# Patient Record
Sex: FEMALE | Race: BLACK OR AFRICAN AMERICAN | NOT HISPANIC OR LATINO | Employment: FULL TIME | URBAN - METROPOLITAN AREA
[De-identification: names, ages, dates, MRNs, and addresses within clinical notes are randomized per-mention and may not be internally consistent; named-entity substitution may affect disease eponyms.]

---

## 2017-01-31 ENCOUNTER — OFFICE VISIT (OUTPATIENT)
Dept: FAMILY MEDICINE | Facility: CLINIC | Age: 47
End: 2017-01-31
Payer: COMMERCIAL

## 2017-01-31 VITALS
DIASTOLIC BLOOD PRESSURE: 66 MMHG | SYSTOLIC BLOOD PRESSURE: 126 MMHG | HEIGHT: 64 IN | OXYGEN SATURATION: 99 % | HEART RATE: 68 BPM | TEMPERATURE: 96.7 F | BODY MASS INDEX: 32.47 KG/M2 | RESPIRATION RATE: 16 BRPM | WEIGHT: 190.2 LBS

## 2017-01-31 DIAGNOSIS — M54.42 CHRONIC LEFT-SIDED LOW BACK PAIN WITH LEFT-SIDED SCIATICA: ICD-10-CM

## 2017-01-31 DIAGNOSIS — E55.9 VITAMIN D INSUFFICIENCY: Chronic | ICD-10-CM

## 2017-01-31 DIAGNOSIS — R73.9 HYPERGLYCEMIA: ICD-10-CM

## 2017-01-31 DIAGNOSIS — E66.09 NON MORBID OBESITY DUE TO EXCESS CALORIES: Chronic | ICD-10-CM

## 2017-01-31 DIAGNOSIS — E66.3 OVER WEIGHT: Primary | ICD-10-CM

## 2017-01-31 DIAGNOSIS — E78.5 HYPERLIPIDEMIA WITH TARGET LDL LESS THAN 130: Chronic | ICD-10-CM

## 2017-01-31 DIAGNOSIS — G89.29 CHRONIC LEFT-SIDED LOW BACK PAIN WITH LEFT-SIDED SCIATICA: ICD-10-CM

## 2017-01-31 LAB — HBA1C MFR BLD: 5.6 % (ref 4.3–6)

## 2017-01-31 PROCEDURE — 82043 UR ALBUMIN QUANTITATIVE: CPT | Performed by: FAMILY MEDICINE

## 2017-01-31 PROCEDURE — 83036 HEMOGLOBIN GLYCOSYLATED A1C: CPT | Performed by: FAMILY MEDICINE

## 2017-01-31 PROCEDURE — 99214 OFFICE O/P EST MOD 30 MIN: CPT | Performed by: FAMILY MEDICINE

## 2017-01-31 PROCEDURE — 36415 COLL VENOUS BLD VENIPUNCTURE: CPT | Performed by: FAMILY MEDICINE

## 2017-01-31 RX ORDER — PHENTERMINE HYDROCHLORIDE 15 MG/1
15 CAPSULE ORAL EVERY MORNING
Qty: 30 CAPSULE | Refills: 0 | Status: SHIPPED | OUTPATIENT
Start: 2017-01-31 | End: 2017-07-25

## 2017-01-31 RX ORDER — PHENTERMINE HYDROCHLORIDE 15 MG/1
15 CAPSULE ORAL EVERY MORNING
Qty: 30 CAPSULE | Refills: 0 | Status: SHIPPED | OUTPATIENT
Start: 2017-03-02 | End: 2017-07-25

## 2017-01-31 RX ORDER — LIDOCAINE 40 MG/G
2 CREAM TOPICAL
Qty: 120 G | Refills: 11 | Status: SHIPPED | OUTPATIENT
Start: 2017-01-31 | End: 2017-07-25

## 2017-01-31 NOTE — MR AVS SNAPSHOT
"              After Visit Summary   1/31/2017    Luci Londono    MRN: [id number]           Patient Information     Date Of Birth          1970        Visit Information        Provider Department      1/31/2017 8:30 AM Stuart Wills MD [facility]        Today's Diagnoses     Over weight    -  1     Hyperglycemia           Care Instructions      A1C 5.0 AVERAGE GLUCOSE   90    A1C 6.0 AVERAGE GLUCOSE 120    A1C 6.5 AVERAGE GLUCOSE 135    A1C 6.8 AVERAGE GLUCOSE 144    A1C 7.0 AVERAGE GLUCOSE 150  Diabetes: Exchange List  What are the exchange lists?   The exchange lists show you portions of food that equal 1 exchange. Foods are divided into food lists. The foods on each list are called exchanges because they have a similar number of calories, protein, carbohydrate and fat content. Foods from each list can be traded or \"exchanged\" for any other food on the same list because they all have a similar exchange value. A dietitian will help you plan how much food your child should eat at each meal and from what lists the foods should come from. At first you should measure your food until you are able to make good estimates about serving sizes. The following list is a sample of foods found on the exchange lists. For more information, you can buy the Exchange Lists for Meal Planning from: The American Diabetes Association [address] [place] [address] 1-786.874.4580 http://www.diabetes.org  Carbohydrate group   Starch List: One starch exchange contains about 15 grams of carbohydrate, 3 grams of protein, 0 to 1 grams of fat, and 80 calories. A starch exchange is sometimes called a carb exchange. Examples of one starch (carb) exchange are:   one slice of bread   1/2 hamburger or hot dog bun   3/4 cup of unsweetened cereal   1/3 cup pasta   3 cups popcorn   crackers (6 small saltines, 3 squares of bobo crackers, 3 of most other crackers)   1 pancake or waffle (5 " "inch)   15 to 20 fat-free or baked potato or corn chips.   The vegetables included in the starch exchanges include:   corn (1/2 cup or 1/2 cob)   white potato (1/4 large baked with skin or 1/2 cup mashed)   yam or sweet potato (1/2 cup)   green peas (1/2 cup)   squash, winter (1 cup)   lima beans (2/3 cup).   Fruit List: 1 fruit exchange contains about 15 grams of carbohydrate and 60 calories. Examples of one fruit exchange are:   grape juice (1/3 cup)   apple or pineapple juice (1/2 cup)   orange or grapefruit juice (1/2 cup)   1 small apple   orange or peach   1/2 banana   1 cup raspberries   1/3 of a small cantaloupe   1 slice of watermelon.   Milk List: 1 milk exchange contains about 8 grams of protein and 12 grams of carbohydrate. Items on the milk list are divided into fat-free, reduced fat, and whole milk depending on the number of fat grams in the exchange. Examples of one milk exchange are: Fat-Free (0 to 3 grams of fat)   1 cup of skim or non-fat milk   1 cup of 1% milk (also includes 1/2 fat exchange)   6 ounces flavored fat-free yogurt   Reduced-Fat (5 grams of fat)   6 ounces of plain, low-fat yogurt   1 cup 2% milk (also includes one fat exchange).   Whole Milk (8 grams of fat)   8 ounces of plain yogurt (made from whole milk)   1 cup whole milk.   Vegetable List: One vegetable exchange has 5 grams of carbohydrate, 2 grams of protein, no fat, and 25 calories. One-half cup of cooked or a cup of raw vegetables is a good measure for 1 exchange of most vegetables. Raw lettuce may be taken in larger quantities, but salad dressing usually equals 1 fat exchange. Other Carbohydrates List: One \"other carbohydrate\" exchange has 15 grams of carbohydrate. Many of these foods count as a starch exchange and one or more fat exchanges.   brownie (2 inch square) = 1 carb, 1 fat exchange   fruit snack roll = 1 carb exchange   granola bar = 1 and 1/2 carb exchanges   ice cream (1/2 cup) = 1 carb, 2 fat exchanges   frozen " yogurt (1/2 cup) = 1 carb, 0 to 1 fat exchanges   tortilla chips (6-12) = 1 carb, 2 fat exchanges.   Meat and Meat Substitute Group   Meats are divided into very lean meats, lean meats, medium-fat meats, and high-fat meats. People with diabetes should try to eat more lean and medium fat meats and stay away from the high fat choices. The Very Lean meat group includes foods that contain 7 grams of protein, 0 to 1 gram of fat, and 35 calories for 1 exchange. Examples include:   1 ounce chicken or turkey (white meat, no skin)   1 ounce fresh fish   1 ounce fat-free cheese   2 egg whites   The Lean meat group includes foods that contain 7 grams of protein, 3 grams of fat, and 55 calories for 1 meat exchange. Examples include:   1 ounce chicken or turkey (dark meat, no skin)   1 ounce fish   1 ounce lean pork   1 ounce USDA Select or Choice grades of lean beef   1 ounce cheese (with 3 grams of fat or less per ounce).   The Medium-Fat group includes foods that have 7 grams of protein, 5 grams of fat, and 75 calories for 1 meat exchange. Examples include:   1 ounce of ground beef, most cuts of beef, pork, lamb or veal   1 ounce of cheese (5 grams of fat per ounce or less)   1 egg   1 ounce fried fish.   The High-Fat foods have 7 grams of protein, 8 grams of fat, and 100 calories for 1 meat exchange. This group includes:   1 ounce of pork sausage   1 ounce of spare ribs   1 oz of regular cheese (American, Swiss etc.)   1 oz of lunch meat   1 hot dog (turkey or chicken).   Fat Group   Fat List: Fat is necessary for the body and is particularly important during periods of fasting (overnight), when it is very slowly absorbed. 1 fat exchange contains 5 grams of fat and 45 calories. The monounsaturated fats and polyunsaturated fats are better for us than saturated fats. The fat list includes: 1 exchange of monounsaturated fats equals:   1/2 Tbsp peanut butter   6 almonds   1 tsp of oil (olive, peanut, canola).   1 exchange of  polyunsaturated fats equals:   1 tsp margarine   1 tsp of any vegetable oil (except coconut).   1 exchange of saturated fat includes:   1 tsp butter   1 strip of yun   2 Tbsp of cream (half and half).   Free Foods   A free food contains less than 20 calories or less than 5 grams of carbohydrate per serving. If the food has a serving size listed on its package, it should be limited to 3 servings spread throughout the day. Examples of free foods include:   4 Tbsp fat-free margarine   1 Tbsp fat-free Miracle Whip   sugar-free gelatin   diet soft drinks   catsup   soy sauce   spices.     1. MODIFIED FAST 250 CALORIES TWICE DAILY FEMALES  300 CALORIES TWICE DAILY FOR MALES   OATMEAL AND COTTAGE CHEESE WORK NICELY   COPIOUS WATER BETWEEN   5/2 PLAN DR ENAMORADO [facility]  NORMAL DIET 5 DAYS PER WEEK  SEMIFAST 2 DAYS PER WEEK  LOOK UP 5-2 PLAN ON THE INTERNET    Weight Loss Tips  1. Do not eat after 6 hrs before your expected bedtime  2. Have your heaviest meal for breakfast, a slightly lighter meal at lunch and a snack 6 hrs before bed  3. No sugar/calorie drinks except milk ie no fruit juice, pop, alcohol.  4. Drink milk OR WATER  30min before meals to decrease your hunger. Also it is excellent as part of your last meal of the day snack  5. Drink lots of water  6. Increase fiber in diet: all bran cereal, salads, popcorn etc  7. Have only one small serving of fruit a day about 1/2 cup (as this is high in sugar)  8. EXERCISE is the bottom line. Without it, you will gain weight even on a low calorie diet. Best if done 2-3X a day as can    2. The only way known to prevent diabetes or keep it from getting worse is exercise, 20-40 minutes 3 times a day around the time of meals      SLEEP 8 HOURS PER DAY    bLACK AND BLUEBERRIES EVERY DAY ANTINFLAMMATORY BENEFIT     PLAIN YOGURT SEVERAL TIMES DAILY AS TOLERATED IF NOT ALLERGIC     AVOID HIGH FRUCTOSE SYRUP AND OLENE IN YOUR DIET     GREEN TEA EXTRACT    PROBIOTIC CAPSULE  "DAILY  HIGH QUALITY     DON'T EAT LATE AT NIGHT    TRINO Haley HELPS WITH APPETITE    KEEP A Ku JOURNAL    888 BREATHER WHEN STRESSED OR COUNT BACK FROM 100 WITH SLOW BREATHING      FIT BIT OR PEDOMETER 10,000 STEPS PER DAY       Combination Foods   Many foods, such as casseroles, are mixed together. Your dietitian can help you figure out how many exchanges to count for combination foods. For example:   lasagna (1 cup) = 2 carb exchanges and 2 medium-fat meat exchanges   spaghetti with meatballs (1 cup) = 2 carb exchanges and 2 medium-fat meat exchanges   pizza, cheese (1/4 of 12 in.) = 2 carbs, 2 medium-fat meats   chicken noodle soup (1 cup) = 1 carb exchange   frozen entrée (less than 300 calories) = 2 carbs, 3 lean meat exchanges   macaroni and cheese (1 cup) = 2 carb exchanges and 2 medium-fat meat exchanges.                   Follow-ups after your visit        Who to contact     If you have questions or need follow up information about today's clinic visit or your schedule please contact [facility] directly at [phone].  Normal or non-critical lab and imaging results will be communicated to you by [employer], letter or phone within 4 business days after the clinic has received the results. If you do not hear from us within 7 days, please contact the clinic through [employer] or phone. If you have a critical or abnormal lab result, we will notify you by phone as soon as possible.  Submit refill requests through appsplit or call your pharmacy and they will forward the refill request to us. Please allow 3 business days for your refill to be completed.          Additional Information About Your Visit        MyChart Information     appsplit lets you send messages to your doctor, view your test results, renew your prescriptions, schedule appointments and more. To sign up, go to [email or web address] . Click on \"Log in\" on the left side of the screen, which will take " "you to the Welcome page. Then click on \"Sign up Now\" on the right side of the page.     You will be asked to enter the access code listed below, as well as some personal information. Please follow the directions to create your username and password.     Your access code is: [email or web address]  Expires: 2017  9:33 AM     Your access code will  in 90 days. If you need help or a new code, please call your [facility] clinic or [phone].        Care EveryWhere ID     This is your Care EveryWhere ID. This could be used by other organizations to access your [facility] medical records  [id number]        Your Vitals Were     Pulse Temperature Respirations Height BMI (Body Mass Index) Pulse Oximetry    68 96.7  F (35.9  C) (Tympanic) 16 5' 4\" (1.626 m) 32.63 kg/m2 99%       Blood Pressure from Last 3 Encounters:   17 126/66   16 104/64   09/01/15 108/64    Weight from Last 3 Encounters:   17 190 lb 3.2 oz (86.274 kg)   16 178 lb 12.8 oz (81.103 kg)   09/01/15 193 lb 12.8 oz (87.907 kg)              We Performed the Following     Albumin Random Urine Quantitative     Hemoglobin A1c          Today's Medication Changes          These changes are accurate as of: 17  9:33 AM.  If you have any questions, ask your nurse or doctor.               Start taking these medicines.        Dose/Directions    * phentermine 15 MG capsule   Used for:  Over weight   Started by:  Stuart Wills MD        Dose:  15 mg   Take 1 capsule (15 mg) by mouth every morning   Quantity:  30 capsule   Refills:  0       * phentermine 15 MG capsule   Used for:  Over weight   Started by:  Stuart Wills MD        Dose:  15 mg   Start taking on:  3/2/2017   Take 1 capsule (15 mg) by mouth every morning   Quantity:  30 capsule   Refills:  0       * Notice:  This list has 2 medication(s) that are the same as other medications prescribed for you. Read the directions carefully, and ask your doctor or other care " provider to review them with you.         Where to get your medicines      Some of these will need a paper prescription and others can be bought over the counter.  Ask your nurse if you have questions.     Bring a paper prescription for each of these medications    - phentermine 15 MG capsule  - phentermine 15 MG capsule             Primary Care Provider Office Phone # Fax #    Stuart Charley Wills [phone]       [facility] [address]  [facility] [address]        Thank you!     Thank you for choosing [facility]  for your care. Our goal is always to provide you with excellent care. Hearing back from our patients is one way we can continue to improve our services. Please take a few minutes to complete the written survey that you may receive in the mail after your visit with us. Thank you!             Your Updated Medication List - Protect others around you: Learn how to safely use, store and throw away your medicines at www.disposemymeds.org.          This list is accurate as of: 1/31/17  9:33 AM.  Always use your most recent med list.                   Brand Name Dispense Instructions for use    levothyroxine 125 MCG tablet    SYNTHROID/LEVOTHROID    90 tablet    TAKE 1 TABLET BY MOUTH DAILY       medroxyPROGESTERone 10 MG tablet    PROVERA    90 tablet    TAKE 1 TABLET BY MOUTH EVERY DAY       naproxen 500 MG tablet    NAPROSYN    60 tablet    Take 1 tablet (500 mg) by mouth 2 times daily (with meals)       omeprazole 20 MG CR capsule    priLOSEC    60 capsule    Take 1 capsule (20 mg) by mouth 2 times daily       oseltamivir 75 MG capsule    TAMIFLU    10 capsule    Take 1 capsule (75 mg) by mouth 2 times daily for 5 days       * phentermine 15 MG capsule     30 capsule    Take 1 capsule (15 mg) by mouth every morning       * phentermine 15 MG capsule   Start taking on:  3/2/2017     30 capsule    Take 1 capsule (15 mg) by mouth every  morning       * Notice:  This list has 2 medication(s) that are the same as other medications prescribed for you. Read the directions carefully, and ask your doctor or other care provider to review them with you.

## 2017-01-31 NOTE — Clinical Note
[facility]  [address]  [address]  [place] [address]  [phone]                                                                                                           Luci Corbin Alert  [address]  [place] [facility] [place] [address]    February 1, 2017      Dear Luci,    The results of your recent tests were reviewed and are enclosed.     NORMAL THREE MONTH GLUCOSE AVERAGE   IMPROVED FROM LAST TIME   IN NORMAL LEVEL  Results for orders placed or performed in visit on 01/31/17   Hemoglobin A1c   Result Value Ref Range    Hemoglobin A1C 5.6 4.3 - 6.0 %     Thank you for choosing [facility].  We appreciate the opportunity to serve you and look forward to supporting your healthcare needs in the future.    If you have any questions or concerns, please call me or my staff at [phone].      Sincerely,    Stuart Wills Jr MD

## 2017-01-31 NOTE — PROGRESS NOTES
Quick Note:    Please send normal lab letter when labs are complete  NORMAL THREE MONTH GLUCOSE AVERAGE   IMPROVED FROM LAST TIME   IN NORMAL LEVEL  ALEENA TYLER JR., MD  ______

## 2017-01-31 NOTE — NURSING NOTE
"Chief Complaint   Patient presents with     Musculoskeletal Problem     left hip       Initial /66 mmHg  Pulse 68  Temp(Src) 96.7  F (35.9  C) (Tympanic)  Resp 16  Ht 5' 4\" (1.626 m)  Wt 190 lb 3.2 oz (86.274 kg)  BMI 32.63 kg/m2  SpO2 99% Estimated body mass index is 32.63 kg/(m^2) as calculated from the following:    Height as of this encounter: 5' 4\" (1.626 m).    Weight as of this encounter: 190 lb 3.2 oz (86.274 kg).  BP completed using cuff size: matthew Morel CMA      "

## 2017-01-31 NOTE — PATIENT INSTRUCTIONS
"  A1C 5.0 AVERAGE GLUCOSE   90    A1C 6.0 AVERAGE GLUCOSE 120    A1C 6.5 AVERAGE GLUCOSE 135    A1C 6.8 AVERAGE GLUCOSE 144    A1C 7.0 AVERAGE GLUCOSE 150  Diabetes: Exchange List  What are the exchange lists?   The exchange lists show you portions of food that equal 1 exchange. Foods are divided into food lists. The foods on each list are called exchanges because they have a similar number of calories, protein, carbohydrate and fat content. Foods from each list can be traded or \"exchanged\" for any other food on the same list because they all have a similar exchange value. A dietitian will help you plan how much food your child should eat at each meal and from what lists the foods should come from. At first you should measure your food until you are able to make good estimates about serving sizes. The following list is a sample of foods found on the exchange lists. For more information, you can buy the Exchange Lists for Meal Planning from: The American Diabetes Association [address] [place] [address] [phone] http://www.diabetes.org  Carbohydrate group   Starch List: One starch exchange contains about 15 grams of carbohydrate, 3 grams of protein, 0 to 1 grams of fat, and 80 calories. A starch exchange is sometimes called a carb exchange. Examples of one starch (carb) exchange are:   one slice of bread   1/2 hamburger or hot dog bun   3/4 cup of unsweetened cereal   1/3 cup pasta   3 cups popcorn   crackers (6 small saltines, 3 squares of bobo crackers, 3 of most other crackers)   1 pancake or waffle (5 inch)   15 to 20 fat-free or baked potato or corn chips.   The vegetables included in the starch exchanges include:   corn (1/2 cup or 1/2 cob)   white potato (1/4 large baked with skin or 1/2 cup mashed)   yam or sweet potato (1/2 cup)   green peas (1/2 cup)   squash, winter (1 cup)   lima beans (2/3 cup).   Fruit List: 1 fruit exchange contains about 15 grams of carbohydrate and 60 calories. Examples " "of one fruit exchange are:   grape juice (1/3 cup)   apple or pineapple juice (1/2 cup)   orange or grapefruit juice (1/2 cup)   1 small apple   orange or peach   1/2 banana   1 cup raspberries   1/3 of a small cantaloupe   1 slice of watermelon.   Milk List: 1 milk exchange contains about 8 grams of protein and 12 grams of carbohydrate. Items on the milk list are divided into fat-free, reduced fat, and whole milk depending on the number of fat grams in the exchange. Examples of one milk exchange are: Fat-Free (0 to 3 grams of fat)   1 cup of skim or non-fat milk   1 cup of 1% milk (also includes 1/2 fat exchange)   6 ounces flavored fat-free yogurt   Reduced-Fat (5 grams of fat)   6 ounces of plain, low-fat yogurt   1 cup 2% milk (also includes one fat exchange).   Whole Milk (8 grams of fat)   8 ounces of plain yogurt (made from whole milk)   1 cup whole milk.   Vegetable List: One vegetable exchange has 5 grams of carbohydrate, 2 grams of protein, no fat, and 25 calories. One-half cup of cooked or a cup of raw vegetables is a good measure for 1 exchange of most vegetables. Raw lettuce may be taken in larger quantities, but salad dressing usually equals 1 fat exchange. Other Carbohydrates List: One \"other carbohydrate\" exchange has 15 grams of carbohydrate. Many of these foods count as a starch exchange and one or more fat exchanges.   brownie (2 inch square) = 1 carb, 1 fat exchange   fruit snack roll = 1 carb exchange   granola bar = 1 and 1/2 carb exchanges   ice cream (1/2 cup) = 1 carb, 2 fat exchanges   frozen yogurt (1/2 cup) = 1 carb, 0 to 1 fat exchanges   tortilla chips (6-12) = 1 carb, 2 fat exchanges.   Meat and Meat Substitute Group   Meats are divided into very lean meats, lean meats, medium-fat meats, and high-fat meats. People with diabetes should try to eat more lean and medium fat meats and stay away from the high fat choices. The Very Lean meat group includes foods that contain 7 grams of " protein, 0 to 1 gram of fat, and 35 calories for 1 exchange. Examples include:   1 ounce chicken or turkey (white meat, no skin)   1 ounce fresh fish   1 ounce fat-free cheese   2 egg whites   The Lean meat group includes foods that contain 7 grams of protein, 3 grams of fat, and 55 calories for 1 meat exchange. Examples include:   1 ounce chicken or turkey (dark meat, no skin)   1 ounce fish   1 ounce lean pork   1 ounce USDA Select or Choice grades of lean beef   1 ounce cheese (with 3 grams of fat or less per ounce).   The Medium-Fat group includes foods that have 7 grams of protein, 5 grams of fat, and 75 calories for 1 meat exchange. Examples include:   1 ounce of ground beef, most cuts of beef, pork, lamb or veal   1 ounce of cheese (5 grams of fat per ounce or less)   1 egg   1 ounce fried fish.   The High-Fat foods have 7 grams of protein, 8 grams of fat, and 100 calories for 1 meat exchange. This group includes:   1 ounce of pork sausage   1 ounce of spare ribs   1 oz of regular cheese (American, Swiss etc.)   1 oz of lunch meat   1 hot dog (turkey or chicken).   Fat Group   Fat List: Fat is necessary for the body and is particularly important during periods of fasting (overnight), when it is very slowly absorbed. 1 fat exchange contains 5 grams of fat and 45 calories. The monounsaturated fats and polyunsaturated fats are better for us than saturated fats. The fat list includes: 1 exchange of monounsaturated fats equals:   1/2 Tbsp peanut butter   6 almonds   1 tsp of oil (olive, peanut, canola).   1 exchange of polyunsaturated fats equals:   1 tsp margarine   1 tsp of any vegetable oil (except coconut).   1 exchange of saturated fat includes:   1 tsp butter   1 strip of yun   2 Tbsp of cream (half and half).   Free Foods   A free food contains less than 20 calories or less than 5 grams of carbohydrate per serving. If the food has a serving size listed on its package, it should be limited to 3 servings  spread throughout the day. Examples of free foods include:   4 Tbsp fat-free margarine   1 Tbsp fat-free Miracle Whip   sugar-free gelatin   diet soft drinks   catsup   soy sauce   spices.     1. MODIFIED FAST 250 CALORIES TWICE DAILY FEMALES  300 CALORIES TWICE DAILY FOR MALES   OATMEAL AND COTTAGE CHEESE WORK NICELY   COPIOUS WATER BETWEEN   5/2 PLAN DR ENAMORADO [facility]  NORMAL DIET 5 DAYS PER WEEK  SEMIFAST 2 DAYS PER WEEK  LOOK UP 5-2 PLAN ON THE INTERNET    Weight Loss Tips  1. Do not eat after 6 hrs before your expected bedtime  2. Have your heaviest meal for breakfast, a slightly lighter meal at lunch and a snack 6 hrs before bed  3. No sugar/calorie drinks except milk ie no fruit juice, pop, alcohol.  4. Drink milk OR WATER  30min before meals to decrease your hunger. Also it is excellent as part of your last meal of the day snack  5. Drink lots of water  6. Increase fiber in diet: all bran cereal, salads, popcorn etc  7. Have only one small serving of fruit a day about 1/2 cup (as this is high in sugar)  8. EXERCISE is the bottom line. Without it, you will gain weight even on a low calorie diet. Best if done 2-3X a day as can    2. The only way known to prevent diabetes or keep it from getting worse is exercise, 20-40 minutes 3 times a day around the time of meals      SLEEP 8 HOURS PER DAY    bLACK AND BLUEBERRIES EVERY DAY ANTINFLAMMATORY BENEFIT     PLAIN YOGURT SEVERAL TIMES DAILY AS TOLERATED IF NOT ALLERGIC     AVOID HIGH FRUCTOSE SYRUP AND OLENE IN YOUR DIET     GREEN TEA EXTRACT    PROBIOTIC CAPSULE DAILY  HIGH QUALITY     DON'T EAT LATE AT NIGHT    TRINO MartinezA HELPS WITH APPETITE    KEEP A BLESSINGS JOURNAL    888 BREATHER WHEN STRESSED OR COUNT BACK FROM 100 WITH SLOW BREATHING      FIT BIT OR PEDOMETER 10,000 STEPS PER DAY       Combination Foods   Many foods, such as casseroles, are mixed together. Your dietitian can help you figure out how many exchanges to count for combination  foods. For example:   lasagna (1 cup) = 2 carb exchanges and 2 medium-fat meat exchanges   spaghetti with meatballs (1 cup) = 2 carb exchanges and 2 medium-fat meat exchanges   pizza, cheese (1/4 of 12 in.) = 2 carbs, 2 medium-fat meats   chicken noodle soup (1 cup) = 1 carb exchange   frozen entrée (less than 300 calories) = 2 carbs, 3 lean meat exchanges   macaroni and cheese (1 cup) = 2 carb exchanges and 2 medium-fat meat exchanges.

## 2017-01-31 NOTE — Clinical Note
[facility]  [address]  [address]  [facility] [address]  [phone]                                                                                                           Luci Corbin Alert  [address]  [facility] [address]    February 2, 2017      Dear Luci,    The results of your recent tests were reviewed and are enclosed.     NORMAL URINE ANALYSIS   NORMAL THREE MONTH GLUCOSE AVERAGE   Results for orders placed or performed in visit on 01/31/17   Albumin Random Urine Quantitative   Result Value Ref Range    Creatinine Urine 109 mg/dL    Albumin Urine mg/L <5 mg/L    Albumin Urine mg/g Cr Unable to calculate due to low value 0 - 25 mg/g Cr   Hemoglobin A1c   Result Value Ref Range    Hemoglobin A1C 5.6 4.3 - 6.0 %     Thank you for choosing [facility].  We appreciate the opportunity to serve you and look forward to supporting your healthcare needs in the future.    If you have any questions or concerns, please call me or my staff at [phone].      Sincerely,    Stuart Wills Jr MD

## 2017-01-31 NOTE — Clinical Note
[address]  [address]  [facility] [address] [phone]                                                                                                           Luci Londono  [address]  [facility] [address]    January 31, 2017          Dear Sneha Conway method or extension exercises  Useful disc bulging posteriorly  1. Prone pressups  Please lie on your abdomen.   Please do a push with the upper half of your body only.  This should not cause the pain to shoot down your buttock thigh leg or foot  Start with 10 and repeat x 3 one minute apart.  Repeat x 10 every 1-2 hours  Pain tends to increase in the center of back  And leaves buttock thighs legs and foot over time  You may shift your pelvis in opposite direction of buttock and leg pain to achieve even better results  2. Superman with arms extended  Or at the side Simultaneously lift your arms and legs off the floor. Start with 5-10 over one month increase to 100.  3. Cat stretch or cobra Start  As if in extended position of prone pressup but hold the stretch for 5 or 10 count. Over one moth to count of 30.  4.  Extensions can be done in standing position  As well if prone pressup is inconvenient.  Shift your pelvis in opposite direction of limb pain.  Put your hands  Flat on your buttocks and lean backwards without loss of balance.  Count 10 x 3 times then 10 extensions hourly           ASPERCREME WITH LIDOCAINE 4% FOUR TIMES DAILY           Sincerely,    Stuart Wills Jr MD

## 2017-01-31 NOTE — PROGRESS NOTES
SUBJECTIVE:                                                    Luci Londono is a 46 year old female who presents to clinic today for the following health issues:  Health Maintenance Due   Topic Date Due     TETANUS IMMUNIZATION (SYSTEM ASSIGNED)  01/01/2015     FIT Q1 YR (NO INBASKET)  02/01/2015     INFLUENZA VACCINE (SYSTEM ASSIGNED)  09/01/2016     PAP Q3 YR  01/31/2017     Health Maintenance reviewed at today's visit patient asked to schedule/complete:   Colon Cancer:  Patient declined    Musculoskeletal problem/pain      Duration: couple weeks    Description  Location: left hip, into thigh    Intensity:  moderate    Accompanying signs and symptoms: radiation of pain to thigh    History  Previous similar problem: YES- on the right side  Previous evaluation:  none    Precipitating or alleviating factors:  Trauma or overuse: no   Aggravating factors include: sitting and walking    Therapies tried and outcome: nothing         ONGOING MYALGIAS     LOWER BACK PAIN with RADICULOPATHY LEFT SACROILIAC AREA AND LEFT OUTER THIGH ONGOING    GASTROESOPHAGEAL REFLUX DISEASE WITHOUT ESOPHAGITIS     HYPOTHYROID  CONTROLLED with LAST ADJUSTMENT     ONGOING OBESITY BMI 35    POLYCYSTIC OVARY SYNDROME     HYPERGLYCEMIA with PRE DIABETES       .  Current Outpatient Prescriptions   Medication Sig Dispense Refill     phentermine 15 MG capsule Take 1 capsule (15 mg) by mouth every morning 30 capsule 0     [START ON 3/2/2017] phentermine 15 MG capsule Take 1 capsule (15 mg) by mouth every morning 30 capsule 0     lidocaine (ASPERCREME W/LIDOCAINE) 4 % CREA cream Apply 2 g topically once as needed for mild pain 120 g 11     levothyroxine (SYNTHROID/LEVOTHROID) 125 MCG tablet TAKE 1 TABLET BY MOUTH DAILY 90 tablet 1     medroxyPROGESTERone (PROVERA) 10 MG tablet TAKE 1 TABLET BY MOUTH EVERY DAY 90 tablet 2     naproxen (NAPROSYN) 500 MG tablet Take 1 tablet (500 mg) by mouth 2 times daily (with meals) 60 tablet 5      [DISCONTINUED] levothyroxine (SYNTHROID,LEVOTHROID) 100 MCG tablet TAKE 1 TABLET BY MOUTH DAILY 90 tablet 0     omeprazole (PRILOSEC) 20 MG capsule Take 1 capsule (20 mg) by mouth 2 times daily 60 capsule 7     oseltamivir (TAMIFLU) 75 MG capsule Take 1 capsule (75 mg) by mouth 2 times daily for 5 days 10 capsule 0        No Known Allergies    Immunization History   Administered Date(s) Administered     TD (ADULT, 7+) 2005         reports that she does not drink alcohol.      reports that she does not use illicit drugs.    family history includes DIABETES in her mother.    indicated that her mother is alive. She indicated that her father is .      has past surgical history that includes Hand surgery () and tubal ligation.     reports that she currently engages in sexual activity and has had male partners. She reports using the following method of birth control/protection: Pill.  .  Pediatric History   Patient Guardian Status     Not on file.     Other Topics Concern     Parent/Sibling W/ Cabg, Mi Or Angioplasty Before 65f 55m? No     Social History Narrative         reports that she has never smoked. She has never used smokeless tobacco.    Medical, social, surgical, and family histories reviewed.    Problem list, Medication list, Allergies, and Medical/Social/Surgical histories reviewed in [employer] and updated as appropriate.  Labs reviewed in EPIC  Patient Active Problem List   Diagnosis     Myalgia and myositis     Intervertebral lumbar disc disorder with myelopathy, lumbar region     Nonspecific abnormal finding in stool contents     Esophageal reflux     Helicobacter pylori infection     Other type of migraine     Acquired hypothyroidism     Disorder of metabolism     Polycystic ovaries     Hyperlipidemia with target LDL less than 130     Vitamin D insufficiency     BMI 35     Rosacea     Absence of menstruation     Hyperglycemia     Chronic left-sided low back pain with left-sided sciatica      Past Surgical History   Procedure Laterality Date     Hand surgery  2002     left     Tubal ligation         Social History   Substance Use Topics     Smoking status: Never Smoker      Smokeless tobacco: Never Used     Alcohol Use: No     Family History   Problem Relation Age of Onset     DIABETES Mother          No Known Allergies  Recent Labs   Lab Test  01/31/17   0942  06/30/16   1214  09/01/15   1336  09/11/14   0949   01/31/14   0932  04/17/13   0952   A1C  5.6  6.1*   --    --    --   5.7   --    LDL   --   123*  118  151*   --   124   --    HDL   --   64  71   --    --   62   --    TRIG   --   58  83   --    --   69   --    ALT   --   31   --    --    --   19  22   CR   --    --    --    --    --   0.90  0.90   GFRESTIMATED   --    --    --    --    --   69  69   GFRESTBLACK   --    --    --    --    --   83  84   POTASSIUM   --    --    --    --    --   4.6  3.9   TSH   --   3.09  3.81   --    < >  8.70*   --     < > = values in this interval not displayed.        BP Readings from Last 6 Encounters:   01/31/17 126/66   06/30/16 104/64   09/01/15 108/64   02/25/15 116/72   12/11/14 120/72   12/08/14 128/82       Wt Readings from Last 3 Encounters:   01/31/17 190 lb 3.2 oz (86.274 kg)   06/30/16 178 lb 12.8 oz (81.103 kg)   09/01/15 193 lb 12.8 oz (87.907 kg)         Positive symptoms or findings indicated by bold designation:     ROS: 10 point ROS neg other than the symptoms noted above in the HPI.except  has Myalgia and myositis; Intervertebral lumbar disc disorder with myelopathy, lumbar region; Nonspecific abnormal finding in stool contents; Esophageal reflux; Helicobacter pylori infection; Other type of migraine; Acquired hypothyroidism; Disorder of metabolism; Polycystic ovaries; Hyperlipidemia with target LDL less than 130; Vitamin D insufficiency; BMI 35; Rosacea; Absence of menstruation; Hyperglycemia; and Chronic left-sided low back pain with left-sided sciatica on her problem list.    Constitutional: The patient denied fatigue, fever, insomnia, night sweats, recent illness and weight loss.  OBESITYO     Eyes: The patient denied blindness, eye pain, eye tearing, photophobia, vision change and visual disturbance. NO VISION       Ears/Nose/Throat/Neck: The patient denied dizziness, facial pain, hearing loss, nasal discharge, oral pain, otalgia, postnasal drip, sinus congestion, sore throat, tinnitus and voice change.   NORMAL HEARING AND BALANCE     Cardiovascular: The patient denied arrhythmia, chest pain/pressure, claudication, edema, exercise intolerance, fatigue, orthopnea, palpitations and syncope.  WITHIN NORMAL LIMITS     Respiratory: The patient denied asthma, chest congestion, cough, dyspnea on exertion, dyspnea/shortness of breath, hemoptysis, pedal edema, pleuritic pain, productive sputum, snoring and wheezing. NORMAL     Gastrointestinal: The patient denied abdominal pain, anorexia, constipation, diarrhea, dysphagia, gastroesophageal reflux, hematochezia, hemorrhoids, melena, nausea and vomiting . GASTROESOPHAGEAL REFLUX DISEASE CONTROLLED     Genitourinary/Nephrology: The patient denied breast complaint, dysuria, nocturia sexual dysfunction, t, urinary frequency, urinary incontinence, urinary urgency    ASYMPTOMATIC     Musculoskeletal:  SEE HISTORY OF PRESENT ILLNESS     Dermatoligic:: The patient denied acne, dermatitis, ecchymosis, itching, mole change, rash, skin cancer, skin lesion and sores.      Neurologic: The patient denied dizziness, gait abnormality, headache, memory loss, mental status change, paresis, paresthesia, seizure, syncope, tremor and vision change.     Psychiatric: The patient denied anxiety, depression, disturbances of memory, drug abuse, insomnia, mood swings and relationship difficulties.      Endocrine: The patient denied , goiter, obesity, polyuria and thyroid disease.  MILD  OBESITY     Hematologic/Lymphatic: The patient denied abnormal bleeding and bruising,  "abnormal ecchymoses, anemia, lymph node enlargement/mass, petechiae and venous  Thrombosis.      Allergy/Immunology: The patient denied food allergy and  Allergic rhinitis or conjunctivitis.        PE:  /66 mmHg  Pulse 68  Temp(Src) 96.7  F (35.9  C) (Tympanic)  Resp 16  Ht 5' 4\" (1.626 m)  Wt 190 lb 3.2 oz (86.274 kg)  BMI 32.63 kg/m2  SpO2 99% Body mass index is 32.63 kg/(m^2).    Constitutional: general appearance, well nourished, well developed, in no acute distress, well developed, appears stated age, normal body habitus,      Eyes:; The patient has normal eyelids sclerae and conjunctivae :      Ears/Nose/Throat: external ear, overall: normal appearance; external nose, overall: benign appearance, normal moujth gums and lips  The patient has:      Neck: thyroid, overall: normal size, normal consistency, nontender,      Respiratory:  palpation of chest, overall: normal excursion,    Clear to percussion and auscultation      Tachypnea   Color  WITHIN NORMAL LIMITS     Cardiovascular:  Good color with no peripheral edema  NORMAL   Regular sinus rhythm without murmur. Physiologic heart sounds Heart is unelarged  .   Chest/Breast: normal shape   NORMAL      Abdominal exam,  Liver and spleen are  unenlarged  NORMAL       Tenderness  NORMAL   Scars  NOT APPLICABLE     Urogenital; no renal, flank or bladder  tenderness;  NORMAL     Lymphatic: neck nodes,  NORMAL     Other nodes  NOT APPLICABLE     Musculoskeletal:  Brief ortho exam normal except:   NEGATIVE STRAIGHT LEG RAISING TEST     Integument: inspection of skin, no rash, lesions; and, palpation, no induration, no tenderness.      Neurologic mental status, overall: alert and oriented; gait, no ataxia, no unsteadiness; coordination, no tremors; cranial nerves, overall: normal motor, overall: normal bulk, tone.      Psychiatric: orientation/consciousness, overall: oriented to person, place and time; behavior/psychomotor activity, no tics, normal " psychomotor activity; mood and affect, overall: normal mood and affect; appearance, overall: well-groomed, good eye contact; speech, overall: normal quality, no aphasia and normal quality, quantity, intact.      Diagnostic Test Results:  Results for orders placed or performed in visit on 01/31/17   Hemoglobin A1c   Result Value Ref Range    Hemoglobin A1C 5.6 4.3 - 6.0 %         ICD-10-CM    1. Over weight E66.3 phentermine 15 MG capsule     phentermine 15 MG capsule     Albumin Random Urine Quantitative     Hemoglobin A1c   2. Hyperglycemia R73.9 Albumin Random Urine Quantitative     Hemoglobin A1c   3. Chronic left-sided low back pain with left-sided sciatica M54.42 lidocaine (ASPERCREME W/LIDOCAINE) 4 % CREA cream    G89.29    4. Hyperlipidemia with target LDL less than 130 E78.5    5. Non morbid obesity due to excess calories E66.09    6. Vitamin D insufficiency E55.9         .    Side effects benefits and risks thoroughly discussed. .she may come in early if unimproved or getting worse          Importance of adhering to regimen discussed and if medications were dispensed, the importance of taking medications discussed and bringing in the medications after every visit for chronic problems         Please drink 2 glasses of water prior to meals and walk 15-30 minutes after meals    I spent 25 MINUTES SPENT  with patient discussing the following issues    The primary encounter diagnosis was Over weight. Diagnoses of Hyperglycemia, Chronic left-sided low back pain with left-sided sciatica, Hyperlipidemia with target LDL less than 130, Non morbid obesity due to excess calories, and Vitamin D insufficiency were also pertinent to this visit. over half of which involved counseling and coordination of care.    Patient Instructions     A1C 5.0 AVERAGE GLUCOSE   90    A1C 6.0 AVERAGE GLUCOSE 120    A1C 6.5 AVERAGE GLUCOSE 135    A1C 6.8 AVERAGE GLUCOSE 144    A1C 7.0 AVERAGE GLUCOSE 150  Diabetes: Exchange List  What are  "the exchange lists?   The exchange lists show you portions of food that equal 1 exchange. Foods are divided into food lists. The foods on each list are called exchanges because they have a similar number of calories, protein, carbohydrate and fat content. Foods from each list can be traded or \"exchanged\" for any other food on the same list because they all have a similar exchange value. A dietitian will help you plan how much food your child should eat at each meal and from what lists the foods should come from. At first you should measure your food until you are able to make good estimates about serving sizes. The following list is a sample of foods found on the exchange lists. For more information, you can buy the Exchange Lists for Meal Planning from: The American Diabetes Association [address] [place] [address] 1-253.764.1047 http://www.diabetes.org  Carbohydrate group   Starch List: One starch exchange contains about 15 grams of carbohydrate, 3 grams of protein, 0 to 1 grams of fat, and 80 calories. A starch exchange is sometimes called a carb exchange. Examples of one starch (carb) exchange are:   one slice of bread   1/2 hamburger or hot dog bun   3/4 cup of unsweetened cereal   1/3 cup pasta   3 cups popcorn   crackers (6 small saltines, 3 squares of bobo crackers, 3 of most other crackers)   1 pancake or waffle (5 inch)   15 to 20 fat-free or baked potato or corn chips.   The vegetables included in the starch exchanges include:   corn (1/2 cup or 1/2 cob)   white potato (1/4 large baked with skin or 1/2 cup mashed)   yam or sweet potato (1/2 cup)   green peas (1/2 cup)   squash, winter (1 cup)   lima beans (2/3 cup).   Fruit List: 1 fruit exchange contains about 15 grams of carbohydrate and 60 calories. Examples of one fruit exchange are:   grape juice (1/3 cup)   apple or pineapple juice (1/2 cup)   orange or grapefruit juice (1/2 cup)   1 small apple   orange or peach   1/2 banana   1 cup " "raspberries   1/3 of a small cantaloupe   1 slice of watermelon.   Milk List: 1 milk exchange contains about 8 grams of protein and 12 grams of carbohydrate. Items on the milk list are divided into fat-free, reduced fat, and whole milk depending on the number of fat grams in the exchange. Examples of one milk exchange are: Fat-Free (0 to 3 grams of fat)   1 cup of skim or non-fat milk   1 cup of 1% milk (also includes 1/2 fat exchange)   6 ounces flavored fat-free yogurt   Reduced-Fat (5 grams of fat)   6 ounces of plain, low-fat yogurt   1 cup 2% milk (also includes one fat exchange).   Whole Milk (8 grams of fat)   8 ounces of plain yogurt (made from whole milk)   1 cup whole milk.   Vegetable List: One vegetable exchange has 5 grams of carbohydrate, 2 grams of protein, no fat, and 25 calories. One-half cup of cooked or a cup of raw vegetables is a good measure for 1 exchange of most vegetables. Raw lettuce may be taken in larger quantities, but salad dressing usually equals 1 fat exchange. Other Carbohydrates List: One \"other carbohydrate\" exchange has 15 grams of carbohydrate. Many of these foods count as a starch exchange and one or more fat exchanges.   brownie (2 inch square) = 1 carb, 1 fat exchange   fruit snack roll = 1 carb exchange   granola bar = 1 and 1/2 carb exchanges   ice cream (1/2 cup) = 1 carb, 2 fat exchanges   frozen yogurt (1/2 cup) = 1 carb, 0 to 1 fat exchanges   tortilla chips (6-12) = 1 carb, 2 fat exchanges.   Meat and Meat Substitute Group   Meats are divided into very lean meats, lean meats, medium-fat meats, and high-fat meats. People with diabetes should try to eat more lean and medium fat meats and stay away from the high fat choices. The Very Lean meat group includes foods that contain 7 grams of protein, 0 to 1 gram of fat, and 35 calories for 1 exchange. Examples include:   1 ounce chicken or turkey (white meat, no skin)   1 ounce fresh fish   1 ounce fat-free cheese   2 egg " whites   The Lean meat group includes foods that contain 7 grams of protein, 3 grams of fat, and 55 calories for 1 meat exchange. Examples include:   1 ounce chicken or turkey (dark meat, no skin)   1 ounce fish   1 ounce lean pork   1 ounce USDA Select or Choice grades of lean beef   1 ounce cheese (with 3 grams of fat or less per ounce).   The Medium-Fat group includes foods that have 7 grams of protein, 5 grams of fat, and 75 calories for 1 meat exchange. Examples include:   1 ounce of ground beef, most cuts of beef, pork, lamb or veal   1 ounce of cheese (5 grams of fat per ounce or less)   1 egg   1 ounce fried fish.   The High-Fat foods have 7 grams of protein, 8 grams of fat, and 100 calories for 1 meat exchange. This group includes:   1 ounce of pork sausage   1 ounce of spare ribs   1 oz of regular cheese (American, Swiss etc.)   1 oz of lunch meat   1 hot dog (turkey or chicken).   Fat Group   Fat List: Fat is necessary for the body and is particularly important during periods of fasting (overnight), when it is very slowly absorbed. 1 fat exchange contains 5 grams of fat and 45 calories. The monounsaturated fats and polyunsaturated fats are better for us than saturated fats. The fat list includes: 1 exchange of monounsaturated fats equals:   1/2 Tbsp peanut butter   6 almonds   1 tsp of oil (olive, peanut, canola).   1 exchange of polyunsaturated fats equals:   1 tsp margarine   1 tsp of any vegetable oil (except coconut).   1 exchange of saturated fat includes:   1 tsp butter   1 strip of yun   2 Tbsp of cream (half and half).   Free Foods   A free food contains less than 20 calories or less than 5 grams of carbohydrate per serving. If the food has a serving size listed on its package, it should be limited to 3 servings spread throughout the day. Examples of free foods include:   4 Tbsp fat-free margarine   1 Tbsp fat-free Miracle Whip   sugar-free gelatin   diet soft drinks   catsup   soy sauce    spices.     1. MODIFIED FAST 250 CALORIES TWICE DAILY FEMALES  300 CALORIES TWICE DAILY FOR MALES   OATMEAL AND COTTAGE CHEESE WORK NICELY   COPIOUS WATER BETWEEN   5/2 PLAN DR ENAMORADO [facility]  NORMAL DIET 5 DAYS PER WEEK  SEMIFAST 2 DAYS PER WEEK  LOOK UP 5-2 PLAN ON THE INTERNET    Weight Loss Tips  1. Do not eat after 6 hrs before your expected bedtime  2. Have your heaviest meal for breakfast, a slightly lighter meal at lunch and a snack 6 hrs before bed  3. No sugar/calorie drinks except milk ie no fruit juice, pop, alcohol.  4. Drink milk OR WATER  30min before meals to decrease your hunger. Also it is excellent as part of your last meal of the day snack  5. Drink lots of water  6. Increase fiber in diet: all bran cereal, salads, popcorn etc  7. Have only one small serving of fruit a day about 1/2 cup (as this is high in sugar)  8. EXERCISE is the bottom line. Without it, you will gain weight even on a low calorie diet. Best if done 2-3X a day as can    2. The only way known to prevent diabetes or keep it from getting worse is exercise, 20-40 minutes 3 times a day around the time of meals      SLEEP 8 HOURS PER DAY    bLACK AND BLUEBERRIES EVERY DAY ANTINFLAMMATORY BENEFIT     PLAIN YOGURT SEVERAL TIMES DAILY AS TOLERATED IF NOT ALLERGIC     AVOID HIGH FRUCTOSE SYRUP AND OLENE IN YOUR DIET     GREEN TEA EXTRACT    PROBIOTIC CAPSULE DAILY  HIGH QUALITY     DON'T EAT LATE AT NIGHT    TRINO MartinezA HELPS WITH APPETITE    KEEP A BLESSINGS JOURNAL    888 BREATHER WHEN STRESSED OR COUNT BACK FROM 100 WITH SLOW BREATHING      FIT BIT OR PEDOMETER 10,000 STEPS PER DAY       Combination Foods   Many foods, such as casseroles, are mixed together. Your dietitian can help you figure out how many exchanges to count for combination foods. For example:   lasagna (1 cup) = 2 carb exchanges and 2 medium-fat meat exchanges   spaghetti with meatballs (1 cup) = 2 carb exchanges and 2 medium-fat meat exchanges    pizza, cheese (1/4 of 12 in.) = 2 carbs, 2 medium-fat meats   chicken noodle soup (1 cup) = 1 carb exchange   frozen entrée (less than 300 calories) = 2 carbs, 3 lean meat exchanges   macaroni and cheese (1 cup) = 2 carb exchanges and 2 medium-fat meat exchanges.                 Diet:  MEDITERRANEAN DIET AND DIABETES DIET FOR PRE DIABETES     Exercise:  YES AEROBIC AND BACK   Exercises Range of motion, balance, isometric, and strengthening exercises 30 repetitions twice daily of involved joints      .ALEENA TYLER MD 1/31/2017 12:23 PM  January 31, 2017

## 2017-02-01 LAB
CREAT UR-MCNC: 109 MG/DL
MICROALBUMIN UR-MCNC: <5 MG/L
MICROALBUMIN/CREAT UR: NORMAL MG/G CR (ref 0–25)

## 2017-07-25 ENCOUNTER — OFFICE VISIT (OUTPATIENT)
Dept: FAMILY MEDICINE | Facility: CLINIC | Age: 47
End: 2017-07-25
Payer: MEDICAID

## 2017-07-25 VITALS
OXYGEN SATURATION: 100 % | HEART RATE: 65 BPM | SYSTOLIC BLOOD PRESSURE: 110 MMHG | TEMPERATURE: 96.5 F | HEIGHT: 64 IN | RESPIRATION RATE: 16 BRPM | DIASTOLIC BLOOD PRESSURE: 62 MMHG | WEIGHT: 195 LBS | BODY MASS INDEX: 33.29 KG/M2

## 2017-07-25 DIAGNOSIS — M51.06 INTERVERTEBRAL LUMBAR DISC DISORDER WITH MYELOPATHY, LUMBAR REGION: ICD-10-CM

## 2017-07-25 DIAGNOSIS — E78.5 HYPERLIPIDEMIA WITH TARGET LDL LESS THAN 130: Chronic | ICD-10-CM

## 2017-07-25 DIAGNOSIS — Z23 NEED FOR VACCINATION: ICD-10-CM

## 2017-07-25 DIAGNOSIS — E66.09 NON MORBID OBESITY DUE TO EXCESS CALORIES: ICD-10-CM

## 2017-07-25 DIAGNOSIS — R73.9 HYPERGLYCEMIA: Primary | ICD-10-CM

## 2017-07-25 DIAGNOSIS — E03.9 ACQUIRED HYPOTHYROIDISM: ICD-10-CM

## 2017-07-25 DIAGNOSIS — E55.9 VITAMIN D INSUFFICIENCY: Chronic | ICD-10-CM

## 2017-07-25 LAB — HBA1C MFR BLD: 5.5 % (ref 4.3–6)

## 2017-07-25 PROCEDURE — 36415 COLL VENOUS BLD VENIPUNCTURE: CPT | Performed by: FAMILY MEDICINE

## 2017-07-25 PROCEDURE — 90715 TDAP VACCINE 7 YRS/> IM: CPT | Performed by: FAMILY MEDICINE

## 2017-07-25 PROCEDURE — 83036 HEMOGLOBIN GLYCOSYLATED A1C: CPT | Performed by: FAMILY MEDICINE

## 2017-07-25 PROCEDURE — 82947 ASSAY GLUCOSE BLOOD QUANT: CPT | Performed by: FAMILY MEDICINE

## 2017-07-25 PROCEDURE — 99396 PREV VISIT EST AGE 40-64: CPT | Mod: 25 | Performed by: FAMILY MEDICINE

## 2017-07-25 PROCEDURE — 90471 IMMUNIZATION ADMIN: CPT | Performed by: FAMILY MEDICINE

## 2017-07-25 PROCEDURE — 84460 ALANINE AMINO (ALT) (SGPT): CPT | Performed by: FAMILY MEDICINE

## 2017-07-25 PROCEDURE — 84443 ASSAY THYROID STIM HORMONE: CPT | Performed by: FAMILY MEDICINE

## 2017-07-25 PROCEDURE — 80061 LIPID PANEL: CPT | Performed by: FAMILY MEDICINE

## 2017-07-25 RX ORDER — METFORMIN HCL 500 MG
500 TABLET, EXTENDED RELEASE 24 HR ORAL
Qty: 30 TABLET | Refills: 1 | Status: SHIPPED | OUTPATIENT
Start: 2017-07-25 | End: 2017-08-25

## 2017-07-25 NOTE — LETTER
"[address]  [address]  [facility] [id number] [phone]          July 27, 2017    Luci Corbin Alert                                                                                                                     [address]  [facility] [address]      Dear Luci,    MILDLY HIGH TOTAL CHOLESTEROL   NORMAL TRIGLYCERIDES   NORMAL HDL OR \"GOOD\" CHOLESTEROL   BORDERLINE  HIGH LDL OR \"BAD\" CHOLESTEROL   BORDERLINE  VERY LOW DENSITY CHOLESTEROL   [unfilled]   BP Readings from Last 1 Encounters:   07/25/17 : 110/62     Lab Test        07/25/17     06/30/16     09/01/15      --          01/31/14                        1026          1214          1336           --           0932           CHOL         220*         199          206*          --          200           HDL          69           64           71            --          62             LDL          138*         123*         118            < >        124           TRIG         67           58           83            --          69             CHOLHDLRATIO  --           --          2.9           --          3.2            < > = values in this interval not displayed.                 Lab Test        07/25/17 01/31/17                        1026          0942           A1C          5.5          5.6           The 10-year ASCVD risk score (Troy JONELLE Jr, et al., 2013) is: 0.4%     Values used to calculate the score:       Age: 46 years       Sex: Female       Is Non- : Yes       Diabetic: No       Tobacco smoker: No       Systolic Blood Pressure: 110 mmHg       Is BP treated: No       HDL Cholesterol: 69 mg/dL       Total Cholesterol: 220 mg/dL   LOW RISK FOR MYOCARDIAL INFARCTION   THEREFORE KEEP ON MEDITERRANEAN DIET      What You Can Do to Reduce Cholesterol Levels   and Reduce Risk of Diabetes, Heart, and Blood Vessel Disease   1. Eat six to eight servings of green or root " "vegetables or fruit (raw or cooked) per day. You need 35 grams of fiber per day.   Examples: carrots apples   broccoli oranges   spinach grapefruit   lettuce pears   bananas   2. Use up to 2 cup of walnuts, almonds, or pecans as a source of \"good\" fat per day.   3. Drink one to three servings of soy milk (great for cereal) or 2 cup of soynuts per day.   4. Use margarine with reduced trans or saturated fats (e.g. Benecol, Smart Balance, olive oil margarine) as replacement for butter or margarine.   5. Eat fewer or half-portions of desserts, baked goods, chips, cookies, processed flour and sugar, pasta, butter, cream, non-skim dairy, ice cream.   6. Use olive or canola oil as the only oils for cooking and dressings.   7. Use one tablespoon of ground flaxseed or Natural Ovens \"Energy Mix\" per day (great on cereal or over salad).   8. Eat one to two servings per week of wild salmon or water-packed tuna.   9. Limit beef, lamb, and pork to at most one serving per day. (Range-fed beef, if you can get it, is much preferred and allows for greater use in diet).   10. Eat three to four servings per day of whole grains (legumes, rice, wheat, oats).   11. Limit sodas and beer at most to three per week (only special occasions).   12. 65 percent of us need to lose weight and all of us need to keep moving! You should be walking at least 150 minutes per week. You should lose approximately seven percent of your weight in the next year if overweight. Check with me for more details.   1. Andrew Weil's paperback book, The Healthy Kitchen, is a great addition to your healthy lifestyle library.   2. American Diabetic Association (www.diabetes.org) - a wealth of information on nutritional excellence.   3. Other great websites for Mediterranean diets are available.   THYROID UNDER REPLACED THEREFORE IN   CREASE DOSAGE RECOMMENDED   WILL SEND INTO YOUR PHARMACY   INCREASE  MICROGRAMS AND REPEAT TSH IN 6-8 WEEKS   THIS WILL ALSO HELP " YOUR CHOLESTEROL   Current Outpatient Prescriptions:   metFORMIN (GLUCOPHAGE-XR) 500 MG 24 hr tablet   levothyroxine (SYNTHROID/LEVOTHROID) 125 MCG tablet   medroxyPROGESTERone (PROVERA) 10 MG tablet   omeprazole (PRILOSEC) 20 MG capsule   naproxen (NAPROSYN) 500 MG tablet       Sincerely,         Stuart Wills MD

## 2017-07-25 NOTE — PROGRESS NOTES
SUBJECTIVE:   CC: Luci Londono is an 46 year old woman who presents for preventive health visit.     Healthy Habits:    Do you get at least three servings of calcium containing foods daily (dairy, green leafy vegetables, etc.)? yes    Amount of exercise or daily activities, outside of work: 7 day(s) per week    Problems taking medications regularly No    Medication side effects: No    Have you had an eye exam in the past two years? yes    Do you see a dentist twice per year? once    Do you have sleep apnea, excessive snoring or daytime drowsiness?no          Pain on left side from hip to knee    Today's PHQ-2 Score: PHQ-2 ( 1999 Pfizer) 7/25/2017 9/1/2015   Q1: Little interest or pleasure in doing things 0 0   Q2: Feeling down, depressed or hopeless 0 0   PHQ-2 Score 0 0         Abuse: Current or Past(Physical, Sexual or Emotional)- No  Do you feel safe in your environment - Yes  Social History   Substance Use Topics     Smoking status: Never Smoker     Smokeless tobacco: Never Used     Alcohol use No     The patient does not drink >3 drinks per day nor >7 drinks per week.    Reviewed orders with patient.  Reviewed health maintenance and updated orders accordingly - No  Labs reviewed in EPIC  BP Readings from Last 3 Encounters:   07/25/17 110/62   01/31/17 126/66   06/30/16 104/64    Wt Readings from Last 3 Encounters:   07/25/17 195 lb (88.5 kg)   01/31/17 190 lb 3.2 oz (86.3 kg)   06/30/16 178 lb 12.8 oz (81.1 kg)                  Patient Active Problem List   Diagnosis     Myalgia and myositis     Intervertebral lumbar disc disorder with myelopathy, lumbar region     Nonspecific abnormal finding in stool contents     Esophageal reflux     Helicobacter pylori infection     Other type of migraine     Acquired hypothyroidism     Disorder of metabolism     Polycystic ovaries     Hyperlipidemia with target LDL less than 130     Vitamin D insufficiency     BMI 35     Rosacea     Absence of menstruation      Hyperglycemia     Chronic left-sided low back pain with left-sided sciatica     Past Surgical History:   Procedure Laterality Date     HAND SURGERY  2002    left     TUBAL LIGATION         Social History   Substance Use Topics     Smoking status: Never Smoker     Smokeless tobacco: Never Used     Alcohol use No     Family History   Problem Relation Age of Onset     DIABETES Mother          Current Outpatient Prescriptions   Medication Sig Dispense Refill     levothyroxine (SYNTHROID/LEVOTHROID) 125 MCG tablet TAKE 1 TABLET BY MOUTH DAILY 90 tablet 1     medroxyPROGESTERone (PROVERA) 10 MG tablet TAKE 1 TABLET BY MOUTH EVERY DAY (Patient not taking: Reported on 7/25/2017) 90 tablet 2     omeprazole (PRILOSEC) 20 MG capsule Take 1 capsule (20 mg) by mouth 2 times daily (Patient not taking: Reported on 7/25/2017) 60 capsule 7     naproxen (NAPROSYN) 500 MG tablet Take 1 tablet (500 mg) by mouth 2 times daily (with meals) (Patient not taking: Reported on 7/25/2017) 60 tablet 5     No Known Allergies  Recent Labs   Lab Test  01/31/17   0942  06/30/16   1214  09/01/15   1336  09/11/14   0949   01/31/14   0932  04/17/13   0952   A1C  5.6  6.1*   --    --    --   5.7   --    LDL   --   123*  118  151*   --   124   --    HDL   --   64  71   --    --   62   --    TRIG   --   58  83   --    --   69   --    ALT   --   31   --    --    --   19  22   CR   --    --    --    --    --   0.90  0.90   GFRESTIMATED   --    --    --    --    --   69  69   GFRESTBLACK   --    --    --    --    --   83  84   POTASSIUM   --    --    --    --    --   4.6  3.9   TSH   --   3.09  3.81   --    < >  8.70*   --     < > = values in this interval not displayed.              MAMMOGRAM RECOMMENDED   [facility]  RECOMMENDED     Pertinent mammograms are reviewed under the imaging tab.  History of abnormal Pap smear:  NEAR FUTURE HAVING HER PERIOD     Reviewed and updated as needed this visit by clinical staffTobacco   Med Hx  Surg Hx  Fam Hx  Soc Hx        Reviewed and updated as needed this visit by Provider          Past Medical History:   Diagnosis Date     Hypertension goal BP (blood pressure) < 140/80 2014     Thyroid disease       Past Surgical History:   Procedure Laterality Date     HAND SURGERY      left     TUBAL LIGATION       Obstetric History       T0      L0     SAB0   TAB0   Ectopic0   Multiple0   Live Births0       # Outcome Date GA Lbr Murtaza/2nd Weight Sex Delivery Anes PTL Lv   3 Para            2 Para            1 Para                     ROS: has Myalgia and myositis; Intervertebral lumbar disc disorder with myelopathy, lumbar region; Nonspecific abnormal finding in stool contents; Gastroesophageal reflux disease without esophagitis; Helicobacter pylori infection; Other type of migraine; Acquired hypothyroidism; Disorder of metabolism; Polycystic ovaries; Hyperlipidemia with target LDL less than 130; Vitamin D insufficiency; BMI 35; Rosacea; Absence of menstruation; Hyperglycemia; and Chronic left-sided low back pain with left-sided sciatica on her problem list.    C: NEGATIVE for fever, chills, change in weight  I: NEGATIVE for worrisome rashes, moles or lesions  E: NEGATIVE for vision changes or irritation  ENT: NEGATIVE for ear, mouth and throat problems  R: NEGATIVE for significant cough or SOB  B: NEGATIVE for masses, tenderness or discharge  CV: NEGATIVE for chest pain, palpitations or peripheral edema  GI: NEGATIVE for nausea, abdominal pain, heartburn, or change in bowel habits  : NEGATIVE for unusual urinary or vaginal symptoms. No vaginal bleeding.  MUSCULOSKELETAL:  RIGHT SIDED LOWER BACK PAIN with RADICULOPATHY RIGHT LEG   UPPER BACK PAIN AS WELL   N: NEGATIVE for weakness, dizziness or paresthesias  E: NEGATIVE for temperature intolerance, skin/hair changes  H: NEGATIVE for bleeding problems  P: NEGATIVE for changes in mood or affect     OBJECTIVE:   /62  Pulse  "65  Temp 96.5  F (35.8  C) (Tympanic)  Resp 16  Ht 5' 4\" (1.626 m)  Wt 195 lb (88.5 kg)  SpO2 100%  BMI 33.47 kg/m2  EXAM:  GENERAL: healthy, alert and no distress  EYES: Eyes grossly normal to inspection, PERRL and conjunctivae and sclerae normal  HENT: ear canals and TM's normal, nose and mouth without ulcers or lesions  NECK: no adenopathy, no asymmetry, masses, or scars and thyroid normal to palpation  RESP: lungs clear to auscultation - no rales, rhonchi or wheezes  BREAST: normal without masses, tenderness or nipple discharge and no palpable axillary masses or adenopathy  CV: regular rate and rhythm, normal S1 S2, no S3 or S4, no murmur, click or rub, no peripheral edema and peripheral pulses strong  ABDOMEN: soft, nontender, no hepatosplenomegaly, no masses and bowel sounds normal  MS: no gross musculoskeletal defects noted, no edema  UPPER EXTREMETIES WITHIN NORMAL LIMITS   DECREASE RANGE OF MOTION OF BACK   NEGATIVE STRAIGHT LEG RAISING TEST   NORMAL REFLEXES LOWER EXTREMITY BILATERAL   NO SIGNIFICANT OSTEOARTHRITIS   SKIN: no suspicious lesions or rashes  NEURO: Normal strength and tone, mentation intact and speech normal  PSYCH: mentation appears normal, affect normal/bright  LYMPH: no cervical, supraclavicular, axillary, or inguinal adenopathy  Diabetic foot exam: normal DP and PT pulses, no trophic changes or ulcerative lesions, normal sensory exam and normal monofilament exam  PELVIC EXAM DEFERRED HAVING MENSES   PLAN PAP 1-2 WEEKS   ASSESSMENT/PLAN:       ICD-10-CM    1. Hyperglycemia R73.9 GLUCOSE     Lipid panel reflex to direct LDL     Hemoglobin A1c     ALT     metFORMIN (GLUCOPHAGE-XR) 500 MG 24 hr tablet   2. Hyperlipidemia with target LDL less than 130 E78.5    3. BMI 35 E66.09 metFORMIN (GLUCOPHAGE-XR) 500 MG 24 hr tablet   4. Intervertebral lumbar disc disorder with myelopathy, lumbar region M51.06    5. Acquired hypothyroidism E03.9 TSH   6. Vitamin D insufficiency E55.9    7. Need for " "vaccination Z23 TDAP VACCINE (ADACEL) [91567.002]     1st  Administration  [81988]       COUNSELING:   Reviewed preventive health counseling, as reflected in patient instructions       Regular exercise       Healthy diet/nutrition       Vision screening       Hearing screening       Immunizations    Vaccinated for: Td          BP Screening:   Last 3 BP Readings:    BP Readings from Last 3 Encounters:   07/25/17 110/62   01/31/17 126/66   06/30/16 104/64       The following was recommended to the patient:       reports that she has never smoked. She has never used smokeless tobacco.    Estimated body mass index is 33.47 kg/(m^2) as calculated from the following:    Height as of this encounter: 5' 4\" (1.626 m).    Weight as of this encounter: 195 lb (88.5 kg).   Weight management plan: Discussed healthy diet and exercise guidelines and patient will follow up in 2 WEEKS  in clinic to re-evaluate. 2 WEEKS     Counseling Resources:  ATP IV Guidelines  Pooled Cohorts Equation Calculator  Breast Cancer Risk Calculator  FRAX Risk Assessment  ICSI Preventive Guidelines  Dietary Guidelines for Americans, 2010  USDA's MyPlate  ASA Prophylaxis  Lung CA Screening    ALEENA TYLER MD  [facility]  "

## 2017-07-25 NOTE — NURSING NOTE
"Chief Complaint   Patient presents with     Physical       Initial /62  Pulse 65  Temp 96.5  F (35.8  C) (Tympanic)  Resp 16  Ht 5' 4\" (1.626 m)  Wt 195 lb (88.5 kg)  SpO2 100%  BMI 33.47 kg/m2 Estimated body mass index is 33.47 kg/(m^2) as calculated from the following:    Height as of this encounter: 5' 4\" (1.626 m).    Weight as of this encounter: 195 lb (88.5 kg).  Medication Reconciliation: complete   Emily Morel CMA    "

## 2017-07-25 NOTE — MR AVS SNAPSHOT
After Visit Summary   7/25/2017    Luci Londono    MRN: [id number]           Patient Information     Date Of Birth          1970        Visit Information        Provider Department      7/25/2017 10:15 AM Stuart Wills MD [facility]        Today's Diagnoses     Hyperglycemia    -  1    Hyperlipidemia with target LDL less than 130        BMI 35        Intervertebral lumbar disc disorder with myelopathy, lumbar region        Acquired hypothyroidism        Vitamin D insufficiency          Care Instructions      Preventive Health Recommendations  Female Ages 40 to 49    Yearly exam:     See your health care provider every year in order to  1. Review health changes.   2. Discuss preventive care.    3. Review your medicines if your doctor prescribed any.      Get a Pap test every three years (unless you have an abnormal result and your provider advises testing more often).      If you get Pap tests with HPV test, you only need to test every 5 years, unless you have an abnormal result. You do not need a Pap test if your uterus was removed (hysterectomy) and you have not had cancer.      You should be tested each year for STDs (sexually transmitted diseases), if you're at risk.       Ask your doctor if you should have a mammogram.      Have a colonoscopy (test for colon cancer) if someone in your family has had colon cancer or polyps before age 50.       Have a cholesterol test every 5 years.       Have a diabetes test (fasting glucose) after age 45. If you are at risk for diabetes, you should have this test every 3 years.    Shots: Get a flu shot each year. Get a tetanus shot every 10 years.     Nutrition:     Eat at least 5 servings of fruits and vegetables each day.    Eat whole-grain bread, whole-wheat pasta and brown rice instead of white grains and rice.    Talk to your provider about Calcium and Vitamin D.     Lifestyle    Exercise at least  150 minutes a week (an average of 30 minutes a day, 5 days a week). This will help you control your weight and prevent disease.    Limit alcohol to one drink per day.    No smoking.     Wear sunscreen to prevent skin cancer.    See your dentist every six months for an exam and cleaning.    Assessment: Lower back pain    Plan: do these exercises at least twice daily:  Standing or sitting exercise can be done every two hours    Justo' Flexion Versus Sneha   Extension Exercises For   Low Back Pain   Examples of Justo' Flexion Exercises  1. Pelvic tilt.  Please press the small of your back against the floor.  Start with 5-10  and increase to 100 count over one month   2. Single Knee to chest. Lie on your back with legs in bent position. Alternate one leg and the other very slowly bringing the knee to chest.  Start with a count of 5-10   Over one month work up to 100  3. Double knee to chest.  Lie on your back with knees in bent position.  Bring both knees to the chest slowly hold for a count of 5-to 10. Over one month work to 100  4. Partial sit-up or crunch.  Lie on your back in bent leg position.  Please bring your body with arms crossed in front  To 30 degrees of flexion. Start with 5-10 over one month work up to 100 or more  5. Sit back.  Please sit on the side of the bed or a stair landing    Sneha method or extension exercises  Useful disc bulging posteriorly  1. Prone pressups  Please lie on your abdomen.   Please do a push with the upper half of your body only.  This should not cause the pain to shoot down your buttock thigh leg or foot  Start with 10 and repeat x 3 one minute apart.  Repeat x 10 every 1-2 hours  Pain tends to increase in the center of back  And leaves buttock thighs legs and foot over time  You may shift your pelvis in opposite direction of buttock and leg pain to achieve even better results  2. Superman with arms extended  Or at the side Simultaneously lift your arms and legs off  "the floor. Start with 5-10 over one month increase to 100.  3. Cat stretch or cobra Start  As if in extended position of prone pressup but hold the stretch for 5 or 10 count. Over one moth to count of 30.  4.  Extensions can be done in standing position  As well if prone pressup is inconvenient.  Shift your pelvis in opposite direction of limb pain.  Put your hands  Flat on your buttocks and lean backwards without loss of balance.  Count 10 x 3 times then 10 extensions hourly      What You Can Do to Reduce Cholesterol Levels  and Reduce Risk of Diabetes, Heart, and Blood Vessel Disease  1. Eat six to eight servings of green or root vegetables or fruit (raw or cooked) per day. You need 35 grams of fiber per day.  Examples: carrots apples  broccoli oranges  spinach grapefruit  lettuce pears  bananas  2. Use up to 2 cup of walnuts, almonds, or pecans as a source of \"good\" fat per day.  3. Drink one to three servings of soy milk (great for cereal) or 2 cup of soynuts per day.  4. Use margarine with reduced trans or saturated fats (e.g. Benecol, Smart Balance, olive oil margarine) as replacement for butter or margarine.  5. Eat fewer or half-portions of desserts, baked goods, chips, cookies, processed flour and sugar, pasta, butter, cream, non-skim dairy, ice cream.  6. Use olive or canola oil as the only oils for cooking and dressings.  7. Use one tablespoon of ground flaxseed or Natural Ovens \"Energy Mix\" per day (great on cereal or over salad).  8. Eat one to two servings per week of wild salmon or water-packed tuna.  9. Limit beef, lamb, and pork to at most one serving per day. (Range-fed beef, if you can get it, is much preferred and allows for greater use in diet).  10. Eat three to four servings per day of whole grains (legumes, rice, wheat, oats).  11. Limit sodas and beer at most to three per week (only special occasions).  12. 65 percent of us need to lose weight and all of us need to keep moving! You should be " "walking at least 150 minutes per week. You should lose approximately seven percent of your weight in the next year if overweight. Check with me for more details.  1. Andrew Weil's paperback book, The Healthy Kitchen, is a great addition to your healthy lifestyle library.  2. American Diabetic Association (www.diabetes.org) - a wealth of information on nutritional excellence.  3. Other great websites for Mediterranean diets are available.  Diabetes: Exchange List  What are the exchange lists?   The exchange lists show you portions of food that equal 1 exchange. Foods are divided into food lists. The foods on each list are called exchanges because they have a similar number of calories, protein, carbohydrate and fat content. Foods from each list can be traded or \"exchanged\" for any other food on the same list because they all have a similar exchange value. A dietitian will help you plan how much food your child should eat at each meal and from what lists the foods should come from. At first you should measure your food until you are able to make good estimates about serving sizes. The following list is a sample of foods found on the exchange lists. For more information, you can buy the Exchange Lists for Meal Planning from: The American Diabetes Association [address] [place] [address] [phone] http://www.diabetes.org  Carbohydrate group   Starch List: One starch exchange contains about 15 grams of carbohydrate, 3 grams of protein, 0 to 1 grams of fat, and 80 calories. A starch exchange is sometimes called a carb exchange. Examples of one starch (carb) exchange are:   one slice of bread   1/2 hamburger or hot dog bun   3/4 cup of unsweetened cereal   1/3 cup pasta   3 cups popcorn   crackers (6 small saltines, 3 squares of bobo crackers, 3 of most other crackers)   1 pancake or waffle (5 inch)   15 to 20 fat-free or baked potato or corn chips.   The vegetables included in the starch exchanges include: " "  corn (1/2 cup or 1/2 cob)   white potato (1/4 large baked with skin or 1/2 cup mashed)   yam or sweet potato (1/2 cup)   green peas (1/2 cup)   squash, winter (1 cup)   lima beans (2/3 cup).   Fruit List: 1 fruit exchange contains about 15 grams of carbohydrate and 60 calories. Examples of one fruit exchange are:   grape juice (1/3 cup)   apple or pineapple juice (1/2 cup)   orange or grapefruit juice (1/2 cup)   1 small apple   orange or peach   1/2 banana   1 cup raspberries   1/3 of a small cantaloupe   1 slice of watermelon.   Milk List: 1 milk exchange contains about 8 grams of protein and 12 grams of carbohydrate. Items on the milk list are divided into fat-free, reduced fat, and whole milk depending on the number of fat grams in the exchange. Examples of one milk exchange are: Fat-Free (0 to 3 grams of fat)   1 cup of skim or non-fat milk   1 cup of 1% milk (also includes 1/2 fat exchange)   6 ounces flavored fat-free yogurt   Reduced-Fat (5 grams of fat)   6 ounces of plain, low-fat yogurt   1 cup 2% milk (also includes one fat exchange).   Whole Milk (8 grams of fat)   8 ounces of plain yogurt (made from whole milk)   1 cup whole milk.   Vegetable List: One vegetable exchange has 5 grams of carbohydrate, 2 grams of protein, no fat, and 25 calories. One-half cup of cooked or a cup of raw vegetables is a good measure for 1 exchange of most vegetables. Raw lettuce may be taken in larger quantities, but salad dressing usually equals 1 fat exchange. Other Carbohydrates List: One \"other carbohydrate\" exchange has 15 grams of carbohydrate. Many of these foods count as a starch exchange and one or more fat exchanges.   brownie (2 inch square) = 1 carb, 1 fat exchange   fruit snack roll = 1 carb exchange   granola bar = 1 and 1/2 carb exchanges   ice cream (1/2 cup) = 1 carb, 2 fat exchanges   frozen yogurt (1/2 cup) = 1 carb, 0 to 1 fat exchanges   tortilla chips (6-12) = 1 carb, 2 fat exchanges.   Meat and Meat " Substitute Group   Meats are divided into very lean meats, lean meats, medium-fat meats, and high-fat meats. People with diabetes should try to eat more lean and medium fat meats and stay away from the high fat choices. The Very Lean meat group includes foods that contain 7 grams of protein, 0 to 1 gram of fat, and 35 calories for 1 exchange. Examples include:   1 ounce chicken or turkey (white meat, no skin)   1 ounce fresh fish   1 ounce fat-free cheese   2 egg whites   The Lean meat group includes foods that contain 7 grams of protein, 3 grams of fat, and 55 calories for 1 meat exchange. Examples include:   1 ounce chicken or turkey (dark meat, no skin)   1 ounce fish   1 ounce lean pork   1 ounce USDA Select or Choice grades of lean beef   1 ounce cheese (with 3 grams of fat or less per ounce).   The Medium-Fat group includes foods that have 7 grams of protein, 5 grams of fat, and 75 calories for 1 meat exchange. Examples include:   1 ounce of ground beef, most cuts of beef, pork, lamb or veal   1 ounce of cheese (5 grams of fat per ounce or less)   1 egg   1 ounce fried fish.   The High-Fat foods have 7 grams of protein, 8 grams of fat, and 100 calories for 1 meat exchange. This group includes:   1 ounce of pork sausage   1 ounce of spare ribs   1 oz of regular cheese (American, Swiss etc.)   1 oz of lunch meat   1 hot dog (turkey or chicken).   Fat Group   Fat List: Fat is necessary for the body and is particularly important during periods of fasting (overnight), when it is very slowly absorbed. 1 fat exchange contains 5 grams of fat and 45 calories. The monounsaturated fats and polyunsaturated fats are better for us than saturated fats. The fat list includes: 1 exchange of monounsaturated fats equals:   1/2 Tbsp peanut butter   6 almonds   1 tsp of oil (olive, peanut, canola).   1 exchange of polyunsaturated fats equals:   1 tsp margarine   1 tsp of any vegetable oil (except coconut).   1 exchange of  saturated fat includes:   1 tsp butter   1 strip of yun   2 Tbsp of cream (half and half).   Free Foods   A free food contains less than 20 calories or less than 5 grams of carbohydrate per serving. If the food has a serving size listed on its package, it should be limited to 3 servings spread throughout the day. Examples of free foods include:   4 Tbsp fat-free margarine   1 Tbsp fat-free Miracle Whip   sugar-free gelatin   diet soft drinks   catsup   soy sauce   spices.   Combination Foods   Many foods, such as casseroles, are mixed together. Your dietitian can help you figure out how many exchanges to count for combination foods. For example:   lasagna (1 cup) = 2 carb exchanges and 2 medium-fat meat exchanges   spaghetti with meatballs (1 cup) = 2 carb exchanges and 2 medium-fat meat exchanges   pizza, cheese (1/4 of 12 in.) = 2 carbs, 2 medium-fat meats   chicken noodle soup (1 cup) = 1 carb exchange   frozen entrée (less than 300 calories) = 2 carbs, 3 lean meat exchanges   macaroni and cheese (1 cup) = 2 carb exchanges and 2 medium-fat meat exchanges.                           Follow-ups after your visit        Who to contact     If you have questions or need follow up information about today's clinic visit or your schedule please contact [facility] directly at [phone].  Normal or non-critical lab and imaging results will be communicated to you by MyChart, letter or phone within 4 business days after the clinic has received the results. If you do not hear from us within 7 days, please contact the clinic through [facility] or phone. If you have a critical or abnormal lab result, we will notify you by phone as soon as possible.  Submit refill requests through [facility] or call your pharmacy and they will forward the refill request to us. Please allow 3 business days for your refill to be completed.          Additional Information About Your Visit        MyChart Information   "   [employer] lets you send messages to your doctor, view your test results, renew your prescriptions, schedule appointments and more. To sign up, go to [email or web address] . Click on \"Log in\" on the left side of the screen, which will take you to the Welcome page. Then click on \"Sign up Now\" on the right side of the page.     You will be asked to enter the access code listed below, as well as some personal information. Please follow the directions to create your username and password.     Your access code is: [email or web address]  Expires: 10/23/2017 10:39 AM     Your access code will  in 90 days. If you need help or a new code, please call your [facility] clinic or [phone].        Care EveryWhere ID     This is your Care EveryWhere ID. This could be used by other organizations to access your [facility] medical records  [id number]        Your Vitals Were     Pulse Temperature Respirations Height Pulse Oximetry BMI (Body Mass Index)    65 96.5  F (35.8  C) (Tympanic) 16 5' 4\" (1.626 m) 100% 33.47 kg/m2       Blood Pressure from Last 3 Encounters:   17 110/62   17 126/66   16 104/64    Weight from Last 3 Encounters:   17 195 lb (88.5 kg)   17 190 lb 3.2 oz (86.3 kg)   16 178 lb 12.8 oz (81.1 kg)              We Performed the Following     ALT     GLUCOSE     Hemoglobin A1c     Lipid panel reflex to direct LDL     TSH        Primary Care Provider Office Phone # Fax #    Stuart Wills [phone]       [facility] [address]  [facility] [address]        Equal Access to Services     PINA PINEDA : Kalani Marks, waeleanorda lumanjit, qaybta kaalmada juancarlos, josh bangura. So [facility] [phone].    ATENCIÓN: Si habla español, tiene a dominique disposición servicios gratuitos de asistencia lingüística. Llame al [phone].    We comply with applicable federal civil rights laws and [place] laws. We do " not discriminate on the basis of race, color, national origin, age, disability sex, sexual orientation or gender identity.            Thank you!     Thank you for choosing [facility]  for your care. Our goal is always to provide you with excellent care. Hearing back from our patients is one way we can continue to improve our services. Please take a few minutes to complete the written survey that you may receive in the mail after your visit with us. Thank you!             Your Updated Medication List - Protect others around you: Learn how to safely use, store and throw away your medicines at www.disposemymeds.org.          This list is accurate as of: 7/25/17 10:39 AM.  Always use your most recent med list.                   Brand Name Dispense Instructions for use Diagnosis    levothyroxine 125 MCG tablet    SYNTHROID/LEVOTHROID    90 tablet    TAKE 1 TABLET BY MOUTH DAILY    Hypothyroidism, unspecified type       medroxyPROGESTERone 10 MG tablet    PROVERA    90 tablet    TAKE 1 TABLET BY MOUTH EVERY DAY    Absence of menstruation       naproxen 500 MG tablet    NAPROSYN    60 tablet    Take 1 tablet (500 mg) by mouth 2 times daily (with meals)    Intervertebral lumbar disc disorder with myelopathy, lumbar region       omeprazole 20 MG CR capsule    priLOSEC    60 capsule    Take 1 capsule (20 mg) by mouth 2 times daily    Esophageal reflux

## 2017-07-25 NOTE — PROGRESS NOTES
Screening Questionnaire for Adult Immunization    Are you sick today?   No   Do you have allergies to medications, food, a vaccine component or latex?   No   Have you ever had a serious reaction after receiving a vaccination?   No   Do you have a long-term health problem with heart disease, lung disease, asthma, kidney disease, metabolic disease (e.g. diabetes), anemia, or other blood disorder?   No   Do you have cancer, leukemia, HIV/AIDS, or any other immune system problem?   No   In the past 3 months, have you taken medications that affect  your immune system, such as prednisone, other steroids, or anticancer drugs; drugs for the treatment of rheumatoid arthritis, Crohn s disease, or psoriasis; or have you had radiation treatments?   No   Have you had a seizure, or a brain or other nervous system problem?   No   During the past year, have you received a transfusion of blood or blood     products, or been given immune (gamma) globulin or antiviral drug?   No   For women: Are you pregnant or is there a chance you could become        pregnant during the next month?   No   Have you received any vaccinations in the past 4 weeks?   No     Immunization questionnaire answers were all negative.      MNVFC doesn't apply on this patient    Per orders of Dr. Wills, injection of Tdap given by Emily Morel. Patient instructed to remain in clinic for 15 minutes afterwards, and to report any adverse reaction to me immediately.       Screening performed by Emily Morel on 7/25/2017 at 11:27 AM.

## 2017-07-25 NOTE — PATIENT INSTRUCTIONS
Preventive Health Recommendations  Female Ages 40 to 49    Yearly exam:     See your health care provider every year in order to  1. Review health changes.   2. Discuss preventive care.    3. Review your medicines if your doctor prescribed any.      Get a Pap test every three years (unless you have an abnormal result and your provider advises testing more often).      If you get Pap tests with HPV test, you only need to test every 5 years, unless you have an abnormal result. You do not need a Pap test if your uterus was removed (hysterectomy) and you have not had cancer.      You should be tested each year for STDs (sexually transmitted diseases), if you're at risk.       Ask your doctor if you should have a mammogram.      Have a colonoscopy (test for colon cancer) if someone in your family has had colon cancer or polyps before age 50.       Have a cholesterol test every 5 years.       Have a diabetes test (fasting glucose) after age 45. If you are at risk for diabetes, you should have this test every 3 years.    Shots: Get a flu shot each year. Get a tetanus shot every 10 years.     Nutrition:     Eat at least 5 servings of fruits and vegetables each day.    Eat whole-grain bread, whole-wheat pasta and brown rice instead of white grains and rice.    Talk to your provider about Calcium and Vitamin D.     Lifestyle    Exercise at least 150 minutes a week (an average of 30 minutes a day, 5 days a week). This will help you control your weight and prevent disease.    Limit alcohol to one drink per day.    No smoking.     Wear sunscreen to prevent skin cancer.    See your dentist every six months for an exam and cleaning.    Assessment: Lower back pain    Plan: do these exercises at least twice daily:  Standing or sitting exercise can be done every two hours    Justo' Flexion Versus Oriana   Extension Exercises For   Low Back Pain   Examples of Justo' Flexion Exercises  1. Pelvic tilt.  Please press the small  of your back against the floor.  Start with 5-10  and increase to 100 count over one month   2. Single Knee to chest. Lie on your back with legs in bent position. Alternate one leg and the other very slowly bringing the knee to chest.  Start with a count of 5-10   Over one month work up to 100  3. Double knee to chest.  Lie on your back with knees in bent position.  Bring both knees to the chest slowly hold for a count of 5-to 10. Over one month work to 100  4. Partial sit-up or crunch.  Lie on your back in bent leg position.  Please bring your body with arms crossed in front  To 30 degrees of flexion. Start with 5-10 over one month work up to 100 or more  5. Sit back.  Please sit on the side of the bed or a stair landing    Sneha method or extension exercises  Useful disc bulging posteriorly  1. Prone pressups  Please lie on your abdomen.   Please do a push with the upper half of your body only.  This should not cause the pain to shoot down your buttock thigh leg or foot  Start with 10 and repeat x 3 one minute apart.  Repeat x 10 every 1-2 hours  Pain tends to increase in the center of back  And leaves buttock thighs legs and foot over time  You may shift your pelvis in opposite direction of buttock and leg pain to achieve even better results  2. Superman with arms extended  Or at the side Simultaneously lift your arms and legs off the floor. Start with 5-10 over one month increase to 100.  3. Cat stretch or cobra Start  As if in extended position of prone pressup but hold the stretch for 5 or 10 count. Over one moth to count of 30.  4.  Extensions can be done in standing position  As well if prone pressup is inconvenient.  Shift your pelvis in opposite direction of limb pain.  Put your hands  Flat on your buttocks and lean backwards without loss of balance.  Count 10 x 3 times then 10 extensions hourly      What You Can Do to Reduce Cholesterol Levels  and Reduce Risk of Diabetes, Heart, and Blood Vessel  "Disease  1. Eat six to eight servings of green or root vegetables or fruit (raw or cooked) per day. You need 35 grams of fiber per day.  Examples: carrots apples  broccoli oranges  spinach grapefruit  lettuce pears  bananas  2. Use up to 2 cup of walnuts, almonds, or pecans as a source of \"good\" fat per day.  3. Drink one to three servings of soy milk (great for cereal) or 2 cup of soynuts per day.  4. Use margarine with reduced trans or saturated fats (e.g. Benecol, Smart Balance, olive oil margarine) as replacement for butter or margarine.  5. Eat fewer or half-portions of desserts, baked goods, chips, cookies, processed flour and sugar, pasta, butter, cream, non-skim dairy, ice cream.  6. Use olive or canola oil as the only oils for cooking and dressings.  7. Use one tablespoon of ground flaxseed or Natural Ovens \"Energy Mix\" per day (great on cereal or over salad).  8. Eat one to two servings per week of wild salmon or water-packed tuna.  9. Limit beef, lamb, and pork to at most one serving per day. (Range-fed beef, if you can get it, is much preferred and allows for greater use in diet).  10. Eat three to four servings per day of whole grains (legumes, rice, wheat, oats).  11. Limit sodas and beer at most to three per week (only special occasions).  12. 65 percent of us need to lose weight and all of us need to keep moving! You should be walking at least 150 minutes per week. You should lose approximately seven percent of your weight in the next year if overweight. Check with me for more details.  1. Andrew Weil's paperback book, The Healthy Kitchen, is a great addition to your healthy lifestyle library.  2. American Diabetic Association (www.diabetes.org)   a wealth of information on nutritional excellence.  3. Other great websites for Mediterranean diets are available.  Diabetes: Exchange List  What are the exchange lists?   The exchange lists show you portions of food that equal 1 exchange. Foods are divided " "into food lists. The foods on each list are called exchanges because they have a similar number of calories, protein, carbohydrate and fat content. Foods from each list can be traded or \"exchanged\" for any other food on the same list because they all have a similar exchange value. A dietitian will help you plan how much food your child should eat at each meal and from what lists the foods should come from. At first you should measure your food until you are able to make good estimates about serving sizes. The following list is a sample of foods found on the exchange lists. For more information, you can buy the Exchange Lists for Meal Planning from: The American Diabetes Association [address] [place] [address] 1-680.688.2721 http://www.diabetes.org  Carbohydrate group   Starch List: One starch exchange contains about 15 grams of carbohydrate, 3 grams of protein, 0 to 1 grams of fat, and 80 calories. A starch exchange is sometimes called a carb exchange. Examples of one starch (carb) exchange are:   one slice of bread   1/2 hamburger or hot dog bun   3/4 cup of unsweetened cereal   1/3 cup pasta   3 cups popcorn   crackers (6 small saltines, 3 squares of bobo crackers, 3 of most other crackers)   1 pancake or waffle (5 inch)   15 to 20 fat-free or baked potato or corn chips.   The vegetables included in the starch exchanges include:   corn (1/2 cup or 1/2 cob)   white potato (1/4 large baked with skin or 1/2 cup mashed)   yam or sweet potato (1/2 cup)   green peas (1/2 cup)   squash, winter (1 cup)   lima beans (2/3 cup).   Fruit List: 1 fruit exchange contains about 15 grams of carbohydrate and 60 calories. Examples of one fruit exchange are:   grape juice (1/3 cup)   apple or pineapple juice (1/2 cup)   orange or grapefruit juice (1/2 cup)   1 small apple   orange or peach   1/2 banana   1 cup raspberries   1/3 of a small cantaloupe   1 slice of watermelon.   Milk List: 1 milk exchange contains about 8 grams " "of protein and 12 grams of carbohydrate. Items on the milk list are divided into fat-free, reduced fat, and whole milk depending on the number of fat grams in the exchange. Examples of one milk exchange are: Fat-Free (0 to 3 grams of fat)   1 cup of skim or non-fat milk   1 cup of 1% milk (also includes 1/2 fat exchange)   6 ounces flavored fat-free yogurt   Reduced-Fat (5 grams of fat)   6 ounces of plain, low-fat yogurt   1 cup 2% milk (also includes one fat exchange).   Whole Milk (8 grams of fat)   8 ounces of plain yogurt (made from whole milk)   1 cup whole milk.   Vegetable List: One vegetable exchange has 5 grams of carbohydrate, 2 grams of protein, no fat, and 25 calories. One-half cup of cooked or a cup of raw vegetables is a good measure for 1 exchange of most vegetables. Raw lettuce may be taken in larger quantities, but salad dressing usually equals 1 fat exchange. Other Carbohydrates List: One \"other carbohydrate\" exchange has 15 grams of carbohydrate. Many of these foods count as a starch exchange and one or more fat exchanges.   brownie (2 inch square) = 1 carb, 1 fat exchange   fruit snack roll = 1 carb exchange   granola bar = 1 and 1/2 carb exchanges   ice cream (1/2 cup) = 1 carb, 2 fat exchanges   frozen yogurt (1/2 cup) = 1 carb, 0 to 1 fat exchanges   tortilla chips (6-12) = 1 carb, 2 fat exchanges.   Meat and Meat Substitute Group   Meats are divided into very lean meats, lean meats, medium-fat meats, and high-fat meats. People with diabetes should try to eat more lean and medium fat meats and stay away from the high fat choices. The Very Lean meat group includes foods that contain 7 grams of protein, 0 to 1 gram of fat, and 35 calories for 1 exchange. Examples include:   1 ounce chicken or turkey (white meat, no skin)   1 ounce fresh fish   1 ounce fat-free cheese   2 egg whites   The Lean meat group includes foods that contain 7 grams of protein, 3 grams of fat, and 55 calories for 1 meat " exchange. Examples include:   1 ounce chicken or turkey (dark meat, no skin)   1 ounce fish   1 ounce lean pork   1 ounce USDA Select or Choice grades of lean beef   1 ounce cheese (with 3 grams of fat or less per ounce).   The Medium-Fat group includes foods that have 7 grams of protein, 5 grams of fat, and 75 calories for 1 meat exchange. Examples include:   1 ounce of ground beef, most cuts of beef, pork, lamb or veal   1 ounce of cheese (5 grams of fat per ounce or less)   1 egg   1 ounce fried fish.   The High-Fat foods have 7 grams of protein, 8 grams of fat, and 100 calories for 1 meat exchange. This group includes:   1 ounce of pork sausage   1 ounce of spare ribs   1 oz of regular cheese (American, Swiss etc.)   1 oz of lunch meat   1 hot dog (turkey or chicken).   Fat Group   Fat List: Fat is necessary for the body and is particularly important during periods of fasting (overnight), when it is very slowly absorbed. 1 fat exchange contains 5 grams of fat and 45 calories. The monounsaturated fats and polyunsaturated fats are better for us than saturated fats. The fat list includes: 1 exchange of monounsaturated fats equals:   1/2 Tbsp peanut butter   6 almonds   1 tsp of oil (olive, peanut, canola).   1 exchange of polyunsaturated fats equals:   1 tsp margarine   1 tsp of any vegetable oil (except coconut).   1 exchange of saturated fat includes:   1 tsp butter   1 strip of yun   2 Tbsp of cream (half and half).   Free Foods   A free food contains less than 20 calories or less than 5 grams of carbohydrate per serving. If the food has a serving size listed on its package, it should be limited to 3 servings spread throughout the day. Examples of free foods include:   4 Tbsp fat-free margarine   1 Tbsp fat-free Miracle Whip   sugar-free gelatin   diet soft drinks   catsup   soy sauce   spices.   Combination Foods   Many foods, such as casseroles, are mixed together. Your dietitian can help you figure out how  many exchanges to count for combination foods. For example:   lasagna (1 cup) = 2 carb exchanges and 2 medium-fat meat exchanges   spaghetti with meatballs (1 cup) = 2 carb exchanges and 2 medium-fat meat exchanges   pizza, cheese (1/4 of 12 in.) = 2 carbs, 2 medium-fat meats   chicken noodle soup (1 cup) = 1 carb exchange   frozen entrée (less than 300 calories) = 2 carbs, 3 lean meat exchanges   macaroni and cheese (1 cup) = 2 carb exchanges and 2 medium-fat meat exchanges.

## 2017-07-26 LAB
ALT SERPL W P-5'-P-CCNC: 27 U/L (ref 0–50)
CHOLEST SERPL-MCNC: 220 MG/DL
GLUCOSE SERPL-MCNC: 73 MG/DL (ref 70–99)
HDLC SERPL-MCNC: 69 MG/DL
LDLC SERPL CALC-MCNC: 138 MG/DL
NONHDLC SERPL-MCNC: 151 MG/DL
TRIGL SERPL-MCNC: 67 MG/DL
TSH SERPL DL<=0.05 MIU/L-ACNC: 13.95 MU/L (ref 0.4–4)

## 2017-07-27 DIAGNOSIS — E03.9 ACQUIRED HYPOTHYROIDISM: Primary | ICD-10-CM

## 2017-07-27 DIAGNOSIS — E03.9 HYPOTHYROIDISM, UNSPECIFIED TYPE: ICD-10-CM

## 2017-07-27 RX ORDER — LEVOTHYROXINE SODIUM 137 UG/1
137 TABLET ORAL DAILY
Qty: 90 TABLET | Refills: 3 | Status: SHIPPED | OUTPATIENT
Start: 2017-07-27 | End: 2017-11-02 | Stop reason: DRUGHIGH

## 2017-07-27 RX ORDER — LEVOTHYROXINE SODIUM 137 UG/1
137 TABLET ORAL DAILY
Qty: 90 TABLET | Refills: 3 | Status: SHIPPED | OUTPATIENT
Start: 2017-07-27 | End: 2017-07-27

## 2017-08-05 ENCOUNTER — HEALTH MAINTENANCE LETTER (OUTPATIENT)
Age: 47
End: 2017-08-05

## 2017-08-25 ENCOUNTER — RADIANT APPOINTMENT (OUTPATIENT)
Dept: MAMMOGRAPHY | Facility: CLINIC | Age: 47
End: 2017-08-25
Payer: MEDICAID

## 2017-08-25 ENCOUNTER — OFFICE VISIT (OUTPATIENT)
Dept: FAMILY MEDICINE | Facility: CLINIC | Age: 47
End: 2017-08-25
Payer: MEDICAID

## 2017-08-25 ENCOUNTER — RADIANT APPOINTMENT (OUTPATIENT)
Dept: GENERAL RADIOLOGY | Facility: CLINIC | Age: 47
End: 2017-08-25
Attending: FAMILY MEDICINE
Payer: MEDICAID

## 2017-08-25 VITALS
OXYGEN SATURATION: 99 % | BODY MASS INDEX: 32.96 KG/M2 | TEMPERATURE: 96.6 F | HEART RATE: 60 BPM | WEIGHT: 192 LBS | RESPIRATION RATE: 16 BRPM | SYSTOLIC BLOOD PRESSURE: 118 MMHG | DIASTOLIC BLOOD PRESSURE: 64 MMHG

## 2017-08-25 DIAGNOSIS — N91.2 ABSENCE OF MENSTRUATION: ICD-10-CM

## 2017-08-25 DIAGNOSIS — Z01.419 WELL FEMALE EXAM WITH ROUTINE GYNECOLOGICAL EXAM: Primary | ICD-10-CM

## 2017-08-25 DIAGNOSIS — Z12.31 VISIT FOR SCREENING MAMMOGRAM: ICD-10-CM

## 2017-08-25 DIAGNOSIS — M25.532 LEFT WRIST PAIN: ICD-10-CM

## 2017-08-25 DIAGNOSIS — E66.09 NON MORBID OBESITY DUE TO EXCESS CALORIES: ICD-10-CM

## 2017-08-25 DIAGNOSIS — R73.9 HYPERGLYCEMIA: ICD-10-CM

## 2017-08-25 DIAGNOSIS — K21.9 GASTROESOPHAGEAL REFLUX DISEASE WITHOUT ESOPHAGITIS: Chronic | ICD-10-CM

## 2017-08-25 DIAGNOSIS — Z12.11 SPECIAL SCREENING FOR MALIGNANT NEOPLASMS, COLON: ICD-10-CM

## 2017-08-25 PROCEDURE — G0476 HPV COMBO ASSAY CA SCREEN: HCPCS | Performed by: FAMILY MEDICINE

## 2017-08-25 PROCEDURE — 73110 X-RAY EXAM OF WRIST: CPT | Mod: LT

## 2017-08-25 PROCEDURE — G0145 SCR C/V CYTO,THINLAYER,RESCR: HCPCS | Performed by: FAMILY MEDICINE

## 2017-08-25 PROCEDURE — 99214 OFFICE O/P EST MOD 30 MIN: CPT | Performed by: FAMILY MEDICINE

## 2017-08-25 PROCEDURE — G0202 SCR MAMMO BI INCL CAD: HCPCS | Mod: TC

## 2017-08-25 RX ORDER — METFORMIN HCL 500 MG
500 TABLET, EXTENDED RELEASE 24 HR ORAL
Qty: 30 TABLET | Refills: 11 | Status: SHIPPED | OUTPATIENT
Start: 2017-08-25 | End: 2019-01-10

## 2017-08-25 RX ORDER — MEDROXYPROGESTERONE ACETATE 10 MG
10 TABLET ORAL DAILY
Qty: 90 TABLET | Refills: 3 | Status: SHIPPED | OUTPATIENT
Start: 2017-08-25 | End: 2019-05-09

## 2017-08-25 ASSESSMENT — PATIENT HEALTH QUESTIONNAIRE - PHQ9
SUM OF ALL RESPONSES TO PHQ QUESTIONS 1-9: 3
SUM OF ALL RESPONSES TO PHQ QUESTIONS 1-9: 3
10. IF YOU CHECKED OFF ANY PROBLEMS, HOW DIFFICULT HAVE THESE PROBLEMS MADE IT FOR YOU TO DO YOUR WORK, TAKE CARE OF THINGS AT HOME, OR GET ALONG WITH OTHER PEOPLE: NOT DIFFICULT AT ALL

## 2017-08-25 ASSESSMENT — ANXIETY QUESTIONNAIRES
5. BEING SO RESTLESS THAT IT IS HARD TO SIT STILL: NOT AT ALL
2. NOT BEING ABLE TO STOP OR CONTROL WORRYING: NOT AT ALL
GAD7 TOTAL SCORE: 1
1. FEELING NERVOUS, ANXIOUS, OR ON EDGE: NOT AT ALL
GAD7 TOTAL SCORE: 1
6. BECOMING EASILY ANNOYED OR IRRITABLE: NOT AT ALL
GAD7 TOTAL SCORE: 1
7. FEELING AFRAID AS IF SOMETHING AWFUL MIGHT HAPPEN: NOT AT ALL
4. TROUBLE RELAXING: SEVERAL DAYS
3. WORRYING TOO MUCH ABOUT DIFFERENT THINGS: NOT AT ALL
7. FEELING AFRAID AS IF SOMETHING AWFUL MIGHT HAPPEN: NOT AT ALL

## 2017-08-25 NOTE — LETTER
August 25, 2017      Luci Corbin Alert  [address]  [facility] [place] [id number]        Dear ,    We are writing to inform you of your test results.    NORMAL WRIST XRAY     Resulted Orders   XR Wrist Left G/E 3 Views    Narrative    XR WRIST LEFT G/E 3 VIEWS  8/25/2017 10:09 AM    HISTORY:  Pain in left wrist    COMPARISON:  2/25/2015      Impression    IMPRESSION:  Negative.     DOMINIQUE FORREST MD       If you have any questions or concerns, please call the clinic at the number listed above.       Sincerely,        [facility] XRAY ROOM 1

## 2017-08-25 NOTE — NURSING NOTE
"Chief Complaint   Patient presents with     Musculoskeletal Problem     left hand     Gyn Exam     PAP only       Initial /64  Pulse 60  Temp 96.6  F (35.9  C) (Tympanic)  Resp 16  Wt 192 lb (87.1 kg)  SpO2 99%  BMI 32.96 kg/m2 Estimated body mass index is 32.96 kg/(m^2) as calculated from the following:    Height as of 7/25/17: 5' 4\" (1.626 m).    Weight as of this encounter: 192 lb (87.1 kg).  Medication Reconciliation: complete   Emily Morel CMA    "

## 2017-08-25 NOTE — PROGRESS NOTES
SUBJECTIVE:   Luci Londono is a 46 year old female who presents to clinic today for the following health issues:      PAP only    Musculoskeletal problem/pain      Duration: 1 week    Description  Location: left hand    Intensity:  8/10    Accompanying signs and symptoms: none    History  Previous similar problem: no   Previous evaluation:  none    Precipitating or alleviating factors:  Trauma or overuse: no   Aggravating factors include: unable to  and hol    Therapies tried and outcome: ice        (M25.532) Left wrist pain  Comment:  GANGLION DORSUM LEFT WRIST SEE HISTORY OF PRESENT ILLNESS   Plan: ORTHOPEDICS ADULT REFERRAL, XR Wrist Left G/E 3        Views  LEFT WRIST SPLINT  LEFT WRIST XRAY                Problem list and histories reviewed & adjusted, as indicated.  Additional history: as documented      Patient Active Problem List   Diagnosis     Myalgia and myositis     Intervertebral lumbar disc disorder with myelopathy, lumbar region     Nonspecific abnormal finding in stool contents     Gastroesophageal reflux disease without esophagitis     Helicobacter pylori infection     Other type of migraine     Acquired hypothyroidism     Disorder of metabolism     Polycystic ovaries     Hyperlipidemia with target LDL less than 130     Vitamin D insufficiency     BMI 35     Rosacea     Absence of menstruation     Hyperglycemia     Chronic left-sided low back pain with left-sided sciatica     Past Surgical History:   Procedure Laterality Date     HAND SURGERY  2002    left     TUBAL LIGATION         Social History   Substance Use Topics     Smoking status: Never Smoker     Smokeless tobacco: Never Used     Alcohol use No     Family History   Problem Relation Age of Onset     DIABETES Mother          Current Outpatient Prescriptions   Medication Sig Dispense Refill     metFORMIN (GLUCOPHAGE-XR) 500 MG 24 hr tablet Take 1 tablet (500 mg) by mouth daily (with dinner) 30 tablet 11     omeprazole  (PRILOSEC) 20 MG CR capsule Take 1 capsule (20 mg) by mouth 2 times daily 60 capsule 11     medroxyPROGESTERone (PROVERA) 10 MG tablet Take 1 tablet (10 mg) by mouth daily 90 tablet 3     levothyroxine (SYNTHROID/LEVOTHROID) 137 MCG tablet Take 1 tablet (137 mcg) by mouth daily 90 tablet 3     [DISCONTINUED] metFORMIN (GLUCOPHAGE-XR) 500 MG 24 hr tablet Take 1 tablet (500 mg) by mouth daily (with dinner) 30 tablet 1     No Known Allergies  Recent Labs   Lab Test  07/25/17   1026  01/31/17   0942  06/30/16   1214  09/01/15   1336   01/31/14   0932   04/17/13   0952   A1C  5.5  5.6  6.1*   --    --   5.7   < >   --    LDL  138*   --   123*  118   < >  124   < >   --    HDL  69   --   64  71   --   62   --    --    TRIG  67   --   58  83   --   69   --    --    ALT  27   --   31   --    --   19   --   22   CR   --    --    --    --    --   0.90   --   0.90   GFRESTIMATED   --    --    --    --    --   69   --   69   GFRESTBLACK   --    --    --    --    --   83   --   84   POTASSIUM   --    --    --    --    --   4.6   --   3.9   TSH  13.95*   --   3.09  3.81   < >  8.70*   --    --     < > = values in this interval not displayed.      BP Readings from Last 3 Encounters:   08/25/17 118/64   07/25/17 110/62   01/31/17 126/66    Wt Readings from Last 3 Encounters:   08/25/17 192 lb (87.1 kg)   07/25/17 195 lb (88.5 kg)   01/31/17 190 lb 3.2 oz (86.3 kg)                  Labs reviewed in EPIC          Reviewed and updated as needed this visit by clinical staffTobacco  Allergies  Med Hx  Surg Hx  Fam Hx  Soc Hx      Reviewed and updated as needed this visit by Provider         ROS: has Myalgia and myositis; Intervertebral lumbar disc disorder with myelopathy, lumbar region; Nonspecific abnormal finding in stool contents; Gastroesophageal reflux disease without esophagitis; Helicobacter pylori infection; Other type of migraine; Acquired hypothyroidism; Disorder of metabolism; Polycystic ovaries; Hyperlipidemia with  target LDL less than 130; Vitamin D insufficiency; BMI 35; Rosacea; Absence of menstruation; Hyperglycemia; and Chronic left-sided low back pain with left-sided sciatica on her problem list.    C: NEGATIVE for fever, chills, change in weight  I: NEGATIVE for worrisome rashes, moles or lesions  E: NEGATIVE for vision changes or irritation  E/M: NEGATIVE for ear, mouth and throat problems  R: NEGATIVE for significant cough or SOB  B: NEGATIVE for masses, tenderness or discharge  CV: NEGATIVE for chest pain, palpitations or peripheral edema  GI: NEGATIVE for nausea, abdominal pain, heartburn, or change in bowel habits  : NEGATIVE for frequency, dysuria, or hematuria  MUSCULOSKELETAL: CHRONIC LOWER BACK PAIN with RADICULOPATHY   LEFT WRIST PAIN RECENT  POSSIBLE GANGLION CYST   N: NEGATIVE for weakness, dizziness or paresthesias  E: NEGATIVE for temperature intolerance, skin/hair changes  H: NEGATIVE for bleeding problems  P: NEGATIVE for changes in mood or affect    OBJECTIVE:     /64  Pulse 60  Temp 96.6  F (35.9  C) (Tympanic)  Resp 16  Wt 192 lb (87.1 kg)  SpO2 99%  BMI 32.96 kg/m2  Body mass index is 32.96 kg/(m^2).  GENERAL: healthy, alert and no distress  NECK: no adenopathy, no asymmetry, masses, or scars and thyroid normal to palpation  RESP: lungs clear to auscultation - no rales, rhonchi or wheezes  CV: regular rate and rhythm, normal S1 S2, no S3 or S4, no murmur, click or rub, no peripheral edema and peripheral pulses strong  ABDOMEN: soft, nontender, no hepatosplenomegaly, no masses and bowel sounds normal   (female): normal female external genitalia, normal urethral meatus, vaginal mucosa, normal cervix/adnexa/uterus without masses or discharge  MS:  LEFT WRIST GANGLION CYST  CHRONIC LOWER BACK PAIN DECREASE RANGE OF MOTION   NEGATIVE STRAIGHT LEG RAISING TEST   NORMAL REFLEXES   NEURO: Normal strength and tone, mentation intact and speech normal  PSYCH: mentation appears normal, affect  normal/bright  LYMPH: no cervical, supraclavicular, axillary, or inguinal adenopathy    Diagnostic Test Results:  Results for orders placed or performed in visit on 07/25/17   GLUCOSE   Result Value Ref Range    Glucose 73 70 - 99 mg/dL   Lipid panel reflex to direct LDL   Result Value Ref Range    Cholesterol 220 (H) <200 mg/dL    Triglycerides 67 <150 mg/dL    HDL Cholesterol 69 >49 mg/dL    LDL Cholesterol Calculated 138 (H) <100 mg/dL    Non HDL Cholesterol 151 (H) <130 mg/dL   Hemoglobin A1c   Result Value Ref Range    Hemoglobin A1C 5.5 4.3 - 6.0 %   ALT   Result Value Ref Range    ALT 27 0 - 50 U/L   TSH   Result Value Ref Range    TSH 13.95 (H) 0.40 - 4.00 mU/L       ASSESSMENT/PLAN:           ICD-10-CM    1. Well female exam with routine gynecological exam Z01.419 Pap imaged thin layer screen with HPV - recommended age 30 - 65 years (select HPV order below)   2. BMI 35 E66.09 metFORMIN (GLUCOPHAGE-XR) 500 MG 24 hr tablet   3. Hyperglycemia R73.9 metFORMIN (GLUCOPHAGE-XR) 500 MG 24 hr tablet   4. Absence of menstruation N91.2 medroxyPROGESTERone (PROVERA) 10 MG tablet   5. Gastroesophageal reflux disease without esophagitis K21.9 omeprazole (PRILOSEC) 20 MG CR capsule   6. Left wrist pain M25.532 ORTHOPEDICS ADULT REFERRAL     XR Wrist Left G/E 3 Views         Patient Instructions   Assessment: Lower back pain    Plan: do these exercises at least twice daily:  Standing or sitting exercise can be done every two hours    Justo' Flexion Versus Oriana   Extension Exercises For   Low Back Pain   Examples of Justo' Flexion Exercises  1. Pelvic tilt.  Please press the small of your back against the floor.  Start with 5-10  and increase to 100 count over one month   2. Single Knee to chest. Lie on your back with legs in bent position. Alternate one leg and the other very slowly bringing the knee to chest.  Start with a count of 5-10   Over one month work up to 100  3. Double knee to chest.  Lie on your back  with knees in bent position.  Bring both knees to the chest slowly hold for a count of 5-to 10. Over one month work to 100  4. Partial sit-up or crunch.  Lie on your back in bent leg position.  Please bring your body with arms crossed in front  To 30 degrees of flexion. Start with 5-10 over one month work up to 100 or more  5. Sit back.  Please sit on the side of the bed or a stair landing  And lie backwards until the abdominal muscles start to quiver.  Hold for a count of 5-10 and over a month work up to 100.  5. Hamstring stretch.  Please extend your legs while sitting on the floor as tolerated for 5-10 count.  Gradually increase to count of 30 over one month      Alternately find a stair landing or sturdy chair and place heel  In a comfortable level of extension  And stretch one hamstring at a time for 5-10 seconds.  Increase to count of 30 over one month                         Squat.  Stand with legs comfortably apart and lower the body slowly by flexing the knees  for count of 5-10 over one month increase to 30.  Useful for anterior disc protrusion, facette joint arthritis spond-10ylolysis, spondylolisthesis and spinal stenosis    Sneha method or extension exercises  Useful disc bulging posteriorly  1. Prone pressups  Please lie on your abdomen.   Please do a push with the upper half of your body only.  This should not cause the pain to shoot down your buttock thigh leg or foot  Start with 10 and repeat x 3 one minute apart.  Repeat x 10 every 1-2 hours  Pain tends to increase in the center of back  And leaves buttock thighs legs and foot over time  You may shift your pelvis in opposite direction of buttock and leg pain to achieve even better results  2. Superman with arms extended  Or at the side Simultaneously lift your arms and legs off the floor. Start with 5-10 over one month increase to 100.  3. Cat stretch or cobra Start  As if in extended position of prone pressup but hold the stretch for 5 or 10  "count. Over one moth to count of 30.  4.  Extensions can be done in standing position  As well if prone pressup is inconvenient.  Shift your pelvis in opposite direction of limb pain.  Put your hands  Flat on your buttocks and lean backwards without loss of balance.  Count 10 x 3 times then 10 extensions hourly   Diabetes: Exchange List  What are the exchange lists?   The exchange lists show you portions of food that equal 1 exchange. Foods are divided into food lists. The foods on each list are called exchanges because they have a similar number of calories, protein, carbohydrate and fat content. Foods from each list can be traded or \"exchanged\" for any other food on the same list because they all have a similar exchange value. A dietitian will help you plan how much food your child should eat at each meal and from what lists the foods should come from. At first you should measure your food until you are able to make good estimates about serving sizes. The following list is a sample of foods found on the exchange lists. For more information, you can buy the Exchange Lists for Meal Planning from: The American Diabetes Association [address] [place] [address] 1-444.613.9796 http://www.diabetes.org  Carbohydrate group   Starch List: One starch exchange contains about 15 grams of carbohydrate, 3 grams of protein, 0 to 1 grams of fat, and 80 calories. A starch exchange is sometimes called a carb exchange. Examples of one starch (carb) exchange are:   one slice of bread   1/2 hamburger or hot dog bun   3/4 cup of unsweetened cereal   1/3 cup pasta   3 cups popcorn   crackers (6 small saltines, 3 squares of bobo crackers, 3 of most other crackers)   1 pancake or waffle (5 inch)   15 to 20 fat-free or baked potato or corn chips.   The vegetables included in the starch exchanges include:   corn (1/2 cup or 1/2 cob)   white potato (1/4 large baked with skin or 1/2 cup mashed)   yam or sweet potato (1/2 cup)   green " "peas (1/2 cup)   squash, winter (1 cup)   lima beans (2/3 cup).   Fruit List: 1 fruit exchange contains about 15 grams of carbohydrate and 60 calories. Examples of one fruit exchange are:   grape juice (1/3 cup)   apple or pineapple juice (1/2 cup)   orange or grapefruit juice (1/2 cup)   1 small apple   orange or peach   1/2 banana   1 cup raspberries   1/3 of a small cantaloupe   1 slice of watermelon.   Milk List: 1 milk exchange contains about 8 grams of protein and 12 grams of carbohydrate. Items on the milk list are divided into fat-free, reduced fat, and whole milk depending on the number of fat grams in the exchange. Examples of one milk exchange are: Fat-Free (0 to 3 grams of fat)   1 cup of skim or non-fat milk   1 cup of 1% milk (also includes 1/2 fat exchange)   6 ounces flavored fat-free yogurt   Reduced-Fat (5 grams of fat)   6 ounces of plain, low-fat yogurt   1 cup 2% milk (also includes one fat exchange).   Whole Milk (8 grams of fat)   8 ounces of plain yogurt (made from whole milk)   1 cup whole milk.   Vegetable List: One vegetable exchange has 5 grams of carbohydrate, 2 grams of protein, no fat, and 25 calories. One-half cup of cooked or a cup of raw vegetables is a good measure for 1 exchange of most vegetables. Raw lettuce may be taken in larger quantities, but salad dressing usually equals 1 fat exchange. Other Carbohydrates List: One \"other carbohydrate\" exchange has 15 grams of carbohydrate. Many of these foods count as a starch exchange and one or more fat exchanges.   brownie (2 inch square) = 1 carb, 1 fat exchange   fruit snack roll = 1 carb exchange   granola bar = 1 and 1/2 carb exchanges   ice cream (1/2 cup) = 1 carb, 2 fat exchanges   frozen yogurt (1/2 cup) = 1 carb, 0 to 1 fat exchanges   tortilla chips (6-12) = 1 carb, 2 fat exchanges.   Meat and Meat Substitute Group   Meats are divided into very lean meats, lean meats, medium-fat meats, and high-fat meats. People with diabetes " should try to eat more lean and medium fat meats and stay away from the high fat choices. The Very Lean meat group includes foods that contain 7 grams of protein, 0 to 1 gram of fat, and 35 calories for 1 exchange. Examples include:   1 ounce chicken or turkey (white meat, no skin)   1 ounce fresh fish   1 ounce fat-free cheese   2 egg whites   The Lean meat group includes foods that contain 7 grams of protein, 3 grams of fat, and 55 calories for 1 meat exchange. Examples include:   1 ounce chicken or turkey (dark meat, no skin)   1 ounce fish   1 ounce lean pork   1 ounce USDA Select or Choice grades of lean beef   1 ounce cheese (with 3 grams of fat or less per ounce).   The Medium-Fat group includes foods that have 7 grams of protein, 5 grams of fat, and 75 calories for 1 meat exchange. Examples include:   1 ounce of ground beef, most cuts of beef, pork, lamb or veal   1 ounce of cheese (5 grams of fat per ounce or less)   1 egg   1 ounce fried fish.   The High-Fat foods have 7 grams of protein, 8 grams of fat, and 100 calories for 1 meat exchange. This group includes:   1 ounce of pork sausage   1 ounce of spare ribs   1 oz of regular cheese (American, Swiss etc.)   1 oz of lunch meat   1 hot dog (turkey or chicken).   Fat Group   Fat List: Fat is necessary for the body and is particularly important during periods of fasting (overnight), when it is very slowly absorbed. 1 fat exchange contains 5 grams of fat and 45 calories. The monounsaturated fats and polyunsaturated fats are better for us than saturated fats. The fat list includes: 1 exchange of monounsaturated fats equals:   1/2 Tbsp peanut butter   6 almonds   1 tsp of oil (olive, peanut, canola).   1 exchange of polyunsaturated fats equals:   1 tsp margarine   1 tsp of any vegetable oil (except coconut).   1 exchange of saturated fat includes:   1 tsp butter   1 strip of yun   2 Tbsp of cream (half and half).   Free Foods   A free food contains less than  20 calories or less than 5 grams of carbohydrate per serving. If the food has a serving size listed on its package, it should be limited to 3 servings spread throughout the day. Examples of free foods include:   4 Tbsp fat-free margarine   1 Tbsp fat-free Miracle Whip   sugar-free gelatin   diet soft drinks   catsup   soy sauce   spices.   Combination Foods   Many foods, such as casseroles, are mixed together. Your dietitian can help you figure out how many exchanges to count for combination foods. For example:   lasagna (1 cup) = 2 carb exchanges and 2 medium-fat meat exchanges   spaghetti with meatballs (1 cup) = 2 carb exchanges and 2 medium-fat meat exchanges   pizza, cheese (1/4 of 12 in.) = 2 carbs, 2 medium-fat meats   chicken noodle soup (1 cup) = 1 carb exchange   frozen entrée (less than 300 calories) = 2 carbs, 3 lean meat exchanges   macaroni and cheese (1 cup) = 2 carb exchanges and 2 medium-fat meat exchanges.     LEFT WRIST      FIT BIT TWITCH RECOMMENDED       PYRODOXINE 25MG 3 TABLETS DAILY  DIVIDED OR IN AM    WRIST SPLINT AT NIGHT    AT DAY TIME IF DOING HEAVY LIFTING, GRIPPING GRASPING, OR HEAVY DUTY TYPING    ERGONOMIC POSITION OF COMPUTER    RANGE OF MOTION WRIST 30 EACH TWICE DAILY    AVOID WRIST FLEXION IT PUTS PRESSURE ON THE MEDIAN NERVE    AVOID REPETITIVE GRIPPING    ICE 5 MINUTES PRN    DICLOFENAC GEL UP TO 4 X DAILY A NEEDED WITH FLARE UPS     CONSIDER CORTISONE INJECTION          Apply heat to the hand for 15 minutes before performing the exercises, and apply ice (a bag    of crushed ice or frozen peas) to the hand for 20 minutes after each exercise session to    prevent inflammation. If numbness steadily worsens, if the exercises increase the pain, or if    the pain does not improve after you have performed the exercises for 3 to 4 weeks, call your        Begin with the affected hand raised.    (1) Make a fist, with the thumb outside the    fingers. (2) Extend the fingers, keeping  the    thumb close to the side of the hand.    (3) Extend the hand at the wrist (bend it    backward, toward the forearm), keeping    the fingers straight. (4) With the wrist    straight, extend the thumb as shown.    (5) Keeping the thumb extended, extend    the hand at the wrist. (6) Reach behind    your hand and grasp the thumb with the    thumb and forefinger of the opposite hand.    Pull the thumb downward, away from the    palm of your hand.    nges.         (R73.9) Hyperglycemia  Comment:    Plan: metFORMIN (GLUCOPHAGE-XR) 500 MG 24 hr tablet        1200 CALORY ADA     (N91.2) Absence of menstruation  Comment:    Plan: medroxyPROGESTERone (PROVERA) 10 MG tablet         PRN ABSENT MENSES   PERIMENOPAUSAL     (K21.9) Gastroesophageal reflux disease without esophagitis  Comment:    Plan: omeprazole (PRILOSEC) 20 MG CR capsule         USING PRN     (M25.532) Left wrist pain  Comment:  GANGLION DORSUM LEFT WRIST SEE HISTORY OF PRESENT ILLNESS   Plan: ORTHOPEDICS ADULT REFERRAL, XR Wrist Left G/E 3        Views  LEFT WRIST SPLINT  LEFT WRIST XRAY           ALEENA TYLER MD  [facility]

## 2017-08-25 NOTE — PATIENT INSTRUCTIONS
Assessment: Lower back pain    Plan: do these exercises at least twice daily:  Standing or sitting exercise can be done every two hours    Justo' Flexion Versus Sneha   Extension Exercises For   Low Back Pain   Examples of Justo' Flexion Exercises  1. Pelvic tilt.  Please press the small of your back against the floor.  Start with 5-10  and increase to 100 count over one month   2. Single Knee to chest. Lie on your back with legs in bent position. Alternate one leg and the other very slowly bringing the knee to chest.  Start with a count of 5-10   Over one month work up to 100  3. Double knee to chest.  Lie on your back with knees in bent position.  Bring both knees to the chest slowly hold for a count of 5-to 10. Over one month work to 100  4. Partial sit-up or crunch.  Lie on your back in bent leg position.  Please bring your body with arms crossed in front  To 30 degrees of flexion. Start with 5-10 over one month work up to 100 or more  5. Sit back.  Please sit on the side of the bed or a stair landing  And lie backwards until the abdominal muscles start to quiver.  Hold for a count of 5-10 and over a month work up to 100.  5. Hamstring stretch.  Please extend your legs while sitting on the floor as tolerated for 5-10 count.  Gradually increase to count of 30 over one month      Alternately find a stair landing or sturdy chair and place heel  In a comfortable level of extension  And stretch one hamstring at a time for 5-10 seconds.  Increase to count of 30 over one month                         Squat.  Stand with legs comfortably apart and lower the body slowly by flexing the knees  for count of 5-10 over one month increase to 30.  Useful for anterior disc protrusion, facette joint arthritis spond-10ylolysis, spondylolisthesis and spinal stenosis    Sneha method or extension exercises  Useful disc bulging posteriorly  1. Prone pressups  Please lie on your abdomen.   Please do a push with the upper  "half of your body only.  This should not cause the pain to shoot down your buttock thigh leg or foot  Start with 10 and repeat x 3 one minute apart.  Repeat x 10 every 1-2 hours  Pain tends to increase in the center of back  And leaves buttock thighs legs and foot over time  You may shift your pelvis in opposite direction of buttock and leg pain to achieve even better results  2. Superman with arms extended  Or at the side Simultaneously lift your arms and legs off the floor. Start with 5-10 over one month increase to 100.  3. Cat stretch or cobra Start  As if in extended position of prone pressup but hold the stretch for 5 or 10 count. Over one moth to count of 30.  4.  Extensions can be done in standing position  As well if prone pressup is inconvenient.  Shift your pelvis in opposite direction of limb pain.  Put your hands  Flat on your buttocks and lean backwards without loss of balance.  Count 10 x 3 times then 10 extensions hourly   Diabetes: Exchange List  What are the exchange lists?   The exchange lists show you portions of food that equal 1 exchange. Foods are divided into food lists. The foods on each list are called exchanges because they have a similar number of calories, protein, carbohydrate and fat content. Foods from each list can be traded or \"exchanged\" for any other food on the same list because they all have a similar exchange value. A dietitian will help you plan how much food your child should eat at each meal and from what lists the foods should come from. At first you should measure your food until you are able to make good estimates about serving sizes. The following list is a sample of foods found on the exchange lists. For more information, you can buy the Exchange Lists for Meal Planning from: The [employer] [address] [place] [address] [phone] http://www.diabetes.org  Carbohydrate group   Starch List: One starch exchange contains about 15 grams of " carbohydrate, 3 grams of protein, 0 to 1 grams of fat, and 80 calories. A starch exchange is sometimes called a carb exchange. Examples of one starch (carb) exchange are:   one slice of bread   1/2 hamburger or hot dog bun   3/4 cup of unsweetened cereal   1/3 cup pasta   3 cups popcorn   crackers (6 small saltines, 3 squares of bobo crackers, 3 of most other crackers)   1 pancake or waffle (5 inch)   15 to 20 fat-free or baked potato or corn chips.   The vegetables included in the starch exchanges include:   corn (1/2 cup or 1/2 cob)   white potato (1/4 large baked with skin or 1/2 cup mashed)   yam or sweet potato (1/2 cup)   green peas (1/2 cup)   squash, winter (1 cup)   lima beans (2/3 cup).   Fruit List: 1 fruit exchange contains about 15 grams of carbohydrate and 60 calories. Examples of one fruit exchange are:   grape juice (1/3 cup)   apple or pineapple juice (1/2 cup)   orange or grapefruit juice (1/2 cup)   1 small apple   orange or peach   1/2 banana   1 cup raspberries   1/3 of a small cantaloupe   1 slice of watermelon.   Milk List: 1 milk exchange contains about 8 grams of protein and 12 grams of carbohydrate. Items on the milk list are divided into fat-free, reduced fat, and whole milk depending on the number of fat grams in the exchange. Examples of one milk exchange are: Fat-Free (0 to 3 grams of fat)   1 cup of skim or non-fat milk   1 cup of 1% milk (also includes 1/2 fat exchange)   6 ounces flavored fat-free yogurt   Reduced-Fat (5 grams of fat)   6 ounces of plain, low-fat yogurt   1 cup 2% milk (also includes one fat exchange).   Whole Milk (8 grams of fat)   8 ounces of plain yogurt (made from whole milk)   1 cup whole milk.   Vegetable List: One vegetable exchange has 5 grams of carbohydrate, 2 grams of protein, no fat, and 25 calories. One-half cup of cooked or a cup of raw vegetables is a good measure for 1 exchange of most vegetables. Raw lettuce may be taken in larger quantities, but  "salad dressing usually equals 1 fat exchange. Other Carbohydrates List: One \"other carbohydrate\" exchange has 15 grams of carbohydrate. Many of these foods count as a starch exchange and one or more fat exchanges.   brownie (2 inch square) = 1 carb, 1 fat exchange   fruit snack roll = 1 carb exchange   granola bar = 1 and 1/2 carb exchanges   ice cream (1/2 cup) = 1 carb, 2 fat exchanges   frozen yogurt (1/2 cup) = 1 carb, 0 to 1 fat exchanges   tortilla chips (6-12) = 1 carb, 2 fat exchanges.   Meat and Meat Substitute Group   Meats are divided into very lean meats, lean meats, medium-fat meats, and high-fat meats. People with diabetes should try to eat more lean and medium fat meats and stay away from the high fat choices. The Very Lean meat group includes foods that contain 7 grams of protein, 0 to 1 gram of fat, and 35 calories for 1 exchange. Examples include:   1 ounce chicken or turkey (white meat, no skin)   1 ounce fresh fish   1 ounce fat-free cheese   2 egg whites   The Lean meat group includes foods that contain 7 grams of protein, 3 grams of fat, and 55 calories for 1 meat exchange. Examples include:   1 ounce chicken or turkey (dark meat, no skin)   1 ounce fish   1 ounce lean pork   1 ounce USDA Select or Choice grades of lean beef   1 ounce cheese (with 3 grams of fat or less per ounce).   The Medium-Fat group includes foods that have 7 grams of protein, 5 grams of fat, and 75 calories for 1 meat exchange. Examples include:   1 ounce of ground beef, most cuts of beef, pork, lamb or veal   1 ounce of cheese (5 grams of fat per ounce or less)   1 egg   1 ounce fried fish.   The High-Fat foods have 7 grams of protein, 8 grams of fat, and 100 calories for 1 meat exchange. This group includes:   1 ounce of pork sausage   1 ounce of spare ribs   1 oz of regular cheese (American, Swiss etc.)   1 oz of lunch meat   1 hot dog (turkey or chicken).   Fat Group   Fat List: Fat is necessary for the body and is " particularly important during periods of fasting (overnight), when it is very slowly absorbed. 1 fat exchange contains 5 grams of fat and 45 calories. The monounsaturated fats and polyunsaturated fats are better for us than saturated fats. The fat list includes: 1 exchange of monounsaturated fats equals:   1/2 Tbsp peanut butter   6 almonds   1 tsp of oil (olive, peanut, canola).   1 exchange of polyunsaturated fats equals:   1 tsp margarine   1 tsp of any vegetable oil (except coconut).   1 exchange of saturated fat includes:   1 tsp butter   1 strip of yun   2 Tbsp of cream (half and half).   Free Foods   A free food contains less than 20 calories or less than 5 grams of carbohydrate per serving. If the food has a serving size listed on its package, it should be limited to 3 servings spread throughout the day. Examples of free foods include:   4 Tbsp fat-free margarine   1 Tbsp fat-free Miracle Whip   sugar-free gelatin   diet soft drinks   catsup   soy sauce   spices.   Combination Foods   Many foods, such as casseroles, are mixed together. Your dietitian can help you figure out how many exchanges to count for combination foods. For example:   lasagna (1 cup) = 2 carb exchanges and 2 medium-fat meat exchanges   spaghetti with meatballs (1 cup) = 2 carb exchanges and 2 medium-fat meat exchanges   pizza, cheese (1/4 of 12 in.) = 2 carbs, 2 medium-fat meats   chicken noodle soup (1 cup) = 1 carb exchange   frozen entrée (less than 300 calories) = 2 carbs, 3 lean meat exchanges   macaroni and cheese (1 cup) = 2 carb exchanges and 2 medium-fat meat exchanges.     LEFT WRIST      FIT BIT TWITCH RECOMMENDED       PYRODOXINE 25MG 3 TABLETS DAILY  DIVIDED OR IN AM    WRIST SPLINT AT NIGHT    AT DAY TIME IF DOING HEAVY LIFTING, GRIPPING GRASPING, OR HEAVY DUTY TYPING    ERGONOMIC POSITION OF COMPUTER    RANGE OF MOTION WRIST 30 EACH TWICE DAILY    AVOID WRIST FLEXION IT PUTS PRESSURE ON THE MEDIAN NERVE    AVOID  REPETITIVE GRIPPING    ICE 5 MINUTES PRN    DICLOFENAC GEL UP TO 4 X DAILY A NEEDED WITH FLARE UPS     CONSIDER CORTISONE INJECTION          Apply heat to the hand for 15 minutes before performing the exercises, and apply ice (a bag    of crushed ice or frozen peas) to the hand for 20 minutes after each exercise session to    prevent inflammation. If numbness steadily worsens, if the exercises increase the pain, or if    the pain does not improve after you have performed the exercises for 3 to 4 weeks, call your        Begin with the affected hand raised.    (1) Make a fist, with the thumb outside the    fingers. (2) Extend the fingers, keeping the    thumb close to the side of the hand.    (3) Extend the hand at the wrist (bend it    backward, toward the forearm), keeping    the fingers straight. (4) With the wrist    straight, extend the thumb as shown.    (5) Keeping the thumb extended, extend    the hand at the wrist. (6) Reach behind    your hand and grasp the thumb with the    thumb and forefinger of the opposite hand.    Pull the thumb downward, away from the    palm of your hand.    joaquín.         (R73.9) Hyperglycemia  Comment:    Plan: metFORMIN (GLUCOPHAGE-XR) 500 MG 24 hr tablet        1200 CALORY ADA     (N91.2) Absence of menstruation  Comment:    Plan: medroxyPROGESTERone (PROVERA) 10 MG tablet         PRN ABSENT MENSES   PERIMENOPAUSAL     (K21.9) Gastroesophageal reflux disease without esophagitis  Comment:    Plan: omeprazole (PRILOSEC) 20 MG CR capsule         USING PRN     (M25.532) Left wrist pain  Comment:  GANGLION DORSUM LEFT WRIST SEE HISTORY OF PRESENT ILLNESS   Plan: ORTHOPEDICS ADULT REFERRAL, XR Wrist Left G/E 3        Views  LEFT WRIST SPLINT  LEFT WRIST XRAY

## 2017-08-25 NOTE — MR AVS SNAPSHOT
After Visit Summary   8/25/2017    Luci Londono    MRN: [id number]           Patient Information     Date Of Birth          1970        Visit Information        Provider Department      8/25/2017 9:00 AM Stuart Wills MD [facility]        Today's Diagnoses     Well female exam with routine gynecological exam    -  1    BMI 35        Hyperglycemia        Absence of menstruation        Gastroesophageal reflux disease without esophagitis        Left wrist pain          Care Instructions    Assessment: Lower back pain    Plan: do these exercises at least twice daily:  Standing or sitting exercise can be done every two hours    Justo' Flexion Versus Oriana   Extension Exercises For   Low Back Pain   Examples of Justo' Flexion Exercises  1. Pelvic tilt.  Please press the small of your back against the floor.  Start with 5-10  and increase to 100 count over one month   2. Single Knee to chest. Lie on your back with legs in bent position. Alternate one leg and the other very slowly bringing the knee to chest.  Start with a count of 5-10   Over one month work up to 100  3. Double knee to chest.  Lie on your back with knees in bent position.  Bring both knees to the chest slowly hold for a count of 5-to 10. Over one month work to 100  4. Partial sit-up or crunch.  Lie on your back in bent leg position.  Please bring your body with arms crossed in front  To 30 degrees of flexion. Start with 5-10 over one month work up to 100 or more  5. Sit back.  Please sit on the side of the bed or a stair landing  And lie backwards until the abdominal muscles start to quiver.  Hold for a count of 5-10 and over a month work up to 100.  5. Hamstring stretch.  Please extend your legs while sitting on the floor as tolerated for 5-10 count.  Gradually increase to count of 30 over one month      Alternately find a stair landing or sturdy chair and place heel  In a  "comfortable level of extension  And stretch one hamstring at a time for 5-10 seconds.  Increase to count of 30 over one month                         Squat.  Stand with legs comfortably apart and lower the body slowly by flexing the knees  for count of 5-10 over one month increase to 30.  Useful for anterior disc protrusion, facette joint arthritis spond-10ylolysis, spondylolisthesis and spinal stenosis    Sneha method or extension exercises  Useful disc bulging posteriorly  1. Prone pressups  Please lie on your abdomen.   Please do a push with the upper half of your body only.  This should not cause the pain to shoot down your buttock thigh leg or foot  Start with 10 and repeat x 3 one minute apart.  Repeat x 10 every 1-2 hours  Pain tends to increase in the center of back  And leaves buttock thighs legs and foot over time  You may shift your pelvis in opposite direction of buttock and leg pain to achieve even better results  2. Superman with arms extended  Or at the side Simultaneously lift your arms and legs off the floor. Start with 5-10 over one month increase to 100.  3. Cat stretch or cobra Start  As if in extended position of prone pressup but hold the stretch for 5 or 10 count. Over one moth to count of 30.  4.  Extensions can be done in standing position  As well if prone pressup is inconvenient.  Shift your pelvis in opposite direction of limb pain.  Put your hands  Flat on your buttocks and lean backwards without loss of balance.  Count 10 x 3 times then 10 extensions hourly   Diabetes: Exchange List  What are the exchange lists?   The exchange lists show you portions of food that equal 1 exchange. Foods are divided into food lists. The foods on each list are called exchanges because they have a similar number of calories, protein, carbohydrate and fat content. Foods from each list can be traded or \"exchanged\" for any other food on the same list because they all have a similar exchange value. A " dietitian will help you plan how much food your child should eat at each meal and from what lists the foods should come from. At first you should measure your food until you are able to make good estimates about serving sizes. The following list is a sample of foods found on the exchange lists. For more information, you can buy the Exchange Lists for Meal Planning from: The American Diabetes Association [address] [place] [address] 1-372.392.2812 http://www.diabetes.org  Carbohydrate group   Starch List: One starch exchange contains about 15 grams of carbohydrate, 3 grams of protein, 0 to 1 grams of fat, and 80 calories. A starch exchange is sometimes called a carb exchange. Examples of one starch (carb) exchange are:   one slice of bread   1/2 hamburger or hot dog bun   3/4 cup of unsweetened cereal   1/3 cup pasta   3 cups popcorn   crackers (6 small saltines, 3 squares of bobo crackers, 3 of most other crackers)   1 pancake or waffle (5 inch)   15 to 20 fat-free or baked potato or corn chips.   The vegetables included in the starch exchanges include:   corn (1/2 cup or 1/2 cob)   white potato (1/4 large baked with skin or 1/2 cup mashed)   yam or sweet potato (1/2 cup)   green peas (1/2 cup)   squash, winter (1 cup)   lima beans (2/3 cup).   Fruit List: 1 fruit exchange contains about 15 grams of carbohydrate and 60 calories. Examples of one fruit exchange are:   grape juice (1/3 cup)   apple or pineapple juice (1/2 cup)   orange or grapefruit juice (1/2 cup)   1 small apple   orange or peach   1/2 banana   1 cup raspberries   1/3 of a small cantaloupe   1 slice of watermelon.   Milk List: 1 milk exchange contains about 8 grams of protein and 12 grams of carbohydrate. Items on the milk list are divided into fat-free, reduced fat, and whole milk depending on the number of fat grams in the exchange. Examples of one milk exchange are: Fat-Free (0 to 3 grams of fat)   1 cup of skim or non-fat milk   1 cup of  "1% milk (also includes 1/2 fat exchange)   6 ounces flavored fat-free yogurt   Reduced-Fat (5 grams of fat)   6 ounces of plain, low-fat yogurt   1 cup 2% milk (also includes one fat exchange).   Whole Milk (8 grams of fat)   8 ounces of plain yogurt (made from whole milk)   1 cup whole milk.   Vegetable List: One vegetable exchange has 5 grams of carbohydrate, 2 grams of protein, no fat, and 25 calories. One-half cup of cooked or a cup of raw vegetables is a good measure for 1 exchange of most vegetables. Raw lettuce may be taken in larger quantities, but salad dressing usually equals 1 fat exchange. Other Carbohydrates List: One \"other carbohydrate\" exchange has 15 grams of carbohydrate. Many of these foods count as a starch exchange and one or more fat exchanges.   brownie (2 inch square) = 1 carb, 1 fat exchange   fruit snack roll = 1 carb exchange   granola bar = 1 and 1/2 carb exchanges   ice cream (1/2 cup) = 1 carb, 2 fat exchanges   frozen yogurt (1/2 cup) = 1 carb, 0 to 1 fat exchanges   tortilla chips (6-12) = 1 carb, 2 fat exchanges.   Meat and Meat Substitute Group   Meats are divided into very lean meats, lean meats, medium-fat meats, and high-fat meats. People with diabetes should try to eat more lean and medium fat meats and stay away from the high fat choices. The Very Lean meat group includes foods that contain 7 grams of protein, 0 to 1 gram of fat, and 35 calories for 1 exchange. Examples include:   1 ounce chicken or turkey (white meat, no skin)   1 ounce fresh fish   1 ounce fat-free cheese   2 egg whites   The Lean meat group includes foods that contain 7 grams of protein, 3 grams of fat, and 55 calories for 1 meat exchange. Examples include:   1 ounce chicken or turkey (dark meat, no skin)   1 ounce fish   1 ounce lean pork   1 ounce USDA Select or Choice grades of lean beef   1 ounce cheese (with 3 grams of fat or less per ounce).   The Medium-Fat group includes foods that have 7 grams of " protein, 5 grams of fat, and 75 calories for 1 meat exchange. Examples include:   1 ounce of ground beef, most cuts of beef, pork, lamb or veal   1 ounce of cheese (5 grams of fat per ounce or less)   1 egg   1 ounce fried fish.   The High-Fat foods have 7 grams of protein, 8 grams of fat, and 100 calories for 1 meat exchange. This group includes:   1 ounce of pork sausage   1 ounce of spare ribs   1 oz of regular cheese (American, Swiss etc.)   1 oz of lunch meat   1 hot dog (turkey or chicken).   Fat Group   Fat List: Fat is necessary for the body and is particularly important during periods of fasting (overnight), when it is very slowly absorbed. 1 fat exchange contains 5 grams of fat and 45 calories. The monounsaturated fats and polyunsaturated fats are better for us than saturated fats. The fat list includes: 1 exchange of monounsaturated fats equals:   1/2 Tbsp peanut butter   6 almonds   1 tsp of oil (olive, peanut, canola).   1 exchange of polyunsaturated fats equals:   1 tsp margarine   1 tsp of any vegetable oil (except coconut).   1 exchange of saturated fat includes:   1 tsp butter   1 strip of yun   2 Tbsp of cream (half and half).   Free Foods   A free food contains less than 20 calories or less than 5 grams of carbohydrate per serving. If the food has a serving size listed on its package, it should be limited to 3 servings spread throughout the day. Examples of free foods include:   4 Tbsp fat-free margarine   1 Tbsp fat-free Miracle Whip   sugar-free gelatin   diet soft drinks   catsup   soy sauce   spices.   Combination Foods   Many foods, such as casseroles, are mixed together. Your dietitian can help you figure out how many exchanges to count for combination foods. For example:   lasagna (1 cup) = 2 carb exchanges and 2 medium-fat meat exchanges   spaghetti with meatballs (1 cup) = 2 carb exchanges and 2 medium-fat meat exchanges   pizza, cheese (1/4 of 12 in.) = 2 carbs, 2 medium-fat meats    chicken noodle soup (1 cup) = 1 carb exchange   frozen entrée (less than 300 calories) = 2 carbs, 3 lean meat exchanges   macaroni and cheese (1 cup) = 2 carb exchanges and 2 medium-fat meat exchanges.     LEFT WRIST      FIT BIT TWITCH RECOMMENDED       PYRODOXINE 25MG 3 TABLETS DAILY  DIVIDED OR IN AM    WRIST SPLINT AT NIGHT    AT DAY TIME IF DOING HEAVY LIFTING, GRIPPING GRASPING, OR HEAVY DUTY TYPING    ERGONOMIC POSITION OF COMPUTER    RANGE OF MOTION WRIST 30 EACH TWICE DAILY    AVOID WRIST FLEXION IT PUTS PRESSURE ON THE MEDIAN NERVE    AVOID REPETITIVE GRIPPING    ICE 5 MINUTES PRN    DICLOFENAC GEL UP TO 4 X DAILY A NEEDED WITH FLARE UPS     CONSIDER CORTISONE INJECTION          Apply heat to the hand for 15 minutes before performing the exercises, and apply ice (a bag    of crushed ice or frozen peas) to the hand for 20 minutes after each exercise session to    prevent inflammation. If numbness steadily worsens, if the exercises increase the pain, or if    the pain does not improve after you have performed the exercises for 3 to 4 weeks, call your        Begin with the affected hand raised.    (1) Make a fist, with the thumb outside the    fingers. (2) Extend the fingers, keeping the    thumb close to the side of the hand.    (3) Extend the hand at the wrist (bend it    backward, toward the forearm), keeping    the fingers straight. (4) With the wrist    straight, extend the thumb as shown.    (5) Keeping the thumb extended, extend    the hand at the wrist. (6) Reach behind    your hand and grasp the thumb with the    thumb and forefinger of the opposite hand.    Pull the thumb downward, away from the    palm of your hand.    joaquín.         (R73.9) Hyperglycemia  Comment:    Plan: metFORMIN (GLUCOPHAGE-XR) 500 MG 24 hr tablet        1200 CALORY ADA     (N91.2) Absence of menstruation  Comment:    Plan: medroxyPROGESTERone (PROVERA) 10 MG tablet         PRN ABSENT MENSES   PERIMENOPAUSAL     (K21.9)  Gastroesophageal reflux disease without esophagitis  Comment:    Plan: omeprazole (PRILOSEC) 20 MG CR capsule         USING PRN     (M25.532) Left wrist pain  Comment:  GANGLION DORSUM LEFT WRIST SEE HISTORY OF PRESENT ILLNESS   Plan: ORTHOPEDICS ADULT REFERRAL, XR Wrist Left G/E 3        Views  LEFT WRIST SPLINT  LEFT WRIST XRAY               Follow-ups after your visit        Additional Services     ORTHOPEDICS ADULT REFERRAL       Your provider has referred you to: [facility] [place] [phone]   [email or web address]  HAND SURGERY CLIFF OR MACY GARCÍA   WRIST PAIN CAUSE GANGLION     Please be aware that coverage of these services is subject to the terms and limitations of your health insurance plan.  Call member services at your health plan with any benefit or coverage questions.      Please bring the following to your appointment:    >>   Any x-rays, CTs or MRIs which have been performed.  Contact the facility where they were done to arrange for  prior to your scheduled appointment.    >>   List of current medications   >>   This referral request   >>   Any documents/labs given to you for this referral                  Who to contact     If you have questions or need follow up information about today's clinic visit or your schedule please contact [facility] [place] [facility] directly at [phone].  Normal or non-critical lab and imaging results will be communicated to you by MyChart, letter or phone within 4 business days after the clinic has received the results. If you do not hear from us within 7 days, please contact the clinic through MyChart or phone. If you have a critical or abnormal lab result, we will notify you by phone as soon as possible.  Submit refill requests through [employer] or call your pharmacy and they will forward the refill request to us. Please allow 3 business days for your refill to be completed.          Additional Information  "About Your Visit        [employer] Information     [employer] lets you send messages to your doctor, view your test results, renew your prescriptions, schedule appointments and more. To sign up, go to [email or web address] . Click on \"Log in\" on the left side of the screen, which will take you to the Welcome page. Then click on \"Sign up Now\" on the right side of the page.     You will be asked to enter the access code listed below, as well as some personal information. Please follow the directions to create your username and password.     Your access code is: [email or web address]  Expires: 10/23/2017 10:39 AM     Your access code will  in 90 days. If you need help or a new code, please call your [place] clinic or [phone].        Care EveryWhere ID     This is your Care EveryWhere ID. This could be used by other organizations to access your [facility] medical records  [id number]        Your Vitals Were     Pulse Temperature Respirations Pulse Oximetry BMI (Body Mass Index)       60 96.6  F (35.9  C) (Tympanic) 16 99% 32.96 kg/m2        Blood Pressure from Last 3 Encounters:   17 118/64   17 110/62   17 126/66    Weight from Last 3 Encounters:   17 192 lb (87.1 kg)   17 195 lb (88.5 kg)   17 190 lb 3.2 oz (86.3 kg)              We Performed the Following     ORTHOPEDICS ADULT REFERRAL     Pap imaged thin layer screen with HPV - recommended age 30 - 65 years (select HPV order below)          Today's Medication Changes          These changes are accurate as of: 17 10:09 AM.  If you have any questions, ask your nurse or doctor.               These medicines have changed or have updated prescriptions.        Dose/Directions    medroxyPROGESTERone 10 MG tablet   Commonly known as:  PROVERA   This may have changed:  See the new instructions.   Used for:  Absence of menstruation   Changed by:  Stuart Wills MD        Dose:  10 mg   Take 1 tablet (10 mg) by mouth daily "   Quantity:  90 tablet   Refills:  3            Where to get your medicines      These medications were sent to [facility] [address] [facility] [address] [place] [address] & [address]  [address] [address]     Phone:  [phone]     medroxyPROGESTERone 10 MG tablet    metFORMIN 500 MG 24 hr tablet    omeprazole 20 MG CR capsule                Primary Care Provider Office Phone # Fax #    Stuart Charley Wills [phone] [address] [facility] [address]        Equal Access to Services     [facility]: Hadii aad ku hadasho Soomaali, waaxda luqadaha, qaybta kaalmada adeegyada, josh arciniega ademaynor neumann . So [facility] [phone].    ATENCIÓN: Si habla español, tiene a dominique disposición servicios gratuitos de asistencia lingüística. Llame al [phone].    We comply with applicable federal civil rights laws and [place] laws. We do not discriminate on the basis of race, color, national origin, age, disability sex, sexual orientation or gender identity.            Thank you!     Thank you for choosing [facility]  for your care. Our goal is always to provide you with excellent care. Hearing back from our patients is one way we can continue to improve our services. Please take a few minutes to complete the written survey that you may receive in the mail after your visit with us. Thank you!             Your Updated Medication List - Protect others around you: Learn how to safely use, store and throw away your medicines at www.disposemymeds.org.          This list is accurate as of: 8/25/17 10:09 AM.  Always use your most recent med list.                   Brand Name Dispense Instructions for use Diagnosis    levothyroxine 137 MCG tablet    SYNTHROID/LEVOTHROID    90 tablet    Take 1 tablet (137 mcg) by mouth daily    Hypothyroidism, unspecified type, Acquired hypothyroidism       medroxyPROGESTERone 10 MG  tablet    PROVERA    90 tablet    Take 1 tablet (10 mg) by mouth daily    Absence of menstruation       metFORMIN 500 MG 24 hr tablet    GLUCOPHAGE-XR    30 tablet    Take 1 tablet (500 mg) by mouth daily (with dinner)    Non morbid obesity due to excess calories, Hyperglycemia       omeprazole 20 MG CR capsule    priLOSEC    60 capsule    Take 1 capsule (20 mg) by mouth 2 times daily    Gastroesophageal reflux disease without esophagitis

## 2017-08-25 NOTE — LETTER
September 7, 2017    Luci Corbin Alert  [address]  [place] [address]    Dear Luci,  We are happy to inform you that your PAP smear result from 08/25/17 is normal.  We are now able to do a follow up test on PAP smears. The DNA test is for HPV (Human Papilloma Virus). Cervical cancer is closely linked with certain types of HPV. Your result showed no evidence of high risk HPV.  Therefore we recommend you return in 3 years for your next pap smear.  You will still need to return to the clinic every year for an annual exam and other preventive tests.  Please contact the clinic at [phone] with any questions.  Sincerely,    ALEENA TYLER MD/vadim

## 2017-08-26 ASSESSMENT — ANXIETY QUESTIONNAIRES: GAD7 TOTAL SCORE: 1

## 2017-08-30 LAB
COPATH REPORT: NORMAL
PAP: NORMAL

## 2017-08-31 LAB
FINAL DIAGNOSIS: NORMAL
HPV HR 12 DNA CVX QL NAA+PROBE: NEGATIVE
HPV16 DNA SPEC QL NAA+PROBE: NEGATIVE
HPV18 DNA SPEC QL NAA+PROBE: NEGATIVE
SPECIMEN DESCRIPTION: NORMAL

## 2017-10-16 ENCOUNTER — APPOINTMENT (OUTPATIENT)
Dept: GENERAL RADIOLOGY | Facility: CLINIC | Age: 47
End: 2017-10-16
Attending: EMERGENCY MEDICINE
Payer: COMMERCIAL

## 2017-10-16 ENCOUNTER — HOSPITAL ENCOUNTER (EMERGENCY)
Facility: CLINIC | Age: 47
Discharge: HOME | End: 2017-10-16
Attending: EMERGENCY MEDICINE | Admitting: EMERGENCY MEDICINE
Payer: COMMERCIAL

## 2017-10-16 VITALS
OXYGEN SATURATION: 100 % | WEIGHT: 180 LBS | BODY MASS INDEX: 31.89 KG/M2 | RESPIRATION RATE: 17 BRPM | HEIGHT: 63 IN | SYSTOLIC BLOOD PRESSURE: 133 MMHG | DIASTOLIC BLOOD PRESSURE: 71 MMHG | TEMPERATURE: 97.9 F | HEART RATE: 83 BPM

## 2017-10-16 DIAGNOSIS — R07.9 CHEST PAIN, UNSPECIFIED TYPE: ICD-10-CM

## 2017-10-16 LAB
ALBUMIN SERPL-MCNC: 3.3 G/DL (ref 3.4–5)
ALP SERPL-CCNC: 77 U/L (ref 40–150)
ALT SERPL W P-5'-P-CCNC: 20 U/L (ref 0–50)
ANION GAP SERPL CALCULATED.3IONS-SCNC: 6 MMOL/L (ref 3–14)
AST SERPL W P-5'-P-CCNC: 16 U/L (ref 0–45)
BASOPHILS # BLD AUTO: 0 10E9/L (ref 0–0.2)
BASOPHILS NFR BLD AUTO: 0.2 %
BILIRUB SERPL-MCNC: 0.2 MG/DL (ref 0.2–1.3)
BUN SERPL-MCNC: 12 MG/DL (ref 7–30)
CALCIUM SERPL-MCNC: 8.5 MG/DL (ref 8.5–10.1)
CHLORIDE SERPL-SCNC: 105 MMOL/L (ref 94–109)
CO2 SERPL-SCNC: 28 MMOL/L (ref 20–32)
CREAT SERPL-MCNC: 0.77 MG/DL (ref 0.52–1.04)
D DIMER PPP FEU-MCNC: <0.3 UG/ML FEU (ref 0–0.5)
DIFFERENTIAL METHOD BLD: ABNORMAL
EOSINOPHIL # BLD AUTO: 0 10E9/L (ref 0–0.7)
EOSINOPHIL NFR BLD AUTO: 0.4 %
ERYTHROCYTE [DISTWIDTH] IN BLOOD BY AUTOMATED COUNT: 15.8 % (ref 10–15)
GFR SERPL CREATININE-BSD FRML MDRD: 81 ML/MIN/1.7M2
GLUCOSE SERPL-MCNC: 125 MG/DL (ref 70–99)
HCT VFR BLD AUTO: 35 % (ref 35–47)
HGB BLD-MCNC: 11.9 G/DL (ref 11.7–15.7)
IMM GRANULOCYTES # BLD: 0 10E9/L (ref 0–0.4)
IMM GRANULOCYTES NFR BLD: 0.1 %
INTERPRETATION ECG - MUSE: NORMAL
LIPASE SERPL-CCNC: 113 U/L (ref 73–393)
LYMPHOCYTES # BLD AUTO: 3 10E9/L (ref 0.8–5.3)
LYMPHOCYTES NFR BLD AUTO: 36.1 %
MCH RBC QN AUTO: 26.9 PG (ref 26.5–33)
MCHC RBC AUTO-ENTMCNC: 34 G/DL (ref 31.5–36.5)
MCV RBC AUTO: 79 FL (ref 78–100)
MONOCYTES # BLD AUTO: 0.5 10E9/L (ref 0–1.3)
MONOCYTES NFR BLD AUTO: 5.6 %
NEUTROPHILS # BLD AUTO: 4.8 10E9/L (ref 1.6–8.3)
NEUTROPHILS NFR BLD AUTO: 57.6 %
NRBC # BLD AUTO: 0 10*3/UL
NRBC BLD AUTO-RTO: 0 /100
PLATELET # BLD AUTO: 328 10E9/L (ref 150–450)
POTASSIUM SERPL-SCNC: 3.4 MMOL/L (ref 3.4–5.3)
PROT SERPL-MCNC: 7.6 G/DL (ref 6.8–8.8)
RBC # BLD AUTO: 4.43 10E12/L (ref 3.8–5.2)
SODIUM SERPL-SCNC: 139 MMOL/L (ref 133–144)
TROPONIN I SERPL-MCNC: <0.015 UG/L (ref 0–0.04)
WBC # BLD AUTO: 8.3 10E9/L (ref 4–11)

## 2017-10-16 PROCEDURE — 25000132 ZZH RX MED GY IP 250 OP 250 PS 637: Performed by: EMERGENCY MEDICINE

## 2017-10-16 PROCEDURE — 84484 ASSAY OF TROPONIN QUANT: CPT | Performed by: EMERGENCY MEDICINE

## 2017-10-16 PROCEDURE — 25000128 H RX IP 250 OP 636: Performed by: EMERGENCY MEDICINE

## 2017-10-16 PROCEDURE — 80053 COMPREHEN METABOLIC PANEL: CPT | Performed by: EMERGENCY MEDICINE

## 2017-10-16 PROCEDURE — 71020 XR CHEST 2 VW: CPT

## 2017-10-16 PROCEDURE — 83690 ASSAY OF LIPASE: CPT | Performed by: EMERGENCY MEDICINE

## 2017-10-16 PROCEDURE — 25000125 ZZHC RX 250: Performed by: EMERGENCY MEDICINE

## 2017-10-16 PROCEDURE — 96360 HYDRATION IV INFUSION INIT: CPT

## 2017-10-16 PROCEDURE — 85025 COMPLETE CBC W/AUTO DIFF WBC: CPT | Performed by: EMERGENCY MEDICINE

## 2017-10-16 PROCEDURE — 93005 ELECTROCARDIOGRAM TRACING: CPT

## 2017-10-16 PROCEDURE — 99285 EMERGENCY DEPT VISIT HI MDM: CPT | Mod: 25

## 2017-10-16 PROCEDURE — 85379 FIBRIN DEGRADATION QUANT: CPT | Performed by: EMERGENCY MEDICINE

## 2017-10-16 RX ORDER — SODIUM CHLORIDE 9 MG/ML
1000 INJECTION, SOLUTION INTRAVENOUS CONTINUOUS
Status: DISCONTINUED | OUTPATIENT
Start: 2017-10-16 | End: 2017-10-16 | Stop reason: HOSPADM

## 2017-10-16 RX ADMIN — LIDOCAINE HYDROCHLORIDE 30 ML: 20 SOLUTION ORAL; TOPICAL at 17:32

## 2017-10-16 RX ADMIN — SODIUM CHLORIDE 1000 ML: 9 INJECTION, SOLUTION INTRAVENOUS at 17:34

## 2017-10-16 ASSESSMENT — ENCOUNTER SYMPTOMS
BLOOD IN STOOL: 0
FEVER: 0
VOMITING: 0
CHILLS: 0
CONSTIPATION: 0
DYSURIA: 0
APPETITE CHANGE: 0
COUGH: 0
HEMATURIA: 0
NAUSEA: 0
NUMBNESS: 1
DIARRHEA: 0
SHORTNESS OF BREATH: 1

## 2017-10-16 NOTE — ED AVS SNAPSHOT
Emergency Department    [address] [place] [address]    Phone:  [phone]    Fax:  [phone]                                       Luci Londono   MRN: [id number]    Department:   Emergency Department   Date of Visit:  10/16/2017           After Visit Summary Signature Page     I have received my discharge instructions, and my questions have been answered. I have discussed any challenges I see with this plan with the nurse or doctor.    ..........................................................................................................................................  Patient/Patient Representative Signature      ..........................................................................................................................................  Patient Representative Print Name and Relationship to Patient    ..................................................               ................................................  Date                                            Time    ..........................................................................................................................................  Reviewed by Signature/Title    ...................................................              ..............................................  Date                                                            Time

## 2017-10-16 NOTE — ED AVS SNAPSHOT
Emergency Department    [address] [facility] [address]    Phone:  [phone]    Fax:  [phone]                                       Luci Londono   MRN: [id number]    Department:   Emergency Department   Date of Visit:  10/16/2017           Patient Information     Date Of Birth          1970        Your diagnoses for this visit were:     Chest pain, unspecified type        You were seen by Haja Granger MD.      Follow-up Information     Schedule an appointment as soon as possible for a visit with Stuart Wills MD.    Specialty:  Family Practice    Contact information:    [address]  [facility] [address] [phone]          Follow up with  Emergency Department.    Specialty:  EMERGENCY MEDICINE    Why:  If symptoms worsen    Contact information:    [address] [facility] [id number] [phone]        Discharge Instructions       Discharge Instructions  Chest Pain    You have been seen today for chest pain or discomfort.  At this time, your provider has found no signs that your chest pain is due to a serious or life-threatening condition, (or you have declined more testing and/or admission to the hospital). However, sometimes there is a serious problem that does not show up right away. Your evaluation today may not be complete and you may need further testing and evaluation.     Generally, every Emergency Department visit should have a follow-up clinic visit with either a primary or a specialty clinic/provider. Please follow-up as instructed by your emergency provider today.  Return to the Emergency Department if:    Your chest pain changes, gets worse, starts to happen more often, or comes with less activity.    You are newly short of breath.    You get very weak or tired.    You pass out or faint.    You have any new symptoms, like fever, cough, numb legs, or you cough up blood.    You have anything else that worries  you.    Until you follow-up with your regular provider, please do the following:    Take one aspirin daily unless you have an allergy or are told not to by your provider.    If a stress test appointment has been made, go to the appointment.    If you have questions, contact your regular provider.    Follow-up with your regular provider/clinic as directed; this is very important.    If you were given a prescription for medicine here today, be sure to read all of the information (including the package insert) that comes with your prescription.  This will include important information about the medicine, its side effects, and any warnings that you need to know about.  The pharmacist who fills the prescription can provide more information and answer questions you may have about the medicine.  If you have questions or concerns that the pharmacist cannot address, please call or return to the Emergency Department.       Remember that you can always come back to the Emergency Department if you are not able to see your regular provider in the amount of time listed above, if you get any new symptoms, or if there is anything that worries you.        Discharge References/Attachments     CHEST WALL PAIN, COSTOCHONDRITIS (ENGLISH)    GERD (ADULT) (ENGLISH)      24 Hour Appointment Hotline       To make an appointment at any [facility], call [phone] ([phone]). If you don't have a family doctor or clinic, we will help you find one. [facility] clinics are conveniently located to serve the needs of you and your family.             Review of your medicines      Our records show that you are taking the medicines listed below. If these are incorrect, please call your family doctor or clinic.        Dose / Directions Last dose taken    levothyroxine 137 MCG tablet   Commonly known as:  SYNTHROID/LEVOTHROID   Dose:  137 mcg   Quantity:  90 tablet        Take 1 tablet (137 mcg) by mouth daily   Refills:  3         medroxyPROGESTERone 10 MG tablet   Commonly known as:  PROVERA   Dose:  10 mg   Quantity:  90 tablet        Take 1 tablet (10 mg) by mouth daily   Refills:  3        metFORMIN 500 MG 24 hr tablet   Commonly known as:  GLUCOPHAGE-XR   Dose:  500 mg   Quantity:  30 tablet        Take 1 tablet (500 mg) by mouth daily (with dinner)   Refills:  11        omeprazole 20 MG CR capsule   Commonly known as:  priLOSEC   Dose:  20 mg   Quantity:  60 capsule        Take 1 capsule (20 mg) by mouth 2 times daily   Refills:  11                Procedures and tests performed during your visit     CBC with platelets differential    Comprehensive metabolic panel    D dimer quantitative    EKG 12 lead    Lipase    Peripheral IV: Standard    Troponin I    XR Chest 2 Views      Orders Needing Specimen Collection     None      Pending Results     Date and Time Order Name Status Description    10/16/2017 1800 XR Chest 2 Views Preliminary             Pending Culture Results     No orders found from 10/14/2017 to 10/17/2017.            Pending Results Instructions     If you had any lab results that were not finalized at the time of your Discharge, you can call the ED Lab Result RN at [phone]. You will be contacted by this team for any positive Lab results or changes in treatment. The nurses are available 7 days a week from 10A to 6:30P.  You can leave a message 24 hours per day and they will return your call.        Test Results From Your Hospital Stay        10/16/2017  5:39 PM      Component Results     Component Value Ref Range & Units Status    WBC 8.3 4.0 - 11.0 10e9/L Final    RBC Count 4.43 3.8 - 5.2 10e12/L Final    Hemoglobin 11.9 11.7 - 15.7 g/dL Final    Hematocrit 35.0 35.0 - 47.0 % Final    MCV 79 78 - 100 fl Final    MCH 26.9 26.5 - 33.0 pg Final    MCHC 34.0 31.5 - 36.5 g/dL Final    RDW 15.8 (H) 10.0 - 15.0 % Final    Platelet Count 328 150 - 450 10e9/L Final    Diff Method Automated Method  Final    % Neutrophils 57.6 %  Final    % Lymphocytes 36.1 % Final    % Monocytes 5.6 % Final    % Eosinophils 0.4 % Final    % Basophils 0.2 % Final    % Immature Granulocytes 0.1 % Final    Nucleated RBCs 0 0 /100 Final    Absolute Neutrophil 4.8 1.6 - 8.3 10e9/L Final    Absolute Lymphocytes 3.0 0.8 - 5.3 10e9/L Final    Absolute Monocytes 0.5 0.0 - 1.3 10e9/L Final    Absolute Eosinophils 0.0 0.0 - 0.7 10e9/L Final    Absolute Basophils 0.0 0.0 - 0.2 10e9/L Final    Abs Immature Granulocytes 0.0 0 - 0.4 10e9/L Final    Absolute Nucleated RBC 0.0  Final         10/16/2017  5:59 PM      Component Results     Component Value Ref Range & Units Status    Sodium 139 133 - 144 mmol/L Final    Potassium 3.4 3.4 - 5.3 mmol/L Final    Chloride 105 94 - 109 mmol/L Final    Carbon Dioxide 28 20 - 32 mmol/L Final    Anion Gap 6 3 - 14 mmol/L Final    Glucose 125 (H) 70 - 99 mg/dL Final    Urea Nitrogen 12 7 - 30 mg/dL Final    Creatinine 0.77 0.52 - 1.04 mg/dL Final    GFR Estimate 81 >60 mL/min/1.7m2 Final    Non  GFR Calc    GFR Estimate If Black >90 >60 mL/min/1.7m2 Final    African American GFR Calc    Calcium 8.5 8.5 - 10.1 mg/dL Final    Bilirubin Total 0.2 0.2 - 1.3 mg/dL Final    Albumin 3.3 (L) 3.4 - 5.0 g/dL Final    Protein Total 7.6 6.8 - 8.8 g/dL Final    Alkaline Phosphatase 77 40 - 150 U/L Final    ALT 20 0 - 50 U/L Final    AST 16 0 - 45 U/L Final         10/16/2017  5:54 PM      Component Results     Component Value Ref Range & Units Status    Lipase 113 73 - 393 U/L Final         10/16/2017  5:59 PM      Component Results     Component Value Ref Range & Units Status    Troponin I ES <0.015 0.000 - 0.045 ug/L Final    The 99th percentile for upper reference range is 0.045 ug/L.  Troponin values   in the range of 0.045 - 0.120 ug/L may be associated with risks of adverse   clinical events.           10/16/2017  5:53 PM      Component Results     Component Value Ref Range & Units Status    D Dimer <0.3 0.0 - 0.50 ug/ml FEU  Final    This D-dimer assay is intended for use in conjunction with a clinical pretest   probability assessment model to exclude pulmonary embolism (PE) and deep   venous thrombosis (DVT) in outpatients suspected of PE or DVT. The cut-off   value is 0.5 ug/mL FEU.           10/16/2017  6:11 PM      Narrative     CHEST TWO VIEWS   10/16/2017 6:08 PM     HISTORY: Chest pain.    COMPARISON: None.    FINDINGS: The lungs are clear. Normal-sized cardiac silhouette.        Impression     IMPRESSION: No evidence of active cardiopulmonary disease.                Clinical Quality Measure: Blood Pressure Screening     Your blood pressure was checked while you were in the emergency department today. The last reading we obtained was  BP: 133/71 . Please read the guidelines below about what these numbers mean and what you should do about them.  If your systolic blood pressure (the top number) is less than 120 and your diastolic blood pressure (the bottom number) is less than 80, then your blood pressure is normal. There is nothing more that you need to do about it.  If your systolic blood pressure (the top number) is 120-139 or your diastolic blood pressure (the bottom number) is 80-89, your blood pressure may be higher than it should be. You should have your blood pressure rechecked within a year by a primary care provider.  If your systolic blood pressure (the top number) is 140 or greater or your diastolic blood pressure (the bottom number) is 90 or greater, you may have high blood pressure. High blood pressure is treatable, but if left untreated over time it can put you at risk for heart attack, stroke, or kidney failure. You should have your blood pressure rechecked by a primary care provider within the next 4 weeks.  If your provider in the emergency department today gave you specific instructions to follow-up with your doctor or provider even sooner than that, you should follow that instruction and not wait for up to 4  "weeks for your follow-up visit.        Thank you for choosing [facility]       Thank you for choosing [facility] for your care. Our goal is always to provide you with excellent care. Hearing back from our patients is one way we can continue to improve our services. Please take a few minutes to complete the written survey that you may receive in the mail after you visit with us. Thank you!        [facility] Information     [facility] lets you send messages to your doctor, view your test results, renew your prescriptions, schedule appointments and more. To sign up, go to [email or web address] . Click on \"Log in\" on the left side of the screen, which will take you to the Welcome page. Then click on \"Sign up Now\" on the right side of the page.     You will be asked to enter the access code listed below, as well as some personal information. Please follow the directions to create your username and password.     Your access code is: [email or web address]  Expires: 10/23/2017 10:39 AM     Your access code will  in 90 days. If you need help or a new code, please call your [facility] clinic or [phone].        Care EveryWhere ID     This is your Care EveryWhere ID. This could be used by other organizations to access your [facility] medical records  [id number]        Equal Access to Services     PINA PINEDA : Kalani Marks, waaxda luqadaha, qaybta kaalmada ademaynoryajulianne, josh bangura. So [facility] [phone].    ATENCIÓN: Si habla español, tiene a dominique disposición servicios gratuitos de asistencia lingüística. Llame al [phone].    We comply with applicable federal civil rights laws and [place] laws. We do not discriminate on the basis of race, color, national origin, age, disability, sex, sexual orientation, or gender identity.            After Visit Summary       This is your record. Keep this with you and show to your community pharmacist(s) and doctor(s) at your next visit.                "

## 2017-10-16 NOTE — ED PROVIDER NOTES
"  History     Chief Complaint:  Chest Pain      HPI   Luci Londono is a 46 year old female with history of GERD who presents to the emergency department today for evaluation of chest pain. The patient reports having constant left upper chest pain for 1-2 weeks. She first thought it was gas, however the pain has persisted. Therefore, she presents to the emergency department for evaluation. On presentation, the patient states the pain is worse with deep inspiration and says \"in my breath it feels like something is biting.\" She states the pain is \"from back to front\" with numbness radiating down her entire left side into her body and leg. She reports it is not a burning pain and feels different than her acid reflux. She has associated shortness of breath but denies cough, fever, appetite change, nausea, vomiting, diarrhea, constipation, black/bloody stools, dysuria, hematuria, muscle weakness or any other complaints.    Cardiac/PE/DVT Risk Factors:  History of hypertension - positive  History of hyperlipidemia - negative   History of diabetes - negative   History of smoking - negative   Personal history of PE/DVT - negative   Family history of PE/DVT - negative   Family history of heart complications - negative   Recent travel - negative   Recent surgery - negative   Other immobilizations - negative   Cancer - negative    Allergies:  No Known Drug Allergies    Medications:    metFORMIN (GLUCOPHAGE-XR) 500 MG 24 hr tablet  omeprazole (PRILOSEC) 20 MG CR capsule  medroxyPROGESTERone (PROVERA) 10 MG tablet  levothyroxine (SYNTHROID/LEVOTHROID) 137 MCG tablet    Past Medical History:    Hypertension  Thyroid disease  Sciatica  GERD  H. pylori infection  Migraine  Polycystic ovaries  Vitamin D insufficiency    Past Surgical History:    Tubal ligation  Left hand surgery    Family History:    Diabetes    Social History:  The patient was unaccompanied to the ED.  Smoking Status: Never  Smokeless Tobacco: " "Never  Alcohol Use: No  Marital Status:       Review of Systems   Constitutional: Negative for appetite change, chills and fever.   Respiratory: Positive for shortness of breath. Negative for cough.    Cardiovascular: Positive for chest pain.   Gastrointestinal: Negative for blood in stool, constipation, diarrhea, nausea and vomiting.   Genitourinary: Negative for dysuria and hematuria.   Neurological: Positive for numbness (left side).   All other systems reviewed and are negative.    Physical Exam   First Vitals:  BP: 157/81  Pulse: 83  Temp: 97.9  F (36.6  C)  Resp: 18  Height: 160 cm (5' 3\")  Weight: 81.6 kg (180 lb)  SpO2: 100 %    Physical Exam  Nursing note and vitals reviewed.  Constitutional:  Oriented to person, place, and time. Cooperative.   HENT:   Nose:    Nose normal.   Mouth/Throat:   Mucous membranes are normal.   Eyes:    Conjunctivae normal and EOM are normal.      Pupils are equal, round, and reactive to light.   Neck:    Trachea normal.   Cardiovascular:  Normal rate, regular rhythm, normal heart sounds and normal pulses. No murmur heard.  Pulmonary/Chest:  Effort normal and breath sounds normal.   Abdominal:   Soft. Normal appearance and bowel sounds are normal.      There is no tenderness.      There is no rebound and no CVA tenderness.   Musculoskeletal:  Extremities atraumatic x 4. Some reproducible tenderness to palpation across the anterior chest wall.  Lymphadenopathy:  No cervical adenopathy.   Neurological:   Alert and oriented to person, place, and time. Normal strength.      No cranial nerve deficit or sensory deficit. GCS eye subscore is 4. GCS verbal subscore is 5. GCS motor subscore is 6.   Skin:    Skin is intact. No rash noted.   Psychiatric:   Normal mood and affect.    Emergency Department Course     ECG:  Indication: chest pain   Completed at 1658.  Read at 1705.   Normal sinus rhythm  Cannot rule out anterior infarct, age undetermined  Abnormal ECG   Rate 76 bpm. [place] " interval 140. QRS duration 80. QT/QTc 362/407. P-R-T axes 55 37 44.    Imaging:  Radiology findings were communicated with the patient who voiced understanding of the findings.  XR Chest 2 Views  IMPRESSION: No evidence of active cardiopulmonary disease.  Report per radiology     Laboratory:  Laboratory findings were communicated with the patient who voiced understanding of the findings.  CBC: AWNL. (WBC 8.3, HGB 11.9, )   CMP: Glucose 125 (H), Albumin 3.3 (L), o/w WNL (Creatinine 0.77)  Lipase: 113  Troponin (Collected 1723): <0.015  D Dimer (Collected 1723): <0.3    Interventions:  1732 GI Cocktail 30 mL PO  1734 NS Bolus 1,000mL IV    Emergency Department Course:  Nursing notes and vitals reviewed.  IV was inserted and blood was drawn for laboratory testing, results above.  1711: I performed an exam of the patient as documented above.   1818: Patient rechecked and updated.   Findings and plan explained to the Patient. Patient discharged home with instructions regarding supportive care, medications, and reasons to return. The importance of close follow-up was reviewed.  I personally reviewed the laboratory and imaging results with the Patient and answered all related questions prior to discharge.    Impression & Plan      Medical Decision Making:  Luci Londono is a 46 year old female who came in with chest pain. This has been going on for 1-2 weeks. I felt it was reasonable to proceed with the above workup to rule out cardiac ischemia, aortic dissection, as well as pulmonary embolism. My suspicion however was that this was not anything too serious, but rather likely chest wall pain or possibly GERD. I also considered the possibility of gallbladder disease or pancreatitis. Her workup here is unremarkable though, which is reassuring. At that point, she asked for a note to be out of work for a few days. 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 Therefore I suspect that this is in fact muscular in nature. I recommended using tylenol as well as trying Maalox and/or Tums at home in case there is a reflux component. She indicated that she thinks that she obtained some relief with the GI cocktail as well. Regardless, I think she is safe for discharge and continued outpatient management. I agreed to provide her a note to be out of work for a couple of days.    Diagnosis:    ICD-10-CM    1. Chest pain, unspecified type R07.9        Disposition:  Discharged to home    Scribe Disclosure:  I, Mariam Velazquez, am serving as a scribe at 5:06 PM on 10/16/2017 to document services personally performed by Haja Granger MD based on my observations and the provider's statements to me.   10/16/2017    EMERGENCY DEPARTMENT       Haja Granger MD  10/16/17 0515

## 2017-10-16 NOTE — LETTER
To Whom it may concern:      Luci Londono was seen in our Emergency Department today, 10/16/17.  Please excuse her from work for two days.    Sincerely,        Haja Granger MD

## 2017-10-16 NOTE — DISCHARGE INSTRUCTIONS
Discharge Instructions  Chest Pain    You have been seen today for chest pain or discomfort.  At this time, your provider has found no signs that your chest pain is due to a serious or life-threatening condition, (or you have declined more testing and/or admission to the hospital). However, sometimes there is a serious problem that does not show up right away. Your evaluation today may not be complete and you may need further testing and evaluation.     Generally, every Emergency Department visit should have a follow-up clinic visit with either a primary or a specialty clinic/provider. Please follow-up as instructed by your emergency provider today.  Return to the Emergency Department if:    Your chest pain changes, gets worse, starts to happen more often, or comes with less activity.    You are newly short of breath.    You get very weak or tired.    You pass out or faint.    You have any new symptoms, like fever, cough, numb legs, or you cough up blood.    You have anything else that worries you.    Until you follow-up with your regular provider, please do the following:    Take one aspirin daily unless you have an allergy or are told not to by your provider.    If a stress test appointment has been made, go to the appointment.    If you have questions, contact your regular provider.    Follow-up with your regular provider/clinic as directed; this is very important.    If you were given a prescription for medicine here today, be sure to read all of the information (including the package insert) that comes with your prescription.  This will include important information about the medicine, its side effects, and any warnings that you need to know about.  The pharmacist who fills the prescription can provide more information and answer questions you may have about the medicine.  If you have questions or concerns that the pharmacist cannot address, please call or return to the Emergency Department.       Remember that  you can always come back to the Emergency Department if you are not able to see your regular provider in the amount of time listed above, if you get any new symptoms, or if there is anything that worries you.

## 2017-10-31 ENCOUNTER — OFFICE VISIT (OUTPATIENT)
Dept: FAMILY MEDICINE | Facility: CLINIC | Age: 47
End: 2017-10-31
Payer: COMMERCIAL

## 2017-10-31 VITALS
DIASTOLIC BLOOD PRESSURE: 70 MMHG | WEIGHT: 191 LBS | HEIGHT: 63 IN | SYSTOLIC BLOOD PRESSURE: 120 MMHG | RESPIRATION RATE: 14 BRPM | OXYGEN SATURATION: 97 % | BODY MASS INDEX: 33.84 KG/M2 | TEMPERATURE: 97.5 F | HEART RATE: 62 BPM

## 2017-10-31 DIAGNOSIS — E66.811 CLASS 1 OBESITY DUE TO EXCESS CALORIES WITH SERIOUS COMORBIDITY AND BODY MASS INDEX (BMI) OF 33.0 TO 33.9 IN ADULT: ICD-10-CM

## 2017-10-31 DIAGNOSIS — G43.009 MIGRAINE WITHOUT AURA AND WITHOUT STATUS MIGRAINOSUS, NOT INTRACTABLE: ICD-10-CM

## 2017-10-31 DIAGNOSIS — E78.00 HYPERCHOLESTEROLEMIA: ICD-10-CM

## 2017-10-31 DIAGNOSIS — E28.2 POLYCYSTIC OVARIES: ICD-10-CM

## 2017-10-31 DIAGNOSIS — R73.01 IMPAIRED FASTING GLUCOSE: ICD-10-CM

## 2017-10-31 DIAGNOSIS — E66.09 CLASS 1 OBESITY DUE TO EXCESS CALORIES WITH SERIOUS COMORBIDITY AND BODY MASS INDEX (BMI) OF 33.0 TO 33.9 IN ADULT: ICD-10-CM

## 2017-10-31 DIAGNOSIS — M77.11 LATERAL EPICONDYLITIS, RIGHT ELBOW: Primary | ICD-10-CM

## 2017-10-31 DIAGNOSIS — E03.9 ACQUIRED HYPOTHYROIDISM: ICD-10-CM

## 2017-10-31 PROBLEM — H11.153 PINGUECULA OF BOTH EYES: Status: ACTIVE | Noted: 2017-09-07

## 2017-10-31 PROBLEM — H52.4 PRESBYOPIA: Status: ACTIVE | Noted: 2017-09-07

## 2017-10-31 PROBLEM — R73.9 HYPERGLYCEMIA: Status: RESOLVED | Noted: 2017-01-31 | Resolved: 2017-10-31

## 2017-10-31 PROCEDURE — 84443 ASSAY THYROID STIM HORMONE: CPT | Performed by: FAMILY MEDICINE

## 2017-10-31 PROCEDURE — 84439 ASSAY OF FREE THYROXINE: CPT | Performed by: FAMILY MEDICINE

## 2017-10-31 PROCEDURE — 99214 OFFICE O/P EST MOD 30 MIN: CPT | Performed by: FAMILY MEDICINE

## 2017-10-31 PROCEDURE — 36415 COLL VENOUS BLD VENIPUNCTURE: CPT | Performed by: FAMILY MEDICINE

## 2017-10-31 NOTE — NURSING NOTE
"Chief Complaint   Patient presents with     Musculoskeletal Problem     /70  Pulse 62  Temp 97.5  F (36.4  C) (Tympanic)  Resp 14  Ht 5' 3\" (1.6 m)  Wt 191 lb (86.6 kg)  LMP  (LMP Unknown)  SpO2 97%  Breastfeeding? No  BMI 33.83 kg/m2 Estimated body mass index is 33.83 kg/(m^2) as calculated from the following:    Height as of this encounter: 5' 3\" (1.6 m).    Weight as of this encounter: 191 lb (86.6 kg).  BP completed using cuff size: large   Meagan Lawler CMA    Health Maintenance Due   Topic Date Due     FIT Q1 YR  02/01/2015     INFLUENZA VACCINE (SYSTEM ASSIGNED)  09/01/2017     Health Maintenance reviewed at today's visit patient asked to schedule/complete:   Immunizations:  Patient agrees to schedule    "

## 2017-10-31 NOTE — LETTER
[facility]  [address]  [place] [place] [address]  [phone]                                                                                                           Luci Londono  [address]  [facility] [address]    October 31, 2017      Dear Luci,    Eduarda lift >10#   For a week  Will need re eval then     Thank you for choosing [facility].  We appreciate the opportunity to serve you and look forward to supporting your healthcare needs in the future.    If you have any questions or concerns, please call me or my staff at [phone].      Sincerely,    Paris Wills MD

## 2017-10-31 NOTE — PROGRESS NOTES
SUBJECTIVE:   Luci Londono is a 46 year old female who presents to clinic today for the following health issues:    Musculoskeletal problem/pain: Rt Dominant Lat Epicondylitis       Duration: x 1 week but worse for 2 d     Has been on vacation for a wk     Description  Location: Right Elbow    Intensity:  moderate    Accompanying signs and symptoms: none    History  Previous similar problem: no   Previous evaluation:  none    Precipitating or alleviating factors:  Trauma or overuse: YES  Working for 12 yrs at [employer] & dietary for 10 yrs , 2 yrs ago a  Aggravating factors include: overuse    Therapies tried and outcome: bio-freeze, immobilization and Ibuprofen    NONMORBID OBESITY    -BMI= 33.9  -comorbid glu intol, hi LDL       Glucose Intolerance   Follow-up      Patient is checking blood sugars: not at all    h I FBS    Diabetic concerns: None     Symptoms of hypoglycemia (low blood sugar): none     Paresthesias (numbness or burning in feet) or sores: No     Date of last diabetic eye exam: 2016    Hyperlipidemia:LDL Follow-Up      Rate your low fat/cholesterol diet?: not monitoring fat    Taking statin?  No    Other lipid medications/supplements?:  None    Last LD= 155    Neg fam hx CAD    Migraine Follow-Up      Headaches symptoms:  Improved for yrs   Rare     Frequency: q few mo      Duration of headaches: hrs     Able to do normal daily activities/work with migraines: yes    Rescue/Relief medication:Tylenol              Effectiveness: total relief    Preventative medication: None    Neurologic complications: No new stroke-like symptoms, loss of vision or speech, numbness or weakness    In the past 4 weeks, how often have you gone to Urgent Care or the emergency room because of your headaches?  0    Hypothyroidism Follow-up=low on meds       Since last visit, patient describes the following symptoms: Weight stable, no hair loss, no skin changes, no constipation, no loose stools    On levothyroxine 137  "mcg for a yr     TSH = 14 in 7-17               Problem list and histories reviewed & adjusted, as indicated.  Additional history: as documented    Labs reviewed in EPIC    Reviewed and updated as needed this visit by clinical staffTobacco  Allergies  Meds  Problems  Med Hx  Surg Hx  Fam Hx  Soc Hx        Reviewed and updated as needed this visit by Provider         ROS:  C: NEGATIVE for fever, chills, change in weight  I: NEGATIVE for worrisome rashes, moles or lesions  E: NEGATIVE for vision changes or irritation  E/M: NEGATIVE for ear, mouth and throat problems  R: NEGATIVE for significant cough or SOB  B: NEGATIVE for masses, tenderness or discharge  CV: NEGATIVE for chest pain, palpitations or peripheral edema  GI: NEGATIVE for nausea, abdominal pain, heartburn, or change in bowel habits  : NEGATIVE for frequency, dysuria, or hematuria  MUSCULOSKELETAL:POSITIVE  for  and joint stiffness Rt elbow   N: NEGATIVE for weakness, dizziness or paresthesias  E: NEGATIVE for temperature intolerance, skin/hair changes  H: NEGATIVE for bleeding problems  P: NEGATIVE for changes in mood or affect    OBJECTIVE:     /70  Pulse 62  Temp 97.5  F (36.4  C) (Tympanic)  Resp 14  Ht 5' 3\" (1.6 m)  Wt 191 lb (86.6 kg)  LMP  (LMP Unknown)  SpO2 97%  Breastfeeding? No  BMI 33.83 kg/m2  Body mass index is 33.83 kg/(m^2).  GENERAL: healthy, alert, no distress and obese  EYES: Eyes grossly normal to inspection, PERRL and conjunctivae and sclerae normal  RESP: lungs clear to auscultation - no rales, rhonchi or wheezes  CV: regular rate and rhythm, normal S1 S2, no S3 or S4, no murmur, click or rub, no peripheral edema and peripheral pulses strong  MS: no gross musculoskeletal defects noted, no edema;tender over lat epicondyle   SKIN: no suspicious lesions or rashes  NEURO: Normal strength and tone, mentation intact and speech normal  PSYCH: mentation appears normal, affect normal/bright    Diagnostic Test " Results:  Results for orders placed or performed in visit on 10/31/17   TSH with free T4 reflex   Result Value Ref Range    TSH 0.03 (L) 0.40 - 4.00 mU/L   T4 free   Result Value Ref Range    T4 Free 1.70 (H) 0.76 - 1.46 ng/dL       ASSESSMENT/PLAN:               ICD-10-CM    1. Lateral epicondylitis, right dominant elbow since late 10-17 M77.11 [facility] PT, HAND, AND CHIROPRACTIC REFERRAL     T4 free   2. Acquired hypothyroidism:hypo on meds E03.9 TSH with free T4 reflex   3. Polycystic ovaries E28.2    4. Impaired fasting glucose R73.01    5. Hypercholesterolemia E78.00    6. Migraine without aura and without status migrainosus, not intractable G43.009    7. Class 1 obesity due to excess calories with serious comorbidity and body mass index (BMI) of 33.0 to 33.9 in adult E66.09     Z68.33        Patient Instructions   1. ICE  decreases inflammation & kills the pain coming from the nerves     WARM SOAKS/PACKS  Relax & loosen up tight muscles to reduce the pain of the        muscle tightness & spasm    2.  Weight Loss Tips  1. Do not eat after 6 hrs before your expected bedtime  2. Have your heaviest meal for breakfast, a slightly lighter meal at lunch and a snack 6 hrs before bed  3. No sugar/calorie drinks except milk ie no fruit juice, pop, alcohol.  4. Drink milk 30min before meals to decrease your hunger. Also it is excellent as part of your last meal of the day snack  5. Drink lots of water  6. Increase fiber in diet: all bran cereal, salads, popcorn etc  7. Have only one small serving of fruit a day about 1/2 cup (as this is high in sugar)  8. EXERCISE is the bottom line. Without it, you will gain weight even on a low calorie diet. Best if done 2-3X a day as can    Being overweight contributes to high blood pressure and high cholesterol, both of which cause heart attacks, strokes and kidney failure, prediabetes and diabetes, arthritis, and liver disease     3. GET YOUR FLU SHOT !!!! At work       Paris Wills,  MD  [facility]    Weight management plan: Discussed healthy diet and exercise guidelines and patient will follow up in 6 months in clinic to re-evaluate.    Paris Wills MD

## 2017-10-31 NOTE — LETTER
November 3, 2017      Luci Corbin Alert  [address]  [place] [address]        Dear ,    We are writing to inform you of your test results.    Your thyroid is still too high on 137mcg of levothyroxine, please decrease to the new dose of 125mcg and recheck in 6-8 weeks.     The prescription(s) have been sent to your pharmacy electronically and the future lab has been ordered.     Please call us at [phone] to make a lab appointment.     Resulted Orders   TSH with free T4 reflex   Result Value Ref Range    TSH 0.03 (L) 0.40 - 4.00 mU/L   T4 free   Result Value Ref Range    T4 Free 1.70 (H) 0.76 - 1.46 ng/dL       If you have any questions or concerns, please call the clinic at the number listed above.       Sincerely,        Paris Wills MD

## 2017-10-31 NOTE — PATIENT INSTRUCTIONS
1. ICE  decreases inflammation & kills the pain coming from the nerves     WARM SOAKS/PACKS  Relax & loosen up tight muscles to reduce the pain of the        muscle tightness & spasm    2.  Weight Loss Tips  1. Do not eat after 6 hrs before your expected bedtime  2. Have your heaviest meal for breakfast, a slightly lighter meal at lunch and a snack 6 hrs before bed  3. No sugar/calorie drinks except milk ie no fruit juice, pop, alcohol.  4. Drink milk 30min before meals to decrease your hunger. Also it is excellent as part of your last meal of the day snack  5. Drink lots of water  6. Increase fiber in diet: all bran cereal, salads, popcorn etc  7. Have only one small serving of fruit a day about 1/2 cup (as this is high in sugar)  8. EXERCISE is the bottom line. Without it, you will gain weight even on a low calorie diet. Best if done 2-3X a day as can    Being overweight contributes to high blood pressure and high cholesterol, both of which cause heart attacks, strokes and kidney failure, prediabetes and diabetes, arthritis, and liver disease     3. GET YOUR FLU SHOT !!!!

## 2017-10-31 NOTE — LETTER
My Migraine Action Plan      Date: 11/1/2017     My Name: Luci Londono   YOB: 1970  My Pharmacy: [facility] [id number] [place], [place] - [address]       My (Preventative) Control Medicine: 0        My Rescue Medicine: 0   My Doctor: Stuart Wills     My Clinic: [address] [address]  [phone]        GREEN ZONE = Good Control    My headache plan is working.   I can do what I need to do.           I WILL:     ? Keep managing my triggers.  ? Write in my migraine diary each time I have a headache.  ? Keep taking my preventive (controller) medicine daily.  ? Take my relief and rescue medicine as needed.             YELLOW ZONE = Not Enough Control    My headache plan isn t always working.   My headaches keep me from doing   some of the things I need to do.       I WILL:     ? Set goals to control my triggers and act on them.  ? Write in my migraine diary each time I have a headache and review it for                      patterns or new triggers.  ? Keep taking my preventive (controller) medicine daily.  ? Take my relief and rescue medicine as needed.  ? Call my doctor or clinic at if I stay in the Yellow Zone.             RED ZONE = Poor or No Control    My headache plan has  failed. I can t do anything  when I have one. My  medicines aren t working.           I WILL:   ? Set goals to control my triggers and act on them.  ? Write in my migraine diary each time I have a headache and review it for                      patterns or new triggers.  ? Keep taking my preventive (controller) medicine daily.  ? Take my relief and rescue medicine as needed.  ? Call my doctor or clinic or go to urgent care or an ER if I m having the worst                  headache of my life.  ? Call my doctor or clinic or go to urgent care or an ER if my medicine doesn t work.  ? Let my  doctor or clinic know within 2 weeks if I have gone to an urgent care or             emergency department.          Provider specific instructions:  --

## 2017-10-31 NOTE — MR AVS SNAPSHOT
After Visit Summary   10/31/2017    Luci Londono    MRN: [id number]           Patient Information     Date Of Birth          1970        Visit Information        Provider Department      10/31/2017 11:40 AM Paris Wills MD [facility]        Today's Diagnoses     Lateral epicondylitis, right elbow for a week     -  1    Polycystic ovaries        Acquired hypothyroidism:hypo on meds        Impaired fasting glucose          Care Instructions    1. ICE  decreases inflammation & kills the pain coming from the nerves     WARM SOAKS/PACKS  Relax & loosen up tight muscles to reduce the pain of the        muscle tightness & spasm    2.  Weight Loss Tips  1. Do not eat after 6 hrs before your expected bedtime  2. Have your heaviest meal for breakfast, a slightly lighter meal at lunch and a snack 6 hrs before bed  3. No sugar/calorie drinks except milk ie no fruit juice, pop, alcohol.  4. Drink milk 30min before meals to decrease your hunger. Also it is excellent as part of your last meal of the day snack  5. Drink lots of water  6. Increase fiber in diet: all bran cereal, salads, popcorn etc  7. Have only one small serving of fruit a day about 1/2 cup (as this is high in sugar)  8. EXERCISE is the bottom line. Without it, you will gain weight even on a low calorie diet. Best if done 2-3X a day as can    Being overweight contributes to high blood pressure and high cholesterol, both of which cause heart attacks, strokes and kidney failure, prediabetes and diabetes, arthritis, and liver disease     3. GET YOUR FLU SHOT !!!!          Follow-ups after your visit        Additional Services     [facility] PT, HAND, AND CHIROPRACTIC REFERRAL       **This order will print in the [facility] Scheduling Office**    Physical Therapy, Hand Therapy and Chiropractic Care are available through:    *[facility]  *[facility]  *[facility]  "[facility]    Call one number to schedule at any of the above locations: [phone].    Your provider has referred you to: As Indicated:     Indication/Reason for Referral:   Onset of Illness:   Therapy Orders: Evaluate and Treat  Special Programs:   Special Request:     Sanjuanita Gann      Additional Comments for the Therapist or Chiropractor:     Please be aware that coverage of these services is subject to the terms and limitations of your health insurance plan.  Call member services at your health plan with any benefit or coverage questions.      Please bring the following to your appointment:    *Your personal calendar for scheduling future appointments  *Comfortable clothing                  Who to contact     If you have questions or need follow up information about today's clinic visit or your schedule please contact [facility] directly at [phone].  Normal or non-critical lab and imaging results will be communicated to you by MyChart, letter or phone within 4 business days after the clinic has received the results. If you do not hear from us within 7 days, please contact the clinic through MyChart or phone. If you have a critical or abnormal lab result, we will notify you by phone as soon as possible.  Submit refill requests through SeeOn or call your pharmacy and they will forward the refill request to us. Please allow 3 business days for your refill to be completed.          Additional Information About Your Visit        SeeOn Information     SeeOn lets you send messages to your doctor, view your test results, renew your prescriptions, schedule appointments and more. To sign up, go to [email or web address] . Click on \"Log in\" on the left side of the screen, which will take you to the Welcome page. Then click on \"Sign up Now\" on the right side of the page.     You will be asked to enter the access code listed below, as well as some personal information. " "Please follow the directions to create your username and password.     Your access code is: [email or web address]  Expires: 2018 12:39 PM     Your access code will  in 90 days. If you need help or a new code, please call your [facility] clinic or [phone].        Care EveryWhere ID     This is your Care EveryWhere ID. This could be used by other organizations to access your [facility] medical records  [id number]        Your Vitals Were     Pulse Temperature Respirations Height Last Period Pulse Oximetry    62 97.5  F (36.4  C) (Tympanic) 14 5' 3\" (1.6 m) (LMP Unknown) 97%    Breastfeeding? BMI (Body Mass Index)                No 33.83 kg/m2           Blood Pressure from Last 3 Encounters:   10/31/17 120/70   10/16/17 133/71   17 118/64    Weight from Last 3 Encounters:   10/31/17 191 lb (86.6 kg)   10/16/17 180 lb (81.6 kg)   17 192 lb (87.1 kg)              We Performed the Following     [facility] PT, HAND, AND CHIROPRACTIC REFERRAL     TSH with free T4 reflex        Primary Care Provider Office Phone # Fax #    Stuart Wills [phone] [address] [facility] [address]        Equal Access to Services     PINA PINEDA AH: Hadii raymon ku hadasho Soomaali, waaxda luqadaha, qaybta kaalmada juancarlos, josh bangura. So [facility] [phone].    ATENCIÓN: Si habla español, tiene a dominique disposición servicios gratuitos de asistencia lingüística. Llame al [phone].    We comply with applicable federal civil rights laws and [place] laws. We do not discriminate on the basis of race, color, national origin, age, disability, sex, sexual orientation, or gender identity.            Thank you!     Thank you for choosing [facility]  for your care. Our goal is always to provide you with excellent care. Hearing back from our patients is one way we can continue to improve our services. Please take a few minutes to complete the " written survey that you may receive in the mail after your visit with us. Thank you!             Your Updated Medication List - Protect others around you: Learn how to safely use, store and throw away your medicines at www.disposemymeds.org.          This list is accurate as of: 10/31/17 12:39 PM.  Always use your most recent med list.                   Brand Name Dispense Instructions for use Diagnosis    levothyroxine 137 MCG tablet    SYNTHROID/LEVOTHROID    90 tablet    Take 1 tablet (137 mcg) by mouth daily    Hypothyroidism, unspecified type, Acquired hypothyroidism       medroxyPROGESTERone 10 MG tablet    PROVERA    90 tablet    Take 1 tablet (10 mg) by mouth daily    Absence of menstruation       metFORMIN 500 MG 24 hr tablet    GLUCOPHAGE-XR    30 tablet    Take 1 tablet (500 mg) by mouth daily (with dinner)    Non morbid obesity due to excess calories, Hyperglycemia       omeprazole 20 MG CR capsule    priLOSEC    60 capsule    Take 1 capsule (20 mg) by mouth 2 times daily    Gastroesophageal reflux disease without esophagitis

## 2017-11-01 PROBLEM — M77.11 LATERAL EPICONDYLITIS, RIGHT ELBOW: Status: ACTIVE | Noted: 2017-11-01

## 2017-11-01 PROBLEM — E78.00 HYPERCHOLESTEROLEMIA: Status: ACTIVE | Noted: 2017-11-01

## 2017-11-01 PROBLEM — G43.009 MIGRAINE WITHOUT AURA AND WITHOUT STATUS MIGRAINOSUS, NOT INTRACTABLE: Status: ACTIVE | Noted: 2017-11-01

## 2017-11-01 LAB
T4 FREE SERPL-MCNC: 1.7 NG/DL (ref 0.76–1.46)
TSH SERPL DL<=0.005 MIU/L-ACNC: 0.03 MU/L (ref 0.4–4)

## 2017-11-02 ENCOUNTER — THERAPY VISIT (OUTPATIENT)
Dept: PHYSICAL THERAPY | Facility: CLINIC | Age: 47
End: 2017-11-02
Payer: COMMERCIAL

## 2017-11-02 DIAGNOSIS — M77.11 LATERAL EPICONDYLITIS, RIGHT ELBOW: Primary | ICD-10-CM

## 2017-11-02 DIAGNOSIS — E03.9 ACQUIRED HYPOTHYROIDISM: Primary | ICD-10-CM

## 2017-11-02 PROCEDURE — 97110 THERAPEUTIC EXERCISES: CPT | Mod: GP | Performed by: PHYSICAL THERAPIST

## 2017-11-02 PROCEDURE — G8984 CARRY CURRENT STATUS: HCPCS | Mod: GP | Performed by: PHYSICAL THERAPIST

## 2017-11-02 PROCEDURE — G8985 CARRY GOAL STATUS: HCPCS | Mod: GP | Performed by: PHYSICAL THERAPIST

## 2017-11-02 PROCEDURE — 97161 PT EVAL LOW COMPLEX 20 MIN: CPT | Mod: GP | Performed by: PHYSICAL THERAPIST

## 2017-11-02 RX ORDER — LEVOTHYROXINE SODIUM 125 UG/1
125 TABLET ORAL DAILY
Qty: 30 TABLET | Refills: 1 | Status: SHIPPED | OUTPATIENT
Start: 2017-11-02 | End: 2018-01-08

## 2017-11-02 NOTE — PROGRESS NOTES
[facility] Initial Evaluation    Subjective:    Patient is a 46 year old female presenting with rehab right elbow hpi.   Luci Londono is a 46 year old female with a right elbow condition.  Condition occurred with:  Insidious onset.  Condition occurred: for unknown reasons.  This is a new condition  Patient notes R lateral elbow pain since about 10/12/17 without specific cause. She saw her MD and was referred to PT for lateral epicondylitis..    Patient reports pain:  Lateral epicondyle.    Pain is described as aching and sharp and is intermittent and reported as 8/10.  Associated symptoms:  Loss of motion/stiffness and edema. Worse during: not depenent on time of day, but on activity.  Symptoms are exacerbated by lifting (gripping) and relieved by ice (icy hot, massage).  Since onset symptoms are unchanged.  Special testing: none.  Previous treatment: none.    General health as reported by patient is excellent.  Pertinent medical history includes:  Thyroid problems.  Medical allergies: no.    Current medications:  Thyroid medication.  Current occupation is CNA.  Patient is currently not working due to present treatment problem (will return to work 11/3/17, with restrictions for light duty).      Barriers include:  None as reported by the patient.    Red flags:  None as reported by the patient.                        Objective:  ELBOW:   PROM L PROM R AROM L AROM R MMT L MMT R   Flex   WNL pain 5 5   Ext   WNL WNL 5 5   Hyper Ext         Pronation   WNL WNL 5 5 with pain   Supination   WNL Trace pain 5 5 with pain     WRIST:   PROM L PROM R AROM L AROM R MMT L MMT R   Flex   WNL WNL 5 5   Ext   WNL pain 5 5 with pain   Radial Dev   WNL WNL 5 5 with pain   Ulnar Dev   WNL WNL 5 5         50lb 25lb with pain     SHOULDER:   PROM L PROM R AROM L AROM R MMT L MMT R   Flex   WNL WNL 5 5   Abd   WNL WNL 5 5   Full Can     5 5   Empty Can     5 5   IR   WNL WNL 5 5   ER   WNL WNL 5 5    Ext/IR   WNL WNL       ELBOW/WRIST:  Palpation: tender R lateral epicondyle and lateral forearm musculature  Special Testing:   L R   Ligament     Valgus 0 degrees     Valgus 30 degrees     Milking     Dynamic Milking     Lateral Epicondylitis     Resisted wrist ext/radial dev  positive   Resisted finger extension (ECRB)  positive   Passive stretch (wrist flex/pronation)  negative   Medial Epicondylitis     Resisted wrist flex/ulnar deviation     Passive stretch (wrist ext/supination)     Nerve Testing     Tinel's     Nerve tension testing           System    Physical Exam    General     ROS    Assessment/Plan:      Patient is a 46 year old female with right side elbow complaints.    Patient has the following significant findings with corresponding treatment plan.                Diagnosis 1:  R lateral epicondylitis    Pain -  hot/cold therapy, US, manual therapy and directional preference exercise  Decreased strength - therapeutic exercise and therapeutic activities  Decreased proprioception - neuro re-education and therapeutic activities  Decreased function - therapeutic activities    Therapy Evaluation Codes:   1) History comprised of:   Personal factors that impact the plan of care:      None.    Comorbidity factors that impact the plan of care are:      None.     Medications impacting care: None.  2) Examination of Body Systems comprised of:   Body structures and functions that impact the plan of care:      Elbow.   Activity limitations that impact the plan of care are:      Cooking, Driving, Dressing, Grasping, Lifting and Working.  3) Clinical presentation characteristics are:   Stable/Uncomplicated.  4) Decision-Making    Low complexity using standardized patient assessment instrument and/or measureable assessment of functional outcome.  Cumulative Therapy Evaluation is: Low complexity.    Previous and current functional limitations:  (See Goal Flow Sheet for this information)    Short term and Long term  goals: (See Goal Flow Sheet for this information)     Communication ability:  Patient appears to be able to clearly communicate and understand verbal and written communication and follow directions correctly.  Treatment Explanation - The following has been discussed with the patient:   RX ordered/plan of care  Anticipated outcomes  Possible risks and side effects  This patient would benefit from PT intervention to resume normal activities.   Rehab potential is good.    Frequency:  1 X week, once daily  Duration:  for 6 weeks  Discharge Plan:  Achieve all LTG.  Independent in home treatment program.  Reach maximal therapeutic benefit.    Please refer to the daily flowsheet for treatment today, total treatment time and time spent performing 1:1 timed codes.

## 2017-11-09 ENCOUNTER — THERAPY VISIT (OUTPATIENT)
Dept: PHYSICAL THERAPY | Facility: CLINIC | Age: 47
End: 2017-11-09
Payer: COMMERCIAL

## 2017-11-09 DIAGNOSIS — M77.11 LATERAL EPICONDYLITIS, RIGHT ELBOW: ICD-10-CM

## 2017-11-09 PROCEDURE — 97140 MANUAL THERAPY 1/> REGIONS: CPT | Mod: GP | Performed by: PHYSICAL THERAPIST

## 2017-11-09 PROCEDURE — 97110 THERAPEUTIC EXERCISES: CPT | Mod: GP | Performed by: PHYSICAL THERAPIST

## 2017-11-24 ENCOUNTER — OFFICE VISIT (OUTPATIENT)
Dept: FAMILY MEDICINE | Facility: CLINIC | Age: 47
End: 2017-11-24
Payer: COMMERCIAL

## 2017-11-24 VITALS
OXYGEN SATURATION: 99 % | RESPIRATION RATE: 16 BRPM | BODY MASS INDEX: 33.92 KG/M2 | TEMPERATURE: 97.5 F | HEART RATE: 67 BPM | DIASTOLIC BLOOD PRESSURE: 74 MMHG | SYSTOLIC BLOOD PRESSURE: 120 MMHG | WEIGHT: 191.5 LBS

## 2017-11-24 DIAGNOSIS — N64.4 BREAST TENDERNESS: Primary | ICD-10-CM

## 2017-11-24 DIAGNOSIS — M77.11 LATERAL EPICONDYLITIS, RIGHT ELBOW: ICD-10-CM

## 2017-11-24 DIAGNOSIS — R92.30 DENSE BREAST TISSUE ON MAMMOGRAM: ICD-10-CM

## 2017-11-24 PROCEDURE — 99214 OFFICE O/P EST MOD 30 MIN: CPT | Performed by: FAMILY MEDICINE

## 2017-11-24 NOTE — MR AVS SNAPSHOT
After Visit Summary   11/24/2017    Luci Londono    MRN: [id number]           Patient Information     Date Of Birth          1970        Visit Information        Provider Department      11/24/2017 10:15 AM Stuart Wills MD [facility]        Today's Diagnoses     Breast tenderness    -  1    Dense breast tissue on mammogram        Lateral epicondylitis, right elbow          Care Instructions    (N64.4) Breast tenderness  (primary encounter diagnosis)  Comment: inner quadrant bilat   Plan: US Breast Bilateral Complete 4 Quadrants,         BREAST CENTER REFERRAL, BREAST CENTER REFERRAL             (R92.2) Dense breast tissue on mammogram  Comment:    Plan: US Breast Bilateral Complete 4 Quadrants,         BREAST CENTER REFERRAL, BREAST CENTER REFERRAL    ICE MASSAGE 5 MINUTES TWICE DAILY AS NEEDED FOR PAIN CONTROL    ISOMETRIC EXERCISES AT 90 DEGREES 30 SECONDS EACH TWICE DAILY    LATERAL FRICTION MASSAGE OR ELBOW GENTLY 30 SECONDS TWICE DAILY    RANGE OF MOTION OF ELBOW FLEXION AND EXTENSION AS TOLERATED    WITH AND WITHOUT WEIGHTS    PALM DOWN ISOMETRIC FOLLOWED BY PALM DOWN RANGE OF MOTION with AND  WITHOUT RESISTANCE 30 EACH TWICE DAILY    THUMB UP ISOMETRICS FOLLOWED BY THUMB UP RANGE OF MOTION WITH AND WITHOUT RESISTANCE TWICE DAILY 30 EACH    PALM DOWN ISOMETRICS FOLLOWED BY PALM DOWN RANGE OF MOTION WITH AND WITHOUT RESISTANCE TWICE DAILY 30 EACH    INTERNAL  AND EXTERNAL ROTATION OF ELBOW AT 90 DEGREES AS TOLERATED 30 EACH TWICE DAILY    WEIGHT ON A STRING WITH BOTH ARMS ON HAND WIND UP ;AND  WIND DOWN SLOWLY 30 SECONDS EACH TWICE DAILY    MASSAGE LIDOCAINE OINTMENT OR DICLOFENAC GEL 1% TWICE DAILY  AS TOLERATED    TENNIS ELBOW BAND AS TOLERATED WHEN WORKING OR IF HAVING SIGNIFICANT PAIN                            Follow-ups after your visit        Additional Services     BREAST CENTER REFERRAL       Your provider has referred you to:  [facility]: [facility] [phone]   [email or web address]    Please be aware that coverage of these services is subject to the terms and limitations of your health insurance plan.  Call member services at your health plan with any benefit or coverage questions.      Please bring the following with you to your appointment:    (1) Any X-Rays, CTs or MRIs which have been performed.  Contact the facility where they were done to arrange for  prior to your scheduled appointment.    (2) List of current medications   (3) This referral request   (4) Any documents/labs given to you for this referral            BREAST CENTER REFERRAL       Your provider has referred you to: G: [facility] [phone]   [email or web address]    Please be aware that coverage of these services is subject to the terms and limitations of your health insurance plan.  Call member services at your health plan with any benefit or coverage questions.      Please bring the following with you to your appointment:  (N64.4) Breast tenderness  (primary encounter diagnosis)  Comment: inner quadrant bilat  Diffuse pain inner quadarants   Plan: US Breast Bilateral Complete 4 Quadrants,         BREAST CENTER REFERRAL             (R92.2) Dense breast tissue on mammogram  Comment:    Plan: US Breast Bilateral Complete 4 Quadrants,         BREAST CENTER REFERRAL                   (1) Any X-Rays, CTs or MRIs which have been performed.  Contact the facility where they were done to arrange for  prior to your scheduled appointment.    (2) List of current medications   (3) This referral request   (4) Any documents/labs given to you for this referral                  Follow-up notes from your care team     Return in about 4 weeks (around 12/22/2017).      Your next 10 appointments already scheduled     Nov 30, 2017  "11:20 AM CST   RADHA Extremity with Ari Samuel, PT   [facility] ([facility]  )    [address]  [facility] [address] [phone]              Future tests that were ordered for you today     Open Future Orders        Priority Expected Expires Ordered    US Breast Bilateral Complete 4 Quadrants Routine  2018            Who to contact     If you have questions or need follow up information about today's clinic visit or your schedule please contact [facility] directly at [phone].  Normal or non-critical lab and imaging results will be communicated to you by Study[employer], letter or phone within 4 business days after the clinic has received the results. If you do not hear from us within 7 days, please contact the clinic through StudyBluehart or phone. If you have a critical or abnormal lab result, we will notify you by phone as soon as possible.  Submit refill requests through [employer] or call your pharmacy and they will forward the refill request to us. Please allow 3 business days for your refill to be completed.          Additional Information About Your Visit        StudyBluehart Information     [employer] lets you send messages to your doctor, view your test results, renew your prescriptions, schedule appointments and more. To sign up, go to [email or web address] . Click on \"Log in\" on the left side of the screen, which will take you to the Welcome page. Then click on \"Sign up Now\" on the right side of the page.     You will be asked to enter the access code listed below, as well as some personal information. Please follow the directions to create your username and password.     Your access code is: [email or web address]  Expires: 2018 11:39 AM     Your access code will  in 90 days. If you need help or a new code, please call your [facility] or [phone].        Care EveryWhere ID     This is your Care " EveryWhere ID. This could be used by other organizations to access your [place] medical records  [id number]        Your Vitals Were     Pulse Temperature Respirations Last Period Pulse Oximetry BMI (Body Mass Index)    67 97.5  F (36.4  C) (Tympanic) 16 11/14/2017 (Approximate) 99% 33.92 kg/m2       Blood Pressure from Last 3 Encounters:   11/24/17 120/74   10/31/17 120/70   10/16/17 133/71    Weight from Last 3 Encounters:   11/24/17 191 lb 8 oz (86.9 kg)   10/31/17 191 lb (86.6 kg)   10/16/17 180 lb (81.6 kg)              We Performed the Following     BREAST CENTER REFERRAL     BREAST CENTER REFERRAL          Today's Medication Changes          These changes are accurate as of: 11/24/17 11:03 AM.  If you have any questions, ask your nurse or doctor.               Start taking these medicines.        Dose/Directions    diclofenac 1 % Gel topical gel   Commonly known as:  VOLTAREN   Used for:  Lateral epicondylitis, right elbow   Started by:  Stuart Wills MD        Apply  2 grams to hands four times daily using enclosed dosing card.   Quantity:  200 g   Refills:  3            Where to get your medicines      These medications were sent to [facility] [address] [place] [address] & [address], [place] [address]     Phone:  [phone]     diclofenac 1 % Gel topical gel                Primary Care Provider Office Phone # Fax #    Stuart Wills [phone] [address] [facility] [address]        Equal Access to Services     [facility] AH: Hadlisseth Marks, waaxda luqadaha, qaybta kaalmajosh patrick. So [facility] [phone].    ATENCIÓN: Si habla español, tiene a dominique disposición servicios gratuitos de asistencia lingüística. Llame al [phone].    We comply with applicable federal civil rights laws and [place] laws. We do not discriminate on the basis  of race, color, national origin, age, disability, sex, sexual orientation, or gender identity.            Thank you!     Thank you for choosing [facility]  for your care. Our goal is always to provide you with excellent care. Hearing back from our patients is one way we can continue to improve our services. Please take a few minutes to complete the written survey that you may receive in the mail after your visit with us. Thank you!             Your Updated Medication List - Protect others around you: Learn how to safely use, store and throw away your medicines at www.disposemymeds.org.          This list is accurate as of: 11/24/17 11:03 AM.  Always use your most recent med list.                   Brand Name Dispense Instructions for use Diagnosis    diclofenac 1 % Gel topical gel    VOLTAREN    200 g    Apply  2 grams to hands four times daily using enclosed dosing card.    Lateral epicondylitis, right elbow       levothyroxine 125 MCG tablet    SYNTHROID/LEVOTHROID    30 tablet    Take 1 tablet (125 mcg) by mouth daily    Acquired hypothyroidism       medroxyPROGESTERone 10 MG tablet    PROVERA    90 tablet    Take 1 tablet (10 mg) by mouth daily    Absence of menstruation       metFORMIN 500 MG 24 hr tablet    GLUCOPHAGE-XR    30 tablet    Take 1 tablet (500 mg) by mouth daily (with dinner)    Non morbid obesity due to excess calories, Hyperglycemia       omeprazole 20 MG CR capsule    priLOSEC    60 capsule    Take 1 capsule (20 mg) by mouth 2 times daily    Gastroesophageal reflux disease without esophagitis

## 2017-11-24 NOTE — PATIENT INSTRUCTIONS
(N64.4) Breast tenderness  (primary encounter diagnosis)  Comment: inner quadrant bilat   Plan: US Breast Bilateral Complete 4 Quadrants,         BREAST CENTER REFERRAL, BREAST CENTER REFERRAL             (R92.2) Dense breast tissue on mammogram  Comment:    Plan: US Breast Bilateral Complete 4 Quadrants,         BREAST CENTER REFERRAL, BREAST CENTER REFERRAL    ICE MASSAGE 5 MINUTES TWICE DAILY AS NEEDED FOR PAIN CONTROL    ISOMETRIC EXERCISES AT 90 DEGREES 30 SECONDS EACH TWICE DAILY    LATERAL FRICTION MASSAGE OR ELBOW GENTLY 30 SECONDS TWICE DAILY    RANGE OF MOTION OF ELBOW FLEXION AND EXTENSION AS TOLERATED    WITH AND WITHOUT WEIGHTS    PALM DOWN ISOMETRIC FOLLOWED BY PALM DOWN RANGE OF MOTION with AND  WITHOUT RESISTANCE 30 EACH TWICE DAILY    THUMB UP ISOMETRICS FOLLOWED BY THUMB UP RANGE OF MOTION WITH AND WITHOUT RESISTANCE TWICE DAILY 30 EACH    PALM DOWN ISOMETRICS FOLLOWED BY PALM DOWN RANGE OF MOTION WITH AND WITHOUT RESISTANCE TWICE DAILY 30 EACH    INTERNAL  AND EXTERNAL ROTATION OF ELBOW AT 90 DEGREES AS TOLERATED 30 EACH TWICE DAILY    WEIGHT ON A STRING WITH BOTH ARMS ON HAND WIND UP ;AND  WIND DOWN SLOWLY 30 SECONDS EACH TWICE DAILY    MASSAGE LIDOCAINE OINTMENT OR DICLOFENAC GEL 1% TWICE DAILY  AS TOLERATED    TENNIS ELBOW BAND AS TOLERATED WHEN WORKING OR IF HAVING SIGNIFICANT PAIN

## 2017-11-24 NOTE — PROGRESS NOTES
SUBJECTIVE:   Luci Londono is a 46 year old female who presents to clinic today for the following health issues:      Musculoskeletal problem/pain      Duration: weeks    Description  Location: right elbow    Intensity:  moderate    Accompanying signs and symptoms: radiation of pain to hand and swelling    History  Previous similar problem: YES  Previous evaluation:  none    Precipitating or alleviating factors:  Trauma or overuse: no   Aggravating factors include: overuse    Therapies tried and outcome: therapy    Breast exam      Duration: 2 months    Description (location/character/radiation): breast    Intensity:      Accompanying signs and symptoms: pain to touch, hot    History (similar episodes/previous evaluation): None    Precipitating or alleviating factors: None    Therapies tried and outcome: None    Pt states taht she received a letter with abnormal findings         Musculoskeletal problem/pain      Duration: 1 week    Description  Location: left hand    Intensity:  8/10    Accompanying signs and symptoms: none    History  Previous similar problem: no   Previous evaluation:  none    Precipitating or alleviating factors:  Trauma or overuse: no   Aggravating factors include: unable to  and hol    Therapies tried and outcome: ice        (M25.532) Left wrist pain  Comment:  GANGLION DORSUM LEFT WRIST SEE HISTORY OF PRESENT ILLNESS   Plan: ORTHOPEDICS ADULT REFERRAL, XR Wrist Left G/E 3        Views    LEFT WRIST SPLINT    LEFT WRIST XRAY     IMPROVED AFTER OFFICE VISIT     MYALGIAS    CHRONIC LOWER BACK PAIN     GASTROESOPHAGEAL REFLUX DISEASE WITHOUT ESOPHAGITIS     HISTORY OF H PYLORI INFECTION    HISTORY OF POLYCYSTIC OVARY SYNDROME     VITAMIN D REPLACEMENT      PRESBYOPIA     MILD TO MODERATE OBESITY           Problem list and histories reviewed & adjusted, as indicated.  Additional history: as documented      Patient Active Problem List   Diagnosis     Myalgia and myositis      Intervertebral lumbar disc disorder with myelopathy, lumbar region     Gastroesophageal reflux disease without esophagitis     Helicobacter pylori infection     Other type of migraine     Acquired hypothyroidism     Disorder of metabolism     Polycystic ovaries     Vitamin D insufficiency     BMI 35     Rosacea     Absence of menstruation     Chronic left-sided low back pain with left-sided sciatica     Pinguecula of both eyes     Presbyopia     Impaired fasting glucose     Lateral epicondylitis, right dominant elbow since late 10-17     Hypercholesterolemia     Migraine without aura and without status migrainosus, not intractable     Class 1 obesity due to excess calories with serious comorbidity and body mass index (BMI) of 33.0 to 33.9 in adult     Past Surgical History:   Procedure Laterality Date     HAND SURGERY  2002    left     TUBAL LIGATION         Social History   Substance Use Topics     Smoking status: Never Smoker     Smokeless tobacco: Never Used     Alcohol use No     Family History   Problem Relation Age of Onset     DIABETES Mother      Unknown/Adopted Father      Coronary Artery Disease No family hx of      Hypertension No family hx of      Hyperlipidemia No family hx of      CEREBROVASCULAR DISEASE No family hx of      Breast Cancer No family hx of      Colon Cancer No family hx of      Prostate Cancer No family hx of      Other Cancer No family hx of      Depression No family hx of      Anxiety Disorder No family hx of      MENTAL ILLNESS No family hx of      Substance Abuse No family hx of      Anesthesia Reaction No family hx of      Asthma No family hx of      OSTEOPOROSIS No family hx of      Genetic Disorder No family hx of      Thyroid Disease No family hx of      Obesity No family hx of          Current Outpatient Prescriptions   Medication Sig Dispense Refill     diclofenac (VOLTAREN) 1 % GEL topical gel Apply  2 grams to hands four times daily using enclosed dosing card. 200 g 3      levothyroxine (SYNTHROID/LEVOTHROID) 125 MCG tablet Take 1 tablet (125 mcg) by mouth daily 30 tablet 1     metFORMIN (GLUCOPHAGE-XR) 500 MG 24 hr tablet Take 1 tablet (500 mg) by mouth daily (with dinner) 30 tablet 11     omeprazole (PRILOSEC) 20 MG CR capsule Take 1 capsule (20 mg) by mouth 2 times daily 60 capsule 11     medroxyPROGESTERone (PROVERA) 10 MG tablet Take 1 tablet (10 mg) by mouth daily 90 tablet 3     No Known Allergies  Recent Labs   Lab Test  10/31/17   1239  10/16/17   1723  07/25/17   1026  01/31/17   0942  06/30/16   1214  09/01/15   1336   01/31/14   0932   A1C   --    --   5.5  5.6  6.1*   --    --   5.7   LDL   --    --   138*   --   123*  118   < >  124   HDL   --    --   69   --   64  71   --   62   TRIG   --    --   67   --   58  83   --   69   ALT   --   20  27   --   31   --    --   19   CR   --   0.77   --    --    --    --    --   0.90   GFRESTIMATED   --   81   --    --    --    --    --   69   GFRESTBLACK   --   >90   --    --    --    --    --   83   POTASSIUM   --   3.4   --    --    --    --    --   4.6   TSH  0.03*   --   13.95*   --   3.09  3.81   < >  8.70*    < > = values in this interval not displayed.      BP Readings from Last 3 Encounters:   11/24/17 120/74   10/31/17 120/70   10/16/17 133/71    Wt Readings from Last 3 Encounters:   11/24/17 191 lb 8 oz (86.9 kg)   10/31/17 191 lb (86.6 kg)   10/16/17 180 lb (81.6 kg)                  Labs reviewed in EPIC          Reviewed and updated as needed this visit by clinical staffTobacco  Allergies  Meds  Med Hx  Surg Hx  Fam Hx  Soc Hx      Reviewed and updated as needed this visit by Provider         ROS: has Myalgia and myositis; Intervertebral lumbar disc disorder with myelopathy, lumbar region; Gastroesophageal reflux disease without esophagitis; Helicobacter pylori infection; Other type of migraine; Acquired hypothyroidism; Disorder of metabolism; Polycystic ovaries; Vitamin D insufficiency; BMI 35; Rosacea; Absence  of menstruation; Chronic left-sided low back pain with left-sided sciatica; Pinguecula of both eyes; Presbyopia; Impaired fasting glucose; Lateral epicondylitis, right dominant elbow since late 10-17; Hypercholesterolemia; Migraine without aura and without status migrainosus, not intractable; and Class 1 obesity due to excess calories with serious comorbidity and body mass index (BMI) of 33.0 to 33.9 in adult on her problem list.    C: NEGATIVE for fever, chills, change in weight  I: NEGATIVE for worrisome rashes, moles or lesions  E: NEGATIVE for vision changes or irritation  E/M: NEGATIVE for ear, mouth and throat problems  R: NEGATIVE for significant cough or SOB  B: NEGATIVE for masses, tenderness or discharge  CV:  CHEST WALL PAIN   BILATERAL WHERE WIRE BRA HITS CHEST WALL   GI: NEGATIVE for nausea, abdominal pain, heartburn, or change in bowel habits  : NEGATIVE for frequency, dysuria, or hematuria  MUSCULOSKELETAL: CHRONIC LOWER BACK PAIN IMPROVED  N: NEGATIVE for weakness, dizziness or paresthesias  E: NEGATIVE for temperature intolerance, skin/hair changes  H: NEGATIVE for bleeding problems  P: NEGATIVE for changes in mood or affect    OBJECTIVE:     /74  Pulse 67  Temp 97.5  F (36.4  C) (Tympanic)  Resp 16  Wt 191 lb 8 oz (86.9 kg)  LMP 11/14/2017 (Approximate)  SpO2 99%  BMI 33.92 kg/m2  Body mass index is 33.92 kg/(m^2).  GENERAL: healthy, alert and no distress  EYES: Eyes grossly normal to inspection, PERRL and conjunctivae and sclerae normal  HENT: ear canals and TM's normal, nose and mouth without ulcers or lesions  NECK: no adenopathy, no asymmetry, masses, or scars and thyroid normal to palpation  RESP: lungs clear to auscultation - no rales, rhonchi or wheezes  RESP:  CHEST WALL  PAIN BILATERAL   BREAST: normal without masses, tenderness or nipple discharge and no palpable axillary masses or adenopathy CHEST WALL PAIN    NO DOMINANT MASSES   CV: regular rate and rhythm, normal S1 S2,  no S3 or S4, no murmur, click or rub, no peripheral edema and peripheral pulses strong  ABDOMEN: soft, nontender, no hepatosplenomegaly, no masses and bowel sounds normal  MS: no gross musculoskeletal defects noted, no edema  SKIN: no suspicious lesions or rashes  NEURO: Normal strength and tone, mentation intact and speech normal  PSYCH: mentation appears normal, affect normal/bright    Diagnostic Test Results:  Results for orders placed or performed in visit on 10/31/17   TSH with free T4 reflex   Result Value Ref Range    TSH 0.03 (L) 0.40 - 4.00 mU/L   T4 free   Result Value Ref Range    T4 Free 1.70 (H) 0.76 - 1.46 ng/dL       ASSESSMENT/PLAN:           ICD-10-CM    1. Breast tenderness N64.4 US Breast Bilateral Complete 4 Quadrants     BREAST CENTER REFERRAL     BREAST CENTER REFERRAL    inner quadrant bilat    2. Dense breast tissue on mammogram R92.2 US Breast Bilateral Complete 4 Quadrants     BREAST CENTER REFERRAL     BREAST CENTER REFERRAL   3. Lateral epicondylitis, right elbow M77.11 diclofenac (VOLTAREN) 1 % GEL topical gel   RIGHT ELBOW SPLINT    Patient Instructions   (N64.4) Breast tenderness  (primary encounter diagnosis)  Comment: inner quadrant bilat   Plan: US Breast Bilateral Complete 4 Quadrants,         BREAST CENTER REFERRAL, BREAST CENTER REFERRAL             (R92.2) Dense breast tissue on mammogram  Comment:    Plan: US Breast Bilateral Complete 4 Quadrants,         BREAST CENTER REFERRAL, BREAST CENTER REFERRAL    ICE MASSAGE 5 MINUTES TWICE DAILY AS NEEDED FOR PAIN CONTROL    ISOMETRIC EXERCISES AT 90 DEGREES 30 SECONDS EACH TWICE DAILY    LATERAL FRICTION MASSAGE OR ELBOW GENTLY 30 SECONDS TWICE DAILY    RANGE OF MOTION OF ELBOW FLEXION AND EXTENSION AS TOLERATED    WITH AND WITHOUT WEIGHTS    PALM DOWN ISOMETRIC FOLLOWED BY PALM DOWN RANGE OF MOTION with AND  WITHOUT RESISTANCE 30 EACH TWICE DAILY    THUMB UP ISOMETRICS FOLLOWED BY THUMB UP RANGE OF MOTION WITH AND WITHOUT RESISTANCE  TWICE DAILY 30 EACH    PALM DOWN ISOMETRICS FOLLOWED BY PALM DOWN RANGE OF MOTION WITH AND WITHOUT RESISTANCE TWICE DAILY 30 EACH    INTERNAL  AND EXTERNAL ROTATION OF ELBOW AT 90 DEGREES AS TOLERATED 30 EACH TWICE DAILY    WEIGHT ON A STRING WITH BOTH ARMS ON HAND WIND UP ;AND  WIND DOWN SLOWLY 30 SECONDS EACH TWICE DAILY    MASSAGE LIDOCAINE OINTMENT OR DICLOFENAC GEL 1% TWICE DAILY  AS TOLERATED    TENNIS ELBOW BAND AS TOLERATED WHEN WORKING OR IF HAVING SIGNIFICANT PAIN                        ALEENA TYLER MD  [facility]

## 2017-11-30 ENCOUNTER — HOSPITAL ENCOUNTER (OUTPATIENT)
Dept: MAMMOGRAPHY | Facility: CLINIC | Age: 47
Discharge: HOME | End: 2017-11-30
Attending: FAMILY MEDICINE | Admitting: FAMILY MEDICINE
Payer: COMMERCIAL

## 2017-11-30 ENCOUNTER — THERAPY VISIT (OUTPATIENT)
Dept: PHYSICAL THERAPY | Facility: CLINIC | Age: 47
End: 2017-11-30
Payer: COMMERCIAL

## 2017-11-30 DIAGNOSIS — R92.30 DENSE BREAST TISSUE ON MAMMOGRAM: ICD-10-CM

## 2017-11-30 DIAGNOSIS — M77.11 LATERAL EPICONDYLITIS, RIGHT ELBOW: ICD-10-CM

## 2017-11-30 DIAGNOSIS — N64.4 BREAST TENDERNESS: ICD-10-CM

## 2017-11-30 PROCEDURE — 97140 MANUAL THERAPY 1/> REGIONS: CPT | Mod: GP | Performed by: PHYSICAL THERAPIST

## 2017-11-30 PROCEDURE — 97110 THERAPEUTIC EXERCISES: CPT | Mod: GP | Performed by: PHYSICAL THERAPIST

## 2017-11-30 PROCEDURE — 76642 ULTRASOUND BREAST LIMITED: CPT | Mod: 50

## 2017-11-30 NOTE — LETTER
December 1, 2017      Luci Londono  [address]  [facility] [address]        Dear ,    We are writing to inform you of your test results.    NORMAL BREAST ULTRASOUND   FOLLOW UP  ONE YEAR MAMMOGRAM   AVOID WIRE BRAS     Resulted Orders   US Breast Bilateral Limited 1-3 Quadrants    Narrative    ULTRASOUND BILATERAL BREAST - 11/30/2017    HISTORY:  Bilateral breast pain.     COMPARISON:  None.     FINDINGS:  Survey bilateral breast ultrasound shows normal  fibroglandular tissue in both breasts without suspicious cystic or  solid lesion.       Impression    IMPRESSION: BI-RADS CATEGORY: 1 -  NEGATIVE.  No evidence of malignancy. No specific finding to explain breast pain.  Results were communicated to the patient during her appointment. As  long as her physical examination remains stable, annual screening  mammography would be recommended.    RECOMMENDED FOLLOW-UP: Annual Mammography.     YULIA GILLIS MD       If you have any questions or concerns, please call the clinic at the number listed above.       Sincerely,        Stuart Wills Jr., MD

## 2017-11-30 NOTE — LETTER
[facility]  [address], [address]  [place] [address]                                                                                                            Luci Emerald Alert  [address]  [place] [address]      November 30, 2017  Date of Exam:     Dear Luci:    Thank you for your recent visit.  Breast Imaging Result: We are pleased to inform you that the results of your recent breast imaging show no evidence of malignancy (cancer).    If you are experiencing any breast problems such as a lump or localized pain we request that you discuss this with your health care provider if you haven t already done so, as additional testing may be necessary.    As you know, early detection of cancer is very important. Although mammography is the most accurate method for early detection, not all cancers are found through mammography. A thorough examination includes a combination of mammography, physical examination and breast self-examination. Currently the American College of Radiology and the Society of Breast Imaging recommend an annual mammogram for all women beginning at the age of 40.    A report of your breast imaging results was sent to: Stuart Wills    Your breast imaging will become part of your medical file here at [place] for at least 10 years. You are responsible for informing any new health care provider or breast imaging facility of the date and location of this examination.    We appreciate the opportunity to participate in your health care.    Sincerely,    Ari Skinner MD  Interpreting Radiologist  [facility]

## 2018-01-08 DIAGNOSIS — E03.9 ACQUIRED HYPOTHYROIDISM: ICD-10-CM

## 2018-01-10 NOTE — TELEPHONE ENCOUNTER
"Requested Prescriptions   Pending Prescriptions Disp Refills     levothyroxine (SYNTHROID/LEVOTHROID) 125 MCG tablet [Pharmacy Med Name: LEVOTHYROXINE 0.125MG (125MCG) TAB]  Last Written Prescription Date:  11/2/2017  Last Fill Quantity: 30 tablet,  # refills: 1   Last Office Visit  11/24/2017        with  [facility], [facility] or [facility] prescribing provider:     Future Office Visit:    30 tablet 0     Sig: TAKE 1 TABLET BY MOUTH DAILY    Thyroid Protocol Failed    1/8/2018  3:39 AM       Failed - Normal TSH on file in past 12 months    Recent Labs   Lab Test  10/31/17   1239   TSH  0.03*           Passed - Patient is 12 years or older       Passed - Recent or future visit with authorizing provider's specialty    Patient had office visit in the last year or has a visit in the next 30 days with authorizing provider.  See \"Patient Info\" tab in inbasket, or \"Choose Columns\" in Meds & Orders section of the refill encounter.          Passed - No active pregnancy on record    If patient is pregnant or has had a positive pregnancy test, please check TSH.       Passed - No positive pregnancy test in past 12 months    If patient is pregnant or has had a positive pregnancy test, please check TSH.            "

## 2018-01-11 RX ORDER — LEVOTHYROXINE SODIUM 125 UG/1
TABLET ORAL
Qty: 30 TABLET | Refills: 0 | Status: SHIPPED | OUTPATIENT
Start: 2018-01-11 | End: 2018-02-07

## 2018-02-07 DIAGNOSIS — E03.9 ACQUIRED HYPOTHYROIDISM: ICD-10-CM

## 2018-02-07 NOTE — TELEPHONE ENCOUNTER
"Requested Prescriptions   Pending Prescriptions Disp Refills     levothyroxine (SYNTHROID/LEVOTHROID) 125 MCG tablet [Pharmacy Med Name: LEVOTHYROXINE 0.125MG (125MCG) TAB]  Last Written Prescription Date:  1/11/2018  Last Fill Quantity: 30 tablet,  # refills: 0   Last Office Visit  11/24/2017        with  [facility], [facility] or [facility] prescribing provider:     Future Office Visit:    30 tablet 0     Sig: TAKE 1 TABLET BY MOUTH DAILY    Thyroid Protocol Failed    2/7/2018  8:07 AM       Failed - Normal TSH on file in past 12 months    Recent Labs   Lab Test  10/31/17   1239   TSH  0.03*           Passed - Patient is 12 years or older       Passed - Recent or future visit with authorizing provider's specialty    Patient had office visit in the last year or has a visit in the next 30 days with authorizing provider.  See \"Patient Info\" tab in inbasket, or \"Choose Columns\" in Meds & Orders section of the refill encounter.          Passed - No active pregnancy on record    If patient is pregnant or has had a positive pregnancy test, please check TSH.       Passed - No positive pregnancy test in past 12 months    If patient is pregnant or has had a positive pregnancy test, please check TSH.          "

## 2018-02-08 RX ORDER — LEVOTHYROXINE SODIUM 125 UG/1
TABLET ORAL
Qty: 30 TABLET | Refills: 0 | Status: SHIPPED | OUTPATIENT
Start: 2018-02-08 | End: 2018-03-07

## 2018-02-08 NOTE — TELEPHONE ENCOUNTER
"Requested Prescriptions   Pending Prescriptions Disp Refills     levothyroxine (SYNTHROID/LEVOTHROID) 125 MCG tablet [Pharmacy Med Name: LEVOTHYROXINE 0.125MG (125MCG) TAB] 30 tablet 0     Sig: TAKE 1 TABLET BY MOUTH DAILY    Thyroid Protocol Failed    2/7/2018  9:00 AM       Failed - Normal TSH on file in past 12 months    Recent Labs   Lab Test  10/31/17   1239   TSH  0.03*             Passed - Patient is 12 years or older       Passed - Recent or future visit with authorizing provider's specialty    Patient had office visit in the last year or has a visit in the next 30 days with authorizing provider.  See \"Patient Info\" tab in inbasket, or \"Choose Columns\" in Meds & Orders section of the refill encounter.            Passed - No active pregnancy on record    If patient is pregnant or has had a positive pregnancy test, please check TSH.         Passed - No positive pregnancy test in past 12 months    If patient is pregnant or has had a positive pregnancy test, please check TSH.        Medication is being filled for 1 time refill only due to:  Patient needs labs in follow up of medication and last lab results..      "

## 2018-03-07 DIAGNOSIS — E03.9 ACQUIRED HYPOTHYROIDISM: ICD-10-CM

## 2018-03-07 NOTE — TELEPHONE ENCOUNTER
"Requested Prescriptions   Pending Prescriptions Disp Refills     levothyroxine (SYNTHROID/LEVOTHROID) 125 MCG tablet [Pharmacy Med Name: LEVOTHYROXINE 0.125MG (125MCG) TAB] 30 tablet 0    Last Written Prescription Date:  02/18/2018  Last Fill Quantity: 30,  # refills: 0   Last office visit: 11/24/2017 with prescribing provider:  11/24/2017   Future Office Visit:   Sig: TAKE 1 TABLET BY MOUTH DAILY    Thyroid Protocol Failed    3/7/2018  3:38 AM       Failed - Normal TSH on file in past 12 months    Recent Labs   Lab Test  10/31/17   1239   TSH  0.03*             Passed - Patient is 12 years or older       Passed - Recent (12 mo) or future (30 days) visit within the authorizing provider's specialty    Patient had office visit in the last year or has a visit in the next 30 days with authorizing provider.  See \"Patient Info\" tab in inbasket, or \"Choose Columns\" in Meds & Orders section of the refill encounter.            Passed - No active pregnancy on record    If patient is pregnant or has had a positive pregnancy test, please check TSH.         Passed - No positive pregnancy test in past 12 months    If patient is pregnant or has had a positive pregnancy test, please check TSH.          "

## 2018-03-08 RX ORDER — LEVOTHYROXINE SODIUM 125 UG/1
TABLET ORAL
Qty: 30 TABLET | Refills: 0 | Status: SHIPPED | OUTPATIENT
Start: 2018-03-08 | End: 2018-06-12

## 2018-05-31 ENCOUNTER — OFFICE VISIT (OUTPATIENT)
Dept: FAMILY MEDICINE | Facility: CLINIC | Age: 48
End: 2018-05-31
Payer: COMMERCIAL

## 2018-05-31 VITALS
WEIGHT: 196 LBS | TEMPERATURE: 97.3 F | BODY MASS INDEX: 34.72 KG/M2 | HEART RATE: 72 BPM | OXYGEN SATURATION: 98 % | DIASTOLIC BLOOD PRESSURE: 60 MMHG | SYSTOLIC BLOOD PRESSURE: 108 MMHG | RESPIRATION RATE: 16 BRPM

## 2018-05-31 DIAGNOSIS — R10.9 FLANK PAIN: Primary | ICD-10-CM

## 2018-05-31 DIAGNOSIS — Z12.11 SCREEN FOR COLON CANCER: ICD-10-CM

## 2018-05-31 DIAGNOSIS — R10.11 RUQ ABDOMINAL PAIN: ICD-10-CM

## 2018-05-31 LAB
ALBUMIN UR-MCNC: NEGATIVE MG/DL
APPEARANCE UR: CLEAR
BILIRUB UR QL STRIP: NEGATIVE
COLOR UR AUTO: YELLOW
ERYTHROCYTE [SEDIMENTATION RATE] IN BLOOD BY WESTERGREN METHOD: 29 MM/H (ref 0–20)
GLUCOSE UR STRIP-MCNC: NEGATIVE MG/DL
HGB UR QL STRIP: NEGATIVE
KETONES UR STRIP-MCNC: NEGATIVE MG/DL
LEUKOCYTE ESTERASE UR QL STRIP: NEGATIVE
NITRATE UR QL: NEGATIVE
PH UR STRIP: 6 PH (ref 5–7)
SOURCE: NORMAL
SP GR UR STRIP: 1.01 (ref 1–1.03)
UROBILINOGEN UR STRIP-ACNC: 0.2 EU/DL (ref 0.2–1)

## 2018-05-31 PROCEDURE — 81003 URINALYSIS AUTO W/O SCOPE: CPT | Performed by: FAMILY MEDICINE

## 2018-05-31 PROCEDURE — 99214 OFFICE O/P EST MOD 30 MIN: CPT | Performed by: FAMILY MEDICINE

## 2018-05-31 PROCEDURE — 36415 COLL VENOUS BLD VENIPUNCTURE: CPT | Performed by: FAMILY MEDICINE

## 2018-05-31 PROCEDURE — 85652 RBC SED RATE AUTOMATED: CPT | Performed by: FAMILY MEDICINE

## 2018-05-31 PROCEDURE — 80053 COMPREHEN METABOLIC PANEL: CPT | Performed by: FAMILY MEDICINE

## 2018-05-31 NOTE — PROGRESS NOTES
SUBJECTIVE:   Luci Londono is a 47 year old female who presents to clinic today for the following health issues:      Lower right side back pain      Duration: 4 days    Description (location/character/radiation): lower right back    Intensity:  Moderate, constant, cramping    Accompanying signs and symptoms: none    History (similar episodes/previous evaluation): None    Precipitating or alleviating factors: None    Therapies tried and outcome: None     DIFFERENTIAL DIAGNOSIS DISCUSSION   ERTHROCYTE SEDIMENTATION RATE IE INFLAMMATORY MARKER MILD INCREASE 29 milimeters   Normal urine analysis             Topic Date Due     FIT Q1 YR  02/01/2015               .  Current Outpatient Prescriptions   Medication Sig Dispense Refill     diclofenac (VOLTAREN) 1 % GEL topical gel Apply  2 grams to hands four times daily using enclosed dosing card. 200 g 3     levothyroxine (SYNTHROID/LEVOTHROID) 125 MCG tablet TAKE 1 TABLET BY MOUTH DAILY 30 tablet 0     medroxyPROGESTERone (PROVERA) 10 MG tablet Take 1 tablet (10 mg) by mouth daily 90 tablet 3     metFORMIN (GLUCOPHAGE-XR) 500 MG 24 hr tablet Take 1 tablet (500 mg) by mouth daily (with dinner) 30 tablet 11     omeprazole (PRILOSEC) 20 MG CR capsule Take 1 capsule (20 mg) by mouth 2 times daily 60 capsule 11            No Known Allergies      Immunization History   Administered Date(s) Administered     TD (ADULT, 7+) 01/01/2005     TDAP Vaccine (Adacel) 07/25/2017               reports that she does not drink alcohol.        reports that she does not use illicit drugs.      family history includes DIABETES in her mother; Unknown/Adopted in her father. There is no history of Coronary Artery Disease, Hypertension, Hyperlipidemia, CEREBROVASCULAR DISEASE, Breast Cancer, Colon Cancer, Prostate Cancer, Other Cancer, Depression, Anxiety Disorder, MENTAL ILLNESS, Substance Abuse, Anesthesia Reaction, Asthma, OSTEOPOROSIS, Genetic Disorder, Thyroid Disease, or  Obesity.      indicated that the status of her no family hx of is unknown. She indicated that her mother is alive. She indicated that her father is .        has a past surgical history that includes Hand surgery () and tubal ligation.       reports that she currently engages in sexual activity and has had male partners. She reports using the following method of birth control/protection: Pill.    .  Pediatric History   Patient Guardian Status     Not on file.     Other Topics Concern     Parent/Sibling W/ Cabg, Mi Or Angioplasty Before 65f 55m? No     Social History Narrative             reports that she has never smoked. She has never used smokeless tobacco.        Medical, social, surgical, and family histories reviewed.        Labs reviewed in EPIC  Patient Active Problem List   Diagnosis     Myalgia and myositis     Intervertebral lumbar disc disorder with myelopathy, lumbar region     Gastroesophageal reflux disease without esophagitis     Helicobacter pylori infection     Other type of migraine     Acquired hypothyroidism     Disorder of metabolism     Polycystic ovaries     Vitamin D insufficiency     BMI 35     Rosacea     Absence of menstruation     Chronic left-sided low back pain with left-sided sciatica     Pinguecula of both eyes     Presbyopia     Impaired fasting glucose     Lateral epicondylitis, right dominant elbow since late 10-17     Hypercholesterolemia     Migraine without aura and without status migrainosus, not intractable     Class 1 obesity due to excess calories with serious comorbidity and body mass index (BMI) of 33.0 to 33.9 in adult       Past Surgical History:   Procedure Laterality Date     HAND SURGERY      left     TUBAL LIGATION           Social History   Substance Use Topics     Smoking status: Never Smoker     Smokeless tobacco: Never Used     Alcohol use No       Family History   Problem Relation Age of Onset     DIABETES Mother      Unknown/Adopted Father       Coronary Artery Disease No family hx of      Hypertension No family hx of      Hyperlipidemia No family hx of      CEREBROVASCULAR DISEASE No family hx of      Breast Cancer No family hx of      Colon Cancer No family hx of      Prostate Cancer No family hx of      Other Cancer No family hx of      Depression No family hx of      Anxiety Disorder No family hx of      MENTAL ILLNESS No family hx of      Substance Abuse No family hx of      Anesthesia Reaction No family hx of      Asthma No family hx of      OSTEOPOROSIS No family hx of      Genetic Disorder No family hx of      Thyroid Disease No family hx of      Obesity No family hx of              Current Outpatient Prescriptions   Medication Sig Dispense Refill     diclofenac (VOLTAREN) 1 % GEL topical gel Apply  2 grams to hands four times daily using enclosed dosing card. 200 g 3     levothyroxine (SYNTHROID/LEVOTHROID) 125 MCG tablet TAKE 1 TABLET BY MOUTH DAILY 30 tablet 0     medroxyPROGESTERone (PROVERA) 10 MG tablet Take 1 tablet (10 mg) by mouth daily 90 tablet 3     metFORMIN (GLUCOPHAGE-XR) 500 MG 24 hr tablet Take 1 tablet (500 mg) by mouth daily (with dinner) 30 tablet 11     omeprazole (PRILOSEC) 20 MG CR capsule Take 1 capsule (20 mg) by mouth 2 times daily 60 capsule 11         Recent Labs   Lab Test  05/31/18   1646  10/31/17   1239  10/16/17   1723  07/25/17   1026  01/31/17   0942  06/30/16   1214  09/01/15   1336   A1C   --    --    --   5.5  5.6  6.1*   --    LDL   --    --    --   138*   --   123*  118   HDL   --    --    --   69   --   64  71   TRIG   --    --    --   67   --   58  83   ALT  29   --   20  27   --   31   --    CR  0.86   --   0.77   --    --    --    --    GFRESTIMATED  70   --   81   --    --    --    --    GFRESTBLACK  85   --   >90   --    --    --    --    POTASSIUM  3.7   --   3.4   --    --    --    --    TSH   --   0.03*   --   13.95*   --   3.09  3.81            BP Readings from Last 6 Encounters:   05/31/18 108/60    11/24/17 120/74   10/31/17 120/70   10/16/17 133/71   08/25/17 118/64   07/25/17 110/62         Wt Readings from Last 3 Encounters:   05/31/18 196 lb (88.9 kg)   11/24/17 191 lb 8 oz (86.9 kg)   10/31/17 191 lb (86.6 kg)               Positive symptoms or findings indicated by bold designation:         ROS: 10 point ROS neg other than the symptoms noted above in the HPI.except  has Myalgia and myositis; Intervertebral lumbar disc disorder with myelopathy, lumbar region; Gastroesophageal reflux disease without esophagitis; Helicobacter pylori infection; Other type of migraine; Acquired hypothyroidism; Disorder of metabolism; Polycystic ovaries; Vitamin D insufficiency; BMI 35; Rosacea; Absence of menstruation; Chronic left-sided low back pain with left-sided sciatica; Pinguecula of both eyes; Presbyopia; Impaired fasting glucose; Lateral epicondylitis, right dominant elbow since late 10-17; Hypercholesterolemia; Migraine without aura and without status migrainosus, not intractable; and Class 1 obesity due to excess calories with serious comorbidity and body mass index (BMI) of 33.0 to 33.9 in adult on her problem list.  Review Of Systems    Skin: negative    Eyes: negative    Ears/Nose/Throat: negative    Respiratory: No shortness of breath, dyspnea on exertion, cough, or hemoptysis    Cardiovascular: negative    Gastrointestinal: negative, nausea, abdominal pain and  Right flank pain    Normal bowel pattern    Genitourinary: right flank pain    Musculoskeletal: negative    Neurologic: negative    Psychiatric: negative    Hematologic/Lymphatic/Immunologic: negative    Endocrine: negative, positive for moderate obesity                PE:  /60 (Cuff Size: Adult Regular)  Pulse 72  Temp 97.3  F (36.3  C) (Tympanic)  Resp 16  Wt 196 lb (88.9 kg)  SpO2 98%  BMI 34.72 kg/m2 Body mass index is 34.72 kg/(m^2).        Constitutional: general appearance, well nourished, well developed, in no acute distress, well  developed, appears stated age, normal body habitus,          Eyes:; The patient has normal eyelids sclerae and conjunctivae :          Ears/Nose/Throat: external ear, overall: normal appearance; external nose, overall: benign appearance, normal moujth gums and lips           Neck: thyroid, overall: normal size, normal consistency, nontender,          Respiratory:  palpation of chest, overall: normal excursion,    Clear to percussion and auscultation     NO Tachypnea    NORMAL  Color          Cardiovascular:  Good color with no peripheral edema    Regular sinus rhythm without murmur.  Physiologic heart sounds   Heart is unelarged    .     Chest/Breast: normal shape           Abdominal exam,  Liver and spleen are  unenlarged        Tenderness  RIGHT UPPER QUADRANT AND GOING TO FLANK  Scars  NO             Urogenital; no renal, flank or bladder  tenderness;          Lymphatic: neck nodes,     Other nodes         Musculoskeletal:  Brief ortho exam normal except:   NORMAL  UPPER EXTREMETIES AND LOWER EXTREMITY AND BACK WITH FULL RANGE OF MOTION     TENDER RIGHT FLANK         Integument: inspection of skin, no rash, lesions; and, palpation, no induration, no tenderness.           Neurologic mental status, overall: alert and oriented; gait, no ataxia, no unsteadiness; coordination, no tremors; cranial nerves, overall: normal motor, overall: normal bulk, tone.          Psychiatric: orientation/consciousness, overall: oriented to person, place and time; behavior/psychomotor activity, no tics, normal psychomotor activity; mood and affect, overall: normal mood and affect; appearance, overall: well-groomed, good eye contact; speech, overall: normal quality, no aphasia and normal quality, quantity, intact.        Diagnostic Test Results:  Results for orders placed or performed in visit on 05/31/18   UA reflex to Microscopic and Culture   Result Value Ref Range    Color Urine Yellow     Appearance Urine Clear     Glucose Urine  Negative NEG^Negative mg/dL    Bilirubin Urine Negative NEG^Negative    Ketones Urine Negative NEG^Negative mg/dL    Specific Gravity Urine 1.015 1.003 - 1.035    Blood Urine Negative NEG^Negative    pH Urine 6.0 5.0 - 7.0 pH    Protein Albumin Urine Negative NEG^Negative mg/dL    Urobilinogen Urine 0.2 0.2 - 1.0 EU/dL    Nitrite Urine Negative NEG^Negative    Leukocyte Esterase Urine Negative NEG^Negative    Source Midstream Urine    Erythrocyte sedimentation rate auto   Result Value Ref Range    Sed Rate 29 (H) 0 - 20 mm/h   Comprehensive metabolic panel   Result Value Ref Range    Sodium 138 133 - 144 mmol/L    Potassium 3.7 3.4 - 5.3 mmol/L    Chloride 103 94 - 109 mmol/L    Carbon Dioxide 28 20 - 32 mmol/L    Anion Gap 7 3 - 14 mmol/L    Glucose 95 70 - 99 mg/dL    Urea Nitrogen 11 7 - 30 mg/dL    Creatinine 0.86 0.52 - 1.04 mg/dL    GFR Estimate 70 >60 mL/min/1.7m2    GFR Estimate If Black 85 >60 mL/min/1.7m2    Calcium 8.8 8.5 - 10.1 mg/dL    Bilirubin Total 0.2 0.2 - 1.3 mg/dL    Albumin 3.4 3.4 - 5.0 g/dL    Protein Total 7.7 6.8 - 8.8 g/dL    Alkaline Phosphatase 81 40 - 150 U/L    ALT 29 0 - 50 U/L    AST 22 0 - 45 U/L           ICD-10-CM    1. Flank pain R10.9 UA reflex to Microscopic and Culture     Erythrocyte sedimentation rate auto     RADIOLOGY REFERRAL     Comprehensive metabolic panel   2. Screen for colon cancer Z12.11    3. RUQ abdominal pain R10.11 RADIOLOGY REFERRAL     Comprehensive metabolic panel              .    Side effects benefits and risks thoroughly discussed. .she may come in early if unimproved or getting worse          Please drink 2 glasses of water prior to meals and walk 15-30 minutes after meals        I spent  25 MINUTES SPENT  with patient discussing the following issues   The primary encounter diagnosis was Flank pain. Diagnoses of Screen for colon cancer and RUQ abdominal pain were also pertinent to this visit. over half of which involved counseling and coordination of  care.      Patient Instructions   (R10.9) Flank pain  (primary encounter diagnosis)  Comment:    Plan: UA reflex to Microscopic and Culture,         Erythrocyte sedimentation rate auto, RADIOLOGY         REFERRAL, Comprehensive metabolic panel                 (R10.11) RUQ abdominal pain  Comment:    Plan: RADIOLOGY REFERRAL, Comprehensive metabolic         panel            2PM [facility] RADIOLOGY  [phone]  [phone]   CLEAR LIQUID   NPO X 8 HOURS  ALEENA TYLER JR., MD   NORMAL ULTRASOUND RIGHT UPPER QUADRANT 06/05/18 \  NORMAL COMPLETE METABOLIC PROFILE RENAL BLOOD SALTS LIVER GLUCOSE TEST   NORMAL URINE ANALYSIS   BORDERLINE  ERTHROCYTE SEDIMENTATION RATE IE INFLAMMATORY MARKER   RESULTS TO PATIENT FOLLOW UP  MYRON MATOS MD  OR WILLIE DAVIDSON MD IF NOT IMPROVING NEXT WEEK              ALL THE ABOVE PROBLEMS ARE STABLE AND MED CHANGES AS NOTED        Diet:  MEDITERRANEAN DIET         Exercise:  REST   Exercises Range of motion, balance, isometric, and strengthening exercises 30 repetitions twice daily of involved joints            .ALEENA TYLER MD 5/31/2018 5:16 PM  May 31, 2018

## 2018-05-31 NOTE — MR AVS SNAPSHOT
After Visit Summary   5/31/2018    Luci Londono    MRN: [id number]           Patient Information     Date Of Birth          1970        Visit Information        Provider Department      5/31/2018 4:30 PM Stuart Wills MD [facility]        Today's Diagnoses     Flank pain    -  1    Screen for colon cancer        RUQ abdominal pain          Care Instructions    (R10.9) Flank pain  (primary encounter diagnosis)  Comment:    Plan: UA reflex to Microscopic and Culture,         Erythrocyte sedimentation rate auto, RADIOLOGY         REFERRAL, Comprehensive metabolic panel             (Z12.11) Screen for colon cancer  Comment:    Plan:      (R10.11) RUQ abdominal pain  Comment:    Plan: RADIOLOGY REFERRAL, Comprehensive metabolic         panel            2PM [facility] RADIOLOGY  [phone]  [phone]                Follow-ups after your visit        Additional Services     RADIOLOGY REFERRAL       [facility]  [phone]  [phone]   RIGHT UPPER QUADRANT ABDOMINAL ULTRASOUND     Please be aware that coverage of these services is subject to the terms and limitations of your health insurance plan.  Call member services at your health plan with any benefit or coverage questions.      Please bring the following to your appointment:    >>   Any x-rays, CTs or MRIs which have been performed.  Contact the facility where they were done to arrange for  prior to your scheduled appointment.    >>   List of current medications   >>   This referral request   >>   Any documents/labs given to you for this referral    Prior authorization is required for MRI/MRA, CT, Dexa Scans and Worker's Compensation cases.  Patient Active Problem List:     Myalgia and myositis     Intervertebral lumbar disc disorder with myelopathy, lumbar region     Gastroesophageal reflux disease without esophagitis     Helicobacter  "pylori infection     Other type of migraine     Acquired hypothyroidism     Disorder of metabolism     Polycystic ovaries     Vitamin D insufficiency     BMI 35     Rosacea     Absence of menstruation     Chronic left-sided low back pain with left-sided sciatica     Pinguecula of both eyes     Presbyopia     Impaired fasting glucose     Lateral epicondylitis, right dominant elbow since late 10-17     Hypercholesterolemia     Migraine without aura and without status migrainosus, not intractable     Class 1 obesity due to excess calories with serious comorbidity and body mass index (BMI) of 33.0 to 33.9 in adult    .                  Follow-up notes from your care team     Return in about 4 days (around 6/4/2018).      Who to contact     If you have questions or need follow up information about today's clinic visit or your schedule please contact [facility] directly at [phone].  Normal or non-critical lab and imaging results will be communicated to you by MyChart, letter or phone within 4 business days after the clinic has received the results. If you do not hear from us within 7 days, please contact the clinic through [employer] or phone. If you have a critical or abnormal lab result, we will notify you by phone as soon as possible.  Submit refill requests through [facility] or call your pharmacy and they will forward the refill request to us. Please allow 3 business days for your refill to be completed.          Additional Information About Your Visit        [facility] Information     [facility] lets you send messages to your doctor, view your test results, renew your prescriptions, schedule appointments and more. To sign up, go to [email or web address] . Click on \"Log in\" on the left side of the screen, which will take you to the Welcome page. Then click on \"Sign up Now\" on the right side of the page.     You will be asked to enter the access code listed below, as well as some " personal information. Please follow the directions to create your username and password.     Your access code is: [email or web address]  Expires: 2018  5:00 PM     Your access code will  in 90 days. If you need help or a new code, please call your [place] clinic or [phone].        Care EveryWhere ID     This is your Care EveryWhere ID. This could be used by other organizations to access your [place] medical records  [id number]        Your Vitals Were     Pulse Temperature Respirations Pulse Oximetry BMI (Body Mass Index)       72 97.3  F (36.3  C) (Tympanic) 16 98% 34.72 kg/m2        Blood Pressure from Last 3 Encounters:   18 108/60   17 120/74   10/31/17 120/70    Weight from Last 3 Encounters:   18 196 lb (88.9 kg)   17 191 lb 8 oz (86.9 kg)   10/31/17 191 lb (86.6 kg)              We Performed the Following     Comprehensive metabolic panel     Erythrocyte sedimentation rate auto     RADIOLOGY REFERRAL     UA reflex to Microscopic and Culture        Primary Care Provider Office Phone # Fax #    Stuart Charley Wills [phone] [address] [facility] [address]        Equal Access to Services     PINA PINEDA : Hadii aad ku hadasho Soomaali, waaxda luqadaha, qaybta kaalmada adeegyada, waxay idiin hayaan pedro bangura. So [facility] [phone].    ATENCIÓN: Si habla español, tiene a dominique disposición servicios gratuitos de asistencia lingüística. Lllauren al [phone].    We comply with applicable federal civil rights laws and [place] laws. We do not discriminate on the basis of race, color, national origin, age, disability, sex, sexual orientation, or gender identity.            Thank you!     Thank you for choosing [facility]  for your care. Our goal is always to provide you with excellent care. Hearing back from our patients is one way we can continue to improve our services. Please take a few minutes to  complete the written survey that you may receive in the mail after your visit with us. Thank you!             Your Updated Medication List - Protect others around you: Learn how to safely use, store and throw away your medicines at www.disposemymeds.org.          This list is accurate as of 5/31/18  5:00 PM.  Always use your most recent med list.                   Brand Name Dispense Instructions for use Diagnosis    diclofenac 1 % Gel topical gel    VOLTAREN    200 g    Apply  2 grams to hands four times daily using enclosed dosing card.    Lateral epicondylitis, right elbow       levothyroxine 125 MCG tablet    SYNTHROID/LEVOTHROID    30 tablet    TAKE 1 TABLET BY MOUTH DAILY    Acquired hypothyroidism       medroxyPROGESTERone 10 MG tablet    PROVERA    90 tablet    Take 1 tablet (10 mg) by mouth daily    Absence of menstruation       metFORMIN 500 MG 24 hr tablet    GLUCOPHAGE-XR    30 tablet    Take 1 tablet (500 mg) by mouth daily (with dinner)    Non morbid obesity due to excess calories, Hyperglycemia       omeprazole 20 MG CR capsule    priLOSEC    60 capsule    Take 1 capsule (20 mg) by mouth 2 times daily    Gastroesophageal reflux disease without esophagitis

## 2018-05-31 NOTE — LETTER
June 2, 2018      Luci Londono  [address]  [place] [address]        Dear ,    We are writing to inform you of your test results.    NORMAL COMPLETE METABOLIC PROFILE RENAL BLOOD SALTS LIVER GLUCOSE TEST   NORMAL  URINE ANALYSIS   BORDERLINE  ERTHROCYTE SEDIMENTATION RATE IE INFLAMMATORY MARKER ELEVATION     Resulted Orders   UA reflex to Microscopic and Culture   Result Value Ref Range    Color Urine Yellow     Appearance Urine Clear     Glucose Urine Negative NEG^Negative mg/dL    Bilirubin Urine Negative NEG^Negative    Ketones Urine Negative NEG^Negative mg/dL    Specific Gravity Urine 1.015 1.003 - 1.035    Blood Urine Negative NEG^Negative    pH Urine 6.0 5.0 - 7.0 pH    Protein Albumin Urine Negative NEG^Negative mg/dL    Urobilinogen Urine 0.2 0.2 - 1.0 EU/dL    Nitrite Urine Negative NEG^Negative    Leukocyte Esterase Urine Negative NEG^Negative    Source Midstream Urine    Erythrocyte sedimentation rate auto   Result Value Ref Range    Sed Rate 29 (H) 0 - 20 mm/h   Comprehensive metabolic panel   Result Value Ref Range    Sodium 138 133 - 144 mmol/L    Potassium 3.7 3.4 - 5.3 mmol/L    Chloride 103 94 - 109 mmol/L    Carbon Dioxide 28 20 - 32 mmol/L    Anion Gap 7 3 - 14 mmol/L    Glucose 95 70 - 99 mg/dL    Urea Nitrogen 11 7 - 30 mg/dL    Creatinine 0.86 0.52 - 1.04 mg/dL    GFR Estimate 70 >60 mL/min/1.7m2      Comment:      Non  GFR Calc    GFR Estimate If Black 85 >60 mL/min/1.7m2      Comment:       GFR Calc    Calcium 8.8 8.5 - 10.1 mg/dL    Bilirubin Total 0.2 0.2 - 1.3 mg/dL    Albumin 3.4 3.4 - 5.0 g/dL    Protein Total 7.7 6.8 - 8.8 g/dL    Alkaline Phosphatase 81 40 - 150 U/L    ALT 29 0 - 50 U/L    AST 22 0 - 45 U/L       If you have any questions or concerns, please call the clinic at the number listed above.       Sincerely,        ALEENA TYLER MD

## 2018-05-31 NOTE — PATIENT INSTRUCTIONS
(R10.9) Flank pain  (primary encounter diagnosis)  Comment:    Plan: UA reflex to Microscopic and Culture,         Erythrocyte sedimentation rate auto, RADIOLOGY         REFERRAL, Comprehensive metabolic panel                 (R10.11) RUQ abdominal pain  Comment:    Plan: RADIOLOGY REFERRAL, Comprehensive metabolic         panel            [facility]  [phone]  [phone]   CLEAR LIQUID   NPO X 8 HOURS  ALEENA TYLER JR., MD   NORMAL ULTRASOUND RIGHT UPPER QUADRANT 06/05/18 \  NORMAL COMPLETE METABOLIC PROFILE RENAL BLOOD SALTS LIVER GLUCOSE TEST   NORMAL URINE ANALYSIS   BORDERLINE  ERTHROCYTE SEDIMENTATION RATE IE INFLAMMATORY MARKER   RESULTS TO PATIENT FOLLOW UP  MYRON MATOS MD  OR WILLIE DAVIDSON MD IF NOT IMPROVING NEXT WEEK

## 2018-06-01 ENCOUNTER — TRANSFERRED RECORDS (OUTPATIENT)
Dept: HEALTH INFORMATION MANAGEMENT | Facility: CLINIC | Age: 48
End: 2018-06-01

## 2018-06-01 LAB
ALBUMIN SERPL-MCNC: 3.4 G/DL (ref 3.4–5)
ALP SERPL-CCNC: 81 U/L (ref 40–150)
ALT SERPL W P-5'-P-CCNC: 29 U/L (ref 0–50)
ANION GAP SERPL CALCULATED.3IONS-SCNC: 7 MMOL/L (ref 3–14)
AST SERPL W P-5'-P-CCNC: 22 U/L (ref 0–45)
BILIRUB SERPL-MCNC: 0.2 MG/DL (ref 0.2–1.3)
BUN SERPL-MCNC: 11 MG/DL (ref 7–30)
CALCIUM SERPL-MCNC: 8.8 MG/DL (ref 8.5–10.1)
CHLORIDE SERPL-SCNC: 103 MMOL/L (ref 94–109)
CO2 SERPL-SCNC: 28 MMOL/L (ref 20–32)
CREAT SERPL-MCNC: 0.86 MG/DL (ref 0.52–1.04)
GFR SERPL CREATININE-BSD FRML MDRD: 70 ML/MIN/1.7M2
GLUCOSE SERPL-MCNC: 95 MG/DL (ref 70–99)
POTASSIUM SERPL-SCNC: 3.7 MMOL/L (ref 3.4–5.3)
PROT SERPL-MCNC: 7.7 G/DL (ref 6.8–8.8)
SODIUM SERPL-SCNC: 138 MMOL/L (ref 133–144)

## 2018-06-12 DIAGNOSIS — E03.9 ACQUIRED HYPOTHYROIDISM: ICD-10-CM

## 2018-06-12 RX ORDER — LEVOTHYROXINE SODIUM 125 UG/1
TABLET ORAL
Qty: 30 TABLET | Refills: 0 | Status: SHIPPED | OUTPATIENT
Start: 2018-06-12 | End: 2018-07-16

## 2018-06-12 NOTE — TELEPHONE ENCOUNTER
Medication is being filled for 1 time refill only due to:  Future labs ordered Needs to call and schedule lab only appt..

## 2018-06-12 NOTE — TELEPHONE ENCOUNTER
"Requested Prescriptions   Pending Prescriptions Disp Refills     levothyroxine (SYNTHROID/LEVOTHROID) 125 MCG tablet [Pharmacy Med Name: LEVOTHYROXINE 0.125MG (125MCG) TAB]  Last Written Prescription Date:  3/8/18  Last Fill Quantity: 30,  # refills: 0   Last office visit: 5/31/2018 with prescribing provider:  Elma   Future Office Visit:     30 tablet 0     Sig: TAKE 1 TABLET BY MOUTH DAILY    Thyroid Protocol Failed    6/12/2018  3:38 AM       Failed - Normal TSH on file in past 12 months    Recent Labs   Lab Test  10/31/17   1239   TSH  0.03*             Passed - Patient is 12 years or older       Passed - Recent (12 mo) or future (30 days) visit within the authorizing provider's specialty    Patient had office visit in the last 12 months or has a visit in the next 30 days with authorizing provider or within the authorizing provider's specialty.  See \"Patient Info\" tab in inbasket, or \"Choose Columns\" in Meds & Orders section of the refill encounter.           Passed - No active pregnancy on record    If patient is pregnant or has had a positive pregnancy test, please check TSH.         Passed - No positive pregnancy test in past 12 months    If patient is pregnant or has had a positive pregnancy test, please check TSH.            "

## 2018-07-07 PROBLEM — M77.11 LATERAL EPICONDYLITIS, RIGHT ELBOW: Status: RESOLVED | Noted: 2017-11-01 | Resolved: 2018-07-07

## 2018-07-08 NOTE — PROGRESS NOTES
DISCHARGE REPORT:     Patient did not return for formal reassessment after the last visit on 11/30/17.  Please see completed goal and daily flowsheets from that date, for last know status.  Patient will be discharged at this time.

## 2018-07-16 DIAGNOSIS — E03.9 ACQUIRED HYPOTHYROIDISM: ICD-10-CM

## 2018-07-16 RX ORDER — LEVOTHYROXINE SODIUM 125 UG/1
TABLET ORAL
Qty: 30 TABLET | Refills: 0 | Status: SHIPPED | OUTPATIENT
Start: 2018-07-16 | End: 2018-08-13

## 2018-07-16 NOTE — TELEPHONE ENCOUNTER
"Requested Prescriptions   Pending Prescriptions Disp Refills     levothyroxine (SYNTHROID/LEVOTHROID) 125 MCG tablet [Pharmacy Med Name: LEVOTHYROXINE 0.125MG (125MCG) TAB] 30 tablet 0      Last Written Prescription Date:  6/12/18  Last Fill Quantity: 30,  # refills: 0   Last office visit: 5/31/2018 with prescribing provider:  5/31/18   Future Office Visit:     Sig: TAKE 1 TABLET BY MOUTH DAILY    Thyroid Protocol Failed    7/16/2018  3:38 AM       Failed - Normal TSH on file in past 12 months    Recent Labs   Lab Test  10/31/17   1239   TSH  0.03*             Passed - Patient is 12 years or older       Passed - Recent (12 mo) or future (30 days) visit within the authorizing provider's specialty    Patient had office visit in the last 12 months or has a visit in the next 30 days with authorizing provider or within the authorizing provider's specialty.  See \"Patient Info\" tab in inbasket, or \"Choose Columns\" in Meds & Orders section of the refill encounter.           Passed - No active pregnancy on record    If patient is pregnant or has had a positive pregnancy test, please check TSH.         Passed - No positive pregnancy test in past 12 months    If patient is pregnant or has had a positive pregnancy test, please check TSH.            "

## 2018-08-13 DIAGNOSIS — E03.9 ACQUIRED HYPOTHYROIDISM: ICD-10-CM

## 2018-08-13 NOTE — TELEPHONE ENCOUNTER
"Requested Prescriptions   Pending Prescriptions Disp Refills     levothyroxine (SYNTHROID/LEVOTHROID) 125 MCG tablet [Pharmacy Med Name: LEVOTHYROXINE 0.125MG (125MCG) TAB]  Last Written Prescription Date:  7/16/2018  Last Fill Quantity: 30,  # refills: 0   Last office visit: 5/31/2018 with prescribing provider:  Dr MARA Wills   Future Office Visit:     30 tablet 0     Sig: TAKE 1 TABLET BY MOUTH DAILY    Thyroid Protocol Failed    8/13/2018  3:40 AM       Failed - Normal TSH on file in past 12 months    Recent Labs   Lab Test  10/31/17   1239   TSH  0.03*             Passed - Patient is 12 years or older       Passed - Recent (12 mo) or future (30 days) visit within the authorizing provider's specialty    Patient had office visit in the last 12 months or has a visit in the next 30 days with authorizing provider or within the authorizing provider's specialty.  See \"Patient Info\" tab in inbasket, or \"Choose Columns\" in Meds & Orders section of the refill encounter.           Passed - No active pregnancy on record    If patient is pregnant or has had a positive pregnancy test, please check TSH.         Passed - No positive pregnancy test in past 12 months    If patient is pregnant or has had a positive pregnancy test, please check TSH.            "

## 2018-08-14 NOTE — TELEPHONE ENCOUNTER
"Per 11/3/17 lab letter:    \"Your thyroid is still too high on 137mcg of levothyroxine, please decrease to the new dose of 125mcg and recheck in 6-8 weeks.     The prescription(s) have been sent to your pharmacy electronically and the future lab has been ordered.\"      Patient is over due for TSH lab and was given a abdoul refill already. Patient's home number was called, pt is at work, message left with daughter Laura for patient to call the clinic.      Patient to schedule a lab only appt for TSH.   "

## 2018-08-16 RX ORDER — LEVOTHYROXINE SODIUM 125 UG/1
TABLET ORAL
Qty: 30 TABLET | Refills: 0 | Status: SHIPPED | OUTPATIENT
Start: 2018-08-16 | End: 2018-09-15

## 2018-08-16 NOTE — TELEPHONE ENCOUNTER
Routing refill request to provider for review/approval because:  Tracee given x1 and patient did not follow up, please advise

## 2018-08-21 ENCOUNTER — OFFICE VISIT (OUTPATIENT)
Dept: FAMILY MEDICINE | Facility: CLINIC | Age: 48
End: 2018-08-21
Payer: COMMERCIAL

## 2018-08-21 VITALS
OXYGEN SATURATION: 96 % | WEIGHT: 201 LBS | RESPIRATION RATE: 18 BRPM | DIASTOLIC BLOOD PRESSURE: 70 MMHG | HEART RATE: 68 BPM | HEIGHT: 63 IN | BODY MASS INDEX: 35.61 KG/M2 | SYSTOLIC BLOOD PRESSURE: 120 MMHG | TEMPERATURE: 97.1 F

## 2018-08-21 DIAGNOSIS — Z12.11 SCREEN FOR COLON CANCER: ICD-10-CM

## 2018-08-21 DIAGNOSIS — R73.01 IMPAIRED FASTING GLUCOSE: ICD-10-CM

## 2018-08-21 DIAGNOSIS — E28.2 POLYCYSTIC OVARIES: ICD-10-CM

## 2018-08-21 DIAGNOSIS — R70.0 ELEVATED ERYTHROCYTE SEDIMENTATION RATE: ICD-10-CM

## 2018-08-21 DIAGNOSIS — E66.09 CLASS 1 OBESITY DUE TO EXCESS CALORIES WITH SERIOUS COMORBIDITY AND BODY MASS INDEX (BMI) OF 33.0 TO 33.9 IN ADULT: ICD-10-CM

## 2018-08-21 DIAGNOSIS — E78.00 HYPERCHOLESTEROLEMIA: ICD-10-CM

## 2018-08-21 DIAGNOSIS — M79.10 MYALGIA: Primary | ICD-10-CM

## 2018-08-21 DIAGNOSIS — F32.5 MAJOR DEPRESSION IN COMPLETE REMISSION (H): ICD-10-CM

## 2018-08-21 DIAGNOSIS — G43.009 MIGRAINE WITHOUT AURA AND WITHOUT STATUS MIGRAINOSUS, NOT INTRACTABLE: ICD-10-CM

## 2018-08-21 DIAGNOSIS — E03.9 ACQUIRED HYPOTHYROIDISM: ICD-10-CM

## 2018-08-21 DIAGNOSIS — E66.811 CLASS 1 OBESITY DUE TO EXCESS CALORIES WITH SERIOUS COMORBIDITY AND BODY MASS INDEX (BMI) OF 33.0 TO 33.9 IN ADULT: ICD-10-CM

## 2018-08-21 DIAGNOSIS — J20.9 ACUTE BRONCHITIS, UNSPECIFIED ORGANISM: ICD-10-CM

## 2018-08-21 PROBLEM — E66.01 MORBID OBESITY (H): Status: ACTIVE | Noted: 2018-08-21

## 2018-08-21 LAB
BASOPHILS # BLD AUTO: 0 10E9/L (ref 0–0.2)
BASOPHILS NFR BLD AUTO: 0.3 %
DIFFERENTIAL METHOD BLD: ABNORMAL
EOSINOPHIL # BLD AUTO: 0.1 10E9/L (ref 0–0.7)
EOSINOPHIL NFR BLD AUTO: 1.1 %
ERYTHROCYTE [DISTWIDTH] IN BLOOD BY AUTOMATED COUNT: 16.7 % (ref 10–15)
ERYTHROCYTE [SEDIMENTATION RATE] IN BLOOD BY WESTERGREN METHOD: 17 MM/H (ref 0–20)
HBA1C MFR BLD: 5.6 % (ref 0–5.6)
HCT VFR BLD AUTO: 37 % (ref 35–47)
HGB BLD-MCNC: 12.1 G/DL (ref 11.7–15.7)
LYMPHOCYTES # BLD AUTO: 3.2 10E9/L (ref 0.8–5.3)
LYMPHOCYTES NFR BLD AUTO: 40 %
MCH RBC QN AUTO: 26 PG (ref 26.5–33)
MCHC RBC AUTO-ENTMCNC: 32.7 G/DL (ref 31.5–36.5)
MCV RBC AUTO: 80 FL (ref 78–100)
MONOCYTES # BLD AUTO: 0.5 10E9/L (ref 0–1.3)
MONOCYTES NFR BLD AUTO: 6.2 %
NEUTROPHILS # BLD AUTO: 4.2 10E9/L (ref 1.6–8.3)
NEUTROPHILS NFR BLD AUTO: 52.4 %
PLATELET # BLD AUTO: 395 10E9/L (ref 150–450)
RBC # BLD AUTO: 4.65 10E12/L (ref 3.8–5.2)
WBC # BLD AUTO: 7.9 10E9/L (ref 4–11)

## 2018-08-21 PROCEDURE — 85025 COMPLETE CBC W/AUTO DIFF WBC: CPT | Performed by: FAMILY MEDICINE

## 2018-08-21 PROCEDURE — 36415 COLL VENOUS BLD VENIPUNCTURE: CPT | Performed by: FAMILY MEDICINE

## 2018-08-21 PROCEDURE — 83036 HEMOGLOBIN GLYCOSYLATED A1C: CPT | Performed by: FAMILY MEDICINE

## 2018-08-21 PROCEDURE — 84439 ASSAY OF FREE THYROXINE: CPT | Performed by: FAMILY MEDICINE

## 2018-08-21 PROCEDURE — 85652 RBC SED RATE AUTOMATED: CPT | Performed by: FAMILY MEDICINE

## 2018-08-21 PROCEDURE — 99214 OFFICE O/P EST MOD 30 MIN: CPT | Performed by: FAMILY MEDICINE

## 2018-08-21 PROCEDURE — 84460 ALANINE AMINO (ALT) (SGPT): CPT | Performed by: FAMILY MEDICINE

## 2018-08-21 PROCEDURE — 82306 VITAMIN D 25 HYDROXY: CPT | Performed by: FAMILY MEDICINE

## 2018-08-21 PROCEDURE — 80061 LIPID PANEL: CPT | Performed by: FAMILY MEDICINE

## 2018-08-21 PROCEDURE — 84443 ASSAY THYROID STIM HORMONE: CPT | Performed by: FAMILY MEDICINE

## 2018-08-21 RX ORDER — LEVOTHYROXINE SODIUM 137 UG/1
TABLET ORAL
Refills: 3 | COMMUNITY
Start: 2017-12-04 | End: 2018-10-12

## 2018-08-21 ASSESSMENT — ANXIETY QUESTIONNAIRES
IF YOU CHECKED OFF ANY PROBLEMS ON THIS QUESTIONNAIRE, HOW DIFFICULT HAVE THESE PROBLEMS MADE IT FOR YOU TO DO YOUR WORK, TAKE CARE OF THINGS AT HOME, OR GET ALONG WITH OTHER PEOPLE: NOT DIFFICULT AT ALL
GAD7 TOTAL SCORE: 2
3. WORRYING TOO MUCH ABOUT DIFFERENT THINGS: NOT AT ALL
6. BECOMING EASILY ANNOYED OR IRRITABLE: NOT AT ALL
2. NOT BEING ABLE TO STOP OR CONTROL WORRYING: NOT AT ALL
7. FEELING AFRAID AS IF SOMETHING AWFUL MIGHT HAPPEN: NOT AT ALL
1. FEELING NERVOUS, ANXIOUS, OR ON EDGE: NOT AT ALL
5. BEING SO RESTLESS THAT IT IS HARD TO SIT STILL: MORE THAN HALF THE DAYS

## 2018-08-21 ASSESSMENT — PATIENT HEALTH QUESTIONNAIRE - PHQ9: 5. POOR APPETITE OR OVEREATING: NOT AT ALL

## 2018-08-21 NOTE — PROGRESS NOTES
SUBJECTIVE:   Luci Londono is a 47 year old female who presents to clinic today for the following health issues:    ENT Symptoms             Symptoms: cc Present Absent Comment   Fever/Chills  x     Fatigue  x     Muscle Aches  x     Eye Irritation   x    Sneezing   x    Nasal Fritz/Drg   x    Sinus Pressure/Pain   x    Loss of smell   x    Dental pain   x    Sore Throat   x    Swollen Glands   x    Ear Pain/Fullness   x    Cough  x     Wheeze   x    Chest Pain   x    Shortness of breath  x     Rash   x    Other   x      Symptom duration:  08/17/18   Symptom severity:  Severe   Treatments tried:  Nebulizer of kids    Contacts:  None   Nonsmoker     Musculoskeletal problem/pain:Generalized Myalgias with Dysesthesias and Stiffness       Duration: recurrent since 2012 --now worse     Description  Location: above     Intensity:  moderate, 7/10    Accompanying signs and symptoms: na    History  Previous similar problem: YES  Previous evaluation:  none    Precipitating or alleviating factors:  Trauma or overuse: no   Aggravating factors include: standing, walking, climbing stairs and exercise    Therapies tried and outcome: rest/inactivity      NONMORBID OBESITY    -BMI=33  --sedentary life style   -comorbid glu intol, hi LDL , migraine, PCO,hypothyroid    Glucose Intolerance   Follow-up      Patient is checking blood sugars: not at all    Diabetic concerns: None     Symptoms of hypoglycemia (low blood sugar): none     Paresthesias (numbness or burning in feet) or sores: No     Date of last diabetic eye exam: 2017    Has PCO    BP Readings from Last 2 Encounters:   08/21/18 120/70   05/31/18 108/60     Hemoglobin A1C (%)   Date Value   08/21/2018 5.6   07/25/2017 5.5     LDL Cholesterol Calculated (mg/dL)   Date Value   08/21/2018 123 (H)   07/25/2017 138 (H)       Mixed Hyperlipidemia Follow-Up      Rate your low fat/cholesterol diet?: good    Taking statin?  No    Other lipid medications/supplements?:   None    Neg fam hx CAD    Depression and Anxiety Follow-Up    Status since last visit: No change-in remission     Other associated symptoms:None    Complicating factors:     Significant life event: No     Current substance abuse: None    PHQ-9 8/25/2017 8/21/2018   Total Score 3 5   Q9: Suicide Ideation Not at all Not at all     NICOLASA-7 SCORE 8/25/2017 8/21/2018   Total Score 1 (minimal anxiety) -   Total Score 1 2       PHQ-9  English  PHQ-9   Any Language  NICOLASA-7  Suicide Assessment Five-step Evaluation and Treatment (SAFE-T)      Migraine Follow-Up    Headaches symptoms:  Improved to only q mo    Frequency: above     Duration of headaches: a day     Able to do normal daily activities/work with migraines: Yes    Rescue/Relief medication:Tylenol              Effectiveness: total relief    Preventative medication: None    Neurologic complications: No new stroke-like symptoms, loss of vision or speech, numbness or weakness    In the past 4 weeks, how often have you gone to Urgent Care or the emergency room because of your headaches?  0    Hypothyroidism Follow-up      Since last visit, patient describes the following symptoms: Weight stable, no hair loss, no skin changes, no constipation, no loose stools    On levothyroxine 137 mcg with low TSH--was changed to 125mcg a d ago           Problem list and histories reviewed & adjusted, as indicated.  Additional history: as documented    Labs reviewed in EPIC    Reviewed and updated as needed this visit by clinical staff  Tobacco  Allergies  Meds  Problems       Reviewed and updated as needed this visit by Provider  Allergies  Meds  Problems         ROS:  CONSTITUTIONAL: NEGATIVE for fever, chills, change in weight  INTEGUMENTARY/SKIN: NEGATIVE for worrisome rashes, moles or lesions  EYES: NEGATIVE for vision changes or irritation  ENT/MOUTH: NEGATIVE for ear, mouth and throat problems  RESP: NEGATIVE for significant cough or SOB  BREAST: NEGATIVE for masses,  "tenderness or discharge  CV: NEGATIVE for chest pain, palpitations or peripheral edema  GI: NEGATIVE for nausea, abdominal pain, heartburn, or change in bowel habits  : NEGATIVE for frequency, dysuria, or hematuria  MUSCULOSKELETAL: NEGATIVE for significant arthralgias or myalgia  POS myalgias and stiffness of all extrems   NEURO: NEGATIVE for weakness, dizziness POS paresthesias-tingling of hands & ft  ENDOCRINE: NEGATIVE for temperature intolerance, skin/hair changes  HEME: NEGATIVE for bleeding problems  PSYCHIATRIC: NEGATIVE for changes in mood or affect    OBJECTIVE:     /70  Pulse 68  Temp 97.1  F (36.2  C) (Tympanic)  Resp 18  Ht 5' 3\" (1.6 m)  Wt 201 lb (91.2 kg)  LMP  (LMP Unknown)  SpO2 96%  Breastfeeding? No  BMI 35.61 kg/m2  Body mass index is 35.61 kg/(m^2).  GENERAL: healthy, alert and no distress  EYES: Eyes grossly normal to inspection, PERRL and conjunctivae and sclerae normal  NECK: no adenopathy, no asymmetry, masses, or scars and thyroid normal to palpation  RESP: lungs clear to auscultation - no rales, rhonchi or wheezes  CV: regular rate and rhythm, normal S1 S2, no S3 or S4, no murmur, click or rub, no peripheral edema and peripheral pulses strong  MS: no gross musculoskeletal defects noted, no edema--no edema and neg to palpation of all extrems ; normal tone/gait   SKIN: no suspicious lesions or rashes  NEURO: Normal strength and tone, mentation intact and speech normal  PSYCH: mentation appears normal, affect normal/bright  LYMPH: no cervical, supraclavicular, axillary, or inguinal adenopathy    Diagnostic Test Results:  Results for orders placed or performed in visit on 08/21/18   Lipid panel reflex to direct LDL Fasting   Result Value Ref Range    Cholesterol 218 (H) <200 mg/dL    Triglycerides 188 (H) <150 mg/dL    HDL Cholesterol 57 >49 mg/dL    LDL Cholesterol Calculated 123 (H) <100 mg/dL    Non HDL Cholesterol 161 (H) <130 mg/dL   CBC with platelets differential "   Result Value Ref Range    WBC 7.9 4.0 - 11.0 10e9/L    RBC Count 4.65 3.8 - 5.2 10e12/L    Hemoglobin 12.1 11.7 - 15.7 g/dL    Hematocrit 37.0 35.0 - 47.0 %    MCV 80 78 - 100 fl    MCH 26.0 (L) 26.5 - 33.0 pg    MCHC 32.7 31.5 - 36.5 g/dL    RDW 16.7 (H) 10.0 - 15.0 %    Platelet Count 395 150 - 450 10e9/L    Diff Method Automated Method     % Neutrophils 52.4 %    % Lymphocytes 40.0 %    % Monocytes 6.2 %    % Eosinophils 1.1 %    % Basophils 0.3 %    Absolute Neutrophil 4.2 1.6 - 8.3 10e9/L    Absolute Lymphocytes 3.2 0.8 - 5.3 10e9/L    Absolute Monocytes 0.5 0.0 - 1.3 10e9/L    Absolute Eosinophils 0.1 0.0 - 0.7 10e9/L    Absolute Basophils 0.0 0.0 - 0.2 10e9/L   Hemoglobin A1c   Result Value Ref Range    Hemoglobin A1C 5.6 0 - 5.6 %   TSH with free T4 reflex   Result Value Ref Range    TSH 56.21 (H) 0.40 - 4.00 mU/L   ALT   Result Value Ref Range    ALT 37 0 - 50 U/L   ESR: Erythrocyte sedimentation rate   Result Value Ref Range    Sed Rate 17 0 - 20 mm/h       ASSESSMENT/PLAN:               ICD-10-CM    1. Myalgia w hx of recurrence since 2012 M79.1 CBC with platelets differential     ALT     Vitamin D Deficiency     ESR: Erythrocyte sedimentation rate     T4 free   2. Elevated erythrocyte sedimentation rate R70.0 ESR: Erythrocyte sedimentation rate   3. Acquired hypothyroidism E03.9 TSH with free T4 reflex     TSH with free T4 reflex   4. Impaired fasting glucose R73.01 Hemoglobin A1c   5. Hypercholesterolemia E78.00 Lipid panel reflex to direct LDL Fasting   6. Migraine without aura and without status migrainosus, not intractable G43.009 MIGRAINE ACTION PLAN   7. Polycystic ovaries E28.2    8. Major depression in complete remission (H) F32.5 DEPRESSION ACTION PLAN (DAP)   9. Class 1 obesity due to excess calories with serious comorbidity and body mass index (BMI) of 33.0 to 33.9 in adult E66.09     Z68.33    10. Screen for colon cancer Z12.11 Fecal colorectal cancer screen FIT   11. Acute bronchitis,  unspecified organism J20.9 CBC with platelets differential       Patient Instructions   1.  Run a cold air vaporizer as much as possible. If you cannot,  boil water and breath the warm vapors 2-3 times a day to try to open up the sinuses take 2400mgm of guaifenesin per 24 hours   You can do this by taking  Mucinex plain blue  1200 mg  One tablet twice a day (This may come as 600mg/tablet and you need to take 2 tabs twice a day) or you could buy the cheaper  generic 400mgm / tab and take 2 tablets 3 x a day or 1 and 1/2 tablets 4 x a day . .Guaifenesin is  the major component of most cough syrups, because it makes the mucus less thick, and therefore it drains out better and you are less likely to cough from it dripping on the back of your throat.  Irrigate the  nose with plain water under the kitchen sink faucet or the shower.  Jena pots, spray bottles, etc accumulate bacteria and are not recommended.   The tickle in the throat is also helped by gargling with vinegar and honey mixture, or pop or mouth wash as these coat the throat.  Please try to rinse teeth with water after using these .   Do not use sudafed or pseudephedrine as it dries the mucus up so it is harder to get it out, and it can raise your BP       2. Consider antihistamines= antiallergens:fro least to most powerful and from least to most drowsy :   Loratadine, clariten, alavert        Loratadine, clariten, alavert 10mgm a day        Fexofenadine= allegra 180mgm a day                                                                                                                                                       Cetirizine=zyrtec  10mgm a day        Benadryl=diphenhydramine  25-50 mgm- only at bedtime-can be used together with one of the above taken during the day    3.  Weight Loss Tips  1. Do not eat after 6 hrs before your expected bedtime  2. Have your heaviest meal for breakfast, a slightly lighter meal at lunch and a snack 6 hrs before bed  3.  No sugar/calorie drinks except milk ie no fruit juice, pop, alcohol.  4. Drink milk 30min before meals to decrease your hunger. Also it is excellent as part of your last meal of the day snack  5. Drink lots of water  6. Increase fiber in diet: all bran cereal, salads, popcorn etc  7. Have only one small serving of fruit a day about 1/2 cup (as this is high in sugar)  8. EXERCISE is the bottom line. Without it, you will gain weight even on a low calorie diet. Best if done 2-3X a day as can    Being overweight contributes to high blood pressure and high cholesterol, both of which cause heart attacks, strokes and kidney failure, prediabetes and diabetes, arthritis, and liver disease     4. No difference was  Noted by patients in a double blind study when given codeine, tylenol ( acetaminophen) or ibuprofen (all in identical pills). They felt no difference in pain relief. Since ibuprofen and the NSAIDs  causes kidney damage, esophageal damage with heartburn, and can increase the risk of esophageal and stomach cancer and ulcers,and colonic strictures. They also cause increased risk of heart attack .   I recommend that you use tylenol(acetaminophen) for pain. Use the acetaminophen ES  Which has 500mgm/tablet You can take up to 2 tablets 4 times a day as need for pain.  If this is not enough, you can add in ibuprofen or aleve(naprosyn) with 2 glasses of fluid and some food-to protect the stomach and esophagus. Please let us know if you are continuing to take ibuprofen or aleve, as we will need to periodically check your kidney function with a blood test.    5.  The future lab has been ordered. Please call us at [phone] to make a lab appointment.     In mid oct   For the thyroid       6. Warm tub soaks for the aching muscles         Paris Wills MD  [facility]      Weight management plan: Discussed healthy diet and exercise guidelines and patient will follow up in 3 months in clinic to  re-evaluate.    Paris Wills MD

## 2018-08-21 NOTE — LETTER
My Migraine Action Plan      Date: 8/21/2018     My Name: Luci Londono   YOB: 1970  My Pharmacy: [facility] [address] [place], [place] - [address]       My (Preventative) Control Medicine: 0        My Rescue Medicine: tylenol   My Doctor: Stuart Wills     My Clinic: [address] [address]  [phone]        GREEN ZONE = Good Control    My headache plan is working.   I can do what I need to do.           I WILL:     ? Keep managing my triggers.  ? Write in my migraine diary each time I have a headache.  ? Keep taking my preventive (controller) medicine daily.  ? Take my relief and rescue medicine as needed.             YELLOW ZONE = Not Enough Control    My headache plan isn t always working.   My headaches keep me from doing   some of the things I need to do.       I WILL:     ? Set goals to control my triggers and act on them.  ? Write in my migraine diary each time I have a headache and review it for                      patterns or new triggers.  ? Keep taking my preventive (controller) medicine daily.  ? Take my relief and rescue medicine as needed.  ? Call my doctor or clinic at if I stay in the Yellow Zone.             RED ZONE = Poor or No Control    My headache plan has  failed. I can t do anything  when I have one. My  medicines aren t working.           I WILL:   ? Set goals to control my triggers and act on them.  ? Write in my migraine diary each time I have a headache and review it for                      patterns or new triggers.  ? Keep taking my preventive (controller) medicine daily.  ? Take my relief and rescue medicine as needed.  ? Call my doctor or clinic or go to urgent care or an ER if I m having the worst                  headache of my life.  ? Call my doctor or clinic or go to urgent care or an ER if my medicine doesn t work.  ?  Let my doctor or clinic know within 2 weeks if I have gone to an urgent care or             emergency department.          Provider specific instructions:  --

## 2018-08-21 NOTE — MR AVS SNAPSHOT
After Visit Summary   8/21/2018    Luci Londono    MRN: [id number]           Patient Information     Date Of Birth          1970        Visit Information        Provider Department      8/21/2018 11:20 AM Paris Wills MD [facility]        Today's Diagnoses     Myalgia    -  1    Elevated erythrocyte sedimentation rate        Class 1 obesity due to excess calories with serious comorbidity and body mass index (BMI) of 33.0 to 33.9 in adult        Morbid obesity (H) with glu intol, hi LDL , hypothyroid         Acquired hypothyroidism        Impaired fasting glucose        Hypercholesterolemia        Migraine without aura and without status migrainosus, not intractable        Polycystic ovaries        Screen for colon cancer        Major depression in complete remission (H)        Acute bronchitis, unspecified organism          Care Instructions    1.  Run a cold air vaporizer as much as possible. If you cannot,  boil water and breath the warm vapors 2-3 times a day to try to open up the sinuses take 2400mgm of guaifenesin per 24 hours   You can do this by taking  Mucinex plain blue  1200 mg  One tablet twice a day (This may come as 600mg/tablet and you need to take 2 tabs twice a day) or you could buy the cheaper  generic 400mgm / tab and take 2 tablets 3 x a day or 1 and 1/2 tablets 4 x a day . .Guaifenesin is  the major component of most cough syrups, because it makes the mucus less thick, and therefore it drains out better and you are less likely to cough from it dripping on the back of your throat.  Irrigate the  nose with plain water under the kitchen sink faucet or the shower.  Jena pots, spray bottles, etc accumulate bacteria and are not recommended.   The tickle in the throat is also helped by gargling with vinegar and honey mixture, or pop or mouth wash as these coat the throat.  Please try to rinse teeth with water after using these  .   Do not use sudafed or pseudephedrine as it dries the mucus up so it is harder to get it out, and it can raise your BP       2. Consider antihistamines= antiallergens:fro least to most powerful and from least to most drowsy :   Loratadine, clariten, alavert        Loratadine, clariten, alavert 10mgm a day        Fexofenadine= allegra 180mgm a day                                                                                                                                                       Cetirizine=zyrtec  10mgm a day        Benadryl=diphenhydramine  25-50 mgm- only at bedtime-can be used together with one of the above taken during the day    3.  Weight Loss Tips  1. Do not eat after 6 hrs before your expected bedtime  2. Have your heaviest meal for breakfast, a slightly lighter meal at lunch and a snack 6 hrs before bed  3. No sugar/calorie drinks except milk ie no fruit juice, pop, alcohol.  4. Drink milk 30min before meals to decrease your hunger. Also it is excellent as part of your last meal of the day snack  5. Drink lots of water  6. Increase fiber in diet: all bran cereal, salads, popcorn etc  7. Have only one small serving of fruit a day about 1/2 cup (as this is high in sugar)  8. EXERCISE is the bottom line. Without it, you will gain weight even on a low calorie diet. Best if done 2-3X a day as can    Being overweight contributes to high blood pressure and high cholesterol, both of which cause heart attacks, strokes and kidney failure, prediabetes and diabetes, arthritis, and liver disease     4. No difference was  Noted by patients in a double blind study when given codeine, tylenol ( acetaminophen) or ibuprofen (all in identical pills). They felt no difference in pain relief. Since ibuprofen and the NSAIDs  causes kidney damage, esophageal damage with heartburn, and can increase the risk of esophageal and stomach cancer and ulcers,and colonic strictures. They also cause increased risk of heart  attack .   I recommend that you use tylenol(acetaminophen) for pain. Use the acetaminophen ES  Which has 500mgm/tablet You can take up to 2 tablets 4 times a day as need for pain.  If this is not enough, you can add in ibuprofen or aleve(naprosyn) with 2 glasses of fluid and some food-to protect the stomach and esophagus. Please let us know if you are continuing to take ibuprofen or aleve, as we will need to periodically check your kidney function with a blood test.    5.  The future lab has been ordered. Please call us at [phone] to make a lab appointment.     In mid Sept  For the thyroid       6. Warm tub soaks for the aching muscles             Follow-ups after your visit        Future tests that were ordered for you today     Open Future Orders        Priority Expected Expires Ordered    Fecal colorectal cancer screen FIT Routine 9/11/2018 11/13/2018 8/21/2018            Who to contact     If you have questions or need follow up information about today's clinic visit or your schedule please contact [facility] directly at [phone].  Normal or non-critical lab and imaging results will be communicated to you by MyChart, letter or phone within 4 business days after the clinic has received the results. If you do not hear from us within 7 days, please contact the clinic through MyChart or phone. If you have a critical or abnormal lab result, we will notify you by phone as soon as possible.  Submit refill requests through [employer] or call your pharmacy and they will forward the refill request to us. Please allow 3 business days for your refill to be completed.          Additional Information About Your Visit        Care EveryWhere ID     This is your Care EveryWhere ID. This could be used by other organizations to access your [place] medical records  [id number]        Your Vitals Were     Pulse Temperature Respirations Height Last Period Pulse Oximetry    68 97.1  F (36.2  C)  "(Tympanic) 18 5' 3\" (1.6 m) (LMP Unknown) 96%    Breastfeeding? BMI (Body Mass Index)                No 35.61 kg/m2           Blood Pressure from Last 3 Encounters:   08/21/18 120/70   05/31/18 108/60   11/24/17 120/74    Weight from Last 3 Encounters:   08/21/18 201 lb (91.2 kg)   05/31/18 196 lb (88.9 kg)   11/24/17 191 lb 8 oz (86.9 kg)              We Performed the Following     ALT     CBC with platelets differential     DEPRESSION ACTION PLAN (DAP)     ESR: Erythrocyte sedimentation rate     Hemoglobin A1c     Lipid panel reflex to direct LDL Fasting     MIGRAINE ACTION PLAN     TSH with free T4 reflex     Vitamin D Deficiency        Primary Care Provider Office Phone # Fax #    Stuart Wills [phone] [address] [facility] [place] [address]        Equal Access to Services     PINA PINEDA : Hadii aad ku hadasho Soomaali, waaxda luqadaha, qaybta kaalmada adeegyada, waxay idiin hayaan ademaynor neumann . So [facility] [phone].    ATENCIÓN: Si habla español, tiene a dominique disposición servicios gratuitos de asistencia lingüística. Llame al [phone].    We comply with applicable federal civil rights laws and [place] laws. We do not discriminate on the basis of race, color, national origin, age, disability, sex, sexual orientation, or gender identity.            Thank you!     Thank you for choosing [facility]  for your care. Our goal is always to provide you with excellent care. Hearing back from our patients is one way we can continue to improve our services. Please take a few minutes to complete the written survey that you may receive in the mail after your visit with us. Thank you!             Your Updated Medication List - Protect others around you: Learn how to safely use, store and throw away your medicines at www.disposemymeds.org.          This list is accurate as of 8/21/18 11:57 AM.  Always use your most recent med list.             "       Brand Name Dispense Instructions for use Diagnosis    diclofenac 1 % Gel topical gel    VOLTAREN    200 g    Apply  2 grams to hands four times daily using enclosed dosing card.    Lateral epicondylitis, right elbow       * levothyroxine 137 MCG tablet    SYNTHROID/LEVOTHROID          * levothyroxine 125 MCG tablet    SYNTHROID/LEVOTHROID    30 tablet    TAKE 1 TABLET BY MOUTH DAILY    Acquired hypothyroidism       medroxyPROGESTERone 10 MG tablet    PROVERA    90 tablet    Take 1 tablet (10 mg) by mouth daily    Absence of menstruation       metFORMIN 500 MG 24 hr tablet    GLUCOPHAGE-XR    30 tablet    Take 1 tablet (500 mg) by mouth daily (with dinner)    Non morbid obesity due to excess calories, Hyperglycemia       omeprazole 20 MG CR capsule    priLOSEC    60 capsule    Take 1 capsule (20 mg) by mouth 2 times daily    Gastroesophageal reflux disease without esophagitis       * Notice:  This list has 2 medication(s) that are the same as other medications prescribed for you. Read the directions carefully, and ask your doctor or other care provider to review them with you.

## 2018-08-21 NOTE — NURSING NOTE
"Chief Complaint   Patient presents with     URI     /70  Pulse 68  Temp 97.1  F (36.2  C) (Tympanic)  Resp 18  Ht 5' 3\" (1.6 m)  Wt 201 lb (91.2 kg)  LMP  (LMP Unknown)  SpO2 96%  Breastfeeding? No  BMI 35.61 kg/m2 Estimated body mass index is 35.61 kg/(m^2) as calculated from the following:    Height as of this encounter: 5' 3\" (1.6 m).    Weight as of this encounter: 201 lb (91.2 kg).  BP completed using cuff size: regular   Meagan Lawler CMA    Health Maintenance Due   Topic Date Due     FIT Q1 YR  02/01/2015     LIPID MONITORING Q1 YEAR  07/25/2018     NICOLASA QUESTIONNAIRE 1 YEAR  08/25/2018     PHQ-9 Q1YR  08/25/2018     Health Maintenance reviewed at today's visit patient asked to schedule/complete:   Colon Cancer:  Patient agrees to schedule  Depression:  Patient agrees to schedule    "

## 2018-08-21 NOTE — PATIENT INSTRUCTIONS
1.  Run a cold air vaporizer as much as possible. If you cannot,  boil water and breath the warm vapors 2-3 times a day to try to open up the sinuses take 2400mgm of guaifenesin per 24 hours   You can do this by taking  Mucinex plain blue  1200 mg  One tablet twice a day (This may come as 600mg/tablet and you need to take 2 tabs twice a day) or you could buy the cheaper  generic 400mgm / tab and take 2 tablets 3 x a day or 1 and 1/2 tablets 4 x a day . .Guaifenesin is  the major component of most cough syrups, because it makes the mucus less thick, and therefore it drains out better and you are less likely to cough from it dripping on the back of your throat.  Irrigate the  nose with plain water under the kitchen sink faucet or the shower.  Jena pots, spray bottles, etc accumulate bacteria and are not recommended.   The tickle in the throat is also helped by gargling with vinegar and honey mixture, or pop or mouth wash as these coat the throat.  Please try to rinse teeth with water after using these .   Do not use sudafed or pseudephedrine as it dries the mucus up so it is harder to get it out, and it can raise your BP       2. Consider antihistamines= antiallergens:fro least to most powerful and from least to most drowsy :   Loratadine, clariten, alavert        Loratadine, clariten, alavert 10mgm a day        Fexofenadine= allegra 180mgm a day                                                                                                                                                       Cetirizine=zyrtec  10mgm a day        Benadryl=diphenhydramine  25-50 mgm- only at bedtime-can be used together with one of the above taken during the day    3.  Weight Loss Tips  1. Do not eat after 6 hrs before your expected bedtime  2. Have your heaviest meal for breakfast, a slightly lighter meal at lunch and a snack 6 hrs before bed  3. No sugar/calorie drinks except milk ie no fruit juice, pop, alcohol.  4. Drink milk  30min before meals to decrease your hunger. Also it is excellent as part of your last meal of the day snack  5. Drink lots of water  6. Increase fiber in diet: all bran cereal, salads, popcorn etc  7. Have only one small serving of fruit a day about 1/2 cup (as this is high in sugar)  8. EXERCISE is the bottom line. Without it, you will gain weight even on a low calorie diet. Best if done 2-3X a day as can    Being overweight contributes to high blood pressure and high cholesterol, both of which cause heart attacks, strokes and kidney failure, prediabetes and diabetes, arthritis, and liver disease     4. No difference was  Noted by patients in a double blind study when given codeine, tylenol ( acetaminophen) or ibuprofen (all in identical pills). They felt no difference in pain relief. Since ibuprofen and the NSAIDs  causes kidney damage, esophageal damage with heartburn, and can increase the risk of esophageal and stomach cancer and ulcers,and colonic strictures. They also cause increased risk of heart attack .   I recommend that you use tylenol(acetaminophen) for pain. Use the acetaminophen ES  Which has 500mgm/tablet You can take up to 2 tablets 4 times a day as need for pain.  If this is not enough, you can add in ibuprofen or aleve(naprosyn) with 2 glasses of fluid and some food-to protect the stomach and esophagus. Please let us know if you are continuing to take ibuprofen or aleve, as we will need to periodically check your kidney function with a blood test.    5.  The future lab has been ordered. Please call us at [phone] to make a lab appointment.     In mid oct   For the thyroid       6. Warm tub soaks for the aching muscles

## 2018-08-21 NOTE — LETTER
My Depression Action Plan  Name: Luci Corbin Alert   Date of Birth 1970  Date: 8/21/2018    My doctor: Stuart Wills   My clinic: [address] [address]  [phone]          GREEN    ZONE   Good Control    What it looks like:     Things are going generally well. You have normal up s and down s. You may even feel depressed from time to time, but bad moods usually last less than a day.   What you need to do:  1. Continue to care for yourself (see self care plan)  2. Check your depression survival kit and update it as needed  3. Follow your physician s recommendations including any medication.  4. Do not stop taking medication unless you consult with your physician first.           YELLOW         ZONE Getting Worse    What it looks like:     Depression is starting to interfere with your life.     It may be hard to get out of bed; you may be starting to isolate yourself from others.    Symptoms of depression are starting to last most all day and this has happened for several days.     You may have suicidal thoughts but they are not constant.   What you need to do:     1. Call your care team, your response to treatment will improve if you keep your care team informed of your progress. Yellow periods are signs an adjustment may need to be made.     2. Continue your self-care, even if you have to fake it!    3. Talk to someone in your support network    4. Open up your depression survival kit           RED    ZONE Medical Alert - Get Help    What it looks like:     Depression is seriously interfering with your life.     You may experience these or other symptoms: You can t get out of bed most days, can t work or engage in other necessary activities, you have trouble taking care of basic hygiene, or basic responsibilities, thoughts of suicide or death that will not go away, self-injurious behavior.     What  you need to do:  1. Call your care team and request a same-day appointment. If they are not available (weekends or after hours) call your local crisis line, emergency room or 911.            Depression Self Care Plan / Survival Kit    Self-Care for Depression  Here s the deal. Your body and mind are really not as separate as most people think.  What you do and think affects how you feel and how you feel influences what you do and think. This means if you do things that people who feel good do, it will help you feel better.  Sometimes this is all it takes.  There is also a place for medication and therapy depending on how severe your depression is, so be sure to consult with your medical provider and/ or Behavioral Health Consultant if your symptoms are worsening or not improving.     In order to better manage my stress, I will:    Exercise  Get some form of exercise, every day. This will help reduce pain and release endorphins, the  feel good  chemicals in your brain. This is almost as good as taking antidepressants!  This is not the same as joining a gym and then never going! (they count on that by the way ) It can be as simple as just going for a walk or doing some gardening, anything that will get you moving.      Hygiene   Maintain good hygiene (Get out of bed in the morning, Make your bed, Brush your teeth, Take a shower, and Get dressed like you were going to work, even if you are unemployed).  If your clothes don't fit try to get ones that do.    Diet  I will strive to eat foods that are good for me, drink plenty of water, and avoid excessive sugar, caffeine, alcohol, and other mood-altering substances.  Some foods that are helpful in depression are: complex carbohydrates, B vitamins, flaxseed, fish or fish oil, fresh fruits and vegetables.    Psychotherapy  I agree to participate in Individual Therapy (if recommended).    Medication  If prescribed medications, I agree to take them.  Missing doses can result  in serious side effects.  I understand that drinking alcohol, or other illicit drug use, may cause potential side effects.  I will not stop my medication abruptly without first discussing it with my provider.    Staying Connected With Others  I will stay in touch with my friends, family members, and my primary care provider/team.    Use your imagination  Be creative.  We all have a creative side; it doesn t matter if it s oil painting, sand castles, or mud pies! This will also kick up the endorphins.    Witness Beauty  (AKA stop and smell the roses) Take a look outside, even in mid-winter. Notice colors, textures. Watch the squirrels and birds.     Service to others  Be of service to others.  There is always someone else in need.  By helping others we can  get out of ourselves  and remember the really important things.  This also provides opportunities for practicing all the other parts of the program.    Humor  Laugh and be silly!  Adjust your TV habits for less news and crime-drama and more comedy.    Control your stress  Try breathing deep, massage therapy, biofeedback, and meditation. Find time to relax each day.     My support system    Clinic Contact:  Phone number:    Contact 1:  Phone number:    Contact 2:  Phone number:    Gnosticism/:  Phone number:    Therapist:  Phone number:    Local crisis center:    Phone number:    Other community support:  Phone number:

## 2018-08-22 PROBLEM — M79.10 MYALGIA: Status: ACTIVE | Noted: 2018-08-22

## 2018-08-22 LAB
ALT SERPL W P-5'-P-CCNC: 37 U/L (ref 0–50)
CHOLEST SERPL-MCNC: 218 MG/DL
DEPRECATED CALCIDIOL+CALCIFEROL SERPL-MC: 14 UG/L (ref 20–75)
HDLC SERPL-MCNC: 57 MG/DL
LDLC SERPL CALC-MCNC: 123 MG/DL
NONHDLC SERPL-MCNC: 161 MG/DL
T4 FREE SERPL-MCNC: 0.56 NG/DL (ref 0.76–1.46)
TRIGL SERPL-MCNC: 188 MG/DL
TSH SERPL DL<=0.005 MIU/L-ACNC: 56.21 MU/L (ref 0.4–4)

## 2018-08-22 ASSESSMENT — ANXIETY QUESTIONNAIRES: GAD7 TOTAL SCORE: 2

## 2018-08-22 ASSESSMENT — PATIENT HEALTH QUESTIONNAIRE - PHQ9: SUM OF ALL RESPONSES TO PHQ QUESTIONS 1-9: 5

## 2018-08-23 ENCOUNTER — OFFICE VISIT (OUTPATIENT)
Dept: FAMILY MEDICINE | Facility: CLINIC | Age: 48
End: 2018-08-23
Payer: COMMERCIAL

## 2018-08-23 VITALS
SYSTOLIC BLOOD PRESSURE: 130 MMHG | BODY MASS INDEX: 35.44 KG/M2 | HEIGHT: 63 IN | HEART RATE: 70 BPM | RESPIRATION RATE: 16 BRPM | OXYGEN SATURATION: 99 % | TEMPERATURE: 97.1 F | WEIGHT: 200 LBS | DIASTOLIC BLOOD PRESSURE: 70 MMHG

## 2018-08-23 DIAGNOSIS — E78.2 MIXED HYPERLIPIDEMIA: ICD-10-CM

## 2018-08-23 DIAGNOSIS — M79.10 MYALGIA: Primary | ICD-10-CM

## 2018-08-23 DIAGNOSIS — R73.01 IMPAIRED FASTING GLUCOSE: ICD-10-CM

## 2018-08-23 DIAGNOSIS — E03.9 ACQUIRED HYPOTHYROIDISM: ICD-10-CM

## 2018-08-23 DIAGNOSIS — E55.9 VITAMIN D INSUFFICIENCY: Chronic | ICD-10-CM

## 2018-08-23 PROBLEM — E78.00 HYPERCHOLESTEROLEMIA: Status: RESOLVED | Noted: 2017-11-01 | Resolved: 2018-08-23

## 2018-08-23 PROCEDURE — 99214 OFFICE O/P EST MOD 30 MIN: CPT | Performed by: FAMILY MEDICINE

## 2018-08-23 PROCEDURE — 82274 ASSAY TEST FOR BLOOD FECAL: CPT | Performed by: FAMILY MEDICINE

## 2018-08-23 NOTE — PROGRESS NOTES
SUBJECTIVE:   Luci Londono is a 47 year old female who presents to clinic today for the following health issues:    Musculoskeletal problem/pain:Generalized Myalgias with Dysesthesias and Stiffness       Duration: recurrent since 2012 --now worse     Description  Location: above     Intensity:  moderate, 7/10    Accompanying signs and symptoms: na    History  Previous similar problem: YES  Previous evaluation:  none    Precipitating or alleviating factors:  Trauma or overuse: no   Aggravating factors include: standing, walking, climbing stairs and exercise    Therapies tried and outcome: rest/inactivity    Glucose Intolerance Follow-up      Patient is checking blood sugars: not at all    A1C= 5.6 and stable for 2 yrs     Diabetic concerns: None     Symptoms of hypoglycemia (low blood sugar): none     Paresthesias (numbness or burning in feet) or sores: No     Date of last diabetic eye exam: 2017    Has PCO    BP Readings from Last 2 Encounters:   08/21/18 120/70   05/31/18 108/60     Hemoglobin A1C (%)   Date Value   08/21/2018 5.6   07/25/2017 5.5     LDL Cholesterol Calculated (mg/dL)   Date Value   08/21/2018 123 (H)   07/25/2017 138 (H)     Mixed Hyperlipidemia Follow-Up      Rate your low fat/cholesterol diet?: good    Taking statin?  No    Other lipid medications/supplements?:  None    Neg fam hx CAD    Depression and Anxiety Follow-Up      Status since last visit: No change-in remission     Other associated symptoms:None    Complicating factors:     Significant life event: No     Current substance abuse: None    PHQ-9 8/25/2017 8/21/2018   Total Score 3 5   Q9: Suicide Ideation Not at all Not at all     NICOLASA-7 SCORE 8/25/2017 8/21/2018   Total Score 1 (minimal anxiety) -   Total Score 1 2     PHQ-9  English  PHQ-9   Any Language  NICOLASA-7  Suicide Assessment Five-step Evaluation and Treatment (SAFE-T)    Migraine Follow-Up      Headaches symptoms:  Improved to only q mo    Frequency: above      Duration of headaches: a day     Able to do normal daily activities/work with migraines: Yes    Rescue/Relief medication:Tylenol              Effectiveness: total relief    Preventative medication: None    Neurologic complications: No new stroke-like symptoms, loss of vision or speech, numbness or weakness    In the past 4 weeks, how often have you gone to Urgent Care or the emergency room because of your headaches?  0    Hypothyroidism Follow-up      Since last visit, patient describes the following symptoms: Weight stable, no hair loss, no skin changes, no constipation, no loose stools    On levothyroxine 137 mcg with low TSH--was changed to 125mcg a d ago     NONMORBID OBESITY      -BMI=33    --sedentary life style     -comorbid glu intol, hi LDL , migraine, PCO,hypothyroid      VITAMIN D DEFICIENCY     -LAST WAS 14  8-18  -no present use of vit D   -used in past without problem         Amount of exercise or physical activity: None    Problems taking medications regularly: No    Medication side effects: none    Diet: regular (no restrictions)        Problem list and histories reviewed & adjusted, as indicated.  Additional history: as documented    Labs reviewed in EPIC    Reviewed and updated as needed this visit by clinical staff  Tobacco  Allergies  Meds  Problems  Med Hx  Surg Hx  Fam Hx  Soc Hx        Reviewed and updated as needed this visit by Provider  Allergies  Meds  Problems         ROS:  CONSTITUTIONAL: NEGATIVE for fever, chills, change in weight  INTEGUMENTARY/SKIN: NEGATIVE for worrisome rashes, moles or lesions  EYES: NEGATIVE for vision changes or irritation  ENT/MOUTH: NEGATIVE for ear, mouth and throat problems  RESP: NEGATIVE for significant cough or SOB  BREAST: NEGATIVE for masses, tenderness or discharge  CV: NEGATIVE for chest pain, palpitations or peripheral edema  GI: NEGATIVE for nausea, abdominal pain, heartburn, or change in bowel habits  : NEGATIVE for frequency,  "dysuria, or hematuria  MUSCULOSKELETAL:POSITIVE  for  and myalgia  NEURO: NEGATIVE for weakness, dizziness or paresthesias  ENDOCRINE: NEGATIVE for temperature intolerance, skin/hair changes  HEME: NEGATIVE for bleeding problems  PSYCHIATRIC: NEGATIVE for changes in mood or affect    OBJECTIVE:     /70  Pulse 70  Temp 97.1  F (36.2  C) (Tympanic)  Resp 16  Ht 5' 3\" (1.6 m)  Wt 200 lb (90.7 kg)  LMP  (LMP Unknown)  SpO2 99%  Breastfeeding? No  BMI 35.43 kg/m2  Body mass index is 35.43 kg/(m^2).  GENERAL: healthy, alert, no distress and obese  EYES: Eyes grossly normal to inspection, PERRL and conjunctivae and sclerae normal  RESP: lungs clear to auscultation - no rales, rhonchi or wheezes  CV: regular rate and rhythm, normal S1 S2, no S3 or S4, no murmur, click or rub, no peripheral edema and peripheral pulses strong  MS: no gross musculoskeletal defects noted, no edema-mm neg to palpation   SKIN: no suspicious lesions or rashes  NEURO: Normal strength and tone, mentation intact and speech normal  PSYCH: mentation appears normal, affect normal/bright    Diagnostic Test Results:  Results for orders placed or performed in visit on 08/21/18   Lipid panel reflex to direct LDL Fasting   Result Value Ref Range    Cholesterol 218 (H) <200 mg/dL    Triglycerides 188 (H) <150 mg/dL    HDL Cholesterol 57 >49 mg/dL    LDL Cholesterol Calculated 123 (H) <100 mg/dL    Non HDL Cholesterol 161 (H) <130 mg/dL   CBC with platelets differential   Result Value Ref Range    WBC 7.9 4.0 - 11.0 10e9/L    RBC Count 4.65 3.8 - 5.2 10e12/L    Hemoglobin 12.1 11.7 - 15.7 g/dL    Hematocrit 37.0 35.0 - 47.0 %    MCV 80 78 - 100 fl    MCH 26.0 (L) 26.5 - 33.0 pg    MCHC 32.7 31.5 - 36.5 g/dL    RDW 16.7 (H) 10.0 - 15.0 %    Platelet Count 395 150 - 450 10e9/L    Diff Method Automated Method     % Neutrophils 52.4 %    % Lymphocytes 40.0 %    % Monocytes 6.2 %    % Eosinophils 1.1 %    % Basophils 0.3 %    Absolute Neutrophil 4.2 " 1.6 - 8.3 10e9/L    Absolute Lymphocytes 3.2 0.8 - 5.3 10e9/L    Absolute Monocytes 0.5 0.0 - 1.3 10e9/L    Absolute Eosinophils 0.1 0.0 - 0.7 10e9/L    Absolute Basophils 0.0 0.0 - 0.2 10e9/L   Hemoglobin A1c   Result Value Ref Range    Hemoglobin A1C 5.6 0 - 5.6 %   TSH with free T4 reflex   Result Value Ref Range    TSH 56.21 (H) 0.40 - 4.00 mU/L   ALT   Result Value Ref Range    ALT 37 0 - 50 U/L   Vitamin D Deficiency   Result Value Ref Range    Vitamin D Deficiency screening 14 (L) 20 - 75 ug/L   ESR: Erythrocyte sedimentation rate   Result Value Ref Range    Sed Rate 17 0 - 20 mm/h   T4 free   Result Value Ref Range    T4 Free 0.56 (L) 0.76 - 1.46 ng/dL       ASSESSMENT/PLAN:               ICD-10-CM    1. Myalgia of entire body since 2012 M79.1    2. Vitamin D insufficiency E55.9 cholecalciferol (VITAMIN D3) 32589 units capsule   3. Acquired hypothyroidism at present low from no meds for a wk E03.9    4. Impaired fasting glucose R73.01    5. Mixed hyperlipidemia E78.2        Patient Instructions   1.  Weight Loss Tips  1. Do not eat after 6 hrs before your expected bedtime  2. Have your heaviest meal for breakfast, a slightly lighter meal at lunch and a snack 6 hrs before bed  3. No sugar/calorie drinks except milk ie no fruit juice, pop, alcohol.  4. Drink milk 30min before meals to decrease your hunger. Also it is excellent as part of your last meal of the day snack  5. Drink lots of water  6. Increase fiber in diet: all bran cereal, salads, popcorn etc  7. Have only one small serving of fruit a day about 1/2 cup (as this is high in sugar)  8. EXERCISE is the bottom line. Without it, you will gain weight even on a low calorie diet. Best if done 2-3X a day as can    Being overweight contributes to high blood pressure and high cholesterol, both of which cause heart attacks, strokes and kidney failure, prediabetes and diabetes, arthritis, and liver disease     4. The only way known to prevent diabetes or keep  it from getting worse is exercise, 20-40 minutes 3 times a day around the time of meals as your insulin is wearing out  You need to get rid of the sugar using your muscles     SO overall, you will feel better if you exercise   And we are getting your vitamin D and your thyroid back up     The myalgias could be from the low vit D     Paris Wills MD  [facility]

## 2018-08-23 NOTE — PATIENT INSTRUCTIONS
1.  Weight Loss Tips  1. Do not eat after 6 hrs before your expected bedtime  2. Have your heaviest meal for breakfast, a slightly lighter meal at lunch and a snack 6 hrs before bed  3. No sugar/calorie drinks except milk ie no fruit juice, pop, alcohol.  4. Drink milk 30min before meals to decrease your hunger. Also it is excellent as part of your last meal of the day snack  5. Drink lots of water  6. Increase fiber in diet: all bran cereal, salads, popcorn etc  7. Have only one small serving of fruit a day about 1/2 cup (as this is high in sugar)  8. EXERCISE is the bottom line. Without it, you will gain weight even on a low calorie diet. Best if done 2-3X a day as can    Being overweight contributes to high blood pressure and high cholesterol, both of which cause heart attacks, strokes and kidney failure, prediabetes and diabetes, arthritis, and liver disease     4. The only way known to prevent diabetes or keep it from getting worse is exercise, 20-40 minutes 3 times a day around the time of meals as your insulin is wearing out  You need to get rid of the sugar using your muscles     SO overall, you will feel better if you exercise   And we are getting your vitamin D and your thyroid back up

## 2018-08-23 NOTE — NURSING NOTE
"Chief Complaint   Patient presents with     RECHECK     /70  Pulse 70  Temp 97.1  F (36.2  C) (Tympanic)  Resp 16  Ht 5' 3\" (1.6 m)  Wt 200 lb (90.7 kg)  LMP  (LMP Unknown)  SpO2 99%  Breastfeeding? No  BMI 35.43 kg/m2 Estimated body mass index is 35.43 kg/(m^2) as calculated from the following:    Height as of this encounter: 5' 3\" (1.6 m).    Weight as of this encounter: 200 lb (90.7 kg).  BP completed using cuff size: large   Meagan Lawler CMA    Health Maintenance Due   Topic Date Due     FIT Q1 YR  02/01/2015     Health Maintenance reviewed at today's visit patient asked to schedule/complete:   None, Health Maintenance up to date.    "

## 2018-08-23 NOTE — MR AVS SNAPSHOT
After Visit Summary   8/23/2018    Luci Londono    MRN: [id number]           Patient Information     Date Of Birth          1970        Visit Information        Provider Department      8/23/2018 11:20 AM Paris Wills MD [facility]        Today's Diagnoses     Myalgia of entire body since 2012    -  1    Vitamin D insufficiency        Acquired hypothyroidism at present low from no meds for a wk        Impaired fasting glucose        Mixed hyperlipidemia          Care Instructions    1.  Weight Loss Tips  1. Do not eat after 6 hrs before your expected bedtime  2. Have your heaviest meal for breakfast, a slightly lighter meal at lunch and a snack 6 hrs before bed  3. No sugar/calorie drinks except milk ie no fruit juice, pop, alcohol.  4. Drink milk 30min before meals to decrease your hunger. Also it is excellent as part of your last meal of the day snack  5. Drink lots of water  6. Increase fiber in diet: all bran cereal, salads, popcorn etc  7. Have only one small serving of fruit a day about 1/2 cup (as this is high in sugar)  8. EXERCISE is the bottom line. Without it, you will gain weight even on a low calorie diet. Best if done 2-3X a day as can    Being overweight contributes to high blood pressure and high cholesterol, both of which cause heart attacks, strokes and kidney failure, prediabetes and diabetes, arthritis, and liver disease     4. The only way known to prevent diabetes or keep it from getting worse is exercise, 20-40 minutes 3 times a day around the time of meals as your insulin is wearing out  You need to get rid of the sugar using your muscles     SO overall, you will feel better if you exercise   And we are getting your vitamin D and your thyroid back up           Follow-ups after your visit        Follow-up notes from your care team     Return in about 1 month (around 9/23/2018) for f/u myalgia/fatigue .      Who to  "contact     If you have questions or need follow up information about today's clinic visit or your schedule please contact [facility] directly at [phone].  Normal or non-critical lab and imaging results will be communicated to you by MyChart, letter or phone within 4 business days after the clinic has received the results. If you do not hear from us within 7 days, please contact the clinic through MyChart or phone. If you have a critical or abnormal lab result, we will notify you by phone as soon as possible.  Submit refill requests through [employer] or call your pharmacy and they will forward the refill request to us. Please allow 3 business days for your refill to be completed.          Additional Information About Your Visit        Care EveryWhere ID     This is your Care EveryWhere ID. This could be used by other organizations to access your [facility] medical records  [id number]        Your Vitals Were     Pulse Temperature Respirations Height Last Period Pulse Oximetry    70 97.1  F (36.2  C) (Tympanic) 16 5' 3\" (1.6 m) (LMP Unknown) 99%    Breastfeeding? BMI (Body Mass Index)                No 35.43 kg/m2           Blood Pressure from Last 3 Encounters:   08/23/18 130/70   08/21/18 120/70   05/31/18 108/60    Weight from Last 3 Encounters:   08/23/18 200 lb (90.7 kg)   08/21/18 201 lb (91.2 kg)   05/31/18 196 lb (88.9 kg)              Today, you had the following     No orders found for display       Primary Care Provider Office Phone # Fax #    Stuart Charley Wills [phone]       [address] [facility] [address]        Equal Access to Services     [facility]N [facility] : Hadii aad ku hadashdenise Sojosselyn, waaxda luqadaha, qaybta kaalmada adeegyada, josh neumann . So [facility] [phone].    ATENCIÓN: Si habla español, tiene a dominique disposición servicios gratuitos de asistencia lingüística. Llame al [phone].    We comply with " applicable federal civil rights laws and [place] laws. We do not discriminate on the basis of race, color, national origin, age, disability, sex, sexual orientation, or gender identity.            Thank you!     Thank you for choosing [facility]  for your care. Our goal is always to provide you with excellent care. Hearing back from our patients is one way we can continue to improve our services. Please take a few minutes to complete the written survey that you may receive in the mail after your visit with us. Thank you!             Your Updated Medication List - Protect others around you: Learn how to safely use, store and throw away your medicines at www.disposemymeds.org.          This list is accurate as of 8/23/18 12:16 PM.  Always use your most recent med list.                   Brand Name Dispense Instructions for use Diagnosis    diclofenac 1 % Gel topical gel    VOLTAREN    200 g    Apply  2 grams to hands four times daily using enclosed dosing card.    Lateral epicondylitis, right elbow       * levothyroxine 137 MCG tablet    SYNTHROID/LEVOTHROID          * levothyroxine 125 MCG tablet    SYNTHROID/LEVOTHROID    30 tablet    TAKE 1 TABLET BY MOUTH DAILY    Acquired hypothyroidism       medroxyPROGESTERone 10 MG tablet    PROVERA    90 tablet    Take 1 tablet (10 mg) by mouth daily    Absence of menstruation       metFORMIN 500 MG 24 hr tablet    GLUCOPHAGE-XR    30 tablet    Take 1 tablet (500 mg) by mouth daily (with dinner)    Non morbid obesity due to excess calories, Hyperglycemia       omeprazole 20 MG CR capsule    priLOSEC    60 capsule    Take 1 capsule (20 mg) by mouth 2 times daily    Gastroesophageal reflux disease without esophagitis       * Notice:  This list has 2 medication(s) that are the same as other medications prescribed for you. Read the directions carefully, and ask your doctor or other care provider to review them with you.

## 2018-08-26 LAB — HEMOCCULT STL QL IA: NEGATIVE

## 2018-08-27 DIAGNOSIS — Z12.11 SCREEN FOR COLON CANCER: ICD-10-CM

## 2018-09-15 DIAGNOSIS — E03.9 ACQUIRED HYPOTHYROIDISM: ICD-10-CM

## 2018-09-17 RX ORDER — LEVOTHYROXINE SODIUM 125 UG/1
TABLET ORAL
Qty: 30 TABLET | Refills: 0 | Status: SHIPPED | OUTPATIENT
Start: 2018-09-17 | End: 2018-11-26

## 2018-10-09 ENCOUNTER — OFFICE VISIT (OUTPATIENT)
Dept: FAMILY MEDICINE | Facility: CLINIC | Age: 48
End: 2018-10-09
Payer: COMMERCIAL

## 2018-10-09 ENCOUNTER — HOSPITAL ENCOUNTER (OUTPATIENT)
Dept: MAMMOGRAPHY | Facility: CLINIC | Age: 48
End: 2018-10-09
Attending: FAMILY MEDICINE
Payer: COMMERCIAL

## 2018-10-09 ENCOUNTER — HOSPITAL ENCOUNTER (OUTPATIENT)
Dept: MAMMOGRAPHY | Facility: CLINIC | Age: 48
Discharge: HOME | End: 2018-10-09
Attending: FAMILY MEDICINE | Admitting: FAMILY MEDICINE
Payer: COMMERCIAL

## 2018-10-09 VITALS
DIASTOLIC BLOOD PRESSURE: 74 MMHG | SYSTOLIC BLOOD PRESSURE: 110 MMHG | TEMPERATURE: 97.5 F | OXYGEN SATURATION: 100 % | BODY MASS INDEX: 35.43 KG/M2 | RESPIRATION RATE: 20 BRPM | WEIGHT: 200 LBS | HEART RATE: 59 BPM

## 2018-10-09 DIAGNOSIS — N64.4 BREAST TENDERNESS: ICD-10-CM

## 2018-10-09 DIAGNOSIS — N64.4 BREAST TENDERNESS: Primary | ICD-10-CM

## 2018-10-09 PROCEDURE — G0279 TOMOSYNTHESIS, MAMMO: HCPCS

## 2018-10-09 PROCEDURE — 76642 ULTRASOUND BREAST LIMITED: CPT | Mod: RT

## 2018-10-09 PROCEDURE — 76642 ULTRASOUND BREAST LIMITED: CPT | Mod: 50

## 2018-10-09 PROCEDURE — 99214 OFFICE O/P EST MOD 30 MIN: CPT | Performed by: FAMILY MEDICINE

## 2018-10-09 NOTE — PROGRESS NOTES
"  SUBJECTIVE:   Luci Londono is a 47 year old female who presents to clinic today for the following health issues:      Breast/chest pain      Duration: 2-3 days    Description (location/character/radiation): left chest    Intensity:  Come and go    Accompanying signs and symptoms: pain is more in chest radiates to the shoulder    History (similar episodes/previous evaluation): None    Precipitating or alleviating factors: patient came for a mammo, they would not do the mammogram do to the chest pain    Therapies tried and outcome: None   RIGHT SIDED CHEST WALL AND BREAST LUMP   PALPABLE X 3 DAYS   REFERRAL TO BREAST CENTER AS SOON AS POSSIBLE   SMALL MASS RIGHT UPPER OUTER QUADRANT BREAST   WHICH IS WHAT I PALPATED  IRREGULAR NODE ON ULTRASOUND                                  .0 to 33.9 in adult; Morbid obesity (H); Myalgia w hx of recurrence since 2012; and Mixed hyperlipidemia on her problem list.    HISTORY OF MYALGIAS    HISTORY LOWER BACK PAIN with RADICULOPATHY IMPROVED    GASTROESOPHAGEAL REFLUX DISEASE WITHOUT ESOPHAGITIS IMPROVED     HISTORY OF H PYLORI INFECTION RESOVED     THRYROID REPLACEMENT     POLYCYSTIC OVARY SYNDROME     VITAMIN D REPLACEMENT      OBESITY     ROSACEA     PRESBYOPIA     MIGRAINE WITHOUT AURA     LDL OR \"BAD\" CHOLESTEROL  GOAL < 100       .  Current Outpatient Prescriptions   Medication Sig Dispense Refill     cholecalciferol (VITAMIN D3) 17368 units capsule Take 1 capsule (50,000 Units) by mouth once a week 4 capsule 2     diclofenac (VOLTAREN) 1 % GEL topical gel Apply  2 grams to hands four times daily using enclosed dosing card. 200 g 3     levothyroxine (SYNTHROID/LEVOTHROID) 125 MCG tablet TAKE 1 TABLET BY MOUTH DAILY 30 tablet 0     levothyroxine (SYNTHROID/LEVOTHROID) 137 MCG tablet   3     medroxyPROGESTERone (PROVERA) 10 MG tablet Take 1 tablet (10 mg) by mouth daily 90 tablet 3     metFORMIN (GLUCOPHAGE-XR) 500 MG 24 hr tablet Take 1 tablet (500 mg) by mouth " daily (with dinner) 30 tablet 11     omeprazole (PRILOSEC) 20 MG CR capsule Take 1 capsule (20 mg) by mouth 2 times daily 60 capsule 11            No Known Allergies      Immunization History   Administered Date(s) Administered     TD (ADULT, 7+) 2005     TDAP Vaccine (Adacel) 2017     Tdap (Adult) Unspecified Formulation 2017               reports that she does not drink alcohol.        reports that she does not use illicit drugs.      family history includes Diabetes in her mother; Unknown/Adopted in her father. There is no history of Coronary Artery Disease, Hypertension, Hyperlipidemia, Cerebrovascular Disease, Breast Cancer, Colon Cancer, Prostate Cancer, Other Cancer, Depression, Anxiety Disorder, Mental Illness, Substance Abuse, Anesthesia Reaction, Asthma, Osteoporosis, Genetic Disorder, Thyroid Disease, or Obesity.      indicated that the status of her no family hx of is unknown. She indicated that her mother is alive. She indicated that her father is .        has a past surgical history that includes Hand surgery () and tubal ligation.       reports that she currently engages in sexual activity and has had male partners. She reports using the following method of birth control/protection: Pill.    .  Pediatric History   Patient Guardian Status     Not on file.     Other Topics Concern     Parent/Sibling W/ Cabg, Mi Or Angioplasty Before 65f 55m? No     Social History Narrative             reports that she has never smoked. She has never used smokeless tobacco.        Medical, social, surgical, and family histories reviewed.        Labs reviewed in EPIC  Patient Active Problem List   Diagnosis     Myalgia and myositis     Intervertebral lumbar disc disorder with myelopathy, lumbar region     Gastroesophageal reflux disease without esophagitis     Helicobacter pylori infection     Acquired hypothyroidism     Disorder of metabolism     Polycystic ovaries     Vitamin D  insufficiency     BMI 35     Rosacea     Absence of menstruation     Chronic left-sided low back pain with left-sided sciatica     Pinguecula of both eyes     Presbyopia     Impaired fasting glucose     Migraine without aura and without status migrainosus, not intractable     Class 1 obesity due to excess calories with serious comorbidity and body mass index (BMI) of 33.0 to 33.9 in adult     Morbid obesity (H)     Myalgia w hx of recurrence since 2012     Mixed hyperlipidemia       Past Surgical History:   Procedure Laterality Date     HAND SURGERY  2002    left     TUBAL LIGATION           Social History   Substance Use Topics     Smoking status: Never Smoker     Smokeless tobacco: Never Used     Alcohol use No       Family History   Problem Relation Age of Onset     Diabetes Mother      Unknown/Adopted Father      Coronary Artery Disease No family hx of      Hypertension No family hx of      Hyperlipidemia No family hx of      Cerebrovascular Disease No family hx of      Breast Cancer No family hx of      Colon Cancer No family hx of      Prostate Cancer No family hx of      Other Cancer No family hx of      Depression No family hx of      Anxiety Disorder No family hx of      Mental Illness No family hx of      Substance Abuse No family hx of      Anesthesia Reaction No family hx of      Asthma No family hx of      Osteoporosis No family hx of      Genetic Disorder No family hx of      Thyroid Disease No family hx of      Obesity No family hx of              Current Outpatient Prescriptions   Medication Sig Dispense Refill     cholecalciferol (VITAMIN D3) 82924 units capsule Take 1 capsule (50,000 Units) by mouth once a week 4 capsule 2     diclofenac (VOLTAREN) 1 % GEL topical gel Apply  2 grams to hands four times daily using enclosed dosing card. 200 g 3     levothyroxine (SYNTHROID/LEVOTHROID) 125 MCG tablet TAKE 1 TABLET BY MOUTH DAILY 30 tablet 0     levothyroxine (SYNTHROID/LEVOTHROID) 137 MCG tablet   3      medroxyPROGESTERone (PROVERA) 10 MG tablet Take 1 tablet (10 mg) by mouth daily 90 tablet 3     metFORMIN (GLUCOPHAGE-XR) 500 MG 24 hr tablet Take 1 tablet (500 mg) by mouth daily (with dinner) 30 tablet 11     omeprazole (PRILOSEC) 20 MG CR capsule Take 1 capsule (20 mg) by mouth 2 times daily 60 capsule 11         Recent Labs   Lab Test  08/21/18   1201  05/31/18   1646  10/31/17   1239  10/16/17   1723  07/25/17   1026  01/31/17   0942  06/30/16   1214   A1C  5.6   --    --    --   5.5  5.6  6.1*   LDL  123*   --    --    --   138*   --   123*   HDL  57   --    --    --   69   --   64   TRIG  188*   --    --    --   67   --   58   ALT  37  29   --   20  27   --   31   CR   --   0.86   --   0.77   --    --    --    GFRESTIMATED   --   70   --   81   --    --    --    GFRESTBLACK   --   85   --   >90   --    --    --    POTASSIUM   --   3.7   --   3.4   --    --    --    TSH  56.21*   --   0.03*   --   13.95*   --   3.09            BP Readings from Last 6 Encounters:   10/09/18 110/74   08/23/18 130/70   08/21/18 120/70   05/31/18 108/60   11/24/17 120/74   10/31/17 120/70         Wt Readings from Last 3 Encounters:   10/09/18 200 lb (90.7 kg)   08/23/18 200 lb (90.7 kg)   08/21/18 201 lb (91.2 kg)               Positive symptoms or findings indicated by bold designation:         ROS: 10 point ROS neg other than the symptoms noted above in the HPI.except  has Myalgia and myositis; Intervertebral lumbar disc disorder with myelopathy, lumbar region; Gastroesophageal reflux disease without esophagitis; Helicobacter pylori infection; Acquired hypothyroidism; Disorder of metabolism; Polycystic ovaries; Vitamin D insufficiency; BMI 35; Rosacea; Absence of menstruation; Chronic left-sided low back pain with left-sided sciatica; Pinguecula of both eyes; Presbyopia; Impaired fasting glucose; Migraine without aura and without status migrainosus, not intractable; Class 1 obesity due to excess calories with serious  comorbidity and body mass index (BMI) of 33.0 to 33.9 in adult; Morbid obesity (H); Myalgia w hx of recurrence since 2012; and Mixed hyperlipidemia on her problem list.  Review Of Systems    Skin: negative    Eyes: negative    Ears/Nose/Throat: negative    Respiratory: No shortness of breath, dyspnea on exertion, cough, or hemoptysis    Cardiovascular: negative    Gastrointestinal: negative    Genitourinary: negative    Musculoskeletal: LOWER BACK PAIN     Neurologic: negative    Psychiatric: negative    Hematologic/Lymphatic/Immunologic: negative    Endocrine: thyroid disorder and  OBESITY     RIGHT BREAST MASS X 3 DAYS                 PE:  /74 (Cuff Size: Adult Large)  Pulse 59  Temp 97.5  F (36.4  C) (Tympanic)  Resp 20  Wt 200 lb (90.7 kg)  SpO2 100%  BMI 35.43 kg/m2 Body mass index is 35.43 kg/(m^2).        Constitutional: general appearance, well nourished, well developed, in no acute distress, well developed, appears stated age, normal body habitus,          Eyes:; The patient has normal eyelids sclerae and conjunctivae :          Ears/Nose/Throat: external ear, overall: normal appearance; external nose, overall: benign appearance, normal moujth gums and lips           Neck: thyroid, overall: normal size, normal consistency, nontender,          Respiratory:  palpation of chest, overall: normal excursion,    Clear to percussion and auscultation     NO Tachypnea    NORMAL  Color          Cardiovascular:  Good color with no peripheral edema    Regular sinus rhythm without murmur.  Physiologic heart sounds   Heart is unelarged    .     Chest/Breast: normal shape  SMALL LUMP 1 CM RIGHT UPPER OUTER QUADRANT     NO AXILLARY MASSES          Abdominal exam,  Liver and spleen are  unenlarged        Tenderness    Scars  CENTRAL OBESITY             Urogenital; no renal, flank or bladder  tenderness;          Lymphatic: neck nodes,     Other nodes NOT APPLICABLE          Musculoskeletal:  Brief ortho exam  normal except:   NORMAL RANGE OF MOTION OF BACK         Integument: inspection of skin, no rash, lesions; and, palpation, no induration, no tenderness.          Neurologic mental status, overall: alert and oriented; gait, no ataxia, no unsteadiness; coordination, no tremors; cranial nerves, overall: normal motor, overall: normal bulk, tone.          Psychiatric: orientation/consciousness, overall: oriented to person, place and time; behavior/psychomotor activity, no tics, normal psychomotor activity; mood and affect, overall: normal mood and affect; appearance, overall: well-groomed, good eye contact; speech, overall: normal quality, no aphasia and normal quality, quantity, intact.        Diagnostic Test Results:  Results for orders placed or performed in visit on 08/27/18   Fecal colorectal cancer screen FIT   Result Value Ref Range    Occult Blood Scn FIT Negative NEG^Negative           ICD-10-CM    1. Breast tenderness N64.4 BREAST CENTER REFERRAL     CANCELED: MA Diagnostic Digital Bilateral    RUOQ MASS 1 CM               .    Side effects benefits and risks thoroughly discussed. .she may come in early if unimproved or getting worse          Please drink 2 glasses of water prior to meals and walk 15-30 minutes after meals        I spent  15 MINUTES   with patient discussing the following issues   The encounter diagnosis was Breast tenderness. over half of which involved counseling and coordination of care.      Patient Instructions     [facility]   Medical Clinic   DirectionsWebsite   Address: [address] [place] [address]   Phone: [phone]  Possible breast cyst likely   As soon as possible   Stuart Wills Jr., MD     (N64.4) Breast tenderness  (primary encounter diagnosis)  Comment: RUOQ MASS 1 CM   Plan: BREAST CENTER REFERRAL, MA Diagnostic Digital         Bilateral               arrative              Examination: Bilateral digital diagnostic mammography and digital  breast  tomosynthesis with computer aided detection, and focused  ultrasound of the RIGHT breast, 10/9/2018.    Comparison: 8/25/2017 and 1/31/2014.    History: Palpable lump in the RIGHT breast for the past 3 days.    BREAST DENSITY: Scattered fibroglandular densities    Findings: Mammography with digital breast tomosynthesis demonstrates a  small mass with indistinct margins at the patient's palpable area of  concern in the upper outer RIGHT breast. No concerning findings in the  left breast.     Focussed ultrasound by radiologist and technologist of the upper outer  quadrant of the RIGHT breast was performed. Irregularly-shaped  hypoechoic mass with indistinct margins is seen at the patient's  palpable area of concern, 12:00 position 6 m from the nipple on the  RIGHT. This measures 8 x 6 x 6 mm.    RIGHT axillary ultrasound survey demonstrates a lymph node with  eccentric cortical thickening and subcapsular blood flow on color  Doppler imaging.             Impression             IMPRESSION: BI-RADS CATEGORY: 4 - Suspicious Abnormality-Biopsy Should  Be Considered.    RECOMMENDED FOLLOW-UP: Biopsy.    Ultrasound-guided core needle biopsy of RIGHT breast mass and RIGHT  axillary lymph node.    The patient was given the results of the examination.    CARSON CAGE MD                      ALL THE ABOVE PROBLEMS ARE STABLE AND MED CHANGES AS NOTED        Diet:  MEDITERRANEAN DIET AND WEIGHT LOSS         Exercise:  AS TOLERATED   Exercises Range of motion, balance, isometric, and strengthening exercises 30 repetitions twice daily of involved joints            .ALEENA TYLER MD 10/9/2018 1:24 PM  October 9, 2018

## 2018-10-09 NOTE — PATIENT INSTRUCTIONS
[facility]   Medical Clinic   DirectionsWebsite   Address: [address] [place] [address]   Phone: [phone]  Possible breast cyst likely   As soon as possible   Stuart Wills Jr., MD     (N64.4) Breast tenderness  (primary encounter diagnosis)  Comment: RUOQ MASS 1 CM   Plan: BREAST CENTER REFERRAL, MA Diagnostic Digital         Bilateral               arrative              Examination: Bilateral digital diagnostic mammography and digital  breast tomosynthesis with computer aided detection, and focused  ultrasound of the RIGHT breast, 10/9/2018.    Comparison: 8/25/2017 and 1/31/2014.    History: Palpable lump in the RIGHT breast for the past 3 days.    BREAST DENSITY: Scattered fibroglandular densities    Findings: Mammography with digital breast tomosynthesis demonstrates a  small mass with indistinct margins at the patient's palpable area of  concern in the upper outer RIGHT breast. No concerning findings in the  left breast.     Focussed ultrasound by radiologist and technologist of the upper outer  quadrant of the RIGHT breast was performed. Irregularly-shaped  hypoechoic mass with indistinct margins is seen at the patient's  palpable area of concern, 12:00 position 6 m from the nipple on the  RIGHT. This measures 8 x 6 x 6 mm.    RIGHT axillary ultrasound survey demonstrates a lymph node with  eccentric cortical thickening and subcapsular blood flow on color  Doppler imaging.             Impression             IMPRESSION: BI-RADS CATEGORY: 4 - Suspicious Abnormality-Biopsy Should  Be Considered.    RECOMMENDED FOLLOW-UP: Biopsy.    Ultrasound-guided core needle biopsy of RIGHT breast mass and RIGHT  axillary lymph node.    The patient was given the results of the examination.    CARSON CAGE MD

## 2018-10-09 NOTE — MR AVS SNAPSHOT
After Visit Summary   10/9/2018    Luci Londono    MRN: [id number]           Patient Information     Date Of Birth          1970        Visit Information        Provider Department      10/9/2018 10:45 AM Stuart Wills MD [facility]        Today's Diagnoses     Breast tenderness    -  1      Care Instructions    [facility]   Medical Clinic   DirectionsWebsite   Address: [address] [place] [address]   Phone: [phone]  Possible breast cyst likely   As soon as possible   Stuart Wills Jr., MD     (N64.4) Breast tenderness  (primary encounter diagnosis)  Comment: RUOQ MASS 1 CM   Plan: BREAST CENTER REFERRAL, [place] Diagnostic Digital         Bilateral                               Follow-ups after your visit        Additional Services     BREAST CENTER REFERRAL       Your provider has referred you to: [facility]: [facility] [place] [phone]   [email or web address]  Right upper 1 cm tender RIGHT UPPER OUTER QUADRANT BREAST  X 3 DAYS     Please be aware that coverage of these services is subject to the terms and limitations of your health insurance plan.  Call member services at your health plan with any benefit or coverage questions.      Please bring the following with you to your appointment:    (1) Any X-Rays, CTs or MRIs which have been performed.  Contact the facility where they were done to arrange for  prior to your scheduled appointment.    (2) List of current medications   (3) This referral request   (4) Any documents/labs given to you for this referral                  Future tests that were ordered for you today     Open Future Orders        Priority Expected Expires Ordered    MA Diagnostic Digital Bilateral Routine 10/9/2018 10/9/2019 10/9/2018            Who to contact     If you have questions or need follow up information about today's  clinic visit or your schedule please contact [facility] directly at [phone].  Normal or non-critical lab and imaging results will be communicated to you by MyChart, letter or phone within 4 business days after the clinic has received the results. If you do not hear from us within 7 days, please contact the clinic through MyChart or phone. If you have a critical or abnormal lab result, we will notify you by phone as soon as possible.  Submit refill requests through [employer] or call your pharmacy and they will forward the refill request to us. Please allow 3 business days for your refill to be completed.          Additional Information About Your Visit        Care EveryWhere ID     This is your Care EveryWhere ID. This could be used by other organizations to access your [place] medical records  [id number]        Your Vitals Were     Pulse Temperature Respirations Pulse Oximetry BMI (Body Mass Index)       59 97.5  F (36.4  C) (Tympanic) 20 100% 35.43 kg/m2        Blood Pressure from Last 3 Encounters:   10/09/18 110/74   08/23/18 130/70   08/21/18 120/70    Weight from Last 3 Encounters:   10/09/18 200 lb (90.7 kg)   08/23/18 200 lb (90.7 kg)   08/21/18 201 lb (91.2 kg)              We Performed the Following     BREAST CENTER REFERRAL        Primary Care Provider Office Phone # Fax #    Stuart Charley Wills [phone] [address] [facility] [address]        Equal Access to Services     PINA PINEDA : Hadii aad ku hadasho Soomaali, waaxda luqadaha, qaybta kaalmada adeegyada, josh neumann . So [facility] [phone].    ATENCIÓN: Si habla charismaañol, tiene a dominique disposición servicios gratuitos de asistencia lingüística. Llame al [phone].    We comply with applicable federal civil rights laws and [place] laws. We do not discriminate on the basis of race, color, national origin, age, disability, sex, sexual orientation,  or gender identity.            Thank you!     Thank you for choosing [facility]  for your care. Our goal is always to provide you with excellent care. Hearing back from our patients is one way we can continue to improve our services. Please take a few minutes to complete the written survey that you may receive in the mail after your visit with us. Thank you!             Your Updated Medication List - Protect others around you: Learn how to safely use, store and throw away your medicines at www.disposemymeds.org.          This list is accurate as of 10/9/18 11:39 AM.  Always use your most recent med list.                   Brand Name Dispense Instructions for use Diagnosis    cholecalciferol 59157 units capsule    VITAMIN D3    4 capsule    Take 1 capsule (50,000 Units) by mouth once a week    Vitamin D insufficiency       diclofenac 1 % Gel topical gel    VOLTAREN    200 g    Apply  2 grams to hands four times daily using enclosed dosing card.    Lateral epicondylitis, right elbow       * levothyroxine 137 MCG tablet    SYNTHROID/LEVOTHROID          * levothyroxine 125 MCG tablet    SYNTHROID/LEVOTHROID    30 tablet    TAKE 1 TABLET BY MOUTH DAILY    Acquired hypothyroidism       medroxyPROGESTERone 10 MG tablet    PROVERA    90 tablet    Take 1 tablet (10 mg) by mouth daily    Absence of menstruation       metFORMIN 500 MG 24 hr tablet    GLUCOPHAGE-XR    30 tablet    Take 1 tablet (500 mg) by mouth daily (with dinner)    Non morbid obesity due to excess calories, Hyperglycemia       omeprazole 20 MG CR capsule    priLOSEC    60 capsule    Take 1 capsule (20 mg) by mouth 2 times daily    Gastroesophageal reflux disease without esophagitis       * Notice:  This list has 2 medication(s) that are the same as other medications prescribed for you. Read the directions carefully, and ask your doctor or other care provider to review them with you.

## 2018-10-12 ENCOUNTER — HOSPITAL ENCOUNTER (OUTPATIENT)
Dept: MAMMOGRAPHY | Facility: CLINIC | Age: 48
End: 2018-10-12
Attending: FAMILY MEDICINE
Payer: COMMERCIAL

## 2018-10-12 ENCOUNTER — HOSPITAL ENCOUNTER (OUTPATIENT)
Dept: MAMMOGRAPHY | Facility: CLINIC | Age: 48
Discharge: HOME | End: 2018-10-12
Attending: FAMILY MEDICINE | Admitting: FAMILY MEDICINE
Payer: COMMERCIAL

## 2018-10-12 ENCOUNTER — HOSPITAL ENCOUNTER (EMERGENCY)
Facility: CLINIC | Age: 48
Discharge: HOME | End: 2018-10-13
Attending: EMERGENCY MEDICINE | Admitting: EMERGENCY MEDICINE
Payer: COMMERCIAL

## 2018-10-12 ENCOUNTER — APPOINTMENT (OUTPATIENT)
Dept: CT IMAGING | Facility: CLINIC | Age: 48
End: 2018-10-12
Attending: EMERGENCY MEDICINE
Payer: COMMERCIAL

## 2018-10-12 DIAGNOSIS — N64.4 BREAST TENDERNESS: ICD-10-CM

## 2018-10-12 DIAGNOSIS — R41.82 ALTERED MENTAL STATUS, UNSPECIFIED ALTERED MENTAL STATUS TYPE: ICD-10-CM

## 2018-10-12 LAB
ALBUMIN SERPL-MCNC: 3.4 G/DL (ref 3.4–5)
ALP SERPL-CCNC: 75 U/L (ref 40–150)
ALT SERPL W P-5'-P-CCNC: 25 U/L (ref 0–50)
ANION GAP SERPL CALCULATED.3IONS-SCNC: 8 MMOL/L (ref 3–14)
AST SERPL W P-5'-P-CCNC: 20 U/L (ref 0–45)
BASOPHILS # BLD AUTO: 0 10E9/L (ref 0–0.2)
BASOPHILS NFR BLD AUTO: 0.2 %
BILIRUB SERPL-MCNC: 0.3 MG/DL (ref 0.2–1.3)
BUN SERPL-MCNC: 9 MG/DL (ref 7–30)
CALCIUM SERPL-MCNC: 8.4 MG/DL (ref 8.5–10.1)
CHLORIDE SERPL-SCNC: 105 MMOL/L (ref 94–109)
CO2 SERPL-SCNC: 26 MMOL/L (ref 20–32)
CREAT SERPL-MCNC: 0.72 MG/DL (ref 0.52–1.04)
DIFFERENTIAL METHOD BLD: ABNORMAL
EOSINOPHIL # BLD AUTO: 0.1 10E9/L (ref 0–0.7)
EOSINOPHIL NFR BLD AUTO: 0.7 %
ERYTHROCYTE [DISTWIDTH] IN BLOOD BY AUTOMATED COUNT: 16.6 % (ref 10–15)
GFR SERPL CREATININE-BSD FRML MDRD: 86 ML/MIN/1.7M2
GLUCOSE BLDC GLUCOMTR-MCNC: 104 MG/DL (ref 70–99)
GLUCOSE SERPL-MCNC: 92 MG/DL (ref 70–99)
HCT VFR BLD AUTO: 38.8 % (ref 35–47)
HGB BLD-MCNC: 13 G/DL (ref 11.7–15.7)
IMM GRANULOCYTES # BLD: 0 10E9/L (ref 0–0.4)
IMM GRANULOCYTES NFR BLD: 0.1 %
LYMPHOCYTES # BLD AUTO: 3.4 10E9/L (ref 0.8–5.3)
LYMPHOCYTES NFR BLD AUTO: 41.9 %
MAGNESIUM SERPL-MCNC: 1.8 MG/DL (ref 1.6–2.3)
MCH RBC QN AUTO: 26.3 PG (ref 26.5–33)
MCHC RBC AUTO-ENTMCNC: 33.5 G/DL (ref 31.5–36.5)
MCV RBC AUTO: 79 FL (ref 78–100)
MONOCYTES # BLD AUTO: 0.4 10E9/L (ref 0–1.3)
MONOCYTES NFR BLD AUTO: 5.2 %
NEUTROPHILS # BLD AUTO: 4.2 10E9/L (ref 1.6–8.3)
NEUTROPHILS NFR BLD AUTO: 51.9 %
NRBC # BLD AUTO: 0 10*3/UL
NRBC BLD AUTO-RTO: 0 /100
PLATELET # BLD AUTO: 364 10E9/L (ref 150–450)
POTASSIUM SERPL-SCNC: 4 MMOL/L (ref 3.4–5.3)
PROT SERPL-MCNC: 8 G/DL (ref 6.8–8.8)
RBC # BLD AUTO: 4.94 10E12/L (ref 3.8–5.2)
SODIUM SERPL-SCNC: 139 MMOL/L (ref 133–144)
TSH SERPL DL<=0.005 MIU/L-ACNC: 3.04 MU/L (ref 0.4–4)
WBC # BLD AUTO: 8.1 10E9/L (ref 4–11)

## 2018-10-12 PROCEDURE — 84443 ASSAY THYROID STIM HORMONE: CPT | Performed by: EMERGENCY MEDICINE

## 2018-10-12 PROCEDURE — 88360 TUMOR IMMUNOHISTOCHEM/MANUAL: CPT | Performed by: FAMILY MEDICINE

## 2018-10-12 PROCEDURE — 40000986 MA POST PROCEDURE RIGHT

## 2018-10-12 PROCEDURE — 88377 M/PHMTRC ALYS ISHQUANT/SEMIQ: CPT | Performed by: PATHOLOGY

## 2018-10-12 PROCEDURE — 00000159 ZZHCL STATISTIC H-SEND OUTS PREP: Performed by: FAMILY MEDICINE

## 2018-10-12 PROCEDURE — 85025 COMPLETE CBC W/AUTO DIFF WBC: CPT | Performed by: EMERGENCY MEDICINE

## 2018-10-12 PROCEDURE — 38505 NEEDLE BIOPSY LYMPH NODES: CPT | Mod: RT

## 2018-10-12 PROCEDURE — 80053 COMPREHEN METABOLIC PANEL: CPT | Performed by: EMERGENCY MEDICINE

## 2018-10-12 PROCEDURE — 93005 ELECTROCARDIOGRAM TRACING: CPT

## 2018-10-12 PROCEDURE — 88305 TISSUE EXAM BY PATHOLOGIST: CPT | Mod: 26 | Performed by: FAMILY MEDICINE

## 2018-10-12 PROCEDURE — 83735 ASSAY OF MAGNESIUM: CPT | Performed by: EMERGENCY MEDICINE

## 2018-10-12 PROCEDURE — 88305 TISSUE EXAM BY PATHOLOGIST: CPT | Performed by: FAMILY MEDICINE

## 2018-10-12 PROCEDURE — 25000125 ZZHC RX 250: Performed by: FAMILY MEDICINE

## 2018-10-12 PROCEDURE — 88360 TUMOR IMMUNOHISTOCHEM/MANUAL: CPT | Mod: 26 | Performed by: FAMILY MEDICINE

## 2018-10-12 PROCEDURE — 27210206 US BREAST BIOPSY CORE NEEDLE RIGHT

## 2018-10-12 PROCEDURE — 70450 CT HEAD/BRAIN W/O DYE: CPT

## 2018-10-12 PROCEDURE — 00000146 ZZHCL STATISTIC GLUCOSE BY METER IP

## 2018-10-12 PROCEDURE — 99285 EMERGENCY DEPT VISIT HI MDM: CPT | Mod: 25

## 2018-10-12 PROCEDURE — 00000158 ZZHCL STATISTIC H-FISH PROCESS B/S: Performed by: FAMILY MEDICINE

## 2018-10-12 RX ADMIN — LIDOCAINE HYDROCHLORIDE 10 ML: 10 INJECTION, SOLUTION INFILTRATION; PERINEURAL at 09:49

## 2018-10-12 ASSESSMENT — ENCOUNTER SYMPTOMS
FEVER: 0
HEADACHES: 0
SORE THROAT: 0

## 2018-10-12 NOTE — ED AVS SNAPSHOT
Emergency Department    [address] [facility] [address]    Phone:  [phone]    Fax:  [phone]                                       Luci Londono   MRN: [id number]    Department:   Emergency Department   Date of Visit:  10/12/2018           Patient Information     Date Of Birth          1970        Your diagnoses for this visit were:     Altered mental status, unspecified altered mental status type        You were seen by Jacinto Cohen MD.      Follow-up Information     Follow up with Stuart Wills MD In 1 week.    Specialty:  Family Practice    Contact information:    [address]  [facility] [id number] [phone]          Follow up with  Emergency Department.    Specialty:  EMERGENCY MEDICINE    Why:  If symptoms worsen    Contact information:    [address] [facility] [id number] [phone]        Discharge Instructions           Confusion  Confusion is a change in a person s ability to think clearly. There may be trouble recognizing familiar people and places or knowing what day it is. Memory, judgment, and decision-making may also be affected. In severe cases, the person may have limited or no response to being spoken to.  Confusion is usually a sign of an underlying problem. It may occur suddenly. Or it may develop gradually over time. Causes of confusion include brain injury, medicines, alcohol, withdrawal from certain medicines or illegal drugs, and infection. Heart attack and stroke may cause it. Confusion can also be a sign of dementia or a mental illness.  Treatment will depend on the cause of the problem. If the issue is a medicine, stopping the medicine may help. The healthcare provider will perform an evaluation and certain tests may be done. Follow up with the healthcare provider for the results.  Home care    Be sure someone is with the confused person at all times. He or she should not be left alone or  unsupervised.    Tell the healthcare provider about all medicines that the person takes. These include prescription, over-the-counter, herbs, and supplements.    Dehydration can increase confusion. Ask the healthcare provider how much fluid the person should be drinking. Offer liquids and ensure that they are taken.    Keep all medicines in a secure place under the caregiver s control. To prevent overdose, a confused person should take medicines only under the supervision of a caregiver.    To help a person with confusion:  ? Establish a daily routine. Change can be a source of stress for someone with confusion. Make and keep a time schedule for common tasks such as bathing, dressing, taking medicines, meals, going for walks, shopping, naps and bed time.  ? Speak slowly and clearly with a gentle tone of voice. Use short simple words and sentences. Ask one question at a time. Do not interrupt, criticize or argue. Be calm and supportive. Use friendly facial expressions. Use pointing and touching to help communicate. If there has been loss of long-term memory, do not ask questions about past events. This would only cause frustration for the person.  ? Use lists, signs, family photos, clocks and calendars as memory aids. Label cabinets and drawers. Try to distract, not confront, the patient. When he/she becomes frustrated or upset, redirect his/her attention to eating or some other activity of interest.  ? If this proves to be due to a permanent condition, talk to the healthcare provider or a  about getting a Power of  for healthcare and for financial decisions. It is best to do this while the person can still sign legal documents and make his or her own decisions. Otherwise, a court order will be required.  Follow-up care  Follow up with the person's healthcare provider or as advised for further testing or changes in medical care.  When to seek medical advice  Call the healthcare provider for any of the  following:    Frequent falling    Refusal to eat or drink    Violent behavior or behavior too difficult to manage at home    New hallucinations or delusions    Increased drowsiness    Complaints of headache or numbness or weakness of the face, arm or leg    Nausea or vomiting    Slurred speech or trouble speaking, walking, or seeing    Fainting spell, dizziness or seizure    Unexplained fever over 100.4  F (38.0  C) or as directed by the healthcare provider  Date Last Reviewed: 8/23/2015 2000-2017 [employer]. [address] [place] [address]. All rights reserved. This information is not intended as a substitute for professional medical care. Always follow your healthcare professional's instructions.            24 Hour Appointment Hotline       To make an appointment at any [facility] ([facility]), call [phone] ([phone]). If you don't have a family doctor or clinic, we will help you find one. [place] clinics are conveniently located to serve the needs of you and your family.             Review of your medicines      Our records show that you are taking the medicines listed below. If these are incorrect, please call your family doctor or clinic.        Dose / Directions Last dose taken    levothyroxine 125 MCG tablet   Commonly known as:  SYNTHROID/LEVOTHROID   Quantity:  30 tablet        TAKE 1 TABLET BY MOUTH DAILY   Refills:  0        medroxyPROGESTERone 10 MG tablet   Commonly known as:  PROVERA   Dose:  10 mg   Quantity:  90 tablet        Take 1 tablet (10 mg) by mouth daily   Refills:  3        metFORMIN 500 MG 24 hr tablet   Commonly known as:  GLUCOPHAGE-XR   Dose:  500 mg   Quantity:  30 tablet        Take 1 tablet (500 mg) by mouth daily (with dinner)   Refills:  11        omeprazole 20 MG CR capsule   Commonly known as:  priLOSEC   Dose:  20 mg   Quantity:  60 capsule        Take 1 capsule (20 mg) by mouth 2 times daily   Refills:  11                Procedures and tests  performed during your visit     CBC with platelets differential    Cardiac Continuous Monitoring    Comprehensive metabolic panel    EKG 12-lead, tracing only    Glucose by meter    Glucose monitor nursing POCT    Head CT w/o contrast    Magnesium    Peripheral IV: Standard    Pulse oximetry nursing    TSH with free T4 reflex      Orders Needing Specimen Collection     None      Pending Results     Date and Time Order Name Status Description    10/12/2018 1007 Surgical pathology exam In process             Pending Culture Results     Date and Time Order Name Status Description    10/12/2018 1007 Surgical pathology exam In process             Pending Results Instructions     If you had any lab results that were not finalized at the time of your Discharge, you can call the ED Lab Result RN at [phone]. You will be contacted by this team for any positive Lab results or changes in treatment. The nurses are available 7 days a week from 10A to 6:30P.  You can leave a message 24 hours per day and they will return your call.        Test Results From Your Hospital Stay        10/12/2018 10:23 PM      Component Results     Component Value Ref Range & Units Status    WBC 8.1 4.0 - 11.0 10e9/L Final    RBC Count 4.94 3.8 - 5.2 10e12/L Final    Hemoglobin 13.0 11.7 - 15.7 g/dL Final    Hematocrit 38.8 35.0 - 47.0 % Final    MCV 79 78 - 100 fl Final    MCH 26.3 (L) 26.5 - 33.0 pg Final    MCHC 33.5 31.5 - 36.5 g/dL Final    RDW 16.6 (H) 10.0 - 15.0 % Final    Platelet Count 364 150 - 450 10e9/L Final    Diff Method Automated Method  Final    % Neutrophils 51.9 % Final    % Lymphocytes 41.9 % Final    % Monocytes 5.2 % Final    % Eosinophils 0.7 % Final    % Basophils 0.2 % Final    % Immature Granulocytes 0.1 % Final    Nucleated RBCs 0 0 /100 Final    Absolute Neutrophil 4.2 1.6 - 8.3 10e9/L Final    Absolute Lymphocytes 3.4 0.8 - 5.3 10e9/L Final    Absolute Monocytes 0.4 0.0 - 1.3 10e9/L Final    Absolute Eosinophils 0.1  0.0 - 0.7 10e9/L Final    Absolute Basophils 0.0 0.0 - 0.2 10e9/L Final    Abs Immature Granulocytes 0.0 0 - 0.4 10e9/L Final    Absolute Nucleated RBC 0.0  Final         10/12/2018 10:43 PM      Component Results     Component Value Ref Range & Units Status    Sodium 139 133 - 144 mmol/L Final    Potassium 4.0 3.4 - 5.3 mmol/L Final    Chloride 105 94 - 109 mmol/L Final    Carbon Dioxide 26 20 - 32 mmol/L Final    Anion Gap 8 3 - 14 mmol/L Final    Glucose 92 70 - 99 mg/dL Final    Urea Nitrogen 9 7 - 30 mg/dL Final    Creatinine 0.72 0.52 - 1.04 mg/dL Final    GFR Estimate 86 >60 mL/min/1.7m2 Final    Non  GFR Calc    GFR Estimate If Black >90 >60 mL/min/1.7m2 Final    African American GFR Calc    Calcium 8.4 (L) 8.5 - 10.1 mg/dL Final    Bilirubin Total 0.3 0.2 - 1.3 mg/dL Final    Albumin 3.4 3.4 - 5.0 g/dL Final    Protein Total 8.0 6.8 - 8.8 g/dL Final    Alkaline Phosphatase 75 40 - 150 U/L Final    ALT 25 0 - 50 U/L Final    AST 20 0 - 45 U/L Final         10/12/2018 10:38 PM      Component Results     Component Value Ref Range & Units Status    Magnesium 1.8 1.6 - 2.3 mg/dL Final         10/12/2018 10:47 PM      Narrative     CT SCAN OF THE HEAD WITHOUT CONTRAST   10/12/2018 10:39 PM     HISTORY: Altered mental status.      TECHNIQUE:  Axial images of the head and coronal reformations without  IV contrast material. Radiation dose for this scan was reduced using  automated exposure control, adjustment of the mA and/or kV according  to patient size, or iterative reconstruction technique.    COMPARISON: None.    FINDINGS:  The cerebral hemispheres, brainstem, and cerebellum demonstrate normal  morphology and attenuation. No evidence of acute ischemia, hemorrhage,  mass, mass effect, or hydrocephalus. The visualized calvarium, skull  base, paraspinous sinuses, and extra cranial soft tissues are  unremarkable.        Impression     IMPRESSION:   No acute intracranial abnormality.    COLEMAN DAVALOS  MD RADHA         10/12/2018 10:48 PM      Component Results     Component Value Ref Range & Units Status    TSH 3.04 0.40 - 4.00 mU/L Final         10/12/2018 10:36 PM      Component Results     Component Value Ref Range & Units Status    Glucose 104 (H) 70 - 99 mg/dL Final                Clinical Quality Measure: Blood Pressure Screening     Your blood pressure was checked while you were in the emergency department today. The last reading we obtained was  BP: 138/78 . Please read the guidelines below about what these numbers mean and what you should do about them.  If your systolic blood pressure (the top number) is less than 120 and your diastolic blood pressure (the bottom number) is less than 80, then your blood pressure is normal. There is nothing more that you need to do about it.  If your systolic blood pressure (the top number) is 120-139 or your diastolic blood pressure (the bottom number) is 80-89, your blood pressure may be higher than it should be. You should have your blood pressure rechecked within a year by a primary care provider.  If your systolic blood pressure (the top number) is 140 or greater or your diastolic blood pressure (the bottom number) is 90 or greater, you may have high blood pressure. High blood pressure is treatable, but if left untreated over time it can put you at risk for heart attack, stroke, or kidney failure. You should have your blood pressure rechecked by a primary care provider within the next 4 weeks.  If your provider in the emergency department today gave you specific instructions to follow-up with your doctor or provider even sooner than that, you should follow that instruction and not wait for up to 4 weeks for your follow-up visit.        Thank you for choosing [facility]       Thank you for choosing [facility] for your care. Our goal is always to provide you with excellent care. Hearing back from our patients is one way we can continue to improve our services. Please  take a few minutes to complete the written survey that you may receive in the mail after you visit with us. Thank you!        Care EveryWhere ID     This is your Care EveryWhere ID. This could be used by other organizations to access your [place] medical records  [id number]        Equal Access to Services     PINA PINEDA : Kalani Marks, latisha koch, keiko kaalron bauer, josh bangura. So [facility] [phone].    ATENCIÓN: Si habla español, tiene a dominique disposición servicios gratuitos de asistencia lingüística. Llame al [phone].    We comply with applicable federal civil rights laws and [place] laws. We do not discriminate on the basis of race, color, national origin, age, disability, sex, sexual orientation, or gender identity.            After Visit Summary       This is your record. Keep this with you and show to your community pharmacist(s) and doctor(s) at your next visit.

## 2018-10-12 NOTE — DISCHARGE INSTRUCTIONS
After Your Breast Biopsy    Bleeding or bruising: Slight bruising is normal.  If you bleed through the bandage, put direct pressure on the breast.  If you are still bleeding after 20 minutes, call the doctor who ordered the exam.    Bandages: Keep your bandage in place until tomorrow morning.  Do not get it wet.  On the second day, cover it with a Band-Aid.    Activity: You may shower the morning after the exam.  No heavy activity (lifting, vacuuming) for 24 hours.    Discomfort: Wear your bra overnight to support the breast.  You may take Tylenol (acetaminophen) for pain.  If you had a stereotactic of MR-directed biopsy, you may take aspirin or ibuprofen (Advil, Motrin) the morning after your biopsy, unless your doctor tells you not to.    Infection: Infection is rare.  Symptoms include fever, redness, increasing pain and fluid draining from the biopsy site.  If you have any of these symptoms, please call the doctor who ordered your exam.    Results: Results may take up to three business days.  If you have not heard your results in three days, call the Breast Center Nurse at [phone] or [phone].  In rare cases, we may need to do another biopsy.    Call the doctor who ordered your exam if:    You have bleeding that lasts more than 20 minutes.    You have pain that cannot be controlled.    You have signs of infection (fever, redness, drainage or other signs).    You have not had your results within three days.    Nurse navigator: Our nurse navigator is here to answer your questions and help you set up future clinic visits.  Please call [phone].    Thank you for choosing [facility].  Please call us if you have questions or concerns about your biopsy.

## 2018-10-12 NOTE — ED AVS SNAPSHOT
Emergency Department    [address] [place] [address]    Phone:  [phone]    Fax:  [phone]                                       Luci Londono   MRN: [id number]    Department:   Emergency Department   Date of Visit:  10/12/2018           After Visit Summary Signature Page     I have received my discharge instructions, and my questions have been answered. I have discussed any challenges I see with this plan with the nurse or doctor.    ..........................................................................................................................................  Patient/Patient Representative Signature      ..........................................................................................................................................  Patient Representative Print Name and Relationship to Patient    ..................................................               ................................................  Date                                   Time    ..........................................................................................................................................  Reviewed by Signature/Title    ...................................................              ..............................................  Date                                               Time          22EPIC Rev 08/18

## 2018-10-13 VITALS
OXYGEN SATURATION: 98 % | RESPIRATION RATE: 16 BRPM | SYSTOLIC BLOOD PRESSURE: 136 MMHG | TEMPERATURE: 98.5 F | HEART RATE: 75 BPM | DIASTOLIC BLOOD PRESSURE: 70 MMHG

## 2018-10-13 LAB — INTERPRETATION ECG - MUSE: NORMAL

## 2018-10-13 NOTE — ED PROVIDER NOTES
History     Chief Complaint:  Altered Mental Status    History limited due to patient's altered mental status. History supplemented by records review, EMS history, and patient's family.    JEFFY Londono is a 47 year old female hours status post breast biopsy who presents to the emergency department today for evaluation of altered mental status. The patient and her family report that she underwent a breast biopsy this morning and has since been home sleeping. Tonight the patient's children were unable to wake her and were concerned for her persistent unresponsiveness, prompting a call to EMS. EMS state that en route the patient's vital signs were stable. Here the patient states that she has been feeling slightly funny since her biopsy, noting an abnormal sensation in her right upper extremity and heavy breathing. She states she was feeling otherwise well prior to the procedure and denies any changes in vision, headache, fever, sore throat, or history of medication reaction. Of note, the patient has not eaten since she arrived home following her procedure.     Allergies:  No Known Drug Allergies    Medications:    Synthroid   Provera  Glucophage  Prilosec    Past Medical History:    Hypertension  Lateral epicondylitis  Thyroid disease  GERD  Intervertebral lumbar disc disorder with myelopathy  Helicobacter pylori infection  Polycystic ovaries  Rosacea  Migraine without aura  Mixed hyperlipidemia  Obesity    Past Surgical History:    Left hand surgery  Tubal ligation    Family History:    Mother: diabetes    Social History:  The patient was accompanied to the ED by family.  Smoking Status: Never Smoker  Smokeless Tobacco: Never Used  Alcohol Use: Negative  Marital Status:        Review of Systems   Constitutional: Negative for fever.        Altered mental status   HENT: Negative for sore throat.    Eyes: Negative for visual disturbance.   Respiratory:        Heavy breathing   Neurological:  Negative for headaches.        Abnormal sensation of right upper extremity   All other systems reviewed and are negative.    Physical Exam     Patient Vitals for the past 24 hrs:   BP Temp Temp src Pulse Heart Rate Resp SpO2   10/13/18 0009 136/70 - - - - - 98 %   10/12/18 2300 138/78 - - - 68 - 97 %   10/12/18 2145 139/89 98.5  F (36.9  C) Oral 75 - 16 100 %     Physical Exam  Constitutional:  Appears well-developed and well-nourished. Cooperative.   HENT:   Head:    Atraumatic.   Mouth/Throat:   Oropharynx is without erythema or exudate and mucous     membranes are moist.   Eyes:    Conjunctivae normal and EOM are normal.      Pupils are equal, round, and reactive to light.   Neck:    Normal range of motion. No nuchal rigidity  Cardiovascular:  Normal rate, regular rhythm, normal heart sounds and radial and    dorsalis pedis pulses are 2+ and symmetric.    Pulmonary/Chest:  Effort normal and breath sounds normal.   Abdominal:   Soft. Bowel sounds are normal.      No splenomegaly or hepatomegaly. No tenderness. No rebound.   Musculoskeletal:  Normal range of motion. No edema and no tenderness.   Neurological:  No cranial nerve deficit. 5/5 strength upper and lower extremities bilaterally. Speech is slowed but normal. No slurring. Word choices are appropriate. Sensation to light touch is intact in all four extremities. No pronator drift. Gait intact. Oriented x3.   Skin:    Skin is warm and dry.   Psychiatric:   Appears mildly anxious and tremulous. Normal mood.     Emergency Department Course     ECG:  ECG taken at 2222, ECG read at 2230  Normal sinus rhythm  Nonspecific T wave abnormality  Abnormal ECG  No specific change compared to EKG dated 10/16/17  Rate 70 bpm. AR interval 140 ms. QRS duration 76 ms. QT/QTc 380/410 ms. P-R-T axes 51 12 32.    Imaging:  Radiology findings were communicated with the patient who voiced understanding of the findings.    Head CT w/o contrast  No acute intracranial  abnormality.  COLEMAN GARCIA MD  Reading per radiology    Laboratory:  Laboratory findings were communicated with the patient who voiced understanding of the findings.    Glucose By Meter: 104 (H)  CBC: WBC 8.1, HGB 13.0,   CMP: Calcium 8.4 (L) o/w WNL (Creatinine 0.72)  Magnesium: 1.8  TSH with free T4 Reflex: 3.04    Emergency Department Course:    2206 Nursing notes and vitals reviewed. IV was inserted and blood was drawn for laboratory testing, results above.    2208 I performed an exam of the patient as documented above.     2220 Glucose by meter obtained.    2222 EKG obtained as noted above.    2238 The patient was sent for a head CT while in the emergency department, results above.     2340 Recheck.    0016 I personally reviewed the laboratory and imaging results with the patient and answered all related questions prior to discharge.    Impression & Plan      Medical Decision Making:  Luci Londono is a 47 year old female who presents to the emergency department complaining of generalized weakness, confusion, and increased sleep since she had a breast biopsy done today. It seems the only medication she received during the biopsy was local lidocaine. Her family states that since her procedure she has not been herself. The patient admits that she is feeling somewhat confused and very fatigued. She denies any pain other than at the biopsy site, which is mild. She denies recent illness or injury. She has no headache and no focal neurologic findings on exam. With my initial exam, she is slow to answer questions but does answer them correctly and follows all my commands. She seems diffusely tremulous and perhaps mildly anxious.     CT scan of the brain does not show evidence for tumor, infarct, bleeding, or other acute process. Laboratory testing has returned looking unremarkable as above. There is no major metabolic derangement detected. The patient has a history of hypothyroidism but the TSH  looks normal.     The patient was observed in the ED for a few hours. Her symptoms have slowly resolved. She was able to eat and drink. She then could ambulate. The patient feels like she is back to her baseline mental status, and her family agrees. We discussed that I am uncertain as to the source of her symptoms, but that I don't detect a life threatening metabolic, infectious, toxicologic, or neurologic process that is posing an ongoing threat.    The patient will be with her family members tonight. She will return for recurrent symptoms or for other concerns. She will follow up with her primary care provider. The patient is discharged in good condition.    Diagnosis:    ICD-10-CM    1. Altered mental status, unspecified altered mental status type R41.82      Disposition:   The patient is discharged to home.    Discharge Medications:  No discharge medications.    Scribe Disclosure:  I, Claudia Cristo, am serving as a scribe at 10:40 PM on 10/12/2018 to document services personally performed by Jacinto Cohen MD based on my observations and the provider's statements to me.      EMERGENCY DEPARTMENT       Jacinto Cohen MD  10/14/18 0531

## 2018-10-13 NOTE — ED NOTES
"Pt's \"road test\" went very well and ED MD was informed. Pt denies SOB, CP, N/V and/or dizziness. Pt used a walker to restroom and than walked herself back to her ED room; pt was noted to have a steady gait.  "

## 2018-10-13 NOTE — DISCHARGE INSTRUCTIONS
Confusion  Confusion is a change in a person s ability to think clearly. There may be trouble recognizing familiar people and places or knowing what day it is. Memory, judgment, and decision-making may also be affected. In severe cases, the person may have limited or no response to being spoken to.  Confusion is usually a sign of an underlying problem. It may occur suddenly. Or it may develop gradually over time. Causes of confusion include brain injury, medicines, alcohol, withdrawal from certain medicines or illegal drugs, and infection. Heart attack and stroke may cause it. Confusion can also be a sign of dementia or a mental illness.  Treatment will depend on the cause of the problem. If the issue is a medicine, stopping the medicine may help. The healthcare provider will perform an evaluation and certain tests may be done. Follow up with the healthcare provider for the results.  Home care    Be sure someone is with the confused person at all times. He or she should not be left alone or unsupervised.    Tell the healthcare provider about all medicines that the person takes. These include prescription, over-the-counter, herbs, and supplements.    Dehydration can increase confusion. Ask the healthcare provider how much fluid the person should be drinking. Offer liquids and ensure that they are taken.    Keep all medicines in a secure place under the caregiver s control. To prevent overdose, a confused person should take medicines only under the supervision of a caregiver.    To help a person with confusion:  ? Establish a daily routine. Change can be a source of stress for someone with confusion. Make and keep a time schedule for common tasks such as bathing, dressing, taking medicines, meals, going for walks, shopping, naps and bed time.  ? Speak slowly and clearly with a gentle tone of voice. Use short simple words and sentences. Ask one question at a time. Do not interrupt, criticize or argue. Be calm and  supportive. Use friendly facial expressions. Use pointing and touching to help communicate. If there has been loss of long-term memory, do not ask questions about past events. This would only cause frustration for the person.  ? Use lists, signs, family photos, clocks and calendars as memory aids. Label cabinets and drawers. Try to distract, not confront, the patient. When he/she becomes frustrated or upset, redirect his/her attention to eating or some other activity of interest.  ? If this proves to be due to a permanent condition, talk to the healthcare provider or a  about getting a Power of  for healthcare and for financial decisions. It is best to do this while the person can still sign legal documents and make his or her own decisions. Otherwise, a court order will be required.  Follow-up care  Follow up with the person's healthcare provider or as advised for further testing or changes in medical care.  When to seek medical advice  Call the healthcare provider for any of the following:    Frequent falling    Refusal to eat or drink    Violent behavior or behavior too difficult to manage at home    New hallucinations or delusions    Increased drowsiness    Complaints of headache or numbness or weakness of the face, arm or leg    Nausea or vomiting    Slurred speech or trouble speaking, walking, or seeing    Fainting spell, dizziness or seizure    Unexplained fever over 100.4  F (38.0  C) or as directed by the healthcare provider  Date Last Reviewed: 8/23/2015 2000-2017 [employer]. [address] [place] [address], [place] [address]. All rights reserved. This information is not intended as a substitute for professional medical care. Always follow your healthcare professional's instructions.

## 2018-10-13 NOTE — ED NOTES
Bed: ED25  Expected date:   Expected time:   Means of arrival:   Comments:  HEMS 442 [id number]  Altered LOC

## 2018-10-15 ENCOUNTER — APPOINTMENT (OUTPATIENT)
Dept: CT IMAGING | Facility: CLINIC | Age: 48
End: 2018-10-15
Attending: EMERGENCY MEDICINE
Payer: COMMERCIAL

## 2018-10-15 ENCOUNTER — HOSPITAL ENCOUNTER (EMERGENCY)
Facility: CLINIC | Age: 48
Discharge: HOME | End: 2018-10-15
Attending: EMERGENCY MEDICINE | Admitting: EMERGENCY MEDICINE
Payer: COMMERCIAL

## 2018-10-15 VITALS
WEIGHT: 200 LBS | DIASTOLIC BLOOD PRESSURE: 67 MMHG | HEART RATE: 86 BPM | HEIGHT: 63 IN | BODY MASS INDEX: 35.44 KG/M2 | OXYGEN SATURATION: 100 % | TEMPERATURE: 98.3 F | SYSTOLIC BLOOD PRESSURE: 119 MMHG | RESPIRATION RATE: 16 BRPM

## 2018-10-15 DIAGNOSIS — R91.8 PULMONARY NODULES: ICD-10-CM

## 2018-10-15 DIAGNOSIS — N64.4 BREAST PAIN: ICD-10-CM

## 2018-10-15 DIAGNOSIS — R07.9 ACUTE CHEST PAIN: ICD-10-CM

## 2018-10-15 LAB
ANION GAP SERPL CALCULATED.3IONS-SCNC: 5 MMOL/L (ref 3–14)
BASOPHILS # BLD AUTO: 0 10E9/L (ref 0–0.2)
BASOPHILS NFR BLD AUTO: 0.2 %
BUN SERPL-MCNC: 9 MG/DL (ref 7–30)
CALCIUM SERPL-MCNC: 8.7 MG/DL (ref 8.5–10.1)
CHLORIDE SERPL-SCNC: 105 MMOL/L (ref 94–109)
CO2 SERPL-SCNC: 29 MMOL/L (ref 20–32)
CREAT SERPL-MCNC: 0.75 MG/DL (ref 0.52–1.04)
DIFFERENTIAL METHOD BLD: ABNORMAL
EOSINOPHIL # BLD AUTO: 0 10E9/L (ref 0–0.7)
EOSINOPHIL NFR BLD AUTO: 0.2 %
ERYTHROCYTE [DISTWIDTH] IN BLOOD BY AUTOMATED COUNT: 16.4 % (ref 10–15)
GFR SERPL CREATININE-BSD FRML MDRD: 82 ML/MIN/1.7M2
GLUCOSE SERPL-MCNC: 99 MG/DL (ref 70–99)
HCT VFR BLD AUTO: 35.2 % (ref 35–47)
HGB BLD-MCNC: 11.9 G/DL (ref 11.7–15.7)
IMM GRANULOCYTES # BLD: 0 10E9/L (ref 0–0.4)
IMM GRANULOCYTES NFR BLD: 0.1 %
INTERPRETATION ECG - MUSE: NORMAL
LYMPHOCYTES # BLD AUTO: 3.2 10E9/L (ref 0.8–5.3)
LYMPHOCYTES NFR BLD AUTO: 33.7 %
MCH RBC QN AUTO: 26.4 PG (ref 26.5–33)
MCHC RBC AUTO-ENTMCNC: 33.8 G/DL (ref 31.5–36.5)
MCV RBC AUTO: 78 FL (ref 78–100)
MONOCYTES # BLD AUTO: 0.5 10E9/L (ref 0–1.3)
MONOCYTES NFR BLD AUTO: 5.4 %
NEUTROPHILS # BLD AUTO: 5.7 10E9/L (ref 1.6–8.3)
NEUTROPHILS NFR BLD AUTO: 60.4 %
NRBC # BLD AUTO: 0 10*3/UL
NRBC BLD AUTO-RTO: 0 /100
PLATELET # BLD AUTO: 338 10E9/L (ref 150–450)
POTASSIUM SERPL-SCNC: 3.8 MMOL/L (ref 3.4–5.3)
RBC # BLD AUTO: 4.5 10E12/L (ref 3.8–5.2)
SODIUM SERPL-SCNC: 139 MMOL/L (ref 133–144)
TROPONIN I SERPL-MCNC: <0.015 UG/L (ref 0–0.04)
WBC # BLD AUTO: 9.5 10E9/L (ref 4–11)

## 2018-10-15 PROCEDURE — 99285 EMERGENCY DEPT VISIT HI MDM: CPT | Mod: 25

## 2018-10-15 PROCEDURE — 85025 COMPLETE CBC W/AUTO DIFF WBC: CPT | Performed by: EMERGENCY MEDICINE

## 2018-10-15 PROCEDURE — 93005 ELECTROCARDIOGRAM TRACING: CPT

## 2018-10-15 PROCEDURE — 25000128 H RX IP 250 OP 636: Performed by: EMERGENCY MEDICINE

## 2018-10-15 PROCEDURE — 71260 CT THORAX DX C+: CPT

## 2018-10-15 PROCEDURE — 25000132 ZZH RX MED GY IP 250 OP 250 PS 637: Performed by: EMERGENCY MEDICINE

## 2018-10-15 PROCEDURE — 84484 ASSAY OF TROPONIN QUANT: CPT | Performed by: EMERGENCY MEDICINE

## 2018-10-15 PROCEDURE — 96374 THER/PROPH/DIAG INJ IV PUSH: CPT | Mod: 59

## 2018-10-15 PROCEDURE — 80048 BASIC METABOLIC PNL TOTAL CA: CPT | Performed by: EMERGENCY MEDICINE

## 2018-10-15 RX ORDER — IOPAMIDOL 755 MG/ML
73 INJECTION, SOLUTION INTRAVASCULAR ONCE
Status: COMPLETED | OUTPATIENT
Start: 2018-10-15 | End: 2018-10-15

## 2018-10-15 RX ORDER — DIPHENHYDRAMINE HCL 25 MG
25 CAPSULE ORAL ONCE
Status: COMPLETED | OUTPATIENT
Start: 2018-10-15 | End: 2018-10-15

## 2018-10-15 RX ORDER — KETOROLAC TROMETHAMINE 15 MG/ML
15 INJECTION, SOLUTION INTRAMUSCULAR; INTRAVENOUS ONCE
Status: COMPLETED | OUTPATIENT
Start: 2018-10-15 | End: 2018-10-15

## 2018-10-15 RX ADMIN — KETOROLAC TROMETHAMINE 15 MG: 15 INJECTION, SOLUTION INTRAMUSCULAR; INTRAVENOUS at 22:22

## 2018-10-15 RX ADMIN — IOPAMIDOL 73 ML: 755 INJECTION, SOLUTION INTRAVENOUS at 21:55

## 2018-10-15 RX ADMIN — DIPHENHYDRAMINE HYDROCHLORIDE 25 MG: 25 CAPSULE ORAL at 21:02

## 2018-10-15 ASSESSMENT — ENCOUNTER SYMPTOMS
SHORTNESS OF BREATH: 0
FEVER: 1
TROUBLE SWALLOWING: 0
COUGH: 0
DYSURIA: 0
DIFFICULTY URINATING: 0
SORE THROAT: 0

## 2018-10-15 NOTE — ED AVS SNAPSHOT
Emergency Department    [address] [place] [id number]    Phone:  [phone]    Fax:  [phone]                                       Luci Londono   MRN: [id number]    Department:   Emergency Department   Date of Visit:  10/15/2018           After Visit Summary Signature Page     I have received my discharge instructions, and my questions have been answered. I have discussed any challenges I see with this plan with the nurse or doctor.    ..........................................................................................................................................  Patient/Patient Representative Signature      ..........................................................................................................................................  Patient Representative Print Name and Relationship to Patient    ..................................................               ................................................  Date                                   Time    ..........................................................................................................................................  Reviewed by Signature/Title    ...................................................              ..............................................  Date                                               Time          22EPIC Rev 08/18

## 2018-10-15 NOTE — LETTER
October 15, 2018      To Whom It May Concern:      Luci Londono was seen in our Emergency Department today, 10/15/18.  I expect her condition to improve over the next few days.  She may return to work/school when improved.    Sincerely,        Kimberly Shirley RN

## 2018-10-15 NOTE — PROGRESS NOTES
[facility]  Pathology report reviewed with our breast radiologist Dr. Nereida Boateng.  I phoned and informed patient of results showing Right Breast Invasive Ductal Carcinoma, grade 3 and Right Axilla lymph node consistent with metastatic ductal carcinoma.  Patient states no problems with biopsy site.  Recommended follow up is Surgical Consult.  Surgical Consult has been arranged with Dr Stacey Ashford @ [facility] on 10/16/18 at 2:45p.m., with a check in time of 2:30 p.m.   Patient has directions and phone numbers.  Questions were answered and I explained my role as Nurse Navigator in assisting her with appointments, resources and social support. New diagnosis information packet will be available at consult. I will follow up with the patient. She has my phone number if she has further questions. Ordering provider notified.    Collected: 10/12/2018   Received: 10/12/2018   Reported: 10/15/2018 10:24   Ordering Phy(s): ALEENA TYLER   Additional Phy(s): ZOË BOATENG     SPECIMEN(S):   A: Right ultrasound guided breast needle biopsy, 12:00, 6.0 cm from nipple   B: Right axilla ultrasound guided breast needle biopsy     COMMENT:   A: Right breast, 0.8 cm lesion, o'clock position, 6 cm from the nipple,   ultrasound core biopsy   - Invasive carcinoma of no special type (ductal, not otherwise specified)   - Grade 3   - No lymphovascular space invasion identified   - Ductal carcinoma in situ, solid pattern, nuclear grade 2     B: Right axilla, irregular lymph node, 2.3 cm size, ultrasound core biopsy   - Adenocarcinoma, consistent with metastatic ductal carcinoma, not   otherwise specified   - See separate reports for the estrogen and progesterone receptor studies   and the HER2 study by [facility]     Electronically signed out by:     Miguel Rutledge M.D.

## 2018-10-15 NOTE — ED AVS SNAPSHOT
Emergency Department    [address] [facility] [address]    Phone:  [phone]    Fax:  [phone]                                       Luci Londono   MRN: [id number]    Department:   Emergency Department   Date of Visit:  10/15/2018           Patient Information     Date Of Birth          1970        Your diagnoses for this visit were:     Breast pain     Acute chest pain     Pulmonary nodules        You were seen by Lissa Quinn MD.      Follow-up Information     Schedule an appointment as soon as possible for a visit with Stuart Wills MD.    Specialty:  Family Practice    Contact information:    [address] [facility] [address] [phone]          Follow up with  Emergency Department.    Specialty:  EMERGENCY MEDICINE    Why:  If symptoms worsen    Contact information:    [address] [facility] [id number] [phone]        Discharge Instructions       Use benadryl as needed for itching    Follow up with surgeon tomorrow  Follow up with oncology and mention that you had CT scan of chest today because of the lung nodules    No obvious sign of infection on breast, armpit. Recommend watching for redness, increase in swelling, continued fever      Your next 10 appointments already scheduled     Oct 16, 2018  2:45 PM CDT   New Visit with Stacey Ashford MD   [facility] ([facility])    [address] [id number] [phone]              24 Hour Appointment Hotline       To make an appointment at any [facility] clinic, call [phone] ([phone]). If you don't have a family doctor or clinic, we will help you find one. [facility] clinics are conveniently located to serve the needs of you and your family.             Review of your medicines      Our records show that you are taking the medicines listed below. If these are  incorrect, please call your family doctor or clinic.        Dose / Directions Last dose taken    levothyroxine 125 MCG tablet   Commonly known as:  SYNTHROID/LEVOTHROID   Quantity:  30 tablet        TAKE 1 TABLET BY MOUTH DAILY   Refills:  0        medroxyPROGESTERone 10 MG tablet   Commonly known as:  PROVERA   Dose:  10 mg   Quantity:  90 tablet        Take 1 tablet (10 mg) by mouth daily   Refills:  3        metFORMIN 500 MG 24 hr tablet   Commonly known as:  GLUCOPHAGE-XR   Dose:  500 mg   Quantity:  30 tablet        Take 1 tablet (500 mg) by mouth daily (with dinner)   Refills:  11        omeprazole 20 MG CR capsule   Commonly known as:  priLOSEC   Dose:  20 mg   Quantity:  60 capsule        Take 1 capsule (20 mg) by mouth 2 times daily   Refills:  11                Procedures and tests performed during your visit     Basic metabolic panel    CBC with platelets differential    Chest CT, IV contrast only - PE protocol    EKG 12-lead, tracing only    Troponin I      Orders Needing Specimen Collection     None      Pending Results     Date and Time Order Name Status Description    10/15/2018 2109 Chest CT, IV contrast only - PE protocol Preliminary             Pending Culture Results     No orders found from 10/13/2018 to 10/16/2018.            Pending Results Instructions     If you had any lab results that were not finalized at the time of your Discharge, you can call the ED Lab Result RN at [phone]. You will be contacted by this team for any positive Lab results or changes in treatment. The nurses are available 7 days a week from 10A to 6:30P.  You can leave a message 24 hours per day and they will return your call.        Test Results From Your Hospital Stay        10/15/2018  9:38 PM      Component Results     Component Value Ref Range & Units Status    WBC 9.5 4.0 - 11.0 10e9/L Final    RBC Count 4.50 3.8 - 5.2 10e12/L Final    Hemoglobin 11.9 11.7 - 15.7 g/dL Final    Hematocrit 35.2 35.0 - 47.0 %  Final    MCV 78 78 - 100 fl Final    MCH 26.4 (L) 26.5 - 33.0 pg Final    MCHC 33.8 31.5 - 36.5 g/dL Final    RDW 16.4 (H) 10.0 - 15.0 % Final    Platelet Count 338 150 - 450 10e9/L Final    Diff Method Automated Method  Final    % Neutrophils 60.4 % Final    % Lymphocytes 33.7 % Final    % Monocytes 5.4 % Final    % Eosinophils 0.2 % Final    % Basophils 0.2 % Final    % Immature Granulocytes 0.1 % Final    Nucleated RBCs 0 0 /100 Final    Absolute Neutrophil 5.7 1.6 - 8.3 10e9/L Final    Absolute Lymphocytes 3.2 0.8 - 5.3 10e9/L Final    Absolute Monocytes 0.5 0.0 - 1.3 10e9/L Final    Absolute Eosinophils 0.0 0.0 - 0.7 10e9/L Final    Absolute Basophils 0.0 0.0 - 0.2 10e9/L Final    Abs Immature Granulocytes 0.0 0 - 0.4 10e9/L Final    Absolute Nucleated RBC 0.0  Final         10/15/2018  9:48 PM      Component Results     Component Value Ref Range & Units Status    Sodium 139 133 - 144 mmol/L Final    Potassium 3.8 3.4 - 5.3 mmol/L Final    Chloride 105 94 - 109 mmol/L Final    Carbon Dioxide 29 20 - 32 mmol/L Final    Anion Gap 5 3 - 14 mmol/L Final    Glucose 99 70 - 99 mg/dL Final    Urea Nitrogen 9 7 - 30 mg/dL Final    Creatinine 0.75 0.52 - 1.04 mg/dL Final    GFR Estimate 82 >60 mL/min/1.7m2 Final    Non  GFR Calc    GFR Estimate If Black >90 >60 mL/min/1.7m2 Final    African American GFR Calc    Calcium 8.7 8.5 - 10.1 mg/dL Final         10/15/2018  9:52 PM      Component Results     Component Value Ref Range & Units Status    Troponin I ES <0.015 0.000 - 0.045 ug/L Final    The 99th percentile for upper reference range is 0.045 ug/L.  Troponin values   in the range of 0.045 - 0.120 ug/L may be associated with risks of adverse   clinical events.           10/15/2018 10:42 PM      Narrative     CT CHEST PULMONARY EMBOLISM WITH CONTRAST 10/15/2018 10:15 PM     HISTORY: Chest pressure that worsens with breathing, has breast  cancer, last week had biopsy of right breast/lymph node that  was  positive. Rule out pulmonary embolism, chest pressure.    TECHNIQUE: Volumetric acquisition of the chest  after the  administration of IV contrast. Radiation dose for this scan was  reduced using automated exposure control, adjustment of the mA and/or  kV according to patient size, or iterative reconstruction technique.  Contrast: 73 mL Isovue-370.    COMPARISON: None.    FINDINGS: No visualized pulmonary embolism. Normal heart size. No  thoracic aortic aneurysm or dissection. No acute infiltrates, pleural  effusions or pneumothorax. Calcified granuloma left lower lung. Tiny  peripheral nodule along the right minor fissure measuring 0.2 cm.  Additional small nodule measuring 0.3 cm in the right midlung  laterally. No acute findings in the visualized upper abdomen. Mildly  prominent right axillary lymph node (series 3, image 35) measuring 1.3  x 1.8 cm. No destructive bone lesions.        Impression     IMPRESSION:  1. No visualized pulmonary embolism or other acute findings.  2. Two tiny right lung nodules, technically indeterminate. See below.  3. Evidence old granulomatous infection.  4. Mildly prominent right axillary lymph node.    Recommendations for one or multiple incidental lung nodules < 6mm :    Low risk patients: No routine follow-up.    High risk patients: Optional follow-up CT at 12 months; if  unchanged, no further follow-up.    *Low Risk: Minimal or absent history of smoking or other known risk  factors.  *Nonsolid (ground glass) or partly solid nodules may require longer  follow-up to exclude indolent adenocarcinoma.  *Recommendations based on Guidelines for the Management of Incidental  Pulmonary Nodules Detected at CT: From the Fleischner Society 2017,  Radiology 2017.                Clinical Quality Measure: Blood Pressure Screening     Your blood pressure was checked while you were in the emergency department today. The last reading we obtained was  BP: 123/77 . Please read the guidelines  below about what these numbers mean and what you should do about them.  If your systolic blood pressure (the top number) is less than 120 and your diastolic blood pressure (the bottom number) is less than 80, then your blood pressure is normal. There is nothing more that you need to do about it.  If your systolic blood pressure (the top number) is 120-139 or your diastolic blood pressure (the bottom number) is 80-89, your blood pressure may be higher than it should be. You should have your blood pressure rechecked within a year by a primary care provider.  If your systolic blood pressure (the top number) is 140 or greater or your diastolic blood pressure (the bottom number) is 90 or greater, you may have high blood pressure. High blood pressure is treatable, but if left untreated over time it can put you at risk for heart attack, stroke, or kidney failure. You should have your blood pressure rechecked by a primary care provider within the next 4 weeks.  If your provider in the emergency department today gave you specific instructions to follow-up with your doctor or provider even sooner than that, you should follow that instruction and not wait for up to 4 weeks for your follow-up visit.        Thank you for choosing [facility]       Thank you for choosing [facility] for your care. Our goal is always to provide you with excellent care. Hearing back from our patients is one way we can continue to improve our services. Please take a few minutes to complete the written survey that you may receive in the mail after you visit with us. Thank you!        Care EveryWhere ID     This is your Care EveryWhere ID. This could be used by other organizations to access your [facility] medical records  [id number]        Equal Access to Services     PINA PINEDA : Hadii raymon chowo Sojosselyn, waaxda luqadaha, qaybta kaalmada juancarlos, josh bangura. So [facility] [phone].    ATENCIÓN: lan Anand  a dominique disposición servicios gratuitos de asistencia lingüística. Lllauren al [phone].    We comply with applicable federal civil rights laws and [place] laws. We do not discriminate on the basis of race, color, national origin, age, disability, sex, sexual orientation, or gender identity.            After Visit Summary       This is your record. Keep this with you and show to your community pharmacist(s) and doctor(s) at your next visit.

## 2018-10-16 ENCOUNTER — TELEPHONE (OUTPATIENT)
Dept: SURGERY | Facility: CLINIC | Age: 48
End: 2018-10-16

## 2018-10-16 ENCOUNTER — TELEPHONE (OUTPATIENT)
Dept: ONCOLOGY | Facility: CLINIC | Age: 48
End: 2018-10-16

## 2018-10-16 ENCOUNTER — OFFICE VISIT (OUTPATIENT)
Dept: SURGERY | Facility: CLINIC | Age: 48
End: 2018-10-16
Payer: COMMERCIAL

## 2018-10-16 VITALS
SYSTOLIC BLOOD PRESSURE: 152 MMHG | WEIGHT: 200 LBS | HEIGHT: 63 IN | DIASTOLIC BLOOD PRESSURE: 87 MMHG | BODY MASS INDEX: 35.44 KG/M2 | HEART RATE: 59 BPM | RESPIRATION RATE: 12 BRPM

## 2018-10-16 DIAGNOSIS — Z17.0 MALIGNANT NEOPLASM OF UPPER-OUTER QUADRANT OF RIGHT BREAST IN FEMALE, ESTROGEN RECEPTOR POSITIVE (H): Primary | ICD-10-CM

## 2018-10-16 DIAGNOSIS — C50.411 MALIGNANT NEOPLASM OF UPPER-OUTER QUADRANT OF RIGHT BREAST IN FEMALE, ESTROGEN RECEPTOR POSITIVE (H): Primary | ICD-10-CM

## 2018-10-16 LAB — COPATH REPORT: NORMAL

## 2018-10-16 PROCEDURE — 99205 OFFICE O/P NEW HI 60 MIN: CPT | Performed by: SURGERY

## 2018-10-16 ASSESSMENT — PAIN SCALES - GENERAL: PAINLEVEL: NO PAIN (0)

## 2018-10-16 NOTE — ED PROVIDER NOTES
"  History     Chief Complaint:  Breast Pain    HPI   Luci Londono is a 47 year old female with history of hypertension, hyperlipidemia, thyroid disease, and obesity who presents for evaluation of breast pain. She had a biopsy 3 days ago to her right breast and axillary area, and was found to have right breast invasive ductal carcinoma, grade 3, and right axilla lymph node consistent with metastatic ductal carcinoma. Yesterday, she felt abnormal, and shaky, so she took her temperature. She had a fever of 101, which has since resolved. She presents today due to itchiness surrounding the biopsy sites, her right arm, and back and increased pain to the sites. She denies cough, or trouble with urination. Denies any new medications, or ointment to her chest or sites during the biopsy. Denies any itchiness or pain to her mouth. Yesterday, she developed a \"heaviness\" in her chest, which is worse with deep inspiration. She has had decreased appetite, and is fatigued. Denies abdominal pain, leg swelling or pain. She has an appointment scheduled for tomorrow with a general surgeon, Dr. Stacey Ashford. Thinks she also has appt with oncology tomorrow. She has taken tylenol for pain.     Allergies:  No Known Drug Allergies      Medications:    Levothyroxine  Provera  Metformin  Omeprazole     Past Medical History:    Hypertension  Lateral epicondylitis  Thyroid disease  GERD  Intervertebral lumbar disc disorder with myelopathy  Helicobacter pylori infection  Polycystic ovaries  Rosacea  Migraine without aura  Mixed hyperlipidemia  Obesity    Past Surgical History:    Hand surgery - left  Tubal ligation    Family History:    Diabetes    Social History:  Relationship status:   Tobacco use: No  Alcohol use: No  The patient presents with many family members.    Marital Status:   [2]     Review of Systems   Constitutional: Positive for fever.   HENT: Negative for sore throat and trouble swallowing.  " "  Respiratory: Negative for cough and shortness of breath.    Cardiovascular: Negative for leg swelling.   Genitourinary: Negative for difficulty urinating and dysuria.   Skin: Positive for rash.   All other systems reviewed and are negative.    Physical Exam     Patient Vitals for the past 24 hrs:   BP Temp Temp src Pulse Resp SpO2 Height Weight   10/15/18 2130 - - - - - 97 % - -   10/15/18 2115 - - - - - 100 % - -   10/15/18 2100 - - - - - 98 % - -   10/15/18 2045 - - - - - 100 % - -   10/15/18 2030 - - - - - 100 % - -   10/15/18 2015 - - - - - 98 % - -   10/15/18 2011 123/77 - - - - 99 % - -   10/15/18 1829 173/71 98.3  F (36.8  C) Oral 86 16 100 % 1.6 m (5' 3\") 90.7 kg (200 lb)        Physical Exam    General: Resting comfortably on the gurney  Eyes:  The pupils are equal and round    Conjunctivae and sclerae are normal  ENT:    Moist mucous membranes  Neck:  Normal range of motion  CV:  Regular rate and rhythm    Skin warm and well perfused   Resp:  Lungs are clear    Non-labored    No rales    No wheezing   GI:  Abdomen is soft, there is no rigidity    No distension    No rebound tenderness     No abdominal tenderness  MS:  Normal muscular tone  Skin:  Right breast with ecchymosis in area of biopsy. No erythema, warmth or fluctuance over this area. No pain on palpation of this area. No erythema, warmth or swelling on right axilla. Mild patchy erythema no right posterior upper arm and right side of back, patient itching in these areas  Neuro:   Awake, alert.      Speech is normal and fluent.    Face is symmetric.     Moves all extremities equally  Psych:  Normal affect.  Appropriate interactions.    Emergency Department Course     ECG:  ECG taken at 2058, ECG read at 2107  Normal sinus rhythm  Cannot rule out anterior infarct, age undetermined  Abnormal ECG  Rate 70 bpm. NV interval 138. QRS duration 84. QT/QTc 388/419. P-R-T axes 58  17  32.     Imaging:  Radiology findings were communicated with the patient " and family who voiced understanding of the findings.  Chest CT, IV contrast only - PE protocol  1. No visualized pulmonary embolism or other acute findings.  2. Two tiny right lung nodules, technically indeterminate. See below.  3. Evidence old granulomatous infection.  4. Mildly prominent right axillary lymph node.    Reading per radiology.     Laboratory:  Laboratory findings were communicated with the patient and family who voiced understanding of the findings.  CBC: MCH: 26.4(L), RDW: 16.4(H) o/w WNL (WBC 9.5, HGB 11.9, )  BMP: AWNL (Creatinine 0.75)  Troponin (Collected 2117): <0.015    Interventions:  2102 Benadryl 25 mg Oral  2222 Toradol 15 mg IV     Emergency Department Course:  Nursing notes and vitals reviewed.  (2044) I performed an exam of the patient as documented above.   IV was inserted and blood was drawn for laboratory testing, results above.  EKG obtained in the ED, see results above.     The patient was sent for a CT while in the emergency department, results above.      2234: I rechecked the patient, the redness and itchiness are gone.    Findings and plan explained to the patient and family. Patient discharged home with instructions regarding supportive care, medications, and reasons to return. The importance of close follow-up was reviewed.      I personally reviewed the laboratory results with the patient and family and answered all related questions prior to discharge.    Impression & Plan      Medical Decision Making:  Luci Londono is a 47 year old female who presented to the ED with breast pain.   The biopsy sites show no evidence of infection. No palpable abscess.  There is erythema and she is itching her right posterior arm as well as right side of her back.  Concerned that this is more allergic than infectious.  She is also complaining of chest pain and so EKG obtained that showed T wave inversion in anterior leads which are not new findings. Has had these findings on  prior EKG.  Her troponin is negative.  She has had pain for over a day and so do not think second troponin is indicated.  Given the recent diagnosis of cancer did obtain CT chest to rule out PE. This showed no PE or acute abnormality. Does have very small lung nodules, discussed with patient and recommended f/u with oncology or PCP. Benadryl resolved itching and erythema on back/arm on recheck. Did report fever earlier but no fever in ED, nl WBC. No source of infection found on history/exam so will not start on antibiotics.. Has f/u tomorrow with surgery and oncology per patient. Suspect that anxiety and stress about new diagnosis of cancer is contributing to her symptoms. Should know more tomorrow at appointments.    Diagnosis:    ICD-10-CM    1. Breast pain N64.4    2. Acute chest pain R07.9    3. Pulmonary nodules R91.8       Disposition:   Discharged to home    CMS Diagnoses: None     Discharge Medications:  New Prescriptions    No medications on file       Scribe Disclosure:  I, Lori Melendez, am serving as a scribe at 8:29 PM on 10/15/2018 to document services personally performed by Lissa Quinn MD, based on my observations and the provider's statements to me.     Lori Melendez  10/15/2018    EMERGENCY DEPARTMENT       Lissa Quinn MD  10/16/18 0016

## 2018-10-16 NOTE — TELEPHONE ENCOUNTER
Called New Pt seeing  for Breast Cancer at appt on Wed 10-17-18  at 1pm.  Pt had a few general questions that were all answered.  Provided brief directions to our Clinic's location.  Pt verbalized understanding.    Records are in Epic. ---HER-2 is still pending but per Jorge in Cytogenetics at the [facility] results should be back by noon tomorrow in time for Pt's 1pm appt with oncologist .

## 2018-10-16 NOTE — LETTER
2018    Re: Luci Londono, : 1970    [facility] Breast Surgery Consultation     HPI:  Luci Londono is a 47 year old female who is seen in consultation at the request of Dr. Wills for evaluation of newly diagnosed right breast cancer. She felt a lump in her right breast in early October. She presented for diagnostic imaging. Her mammogram revealed a small mass in the right upper outer breast, US revealed an 8mm hypoechoic mass at 12:00, 6cm FN and axillary US revealed a concerning lymph node which was also biopsied. Her pathology revealed a grade 3, invasive ductal carcinoma, ER/GA negative with involvement of the axillary node.      She reports she does daily self breast exams and noticed the lump three days prior to her mammogram. She was scheduled for a screening annual mammogram and it was changed to diagnostic due to the palpable lump. She has annual screening mammograms. She denies pain on her breast, some pain in the axilla after biopsy. She reports some new right shoulder pain and low back pain which is chronic. 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      She was seen in the ER on 10/12 due to altered mental status and had a head CT which ws normal. She returned to baseline and was discharged. She was seen again yesterday due to fever of 101, which resolved and chest heaviness.      Hormonal history:  menarche at age 15yo, 3 children, 1st at age 32,  Pre-menopausal, a few months of OCP use, no HRT, no fertility treatment.      Family history of breast cancer: No  Family history of ovarian cancer:  No  Family history of colon cancer: No  Family history of prostate cancer: No      Percutaneous core needle biopsy, right:   SPECIMEN(S):   A: Right ultrasound guided breast needle biopsy, 12:00, 6.0 cm from nipple   B: Right axilla ultrasound guided breast needle biopsy     COMMENT:   A: Right breast, 0.8 cm lesion, o'clock position, 6 cm from  the nipple,   ultrasound core biopsy   - Invasive carcinoma of no special type (ductal, not otherwise specified)   - Grade 3   - No lymphovascular space invasion identified   - Ductal carcinoma in situ, solid pattern, nuclear grade 2     B: Right axilla, irregular lymph node, 2.3 cm size, ultrasound core biopsy   - Adenocarcinoma, consistent with metastatic ductal carcinoma, not   otherwise specified   - See separate reports for the estrogen and progesterone receptor studies   and the HER2 study by [facility]       Past Medical History:  has a past medical history of Hypertension goal BP (blood pressure) < 140/80 (2/18/2014); Lateral epicondylitis, right dominant elbow since late 10-17 (11/1/2017); and Thyroid disease.        Current Outpatient Prescriptions:      levothyroxine (SYNTHROID/LEVOTHROID) 125 MCG tablet, TAKE 1 TABLET BY MOUTH DAILY, Disp: 30 tablet, Rfl: 0     medroxyPROGESTERone (PROVERA) 10 MG tablet, Take 1 tablet (10 mg) by mouth daily, Disp: 90 tablet, Rfl: 3     metFORMIN (GLUCOPHAGE-XR) 500 MG 24 hr tablet, Take 1 tablet (500 mg) by mouth daily (with dinner), Disp: 30 tablet, Rfl: 11     omeprazole (PRILOSEC) 20 MG CR capsule, Take 1 capsule (20 mg) by mouth 2 times daily, Disp: 60 capsule, Rfl: 11    PE:  Vitals: There were no vitals taken for this visit.  General appearance: well-nourished, sitting comfortably, no apparent distress  Psych: normal affect, pleasant  HEENT:  Head normocephalic and atraumatic, pupils equal and round, conjunctivae clear, mucous membranes moist, external ears and nose normal  Neck: Supple without thyromegaly or masses  Lungs: Respirations unlabored  Lymphatic: No cervical, or supraclavicular lymphadenopathy  Extremities: Without edema  Musculoskeletal:  Normal station and gait  Neurologic: nonfocal, grossly intact times four extremities, alert and oriented times three  Psychiatric: Mood and affect are appropriate  Skin: Without lesions or rashes     Breast:  A bilateral  breast exam was performed in the supine position.. Bilateral breasts were palpated in a circumferential clockwise fashion including the supraclavicular and axillary areas.   Right breast with mild ecchymosis on upper breast, palpable 1.5cm mass at 12:00. Remainder of breast is normal. No other masses. Left breast is symmetrical with no skin or nipple changes. No masses on the left. Tissue is very dense throughout.       Lymph:                                                No supraclavicular/infraclavicular adenopathy.                         Axillary adenopathy: right - single palpable node deep in the axilla.      Assessment:  Right  breast invasive ductal carcinoma with right axillary eusebio involvement, grade 3, ER -, TN -, [id number] pending measuring 0.8cm at 12:00 and 6 cm from the nipple.     Her stage is:    Staging form: Breast, AJCC 8th Edition    - Clinical: cT1, cN1, cM0, G3, ER: -, TN: -, Her 2 pending     Plan:  Luci is a very pleasant 47yof who has newly diagnosed right breast cancer with eusebio involvement.  I reviewed the imaging and pathology reports with her and sister and aunt and explained the findings.  We talked about the fact that this is invasive ductal carcinoma  that is small in size but that it is high grade and potentially triple negative vs her 2 positive. In either situation with eusebio involvement, I would recommend consideration of neoadjuvant chemotherapy and she is scheduled to see Dr. Murillo on 10/17/2018. I also believe a breast MRI would be helpful given the density of her breasts, DCIS and such a small lesion on imaging with eusebio disease.  We discussed genetic testing given her young age and the implications of a positive results on surgical decision making.      We next discussed the surgical options for treatment.  I described the procedures for lumpectomy with sentinel lymph node biopsy and mastectomy with sentinel lymph node biopsy, possible axillary node dissection including  the details of the procedures, the risks, anesthesia and expected recovery.  If she has a good response to neoadjuvant chemotherapy, she would be a candidate for SLNB, if she continues to have evidence of axillary disease after therapy, an axillary node dissection would be recommended. We discussed the risks of lymphedema in either setting.      I reviewed the data regarding lumpectomy and radiation vs mastectomy that shows that the local recurrence risk is slightly higher for lumpectomy and radiation vs mastectomy (3-5% vs. 1-2%), but the survival at 20 years is the same.  She would be a candidate for breast conservation if cancer is limited to the 0.8cm      We talked about post-lumpectomy radiation, the course and usual side effects. We discussed that with lumpectomy, radiation is typically recommended to decrease risk of recurrence. It may be necessary following mastectomy depending on final pathology and if eusebio involvement is present.      We also talked about post-mastectomy reconstruction and the stages involved. We also discussed the various types of mastectomy, including total, skin-sparing, and nipple-sparing mastectomy.  We reviewed that the nipple-sparing technique is cosmetic; sensation and contractility will likely be lost. She is a candidate for nipple-sparing mastectomy from an oncologic perspective .  The option of having immediate versus delayed reconstruction was also discussed.   We reviewed that the advantages of immediate reconstruction includes superior cosmetics, as the skin is preserved.       Stacey Ashford MD

## 2018-10-16 NOTE — PROGRESS NOTES
[facility] Breast Surgery Consultation    HPI:   Luci Londono is a 47 year old female who is seen in consultation at the request of Dr. Wills for evaluation of newly diagnosed right breast cancer. She felt a lump in her right breast in early October. She presented for diagnostic imaging. Her mammogram revealed a small mass in the right upper outer breast, US revealed an 8mm hypoechoic mass at 12:00, 6cm FN and axillary US revealed a concerning lymph node which was also biopsied. Her pathology revealed a grade 3, invasive ductal carcinoma, ER/NJ negative with involvement of the axillary node.     She reports she does daily self breast exams and noticed the lump three days prior to her mammogram. She was scheduled for a screening annual mammogram and it was changed to diagnostic due to the palpable lump. She has annual screening mammograms. She denies pain on her breast, some pain in the axilla after biopsy. She reports some new right shoulder pain and low back pain which is chronic. 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     She was seen in the ER on 10/12 due to altered mental status and had a head CT which ws normal. She returned to baseline and was discharged. She was seen again yesterday due to fever of 101, which resolved and chest heaviness.     Hormonal history:  menarche at age 13yo, 3 children, 1st at age 32,  Pre-menopausal, a few months of OCP use, no HRT, no fertility treatment.     Family history of breast cancer: No  Family history of ovarian cancer:  No  Family history of colon cancer: No  Family history of prostate cancer: No    Imaging:     Recent Results (from the past 744 hour(s))   MA Diagnostic Bilateral w/David    Narrative    Examination: Bilateral digital diagnostic mammography and digital  breast tomosynthesis with computer aided detection, and focused  ultrasound of the RIGHT breast, 10/9/2018.    Comparison: 8/25/2017 and 1/31/2014.    History:  Palpable lump in the RIGHT breast for the past 3 days.    BREAST DENSITY: Scattered fibroglandular densities    Findings: Mammography with digital breast tomosynthesis demonstrates a  small mass with indistinct margins at the patient's palpable area of  concern in the upper outer RIGHT breast. No concerning findings in the  left breast.     Focussed ultrasound by radiologist and technologist of the upper outer  quadrant of the RIGHT breast was performed. Irregularly-shaped  hypoechoic mass with indistinct margins is seen at the patient's  palpable area of concern, 12:00 position 6 m from the nipple on the  RIGHT. This measures 8 x 6 x 6 mm.    RIGHT axillary ultrasound survey demonstrates a lymph node with  eccentric cortical thickening and subcapsular blood flow on color  Doppler imaging.      Impression    IMPRESSION: BI-RADS CATEGORY: 4 - Suspicious Abnormality-Biopsy Should  Be Considered.    RECOMMENDED FOLLOW-UP: Biopsy.    Ultrasound-guided core needle biopsy of RIGHT breast mass and RIGHT  axillary lymph node.    The patient was given the results of the examination.    CARSON CAGE MD   US Breast Right    Narrative    Examination: Bilateral digital diagnostic mammography and digital  breast tomosynthesis with computer aided detection, and focused  ultrasound of the RIGHT breast, 10/9/2018.    Comparison: 8/25/2017 and 1/31/2014.    History: Palpable lump in the RIGHT breast for the past 3 days.    BREAST DENSITY: Scattered fibroglandular densities    Findings: Mammography with digital breast tomosynthesis demonstrates a  small mass with indistinct margins at the patient's palpable area of  concern in the upper outer RIGHT breast. No concerning findings in the  left breast.     Focussed ultrasound by radiologist and technologist of the upper outer  quadrant of the RIGHT breast was performed. Irregularly-shaped  hypoechoic mass with indistinct margins is seen at the patient's  palpable area of concern, 12:00  position 6 m from the nipple on the  RIGHT. This measures 8 x 6 x 6 mm.    RIGHT axillary ultrasound survey demonstrates a lymph node with  eccentric cortical thickening and subcapsular blood flow on color  Doppler imaging.      Impression    IMPRESSION: BI-RADS CATEGORY: 4 - Suspicious Abnormality-Biopsy Should  Be Considered.    RECOMMENDED FOLLOW-UP: Biopsy.    Ultrasound-guided core needle biopsy of RIGHT breast mass and RIGHT  axillary lymph node.    The patient was given the results of the examination.    CARSON CAGE MD   US Breast Biopsy Core Needle Right    Addendum: 10/15/2018    RAMIRO VELOZ ALERT  [id number]  [id number]  [id number]    Addendum: The pathology diagnosis for the right breast biopsy is  invasive ductal carcinoma and ductal carcinoma in situ. The right  axillary lymph node demonstrated metastatic adenocarcinoma. Surgical  consultation and breast MRI are recommended.    Zoë Fontanez MD ( Date of Addendum: 10/15/2018 )    ZOË FONTANEZ MD      Narrative    ULTRASOUND BREAST BIOPSY CORE NEEDLE RIGHT, ULTRASOUND BIOPSY CORE  LYMPH NODE ADDITIONAL NODE, MAMMOGRAPHY POST PROCEDURE RIGHT  10/12/2018 9:57 AM    HISTORY:  Right breast mass and enlarged right axillary lymph node.    FINDINGS: Procedure, risks, benefits and alternatives were discussed  with the patient and the patient gave written and verbal consent.  Aseptic technique was utilized. 1% lidocaine was utilized for local  anesthesia. Under ultrasound guidance, biopsy of mass was performed  and marker placed as follows and images were archived:    Size: 14-gauge core needle biopsy system.  Number of cores: 3.  Position: 12 o'clock, 6 cm from the nipple.  Marker: HydroMark.     Size: 14-gauge core needle biopsy system.  Number of cores: 2.  Position: Axilla.  Marker: HydroMark.    Pressure was held over both areas for approximately 10 minutes. A  dressing was placed and care instructions were discussed with the  patient.    Post biopsy  mammogram demonstrates clip deployment.      Impression    IMPRESSION:    Uncomplicated ultrasound guided core needle biopsy of  the right breast and right axilla.    TEJAL FONTANEZ MD   MA Post Procedure Right    Addendum: 10/15/2018    TAQUERIAANA VELOZ ALERT  [id number]  [id number]  [id number]    Addendum: The pathology diagnosis for the right breast biopsy is  invasive ductal carcinoma and ductal carcinoma in situ. The right  axillary lymph node demonstrated metastatic adenocarcinoma. Surgical  consultation and breast MRI are recommended.    Tejal Fontanez MD ( Date of Addendum: 10/15/2018 )    TEJAL FONTANEZ MD      Narrative    ULTRASOUND BREAST BIOPSY CORE NEEDLE RIGHT, ULTRASOUND BIOPSY CORE  LYMPH NODE ADDITIONAL NODE, MAMMOGRAPHY POST PROCEDURE RIGHT  10/12/2018 9:57 AM    HISTORY:  Right breast mass and enlarged right axillary lymph node.    FINDINGS: Procedure, risks, benefits and alternatives were discussed  with the patient and the patient gave written and verbal consent.  Aseptic technique was utilized. 1% lidocaine was utilized for local  anesthesia. Under ultrasound guidance, biopsy of mass was performed  and marker placed as follows and images were archived:    Size: 14-gauge core needle biopsy system.  Number of cores: 3.  Position: 12 o'clock, 6 cm from the nipple.  Marker: HydroMark.     Size: 14-gauge core needle biopsy system.  Number of cores: 2.  Position: Axilla.  Marker: HydroMark.    Pressure was held over both areas for approximately 10 minutes. A  dressing was placed and care instructions were discussed with the  patient.    Post biopsy mammogram demonstrates clip deployment.      Impression    IMPRESSION:    Uncomplicated ultrasound guided core needle biopsy of  the right breast and right axilla.    TEJAL FONTANEZ MD   US Biopsy Lymph Node Core Additional Node    Addendum: 10/15/2018    TAQUERIAANA MAGDIEL ALERT  [id number]  [id number]  [id number]    Addendum: The pathology diagnosis for the  right breast biopsy is  invasive ductal carcinoma and ductal carcinoma in situ. The right  axillary lymph node demonstrated metastatic adenocarcinoma. Surgical  consultation and breast MRI are recommended.    Tejal Fontanez MD ( Date of Addendum: 10/15/2018 )    TEJAL FONTANEZ MD      Narrative    ULTRASOUND BREAST BIOPSY CORE NEEDLE RIGHT, ULTRASOUND BIOPSY CORE  LYMPH NODE ADDITIONAL NODE, MAMMOGRAPHY POST PROCEDURE RIGHT  10/12/2018 9:57 AM    HISTORY:  Right breast mass and enlarged right axillary lymph node.    FINDINGS: Procedure, risks, benefits and alternatives were discussed  with the patient and the patient gave written and verbal consent.  Aseptic technique was utilized. 1% lidocaine was utilized for local  anesthesia. Under ultrasound guidance, biopsy of mass was performed  and marker placed as follows and images were archived:    Size: 14-gauge core needle biopsy system.  Number of cores: 3.  Position: 12 o'clock, 6 cm from the nipple.  Marker: HydroMark.     Size: 14-gauge core needle biopsy system.  Number of cores: 2.  Position: Axilla.  Marker: HydroMark.    Pressure was held over both areas for approximately 10 minutes. A  dressing was placed and care instructions were discussed with the  patient.    Post biopsy mammogram demonstrates clip deployment.      Impression    IMPRESSION:    Uncomplicated ultrasound guided core needle biopsy of  the right breast and right axilla.    TEJAL FONTANEZ MD   Head CT w/o contrast    Narrative    CT SCAN OF THE HEAD WITHOUT CONTRAST   10/12/2018 10:39 PM     HISTORY: Altered mental status.      TECHNIQUE:  Axial images of the head and coronal reformations without  IV contrast material. Radiation dose for this scan was reduced using  automated exposure control, adjustment of the mA and/or kV according  to patient size, or iterative reconstruction technique.    COMPARISON: None.    FINDINGS:  The cerebral hemispheres, brainstem, and cerebellum demonstrate  normal  morphology and attenuation. No evidence of acute ischemia, hemorrhage,  mass, mass effect, or hydrocephalus. The visualized calvarium, skull  base, paraspinous sinuses, and extra cranial soft tissues are  unremarkable.      Impression    IMPRESSION:   No acute intracranial abnormality.    COLEMAN GARCIA MD   Chest CT, IV contrast only - PE protocol    Narrative    CT CHEST PULMONARY EMBOLISM WITH CONTRAST 10/15/2018 10:15 PM     HISTORY: Chest pressure that worsens with breathing, has breast  cancer, last week had biopsy of right breast/lymph node that was  positive. Rule out pulmonary embolism, chest pressure.    TECHNIQUE: Volumetric acquisition of the chest  after the  administration of IV contrast. Radiation dose for this scan was  reduced using automated exposure control, adjustment of the mA and/or  kV according to patient size, or iterative reconstruction technique.  Contrast: 73 mL Isovue-370.    COMPARISON: None.    FINDINGS: No visualized pulmonary embolism. Normal heart size. No  thoracic aortic aneurysm or dissection. No acute infiltrates, pleural  effusions or pneumothorax. Calcified granuloma left lower lung. Tiny  peripheral nodule along the right minor fissure measuring 0.2 cm.  Additional small nodule measuring 0.3 cm in the right midlung  laterally. No acute findings in the visualized upper abdomen. Mildly  prominent right axillary lymph node (series 3, image 35) measuring 1.3  x 1.8 cm. No destructive bone lesions.      Impression    IMPRESSION:  1. No visualized pulmonary embolism or other acute findings.  2. Two tiny right lung nodules, technically indeterminate. See below.  3. Evidence old granulomatous infection.  4. Mildly prominent right axillary lymph node.    Recommendations for one or multiple incidental lung nodules < 6mm :    Low risk patients: No routine follow-up.    High risk patients: Optional follow-up CT at 12 months; if  unchanged, no further follow-up.    *Low Risk:  Minimal or absent history of smoking or other known risk  factors.  *Nonsolid (ground glass) or partly solid nodules may require longer  follow-up to exclude indolent adenocarcinoma.  *Recommendations based on Guidelines for the Management of Incidental  Pulmonary Nodules Detected at CT: From the Fleischner Society 2017,  Radiology 2017.    LINH STERLING MD       Percutaneous core needle biopsy, right:   SPECIMEN(S):   A: Right ultrasound guided breast needle biopsy, 12:00, 6.0 cm from nipple   B: Right axilla ultrasound guided breast needle biopsy     COMMENT:   A: Right breast, 0.8 cm lesion, o'clock position, 6 cm from the nipple,   ultrasound core biopsy   - Invasive carcinoma of no special type (ductal, not otherwise specified)   - Grade 3   - No lymphovascular space invasion identified   - Ductal carcinoma in situ, solid pattern, nuclear grade 2     B: Right axilla, irregular lymph node, 2.3 cm size, ultrasound core biopsy   - Adenocarcinoma, consistent with metastatic ductal carcinoma, not   otherwise specified   - See separate reports for the estrogen and progesterone receptor studies   and the HER2 study by [facility]       Past Medical History:   has a past medical history of Hypertension goal BP (blood pressure) < 140/80 (2/18/2014); Lateral epicondylitis, right dominant elbow since late 10-17 (11/1/2017); and Thyroid disease.      Current Outpatient Prescriptions:      levothyroxine (SYNTHROID/LEVOTHROID) 125 MCG tablet, TAKE 1 TABLET BY MOUTH DAILY, Disp: 30 tablet, Rfl: 0     medroxyPROGESTERone (PROVERA) 10 MG tablet, Take 1 tablet (10 mg) by mouth daily, Disp: 90 tablet, Rfl: 3     metFORMIN (GLUCOPHAGE-XR) 500 MG 24 hr tablet, Take 1 tablet (500 mg) by mouth daily (with dinner), Disp: 30 tablet, Rfl: 11     omeprazole (PRILOSEC) 20 MG CR capsule, Take 1 capsule (20 mg) by mouth 2 times daily, Disp: 60 capsule, Rfl: 11    Past Surgical History:  Past Surgical History:   Procedure Laterality Date      HAND SURGERY  2002    left     TUBAL LIGATION           No Known Allergies     Social History:  Social History     Social History     Marital status:      Spouse name: N/A     Number of children: N/A     Years of education: N/A     Occupational History     Not on file.     Social History Main Topics     Smoking status: Never Smoker     Smokeless tobacco: Never Used     Alcohol use No     Drug use: No     Sexual activity: Yes     Partners: Male     Birth control/ protection: Pill     Other Topics Concern     Parent/Sibling W/ Cabg, Mi Or Angioplasty Before 65f 55m? No     Social History Narrative        ROS:  The 10 point review of systems is negative other than noted in the HPI and above.    PE:  Vitals: There were no vitals taken for this visit.  General appearance: well-nourished, sitting comfortably, no apparent distress  Psych: normal affect, pleasant  HEENT:  Head normocephalic and atraumatic, pupils equal and round, conjunctivae clear, mucous membranes moist, external ears and nose normal  Neck: Supple without thyromegaly or masses  Lungs: Respirations unlabored  Lymphatic: No cervical, or supraclavicular lymphadenopathy  Extremities: Without edema  Musculoskeletal:  Normal station and gait  Neurologic: nonfocal, grossly intact times four extremities, alert and oriented times three  Psychiatric: Mood and affect are appropriate  Skin: Without lesions or rashes    Breast:  A bilateral breast exam was performed in the supine position.. Bilateral breasts were palpated in a circumferential clockwise fashion including the supraclavicular and axillary areas.   Right breast with mild ecchymosis on upper breast, palpable 1.5cm mass at 12:00. Remainder of breast is normal. No other masses. Left breast is symmetrical with no skin or nipple changes. No masses on the left. Tissue is very dense throughout.      Lymph:       No supraclavicular/infraclavicular adenopathy.   Axillary adenopathy: right - single palpable  node deep in the axilla.     Assessment:  Right  breast invasive ductal carcinoma with right axillary uesebio involvement, grade 3, ER -, GA -, [id number] pending measuring 0.8cm at 12:00 and 6 cm from the nipple.    Her stage is:    Staging form: Breast, AJCC 8th Edition    - Clinical: cT1, cN1, cM0, G3, ER: -, GA: -, Her 2 pending    Plan:  Luci is a very pleasant 47yof who has newly diagnosed right breast cancer with eusebio involvement.  I reviewed the imaging and pathology reports with her and sister and aunt and explained the findings.  We talked about the fact that this is invasive ductal carcinoma  that is small in size but that it is high grade and potentially triple negative vs her 2 positive. In either situation with eusebio involvement, I would recommend consideration of neoadjuvant chemotherapy and she is scheduled to see Dr. Murillo on 10/17/2018. I also believe a breast MRI would be helpful given the density of her breasts, DCIS and such a small lesion on imaging with eusebio disease.  We discussed genetic testing given her young age and the implications of a positive results on surgical decision making.     We next discussed the surgical options for treatment.  I described the procedures for lumpectomy with sentinel lymph node biopsy and mastectomy with sentinel lymph node biopsy, possible axillary node dissection including the details of the procedures, the risks, anesthesia and expected recovery.  If she has a good response to neoadjuvant chemotherapy, she would be a candidate for SLNB, if she continues to have evidence of axillary disease after therapy, an axillary node dissection would be recommended. We discussed the risks of lymphedema in either setting.     I reviewed the data regarding lumpectomy and radiation vs mastectomy that shows that the local recurrence risk is slightly higher for lumpectomy and radiation vs mastectomy (3-5% vs. 1-2%), but the survival at 20 years is the same.  She would be a  candidate for breast conservation if cancer is limited to the 0.8cm     We talked about post-lumpectomy radiation, the course and usual side effects. We discussed that with lumpectomy, radiation is typically recommended to decrease risk of recurrence. It may be necessary following mastectomy depending on final pathology and if eusebio involvement is present.     We also talked about post-mastectomy reconstruction and the stages involved. We also discussed the various types of mastectomy, including total, skin-sparing, and nipple-sparing mastectomy.  We reviewed that the nipple-sparing technique is cosmetic; sensation and contractility will likely be lost. She is a candidate for nipple-sparing mastectomy from an oncologic perspective .  The option of having immediate versus delayed reconstruction was also discussed.   We reviewed that the advantages of immediate reconstruction includes superior cosmetics, as the skin is preserved.        Stacey Ashford MD     60 minutes were spent with the patient in coordination of care and counseling.       Please route or send letter to:  Primary Care Provider (PCP) and Referring Provider

## 2018-10-16 NOTE — TELEPHONE ENCOUNTER
Type of surgery: Port placement  Location of surgery: [facility]  Date and time of surgery: 10/18/18 at 4:35pm  Surgeon: Dr. Stacey Ashford  Pre-Op Appt Date: Patient to schedule  Post-Op Appt Date: Patient to schedule   Packet sent out: Yes  Pre-cert/Authorization completed:  Not Applicable  Date: 10/16/18

## 2018-10-16 NOTE — DISCHARGE INSTRUCTIONS
Use benadryl as needed for itching    Follow up with surgeon tomorrow  Follow up with oncology and mention that you had CT scan of chest today because of the lung nodules    No obvious sign of infection on breast, armpit. Recommend watching for redness, increase in swelling, continued fever

## 2018-10-16 NOTE — NURSING NOTE
Breast Patients    BREAST PATIENTS (ALL)    1-Do you have any of the following symptoms? Lump(s) or Mass(es)  2-In which breast are you having the symptoms? right  3-Do you use hormones?    4-Have you had a Mammogram? Yes  [facility]: [facility]  Date: 10/9/2018  5-Have you ever had a breast cyst drained? No  6-Have you ever had a breast biopsy? Yes  Side: right  Date: 10/2018  7-Have you ever had a Breast Cancer? No   8-Is there a history of Breast Cancer in your family? No  9-Have you ever had Ovarian Cancer? No  10-Is there a history of Ovarian Cancer in your family? No  11-Summarize your caffeine intake (i.e. coffee, tea, chocolate, soda etc.): 1-2 cups/day    BREAST PATIENTS (FEMALE)    12-What age did your periods begin? 14  13-Date your last menstrual period began? 10/9/2018  14-Number of full-term pregnancies: 3  15-Your age when your first child was born? 32  16-Did you nurse your children? Yes  17-Are you pregnant now? No  18-Have you begun menopause? No  19-Have you had either ovary removed?No  20-Do you have breast implants? No

## 2018-10-17 ENCOUNTER — HOSPITAL ENCOUNTER (OUTPATIENT)
Dept: MRI IMAGING | Facility: CLINIC | Age: 48
Discharge: HOME | End: 2018-10-17
Attending: SURGERY | Admitting: SURGERY
Payer: COMMERCIAL

## 2018-10-17 ENCOUNTER — ONCOLOGY VISIT (OUTPATIENT)
Dept: ONCOLOGY | Facility: CLINIC | Age: 48
End: 2018-10-17
Attending: INTERNAL MEDICINE
Payer: COMMERCIAL

## 2018-10-17 ENCOUNTER — HOSPITAL ENCOUNTER (OUTPATIENT)
Facility: CLINIC | Age: 48
Setting detail: SPECIMEN
Discharge: HOME | End: 2018-10-17
Attending: INTERNAL MEDICINE | Admitting: INTERNAL MEDICINE
Payer: COMMERCIAL

## 2018-10-17 ENCOUNTER — DOCUMENTATION ONLY (OUTPATIENT)
Dept: MAMMOGRAPHY | Facility: CLINIC | Age: 48
End: 2018-10-17

## 2018-10-17 ENCOUNTER — TELEPHONE (OUTPATIENT)
Dept: ONCOLOGY | Facility: CLINIC | Age: 48
End: 2018-10-17

## 2018-10-17 VITALS
HEART RATE: 77 BPM | OXYGEN SATURATION: 98 % | WEIGHT: 195.2 LBS | SYSTOLIC BLOOD PRESSURE: 124 MMHG | BODY MASS INDEX: 34.58 KG/M2 | RESPIRATION RATE: 16 BRPM | TEMPERATURE: 97.9 F | DIASTOLIC BLOOD PRESSURE: 78 MMHG

## 2018-10-17 DIAGNOSIS — Z17.0 MALIGNANT NEOPLASM OF UPPER-OUTER QUADRANT OF RIGHT BREAST IN FEMALE, ESTROGEN RECEPTOR POSITIVE (H): ICD-10-CM

## 2018-10-17 DIAGNOSIS — R73.03 PRE-DIABETES: ICD-10-CM

## 2018-10-17 DIAGNOSIS — C50.411 MALIGNANT NEOPLASM OF UPPER-OUTER QUADRANT OF RIGHT BREAST IN FEMALE, ESTROGEN RECEPTOR NEGATIVE (H): Primary | ICD-10-CM

## 2018-10-17 DIAGNOSIS — C50.411 MALIGNANT NEOPLASM OF UPPER-OUTER QUADRANT OF RIGHT BREAST IN FEMALE, ESTROGEN RECEPTOR POSITIVE (H): ICD-10-CM

## 2018-10-17 DIAGNOSIS — Z17.1 MALIGNANT NEOPLASM OF UPPER-OUTER QUADRANT OF RIGHT BREAST IN FEMALE, ESTROGEN RECEPTOR NEGATIVE (H): Primary | ICD-10-CM

## 2018-10-17 LAB
CANCER AG27-29 SERPL-ACNC: 21 U/ML (ref 0–39)
COPATH REPORT: NORMAL
HBA1C MFR BLD: 5.8 % (ref 0–5.6)

## 2018-10-17 PROCEDURE — 77059 MR BREAST BILATERAL W/O & W CONTRAST: CPT

## 2018-10-17 PROCEDURE — A9585 GADOBUTROL INJECTION: HCPCS | Performed by: SURGERY

## 2018-10-17 PROCEDURE — 36415 COLL VENOUS BLD VENIPUNCTURE: CPT

## 2018-10-17 PROCEDURE — 99204 OFFICE O/P NEW MOD 45 MIN: CPT | Performed by: INTERNAL MEDICINE

## 2018-10-17 PROCEDURE — 83036 HEMOGLOBIN GLYCOSYLATED A1C: CPT | Performed by: INTERNAL MEDICINE

## 2018-10-17 PROCEDURE — 25800025 ZZH RX 258: Performed by: SURGERY

## 2018-10-17 PROCEDURE — 86300 IMMUNOASSAY TUMOR CA 15-3: CPT | Performed by: INTERNAL MEDICINE

## 2018-10-17 PROCEDURE — 25500064 ZZH RX 255 OP 636: Performed by: SURGERY

## 2018-10-17 PROCEDURE — G0463 HOSPITAL OUTPT CLINIC VISIT: HCPCS

## 2018-10-17 RX ORDER — ACYCLOVIR 200 MG/1
60 CAPSULE ORAL ONCE
Status: COMPLETED | OUTPATIENT
Start: 2018-10-17 | End: 2018-10-17

## 2018-10-17 RX ORDER — LIDOCAINE/PRILOCAINE 2.5 %-2.5%
CREAM (GRAM) TOPICAL
Qty: 30 G | Refills: 3 | Status: SHIPPED | OUTPATIENT
Start: 2018-10-17 | End: 2019-12-24

## 2018-10-17 RX ORDER — GADOBUTROL 604.72 MG/ML
9 INJECTION INTRAVENOUS ONCE
Status: COMPLETED | OUTPATIENT
Start: 2018-10-17 | End: 2018-10-17

## 2018-10-17 RX ADMIN — SODIUM CHLORIDE 60 ML: 9 INJECTION, SOLUTION INTRAMUSCULAR; INTRAVENOUS; SUBCUTANEOUS at 21:04

## 2018-10-17 RX ADMIN — GADOBUTROL 9 ML: 604.72 INJECTION INTRAVENOUS at 21:04

## 2018-10-17 ASSESSMENT — PAIN SCALES - GENERAL: PAINLEVEL: MILD PAIN (2)

## 2018-10-17 NOTE — MR AVS SNAPSHOT
After Visit Summary   10/17/2018    Luci Londono    MRN: [id number]           Patient Information     Date Of Birth          1970        Visit Information        Provider Department      10/17/2018 1:00 PM Addie Murillo MD [facility]        Today's Diagnoses     Malignant neoplasm of upper-outer quadrant of right breast in female, estrogen receptor negative (H)    -  1    Pre-diabetes          Care Instructions    Labs today.   PET ASAP as staging. Echo.   Genetic referral.  Chemo teach for AC DD -- wkly taxol  Plan C1D1 next Wed  Port is scheduled with surgery for tomorrow.   F/u with C2D1.          Follow-ups after your visit        Your next 10 appointments already scheduled     Oct 17, 2018  8:15 PM CDT   MR BREAST BILATERAL W CONTRAST with SHMRP1   [facility] MRI ([facility])    [address] [place] [id number] [phone]           How do I prepare for my exam? (Food and drink instructions) **If you will be receiving sedation or general anesthesia, please see special notes below.**  How do I prepare for my exam? (Other instructions) Take your medicines as usual, unless your doctor tells you not to. The timing of your exam may depend on the start of your last period. If you re in menopause, you may have the exam anytime.  You will have IV contrast for this exam.  You do not need to do anything special to prepare. **If you will be receiving sedation or general anesthesia, please see special notes below.**  What should I wear:  The MRI machine uses a strong magnet. Please wear clothes without metal (snaps, zippers). A sweatsuit works well, or we may give you a hospital gown. Please remove any body piercings and hair extensions before you arrive. You will also remove watches, jewelry, hairpins, wallets, dentures, partial dental plates and hearing aids. You may wear contact lenses, and you may be able to wear your rings. We have a  safe place to keep your personal items, but it is safer to leave them at home.  How long does the exam take:  Most tests take 30 to 60 minutes.  HOWEVER, IF YOUR DOCTOR PRESCRIBES ANESTHESIA please plan on spending four to five hours in the recovery room.  What should I bring: Bring a list of your current medicines to your exam (including vitamins, minerals and over-the-counter drugs). Please bring any previous mammograms or breast MRIs from other facilities to the MRI dept. Do not mail these items to us.  Do I need a : **If you will be receiving sedation or general anesthesia, please see special notes below.**  What should I do after the exam: No Restrictions, You may resume normal activities.  What is this test: MRI (magnetic resonance imaging) uses a strong magnet and radio waves to look inside the body. An MRA (magnetic resonance angiogram) does the same thing, but it lets us look at your blood vessels. A computer turns the radio waves into pictures showing cross sections of the body, much like slices of bread. This helps us see any problems more clearly. You may receive fluid (called  contrast ) before or during your scan. The fluid helps us see the pictures better. We give the fluid through an IV (small needle in your arm).  Who should I call with questions: If you have any questions, please contact your Imaging Department exam site. Directions, parking instructions, and other information is available on our website, [email or web address].  How do I prepare if I m having sedation or anesthesia? **IMPORTANT** THE INSTRUCTIONS BELOW ARE ONLY FOR THOSE PATIENTS WHO HAVE BEEN TOLD THEY WILL RECEIVE SEDATION OR GENERAL ANESTHESIA DURING THEIR MRI PROCEDURE:  IF YOU WILL RECEIVE SEDATION (take medicine to help you relax during your exam) You must get the medicine from your doctor before you arrive. Bring the medicine to the exam. Do not take it at home. Arrive one hour early. Bring someone who can take you home  after the test. Your medicine will make you sleepy. After the exam, you may not drive, take a bus or take a taxi by yourself. No eating 8 hours before your exam. You may have clear liquids up until 4 hours before your exam. (Clear liquids include water, clear tea, black coffee and fruit juice without pulp.)  IF YOU WILL RECEIVE ANESTHESIA (be asleep for your exam) Arrive 1 1/2 hours early. Bring someone who can take you home after the test. You may not drive, take a bus or take a taxi by yourself. No eating 8 hours before your exam. You may have clear liquids up until 4 hours before your exam. (Clear liquids include water, clear tea, black coffee and fruit juice without pulp.)            Oct 18, 2018   Procedure with Stacey Ashford MD   [facility]iOP Services (--)    [address] [address]   [phone]            Oct 18, 2018  4:30 PM CDT   [facility] Same Day Surgery with Stacey Ashford MD, Carmen Cruz PA-C   [facility] Surgery Scheduling ([facility])    [facility] Surgery Scheduling ([facility])   [phone]            Oct 22, 2018  1:15 PM CDT   (Arrive by 12:30 PM)   PE NPET ONCOLOGY (EYES TO THIGHS) with SHPETM1   [facility] PET CT ([facility])    [address] [facility] [address]   [phone]           How do I prepare for my exam? (Food and drink instructions) 6 hours before your scan, stop all food and liquids (except water). Do not chew gum or suck on mints. If you need to take medicine with food, you may take it with a few crackers.  How do I prepare for my exam? (Other instructions) If you have diabetes: Have your exam early in the morning. Your blood glucose will go up as the day goes by. Your glucose level must be 180 or less at the start of the exam. Please take any oral diabetic medication you need to ensure this blood glucose level is below 180,  but no insulin 4 hours prior to the exam.  * If you are taking insulin in the morning take with breakfast by 6 am and schedule procedure between 12 and 2:15 pm. * If you are taking insulin at night take nightly dose, fast overnight, schedule procedure before 10 am. * If you take insulin both morning and night take morning dose by 6am and schedule procedure between 12 and 2:15 pm.  24 hours before your scan, don t do any heavy exercise. (No jogging, aerobics or other workouts.) Exercise will make your pictures less accurate.  What should I wear? Wear loose fitting clothing to your exam.  How long does the exam take?  Most scans will take between 2-3 hours.  What should I bring: Please bring a list of your medicines (including vitamins, minerals and over-the-counter drugs). Leave your valuables at home.  Do I need a :  No  is needed.  What should I do after the exam: No restrictions, You may resume normal activities.  What is this test: Your doctor has ordered a PET scan (positron emission tomography). This will take pictures of the cells and organs in your body. Your doctor may have also ordered a CT scan (computed tomography). This is another way to take pictures of your cells and organs. It is done at the same time as the PET scan. The pictures will help us understand your problem so we can make a treatment plan that fits your needs.  Who should I call with questions: Please call your Imaging Department at your exam site with any questions. Directions, parking instructions, and other information is available on our website, [email or web address].            Oct 23, 2018 10:00 AM CDT   Ech Complete with [id number]   [facility] CV Echocardiography ([facility])    [address] [place] [id number] [phone]           1.  Please bring or wear a comfortable two-piece outfit. 2.  You may eat, drink and take your normal medicines. 3.  For  any questions that cannot be answered, please contact the ordering physician 4.  Please do not wear perfumes or scented lotions on the day of your exam.            Oct 24, 2018  7:30 AM CDT   Level 3 with  INFUSION CHAIR 2   [facility] ([facility])    [facility]  [address]  [place] [address]   [phone]            Nov 07, 2018 11:00 AM CST   Level 3 with SH INFUSION CHAIR 4   [facility] ([facility])    [facility]  [address]  [place] [address]   [phone]            Jan 17, 2019 11:15 AM CST   New Visit with Neida Fortune GC   [facility] ([facility])    [place] [id number]  [place] [address]   [phone]              Future tests that were ordered for you today     Open Future Orders        Priority Expected Expires Ordered    Echocardiogram Routine  10/17/2019 10/17/2018    PET Oncology (Eyes to Thighs) Routine 10/17/2018 10/17/2019 10/17/2018    MR Breast Bilateral w/o & w Contrast Routine  10/17/2019 10/16/2018            Who to contact     If you have questions or need follow up information about today's clinic visit or your schedule please contact [facility] directly at [phone].  Normal or non-critical lab and imaging results will be communicated to you by MyChart, letter or phone within 4 business days after the clinic has received the results. If you do not hear from us within 7 days, please contact the clinic through [employer] or phone. If you have a critical or abnormal lab result, we will notify you by phone as soon as possible.  Submit refill requests through [employer] or call your pharmacy and they will forward the refill request to us. Please allow 3 business days for your refill to be completed.          Additional Information About  Your Visit        Care EveryWhere ID     This is your Care EveryWhere ID. This could be used by other organizations to access your [facility] medical records  [id number]        Your Vitals Were     Pulse Temperature Respirations Last Period Pulse Oximetry BMI (Body Mass Index)    77 97.9  F (36.6  C) (Oral) 16 10/09/2018 98% 34.58 kg/m2       Blood Pressure from Last 3 Encounters:   10/17/18 124/78   10/16/18 152/87   10/15/18 119/67    Weight from Last 3 Encounters:   10/17/18 88.5 kg (195 lb 3.2 oz)   10/16/18 90.7 kg (200 lb)   10/15/18 90.7 kg (200 lb)              We Performed the Following     Ca27.29  breast tumor marker     Hemoglobin A1c          Today's Medication Changes          These changes are accurate as of 10/17/18  3:11 PM.  If you have any questions, ask your nurse or doctor.               Start taking these medicines.        Dose/Directions    lidocaine-prilocaine cream   Commonly known as:  EMLA   Used for:  Malignant neoplasm of upper-outer quadrant of right breast in female, estrogen receptor negative (H)   Started by:  Addie Murillo MD        Apply quarter-size amount of Emla cream topically over port site one hour prior to port access for comfort.   Quantity:  30 g   Refills:  3            Where to get your medicines      These medications were sent to [facility] [place], [place] - [address]  [address] [facility] [address], [place] [address]     Phone:  [phone]     lidocaine-prilocaine cream                Primary Care Provider Office Phone # Fax #    Stuart Wills [phone] [address]  [facility] [address]        Equal Access to Services     [facility] Center[facility]IZ[facility] AH: Hadii raymon Marks, waaxda luqadaha, qaybta kaalmada aderenato, josh bangura. So [facility] [phone].    ATENCIÓN: Si habla español, tiene a dominique disposición servicios gratuitos de asistencia lingüística. Llame al [phone].    We comply with  applicable federal civil rights laws and [place] laws. We do not discriminate on the basis of race, color, national origin, age, disability, sex, sexual orientation, or gender identity.            Thank you!     Thank you for choosing [facility]  for your care. Our goal is always to provide you with excellent care. Hearing back from our patients is one way we can continue to improve our services. Please take a few minutes to complete the written survey that you may receive in the mail after your visit with us. Thank you!             Your Updated Medication List - Protect others around you: Learn how to safely use, store and throw away your medicines at www.disposemymeds.org.          This list is accurate as of 10/17/18  3:11 PM.  Always use your most recent med list.                   Brand Name Dispense Instructions for use Diagnosis    levothyroxine 125 MCG tablet    SYNTHROID/LEVOTHROID    30 tablet    TAKE 1 TABLET BY MOUTH DAILY    Acquired hypothyroidism       lidocaine-prilocaine cream    EMLA    30 g    Apply quarter-size amount of Emla cream topically over port site one hour prior to port access for comfort.    Malignant neoplasm of upper-outer quadrant of right breast in female, estrogen receptor negative (H)       medroxyPROGESTERone 10 MG tablet    PROVERA    90 tablet    Take 1 tablet (10 mg) by mouth daily    Absence of menstruation       metFORMIN 500 MG 24 hr tablet    GLUCOPHAGE-XR    30 tablet    Take 1 tablet (500 mg) by mouth daily (with dinner)    Non morbid obesity due to excess calories, Hyperglycemia       omeprazole 20 MG CR capsule    priLOSEC    60 capsule    Take 1 capsule (20 mg) by mouth 2 times daily    Gastroesophageal reflux disease without esophagitis

## 2018-10-17 NOTE — PROGRESS NOTES
Medical Assistant Note:  Luci Londono presents today for yes.    Patient seen by provider today: Yes: Dr Murillo.   present during visit today: Not Applicable.    Concerns: No Concerns.    Procedure:  Lab draw site: LAC, Needle type: BF, Gauge: 21.  Gauze and coban applied  Post Assessment:  Labs drawn without difficulty: Yes.    Discharge Plan:  Departure Mode: Ambulatory.    Face to Face Time: 5.    Emerald Pascual MA

## 2018-10-17 NOTE — PROGRESS NOTES
NEW PATIENT ONCOLOGY CONSULT    REQUESTING PROVIDER:  Dr. Stacey Ashford, breast surgeon    REASON FOR CONSULTATION:  10/2018 dx right breast TN IDC, cT1cN1 disease    HISTORY OF PRESENT ILLNESS:    At age 47 amenorrhea with polycystic ovaries,  She felt a lump in her right breast in early October, 2018.   Her mammogram revealed a small mass in the right upper outer breast, US revealed an 8mm hypoechoic mass at 12:00, 6cm FN and axillary US revealed a concerning lymph node which was also biopsied.   Her pathology from both breast and LN revealed a grade 3, invasive ductal carcinoma, ER/IA/her 2-.     She reports she does daily self breast exams and noticed the lump three days prior to her mammogram. She reports some new right shoulder pain and low back pain which is chronic.      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     MEDICATIONS/ALLERY:  Reviewed in Epic system.      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 Deny ETOH/smoking       FAMILY HISTORY:    Negative for any type of cancers    REVIEW OF SYSTEMS:   GENERAL: Anxious, in usual state of health. reports some new right shoulder pain and low back pain which is chronic.  NEURO:   No headache, double vision, or focal weakness.  No neuropathy.   SKIN:  No chronic skin rash or skin infection.   CARDIOVASCULAR:  No High blood pressure, + hyperlipidemia. NO SALAS  PULMONARY:  No shortness of breath, no pleurisy, no cough, no hemoptysis.   GI:  No abdominal pain, nausea, vomiting, constipation.  No diarrhea.  No bright red blood per  rectum.   :  No urgency, frequency.  No recurrent urinary tract infection.  No kidney problems.   RHEUMATOLOGY/MUSCULOSKELETAL SYSTEM:  no arthritic pain, or muscle pain. No muscle ache.   ENDOCRINE:  No history of diabetes or thyroid problem.  No complaints of hot flashes.   HEMATOLOGY:  No history of bleeding or thrombosis episode.   IMMUNOLOGY:  No recurrent fever or chills episode.  No recurrent infectious episode.   BREASTS/GYN:amenorrhea with polycystic ovaries, dx at age 47 right breast cancer  PSYCHIATRY:  No anxiety or depression.          PHYSICAL EXAMINATION:   VITAL SIGNS: Blood pressure 124/78, pulse 77, temperature 97.9  F (36.6  C), temperature source Oral, resp. rate 16, weight 88.5 kg (195 lb 3.2 oz), last menstrual period 10/09/2018, SpO2 98 %, not currently breastfeeding.      ECOG 0    GENERAL APPEARANCE:  looks like her stated age, very pleasant, not in acute distress.   HEENT: The patient is normocephalic, atraumatic. Pupils are equally reactive to light.  Sclerae are anicteric.  Moist oral mucosa.  Negative pharynx.  No oral thrush.   NECK:  Supple.  No jugular venous distention.  Thyroid is not palpable.   LYMPH NODES:  Superficial lymphadenopathy is not appreciable in the bilateral cervical, supraclavicular, axillary or inguinal areas.   CARDIOVASCULAR:  S1, S2 regular with no murmurs or gallops.  No carotid or abdominal bruits.   PULMONARY:  Lungs are clear to auscultation and percussion bilaterally.  There is no wheezing or rhonchi.   GASTROINTESTINAL:  Abdomen is soft, nontender.  No hepatosplenomegaly.  No signs of ascites.  No mass appreciable.   MUSCULOSKELETAL/EXTREMITIES:  No edema.  No cyanotic changes.  No signs of joint deformity.  No lymphedema.   NEUROLOGIC:  Cranial nerves II-XII are grossly intact.  Sensation intact.  Muscle strength and muscle tone symmetrical, 5/5 throughout.   BACK:  No spinal or paraspinal tenderness.  No CVA tenderness.   SKIN:  No petechiae.  No rash.   No signs of cellulitis.   BREASTS: Right breast with mild ecchymosis on upper breast, palpable 1.5cm mass at 12:00. Remainder of breast is normal. No other masses. Left breast is symmetrical with no skin or nipple changes. No masses on the left. Tissue is very dense throughout.        CURRENT LAB DATA REVIEWED  10/2018  Right breast 8mm hypoechoic mass at 12:00, 6cm FN and right axillary LN biopsy both found grade 3, invasive ductal carcinoma, ER/NM/her 2-.     Baseline cbc diff/CMP are fine.         CURRENT IMAGING REVIEWED  10/2018 dx MA -  revealed a small mass in the right upper outer breast, US revealed an 8mm hypoechoic mass at 12:00, 6cm FN and axillary US revealed a concerning lymph node        OLD DATA REVIEWED TODAY WITH SUMMARY:   Prior MA 2017, 2014 - negative.           ASSESSMENT AND PLAN:    1. Newly dx cT1cN1 right breast high grade IDC, TN at age 47 in 10/2018.     Recommend further staging work up with PET, echo, breast MRI.      I d/w Dr. Ashford, recommend systemic chemo prior to surgery. Will incorporate platinum in her tx plan.   We discussed the goal of neoadjuvant chemo, pros and cons of it versus adjuvant chemo.     We discussed the side effects of chemo include not limit to N/V/hair loss/lower immunity/cardiac toxicity/rare leukemia/infusion related reaction and mortality.   She made informed decision to proceed.     Plan DD AC -- taxol + carbo C1D1 10/24/2018.   Chemo teach is done.     She needs to be monitored closely during this intense systemic chemo treatment.     2. Young age dx with TN breast cancer, she will need genetic counseling.     3. Pre diabetic - she is on metformin. We use lots of steroid with chemo. May push her into DM. Recommend check HbA1c.

## 2018-10-17 NOTE — TELEPHONE ENCOUNTER
Chemo Teach  re: Adriamycin, Cytoxan, Taxol, Neulasta treatment plan for diagnosis: Breast Cancer  -See Pt Education documentation - Chemo Effects (Adult)  -Reviewed treatment schedule including cycle length, lab monitoring and take home medications.    Verbal and written instruction provided using:     [facility] Drug Information: Adriamycin, Cytoxan, Taxol, Neulasta  -Reviewed What Adriamycin, Cytoxan, Taxol, Neulasta Is Used For, How Adriamycin, Cytoxan, Taxol, Neulasta are Given, Side Effects, When to Contact Your Doctor of Health Care Provider and Self Care Tips sections.      [place] literature:   -Reviewed Getting Ready for Chemotherapy: What to Expect Before, During and After Your Treatment   -Reviewed Tips for Increasing Protein and Calories  -Reviewed Vascular Access Port Implantation with Power Port teaching book/model

## 2018-10-17 NOTE — PATIENT INSTRUCTIONS
Labs today.--drawn by [facility] ASAP as staging. Echo. Scheduled/Ligia  Genetic referral.  Scheduled/Ligias  Plan C1D1 next Wed scheduled/Ligia  Chemo teach for AC DD -- wkly taxol--provided by Ellie CAI   Port is scheduled with surgery for tomorrow.   Scheduled/ligia  F/u with C2D1. Working on scheduling/ligia    AVS given to patient/ligia    10-18-18 Rx for Cranial Prosthesis (Wig) was brought downstairs to the [facility] to be given to Pt when she picks up her EMLA cream Rx.  Catalog from [employer] was given to Pt on 10-17-18. AINSLEY CAI

## 2018-10-17 NOTE — TELEPHONE ENCOUNTER
I introduced self to patient, and her sister and aunt.  I explained my role of breast nurse care coordinator. I accompanied Luci to her surgical consultation 10/16/2018 with Dr. Stacey Ashford at the [facility].      Luci was given the new patient packet which includes educational material and support resources such as [employer] [facility], [employer], [facility], [facility] Cancer Support Group, and [facility] Breast Cancer Support Group, along with a copy of her breast biopsy pathology report.       At the end of the consultation, we reviewed plan of care and education.  The plan is for patient to have a bilateral breast MRI on 10/17/2018 @ [facility], and meet with medical oncologist- Dr. Shonda Murillo with [facility] on 10/17/2018 @ 1300.  Patient is also scheduled to have a port placed by Dr. Ashford on 10/18/2018 @ 4:30p.m. By Dr. Ashford.  Patient verbalized understanding and agrees with the plan of care. Dr. Ashford also referred patient to Genetic Counsellor for testing and this appointment is currently being arranged.        I answered her questions and encouraged patient to call me back with any future questions or concerns.  Patient has my contact information and knows to contact me in the future with any questions/concerns. Radha Herndon, RIKIN, RN

## 2018-10-17 NOTE — LETTER
10/17/2018         RE: Luci Londono  [address]  [facility] [address]        Dear Colleague,    Thank you for referring your patient, Luci Londono, to the [facility]. Please see a copy of my visit note below.    NEW PATIENT ONCOLOGY CONSULT    REQUESTING PROVIDER:  Dr. Stacey Ashford, breast surgeon    REASON FOR CONSULTATION:  10/2018 dx right breast TN IDC, cT1cN1 disease    HISTORY OF PRESENT ILLNESS:    At age 47 amenorrhea with polycystic ovaries,  She felt a lump in her right breast in early October, 2018.   Her mammogram revealed a small mass in the right upper outer breast, US revealed an 8mm hypoechoic mass at 12:00, 6cm FN and axillary US revealed a concerning lymph node which was also biopsied.   Her pathology from both breast and LN revealed a grade 3, invasive ductal carcinoma, ER/[place]/her 2-.     She reports she does daily self breast exams and noticed the lump three days prior to her mammogram. She reports some new right shoulder pain and low back pain which is chronic.      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     MEDICATIONS/ALLERY:  Reviewed in Epic system.      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 Deny ETOH/smoking       FAMILY HISTORY:    Negative for any type of cancers    REVIEW OF SYSTEMS:   GENERAL: Anxious, in usual state of health. reports some new right shoulder pain and low back pain which is chronic.  NEURO:   No headache, double vision, or focal weakness.  No neuropathy.   SKIN:  No chronic skin rash or  skin infection.   CARDIOVASCULAR:  No High blood pressure, + hyperlipidemia. NO SALAS  PULMONARY:  No shortness of breath, no pleurisy, no cough, no hemoptysis.   GI:  No abdominal pain, nausea, vomiting, constipation.  No diarrhea.  No bright red blood per rectum.   :  No urgency, frequency.  No recurrent urinary tract infection.  No kidney problems.   RHEUMATOLOGY/MUSCULOSKELETAL SYSTEM:  no arthritic pain, or muscle pain. No muscle ache.   ENDOCRINE:  No history of diabetes or thyroid problem.  No complaints of hot flashes.   HEMATOLOGY:  No history of bleeding or thrombosis episode.   IMMUNOLOGY:  No recurrent fever or chills episode.  No recurrent infectious episode.   BREASTS/GYN:amenorrhea with polycystic ovaries, dx at age 47 right breast cancer  PSYCHIATRY:  No anxiety or depression.          PHYSICAL EXAMINATION:   VITAL SIGNS: Blood pressure 124/78, pulse 77, temperature 97.9  F (36.6  C), temperature source Oral, resp. rate 16, weight 88.5 kg (195 lb 3.2 oz), last menstrual period 10/09/2018, SpO2 98 %, not currently breastfeeding.      ECOG 0    GENERAL APPEARANCE:  looks like her stated age, very pleasant, not in acute distress.   HEENT: The patient is normocephalic, atraumatic. Pupils are equally reactive to light.  Sclerae are anicteric.  Moist oral mucosa.  Negative pharynx.  No oral thrush.   NECK:  Supple.  No jugular venous distention.  Thyroid is not palpable.   LYMPH NODES:  Superficial lymphadenopathy is not appreciable in the bilateral cervical, supraclavicular, axillary or inguinal areas.   CARDIOVASCULAR:  S1, S2 regular with no murmurs or gallops.  No carotid or abdominal bruits.   PULMONARY:  Lungs are clear to auscultation and percussion bilaterally.  There is no wheezing or rhonchi.   GASTROINTESTINAL:  Abdomen is soft, nontender.  No hepatosplenomegaly.  No signs of ascites.  No mass appreciable.   MUSCULOSKELETAL/EXTREMITIES:  No edema.  No cyanotic changes.  No signs of joint  deformity.  No lymphedema.   NEUROLOGIC:  Cranial nerves II-XII are grossly intact.  Sensation intact.  Muscle strength and muscle tone symmetrical, 5/5 throughout.   BACK:  No spinal or paraspinal tenderness.  No CVA tenderness.   SKIN:  No petechiae.  No rash.  No signs of cellulitis.   BREASTS: Right breast with mild ecchymosis on upper breast, palpable 1.5cm mass at 12:00. Remainder of breast is normal. No other masses. Left breast is symmetrical with no skin or nipple changes. No masses on the left. Tissue is very dense throughout.        CURRENT LAB DATA REVIEWED  10/2018  Right breast 8mm hypoechoic mass at 12:00, 6cm FN and right axillary LN biopsy both found grade 3, invasive ductal carcinoma, ER/[place]/her 2-.     Baseline cbc diff/CMP are fine.         CURRENT IMAGING REVIEWED  10/2018 dx MA -  revealed a small mass in the right upper outer breast, US revealed an 8mm hypoechoic mass at 12:00, 6cm FN and axillary US revealed a concerning lymph node        OLD DATA REVIEWED TODAY WITH SUMMARY:   Prior MA 2017, 2014 - negative.           ASSESSMENT AND PLAN:    1. Newly dx cT1cN1 right breast high grade IDC, TN at age 47 in 10/2018.     Recommend further staging work up with PET, echo, breast MRI.      I d/w Dr. Ashford, recommend systemic chemo prior to surgery. Will incorporate platinum in her tx plan.   We discussed the goal of neoadjuvant chemo, pros and cons of it versus adjuvant chemo.     We discussed the side effects of chemo include not limit to N/V/hair loss/lower immunity/cardiac toxicity/rare leukemia/infusion related reaction and mortality.   She made informed decision to proceed.     Plan DD AC -- taxol + carbo C1D1 10/24/2018.   Chemo teach is done.     She needs to be monitored closely during this intense systemic chemo treatment.     2. Young age dx with TN breast cancer, she will need genetic counseling.     3. Pre diabetic - she is on metformin. We use lots of steroid with chemo. May push  "her into DM. Recommend check HbA1c.                 Oncology Rooming Note    October 17, 2018 1:06 PM   Luci Londono is a 47 year old female who presents for:    Chief Complaint   Patient presents with     Oncology Clinic Visit     metastatic breast cancer     Initial Vitals: /78 (BP Location: Left arm, Patient Position: Sitting, Cuff Size: Adult Large)  Pulse 77  Temp 97.9  F (36.6  C) (Oral)  Resp 16  Wt 88.5 kg (195 lb 3.2 oz)  LMP 10/09/2018  SpO2 98%  BMI 34.58 kg/m2 Estimated body mass index is 34.58 kg/(m^2) as calculated from the following:    Height as of 10/16/18: 1.6 m (5' 3\").    Weight as of this encounter: 88.5 kg (195 lb 3.2 oz). Body surface area is 1.98 meters squared.  Mild Pain (2) Comment: headache   Patient's last menstrual period was 10/09/2018.  Allergies reviewed: Yes  Medications reviewed: Yes    Medications: Medication refills not needed today.  Pharmacy name entered into [email or web address]:    [id number] - [place], [place] - [address] [facility] [id number] [place] [address]    Clinical concerns: None                    4 minutes for nursing intake (face to face time)     Francesca Mar MA              Medical Assistant Note:  Luci Londono presents today for yes.    Patient seen by provider today: Yes: Dr Murillo.   present during visit today: Not Applicable.    Concerns: No Concerns.    Procedure:  Lab draw site: LAC, Needle type: BF, Gauge: 21.  Gauze and coban applied  Post Assessment:  Labs drawn without difficulty: Yes.    Discharge Plan:  Departure Mode: Ambulatory.    Face to Face Time: 5.    Emerald Pascual MA              Again, thank you for allowing me to participate in the care of your patient.        Sincerely,        Addie Murillo MD, MD    "

## 2018-10-17 NOTE — TELEPHONE ENCOUNTER
10-17-18 9am  Again called Cytogenetics at the [facility] to make sure that Pt's results are back for Pt's 1pm appt today with oncologist  & was told that they will do their best.

## 2018-10-17 NOTE — TELEPHONE ENCOUNTER
Received Cytogenetics results.  Pt is triple negative.  Gave  copy of pathology with cytogenetics reports.

## 2018-10-18 ENCOUNTER — APPOINTMENT (OUTPATIENT)
Dept: GENERAL RADIOLOGY | Facility: CLINIC | Age: 48
End: 2018-10-18
Attending: PHYSICIAN ASSISTANT
Payer: COMMERCIAL

## 2018-10-18 ENCOUNTER — OFFICE VISIT (OUTPATIENT)
Dept: SURGERY | Facility: PHYSICIAN GROUP | Age: 48
End: 2018-10-18
Payer: COMMERCIAL

## 2018-10-18 ENCOUNTER — APPOINTMENT (OUTPATIENT)
Dept: GENERAL RADIOLOGY | Facility: CLINIC | Age: 48
End: 2018-10-18
Attending: SURGERY
Payer: COMMERCIAL

## 2018-10-18 ENCOUNTER — ANESTHESIA EVENT (OUTPATIENT)
Dept: SURGERY | Facility: CLINIC | Age: 48
End: 2018-10-18
Payer: COMMERCIAL

## 2018-10-18 ENCOUNTER — HOSPITAL ENCOUNTER (OUTPATIENT)
Facility: CLINIC | Age: 48
Discharge: HOME | End: 2018-10-18
Attending: SURGERY | Admitting: SURGERY
Payer: COMMERCIAL

## 2018-10-18 ENCOUNTER — SURGERY (OUTPATIENT)
Age: 48
End: 2018-10-18

## 2018-10-18 ENCOUNTER — ANESTHESIA (OUTPATIENT)
Dept: SURGERY | Facility: CLINIC | Age: 48
End: 2018-10-18
Payer: COMMERCIAL

## 2018-10-18 VITALS
BODY MASS INDEX: 34.11 KG/M2 | HEIGHT: 63 IN | RESPIRATION RATE: 16 BRPM | HEART RATE: 68 BPM | WEIGHT: 192.5 LBS | TEMPERATURE: 96.8 F | DIASTOLIC BLOOD PRESSURE: 86 MMHG | SYSTOLIC BLOOD PRESSURE: 131 MMHG | OXYGEN SATURATION: 97 %

## 2018-10-18 DIAGNOSIS — Z17.1 MALIGNANT NEOPLASM OF UPPER-OUTER QUADRANT OF RIGHT BREAST IN FEMALE, ESTROGEN RECEPTOR NEGATIVE (H): Primary | ICD-10-CM

## 2018-10-18 DIAGNOSIS — C50.411 MALIGNANT NEOPLASM OF UPPER-OUTER QUADRANT OF RIGHT BREAST IN FEMALE, ESTROGEN RECEPTOR NEGATIVE (H): Primary | ICD-10-CM

## 2018-10-18 LAB — HCG UR QL: NEGATIVE

## 2018-10-18 PROCEDURE — 81025 URINE PREGNANCY TEST: CPT | Performed by: ANESTHESIOLOGY

## 2018-10-18 PROCEDURE — 37000009 ZZH ANESTHESIA TECHNICAL FEE, EACH ADDTL 15 MIN: Performed by: SURGERY

## 2018-10-18 PROCEDURE — 25000128 H RX IP 250 OP 636: Performed by: SURGERY

## 2018-10-18 PROCEDURE — 37000008 ZZH ANESTHESIA TECHNICAL FEE, 1ST 30 MIN: Performed by: SURGERY

## 2018-10-18 PROCEDURE — 40000170 ZZH STATISTIC PRE-PROCEDURE ASSESSMENT II: Performed by: SURGERY

## 2018-10-18 PROCEDURE — 25000125 ZZHC RX 250: Performed by: SURGERY

## 2018-10-18 PROCEDURE — 36561 INSERT TUNNELED CV CATH: CPT | Performed by: SURGERY

## 2018-10-18 PROCEDURE — 40000277 XR SURGERY CARM FLUORO LESS THAN 5 MIN W STILLS

## 2018-10-18 PROCEDURE — 27210794 ZZH OR GENERAL SUPPLY STERILE: Performed by: SURGERY

## 2018-10-18 PROCEDURE — 25000125 ZZHC RX 250: Performed by: NURSE ANESTHETIST, CERTIFIED REGISTERED

## 2018-10-18 PROCEDURE — 25000128 H RX IP 250 OP 636: Performed by: ANESTHESIOLOGY

## 2018-10-18 PROCEDURE — 71000027 ZZH RECOVERY PHASE 2 EACH 15 MINS: Performed by: SURGERY

## 2018-10-18 PROCEDURE — 40000986 XR CHEST PORT 1 VW

## 2018-10-18 PROCEDURE — 71000012 ZZH RECOVERY PHASE 1 LEVEL 1 FIRST HR: Performed by: SURGERY

## 2018-10-18 PROCEDURE — C1788 PORT, INDWELLING, IMP: HCPCS | Performed by: SURGERY

## 2018-10-18 PROCEDURE — 27210995 ZZH RX 272: Performed by: SURGERY

## 2018-10-18 PROCEDURE — 25000128 H RX IP 250 OP 636: Performed by: NURSE ANESTHETIST, CERTIFIED REGISTERED

## 2018-10-18 PROCEDURE — 36000052 ZZH SURGERY LEVEL 2 EA 15 ADDTL MIN: Performed by: SURGERY

## 2018-10-18 PROCEDURE — 25000132 ZZH RX MED GY IP 250 OP 250 PS 637: Performed by: PHYSICIAN ASSISTANT

## 2018-10-18 PROCEDURE — 36000054 ZZH SURGERY LEVEL 2 W FLUORO 1ST 30 MIN: Performed by: SURGERY

## 2018-10-18 DEVICE — CATH PORT POWERPORT CLEARVUE ISP 8FR 5608062
Type: IMPLANTABLE DEVICE | Site: JUGULAR | Status: NON-FUNCTIONAL
Removed: 2019-05-15

## 2018-10-18 RX ORDER — ONDANSETRON 2 MG/ML
INJECTION INTRAMUSCULAR; INTRAVENOUS PRN
Status: DISCONTINUED | OUTPATIENT
Start: 2018-10-18 | End: 2018-10-18

## 2018-10-18 RX ORDER — ONDANSETRON 2 MG/ML
4 INJECTION INTRAMUSCULAR; INTRAVENOUS EVERY 30 MIN PRN
Status: DISCONTINUED | OUTPATIENT
Start: 2018-10-18 | End: 2018-10-18 | Stop reason: HOSPADM

## 2018-10-18 RX ORDER — MEPERIDINE HYDROCHLORIDE 25 MG/ML
12.5 INJECTION INTRAMUSCULAR; INTRAVENOUS; SUBCUTANEOUS
Status: DISCONTINUED | OUTPATIENT
Start: 2018-10-18 | End: 2018-10-18 | Stop reason: HOSPADM

## 2018-10-18 RX ORDER — PROPOFOL 10 MG/ML
INJECTION, EMULSION INTRAVENOUS CONTINUOUS PRN
Status: DISCONTINUED | OUTPATIENT
Start: 2018-10-18 | End: 2018-10-18

## 2018-10-18 RX ORDER — CEFAZOLIN SODIUM 1 G/3ML
1 INJECTION, POWDER, FOR SOLUTION INTRAMUSCULAR; INTRAVENOUS SEE ADMIN INSTRUCTIONS
Status: DISCONTINUED | OUTPATIENT
Start: 2018-10-18 | End: 2018-10-18 | Stop reason: HOSPADM

## 2018-10-18 RX ORDER — FENTANYL CITRATE 50 UG/ML
INJECTION, SOLUTION INTRAMUSCULAR; INTRAVENOUS PRN
Status: DISCONTINUED | OUTPATIENT
Start: 2018-10-18 | End: 2018-10-18

## 2018-10-18 RX ORDER — ONDANSETRON 4 MG/1
4-8 TABLET, ORALLY DISINTEGRATING ORAL EVERY 8 HOURS PRN
Qty: 10 TABLET | Refills: 0 | Status: SHIPPED | OUTPATIENT
Start: 2018-10-18 | End: 2019-05-09

## 2018-10-18 RX ORDER — FENTANYL CITRATE 50 UG/ML
25-50 INJECTION, SOLUTION INTRAMUSCULAR; INTRAVENOUS
Status: DISCONTINUED | OUTPATIENT
Start: 2018-10-18 | End: 2018-10-18 | Stop reason: HOSPADM

## 2018-10-18 RX ORDER — HYDROCODONE BITARTRATE AND ACETAMINOPHEN 5; 325 MG/1; MG/1
1-2 TABLET ORAL EVERY 4 HOURS PRN
Qty: 15 TABLET | Refills: 0 | Status: SHIPPED | OUTPATIENT
Start: 2018-10-18 | End: 2019-01-02

## 2018-10-18 RX ORDER — MAGNESIUM HYDROXIDE 1200 MG/15ML
LIQUID ORAL PRN
Status: DISCONTINUED | OUTPATIENT
Start: 2018-10-18 | End: 2018-10-18 | Stop reason: HOSPADM

## 2018-10-18 RX ORDER — HYDROCODONE BITARTRATE AND ACETAMINOPHEN 5; 325 MG/1; MG/1
1-2 TABLET ORAL
Status: COMPLETED | OUTPATIENT
Start: 2018-10-18 | End: 2018-10-18

## 2018-10-18 RX ORDER — NALOXONE HYDROCHLORIDE 0.4 MG/ML
.1-.4 INJECTION, SOLUTION INTRAMUSCULAR; INTRAVENOUS; SUBCUTANEOUS
Status: DISCONTINUED | OUTPATIENT
Start: 2018-10-18 | End: 2018-10-18 | Stop reason: HOSPADM

## 2018-10-18 RX ORDER — SODIUM CHLORIDE, SODIUM LACTATE, POTASSIUM CHLORIDE, CALCIUM CHLORIDE 600; 310; 30; 20 MG/100ML; MG/100ML; MG/100ML; MG/100ML
INJECTION, SOLUTION INTRAVENOUS CONTINUOUS
Status: DISCONTINUED | OUTPATIENT
Start: 2018-10-18 | End: 2018-10-18 | Stop reason: HOSPADM

## 2018-10-18 RX ORDER — AMOXICILLIN 250 MG
1-2 CAPSULE ORAL 2 TIMES DAILY
Qty: 30 TABLET | Refills: 0 | Status: SHIPPED | OUTPATIENT
Start: 2018-10-18 | End: 2019-05-09

## 2018-10-18 RX ORDER — HYDROMORPHONE HYDROCHLORIDE 1 MG/ML
.3-.5 INJECTION, SOLUTION INTRAMUSCULAR; INTRAVENOUS; SUBCUTANEOUS EVERY 10 MIN PRN
Status: DISCONTINUED | OUTPATIENT
Start: 2018-10-18 | End: 2018-10-18 | Stop reason: HOSPADM

## 2018-10-18 RX ORDER — ONDANSETRON 4 MG/1
4 TABLET, ORALLY DISINTEGRATING ORAL EVERY 30 MIN PRN
Status: DISCONTINUED | OUTPATIENT
Start: 2018-10-18 | End: 2018-10-18 | Stop reason: HOSPADM

## 2018-10-18 RX ORDER — CEFAZOLIN SODIUM 2 G/100ML
2 INJECTION, SOLUTION INTRAVENOUS
Status: COMPLETED | OUTPATIENT
Start: 2018-10-18 | End: 2018-10-18

## 2018-10-18 RX ORDER — SODIUM CHLORIDE, SODIUM LACTATE, POTASSIUM CHLORIDE, CALCIUM CHLORIDE 600; 310; 30; 20 MG/100ML; MG/100ML; MG/100ML; MG/100ML
INJECTION, SOLUTION INTRAVENOUS CONTINUOUS PRN
Status: DISCONTINUED | OUTPATIENT
Start: 2018-10-18 | End: 2018-10-18

## 2018-10-18 RX ADMIN — CEFAZOLIN SODIUM 2 G: 2 INJECTION, SOLUTION INTRAVENOUS at 15:55

## 2018-10-18 RX ADMIN — HYDROCODONE BITARTRATE AND ACETAMINOPHEN 1 TABLET: 5; 325 TABLET ORAL at 17:45

## 2018-10-18 RX ADMIN — PHENYLEPHRINE HYDROCHLORIDE 100 MCG: 10 INJECTION, SOLUTION INTRAMUSCULAR; INTRAVENOUS; SUBCUTANEOUS at 16:28

## 2018-10-18 RX ADMIN — ONDANSETRON 4 MG: 2 INJECTION INTRAMUSCULAR; INTRAVENOUS at 15:48

## 2018-10-18 RX ADMIN — SODIUM CHLORIDE, POTASSIUM CHLORIDE, SODIUM LACTATE AND CALCIUM CHLORIDE: 600; 310; 30; 20 INJECTION, SOLUTION INTRAVENOUS at 15:48

## 2018-10-18 RX ADMIN — FENTANYL CITRATE 25 MCG: 50 INJECTION, SOLUTION INTRAMUSCULAR; INTRAVENOUS at 16:04

## 2018-10-18 RX ADMIN — FENTANYL CITRATE 25 MCG: 50 INJECTION, SOLUTION INTRAMUSCULAR; INTRAVENOUS at 15:54

## 2018-10-18 RX ADMIN — DEXMEDETOMIDINE HYDROCHLORIDE 12 MCG: 100 INJECTION, SOLUTION INTRAVENOUS at 16:12

## 2018-10-18 RX ADMIN — PHENYLEPHRINE HYDROCHLORIDE 100 MCG: 10 INJECTION, SOLUTION INTRAMUSCULAR; INTRAVENOUS; SUBCUTANEOUS at 16:20

## 2018-10-18 RX ADMIN — WATER 1000 ML: 100 IRRIGANT IRRIGATION at 16:18

## 2018-10-18 RX ADMIN — FENTANYL CITRATE 50 MCG: 50 INJECTION, SOLUTION INTRAMUSCULAR; INTRAVENOUS at 17:31

## 2018-10-18 RX ADMIN — PROPOFOL: 10 INJECTION, EMULSION INTRAVENOUS at 16:31

## 2018-10-18 RX ADMIN — FENTANYL CITRATE 50 MCG: 50 INJECTION, SOLUTION INTRAMUSCULAR; INTRAVENOUS at 17:46

## 2018-10-18 RX ADMIN — Medication 1 EACH: at 16:40

## 2018-10-18 RX ADMIN — MIDAZOLAM 2 MG: 1 INJECTION INTRAMUSCULAR; INTRAVENOUS at 15:48

## 2018-10-18 RX ADMIN — FENTANYL CITRATE 50 MCG: 50 INJECTION, SOLUTION INTRAMUSCULAR; INTRAVENOUS at 15:48

## 2018-10-18 RX ADMIN — SODIUM CHLORIDE, PRESERVATIVE FREE 2 ML: 5 INJECTION INTRAVENOUS at 16:40

## 2018-10-18 RX ADMIN — LIDOCAINE HYDROCHLORIDE 17 ML: 10 INJECTION, SOLUTION EPIDURAL; INFILTRATION; INTRACAUDAL; PERINEURAL at 16:40

## 2018-10-18 RX ADMIN — SODIUM CHLORIDE 1000 ML: 900 IRRIGANT IRRIGATION at 16:18

## 2018-10-18 RX ADMIN — PROPOFOL 100 MCG/KG/MIN: 10 INJECTION, EMULSION INTRAVENOUS at 15:48

## 2018-10-18 RX ADMIN — HEPARIN SODIUM 250 ML: 1000 INJECTION, SOLUTION INTRAVENOUS; SUBCUTANEOUS at 16:18

## 2018-10-18 NOTE — OR NURSING
Family brought back to be with patient in recovery.  Discharge instructions completed.  Patient dressed; to car via wheel chair.

## 2018-10-18 NOTE — IP AVS SNAPSHOT
MRN:[id number]                      After Visit Summary   10/18/2018    Luci Londono    MRN: [id number]           Thank you!     Thank you for choosing [facility] for your care. Our goal is always to provide you with excellent care. Hearing back from our patients is one way we can continue to improve our services. Please take a few minutes to complete the written survey that you may receive in the mail after you visit with us. Thank you!        Patient Information     Date Of Birth          1970        Designated Caregiver       Most Recent Value    Caregiver    Will someone help with your care after discharge? yes    Name of designated caregiver Doris dominguez    Phone number of caregiver none    Caregiver address lives with patient      About your hospital stay     You were admitted on:  October 18, 2018 You last received care in the:  [facility] PACU    You were discharged on:  October 18, 2018       Who to Call     For medical emergencies, please call 911.  For non-urgent questions about your medical care, please call your primary care provider or clinic, [phone]  For questions related to your surgery, please call your surgery clinic        Attending Provider     Provider Specialty    Stacey Ashford MD Surgery       Primary Care Provider Office Phone # Fax #    Stuart Charley Wills [phone]      Your next 10 appointments already scheduled     Oct 22, 2018  1:15 PM CDT   (Arrive by 12:30 PM)   PE NPET ONCOLOGY (EYES TO THIGHS) with [id number]   [facility] PET CT ([facility])    [address] [place] [id number] [phone]           How do I prepare for my exam? (Food and drink instructions) 6 hours before your scan, stop all food and liquids (except water). Do not chew gum or suck on mints. If you need to take medicine with food, you may take it with a few crackers.  How do I prepare for my exam? (Other  instructions) If you have diabetes: Have your exam early in the morning. Your blood glucose will go up as the day goes by. Your glucose level must be 180 or less at the start of the exam. Please take any oral diabetic medication you need to ensure this blood glucose level is below 180, but no insulin 4 hours prior to the exam.  * If you are taking insulin in the morning take with breakfast by 6 am and schedule procedure between 12 and 2:15 pm. * If you are taking insulin at night take nightly dose, fast overnight, schedule procedure before 10 am. * If you take insulin both morning and night take morning dose by 6am and schedule procedure between 12 and 2:15 pm.  24 hours before your scan, don t do any heavy exercise. (No jogging, aerobics or other workouts.) Exercise will make your pictures less accurate.  What should I wear? Wear loose fitting clothing to your exam.  How long does the exam take?  Most scans will take between 2-3 hours.  What should I bring: Please bring a list of your medicines (including vitamins, minerals and over-the-counter drugs). Leave your valuables at home.  Do I need a :  No  is needed.  What should I do after the exam: No restrictions, You may resume normal activities.  What is this test: Your doctor has ordered a PET scan (positron emission tomography). This will take pictures of the cells and organs in your body. Your doctor may have also ordered a CT scan (computed tomography). This is another way to take pictures of your cells and organs. It is done at the same time as the PET scan. The pictures will help us understand your problem so we can make a treatment plan that fits your needs.  Who should I call with questions: Please call your Imaging Department at your exam site with any questions. Directions, parking instructions, and other information is available on our website, [email or web address].            Oct 23, 2018 10:00 AM CDT   Ech Complete with [id number]   [facility] CV Echocardiography ([facility])    [address]  [place] [address]   [phone]           1.  Please bring or wear a comfortable two-piece outfit. 2.  You may eat, drink and take your normal medicines. 3.  For any questions that cannot be answered, please contact the ordering physician 4.  Please do not wear perfumes or scented lotions on the day of your exam.            Oct 24, 2018  7:30 AM CDT   Level 3 with  INFUSION CHAIR 2   [facility] ([facility])    [facility]  [address]  [facility] [address]   [phone]            Nov 07, 2018 11:00 AM CST   Level 3 with  INFUSION CHAIR 4   [facility] ([facility])    [facility]  [address]  [facility] [address]   [phone]            Jan 17, 2019 11:15 AM CST   New Visit with Neida Fortune GC   Cancer Risk Management Program ([facility])    [facility]  [address]  [facility] [address]   [phone]              Further instructions from your care team       [facility]  Discharge Instructions: Post-Operative Port Placement    ACTIVITY    Take frequent, short walks and increase your activity gradually.      Avoid strenuous physical activity or heavy lifting greater than 15-20 lbs. for 1 week.  You may climb stairs.    You may drive without restrictions when you are not using any prescription pain medication and feel comfortable in a car.    You may return to work/school when you are comfortable without any prescription pain medication.    WOUND CARE    You may shower 48 hours after surgery. Pat your incisions dry and leave them open to air.  You may apply a dressing (gauze/bandaid) if preferred for comfort.    You have Dermabond (looks  like glue) on your incisions. Do not pick at the skin glue. It will peel up and flake off on its own after a few weeks.    Do not soak your incisions in a tub or pool for 2 weeks.     Do not apply any lotions, creams, or ointments to your incision(s).    A ridge under your incision(s) is normal and will gradually resolve.    DIET    Start with liquids, then gradually resume your regular diet as tolerated.     Drink plenty of liquids to stay hydrated.    PAIN    Expect some tenderness and discomfort at the incision site(s).  Use the prescribed pain medication at your discretion.  Expect gradual resolution of your pain over several days.    You may take ibuprofen with food (unless you have been told not to) instead of or in addition to your prescribed pain medication.  If you are taking Norco or Percocet, do not take any additional acetaminophen/APAP/Tylenol.    Do not drink alcohol or drive while you are taking pain medications.    You may apply ice to your incisions in 20 minute intervals as needed for the next 48 hours.  After that time, consider switching to heat if you prefer.    EXPECTATIONS    Pain medications can cause constipation.  Limit use when possible.  Take over the counter stool softener/stimulant, such as Colace or Senna, 1-2 times a day with plenty of water.  You may take a mild over the counter laxative, such as Miralax or a suppository, as needed.      You may discontinue these medications once you are having regular bowel movements and/or are no longer taking your narcotic pain medication.    FOLLOW UP    Our office will contact you in approximately 2 weeks to check on your progress and answer any questions you may have.  If you are doing well, you will not need to return for a follow up appointment.  If any concerns are identified over the phone, we will help you make an appointment to see a provider.     If you have not received a phone call, have any questions or concerns, or would like to be  seen, please call us at [phone] and ask to speak with our nurse.  We are located at [address]  [place] [id number].    CALL OUR OFFICE [phone] IF YOU HAVE:     Chills or fever above 101 F.    Increased redness, warmth, or drainage at your incisions.    Significant bleeding.    Pain not relieved by your pain medication or rest.    Increasing pain after the first 48 hours.    Any other concerns or questions.    Created May 2018    Same Day Surgery Discharge Instructions for  Sedation and General Anesthesia       It's not unusual to feel dizzy, light-headed or faint for up to 24 hours after surgery or while taking pain medication.  If you have these symptoms: sit for a few minutes before standing and have someone assist you when you get up to walk or use the bathroom.      You should rest and relax for the next 24 hours. We recommend you make arrangements to have an adult stay with you for at least 24 hours after your discharge.  Avoid hazardous and strenuous activity.      DO NOT DRIVE any vehicle or operate mechanical equipment for 24 hours following the end of your surgery.  Even though you may feel normal, your reactions may be affected by the medication you have received.      Do not drink alcoholic beverages for 24 hours following surgery.       Slowly progress to your regular diet as you feel able. It's not unusual to feel nauseated and/or vomit after receiving anesthesia.  If you develop these symptoms, drink clear liquids (apple juice, ginger ale, broth, 7-up, etc. ) until you feel better.  If your nausea and vomiting persists for 24 hours, please notify your surgeon.        All narcotic pain medications, along with inactivity and anesthesia, can cause constipation. Drinking plenty of liquids and increasing fiber intake will help.      For any questions of a medical nature, call your surgeon.      Do not make important decisions for 24 hours.      If you had general  anesthesia, you may have a sore throat for a couple of days related to the breathing tube used during surgery.  You may use Cepacol lozenges to help with this discomfort.  If it worsens or if you develop a fever, contact your surgeon.       If you feel your pain is not well managed with the pain medications prescribed by your surgeon, please contact your surgeon's office to let them know so they can address your concerns.         Discharge Instructions for Power Port Placement      General Instructions:    You will be given a card with information about your port.  You should carry this with you in your wallet/billfold. It provides information to clinicians about your port.  You should also carry the card in case your port triggers any security devices.     Activity:    Limit your activity for the first few days after your port is placed    Incision Care:     Unless otherwise directed by your surgeon, the dressing may removed tomorrow.      If a topical skin adhesive was used to close incision, you may shower tomorrow. Do not use soaps, lotions, or ointments on the wound area. Do not scrub the wound. After bathing, pat the wound dry with a soft towel.  Do not scratch, rub, or pick at the strips or film. Do not place tape directly over the strips or film.  Do not apply liquids (such as peroxide), ointments, or creams to the wound while the strips or film are in place.    Call your surgeon if you have:     Redness, swelling or drainage from incision     A fever of 101 F or greater.      Nausea or vomiting.     Pain that is not controlled by medications and/or rest.     Questions or concerns.              **If you have concerns or questions about your procedure,    please contact Dr Ashford at  [phone]**    Pending Results     Date and Time Order Name Status Description    10/18/2018 1652 XR Chest Port 1 View Preliminary     10/18/2018 1446 XR Surgery BENJAMIN L/T 5 Min Fluoro w Stills In process            "  Admission Information     Date & Time Provider Department Dept. Phone    10/18/2018 Stacey Ashford MD [facility] [phone]      Your Vitals Were     Blood Pressure Pulse Temperature Respirations Height Weight    120/75 68 96.6  F (35.9  C) (Temporal) 14 1.6 m (5' 3\") 87.3 kg (192 lb 8 oz)    Last Period Pulse Oximetry BMI (Body Mass Index)             10/09/2018 99% 34.1 kg/m2         Care EveryWhere ID     This is your Care EveryWhere ID. This could be used by other organizations to access your [facility] medical records  [id number]        Equal Access to Services     PINA PINEDA AH: Kalani Marks, latisha koch, keiko bauer, josh bangura. So [facility] [phone].    ATENCIÓN: Si habla español, tiene a dominique disposición servicios gratuitos de asistencia lingüística. Llame al [phone].    We comply with applicable federal civil rights laws and [place] laws. We do not discriminate on the basis of race, color, national origin, age, disability, sex, sexual orientation, or gender identity.               Review of your medicines      START taking        Dose / Directions    HYDROcodone-acetaminophen 5-325 MG per tablet   Commonly known as:  NORCO   Used for:  Malignant neoplasm of upper-outer quadrant of right breast in female, estrogen receptor negative (H)   Notes to Patient:  ONE TABLET TAKEN @ 5:45PM        Dose:  1-2 tablet   Take 1-2 tablets by mouth every 4 hours as needed for moderate to severe pain   Quantity:  15 tablet   Refills:  0       ondansetron 4 MG ODT tab   Commonly known as:  ZOFRAN-ODT   Used for:  Malignant neoplasm of upper-outer quadrant of right breast in female, estrogen receptor negative (H)        Dose:  4-8 mg   Take 1-2 tablets (4-8 mg) by mouth every 8 hours as needed for nausea   Quantity:  10 tablet   Refills:  0       senna-docusate 8.6-50 MG per tablet   Commonly known as:  SENOKOT-S;PERICOLACE   Used for:  " Malignant neoplasm of upper-outer quadrant of right breast in female, estrogen receptor negative (H)        Dose:  1-2 tablet   Take 1-2 tablets by mouth 2 times daily   Quantity:  30 tablet   Refills:  0         CONTINUE these medicines which have NOT CHANGED        Dose / Directions    levothyroxine 125 MCG tablet   Commonly known as:  SYNTHROID/LEVOTHROID   Used for:  Acquired hypothyroidism        TAKE 1 TABLET BY MOUTH DAILY   Quantity:  30 tablet   Refills:  0       lidocaine-prilocaine cream   Commonly known as:  EMLA   Used for:  Malignant neoplasm of upper-outer quadrant of right breast in female, estrogen receptor negative (H)        Apply quarter-size amount of Emla cream topically over port site one hour prior to port access for comfort.   Quantity:  30 g   Refills:  3       medroxyPROGESTERone 10 MG tablet   Commonly known as:  PROVERA   Used for:  Absence of menstruation        Dose:  10 mg   Take 1 tablet (10 mg) by mouth daily   Quantity:  90 tablet   Refills:  3       metFORMIN 500 MG 24 hr tablet   Commonly known as:  GLUCOPHAGE-XR   Used for:  Non morbid obesity due to excess calories, Hyperglycemia        Dose:  500 mg   Take 1 tablet (500 mg) by mouth daily (with dinner)   Quantity:  30 tablet   Refills:  11       omeprazole 20 MG CR capsule   Commonly known as:  priLOSEC   Used for:  Gastroesophageal reflux disease without esophagitis        Dose:  20 mg   Take 1 capsule (20 mg) by mouth 2 times daily   Quantity:  60 capsule   Refills:  11         STOP taking     TYLENOL PO                Where to get your medicines      These medications were sent to [facility] - [address]  [address] [place] [address], [place] [address]     Phone:  [phone]     ondansetron 4 MG ODT tab    senna-docusate 8.6-50 MG per tablet         Some of these will need a paper prescription and others can be bought over the counter. Ask your nurse if you have questions.     Bring a paper  prescription for each of these medications     HYDROcodone-acetaminophen 5-325 MG per tablet                Protect others around you: Learn how to safely use, store and throw away your medicines at www.disposemymeds.org.        Information about OPIOIDS     PRESCRIPTION OPIOIDS: WHAT YOU NEED TO KNOW   We gave you an opioid (narcotic) pain medicine. It is important to manage your pain, but opioids are not always the best choice. You should first try all the other options your care team gave you. Take this medicine for as short a time (and as few doses) as possible.    Some activities can increase your pain, such as bandage changes or therapy sessions. It may help to take your pain medicine 30 to 60 minutes before these activities. Reduce your stress by getting enough sleep, working on hobbies you enjoy and practicing relaxation or meditation. Talk to your care team about ways to manage your pain beyond prescription opioids.    These medicines have risks:    DO NOT drive when on new or higher doses of pain medicine. These medicines can affect your alertness and reaction times, and you could be arrested for driving under the influence (DUI). If you need to use opioids long-term, talk to your care team about driving.    DO NOT operate heavy machinery    DO NOT do any other dangerous activities while taking these medicines.    DO NOT drink any alcohol while taking these medicines.     If the opioid prescribed includes acetaminophen, DO NOT take with any other medicines that contain acetaminophen. Read all labels carefully. Look for the word  acetaminophen  or  Tylenol.  Ask your pharmacist if you have questions or are unsure.    You can get addicted to pain medicines, especially if you have a history of addiction (chemical, alcohol or substance dependence). Talk to your care team about ways to reduce this risk.    All opioids tend to cause constipation. Drink plenty of water and eat foods that have a lot of fiber, such  as fruits, vegetables, prune juice, apple juice and high-fiber cereal. Take a laxative (Miralax, milk of magnesia, Colace, Senna) if you don t move your bowels at least every other day. Other side effects include upset stomach, sleepiness, dizziness, throwing up, tolerance (needing more of the medicine to have the same effect), physical dependence and slowed breathing.    Store your pills in a secure place, locked if possible. We will not replace any lost or stolen medicine. If you don t finish your medicine, please throw away (dispose) as directed by your pharmacist. The [place] [employer]ution [employer] has more information about safe disposal: https://www.pca.[email or web address]/[email or web address]-medications             Medication List: This is a list of all your medications and when to take them. Check marks below indicate your daily home schedule. Keep this list as a reference.      Medications           Morning Afternoon Evening Bedtime As Needed    HYDROcodone-acetaminophen 5-325 MG per tablet   Commonly known as:  NORCO   Take 1-2 tablets by mouth every 4 hours as needed for moderate to severe pain   Last time this was given:  1 tablet on 10/18/2018  5:45 PM   Notes to Patient:  ONE TABLET TAKEN @ 5:45PM                                levothyroxine 125 MCG tablet   Commonly known as:  SYNTHROID/LEVOTHROID   TAKE 1 TABLET BY MOUTH DAILY                                lidocaine-prilocaine cream   Commonly known as:  EMLA   Apply quarter-size amount of Emla cream topically over port site one hour prior to port access for comfort.                                medroxyPROGESTERone 10 MG tablet   Commonly known as:  PROVERA   Take 1 tablet (10 mg) by mouth daily                                metFORMIN 500 MG 24 hr tablet   Commonly known as:  GLUCOPHAGE-XR   Take 1 tablet (500 mg) by mouth daily (with dinner)                                omeprazole 20 MG CR capsule   Commonly known as:  priLOSEC   Take 1  capsule (20 mg) by mouth 2 times daily                                ondansetron 4 MG ODT tab   Commonly known as:  ZOFRAN-ODT   Take 1-2 tablets (4-8 mg) by mouth every 8 hours as needed for nausea                                senna-docusate 8.6-50 MG per tablet   Commonly known as:  SENOKOT-S;PERICOLACE   Take 1-2 tablets by mouth 2 times daily

## 2018-10-18 NOTE — ANESTHESIA PREPROCEDURE EVALUATION
Procedure: Procedure(s):  INSERTION OF VASCULAR PORT  Preop diagnosis: BREAST CANCER    No Known Allergies  Past Medical History:   Diagnosis Date     Hypertension goal BP (blood pressure) < 140/80 2/18/2014     Lateral epicondylitis, right dominant elbow since late 10-17 11/1/2017     Thyroid disease      Past Surgical History:   Procedure Laterality Date     HAND SURGERY  2002    left     TUBAL LIGATION       Social History   Substance Use Topics     Smoking status: Never Smoker     Smokeless tobacco: Never Used     Alcohol use No     Prior to Admission medications    Medication Sig Start Date End Date Taking? Authorizing Provider   levothyroxine (SYNTHROID/LEVOTHROID) 125 MCG tablet TAKE 1 TABLET BY MOUTH DAILY 9/17/18   Stuart Wills MD   lidocaine-prilocaine (EMLA) cream Apply quarter-size amount of Emla cream topically over port site one hour prior to port access for comfort. 10/17/18   Addie Murillo MD   medroxyPROGESTERone (PROVERA) 10 MG tablet Take 1 tablet (10 mg) by mouth daily 8/25/17   Stuart Wills MD   metFORMIN (GLUCOPHAGE-XR) 500 MG 24 hr tablet Take 1 tablet (500 mg) by mouth daily (with dinner) 8/25/17   Stuart Wills MD   omeprazole (PRILOSEC) 20 MG CR capsule Take 1 capsule (20 mg) by mouth 2 times daily 8/25/17   Stuart Wills MD     Current Facility-Administered Medications Ordered in Epic   Medication Dose Route Frequency Last Rate Last Dose     ceFAZolin (ANCEF) 1 g vial to attach to  ml bag for ADULT or 50 ml bag for PEDS  1 g Intravenous See Admin Instructions         ceFAZolin (ANCEF) intermittent infusion 2 g in 100 mL dextrose PRE-MIX  2 g Intravenous Pre-Op/Pre-procedure x 1 dose         No current [facility]-ordered outpatient prescriptions on file.       Wt Readings from Last 1 Encounters:   10/17/18 88.5 kg (195 lb 3.2 oz)     Temp Readings from Last 1 Encounters:   10/17/18 36.6  C (97.9  F) (Oral)     BP Readings from Last 6 Encounters:    10/17/18 124/78   10/16/18 152/87   10/15/18 119/67   10/13/18 136/70   10/09/18 110/74   08/23/18 130/70     Pulse Readings from Last 4 Encounters:   10/17/18 77   10/16/18 59   10/15/18 86   10/12/18 75     Resp Readings from Last 1 Encounters:   10/17/18 16     SpO2 Readings from Last 1 Encounters:   10/17/18 98%     Recent Labs   Lab Test  10/15/18   2117  10/12/18   2206   NA  139  139   POTASSIUM  3.8  4.0   CHLORIDE  105  105   CO2  29  26   ANIONGAP  5  8   GLC  99  92   BUN  9  9   CR  0.75  0.72   MATHIEU  8.7  8.4*     Recent Labs   Lab Test  10/12/18   2206  08/21/18   1201  05/31/18   1646  10/16/17   1723   04/17/13   0952   AST  20   --   22  16   --   19   ALT  25  37  29  20   < >  22   ALKPHOS  75   --   81  77   --   74   BILITOTAL  0.3   --   0.2  0.2   --   0.6   LIPASE   --    --    --   113   --   46    < > = values in this interval not displayed.     Recent Labs   Lab Test  10/15/18   2117  10/12/18   2206   WBC  9.5  8.1   HGB  11.9  13.0   PLT  338  364     No results for input(s): ABO, RH in the last 93278 hours.  No results for input(s): INR, PTT in the last 99697 hours.   Recent Labs   Lab Test  10/15/18   2117  10/16/17   1723   TROPI  <0.015  <0.015     No results for input(s): PH, PCO2, PO2, HCO3 in the last 07192 hours.  Recent Labs   Lab Test  10/18/18   1500   HCG  Negative     Recent Results (from the past 744 hour(s))   MA Diagnostic Bilateral w/David    Narrative    Examination: Bilateral digital diagnostic mammography and digital  breast tomosynthesis with computer aided detection, and focused  ultrasound of the RIGHT breast, 10/9/2018.    Comparison: 8/25/2017 and 1/31/2014.    History: Palpable lump in the RIGHT breast for the past 3 days.    BREAST DENSITY: Scattered fibroglandular densities    Findings: Mammography with digital breast tomosynthesis demonstrates a  small mass with indistinct margins at the patient's palpable area of  concern in the upper outer RIGHT breast. No  concerning findings in the  left breast.     Focussed ultrasound by radiologist and technologist of the upper outer  quadrant of the RIGHT breast was performed. Irregularly-shaped  hypoechoic mass with indistinct margins is seen at the patient's  palpable area of concern, 12:00 position 6 m from the nipple on the  RIGHT. This measures 8 x 6 x 6 mm.    RIGHT axillary ultrasound survey demonstrates a lymph node with  eccentric cortical thickening and subcapsular blood flow on color  Doppler imaging.      Impression    IMPRESSION: BI-RADS CATEGORY: 4 - Suspicious Abnormality-Biopsy Should  Be Considered.    RECOMMENDED FOLLOW-UP: Biopsy.    Ultrasound-guided core needle biopsy of RIGHT breast mass and RIGHT  axillary lymph node.    The patient was given the results of the examination.    CARSON CAGE MD   US Breast Right    Narrative    Examination: Bilateral digital diagnostic mammography and digital  breast tomosynthesis with computer aided detection, and focused  ultrasound of the RIGHT breast, 10/9/2018.    Comparison: 8/25/2017 and 1/31/2014.    History: Palpable lump in the RIGHT breast for the past 3 days.    BREAST DENSITY: Scattered fibroglandular densities    Findings: Mammography with digital breast tomosynthesis demonstrates a  small mass with indistinct margins at the patient's palpable area of  concern in the upper outer RIGHT breast. No concerning findings in the  left breast.     Focussed ultrasound by radiologist and technologist of the upper outer  quadrant of the RIGHT breast was performed. Irregularly-shaped  hypoechoic mass with indistinct margins is seen at the patient's  palpable area of concern, 12:00 position 6 m from the nipple on the  RIGHT. This measures 8 x 6 x 6 mm.    RIGHT axillary ultrasound survey demonstrates a lymph node with  eccentric cortical thickening and subcapsular blood flow on color  Doppler imaging.      Impression    IMPRESSION: BI-RADS CATEGORY: 4 - Suspicious  Abnormality-Biopsy Should  Be Considered.    RECOMMENDED FOLLOW-UP: Biopsy.    Ultrasound-guided core needle biopsy of RIGHT breast mass and RIGHT  axillary lymph node.    The patient was given the results of the examination.    CARSON CAGE MD   US Breast Biopsy Core Needle Right    Addendum: 10/15/2018    TAQUERIAANA MAGDIEL ALERT  [id number]  [id number]  [id number]    Addendum: The pathology diagnosis for the right breast biopsy is  invasive ductal carcinoma and ductal carcinoma in situ. The right  axillary lymph node demonstrated metastatic adenocarcinoma. Surgical  consultation and breast MRI are recommended.    Tejal Fontanez MD ( Date of Addendum: 10/15/2018 )    TEJAL FONTANEZ MD      Narrative    ULTRASOUND BREAST BIOPSY CORE NEEDLE RIGHT, ULTRASOUND BIOPSY CORE  LYMPH NODE ADDITIONAL NODE, MAMMOGRAPHY POST PROCEDURE RIGHT  10/12/2018 9:57 AM    HISTORY:  Right breast mass and enlarged right axillary lymph node.    FINDINGS: Procedure, risks, benefits and alternatives were discussed  with the patient and the patient gave written and verbal consent.  Aseptic technique was utilized. 1% lidocaine was utilized for local  anesthesia. Under ultrasound guidance, biopsy of mass was performed  and marker placed as follows and images were archived:    Size: 14-gauge core needle biopsy system.  Number of cores: 3.  Position: 12 o'clock, 6 cm from the nipple.  Marker: HydroMark.     Size: 14-gauge core needle biopsy system.  Number of cores: 2.  Position: Axilla.  Marker: HydroMark.    Pressure was held over both areas for approximately 10 minutes. A  dressing was placed and care instructions were discussed with the  patient.    Post biopsy mammogram demonstrates clip deployment.      Impression    IMPRESSION:    Uncomplicated ultrasound guided core needle biopsy of  the right breast and right axilla.    TEJAL FONTANEZ MD   MA Post Procedure Right    Addendum: 10/15/2018    RAMIRO VELOZ  ALERT  [id number]  [id number]    Addendum: The pathology diagnosis for the right breast biopsy is  invasive ductal carcinoma and ductal carcinoma in situ. The right  axillary lymph node demonstrated metastatic adenocarcinoma. Surgical  consultation and breast MRI are recommended.    Tejal Fontanez MD ( Date of Addendum: 10/15/2018 )    TEJAL FONTANEZ MD      Narrative    ULTRASOUND BREAST BIOPSY CORE NEEDLE RIGHT, ULTRASOUND BIOPSY CORE  LYMPH NODE ADDITIONAL NODE, MAMMOGRAPHY POST PROCEDURE RIGHT  10/12/2018 9:57 AM    HISTORY:  Right breast mass and enlarged right axillary lymph node.    FINDINGS: Procedure, risks, benefits and alternatives were discussed  with the patient and the patient gave written and verbal consent.  Aseptic technique was utilized. 1% lidocaine was utilized for local  anesthesia. Under ultrasound guidance, biopsy of mass was performed  and marker placed as follows and images were archived:    Size: 14-gauge core needle biopsy system.  Number of cores: 3.  Position: 12 o'clock, 6 cm from the nipple.  Marker: HydroMark.     Size: 14-gauge core needle biopsy system.  Number of cores: 2.  Position: Axilla.  Marker: HydroMark.    Pressure was held over both areas for approximately 10 minutes. A  dressing was placed and care instructions were discussed with the  patient.    Post biopsy mammogram demonstrates clip deployment.      Impression    IMPRESSION:    Uncomplicated ultrasound guided core needle biopsy of  the right breast and right axilla.    TEJAL FONTANEZ MD   US Biopsy Lymph Node Core Additional Node    Addendum: 10/15/2018    TAQUERIAANA MAGDIEL ALERT  [id number]  [id number]  [id number]    Addendum: The pathology diagnosis for the right breast biopsy is  invasive ductal carcinoma and ductal carcinoma in situ. The right  axillary lymph node demonstrated metastatic adenocarcinoma. Surgical  consultation and breast MRI are recommended.    Tejal Fontanez MD ( Date of Addendum: 10/15/2018  )    ZOË BOATENG MD      Narrative    ULTRASOUND BREAST BIOPSY CORE NEEDLE RIGHT, ULTRASOUND BIOPSY CORE  LYMPH NODE ADDITIONAL NODE, MAMMOGRAPHY POST PROCEDURE RIGHT  10/12/2018 9:57 AM    HISTORY:  Right breast mass and enlarged right axillary lymph node.    FINDINGS: Procedure, risks, benefits and alternatives were discussed  with the patient and the patient gave written and verbal consent.  Aseptic technique was utilized. 1% lidocaine was utilized for local  anesthesia. Under ultrasound guidance, biopsy of mass was performed  and marker placed as follows and images were archived:    Size: 14-gauge core needle biopsy system.  Number of cores: 3.  Position: 12 o'clock, 6 cm from the nipple.  Marker: HydroMark.     Size: 14-gauge core needle biopsy system.  Number of cores: 2.  Position: Axilla.  Marker: HydroMark.    Pressure was held over both areas for approximately 10 minutes. A  dressing was placed and care instructions were discussed with the  patient.    Post biopsy mammogram demonstrates clip deployment.      Impression    IMPRESSION:    Uncomplicated ultrasound guided core needle biopsy of  the right breast and right axilla.    ZOË BOATENG MD   Head CT w/o contrast    Narrative    CT SCAN OF THE HEAD WITHOUT CONTRAST   10/12/2018 10:39 PM     HISTORY: Altered mental status.      TECHNIQUE:  Axial images of the head and coronal reformations without  IV contrast material. Radiation dose for this scan was reduced using  automated exposure control, adjustment of the mA and/or kV according  to patient size, or iterative reconstruction technique.    COMPARISON: None.    FINDINGS:  The cerebral hemispheres, brainstem, and cerebellum demonstrate normal  morphology and attenuation. No evidence of acute ischemia, hemorrhage,  mass, mass effect, or hydrocephalus. The visualized calvarium, skull  base, paraspinous sinuses, and extra cranial soft tissues are  unremarkable.      Impression    IMPRESSION:   No  acute intracranial abnormality.    COLEMAN GARCIA MD   Chest CT, IV contrast only - PE protocol    Narrative    CT CHEST PULMONARY EMBOLISM WITH CONTRAST 10/15/2018 10:15 PM     HISTORY: Chest pressure that worsens with breathing, has breast  cancer, last week had biopsy of right breast/lymph node that was  positive. Rule out pulmonary embolism, chest pressure.    TECHNIQUE: Volumetric acquisition of the chest  after the  administration of IV contrast. Radiation dose for this scan was  reduced using automated exposure control, adjustment of the mA and/or  kV according to patient size, or iterative reconstruction technique.  Contrast: 73 mL Isovue-370.    COMPARISON: None.    FINDINGS: No visualized pulmonary embolism. Normal heart size. No  thoracic aortic aneurysm or dissection. No acute infiltrates, pleural  effusions or pneumothorax. Calcified granuloma left lower lung. Tiny  peripheral nodule along the right minor fissure measuring 0.2 cm.  Additional small nodule measuring 0.3 cm in the right midlung  laterally. No acute findings in the visualized upper abdomen. Mildly  prominent right axillary lymph node (series 3, image 35) measuring 1.3  x 1.8 cm. No destructive bone lesions.      Impression    IMPRESSION:  1. No visualized pulmonary embolism or other acute findings.  2. Two tiny right lung nodules, technically indeterminate. See below.  3. Evidence old granulomatous infection.  4. Mildly prominent right axillary lymph node.    Recommendations for one or multiple incidental lung nodules < 6mm :    Low risk patients: No routine follow-up.    High risk patients: Optional follow-up CT at 12 months; if  unchanged, no further follow-up.    *Low Risk: Minimal or absent history of smoking or other known risk  factors.  *Nonsolid (ground glass) or partly solid nodules may require longer  follow-up to exclude indolent adenocarcinoma.  *Recommendations based on Guidelines for the Management of Incidental  Pulmonary  Nodules Detected at CT: From the Fleischner Society 2017,  Radiology 2017.    LINH STERLING MD   MR Breast Bilateral w/o & w Contrast    Narrative    MRI BREASTS, BILATERAL, ENHANCED - 10/17/2018 9:20 PM    HISTORY: Newly diagnosed right breast cancer at 12:00. Evaluate for  extent of disease.     COMPARISON: Recent mammograms and ultrasound in October 2018.     TECHNIQUE: Both breasts were simultaneously evaluated using dedicated  breast coil.  Axial STIR, axial T1 before and at two-minute intervals  out to eight minutes following 10 ml Gadavist administration and  sagittal postcontrast images were performed, as well as subtraction,  CAD and angio mapping.    FINDINGS:     Right Breast: There is minimal  background parenchymal enhancement.    In the right breast at 12:00 7 cm from the nipple, there is irregular  heterogeneously enhancing mass, corresponding with the known  biopsy-proven malignancy, which spans approximately 2.2 cm in maximal  diameter, best appreciated on sagittal view, with surrounding  nonmasslike enhancement likely related to postbiopsy change or DCIS.  Taken with the primary tumor, the entire span extends up to 3.8 cm.    There are multiple enlarged right axillary lymph nodes.     Left Breast: There is minimal  background parenchymal enhancement.    There are no areas of suspicious enhancement or lymphadenopathy.      Impression    IMPRESSION:   BI-RADS 6, KNOWN MALIGNANCY. Known biopsy-proven malignancy in the  right breast at 12:00 with metastatic axillary lymph node.     RECOMMENDED FOLLOW-UP:  Surgical consultation.     NILO DELGADO MD       RECENT LABS:   ECG:   ECHO:       Anesthesia Evaluation     . Pt has had prior anesthetic.     No history of anesthetic complications          ROS/MED HX    ENT/Pulmonary:  - neg pulmonary ROS     Neurologic:  - neg neurologic ROS     Cardiovascular:         METS/Exercise Tolerance:  >4 METS   Hematologic:         Musculoskeletal:   (+) , , other  musculoskeletal (chronic low back pain)-       GI/Hepatic:     (+) GERD       Renal/Genitourinary:         Endo:     (+) thyroid problem hypothyroidism, Obesity, .      Psychiatric:         Infectious Disease:         Malignancy:   (+) Malignancy History of Breast  Breast CA status post Chemo.         Other:                                    Anesthesia Plan      History & Physical Review  History and physical reviewed and following examination; no interval change.    ASA Status:  2 .    NPO Status:  > 8 hours    Plan for MAC Maintenance will be TIVA.  Reason for MAC:  Deep or markedly invasive procedure (G8)         Postoperative Care  Postoperative pain management:  IV analgesics.      Consents  Anesthetic plan, risks, benefits and alternatives discussed with:  Patient..                          .

## 2018-10-18 NOTE — DISCHARGE INSTRUCTIONS
[facility] - [facility]  Discharge Instructions: Post-Operative Port Placement    ACTIVITY    Take frequent, short walks and increase your activity gradually.      Avoid strenuous physical activity or heavy lifting greater than 15-20 lbs. for 1 week.  You may climb stairs.    You may drive without restrictions when you are not using any prescription pain medication and feel comfortable in a car.    You may return to work/school when you are comfortable without any prescription pain medication.    WOUND CARE    You may shower 48 hours after surgery. Pat your incisions dry and leave them open to air.  You may apply a dressing (gauze/bandaid) if preferred for comfort.    You have Dermabond (looks like glue) on your incisions. Do not pick at the skin glue. It will peel up and flake off on its own after a few weeks.    Do not soak your incisions in a tub or pool for 2 weeks.     Do not apply any lotions, creams, or ointments to your incision(s).    A ridge under your incision(s) is normal and will gradually resolve.    DIET    Start with liquids, then gradually resume your regular diet as tolerated.     Drink plenty of liquids to stay hydrated.    PAIN    Expect some tenderness and discomfort at the incision site(s).  Use the prescribed pain medication at your discretion.  Expect gradual resolution of your pain over several days.    You may take ibuprofen with food (unless you have been told not to) instead of or in addition to your prescribed pain medication.  If you are taking Norco or Percocet, do not take any additional acetaminophen/APAP/Tylenol.    Do not drink alcohol or drive while you are taking pain medications.    You may apply ice to your incisions in 20 minute intervals as needed for the next 48 hours.  After that time, consider switching to heat if you prefer.    EXPECTATIONS    Pain medications can cause constipation.  Limit use when possible.  Take over the counter stool  softener/stimulant, such as Colace or Senna, 1-2 times a day with plenty of water.  You may take a mild over the counter laxative, such as Miralax or a suppository, as needed.      You may discontinue these medications once you are having regular bowel movements and/or are no longer taking your narcotic pain medication.    FOLLOW UP    Our office will contact you in approximately 2 weeks to check on your progress and answer any questions you may have.  If you are doing well, you will not need to return for a follow up appointment.  If any concerns are identified over the phone, we will help you make an appointment to see a provider.     If you have not received a phone call, have any questions or concerns, or would like to be seen, please call us at [phone] and ask to speak with our nurse.  We are located at [address] [place] [address].    CALL OUR OFFICE -[phone] IF YOU HAVE:     Chills or fever above 101 F.    Increased redness, warmth, or drainage at your incisions.    Significant bleeding.    Pain not relieved by your pain medication or rest.    Increasing pain after the first 48 hours.    Any other concerns or questions.    Created May 2018    Same Day Surgery Discharge Instructions for  Sedation and General Anesthesia       It's not unusual to feel dizzy, light-headed or faint for up to 24 hours after surgery or while taking pain medication.  If you have these symptoms: sit for a few minutes before standing and have someone assist you when you get up to walk or use the bathroom.      You should rest and relax for the next 24 hours. We recommend you make arrangements to have an adult stay with you for at least 24 hours after your discharge.  Avoid hazardous and strenuous activity.      DO NOT DRIVE any vehicle or operate mechanical equipment for 24 hours following the end of your surgery.  Even though you may feel normal, your reactions may be affected by the medication you  have received.      Do not drink alcoholic beverages for 24 hours following surgery.       Slowly progress to your regular diet as you feel able. It's not unusual to feel nauseated and/or vomit after receiving anesthesia.  If you develop these symptoms, drink clear liquids (apple juice, ginger ale, broth, 7-up, etc. ) until you feel better.  If your nausea and vomiting persists for 24 hours, please notify your surgeon.        All narcotic pain medications, along with inactivity and anesthesia, can cause constipation. Drinking plenty of liquids and increasing fiber intake will help.      For any questions of a medical nature, call your surgeon.      Do not make important decisions for 24 hours.      If you had general anesthesia, you may have a sore throat for a couple of days related to the breathing tube used during surgery.  You may use Cepacol lozenges to help with this discomfort.  If it worsens or if you develop a fever, contact your surgeon.       If you feel your pain is not well managed with the pain medications prescribed by your surgeon, please contact your surgeon's office to let them know so they can address your concerns.         Discharge Instructions for Power Port Placement      General Instructions:    You will be given a card with information about your port.  You should carry this with you in your wallet/billfold. It provides information to clinicians about your port.  You should also carry the card in case your port triggers any security devices.     Activity:    Limit your activity for the first few days after your port is placed    Incision Care:     Unless otherwise directed by your surgeon, the dressing may removed tomorrow.      If a topical skin adhesive was used to close incision, you may shower tomorrow. Do not use soaps, lotions, or ointments on the wound area. Do not scrub the wound. After bathing, pat the wound dry with a soft towel.  Do not scratch, rub, or pick at the strips or film.  Do not place tape directly over the strips or film.  Do not apply liquids (such as peroxide), ointments, or creams to the wound while the strips or film are in place.    Call your surgeon if you have:     Redness, swelling or drainage from incision     A fever of 101 F or greater.      Nausea or vomiting.     Pain that is not controlled by medications and/or rest.     Questions or concerns.              **If you have concerns or questions about your procedure,    please contact Dr Ashford at  [phone]**

## 2018-10-18 NOTE — H&P (VIEW-ONLY)
NEW PATIENT ONCOLOGY CONSULT    REQUESTING PROVIDER:  Dr. Stacey Ashford, breast surgeon    REASON FOR CONSULTATION:  10/2018 dx right breast TN IDC, cT1cN1 disease    HISTORY OF PRESENT ILLNESS:    At age 47 amenorrhea with polycystic ovaries,  She felt a lump in her right breast in early October, 2018.   Her mammogram revealed a small mass in the right upper outer breast, US revealed an 8mm hypoechoic mass at 12:00, 6cm FN and axillary US revealed a concerning lymph node which was also biopsied.   Her pathology from both breast and LN revealed a grade 3, invasive ductal carcinoma, ER/VA/her 2-.     She reports she does daily self breast exams and noticed the lump three days prior to her mammogram. She reports some new right shoulder pain and low back pain which is chronic.      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     MEDICATIONS/ALLERY:  Reviewed in Epic system.      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 Deny ETOH/smoking       FAMILY HISTORY:    Negative for any type of cancers    REVIEW OF SYSTEMS:   GENERAL: Anxious, in usual state of health. reports some new right shoulder pain and low back pain which is chronic.  NEURO:   No headache, double vision, or focal weakness.  No neuropathy.   SKIN:  No chronic skin rash or skin infection.   CARDIOVASCULAR:  No High blood pressure, + hyperlipidemia. NO SALAS  PULMONARY:  No shortness of breath, no pleurisy, no cough, no hemoptysis.   GI:  No abdominal pain, nausea, vomiting, constipation.  No diarrhea.  No bright red blood per  rectum.   :  No urgency, frequency.  No recurrent urinary tract infection.  No kidney problems.   RHEUMATOLOGY/MUSCULOSKELETAL SYSTEM:  no arthritic pain, or muscle pain. No muscle ache.   ENDOCRINE:  No history of diabetes or thyroid problem.  No complaints of hot flashes.   HEMATOLOGY:  No history of bleeding or thrombosis episode.   IMMUNOLOGY:  No recurrent fever or chills episode.  No recurrent infectious episode.   BREASTS/GYN:amenorrhea with polycystic ovaries, dx at age 47 right breast cancer  PSYCHIATRY:  No anxiety or depression.          PHYSICAL EXAMINATION:   VITAL SIGNS: Blood pressure 124/78, pulse 77, temperature 97.9  F (36.6  C), temperature source Oral, resp. rate 16, weight 88.5 kg (195 lb 3.2 oz), last menstrual period 10/09/2018, SpO2 98 %, not currently breastfeeding.      ECOG 0    GENERAL APPEARANCE:  looks like her stated age, very pleasant, not in acute distress.   HEENT: The patient is normocephalic, atraumatic. Pupils are equally reactive to light.  Sclerae are anicteric.  Moist oral mucosa.  Negative pharynx.  No oral thrush.   NECK:  Supple.  No jugular venous distention.  Thyroid is not palpable.   LYMPH NODES:  Superficial lymphadenopathy is not appreciable in the bilateral cervical, supraclavicular, axillary or inguinal areas.   CARDIOVASCULAR:  S1, S2 regular with no murmurs or gallops.  No carotid or abdominal bruits.   PULMONARY:  Lungs are clear to auscultation and percussion bilaterally.  There is no wheezing or rhonchi.   GASTROINTESTINAL:  Abdomen is soft, nontender.  No hepatosplenomegaly.  No signs of ascites.  No mass appreciable.   MUSCULOSKELETAL/EXTREMITIES:  No edema.  No cyanotic changes.  No signs of joint deformity.  No lymphedema.   NEUROLOGIC:  Cranial nerves II-XII are grossly intact.  Sensation intact.  Muscle strength and muscle tone symmetrical, 5/5 throughout.   BACK:  No spinal or paraspinal tenderness.  No CVA tenderness.   SKIN:  No petechiae.  No rash.   No signs of cellulitis.   BREASTS: Right breast with mild ecchymosis on upper breast, palpable 1.5cm mass at 12:00. Remainder of breast is normal. No other masses. Left breast is symmetrical with no skin or nipple changes. No masses on the left. Tissue is very dense throughout.        CURRENT LAB DATA REVIEWED  10/2018  Right breast 8mm hypoechoic mass at 12:00, 6cm FN and right axillary LN biopsy both found grade 3, invasive ductal carcinoma, ER/CA/her 2-.     Baseline cbc diff/CMP are fine.         CURRENT IMAGING REVIEWED  10/2018 dx MA -  revealed a small mass in the right upper outer breast, US revealed an 8mm hypoechoic mass at 12:00, 6cm FN and axillary US revealed a concerning lymph node        OLD DATA REVIEWED TODAY WITH SUMMARY:   Prior MA 2017, 2014 - negative.           ASSESSMENT AND PLAN:    1. Newly dx cT1cN1 right breast high grade IDC, TN at age 47 in 10/2018.     Recommend further staging work up with PET, echo, breast MRI.      I d/w Dr. Ashford, recommend systemic chemo prior to surgery. Will incorporate platinum in her tx plan.   We discussed the goal of neoadjuvant chemo, pros and cons of it versus adjuvant chemo.     We discussed the side effects of chemo include not limit to N/V/hair loss/lower immunity/cardiac toxicity/rare leukemia/infusion related reaction and mortality.   She made informed decision to proceed.     Plan DD AC -- taxol + carbo C1D1 10/24/2018.   Chemo teach is done.     She needs to be monitored closely during this intense systemic chemo treatment.     2. Young age dx with TN breast cancer, she will need genetic counseling.     3. Pre diabetic - she is on metformin. We use lots of steroid with chemo. May push her into DM. Recommend check HbA1c.

## 2018-10-18 NOTE — ANESTHESIA CARE TRANSFER NOTE
Patient: Luci Corbin Alert    Procedure(s):  INSERTION OF VASCULAR PORT    Diagnosis: BREAST CANCER  Diagnosis Additional Information: No value filed.    Anesthesia Type:   MAC     Note:  Airway :Nasal Cannula  Patient transferred to:PACU  Handoff Report: Identifed the Patient, Identified the Reponsible Provider, Reviewed the pertinent medical history, Discussed the surgical course, Reviewed Intra-OP anesthesia mangement and issues during anesthesia, Set expectations for post-procedure period and Allowed opportunity for questions and acknowledgement of understanding      Vitals: (Last set prior to Anesthesia Care Transfer)    CRNA VITALS  10/18/2018 1619 - 10/18/2018 1655      10/18/2018             Resp Rate (set): 10                Electronically Signed By: AMIE Sunshine CRNA  October 18, 2018  4:55 PM

## 2018-10-18 NOTE — OP NOTE
General Surgery Operative Note      Pre-operative diagnosis: BREAST CANCER   Post-operative diagnosis: Same   Procedure: Left internal jugular Power Port placement    Surgeon: Stacey Ashford MD   Assistant(s): Carmen Cruz PA-C  The PA s assistance was medically necessary to provide adequate exposure in the operating field, maintain hemostasis, cutting suture, clamping and ligating bleeding vessels, and visualization of anatomic structures throughout the surgical procedure.    Anesthesia                                   Local with MAC    Estimated blood loss:  Findings:                                        10 cc  Tip of the catheter at the atriocaval junction           DESCRIPTION OF PROCEDURE:  The patient was taken to the operating room after obtaining informed consent.  The patient was placed on the table in supine position.  A roll was placed between her shoulder blades.  IV anesthetic was administered.  The bilateral neck and upper chest were prepped and draped in standard sterile fashion.  We anesthetized the skin of the upper left chest.  The patient was placed in Trendelenburg.  Local anesthetic was injected into the skin in the lower neck, upper chest for the port site and proposed track for the catheter. We made an incision in the skin.  We cannulated the internal jugular vein using ultrasound guidance with a needle.  We then passed a wire through the needle into the internal jugular vein.  The wire would not pass beyond the needle. The vein was reaccessed with the introducer needle and again I was unable to pass a wire beyond the introducer needle despite excellent blood flow with aspiration and ultrasound revealing the tip of the needle within the vein. I elected to try on the right side. A small skin nick was made and using ultrasound, the introducer needle inserted into the vein, again I was unable to pass the wire after several attempts. Pt was placed in further trendelenberg without success.  her veins were very compressible. The needle was removed and no hematoma present. I re-examined the left internal jugular with ultrasound and in further trendelenberg this appeared patent. I accessed the vein more cephalad that the initial attempt was was able to pass the wire. Fluoroscopy was used and confirmed the wire was traveling into the SVC. We then used a #15 blade to make a skin incision in the right upper chest wall. The subcutaneous tissue was divided with cautery. The fascia was identified. A pocket was created above the fascia with blunt dissection. The port fit well into the pocket. We then used a tunneling device to place the catheter through the incision in the chest and tunneled to the neck incision. We then passed a dilator over the wire under fluoroscopy.  The catheter was then placed through the catheter introducer and threaded, using fluoroscopy until the tip of the catheter was at the atrial caval junction.  The catheter introducer was removed.  We flushed the port with injectable saline.  We attached the catheter to the port and secured it in place with the catheter hub.   The port was secured to the fascia with two 2-0 prolene sutures on either side.  We then closed the subcutaneous tissue with 3-0 interrupted Vicryl sutures.  The skin was closed with a running 4-0 Vicryl subcuticular suture and Dermabond.  A final flush of full strength heparin (2ml) was injected into the port using a Smith needle.  The patient tolerated the procedure well.  All sponge and instrument counts were correct.    Stacey Ashford MD

## 2018-10-18 NOTE — ANESTHESIA POSTPROCEDURE EVALUATION
Patient: Luci Corbin Spring    Procedure(s):  INSERTION OF VASCULAR PORT    Diagnosis:BREAST CANCER  Diagnosis Additional Information: No value filed.    Anesthesia Type:  MAC    Note:  Anesthesia Post Evaluation    Patient location during evaluation: PACU  Patient participation: Able to fully participate in evaluation  Level of consciousness: awake  Pain management: adequate  Airway patency: patent  Cardiovascular status: acceptable  Respiratory status: acceptable  Hydration status: acceptable  PONV: none     Anesthetic complications: None          Last vitals:  Vitals:    10/18/18 1525 10/18/18 1653   BP: 124/69 122/79   Pulse: 72 71   Resp: 16 19   Temp: 35.9  C (96.7  F) 35.8  C (96.4  F)   SpO2: 100% 100%         Electronically Signed By: Susanne Gillis MD, MD  October 18, 2018  4:58 PM

## 2018-10-18 NOTE — IP AVS SNAPSHOT
[place] [address]    [place] [address]    Phone:  [phone]                                       After Visit Summary   10/18/2018    Luci Londono    MRN: [id number]           After Visit Summary Signature Page     I have received my discharge instructions, and my questions have been answered. I have discussed any challenges I see with this plan with the nurse or doctor.    ..........................................................................................................................................  Patient/Patient Representative Signature      ..........................................................................................................................................  Patient Representative Print Name and Relationship to Patient    ..................................................               ................................................  Date                                   Time    ..........................................................................................................................................  Reviewed by Signature/Title    ...................................................              ..............................................  Date                                               Time          22EPIC Rev 08/18

## 2018-10-19 ENCOUNTER — TELEPHONE (OUTPATIENT)
Dept: SURGERY | Facility: CLINIC | Age: 48
End: 2018-10-19

## 2018-10-19 NOTE — TELEPHONE ENCOUNTER
I called Luci to discuss her MRI results and check in after her port placement. She is a bit sore by her left collarbone. Otherwise feeling fine. Her MRI reveals a 3.8cm area of concern at the biopsied cancer location. There are several lymph nodes which are enlarged in the right axilla. The left breast and axilla are normal. She is scheduled to start chemotherapy on Wednesday. I will see her back near the end of chemotherapy to discuss surgical options again.     Stacey Ashford MD  [facility]  [phone]

## 2018-10-22 ENCOUNTER — HOSPITAL ENCOUNTER (OUTPATIENT)
Facility: CLINIC | Age: 48
Discharge: HOME | End: 2018-10-22
Attending: INTERNAL MEDICINE | Admitting: INTERNAL MEDICINE
Payer: COMMERCIAL

## 2018-10-22 VITALS
TEMPERATURE: 98.4 F | DIASTOLIC BLOOD PRESSURE: 74 MMHG | SYSTOLIC BLOOD PRESSURE: 121 MMHG | RESPIRATION RATE: 18 BRPM | OXYGEN SATURATION: 98 % | HEART RATE: 88 BPM

## 2018-10-22 PROCEDURE — 25000128 H RX IP 250 OP 636: Performed by: INTERNAL MEDICINE

## 2018-10-22 PROCEDURE — 40000863 ZZH STATISTIC RADIOLOGY XRAY, US, CT, MAR, NM

## 2018-10-22 RX ORDER — MULTIPLE VITAMINS W/ MINERALS TAB 9MG-400MCG
1 TAB ORAL DAILY
COMMUNITY
End: 2023-11-08

## 2018-10-22 RX ORDER — HEPARIN SODIUM,PORCINE 10 UNIT/ML
5-10 VIAL (ML) INTRAVENOUS EVERY 24 HOURS
Status: DISCONTINUED | OUTPATIENT
Start: 2018-10-22 | End: 2018-10-22 | Stop reason: HOSPADM

## 2018-10-22 RX ORDER — HEPARIN SODIUM (PORCINE) LOCK FLUSH IV SOLN 100 UNIT/ML 100 UNIT/ML
5 SOLUTION INTRAVENOUS
Status: DISCONTINUED | OUTPATIENT
Start: 2018-10-22 | End: 2018-10-22 | Stop reason: HOSPADM

## 2018-10-22 RX ORDER — HEPARIN SODIUM,PORCINE 10 UNIT/ML
5-10 VIAL (ML) INTRAVENOUS
Status: DISCONTINUED | OUTPATIENT
Start: 2018-10-22 | End: 2018-10-22 | Stop reason: HOSPADM

## 2018-10-22 RX ADMIN — HEPARIN SODIUM (PORCINE) LOCK FLUSH IV SOLN 100 UNIT/ML 500 UNITS: 100 SOLUTION at 13:13

## 2018-10-22 NOTE — PROGRESS NOTES
Port accessed with 2 attempts. This was pt 1st access.   Pt had something to eat prior to the PET scan so needed to be rescheduled.  Port left accessed per pt request.  Saline flushed and hep flushed per protocol.  Pt having PET scan at the [facility] tomorrow at 0645.  They will de-access port there.

## 2018-10-23 ENCOUNTER — HOSPITAL ENCOUNTER (OUTPATIENT)
Dept: CARDIOLOGY | Facility: CLINIC | Age: 48
Discharge: HOME | End: 2018-10-23
Attending: INTERNAL MEDICINE | Admitting: INTERNAL MEDICINE
Payer: COMMERCIAL

## 2018-10-23 ENCOUNTER — HOSPITAL ENCOUNTER (OUTPATIENT)
Dept: PET IMAGING | Facility: CLINIC | Age: 48
Discharge: HOME | End: 2018-10-23
Attending: INTERNAL MEDICINE | Admitting: INTERNAL MEDICINE
Payer: COMMERCIAL

## 2018-10-23 DIAGNOSIS — C50.411 MALIGNANT NEOPLASM OF UPPER-OUTER QUADRANT OF RIGHT BREAST IN FEMALE, ESTROGEN RECEPTOR NEGATIVE (H): ICD-10-CM

## 2018-10-23 DIAGNOSIS — Z17.1 MALIGNANT NEOPLASM OF UPPER-OUTER QUADRANT OF RIGHT BREAST IN FEMALE, ESTROGEN RECEPTOR NEGATIVE (H): ICD-10-CM

## 2018-10-23 LAB — GLUCOSE BLDC GLUCOMTR-MCNC: 92 MG/DL (ref 70–99)

## 2018-10-23 PROCEDURE — A9552 F18 FDG: HCPCS | Performed by: INTERNAL MEDICINE

## 2018-10-23 PROCEDURE — 82962 GLUCOSE BLOOD TEST: CPT

## 2018-10-23 PROCEDURE — 0399T ECHO COMPLETE: CPT

## 2018-10-23 PROCEDURE — 93306 TTE W/DOPPLER COMPLETE: CPT | Mod: 26 | Performed by: INTERNAL MEDICINE

## 2018-10-23 PROCEDURE — 34300033 ZZH RX 343: Performed by: INTERNAL MEDICINE

## 2018-10-23 PROCEDURE — 25000128 H RX IP 250 OP 636: Performed by: INTERNAL MEDICINE

## 2018-10-23 PROCEDURE — 78816 PET IMAGE W/CT FULL BODY: CPT | Mod: PI

## 2018-10-23 RX ORDER — IOPAMIDOL 755 MG/ML
45-150 INJECTION, SOLUTION INTRAVASCULAR ONCE
Status: COMPLETED | OUTPATIENT
Start: 2018-10-23 | End: 2018-10-23

## 2018-10-23 RX ORDER — HEPARIN SODIUM (PORCINE) LOCK FLUSH IV SOLN 100 UNIT/ML 100 UNIT/ML
500 SOLUTION INTRAVENOUS ONCE
Status: COMPLETED | OUTPATIENT
Start: 2018-10-23 | End: 2018-10-23

## 2018-10-23 RX ADMIN — IOPAMIDOL 118 ML: 755 INJECTION, SOLUTION INTRAVENOUS at 07:57

## 2018-10-23 RX ADMIN — SODIUM CHLORIDE, PRESERVATIVE FREE 500 UNITS: 5 INJECTION INTRAVENOUS at 08:01

## 2018-10-23 RX ADMIN — FLUDEOXYGLUCOSE F-18 16.1 MCI.: 500 INJECTION, SOLUTION INTRAVENOUS at 07:58

## 2018-10-24 ENCOUNTER — TELEPHONE (OUTPATIENT)
Dept: ONCOLOGY | Facility: CLINIC | Age: 48
End: 2018-10-24

## 2018-10-24 ENCOUNTER — HOSPITAL ENCOUNTER (OUTPATIENT)
Facility: CLINIC | Age: 48
Setting detail: SPECIMEN
End: 2018-10-24
Attending: INTERNAL MEDICINE
Payer: COMMERCIAL

## 2018-10-24 NOTE — TELEPHONE ENCOUNTER
Called Luci and she is aware of her appointment tomorrow with Dr. Sanchez to discuss her Echocardiogram results before starting her chemotherapy. Patient aware that writer will follow up and contact her once clearance is given for her to start chemotherapy. Marialuisa Casper RN,BSN,OCN'

## 2018-10-25 ENCOUNTER — OFFICE VISIT (OUTPATIENT)
Dept: CARDIOLOGY | Facility: CLINIC | Age: 48
End: 2018-10-25
Payer: COMMERCIAL

## 2018-10-25 VITALS
BODY MASS INDEX: 34.07 KG/M2 | DIASTOLIC BLOOD PRESSURE: 84 MMHG | SYSTOLIC BLOOD PRESSURE: 135 MMHG | HEART RATE: 80 BPM | WEIGHT: 192.3 LBS | HEIGHT: 63 IN

## 2018-10-25 DIAGNOSIS — T50.905A DRUG OR MEDICINAL SUBSTANCE CAUSING ADVERSE EFFECT IN THERAPEUTIC USE, INITIAL ENCOUNTER: ICD-10-CM

## 2018-10-25 DIAGNOSIS — C50.411 MALIGNANT NEOPLASM OF UPPER-OUTER QUADRANT OF RIGHT BREAST IN FEMALE, ESTROGEN RECEPTOR NEGATIVE (H): Primary | ICD-10-CM

## 2018-10-25 DIAGNOSIS — E66.01 MORBID OBESITY (H): ICD-10-CM

## 2018-10-25 DIAGNOSIS — Z17.1 MALIGNANT NEOPLASM OF UPPER-OUTER QUADRANT OF RIGHT BREAST IN FEMALE, ESTROGEN RECEPTOR NEGATIVE (H): Primary | ICD-10-CM

## 2018-10-25 DIAGNOSIS — R07.89 CHEST PRESSURE: ICD-10-CM

## 2018-10-25 DIAGNOSIS — E78.2 MIXED HYPERLIPIDEMIA: ICD-10-CM

## 2018-10-25 DIAGNOSIS — R94.31 ABNORMAL ELECTROCARDIOGRAM: ICD-10-CM

## 2018-10-25 PROCEDURE — 99204 OFFICE O/P NEW MOD 45 MIN: CPT | Performed by: INTERNAL MEDICINE

## 2018-10-25 RX ORDER — MULTIVIT WITH MINERALS/LUTEIN
1000 TABLET ORAL DAILY
COMMUNITY

## 2018-10-25 NOTE — MR AVS SNAPSHOT
After Visit Summary   10/25/2018    Luci Londono    MRN: [id number]           Patient Information     Date Of Birth          1970        Visit Information        Provider Department      10/25/2018 9:00 AM Casey Sanchez MD [facility]   [place]        Today's Diagnoses     Malignant neoplasm of upper-outer quadrant of right breast in female, estrogen receptor negative (H)    -  1    Mixed hyperlipidemia        Drug or medicinal substance causing adverse effect in therapeutic use, initial encounter        Morbid obesity (H)        Abnormal electrocardiogram        Chest pressure           Follow-ups after your visit        Additional Services     Follow-Up with Cardiac Advanced Practice Provider                 Your next 10 appointments already scheduled     Nov 07, 2018 11:00 AM CST   Level 3 with  INFUSION CHAIR 4   [facility] ([facility])    [facility]  [address]  [facility] [address]   [phone]            Jan 17, 2019 11:15 AM CST   New Visit with Neida Fortune GC   Cancer Risk Management Program ([facility])    [facility]  [address]  [facility] [address]   [phone]              Future tests that were ordered for you today     Open Future Orders        Priority Expected Expires Ordered    Echocardiogram Routine 12/17/2018 10/25/2019 10/25/2018    Follow-Up with Cardiac Advanced Practice Provider Routine 12/24/2018 10/25/2019 10/25/2018    MRI Cardiac w/contrast and stress Routine 10/26/2018 10/25/2019 10/25/2018            Who to contact     If you have questions or need follow up information about today's clinic visit or your schedule please contact [facility]   [place] directly at [phone].  Normal or non-critical lab and imaging results will be communicated to you by  "MyChart, letter or phone within 4 business days after the clinic has received the results. If you do not hear from us within 7 days, please contact the clinic through MyChart or phone. If you have a critical or abnormal lab result, we will notify you by phone as soon as possible.  Submit refill requests through [employer] or call your pharmacy and they will forward the refill request to us. Please allow 3 business days for your refill to be completed.          Additional Information About Your Visit        Care EveryWhere ID     This is your Care EveryWhere ID. This could be used by other organizations to access your [place] medical records  [id number]        Your Vitals Were     Pulse Height Last Period BMI (Body Mass Index)          80 1.6 m (5' 3\") 10/09/2018 34.06 kg/m2         Blood Pressure from Last 3 Encounters:   10/25/18 135/84   10/22/18 121/74   10/18/18 131/86    Weight from Last 3 Encounters:   10/25/18 87.2 kg (192 lb 4.8 oz)   10/18/18 87.3 kg (192 lb 8 oz)   10/17/18 88.5 kg (195 lb 3.2 oz)               Primary Care Provider Office Phone # Fax #    Stuart Charley Wills [phone] [address] [facility] [address]        Equal Access to Services     PINA PINEDA AH: Hadii aad ku hadasho Soomaali, waaxda luqadaha, qaybta kaalmada adeegyada, josh bangura. So [facility] [phone].    ATENCIÓN: Si habla español, tiene a dominique disposición servicios gratuitos de asistencia lingüística. Llame al [phone].    We comply with applicable federal civil rights laws and [place] laws. We do not discriminate on the basis of race, color, national origin, age, disability, sex, sexual orientation, or gender identity.            Thank you!     Thank you for choosing [facility]  for your care. Our goal is always to provide you with excellent care. Hearing back from our patients is one way we can continue to improve our " services. Please take a few minutes to complete the written survey that you may receive in the mail after your visit with us. Thank you!             Your Updated Medication List - Protect others around you: Learn how to safely use, store and throw away your medicines at www.disposemymeds.org.          This list is accurate as of 10/25/18  9:19 AM.  Always use your most recent med list.                   Brand Name Dispense Instructions for use Diagnosis    ascorbic acid 1000 MG Tabs    vitamin C     Take 1,000 mg by mouth daily        HYDROcodone-acetaminophen 5-325 MG per tablet    NORCO    15 tablet    Take 1-2 tablets by mouth every 4 hours as needed for moderate to severe pain    Malignant neoplasm of upper-outer quadrant of right breast in female, estrogen receptor negative (H)       levothyroxine 125 MCG tablet    SYNTHROID/LEVOTHROID    30 tablet    TAKE 1 TABLET BY MOUTH DAILY    Acquired hypothyroidism       lidocaine-prilocaine cream    EMLA    30 g    Apply quarter-size amount of Emla cream topically over port site one hour prior to port access for comfort.    Malignant neoplasm of upper-outer quadrant of right breast in female, estrogen receptor negative (H)       medroxyPROGESTERone 10 MG tablet    PROVERA    90 tablet    Take 1 tablet (10 mg) by mouth daily    Absence of menstruation       metFORMIN 500 MG 24 hr tablet    GLUCOPHAGE-XR    30 tablet    Take 1 tablet (500 mg) by mouth daily (with dinner)    Non morbid obesity due to excess calories, Hyperglycemia       multivitamin, therapeutic with minerals Tabs tablet      Take 1 tablet by mouth daily        omeprazole 20 MG CR capsule    priLOSEC    60 capsule    Take 1 capsule (20 mg) by mouth 2 times daily    Gastroesophageal reflux disease without esophagitis       ondansetron 4 MG ODT tab    ZOFRAN-ODT    10 tablet    Take 1-2 tablets (4-8 mg) by mouth every 8 hours as needed for nausea    Malignant neoplasm of upper-outer quadrant of right breast  in female, estrogen receptor negative (H)       senna-docusate 8.6-50 MG per tablet    SENOKOT-S;PERICOLACE    30 tablet    Take 1-2 tablets by mouth 2 times daily    Malignant neoplasm of upper-outer quadrant of right breast in female, estrogen receptor negative (H)

## 2018-10-25 NOTE — PROGRESS NOTES
HPI and Plan:   The pleasure of seeing Luci Alert in cardiology consultation on request of Dr. Shonda DWYER for abnormal echocardiogram.  She is been referred to the [facility] oncology clinic to discuss potential initiation of adjuvant chemotherapy for her recently diagnosed right-sided breast cancer which is ER WV and HER-2 receptor negative.    She is a pleasant 47-year-old female originally from [facility].  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  She denies any diet Beatties but has prediabetes for which she is on metformin.  She also has polycystic ovarian disease.  She has no history of hypertension.  There is no history of heart disease    Recently she was going to go for a mammogram and prior to that she felt a mass in the right breast and had a mammogram that confirmed.  She had a biopsy of her mass as well as a lymph node biopsy that confirmed to be infiltrating ductal breast cancer with she is ER WV and HER-2 receptor negative.    Her oncologist has recommended neoadjuvant chemotherapy with dense dose Adriamycin and Cytoxan followed by Taxol and carboplatin.  In preparation for that, an echocardiogram was done which was reportedly revealing normal ejection fraction visually at 55-60% by biplane EF Fraction was 53%.  The global longitudinal LV strain is normal at less than -18%.  There was a question of diastolic dysfunction.  There is some mitral annular calcification.  On my review of echocardiogram, EF appears visually normal.  Her E by E ratio is 0.8 which is in the normal range.  Her E by E prime was indeterminate but her LA size is normal.  Overall, there is no definite evidence of diastolic dysfunction.    Recently one of her blood pressure reading was in the 150s but is at other times and in the past her blood pressures have been normal.  Today it was 135 x 84.  She says that she was recently stressed out.  She underwent a port placement recently.    Her EKG revealed sinus rhythm with T  inversions in V1 V2 V3 which is slightly abnormal or could be a normal variant in a female.  I cannot exclude anteroseptal ischemia.    She denies any shortness of breath, dizziness or syncope.  She has occasional chest pressure, couple episodes that were lasting for a short time, nonexertional and over the last month.    Physician Exam:   See below.    Impression  1.  Recently diagnosed with infiltrating ductal carcinoma of the right breast with lymph node involvement with a ER MA and HER-2 receptor negative    She is undergoing further evaluation and is planning for neoadjuvant chemotherapy which includes dense Adriamycin and Cytoxan.  Adriamycin can be potentially cardiotoxic and therefore she has been sent to cardio oncology clinic.  We discussed the implications of using Adriamycin which can cause type I cardiac dysfunction.  The risk factors for increased cardiotoxicity with this medication is usually age greater than 65 years, history of hypertension, previous heart failure or cardiomyopathy, concomitant radiation therapy or diabetes.  She has not of those at this time although has some prediabetes.  Her echo was concerning initially for question of diastolic dysfunction although on my review it is indeterminant.  Her ejection fraction is visually normal.  However she has some chest pressure and borderline abnormal EKG.  Therefore, I would recommend further evaluation with a stress MRI to make sure ejection fraction is normal and there is no evidence of ischemia.  If this is normal, she can proceed with chemotherapy as early as next week.  We will schedule for the cardiac MRI tomorrow.  At this time she is a blood pressures are normal, I would not start on any medication specifically to prevent cardiotoxicity although if her blood pressure is greater than 1 3585, I would have a low threshold to start ACE inhibition and perhaps a beta blocker like Coreg.  She is a nursing assistant will monitor blood pressures  and call me if the blood pressures inch upwards.    We will repeat an echocardiogram with strain in mid December which will be after the 4 cycles of chemotherapy with Adriamycin to make sure there is no evidence of cardiotoxicity.  She will return to see my nurse practitioner or [facility]JI at that time.    2.  Hyperlipidemia  Mild with .  Discussed with her importance of low-fat diet and gradual weight loss.    3.  Prediabetes and polycystic ovarian disease  On metformin    Thank you for allowing us to participate in the care of this nice patient.    Sincerely,    Casey Sanchez MD  Orders Placed This Encounter   Procedures     MRI Cardiac w/contrast and stress     Follow-Up with Cardiac Advanced Practice Provider     Echocardiogram       Orders Placed This Encounter   Medications     ascorbic acid (VITAMIN C) 1000 MG TABS     Sig: Take 1,000 mg by mouth daily       There are no discontinued medications.      Encounter Diagnoses   Name Primary?     Mixed hyperlipidemia      Malignant neoplasm of upper-outer quadrant of right breast in female, estrogen receptor negative (H) Yes     Drug or medicinal substance causing adverse effect in therapeutic use, initial encounter      Morbid obesity (H)      Abnormal electrocardiogram      Chest pressure        CURRENT MEDICATIONS:  Current Outpatient Prescriptions   Medication Sig Dispense Refill     ascorbic acid (VITAMIN C) 1000 MG TABS Take 1,000 mg by mouth daily       HYDROcodone-acetaminophen (NORCO) 5-325 MG per tablet Take 1-2 tablets by mouth every 4 hours as needed for moderate to severe pain 15 tablet 0     levothyroxine (SYNTHROID/LEVOTHROID) 125 MCG tablet TAKE 1 TABLET BY MOUTH DAILY 30 tablet 0     lidocaine-prilocaine (EMLA) cream Apply quarter-size amount of Emla cream topically over port site one hour prior to port access for comfort. 30 g 3     medroxyPROGESTERone (PROVERA) 10 MG tablet Take 1 tablet (10 mg) by mouth daily 90 tablet 3     metFORMIN  (GLUCOPHAGE-XR) 500 MG 24 hr tablet Take 1 tablet (500 mg) by mouth daily (with dinner) 30 tablet 11     multivitamin, therapeutic with minerals (THERA-VIT-M) TABS tablet Take 1 tablet by mouth daily       omeprazole (PRILOSEC) 20 MG CR capsule Take 1 capsule (20 mg) by mouth 2 times daily 60 capsule 11     ondansetron (ZOFRAN-ODT) 4 MG ODT tab Take 1-2 tablets (4-8 mg) by mouth every 8 hours as needed for nausea 10 tablet 0     senna-docusate (SENOKOT-S;PERICOLACE) 8.6-50 MG per tablet Take 1-2 tablets by mouth 2 times daily 30 tablet 0       ALLERGIES   No Known Allergies    PAST MEDICAL HISTORY:  Past Medical History:   Diagnosis Date     Hypertension goal BP (blood pressure) < 140/80 2/18/2014     Lateral epicondylitis, right dominant elbow since late 10-17 11/1/2017     Thyroid disease        PAST SURGICAL HISTORY:  Past Surgical History:   Procedure Laterality Date     HAND SURGERY  2002    left     INSERT PORT VASCULAR ACCESS N/A 10/18/2018    Procedure: INSERTION OF VASCULAR PORT;  Surgeon: Stacey Ashford MD;  Location: [facility] OR     TUBAL LIGATION         FAMILY HISTORY:  Family History   Problem Relation Age of Onset     Diabetes Mother      Unknown/Adopted Father      Coronary Artery Disease No family hx of      Hypertension No family hx of      Hyperlipidemia No family hx of      Cerebrovascular Disease No family hx of      Breast Cancer No family hx of      Colon Cancer No family hx of      Prostate Cancer No family hx of      Other Cancer No family hx of      Depression No family hx of      Anxiety Disorder No family hx of      Mental Illness No family hx of      Substance Abuse No family hx of      Anesthesia Reaction No family hx of      Asthma No family hx of      Osteoporosis No family hx of      Genetic Disorder No family hx of      Thyroid Disease No family hx of      Obesity No family hx of        SOCIAL HISTORY:  Social History     Social History     Marital status:      Spouse name: N/A  "    Number of children: N/A     Years of education: N/A     Social History Main Topics     Smoking status: Never Smoker     Smokeless tobacco: Never Used     Alcohol use No     Drug use: No     Sexual activity: Yes     Partners: Male     Birth control/ protection: Pill     Other Topics Concern     Parent/Sibling W/ Cabg, Mi Or Angioplasty Before 65f 55m? No     Social History Narrative       Review of Systems:  Skin:  Negative       Eyes:  Negative      ENT:  Negative      Respiratory:  Negative       Cardiovascular:  Negative;lightheadedness;dizziness;syncope or near-syncope;cyanosis;chest pain;palpitations;fatigue;edema Positive for chest heaviness occ.  Gastroenterology: Negative      Genitourinary:  Negative      Musculoskeletal:  Positive for back pain    Neurologic:  Negative      Psychiatric:  Positive for sleep disturbances    Heme/Lymph/Imm:  Negative      Endocrine:  Positive for thyroid disorder;diabetes      Physical Exam:  Vitals: /84 (BP Location: Left arm, Cuff Size: Adult Large)  Pulse 80  Ht 1.6 m (5' 3\")  Wt 87.2 kg (192 lb 4.8 oz)  LMP 10/09/2018  BMI 34.06 kg/m2    Constitutional:  cooperative        Skin:  warm and dry to the touch          Head:  normocephalic        Eyes:  pupils equal and round        Lymph:No Cervical lymphadenopathy present     ENT:  no pallor or cyanosis        Neck:  carotid pulses are full and equal bilaterally;JVP normal        Respiratory:  clear to auscultation         Cardiac: regular rhythm;normal S1 and S2     no presence of murmur          not assessed this visit                                        GI:  not assessed this visit        Extremities and Muscular Skeletal:  no edema              Neurological:  no gross motor deficits        Psych:  Alert and Oriented x 3        CC  No referring provider defined for this encounter.              "

## 2018-10-25 NOTE — LETTER
10/25/2018    ALEENA TYLER MD  [address]  [facility] [address]    RE: Luci Londono       Dear Colleague,    I had the pleasure of seeing Luci Londono in the [facility].    HPI and Plan:   The pleasure of seeing Luci Alert in cardiology consultation on request of Dr. Shonda DWYER for abnormal echocardiogram.  She is been referred to the [facility] oncology clinic to discuss potential initiation of adjuvant chemotherapy for her recently diagnosed right-sided breast cancer which is ER ID and HER-2 receptor negative.    She is a pleasant 47-year-old female originally from [facility].  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  She denies any diet Beatties but has prediabetes for which she is on metformin.  She also has polycystic ovarian disease.  She has no history of hypertension.  There is no history of heart disease    Recently she was going to go for a mammogram and prior to that she felt a mass in the right breast and had a mammogram that confirmed.  She had a biopsy of her mass as well as a lymph node biopsy that confirmed to be infiltrating ductal breast cancer with she is ER ID and HER-2 receptor negative.    Her oncologist has recommended neoadjuvant chemotherapy with dense dose Adriamycin and Cytoxan followed by Taxol and carboplatin.  In preparation for that, an echocardiogram was done which was reportedly revealing normal ejection fraction visually at 55-60% by biplane EF Fraction was 53%.  The global longitudinal LV strain is normal at less than -18%.  There was a question of diastolic dysfunction.  There is some mitral annular calcification.  On my review of echocardiogram, EF appears visually normal.  Her E by E ratio is 0.8 which is in the normal range.  Her E by E prime was indeterminate but her LA size is normal.  Overall, there is no definite evidence of diastolic dysfunction.    Recently one of her blood pressure reading was in  the 150s but is at other times and in the past her blood pressures have been normal.  Today it was 135 x 84.  She says that she was recently stressed out.  She underwent a port placement recently.    Her EKG revealed sinus rhythm with T inversions in V1 V2 V3 which is slightly abnormal or could be a normal variant in a female.  I cannot exclude anteroseptal ischemia.    She denies any shortness of breath, dizziness or syncope.  She has occasional chest pressure, couple episodes that were lasting for a short time, nonexertional and over the last month.    Physician Exam:   See below.    Impression  1.  Recently diagnosed with infiltrating ductal carcinoma of the right breast with lymph node involvement with a ER DE and HER-2 receptor negative    She is undergoing further evaluation and is planning for neoadjuvant chemotherapy which includes dense Adriamycin and Cytoxan.  Adriamycin can be potentially cardiotoxic and therefore she has been sent to cardio oncology clinic.  We discussed the implications of using Adriamycin which can cause type I cardiac dysfunction.  The risk factors for increased cardiotoxicity with this medication is usually age greater than 65 years, history of hypertension, previous heart failure or cardiomyopathy, concomitant radiation therapy or diabetes.  She has not of those at this time although has some prediabetes.  Her echo was concerning initially for question of diastolic dysfunction although on my review it is indeterminant.  Her ejection fraction is visually normal.  However she has some chest pressure and borderline abnormal EKG.  Therefore, I would recommend further evaluation with a stress MRI to make sure ejection fraction is normal and there is no evidence of ischemia.  If this is normal, she can proceed with chemotherapy as early as next week.  We will schedule for the cardiac MRI tomorrow.  At this time she is a blood pressures are normal, I would not start on any medication  specifically to prevent cardiotoxicity although if her blood pressure is greater than 1 3585, I would have a low threshold to start ACE inhibition and perhaps a beta blocker like Coreg.  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    We will repeat an echocardiogram with strain in mid December which will be after the 4 cycles of chemotherapy with Adriamycin to make sure there is no evidence of cardiotoxicity.  She will return to see my nurse practitioner or [facility]I at that time.    2.  Hyperlipidemia  Mild with .  Discussed with her importance of low-fat diet and gradual weight loss.    3.  Prediabetes and polycystic ovarian disease  On metformin    Thank you for allowing us to participate in the care of this nice patient.    Sincerely,    Casey Sanchez MD  Orders Placed This Encounter   Procedures     MRI Cardiac w/contrast and stress     Follow-Up with Cardiac Advanced Practice Provider     Echocardiogram       Orders Placed This Encounter   Medications     ascorbic acid (VITAMIN C) 1000 MG TABS     Sig: Take 1,000 mg by mouth daily       There are no discontinued medications.      Encounter Diagnoses   Name Primary?     Mixed hyperlipidemia      Malignant neoplasm of upper-outer quadrant of right breast in female, estrogen receptor negative (H) Yes     Drug or medicinal substance causing adverse effect in therapeutic use, initial encounter      Morbid obesity (H)      Abnormal electrocardiogram      Chest pressure        CURRENT MEDICATIONS:  Current Outpatient Prescriptions   Medication Sig Dispense Refill     ascorbic acid (VITAMIN C) 1000 MG TABS Take 1,000 mg by mouth daily       HYDROcodone-acetaminophen (NORCO) 5-325 MG per tablet Take 1-2 tablets by mouth every 4 hours as needed for moderate to severe pain 15 tablet 0     levothyroxine (SYNTHROID/LEVOTHROID) 125 MCG tablet TAKE 1 TABLET BY MOUTH DAILY 30 tablet 0     lidocaine-prilocaine (EMLA)  cream Apply quarter-size amount of Emla cream topically over port site one hour prior to port access for comfort. 30 g 3     medroxyPROGESTERone (PROVERA) 10 MG tablet Take 1 tablet (10 mg) by mouth daily 90 tablet 3     metFORMIN (GLUCOPHAGE-XR) 500 MG 24 hr tablet Take 1 tablet (500 mg) by mouth daily (with dinner) 30 tablet 11     multivitamin, therapeutic with minerals (THERA-VIT-M) TABS tablet Take 1 tablet by mouth daily       omeprazole (PRILOSEC) 20 MG CR capsule Take 1 capsule (20 mg) by mouth 2 times daily 60 capsule 11     ondansetron (ZOFRAN-ODT) 4 MG ODT tab Take 1-2 tablets (4-8 mg) by mouth every 8 hours as needed for nausea 10 tablet 0     senna-docusate (SENOKOT-S;PERICOLACE) 8.6-50 MG per tablet Take 1-2 tablets by mouth 2 times daily 30 tablet 0       ALLERGIES   No Known Allergies    PAST MEDICAL HISTORY:  Past Medical History:   Diagnosis Date     Hypertension goal BP (blood pressure) < 140/80 2/18/2014     Lateral epicondylitis, right dominant elbow since late 10-17 11/1/2017     Thyroid disease        PAST SURGICAL HISTORY:  Past Surgical History:   Procedure Laterality Date     HAND SURGERY  2002    left     INSERT PORT VASCULAR ACCESS N/A 10/18/2018    Procedure: INSERTION OF VASCULAR PORT;  Surgeon: Stacey Ashford MD;  Location: [facility] OR     TUBAL LIGATION         FAMILY HISTORY:  Family History   Problem Relation Age of Onset     Diabetes Mother      Unknown/Adopted Father      Coronary Artery Disease No family hx of      Hypertension No family hx of      Hyperlipidemia No family hx of      Cerebrovascular Disease No family hx of      Breast Cancer No family hx of      Colon Cancer No family hx of      Prostate Cancer No family hx of      Other Cancer No family hx of      Depression No family hx of      Anxiety Disorder No family hx of      Mental Illness No family hx of      Substance Abuse No family hx of      Anesthesia Reaction No family hx of      Asthma No family hx of       "Osteoporosis No family hx of      Genetic Disorder No family hx of      Thyroid Disease No family hx of      Obesity No family hx of        SOCIAL HISTORY:  Social History     Social History     Marital status:      Spouse name: N/A     Number of children: N/A     Years of education: N/A     Social History Main Topics     Smoking status: Never Smoker     Smokeless tobacco: Never Used     Alcohol use No     Drug use: No     Sexual activity: Yes     Partners: Male     Birth control/ protection: Pill     Other Topics Concern     Parent/Sibling W/ Cabg, Mi Or Angioplasty Before 65f 55m? No     Social History Narrative       Review of Systems:  Skin:  Negative       Eyes:  Negative      ENT:  Negative      Respiratory:  Negative       Cardiovascular:  Negative;lightheadedness;dizziness;syncope or near-syncope;cyanosis;chest pain;palpitations;fatigue;edema Positive for chest heaviness occ.  Gastroenterology: Negative      Genitourinary:  Negative      Musculoskeletal:  Positive for back pain    Neurologic:  Negative      Psychiatric:  Positive for sleep disturbances    Heme/Lymph/Imm:  Negative      Endocrine:  Positive for thyroid disorder;diabetes      Physical Exam:  Vitals: /84 (BP Location: Left arm, Cuff Size: Adult Large)  Pulse 80  Ht 1.6 m (5' 3\")  Wt 87.2 kg (192 lb 4.8 oz)  LMP 10/09/2018  BMI 34.06 kg/m2    Constitutional:  cooperative        Skin:  warm and dry to the touch          Head:  normocephalic        Eyes:  pupils equal and round        Lymph:No Cervical lymphadenopathy present     ENT:  no pallor or cyanosis        Neck:  carotid pulses are full and equal bilaterally;JVP normal        Respiratory:  clear to auscultation         Cardiac: regular rhythm;normal S1 and S2     no presence of murmur          not assessed this visit                                        GI:  not assessed this visit        Extremities and Muscular Skeletal:  no edema              Neurological:  no gross " motor deficits        Psych:  Alert and Oriented x 3          Thank you for allowing me to participate in the care of your patient.    Sincerely,     Casey Sanchez MD     [facility]

## 2018-10-25 NOTE — LETTER
10/25/2018    ALEENA TYLER MD  [address]  [facility] [address]    RE: Luci Londono       Dear Colleague,    I had the pleasure of seeing Luci Londono in the [facility].    HPI and Plan:   The pleasure of seeing Luci Alert in cardiology consultation on request of Dr. Shonda DWYER for abnormal echocardiogram.  She is been referred to the [facility] oncology clinic to discuss potential initiation of adjuvant chemotherapy for her recently diagnosed right-sided breast cancer which is ER DE and HER-2 receptor negative.    She is a pleasant 47-year-old female originally from [facility] [facility].  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  She denies any diet Beatties but has prediabetes for which she is on metformin.  She also has polycystic ovarian disease.  She has no history of hypertension.  There is no history of heart disease    Recently she was going to go for a mammogram and prior to that she felt a mass in the right breast and had a mammogram that confirmed.  She had a biopsy of her mass as well as a lymph node biopsy that confirmed to be infiltrating ductal breast cancer with she is ER DE and HER-2 receptor negative.    Her oncologist has recommended neoadjuvant chemotherapy with dense dose Adriamycin and Cytoxan followed by Taxol and carboplatin.  In preparation for that, an echocardiogram was done which was reportedly revealing normal ejection fraction visually at 55-60% by biplane EF Fraction was 53%.  The global longitudinal LV strain is normal at less than -18%.  There was a question of diastolic dysfunction.  There is some mitral annular calcification.  On my review of echocardiogram, EF appears visually normal.  Her E by E ratio is 0.8 which is in the normal range.  Her E by E prime was indeterminate but her LA size is normal.  Overall, there is no definite evidence of diastolic dysfunction.    Recently one of her blood pressure reading was in  the 150s but is at other times and in the past her blood pressures have been normal.  Today it was 135 x 84.  She says that she was recently stressed out.  She underwent a port placement recently.    Her EKG revealed sinus rhythm with T inversions in V1 V2 V3 which is slightly abnormal or could be a normal variant in a female.  I cannot exclude anteroseptal ischemia.    She denies any shortness of breath, dizziness or syncope.  She has occasional chest pressure, couple episodes that were lasting for a short time, nonexertional and over the last month.    Physician Exam:   See below.    Impression  1.  Recently diagnosed with infiltrating ductal carcinoma of the right breast with lymph node involvement with a ER NV and HER-2 receptor negative    She is undergoing further evaluation and is planning for neoadjuvant chemotherapy which includes dense Adriamycin and Cytoxan.  Adriamycin can be potentially cardiotoxic and therefore she has been sent to cardio oncology clinic.  We discussed the implications of using Adriamycin which can cause type I cardiac dysfunction.  The risk factors for increased cardiotoxicity with this medication is usually age greater than 65 years, history of hypertension, previous heart failure or cardiomyopathy, concomitant radiation therapy or diabetes.  She has not of those at this time although has some prediabetes.  Her echo was concerning initially for question of diastolic dysfunction although on my review it is indeterminant.  Her ejection fraction is visually normal.  However she has some chest pressure and borderline abnormal EKG.  Therefore, I would recommend further evaluation with a stress MRI to make sure ejection fraction is normal and there is no evidence of ischemia.  If this is normal, she can proceed with chemotherapy as early as next week.  We will schedule for the cardiac MRI tomorrow.  At this time she is a blood pressures are normal, I would not start on any medication  specifically to prevent cardiotoxicity although if her blood pressure is greater than 1 3585, I would have a low threshold to start ACE inhibition and perhaps a beta blocker like Coreg.  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    We will repeat an echocardiogram with strain in mid December which will be after the 4 cycles of chemotherapy with Adriamycin to make sure there is no evidence of cardiotoxicity.  She will return to see my nurse practitioner or VIJI at that time.    2.  Hyperlipidemia  Mild with .  Discussed with her importance of low-fat diet and gradual weight loss.    3.  Prediabetes and polycystic ovarian disease  On metformin    Thank you for allowing us to participate in the care of this nice patient.    Sincerely,    Casey Sanchez MD  Orders Placed This Encounter   Procedures     MRI Cardiac w/contrast and stress     Follow-Up with Cardiac Advanced Practice Provider     Echocardiogram       Orders Placed This Encounter   Medications     ascorbic acid (VITAMIN C) 1000 MG TABS     Sig: Take 1,000 mg by mouth daily       There are no discontinued medications.      Encounter Diagnoses   Name Primary?     Mixed hyperlipidemia      Malignant neoplasm of upper-outer quadrant of right breast in female, estrogen receptor negative (H) Yes     Drug or medicinal substance causing adverse effect in therapeutic use, initial encounter      Morbid obesity (H)      Abnormal electrocardiogram      Chest pressure        CURRENT MEDICATIONS:  Current Outpatient Prescriptions   Medication Sig Dispense Refill     ascorbic acid (VITAMIN C) 1000 MG TABS Take 1,000 mg by mouth daily       HYDROcodone-acetaminophen (NORCO) 5-325 MG per tablet Take 1-2 tablets by mouth every 4 hours as needed for moderate to severe pain 15 tablet 0     levothyroxine (SYNTHROID/LEVOTHROID) 125 MCG tablet TAKE 1 TABLET BY MOUTH DAILY 30 tablet 0     lidocaine-prilocaine (EMLA)  cream Apply quarter-size amount of Emla cream topically over port site one hour prior to port access for comfort. 30 g 3     medroxyPROGESTERone (PROVERA) 10 MG tablet Take 1 tablet (10 mg) by mouth daily 90 tablet 3     metFORMIN (GLUCOPHAGE-XR) 500 MG 24 hr tablet Take 1 tablet (500 mg) by mouth daily (with dinner) 30 tablet 11     multivitamin, therapeutic with minerals (THERA-VIT-M) TABS tablet Take 1 tablet by mouth daily       omeprazole (PRILOSEC) 20 MG CR capsule Take 1 capsule (20 mg) by mouth 2 times daily 60 capsule 11     ondansetron (ZOFRAN-ODT) 4 MG ODT tab Take 1-2 tablets (4-8 mg) by mouth every 8 hours as needed for nausea 10 tablet 0     senna-docusate (SENOKOT-S;PERICOLACE) 8.6-50 MG per tablet Take 1-2 tablets by mouth 2 times daily 30 tablet 0       ALLERGIES   No Known Allergies    PAST MEDICAL HISTORY:  Past Medical History:   Diagnosis Date     Hypertension goal BP (blood pressure) < 140/80 2/18/2014     Lateral epicondylitis, right dominant elbow since late 10-17 11/1/2017     Thyroid disease        PAST SURGICAL HISTORY:  Past Surgical History:   Procedure Laterality Date     HAND SURGERY  2002    left     INSERT PORT VASCULAR ACCESS N/A 10/18/2018    Procedure: INSERTION OF VASCULAR PORT;  Surgeon: Stacey Ashford MD;  Location: [facility] OR     TUBAL LIGATION         FAMILY HISTORY:  Family History   Problem Relation Age of Onset     Diabetes Mother      Unknown/Adopted Father      Coronary Artery Disease No family hx of      Hypertension No family hx of      Hyperlipidemia No family hx of      Cerebrovascular Disease No family hx of      Breast Cancer No family hx of      Colon Cancer No family hx of      Prostate Cancer No family hx of      Other Cancer No family hx of      Depression No family hx of      Anxiety Disorder No family hx of      Mental Illness No family hx of      Substance Abuse No family hx of      Anesthesia Reaction No family hx of      Asthma No family hx of       "Osteoporosis No family hx of      Genetic Disorder No family hx of      Thyroid Disease No family hx of      Obesity No family hx of        SOCIAL HISTORY:  Social History     Social History     Marital status:      Spouse name: N/A     Number of children: N/A     Years of education: N/A     Social History Main Topics     Smoking status: Never Smoker     Smokeless tobacco: Never Used     Alcohol use No     Drug use: No     Sexual activity: Yes     Partners: Male     Birth control/ protection: Pill     Other Topics Concern     Parent/Sibling W/ Cabg, Mi Or Angioplasty Before 65f 55m? No     Social History Narrative       Review of Systems:  Skin:  Negative       Eyes:  Negative      ENT:  Negative      Respiratory:  Negative       Cardiovascular:  Negative;lightheadedness;dizziness;syncope or near-syncope;cyanosis;chest pain;palpitations;fatigue;edema Positive for chest heaviness occ.  Gastroenterology: Negative      Genitourinary:  Negative      Musculoskeletal:  Positive for back pain    Neurologic:  Negative      Psychiatric:  Positive for sleep disturbances    Heme/Lymph/Imm:  Negative      Endocrine:  Positive for thyroid disorder;diabetes      Physical Exam:  Vitals: /84 (BP Location: Left arm, Cuff Size: Adult Large)  Pulse 80  Ht 1.6 m (5' 3\")  Wt 87.2 kg (192 lb 4.8 oz)  LMP 10/09/2018  BMI 34.06 kg/m2    Constitutional:  cooperative        Skin:  warm and dry to the touch          Head:  normocephalic        Eyes:  pupils equal and round        Lymph:No Cervical lymphadenopathy present     ENT:  no pallor or cyanosis        Neck:  carotid pulses are full and equal bilaterally;JVP normal        Respiratory:  clear to auscultation         Cardiac: regular rhythm;normal S1 and S2     no presence of murmur          not assessed this visit                                        GI:  not assessed this visit        Extremities and Muscular Skeletal:  no edema              Neurological:  no gross " motor deficits        Psych:  Alert and Oriented x 3        CC  No referring provider defined for this encounter.                Thank you for allowing me to participate in the care of your patient.      Sincerely,     Casey Sanchez MD     [facility]    cc:   No referring provider defined for this encounter.

## 2018-10-26 ENCOUNTER — HOSPITAL ENCOUNTER (OUTPATIENT)
Dept: CARDIOLOGY | Facility: CLINIC | Age: 48
Discharge: HOME | End: 2018-10-26
Attending: INTERNAL MEDICINE | Admitting: INTERNAL MEDICINE
Payer: COMMERCIAL

## 2018-10-26 ENCOUNTER — INFUSION THERAPY VISIT (OUTPATIENT)
Dept: INFUSION THERAPY | Facility: CLINIC | Age: 48
End: 2018-10-26
Attending: INTERNAL MEDICINE
Payer: COMMERCIAL

## 2018-10-26 VITALS — OXYGEN SATURATION: 98 % | SYSTOLIC BLOOD PRESSURE: 130 MMHG | DIASTOLIC BLOOD PRESSURE: 83 MMHG | HEART RATE: 83 BPM

## 2018-10-26 DIAGNOSIS — C50.411 MALIGNANT NEOPLASM OF UPPER-OUTER QUADRANT OF RIGHT BREAST IN FEMALE, ESTROGEN RECEPTOR NEGATIVE (H): ICD-10-CM

## 2018-10-26 DIAGNOSIS — R07.89 CHEST PRESSURE: ICD-10-CM

## 2018-10-26 DIAGNOSIS — Z17.1 MALIGNANT NEOPLASM OF UPPER-OUTER QUADRANT OF RIGHT BREAST IN FEMALE, ESTROGEN RECEPTOR NEGATIVE (H): ICD-10-CM

## 2018-10-26 DIAGNOSIS — Z95.828 PORT-A-CATH IN PLACE: Primary | ICD-10-CM

## 2018-10-26 DIAGNOSIS — R94.31 ABNORMAL ELECTROCARDIOGRAM: ICD-10-CM

## 2018-10-26 PROCEDURE — 93017 CV STRESS TEST TRACING ONLY: CPT

## 2018-10-26 PROCEDURE — 75563 CARD MRI W/STRESS IMG & DYE: CPT | Mod: 26 | Performed by: INTERNAL MEDICINE

## 2018-10-26 PROCEDURE — A9585 GADOBUTROL INJECTION: HCPCS | Performed by: INTERNAL MEDICINE

## 2018-10-26 PROCEDURE — 25000128 H RX IP 250 OP 636: Performed by: INTERNAL MEDICINE

## 2018-10-26 PROCEDURE — 93016 CV STRESS TEST SUPVJ ONLY: CPT | Performed by: INTERNAL MEDICINE

## 2018-10-26 PROCEDURE — 40000269 ZZH STATISTIC NO CHARGE FACILITY FEE

## 2018-10-26 PROCEDURE — 25500064 ZZH RX 255 OP 636: Performed by: INTERNAL MEDICINE

## 2018-10-26 PROCEDURE — 93018 CV STRESS TEST I&R ONLY: CPT | Performed by: INTERNAL MEDICINE

## 2018-10-26 RX ORDER — DIAZEPAM 5 MG
5 TABLET ORAL EVERY 30 MIN PRN
Status: DISCONTINUED | OUTPATIENT
Start: 2018-10-26 | End: 2018-10-27 | Stop reason: HOSPADM

## 2018-10-26 RX ORDER — REGADENOSON 0.08 MG/ML
0.4 INJECTION, SOLUTION INTRAVENOUS ONCE
Status: COMPLETED | OUTPATIENT
Start: 2018-10-26 | End: 2018-10-26

## 2018-10-26 RX ORDER — HEPARIN SODIUM (PORCINE) LOCK FLUSH IV SOLN 100 UNIT/ML 100 UNIT/ML
5 SOLUTION INTRAVENOUS EVERY 8 HOURS
Status: DISCONTINUED | OUTPATIENT
Start: 2018-10-26 | End: 2018-10-26 | Stop reason: HOSPADM

## 2018-10-26 RX ORDER — AMINOPHYLLINE 25 MG/ML
100 INJECTION, SOLUTION INTRAVENOUS ONCE
Status: DISCONTINUED | OUTPATIENT
Start: 2018-10-26 | End: 2018-10-27 | Stop reason: HOSPADM

## 2018-10-26 RX ORDER — ACYCLOVIR 200 MG/1
0-1 CAPSULE ORAL
Status: DISCONTINUED | OUTPATIENT
Start: 2018-10-26 | End: 2018-10-27 | Stop reason: HOSPADM

## 2018-10-26 RX ORDER — HEPARIN SODIUM (PORCINE) LOCK FLUSH IV SOLN 100 UNIT/ML 100 UNIT/ML
5 SOLUTION INTRAVENOUS EVERY 8 HOURS
Status: CANCELLED
Start: 2018-10-26

## 2018-10-26 RX ORDER — ALBUTEROL SULFATE 90 UG/1
2 AEROSOL, METERED RESPIRATORY (INHALATION) EVERY 5 MIN PRN
Status: DISCONTINUED | OUTPATIENT
Start: 2018-10-26 | End: 2018-10-27 | Stop reason: HOSPADM

## 2018-10-26 RX ORDER — GADOBUTROL 604.72 MG/ML
5-65 INJECTION INTRAVENOUS ONCE
Status: COMPLETED | OUTPATIENT
Start: 2018-10-26 | End: 2018-10-26

## 2018-10-26 RX ADMIN — REGADENOSON 0.4 MG: 0.08 INJECTION, SOLUTION INTRAVENOUS at 15:06

## 2018-10-26 RX ADMIN — GADOBUTROL 22 ML: 604.72 INJECTION INTRAVENOUS at 16:06

## 2018-10-26 RX ADMIN — SODIUM CHLORIDE, PRESERVATIVE FREE 5 ML: 5 INJECTION INTRAVENOUS at 16:33

## 2018-10-26 NOTE — PROGRESS NOTES
Infusion Nursing Note:  Luci Corbin Spring presents today for port deaccess.    Patient seen by provider today: No   present during visit today: Not Applicable.    Note: Pt arrived from MRI today for port deaccess.    Intravenous Access:  Implanted Port.    Treatment Conditions:  Not Applicable.      Post Infusion Assessment:  Site patent and intact, free from redness, edema or discomfort.  No evidence of extravasations.  Access discontinued per protocol.    Discharge Plan:   Patient declined prescription refills.  Discharge instructions reviewed with: Patient.  Patient and/or family verbalized understanding of discharge instructions and all questions answered.  Copy of AVS reviewed with patient and/or family.  Patient will return 11/7/18 for next appointment.  Patient discharged in stable condition accompanied by: self.  Departure Mode: Ambulatory.    Monique Barnes RN

## 2018-10-26 NOTE — TELEPHONE ENCOUNTER
Contacted Luci this AM, informed her that writer is aware that cardio MRI was ordered for her today. Writer will follow up on results and get her scheduled for chemotherapy as soon as cardiac clearance is given. Patient also has concerns regarding her ability to work. Informed patient that she can work until we know when her chemotherapy will start. Writer will complete paperwork for patient for her job once we know exact chemotherapy date and she would like to work with lighter duty. Informed patient also of financial resources available for her with working a reduced schedule. Marialuisa Casper RN,BSN,OCN

## 2018-10-26 NOTE — MR AVS SNAPSHOT
After Visit Summary   10/26/2018    Luci Londono    MRN: [id number]           Patient Information     Date Of Birth          1970        Visit Information        Provider Department      10/26/2018 4:00 PM  INFUSION CHAIR 2 [facility]        Today's Diagnoses     Port-A-Cath in place    -  1    Malignant neoplasm of upper-outer quadrant of right breast in female, estrogen receptor negative (H)           Follow-ups after your visit        Your next 10 appointments already scheduled     Nov 07, 2018 11:00 AM CST   Level 3 with  INFUSION CHAIR 4   [facility] ([facility])    [facility]  [address]  [facility] [address]   [phone]            Dec 18, 2018 11:00 AM CST   Ech Complete with [id number]   [facility] CV Echocardiography (Cardiovascular Imaging at [facility])    [address]  [facility] [address] [phone]           1.  Please bring or wear a comfortable two-piece outfit. 2.  You may eat, drink and take your normal medicines. 3.  For any questions that cannot be answered, please contact the ordering physician 4.  Please do not wear perfumes or scented lotions on the day of your exam.            Dec 18, 2018  2:30 PM CST   Return Visit with Elisa Pembetron PA-C   [facility] ([facility])    [address]  [facility] [address] [phone] OPT 2            Jan 17, 2019 11:15 AM CST   New Visit with Neida Fortune GC   [facility] ([facility])    [facility]  [address]  [facility] [address] [phone]              Future tests that were ordered for you today     Open Standing Orders        Priority Remaining Interval Expires Ordered    EKG 12-lead, tracing only STAT [id number]  PRN  10/26/2018    Oxygen: Nasal cannula Routine 35060/54900 CONTINUOUS  10/26/2018          Open Future Orders        Priority Expected Expires Ordered    Echocardiogram Routine 12/17/2018 10/25/2019 10/25/2018    Follow-Up with Cardiac Advanced Practice Provider Routine 12/24/2018 10/25/2019 10/25/2018    MRI Cardiac w/contrast and stress Routine 10/26/2018 10/25/2019 10/25/2018            Who to contact     If you have questions or need follow up information about today's clinic visit or your schedule please contact [facility] directly at [phone].  Normal or non-critical lab and imaging results will be communicated to you by MyChart, letter or phone within 4 business days after the clinic has received the results. If you do not hear from us within 7 days, please contact the clinic through MyChart or phone. If you have a critical or abnormal lab result, we will notify you by phone as soon as possible.  Submit refill requests through [employer] or call your pharmacy and they will forward the refill request to us. Please allow 3 business days for your refill to be completed.          Additional Information About Your Visit        Care EveryWhere ID     This is your Care EveryWhere ID. This could be used by other organizations to access your [facility] medical records  [id number]        Your Vitals Were     Last Period                   10/09/2018            Blood Pressure from Last 3 Encounters:   10/26/18 130/83   10/25/18 135/84   10/22/18 121/74    Weight from Last 3 Encounters:   10/25/18 87.2 kg (192 lb 4.8 oz)   10/18/18 87.3 kg (192 lb 8 oz)   10/17/18 88.5 kg (195 lb 3.2 oz)              Today, you had the following     No orders found for display       Primary Care Provider Office Phone # Fax #    Stuart Wills [phone] [address]  [facility] [address]        Equal Access to Services     PINA PINEDA AH: Kalani rosenberg  Jessicajosselyn, juan carlosda luqadaha, qaguanakitota kalorraine bauer, josh neptaliin hayaan gersonmaynor baileyjuan f laSandiphawa willem. So [facility] [phone].    ATENCIÓN: Si quincy carrero, tiene a dominique disposición servicios gratuitos de asistencia lingüística. Trice al [phone].    We comply with applicable federal civil rights laws and [place] laws. We do not discriminate on the basis of race, color, national origin, age, disability, sex, sexual orientation, or gender identity.            Thank you!     Thank you for choosing [facility]  for your care. Our goal is always to provide you with excellent care. Hearing back from our patients is one way we can continue to improve our services. Please take a few minutes to complete the written survey that you may receive in the mail after your visit with us. Thank you!             Your Updated Medication List - Protect others around you: Learn how to safely use, store and throw away your medicines at www.disposemymeds.org.          This list is accurate as of 10/26/18  4:34 PM.  Always use your most recent med list.                   Brand Name Dispense Instructions for use Diagnosis    ascorbic acid 1000 MG Tabs    vitamin C     Take 1,000 mg by mouth daily        HYDROcodone-acetaminophen 5-325 MG per tablet    NORCO    15 tablet    Take 1-2 tablets by mouth every 4 hours as needed for moderate to severe pain    Malignant neoplasm of upper-outer quadrant of right breast in female, estrogen receptor negative (H)       levothyroxine 125 MCG tablet    SYNTHROID/LEVOTHROID    30 tablet    TAKE 1 TABLET BY MOUTH DAILY    Acquired hypothyroidism       lidocaine-prilocaine cream    EMLA    30 g    Apply quarter-size amount of Emla cream topically over port site one hour prior to port access for comfort.    Malignant neoplasm of upper-outer quadrant of right breast in female, estrogen receptor negative (H)       medroxyPROGESTERone 10 MG tablet    PROVERA    90 tablet    Take 1  tablet (10 mg) by mouth daily    Absence of menstruation       metFORMIN 500 MG 24 hr tablet    GLUCOPHAGE-XR    30 tablet    Take 1 tablet (500 mg) by mouth daily (with dinner)    Non morbid obesity due to excess calories, Hyperglycemia       multivitamin, therapeutic with minerals Tabs tablet      Take 1 tablet by mouth daily        omeprazole 20 MG CR capsule    priLOSEC    60 capsule    Take 1 capsule (20 mg) by mouth 2 times daily    Gastroesophageal reflux disease without esophagitis       ondansetron 4 MG ODT tab    ZOFRAN-ODT    10 tablet    Take 1-2 tablets (4-8 mg) by mouth every 8 hours as needed for nausea    Malignant neoplasm of upper-outer quadrant of right breast in female, estrogen receptor negative (H)       senna-docusate 8.6-50 MG per tablet    SENOKOT-S;PERICOLACE    30 tablet    Take 1-2 tablets by mouth 2 times daily    Malignant neoplasm of upper-outer quadrant of right breast in female, estrogen receptor negative (H)

## 2018-10-29 ENCOUNTER — TELEPHONE (OUTPATIENT)
Dept: ONCOLOGY | Facility: CLINIC | Age: 48
End: 2018-10-29

## 2018-10-29 NOTE — TELEPHONE ENCOUNTER
Dr. Mckeon spoke with cardio/oncologist on 10/26/18, per cardiology standpoint Luci has been approved to start Adriamycin/Cytoxan treatment. She has been scheduled to start treatment on Wednesday 10/31/18 at 0900 AM,  called patient and she is aware. Writer will meet with patient to review chemotherapy and what her schedule will look like. 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 Marialuisa Casper RN,BSN,OCN

## 2018-10-31 ENCOUNTER — INFUSION THERAPY VISIT (OUTPATIENT)
Dept: INFUSION THERAPY | Facility: CLINIC | Age: 48
End: 2018-10-31
Attending: INTERNAL MEDICINE
Payer: COMMERCIAL

## 2018-10-31 ENCOUNTER — CARE COORDINATION (OUTPATIENT)
Dept: ONCOLOGY | Facility: CLINIC | Age: 48
End: 2018-10-31

## 2018-10-31 ENCOUNTER — HOSPITAL ENCOUNTER (OUTPATIENT)
Facility: CLINIC | Age: 48
Setting detail: SPECIMEN
Discharge: HOME | End: 2018-10-31
Attending: INTERNAL MEDICINE | Admitting: INTERNAL MEDICINE
Payer: COMMERCIAL

## 2018-10-31 ENCOUNTER — ALLIED HEALTH/NURSE VISIT (OUTPATIENT)
Dept: ONCOLOGY | Facility: CLINIC | Age: 48
End: 2018-10-31

## 2018-10-31 VITALS
WEIGHT: 193 LBS | TEMPERATURE: 98.2 F | RESPIRATION RATE: 16 BRPM | OXYGEN SATURATION: 100 % | SYSTOLIC BLOOD PRESSURE: 126 MMHG | BODY MASS INDEX: 34.2 KG/M2 | HEART RATE: 84 BPM | HEIGHT: 63 IN | DIASTOLIC BLOOD PRESSURE: 87 MMHG

## 2018-10-31 DIAGNOSIS — Z95.828 PORT-A-CATH IN PLACE: ICD-10-CM

## 2018-10-31 DIAGNOSIS — C50.411 MALIGNANT NEOPLASM OF UPPER-OUTER QUADRANT OF RIGHT BREAST IN FEMALE, ESTROGEN RECEPTOR NEGATIVE (H): Primary | ICD-10-CM

## 2018-10-31 DIAGNOSIS — Z71.9 COUNSELING NOS(V65.40): Primary | ICD-10-CM

## 2018-10-31 DIAGNOSIS — Z17.1 MALIGNANT NEOPLASM OF UPPER-OUTER QUADRANT OF RIGHT BREAST IN FEMALE, ESTROGEN RECEPTOR NEGATIVE (H): Primary | ICD-10-CM

## 2018-10-31 LAB
ALBUMIN SERPL-MCNC: 3.4 G/DL (ref 3.4–5)
ALP SERPL-CCNC: 67 U/L (ref 40–150)
ALT SERPL W P-5'-P-CCNC: 28 U/L (ref 0–50)
ANION GAP SERPL CALCULATED.3IONS-SCNC: 4 MMOL/L (ref 3–14)
AST SERPL W P-5'-P-CCNC: 17 U/L (ref 0–45)
BASOPHILS # BLD AUTO: 0 10E9/L (ref 0–0.2)
BASOPHILS NFR BLD AUTO: 0.3 %
BILIRUB SERPL-MCNC: 0.2 MG/DL (ref 0.2–1.3)
BUN SERPL-MCNC: 9 MG/DL (ref 7–30)
CALCIUM SERPL-MCNC: 8.6 MG/DL (ref 8.5–10.1)
CHLORIDE SERPL-SCNC: 106 MMOL/L (ref 94–109)
CO2 SERPL-SCNC: 29 MMOL/L (ref 20–32)
CREAT SERPL-MCNC: 0.66 MG/DL (ref 0.52–1.04)
DIFFERENTIAL METHOD BLD: ABNORMAL
EOSINOPHIL # BLD AUTO: 0.1 10E9/L (ref 0–0.7)
EOSINOPHIL NFR BLD AUTO: 0.7 %
ERYTHROCYTE [DISTWIDTH] IN BLOOD BY AUTOMATED COUNT: 17 % (ref 10–15)
GFR SERPL CREATININE-BSD FRML MDRD: >90 ML/MIN/1.7M2
GLUCOSE SERPL-MCNC: 88 MG/DL (ref 70–99)
HCT VFR BLD AUTO: 36.4 % (ref 35–47)
HGB BLD-MCNC: 12 G/DL (ref 11.7–15.7)
IMM GRANULOCYTES # BLD: 0 10E9/L (ref 0–0.4)
IMM GRANULOCYTES NFR BLD: 0.1 %
LYMPHOCYTES # BLD AUTO: 2.4 10E9/L (ref 0.8–5.3)
LYMPHOCYTES NFR BLD AUTO: 32.3 %
MCH RBC QN AUTO: 25.6 PG (ref 26.5–33)
MCHC RBC AUTO-ENTMCNC: 33 G/DL (ref 31.5–36.5)
MCV RBC AUTO: 78 FL (ref 78–100)
MONOCYTES # BLD AUTO: 0.5 10E9/L (ref 0–1.3)
MONOCYTES NFR BLD AUTO: 6 %
NEUTROPHILS # BLD AUTO: 4.5 10E9/L (ref 1.6–8.3)
NEUTROPHILS NFR BLD AUTO: 60.6 %
PLATELET # BLD AUTO: 339 10E9/L (ref 150–450)
POTASSIUM SERPL-SCNC: 3.8 MMOL/L (ref 3.4–5.3)
PROT SERPL-MCNC: 7.6 G/DL (ref 6.8–8.8)
RBC # BLD AUTO: 4.69 10E12/L (ref 3.8–5.2)
SODIUM SERPL-SCNC: 139 MMOL/L (ref 133–144)
WBC # BLD AUTO: 7.5 10E9/L (ref 4–11)

## 2018-10-31 PROCEDURE — 80053 COMPREHEN METABOLIC PANEL: CPT | Performed by: INTERNAL MEDICINE

## 2018-10-31 PROCEDURE — 25000128 H RX IP 250 OP 636: Performed by: INTERNAL MEDICINE

## 2018-10-31 PROCEDURE — 96413 CHEMO IV INFUSION 1 HR: CPT

## 2018-10-31 PROCEDURE — 96377 APPLICATON ON-BODY INJECTOR: CPT | Mod: XS

## 2018-10-31 PROCEDURE — 96375 TX/PRO/DX INJ NEW DRUG ADDON: CPT

## 2018-10-31 PROCEDURE — 96411 CHEMO IV PUSH ADDL DRUG: CPT

## 2018-10-31 PROCEDURE — 85025 COMPLETE CBC W/AUTO DIFF WBC: CPT | Performed by: INTERNAL MEDICINE

## 2018-10-31 PROCEDURE — 96367 TX/PROPH/DG ADDL SEQ IV INF: CPT

## 2018-10-31 RX ORDER — MEPERIDINE HYDROCHLORIDE 25 MG/ML
25 INJECTION INTRAMUSCULAR; INTRAVENOUS; SUBCUTANEOUS EVERY 30 MIN PRN
Status: CANCELLED | OUTPATIENT
Start: 2018-10-31

## 2018-10-31 RX ORDER — PROCHLORPERAZINE MALEATE 10 MG
10 TABLET ORAL EVERY 6 HOURS PRN
Qty: 30 TABLET | Refills: 5 | Status: SHIPPED | OUTPATIENT
Start: 2018-10-31 | End: 2019-05-09

## 2018-10-31 RX ORDER — HEPARIN SODIUM (PORCINE) LOCK FLUSH IV SOLN 100 UNIT/ML 100 UNIT/ML
5 SOLUTION INTRAVENOUS ONCE
Status: COMPLETED | OUTPATIENT
Start: 2018-10-31 | End: 2018-10-31

## 2018-10-31 RX ORDER — DEXAMETHASONE 4 MG/1
8 TABLET ORAL DAILY
Qty: 40 TABLET | Refills: 3 | Status: SHIPPED | OUTPATIENT
Start: 2018-10-31 | End: 2019-01-09

## 2018-10-31 RX ORDER — LIDOCAINE/PRILOCAINE 2.5 %-2.5%
CREAM (GRAM) TOPICAL PRN
Qty: 30 G | Refills: 3 | Status: SHIPPED | OUTPATIENT
Start: 2018-10-31 | End: 2019-01-02

## 2018-10-31 RX ORDER — ALBUTEROL SULFATE 90 UG/1
1-2 AEROSOL, METERED RESPIRATORY (INHALATION)
Status: CANCELLED
Start: 2018-10-31

## 2018-10-31 RX ORDER — SODIUM CHLORIDE 9 MG/ML
1000 INJECTION, SOLUTION INTRAVENOUS CONTINUOUS PRN
Status: CANCELLED
Start: 2018-10-31

## 2018-10-31 RX ORDER — DOXORUBICIN HYDROCHLORIDE 2 MG/ML
60 INJECTION, SOLUTION INTRAVENOUS ONCE
Status: CANCELLED | OUTPATIENT
Start: 2018-10-31

## 2018-10-31 RX ORDER — HEPARIN SODIUM (PORCINE) LOCK FLUSH IV SOLN 100 UNIT/ML 100 UNIT/ML
5 SOLUTION INTRAVENOUS ONCE
Status: CANCELLED
Start: 2018-10-31 | End: 2018-10-31

## 2018-10-31 RX ORDER — LORAZEPAM 0.5 MG/1
0.5 TABLET ORAL EVERY 4 HOURS PRN
Qty: 30 TABLET | Refills: 5 | Status: SHIPPED | OUTPATIENT
Start: 2018-10-31 | End: 2019-09-04

## 2018-10-31 RX ORDER — PALONOSETRON 0.05 MG/ML
0.25 INJECTION, SOLUTION INTRAVENOUS ONCE
Status: CANCELLED
Start: 2018-10-31

## 2018-10-31 RX ORDER — LORAZEPAM 2 MG/ML
0.5 INJECTION INTRAMUSCULAR EVERY 4 HOURS PRN
Status: CANCELLED
Start: 2018-10-31

## 2018-10-31 RX ORDER — ALBUTEROL SULFATE 0.83 MG/ML
2.5 SOLUTION RESPIRATORY (INHALATION)
Status: CANCELLED | OUTPATIENT
Start: 2018-10-31

## 2018-10-31 RX ORDER — DIPHENHYDRAMINE HYDROCHLORIDE 50 MG/ML
50 INJECTION INTRAMUSCULAR; INTRAVENOUS
Status: CANCELLED
Start: 2018-10-31

## 2018-10-31 RX ORDER — METHYLPREDNISOLONE SODIUM SUCCINATE 125 MG/2ML
125 INJECTION, POWDER, LYOPHILIZED, FOR SOLUTION INTRAMUSCULAR; INTRAVENOUS
Status: CANCELLED
Start: 2018-10-31

## 2018-10-31 RX ORDER — LORATADINE 10 MG/1
10 TABLET ORAL DAILY
Qty: 90 TABLET | Refills: 1 | Status: SHIPPED | OUTPATIENT
Start: 2018-10-31 | End: 2020-12-03

## 2018-10-31 RX ORDER — EPINEPHRINE 0.3 MG/.3ML
0.3 INJECTION SUBCUTANEOUS EVERY 5 MIN PRN
Status: CANCELLED | OUTPATIENT
Start: 2018-10-31

## 2018-10-31 RX ORDER — HEPARIN SODIUM (PORCINE) LOCK FLUSH IV SOLN 100 UNIT/ML 100 UNIT/ML
5 SOLUTION INTRAVENOUS EVERY 8 HOURS
Status: CANCELLED
Start: 2018-10-31

## 2018-10-31 RX ORDER — DOXORUBICIN HYDROCHLORIDE 2 MG/ML
120 INJECTION, SOLUTION INTRAVENOUS ONCE
Status: COMPLETED | OUTPATIENT
Start: 2018-10-31 | End: 2018-10-31

## 2018-10-31 RX ORDER — EPINEPHRINE 1 MG/ML
0.3 INJECTION, SOLUTION INTRAMUSCULAR; SUBCUTANEOUS EVERY 5 MIN PRN
Status: CANCELLED | OUTPATIENT
Start: 2018-10-31

## 2018-10-31 RX ORDER — PALONOSETRON 0.05 MG/ML
0.25 INJECTION, SOLUTION INTRAVENOUS ONCE
Status: COMPLETED | OUTPATIENT
Start: 2018-10-31 | End: 2018-10-31

## 2018-10-31 RX ADMIN — SODIUM CHLORIDE 500 ML: 9 INJECTION, SOLUTION INTRAVENOUS at 10:38

## 2018-10-31 RX ADMIN — SODIUM CHLORIDE, PRESERVATIVE FREE 5 ML: 5 INJECTION INTRAVENOUS at 12:52

## 2018-10-31 RX ADMIN — DEXAMETHASONE SODIUM PHOSPHATE: 10 INJECTION, SOLUTION INTRAMUSCULAR; INTRAVENOUS at 10:42

## 2018-10-31 RX ADMIN — DOXORUBICIN HYDROCHLORIDE 120 MG: 2 INJECTION, SOLUTION INTRAVENOUS at 11:32

## 2018-10-31 RX ADMIN — PEGFILGRASTIM 6 MG: KIT SUBCUTANEOUS at 13:12

## 2018-10-31 RX ADMIN — CYCLOPHOSPHAMIDE 1200 MG: 1 INJECTION, POWDER, FOR SOLUTION INTRAVENOUS; ORAL at 11:57

## 2018-10-31 RX ADMIN — PALONOSETRON HYDROCHLORIDE 0.25 MG: 0.25 INJECTION INTRAVENOUS at 10:38

## 2018-10-31 ASSESSMENT — PAIN SCALES - GENERAL: PAINLEVEL: NO PAIN (0)

## 2018-10-31 NOTE — MR AVS SNAPSHOT
After Visit Summary   10/31/2018    Luci Londono    MRN: [id number]           Patient Information     Date Of Birth          1970        Visit Information        Provider Department      10/31/2018 8:13 AM Mirtha Toribio LICSW [facility]        Today's Diagnoses     Counseling NOS(V65.40)    -  1       Follow-ups after your visit        Your next 10 appointments already scheduled     Nov 14, 2018  9:00 AM CST   Level 3 with SH INFUSION CHAIR 18   [facility] [place] ([facility])    [facility]  [address]  [facility] [address]   [phone]            Nov 28, 2018 11:00 AM CST   Level 3 with SH INFUSION CHAIR 7   [facility] [place] ([facility])    [facility]  [address]  [facility] [address]   [phone]            Nov 28, 2018 12:00 PM CST   Return Visit with Addie Murillo MD   [facility] [place] ([facility])    [facility]  [address]  [facility] [address]   [phone]            Dec 18, 2018 11:00 AM CST   Ech Complete with [id number]   [facility] CV Echocardiography ([facility])    [address]  [address]  [facility] [address] [phone]           1.  Please bring or wear a comfortable two-piece outfit. 2.  You may eat, drink and take your normal medicines. 3.  For any questions that cannot be answered, please contact the ordering physician 4.  Please do not wear perfumes or scented lotions on the day of your exam.            Dec 18, 2018  2:30 PM CST   Return Visit with Elisa Pemberton PA-C   [facility] ([facility])    [address]  [facility] [address] [phone] OPT 2            Jan 17, 2019 11:15 AM CST   New Visit with  Neida Fortune GC   [facility] ([facility])    [facility] [place]  [address]  [place] [address] [phone]              Who to contact     If you have questions or need follow up information about today's clinic visit or your schedule please contact [facility] directly at [phone].  Normal or non-critical lab and imaging results will be communicated to you by MyChart, letter or phone within 4 business days after the clinic has received the results. If you do not hear from us within 7 days, please contact the clinic through MyChart or phone. If you have a critical or abnormal lab result, we will notify you by phone as soon as possible.  Submit refill requests through [employer] or call your pharmacy and they will forward the refill request to us. Please allow 3 business days for your refill to be completed.          Additional Information About Your Visit        Care EveryWhere ID     This is your Care EveryWhere ID. This could be used by other organizations to access your [place] medical records  [id number]        Your Vitals Were     Last Period                   10/09/2018            Blood Pressure from Last 3 Encounters:   10/31/18 126/87   10/26/18 130/83   10/25/18 135/84    Weight from Last 3 Encounters:   10/31/18 87.5 kg (193 lb)   10/25/18 87.2 kg (192 lb 4.8 oz)   10/18/18 87.3 kg (192 lb 8 oz)              Today, you had the following     No orders found for display         Today's Medication Changes          These changes are accurate as of 10/31/18 11:59 PM.  If you have any questions, ask your nurse or doctor.               Start taking these medicines.        Dose/Directions    dexamethasone 4 MG tablet   Commonly known as:  DECADRON   Used for:  Malignant neoplasm of upper-outer quadrant of right breast in female, estrogen receptor negative (H)        Dose:  8 mg   Take 2 tablets (8 mg) by mouth daily for 3 days  Start on Day 2 of Cycles 1 through 4.   Quantity:  40 tablet   Refills:  3       loratadine 10 MG tablet   Commonly known as:  CLARITIN   Used for:  Malignant neoplasm of upper-outer quadrant of right breast in female, estrogen receptor negative (H)        Dose:  10 mg   Take 1 tablet (10 mg) by mouth daily   Quantity:  90 tablet   Refills:  1       LORazepam 0.5 MG tablet   Commonly known as:  ATIVAN   Used for:  Malignant neoplasm of upper-outer quadrant of right breast in female, estrogen receptor negative (H)        Dose:  0.5 mg   Take 1 tablet (0.5 mg) by mouth every 4 hours as needed (Anxiety, Nausea/Vomiting or Sleep)   Quantity:  30 tablet   Refills:  5       prochlorperazine 10 MG tablet   Commonly known as:  COMPAZINE   Used for:  Malignant neoplasm of upper-outer quadrant of right breast in female, estrogen receptor negative (H)        Dose:  10 mg   Take 1 tablet (10 mg) by mouth every 6 hours as needed (Nausea/Vomiting)   Quantity:  30 tablet   Refills:  5         These medicines have changed or have updated prescriptions.        Dose/Directions    * lidocaine-prilocaine cream   Commonly known as:  EMLA   This may have changed:  Another medication with the same name was added. Make sure you understand how and when to take each.   Used for:  Malignant neoplasm of upper-outer quadrant of right breast in female, estrogen receptor negative (H)        Apply quarter-size amount of Emla cream topically over port site one hour prior to port access for comfort.   Quantity:  30 g   Refills:  3       * lidocaine-prilocaine cream   Commonly known as:  EMLA   This may have changed:  You were already taking a medication with the same name, and this prescription was added. Make sure you understand how and when to take each.   Used for:  Malignant neoplasm of upper-outer quadrant of right breast in female, estrogen receptor negative (H)        Apply topically as needed for moderate pain Apply to port site 1 hour prior  to accessing port   Quantity:  30 g   Refills:  3       * Notice:  This list has 2 medication(s) that are the same as other medications prescribed for you. Read the directions carefully, and ask your doctor or other care provider to review them with you.         Where to get your medicines      These medications were sent to [facility] [place], [place] - [address]  [address], [place] [address]     Phone:  [phone]     dexamethasone 4 MG tablet    lidocaine-prilocaine cream    loratadine 10 MG tablet    prochlorperazine 10 MG tablet         Some of these will need a paper prescription and others can be bought over the counter.  Ask your nurse if you have questions.     Bring a paper prescription for each of these medications     LORazepam 0.5 MG tablet                Primary Care Provider Office Phone # Fax #    Stuart Wills [phone] [address] [facility] [address]        Equal Access to Services     [facility] AH: Hadii aad ku hadasho Soomaali, waaxda luqadaha, qaybta kaalmada adeegyada, waxay neptaliin suze neumann . So [facility] [phone].    ATENCIÓN: Si habla español, tiene a dominique disposición servicios gratuitos de asistencia lingüística. LlMer[facility] [phone].    We comply with applicable federal civil rights laws and [place] laws. We do not discriminate on the basis of race, color, national origin, age, disability, sex, sexual orientation, or gender identity.            Thank you!     Thank you for choosing [facility]  for your care. Our goal is always to provide you with excellent care. Hearing back from our patients is one way we can continue to improve our services. Please take a few minutes to complete the written survey that you may receive in the mail after your visit with us. Thank you!             Your Updated Medication List - Protect others around you: Learn how to safely use, store and throw  away your medicines at www.disposemymeds.org.          This list is accurate as of 10/31/18 11:59 PM.  Always use your most recent med list.                   Brand Name Dispense Instructions for use Diagnosis    ascorbic acid 1000 MG Tabs    vitamin C     Take 1,000 mg by mouth daily        dexamethasone 4 MG tablet    DECADRON    40 tablet    Take 2 tablets (8 mg) by mouth daily for 3 days Start on Day 2 of Cycles 1 through 4.    Malignant neoplasm of upper-outer quadrant of right breast in female, estrogen receptor negative (H)       HYDROcodone-acetaminophen 5-325 MG per tablet    NORCO    15 tablet    Take 1-2 tablets by mouth every 4 hours as needed for moderate to severe pain    Malignant neoplasm of upper-outer quadrant of right breast in female, estrogen receptor negative (H)       levothyroxine 125 MCG tablet    SYNTHROID/LEVOTHROID    30 tablet    TAKE 1 TABLET BY MOUTH DAILY    Acquired hypothyroidism       * lidocaine-prilocaine cream    EMLA    30 g    Apply quarter-size amount of Emla cream topically over port site one hour prior to port access for comfort.    Malignant neoplasm of upper-outer quadrant of right breast in female, estrogen receptor negative (H)       * lidocaine-prilocaine cream    EMLA    30 g    Apply topically as needed for moderate pain Apply to port site 1 hour prior to accessing port    Malignant neoplasm of upper-outer quadrant of right breast in female, estrogen receptor negative (H)       loratadine 10 MG tablet    CLARITIN    90 tablet    Take 1 tablet (10 mg) by mouth daily    Malignant neoplasm of upper-outer quadrant of right breast in female, estrogen receptor negative (H)       LORazepam 0.5 MG tablet    ATIVAN    30 tablet    Take 1 tablet (0.5 mg) by mouth every 4 hours as needed (Anxiety, Nausea/Vomiting or Sleep)    Malignant neoplasm of upper-outer quadrant of right breast in female, estrogen receptor negative (H)       medroxyPROGESTERone 10 MG tablet    PROVERA     90 tablet    Take 1 tablet (10 mg) by mouth daily    Absence of menstruation       metFORMIN 500 MG 24 hr tablet    GLUCOPHAGE-XR    30 tablet    Take 1 tablet (500 mg) by mouth daily (with dinner)    Non morbid obesity due to excess calories, Hyperglycemia       multivitamin, therapeutic with minerals Tabs tablet      Take 1 tablet by mouth daily        omeprazole 20 MG CR capsule    priLOSEC    60 capsule    Take 1 capsule (20 mg) by mouth 2 times daily    Gastroesophageal reflux disease without esophagitis       ondansetron 4 MG ODT tab    ZOFRAN-ODT    10 tablet    Take 1-2 tablets (4-8 mg) by mouth every 8 hours as needed for nausea    Malignant neoplasm of upper-outer quadrant of right breast in female, estrogen receptor negative (H)       prochlorperazine 10 MG tablet    COMPAZINE    30 tablet    Take 1 tablet (10 mg) by mouth every 6 hours as needed (Nausea/Vomiting)    Malignant neoplasm of upper-outer quadrant of right breast in female, estrogen receptor negative (H)       senna-docusate 8.6-50 MG per tablet    SENOKOT-S;PERICOLACE    30 tablet    Take 1-2 tablets by mouth 2 times daily    Malignant neoplasm of upper-outer quadrant of right breast in female, estrogen receptor negative (H)       * Notice:  This list has 2 medication(s) that are the same as other medications prescribed for you. Read the directions carefully, and ask your doctor or other care provider to review them with you.

## 2018-10-31 NOTE — PROGRESS NOTES
Infusion Nursing Note:  Luci Londono presents today for C1D1 Adriamycin/Cytoxan.    Patient seen by provider today: No   present during visit today: Not Applicable.    Note: pt here with sister first cycle chemotherapy. .    Intravenous Access:  Implanted Port.    Treatment Conditions:  Lab Results   Component Value Date    HGB 12.0 10/31/2018     Lab Results   Component Value Date    WBC 7.5 10/31/2018      Lab Results   Component Value Date    ANEU 4.5 10/31/2018     Lab Results   Component Value Date     10/31/2018      Lab Results   Component Value Date     10/31/2018                   Lab Results   Component Value Date    POTASSIUM 3.8 10/31/2018           Lab Results   Component Value Date    MAG 1.8 10/12/2018            Lab Results   Component Value Date    CR 0.66 10/31/2018                   Lab Results   Component Value Date    MATHIEU 8.6 10/31/2018                Lab Results   Component Value Date    BILITOTAL 0.2 10/31/2018           Lab Results   Component Value Date    ALBUMIN 3.4 10/31/2018                    Lab Results   Component Value Date    ALT 28 10/31/2018           Lab Results   Component Value Date    AST 17 10/31/2018       Results reviewed, labs MET treatment parameters, ok to proceed with treatment.  ECHO/MUGA completed 10/25/18  EF 55-60%.    ONPRO  Was placed on patient's: back of left arm.    Was placed at 1:00 PM    ONPRO injector device Lot number: [id number]    Patient education included: what patient can expect after application, what colored lights mean on the device, when to remove device, when and where to call with questions or issues and all patients questions answered.    Patient tolerated administration well.    Post Infusion Assessment:  Patient tolerated infusion without incident.  Patient tolerated injection without incident.  Blood return noted pre and post infusion.  Blood return noted during administration every 5 cc.  Site patent and  intact, free from redness, edema or discomfort.  No evidence of extravasations.  Access discontinued per protocol.    Discharge Plan:   Discharge instructions reviewed with: Patient.  Patient and/or family verbalized understanding of discharge instructions and all questions answered.  Copy of AVS reviewed with patient and/or family.  Patient will return 2 weeks for next appointment.  Patient discharged in stable condition accompanied by: self.  Departure Mode: Ambulatory.    Amie Manuel RN

## 2018-10-31 NOTE — MR AVS SNAPSHOT
After Visit Summary   10/31/2018    Luci Londono    MRN: [id number]           Patient Information     Date Of Birth          1970        Visit Information        Provider Department      10/31/2018 9:00 AM  INFUSION CHAIR 3 [facility]        Today's Diagnoses     Malignant neoplasm of upper-outer quadrant of right breast in female, estrogen receptor negative (H)    -  1      Care Instructions    Your On-body Neulasta Injector was applied to your          At    .  At approximately       on     , your On-body Injector will beep to let you know your dose delivery will begin in 2 minutes.  Your medication will be delivered over the next 45 minutes.  You can remove your Injector at        .  Please make sure your Injector has a solid green light or has turned off prior to removing the device.  Please contact your provider at [phone] with questions or concerns.              Follow-ups after your visit        Your next 10 appointments already scheduled     Nov 14, 2018  9:00 AM CST   Level 3 with  INFUSION CHAIR 18   [facility] ([facility])    [facility] [employer]  [address]  [facility] [address]   [phone]            Nov 28, 2018 11:00 AM CST   Level 3 with  INFUSION CHAIR 7   [facility] ([facility])    [facility] [employer]  [address]  [facility] [address]   [phone]            Nov 28, 2018 12:00 PM CST   Return Visit with Addie Murillo MD   [facility] ([facility])    [facility] [employer]  [address]  [facility] [address]   [phone]            Dec 18, 2018 11:00 AM CST   Ech Complete with [id number]   [facility] CV Echocardiography (Cardiovascular Imaging at [facility])    [facility]  [facility]  [place]  "[id number] [phone]           1.  Please bring or wear a comfortable two-piece outfit. 2.  You may eat, drink and take your normal medicines. 3.  For any questions that cannot be answered, please contact the ordering physician 4.  Please do not wear perfumes or scented lotions on the day of your exam.            Dec 18, 2018  2:30 PM CST   Return Visit with Elisa Pemberton PA-C   [facility] ([facility])    [address]  [facility] [address] [phone] OPT 2            Jan 17, 2019 11:15 AM CST   New Visit with Neida Fortune GC   [facility] ([facility])    [facility]  [address]  [facility] [id number] [phone]              Who to contact     If you have questions or need follow up information about today's clinic visit or your schedule please contact [facility] directly at [phone].  Normal or non-critical lab and imaging results will be communicated to you by MyChart, letter or phone within 4 business days after the clinic has received the results. If you do not hear from us within 7 days, please contact the clinic through MyChart or phone. If you have a critical or abnormal lab result, we will notify you by phone as soon as possible.  Submit refill requests through [employer] or call your pharmacy and they will forward the refill request to us. Please allow 3 business days for your refill to be completed.          Additional Information About Your Visit        Care EveryWhere ID     This is your Care EveryWhere ID. This could be used by other organizations to access your [place] medical records  [id number]        Your Vitals Were     Pulse Temperature Respirations Height Last Period Pulse Oximetry    84 98.2  F (36.8  C) (Oral) 16 1.6 m (5' 3\") 10/09/2018 100%    BMI (Body Mass Index)                   34.19 kg/m2            " Blood Pressure from Last 3 Encounters:   10/31/18 126/87   10/26/18 130/83   10/25/18 135/84    Weight from Last 3 Encounters:   10/31/18 87.5 kg (193 lb)   10/25/18 87.2 kg (192 lb 4.8 oz)   10/18/18 87.3 kg (192 lb 8 oz)              We Performed the Following     CBC with platelets differential     Comprehensive metabolic panel     Nursing Communication 1          Today's Medication Changes          These changes are accurate as of 10/31/18 12:40 PM.  If you have any questions, ask your nurse or doctor.               Start taking these medicines.        Dose/Directions    dexamethasone 4 MG tablet   Commonly known as:  DECADRON   Used for:  Malignant neoplasm of upper-outer quadrant of right breast in female, estrogen receptor negative (H)        Dose:  8 mg   Take 2 tablets (8 mg) by mouth daily for 3 days Start on Day 2 of Cycles 1 through 4.   Quantity:  40 tablet   Refills:  3       loratadine 10 MG tablet   Commonly known as:  CLARITIN   Used for:  Malignant neoplasm of upper-outer quadrant of right breast in female, estrogen receptor negative (H)        Dose:  10 mg   Take 1 tablet (10 mg) by mouth daily   Quantity:  90 tablet   Refills:  1       LORazepam 0.5 MG tablet   Commonly known as:  ATIVAN   Used for:  Malignant neoplasm of upper-outer quadrant of right breast in female, estrogen receptor negative (H)        Dose:  0.5 mg   Take 1 tablet (0.5 mg) by mouth every 4 hours as needed (Anxiety, Nausea/Vomiting or Sleep)   Quantity:  30 tablet   Refills:  5       prochlorperazine 10 MG tablet   Commonly known as:  COMPAZINE   Used for:  Malignant neoplasm of upper-outer quadrant of right breast in female, estrogen receptor negative (H)        Dose:  10 mg   Take 1 tablet (10 mg) by mouth every 6 hours as needed (Nausea/Vomiting)   Quantity:  30 tablet   Refills:  5         These medicines have changed or have updated prescriptions.        Dose/Directions    * lidocaine-prilocaine cream   Commonly known  as:  EMLA   This may have changed:  Another medication with the same name was added. Make sure you understand how and when to take each.   Used for:  Malignant neoplasm of upper-outer quadrant of right breast in female, estrogen receptor negative (H)        Apply quarter-size amount of Emla cream topically over port site one hour prior to port access for comfort.   Quantity:  30 g   Refills:  3       * lidocaine-prilocaine cream   Commonly known as:  EMLA   This may have changed:  You were already taking a medication with the same name, and this prescription was added. Make sure you understand how and when to take each.   Used for:  Malignant neoplasm of upper-outer quadrant of right breast in female, estrogen receptor negative (H)        Apply topically as needed for moderate pain Apply to port site 1 hour prior to accessing port   Quantity:  30 g   Refills:  3       * Notice:  This list has 2 medication(s) that are the same as other medications prescribed for you. Read the directions carefully, and ask your doctor or other care provider to review them with you.         Where to get your medicines      These medications were sent to [facility] [place], [place] - [address]  [address] [address]     Phone:  [phone]     dexamethasone 4 MG tablet    lidocaine-prilocaine cream    loratadine 10 MG tablet    prochlorperazine 10 MG tablet         Some of these will need a paper prescription and others can be bought over the counter.  Ask your nurse if you have questions.     Bring a paper prescription for each of these medications     LORazepam 0.5 MG tablet                Primary Care Provider Office Phone # Fax #    Stuart Wills [phone] [address] [facility] [address]        Equal Access to Services     PINA PINEDA AH: Kalani Marks, latisha koch, josh gerber  lafrancisco bangura. So [facility] [phone].    ATENCIÓN: Si quincy carrero, tiene a dominique disposición servicios gratuitos de asistencia lingüística. Trice reyes [phone].    We comply with applicable federal civil rights laws and [place] laws. We do not discriminate on the basis of race, color, national origin, age, disability, sex, sexual orientation, or gender identity.            Thank you!     Thank you for choosing [facility]  for your care. Our goal is always to provide you with excellent care. Hearing back from our patients is one way we can continue to improve our services. Please take a few minutes to complete the written survey that you may receive in the mail after your visit with us. Thank you!             Your Updated Medication List - Protect others around you: Learn how to safely use, store and throw away your medicines at www.disposemymeds.org.          This list is accurate as of 10/31/18 12:40 PM.  Always use your most recent med list.                   Brand Name Dispense Instructions for use Diagnosis    ascorbic acid 1000 MG Tabs    vitamin C     Take 1,000 mg by mouth daily        dexamethasone 4 MG tablet    DECADRON    40 tablet    Take 2 tablets (8 mg) by mouth daily for 3 days Start on Day 2 of Cycles 1 through 4.    Malignant neoplasm of upper-outer quadrant of right breast in female, estrogen receptor negative (H)       HYDROcodone-acetaminophen 5-325 MG per tablet    NORCO    15 tablet    Take 1-2 tablets by mouth every 4 hours as needed for moderate to severe pain    Malignant neoplasm of upper-outer quadrant of right breast in female, estrogen receptor negative (H)       levothyroxine 125 MCG tablet    SYNTHROID/LEVOTHROID    30 tablet    TAKE 1 TABLET BY MOUTH DAILY    Acquired hypothyroidism       * lidocaine-prilocaine cream    EMLA    30 g    Apply quarter-size amount of Emla cream topically over port site one hour prior to port access for comfort.    Malignant  neoplasm of upper-outer quadrant of right breast in female, estrogen receptor negative (H)       * lidocaine-prilocaine cream    EMLA    30 g    Apply topically as needed for moderate pain Apply to port site 1 hour prior to accessing port    Malignant neoplasm of upper-outer quadrant of right breast in female, estrogen receptor negative (H)       loratadine 10 MG tablet    CLARITIN    90 tablet    Take 1 tablet (10 mg) by mouth daily    Malignant neoplasm of upper-outer quadrant of right breast in female, estrogen receptor negative (H)       LORazepam 0.5 MG tablet    ATIVAN    30 tablet    Take 1 tablet (0.5 mg) by mouth every 4 hours as needed (Anxiety, Nausea/Vomiting or Sleep)    Malignant neoplasm of upper-outer quadrant of right breast in female, estrogen receptor negative (H)       medroxyPROGESTERone 10 MG tablet    PROVERA    90 tablet    Take 1 tablet (10 mg) by mouth daily    Absence of menstruation       metFORMIN 500 MG 24 hr tablet    GLUCOPHAGE-XR    30 tablet    Take 1 tablet (500 mg) by mouth daily (with dinner)    Non morbid obesity due to excess calories, Hyperglycemia       multivitamin, therapeutic with minerals Tabs tablet      Take 1 tablet by mouth daily        omeprazole 20 MG CR capsule    priLOSEC    60 capsule    Take 1 capsule (20 mg) by mouth 2 times daily    Gastroesophageal reflux disease without esophagitis       ondansetron 4 MG ODT tab    ZOFRAN-ODT    10 tablet    Take 1-2 tablets (4-8 mg) by mouth every 8 hours as needed for nausea    Malignant neoplasm of upper-outer quadrant of right breast in female, estrogen receptor negative (H)       prochlorperazine 10 MG tablet    COMPAZINE    30 tablet    Take 1 tablet (10 mg) by mouth every 6 hours as needed (Nausea/Vomiting)    Malignant neoplasm of upper-outer quadrant of right breast in female, estrogen receptor negative (H)       senna-docusate 8.6-50 MG per tablet    SENOKOT-S;PERICOLACE    30 tablet    Take 1-2 tablets by mouth 2  times daily    Malignant neoplasm of upper-outer quadrant of right breast in female, estrogen receptor negative (H)       * Notice:  This list has 2 medication(s) that are the same as other medications prescribed for you. Read the directions carefully, and ask your doctor or other care provider to review them with you.

## 2018-10-31 NOTE — PATIENT INSTRUCTIONS
Your On-body Neulasta Injector was applied to your          At    .  At approximately       on     , your On-body Injector will beep to let you know your dose delivery will begin in 2 minutes.  Your medication will be delivered over the next 45 minutes.  You can remove your Injector at        .  Please make sure your Injector has a solid green light or has turned off prior to removing the device.  Please contact your provider at [phone] with questions or concerns.

## 2018-10-31 NOTE — PROGRESS NOTES
Patient was in clinic today for her first dose of Adriamycin/Cytoxan. Reviewed Chemotherapy guidebook with patient. Reviewed side effects of Adriamycin/Cytoxan, and patient was given information on Neulasta. Patient was instructed to take Claritin starting today and for the next 2-3 days to prevent bone pain. Completed patient's disability paperwork and also wrote letter for decreased work hours at her second job. Patient met with  Mirtha regarding finacial assistance and support group information.

## 2018-11-01 ENCOUNTER — CARE COORDINATION (OUTPATIENT)
Dept: ONCOLOGY | Facility: CLINIC | Age: 48
End: 2018-11-01

## 2018-11-01 NOTE — PROGRESS NOTES
Called patient to see how she is feeling 1 day post Adriamycin/Cytoxan. Patient stated that she did experience some nausea. Patient stated that she did take some Zofran. Instructed patient to continue taking Zofran prophetically for the next couple days and to take Ativan in the evening. She is feeling a little lightheaded this morning and writer instructed her to drink plenty of fluids. She is aware to call clinic if she is having difficulty taking in fluids or her nausea does not subside with anti-emetics. Marialuisa Casper RN,BSN,OCN

## 2018-11-02 NOTE — PROGRESS NOTES
ONCOLOGY SOCIAL WORK  INITIAL PSYCHOSOCIAL ASSESSMENT    Assessment completed of living situation, support system, financial status, functional status, coping, stressors, need for resources and social work intervention provided as needed.    Date of Assessment: 11/2/2018    Present at assessment: Luci comes unaccompanied to C1D1    Diagnosis: breast cancer, invasive ductal carcinoma, grade 3    Date of Diagnosis: 10/2018    Physician: Shonda Murillo MD    Presenting Information: Silvia comes to clinic today for C1D1 Adriamycin/Cytoxan    Decision Making: Self    Health Care Directive: Not on file. Did not discuss with patient today.     Relationship Status of Patient:     Family/Support System: Parents, Siblings and Children. Luci lives at home with her 3 children (ages 16, 12 & 10) and her mother for whom she is primary caregiver. Luci's sister lives 4 blocks away.     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     Financial Concerns: Financial concerns as a result of change in income    Mental Health: Luci does not endorse history of mental illness    Recreation/Leisure Interests: Luci reports today that she derives great deal of satisfaction from being able to provide care to others.     Trauma/Loss/Abuse History: Luci reports that she provided care for father and grandparents prior to end of life.     Spirituality: Patient identifies with michael community, [place]    Current Coping Strategies: approachable, responsive and interactive    Assessment and Recommendations for Team:    Luci is a verenice 47-year-old woman who is the proud mother of 3 children. Luci appears to be adjusting as well to new cancer diagnosis and treatment as can be expected, asking informed questions as to what to expect with treatment. Luci appears to be a driven, and caring woman, and she appears to have a robust network of support from family. Provided safe place for Luci to acknowledge today the range of impact news of cancer diagnosis has had on family, with her oldest son feeling particularly responsible. Luci was open to discussion surrounding emotional support of children while they are adjusting to parental cancer diagnosis and treatment. Luci acknowledges today that continuing to keep a regular work schedule will be important to her coping. Luci acknowledged today some concerns as to changes in finances as a result of treatment, and was open to foundation assistance and community resource referrals. Oriented Luci to ongoing SW support available, and she is open to further visits and support.     Interventions: narrative building, active listening, emotional support, resource referral    Follow-Up Planned:   1) This clinician to submit [employer] applications per Luci's request  2) This clinician to mail additional information regarding [facility], [place] [facility], and exploration for Critical access hospital support available  3) This clinician will plan to next see Luci 11/14/18 for routine psychosocial check-in and emotional support  4) Will continue to collaborate with pt's ongoing care team surrounding concerns and needs.     MIRA Julien, LICSW  Phone: [phone]  Pager: [phone]    [facility]: M, T  *every other Thursday, 8am-4:30pm  [facility]: W, F, *every other Thursday, 8am-4:30pm

## 2018-11-05 ENCOUNTER — TELEPHONE (OUTPATIENT)
Dept: ONCOLOGY | Facility: CLINIC | Age: 48
End: 2018-11-05

## 2018-11-05 NOTE — TELEPHONE ENCOUNTER
"Returned patient's voice mail message that was left on triage. Patient stated that she is having issues with feeling \"burning inside\"  With  swallowing. She also states that she feels a heaviness in her chest and stomach. Instructed patient to take a medication for acid reflux, Zantac, Priolsec or Maalox. Patient stated that she has some acid reflux medication that she will take. Explained to her that the heaviness that she is feeling in her chest and stomach is a side effect from the Neulasta (on-pro). She states that she is taking her claritin and that the haeviness feeling will subside. Marialuisa Casper RN,BSN,OCN    "

## 2018-11-14 ENCOUNTER — ALLIED HEALTH/NURSE VISIT (OUTPATIENT)
Dept: ONCOLOGY | Facility: CLINIC | Age: 48
End: 2018-11-14

## 2018-11-14 ENCOUNTER — HOSPITAL ENCOUNTER (OUTPATIENT)
Facility: CLINIC | Age: 48
Setting detail: SPECIMEN
Discharge: HOME | End: 2018-11-14
Attending: INTERNAL MEDICINE | Admitting: INTERNAL MEDICINE
Payer: COMMERCIAL

## 2018-11-14 ENCOUNTER — ONCOLOGY VISIT (OUTPATIENT)
Dept: ONCOLOGY | Facility: CLINIC | Age: 48
End: 2018-11-14
Attending: INTERNAL MEDICINE
Payer: COMMERCIAL

## 2018-11-14 ENCOUNTER — INFUSION THERAPY VISIT (OUTPATIENT)
Dept: INFUSION THERAPY | Facility: CLINIC | Age: 48
End: 2018-11-14
Attending: INTERNAL MEDICINE
Payer: COMMERCIAL

## 2018-11-14 VITALS
RESPIRATION RATE: 16 BRPM | HEART RATE: 58 BPM | TEMPERATURE: 98.2 F | SYSTOLIC BLOOD PRESSURE: 139 MMHG | OXYGEN SATURATION: 100 % | DIASTOLIC BLOOD PRESSURE: 92 MMHG | BODY MASS INDEX: 34.34 KG/M2 | HEIGHT: 63 IN | WEIGHT: 193.8 LBS

## 2018-11-14 VITALS
SYSTOLIC BLOOD PRESSURE: 130 MMHG | TEMPERATURE: 97.9 F | DIASTOLIC BLOOD PRESSURE: 77 MMHG | HEIGHT: 63 IN | OXYGEN SATURATION: 100 % | BODY MASS INDEX: 34.34 KG/M2 | HEART RATE: 58 BPM | RESPIRATION RATE: 16 BRPM | WEIGHT: 193.8 LBS

## 2018-11-14 DIAGNOSIS — Z17.1 MALIGNANT NEOPLASM OF UPPER-OUTER QUADRANT OF RIGHT BREAST IN FEMALE, ESTROGEN RECEPTOR NEGATIVE (H): Primary | ICD-10-CM

## 2018-11-14 DIAGNOSIS — K12.1 STOMATITIS AND MUCOSITIS: ICD-10-CM

## 2018-11-14 DIAGNOSIS — R11.0 NAUSEA: ICD-10-CM

## 2018-11-14 DIAGNOSIS — K12.30 STOMATITIS AND MUCOSITIS: ICD-10-CM

## 2018-11-14 DIAGNOSIS — C50.411 MALIGNANT NEOPLASM OF UPPER-OUTER QUADRANT OF RIGHT BREAST IN FEMALE, ESTROGEN RECEPTOR NEGATIVE (H): Primary | ICD-10-CM

## 2018-11-14 DIAGNOSIS — M89.8X9 BONE PAIN: ICD-10-CM

## 2018-11-14 DIAGNOSIS — Z95.828 PORT-A-CATH IN PLACE: ICD-10-CM

## 2018-11-14 DIAGNOSIS — Z71.9 COUNSELING NOS(V65.40): Primary | ICD-10-CM

## 2018-11-14 LAB
ANISOCYTOSIS BLD QL SMEAR: SLIGHT
BASOPHILS # BLD AUTO: 0 10E9/L (ref 0–0.2)
BASOPHILS NFR BLD AUTO: 0 %
DIFFERENTIAL METHOD BLD: ABNORMAL
EOSINOPHIL # BLD AUTO: 0 10E9/L (ref 0–0.7)
EOSINOPHIL NFR BLD AUTO: 0 %
ERYTHROCYTE [DISTWIDTH] IN BLOOD BY AUTOMATED COUNT: 16.6 % (ref 10–15)
HCT VFR BLD AUTO: 32.9 % (ref 35–47)
HGB BLD-MCNC: 10.8 G/DL (ref 11.7–15.7)
LYMPHOCYTES # BLD AUTO: 5.5 10E9/L (ref 0.8–5.3)
LYMPHOCYTES NFR BLD AUTO: 35 %
MCH RBC QN AUTO: 26.3 PG (ref 26.5–33)
MCHC RBC AUTO-ENTMCNC: 32.8 G/DL (ref 31.5–36.5)
MCV RBC AUTO: 80 FL (ref 78–100)
METAMYELOCYTES # BLD: 0.2 10E9/L
METAMYELOCYTES NFR BLD MANUAL: 1 %
MICROCYTES BLD QL SMEAR: PRESENT
MONOCYTES # BLD AUTO: 0.5 10E9/L (ref 0–1.3)
MONOCYTES NFR BLD AUTO: 3 %
NEUTROPHILS # BLD AUTO: 9.6 10E9/L (ref 1.6–8.3)
NEUTROPHILS NFR BLD AUTO: 61 %
NRBC # BLD AUTO: 0.5 10*3/UL
NRBC BLD AUTO-RTO: 3 /100
PLATELET # BLD AUTO: 274 10E9/L (ref 150–450)
PLATELET # BLD EST: ABNORMAL 10*3/UL
RBC # BLD AUTO: 4.11 10E12/L (ref 3.8–5.2)
RBC INCLUSIONS BLD: SLIGHT
WBC # BLD AUTO: 15.7 10E9/L (ref 4–11)

## 2018-11-14 PROCEDURE — 85025 COMPLETE CBC W/AUTO DIFF WBC: CPT | Performed by: INTERNAL MEDICINE

## 2018-11-14 PROCEDURE — 25000128 H RX IP 250 OP 636: Performed by: INTERNAL MEDICINE

## 2018-11-14 PROCEDURE — 96367 TX/PROPH/DG ADDL SEQ IV INF: CPT

## 2018-11-14 PROCEDURE — G0463 HOSPITAL OUTPT CLINIC VISIT: HCPCS | Mod: 25

## 2018-11-14 PROCEDURE — 99215 OFFICE O/P EST HI 40 MIN: CPT | Performed by: INTERNAL MEDICINE

## 2018-11-14 PROCEDURE — 96411 CHEMO IV PUSH ADDL DRUG: CPT

## 2018-11-14 PROCEDURE — 96375 TX/PRO/DX INJ NEW DRUG ADDON: CPT

## 2018-11-14 PROCEDURE — 96377 APPLICATON ON-BODY INJECTOR: CPT | Mod: XS

## 2018-11-14 PROCEDURE — 96413 CHEMO IV INFUSION 1 HR: CPT

## 2018-11-14 RX ORDER — PALONOSETRON 0.05 MG/ML
0.25 INJECTION, SOLUTION INTRAVENOUS ONCE
Status: CANCELLED
Start: 2018-11-14

## 2018-11-14 RX ORDER — HEPARIN SODIUM (PORCINE) LOCK FLUSH IV SOLN 100 UNIT/ML 100 UNIT/ML
5 SOLUTION INTRAVENOUS ONCE
Status: COMPLETED | OUTPATIENT
Start: 2018-11-14 | End: 2018-11-14

## 2018-11-14 RX ORDER — ALBUTEROL SULFATE 90 UG/1
1-2 AEROSOL, METERED RESPIRATORY (INHALATION)
Status: CANCELLED
Start: 2018-11-14

## 2018-11-14 RX ORDER — ALBUTEROL SULFATE 0.83 MG/ML
2.5 SOLUTION RESPIRATORY (INHALATION)
Status: CANCELLED | OUTPATIENT
Start: 2018-11-14

## 2018-11-14 RX ORDER — EPINEPHRINE 0.3 MG/.3ML
0.3 INJECTION SUBCUTANEOUS EVERY 5 MIN PRN
Status: CANCELLED | OUTPATIENT
Start: 2018-11-14

## 2018-11-14 RX ORDER — DOXORUBICIN HYDROCHLORIDE 2 MG/ML
60 INJECTION, SOLUTION INTRAVENOUS ONCE
Status: CANCELLED | OUTPATIENT
Start: 2018-11-14

## 2018-11-14 RX ORDER — METHYLPREDNISOLONE SODIUM SUCCINATE 125 MG/2ML
125 INJECTION, POWDER, LYOPHILIZED, FOR SOLUTION INTRAMUSCULAR; INTRAVENOUS
Status: CANCELLED
Start: 2018-11-14

## 2018-11-14 RX ORDER — SODIUM CHLORIDE 9 MG/ML
1000 INJECTION, SOLUTION INTRAVENOUS CONTINUOUS PRN
Status: CANCELLED
Start: 2018-11-14

## 2018-11-14 RX ORDER — DOCOSANOL 100 MG/G
CREAM TOPICAL
Qty: 1 G | Refills: 0 | Status: SHIPPED | OUTPATIENT
Start: 2018-11-14 | End: 2019-02-13

## 2018-11-14 RX ORDER — DOXORUBICIN HYDROCHLORIDE 2 MG/ML
120 INJECTION, SOLUTION INTRAVENOUS ONCE
Status: COMPLETED | OUTPATIENT
Start: 2018-11-14 | End: 2018-11-14

## 2018-11-14 RX ORDER — HEPARIN SODIUM (PORCINE) LOCK FLUSH IV SOLN 100 UNIT/ML 100 UNIT/ML
5 SOLUTION INTRAVENOUS ONCE
Status: CANCELLED
Start: 2018-11-14 | End: 2018-11-14

## 2018-11-14 RX ORDER — EPINEPHRINE 1 MG/ML
0.3 INJECTION, SOLUTION INTRAMUSCULAR; SUBCUTANEOUS EVERY 5 MIN PRN
Status: CANCELLED | OUTPATIENT
Start: 2018-11-14

## 2018-11-14 RX ORDER — DIPHENHYDRAMINE HYDROCHLORIDE 50 MG/ML
50 INJECTION INTRAMUSCULAR; INTRAVENOUS
Status: CANCELLED
Start: 2018-11-14

## 2018-11-14 RX ORDER — MEPERIDINE HYDROCHLORIDE 25 MG/ML
25 INJECTION INTRAMUSCULAR; INTRAVENOUS; SUBCUTANEOUS EVERY 30 MIN PRN
Status: CANCELLED | OUTPATIENT
Start: 2018-11-14

## 2018-11-14 RX ORDER — PALONOSETRON 0.05 MG/ML
0.25 INJECTION, SOLUTION INTRAVENOUS ONCE
Status: COMPLETED | OUTPATIENT
Start: 2018-11-14 | End: 2018-11-14

## 2018-11-14 RX ORDER — LORAZEPAM 2 MG/ML
0.5 INJECTION INTRAMUSCULAR EVERY 4 HOURS PRN
Status: CANCELLED
Start: 2018-11-14

## 2018-11-14 RX ADMIN — SODIUM CHLORIDE, PRESERVATIVE FREE 5 ML: 5 INJECTION INTRAVENOUS at 12:58

## 2018-11-14 RX ADMIN — SODIUM CHLORIDE 500 ML: 9 INJECTION, SOLUTION INTRAVENOUS at 11:05

## 2018-11-14 RX ADMIN — PALONOSETRON HYDROCHLORIDE 0.25 MG: 0.25 INJECTION INTRAVENOUS at 11:05

## 2018-11-14 RX ADMIN — DOXORUBICIN HYDROCHLORIDE 120 MG: 2 INJECTION, SOLUTION INTRAVENOUS at 12:02

## 2018-11-14 RX ADMIN — FAMOTIDINE 20 MG: 20 INJECTION, SOLUTION INTRAVENOUS at 11:08

## 2018-11-14 RX ADMIN — PEGFILGRASTIM 6 MG: KIT SUBCUTANEOUS at 11:53

## 2018-11-14 RX ADMIN — DEXAMETHASONE SODIUM PHOSPHATE: 10 INJECTION, SOLUTION INTRAMUSCULAR; INTRAVENOUS at 11:30

## 2018-11-14 RX ADMIN — CYCLOPHOSPHAMIDE 1200 MG: 1 INJECTION, POWDER, FOR SOLUTION INTRAVENOUS; ORAL at 12:16

## 2018-11-14 ASSESSMENT — PAIN SCALES - GENERAL: PAINLEVEL: NO PAIN (0)

## 2018-11-14 NOTE — PROGRESS NOTES
Infusion Nursing Note:  Luci Londono presents today for C2D1 Adriamycin/Cytoxan.    Patient seen by provider today: No   present during visit today: Not Applicable.    Note: pt states some mild nausea for couple days following cycle 1. Also having some mild gi upset that is not exactly nausea. She reports she is taking her Prilosec.   States she feels that she is getting adequate amounts of po food/fluids.   She noted this am that she has a sore on the left outer corner of upper lip. Requesting Rx for this     May be added to Dr Murillo schedule for today    Intravenous Access:  Implanted Port.    Treatment Conditions:  Lab Results   Component Value Date    HGB 10.8 11/14/2018     Lab Results   Component Value Date    WBC 15.7 11/14/2018      Lab Results   Component Value Date    ANEU 9.6 11/14/2018     Lab Results   Component Value Date     11/14/2018      Results reviewed, labs MET treatment parameters, ok to proceed with treatment.  ECHO/MUGA completed 10/23  EF 55-60%.    ONPRO  Was placed on patient's: back of left arm.    Was placed at 12 PM    ONPRO injector device Lot number: [id number]    Patient education included: what patient can expect after application, what colored lights mean on the device, when to remove device, when and where to call with questions or issues and all patients questions answered.    Patient tolerated administration well.      Post Infusion Assessment:  Patient tolerated infusion without incident.  Blood return noted pre and post infusion.  Blood return noted during administration every 5 cc.  Site patent and intact, free from redness, edema or discomfort.  No evidence of extravasations.  Access discontinued per protocol.    Discharge Plan:   Discharge instructions reviewed with: Patient.  Patient and/or family verbalized understanding of discharge instructions and all questions answered.  Copy of AVS reviewed with patient and/or family.  Patient will return as  scheduled for next appointment.  Patient discharged in stable condition accompanied by: self.  Departure Mode: Ambulatory.    Amie Manuel RN

## 2018-11-14 NOTE — PROGRESS NOTES
"Oncology Rooming Note    November 14, 2018 9:50 AM   Luci Londono is a 47 year old female who presents for:    Chief Complaint   Patient presents with     Oncology Clinic Visit     Malignant neoplasm of upper-outer quadrant of right breast in female, estrogen receptor negative (H)     Initial Vitals: BP (!) 139/92  Pulse 58  Temp 98.2  F (36.8  C) (Oral)  Resp 16  Ht 1.6 m (5' 2.99\")  Wt 87.9 kg (193 lb 12.8 oz)  SpO2 100%  BMI 34.34 kg/m2 Estimated body mass index is 34.34 kg/(m^2) as calculated from the following:    Height as of this encounter: 1.6 m (5' 2.99\").    Weight as of this encounter: 87.9 kg (193 lb 12.8 oz). Body surface area is 1.98 meters squared.  No Pain (0) Comment: Data Unavailable   No LMP recorded.  Allergies reviewed: Yes  Medications reviewed: Yes    Medications: Medication refills not needed today.  Pharmacy name entered into [facility]:    [id number] - [address] AT [address] [id number] [address] AT [address]    Clinical concerns: NO    5 minutes for nursing intake (face to face time)        Emerald Pascual MA              "

## 2018-11-14 NOTE — MR AVS SNAPSHOT
After Visit Summary   11/14/2018    Luci Londono    MRN: [id number]           Patient Information     Date Of Birth          1970        Visit Information        Provider Department      11/14/2018 5:04 PM Mirtha Toribio LICSW [facility]        Today's Diagnoses     Counseling NOS(V65.40)    -  1       Follow-ups after your visit        Your next 10 appointments already scheduled     Nov 28, 2018 11:00 AM CST   Level 3 with  INFUSION CHAIR 7   [facility] ([facility])    [place] [id number]  [facility] [address]   [phone]            Nov 28, 2018 12:00 PM CST   Return Visit with Addie Murillo MD   [facility] ([facility])    [facility]  [address]  [facility] [address]   [phone]            Dec 18, 2018 11:00 AM CST   Ech Complete with [id number]   [facility] CV Echocardiography (Cardiovascular Imaging at [facility])    [address]  [facility] [address] [phone]           1.  Please bring or wear a comfortable two-piece outfit. 2.  You may eat, drink and take your normal medicines. 3.  For any questions that cannot be answered, please contact the ordering physician 4.  Please do not wear perfumes or scented lotions on the day of your exam.            Dec 18, 2018  2:30 PM CST   Return Visit with Elisa Pemberton PA-C   [facility] ([facility])    [address]  [facility] [address] [phone] OPT 2            Jan 17, 2019 11:15 AM CST   New Visit with Neida Fortune GC   Cancer Risk Management Program ([facility])    [facility]  [address]  [facility] [address] [phone]              Who to contact     If you have questions or need follow up  information about today's clinic visit or your schedule please contact [facility] directly at [phone].  Normal or non-critical lab and imaging results will be communicated to you by MyChart, letter or phone within 4 business days after the clinic has received the results. If you do not hear from us within 7 days, please contact the clinic through MyChart or phone. If you have a critical or abnormal lab result, we will notify you by phone as soon as possible.  Submit refill requests through [employer] or call your pharmacy and they will forward the refill request to us. Please allow 3 business days for your refill to be completed.          Additional Information About Your Visit        Care EveryWhere ID     This is your Care EveryWhere ID. This could be used by other organizations to access your [facility] medical records  [id number]         Blood Pressure from Last 3 Encounters:   11/14/18 (!) 139/92   11/14/18 130/77   10/31/18 126/87    Weight from Last 3 Encounters:   11/14/18 87.9 kg (193 lb 12.8 oz)   11/14/18 87.9 kg (193 lb 12.8 oz)   10/31/18 87.5 kg (193 lb)              Today, you had the following     No orders found for display         Today's Medication Changes          These changes are accurate as of 11/14/18 11:59 PM.  If you have any questions, ask your nurse or doctor.               Start taking these medicines.        Dose/Directions    docosanol 10 % Crea cream   Commonly known as:  ABREVA   Used for:  Stomatitis and mucositis   Started by:  Addie Murillo MD        Apply topically 5 times daily   Quantity:  1 g   Refills:  0            Where to get your medicines      These medications were sent to [facility] - [address]  [address], [place] [address]     Phone:  [phone]     docosanol 10 % Crea cream                Primary Care Provider Office Phone # Fax #    Stuart Wills [phone]  [facility] [address]        Equal Access to Services     EMERSONMARILYN MIRIAM : Hadii aad kati claudinedenise Solenardali, waeleanorda luqgaelha, qaguanakitota kaernestojulianne vasquezteddyjulianne, josh avalostoojoelle bangura. So [facility] [phone].    ATENCIÓN: Si habla español, tiene a dominique disposición servicios gratuitos de asistencia lingüística. Llame al [phone].    We comply with applicable federal civil rights laws and [place] laws. We do not discriminate on the basis of race, color, national origin, age, disability, sex, sexual orientation, or gender identity.            Thank you!     Thank you for choosing [facility]  for your care. Our goal is always to provide you with excellent care. Hearing back from our patients is one way we can continue to improve our services. Please take a few minutes to complete the written survey that you may receive in the mail after your visit with us. Thank you!             Your Updated Medication List - Protect others around you: Learn how to safely use, store and throw away your medicines at www.disposemymeds.org.          This list is accurate as of 11/14/18 11:59 PM.  Always use your most recent med list.                   Brand Name Dispense Instructions for use Diagnosis    ascorbic acid 1000 MG Tabs    vitamin C     Take 1,000 mg by mouth daily        dexamethasone 4 MG tablet    DECADRON    40 tablet    Take 2 tablets (8 mg) by mouth daily for 3 days Start on Day 2 of Cycles 1 through 4.    Malignant neoplasm of upper-outer quadrant of right breast in female, estrogen receptor negative (H)       docosanol 10 % Crea cream    ABREVA    1 g    Apply topically 5 times daily    Stomatitis and mucositis       HYDROcodone-acetaminophen 5-325 MG per tablet    NORCO    15 tablet    Take 1-2 tablets by mouth every 4 hours as needed for moderate to severe pain    Malignant neoplasm of upper-outer quadrant of right breast in female, estrogen receptor negative (H)       levothyroxine  125 MCG tablet    SYNTHROID/LEVOTHROID    30 tablet    TAKE 1 TABLET BY MOUTH DAILY    Acquired hypothyroidism       * lidocaine-prilocaine cream    EMLA    30 g    Apply quarter-size amount of Emla cream topically over port site one hour prior to port access for comfort.    Malignant neoplasm of upper-outer quadrant of right breast in female, estrogen receptor negative (H)       * lidocaine-prilocaine cream    EMLA    30 g    Apply topically as needed for moderate pain Apply to port site 1 hour prior to accessing port    Malignant neoplasm of upper-outer quadrant of right breast in female, estrogen receptor negative (H)       loratadine 10 MG tablet    CLARITIN    90 tablet    Take 1 tablet (10 mg) by mouth daily    Malignant neoplasm of upper-outer quadrant of right breast in female, estrogen receptor negative (H)       LORazepam 0.5 MG tablet    ATIVAN    30 tablet    Take 1 tablet (0.5 mg) by mouth every 4 hours as needed (Anxiety, Nausea/Vomiting or Sleep)    Malignant neoplasm of upper-outer quadrant of right breast in female, estrogen receptor negative (H)       medroxyPROGESTERone 10 MG tablet    PROVERA    90 tablet    Take 1 tablet (10 mg) by mouth daily    Absence of menstruation       metFORMIN 500 MG 24 hr tablet    GLUCOPHAGE-XR    30 tablet    Take 1 tablet (500 mg) by mouth daily (with dinner)    Non morbid obesity due to excess calories, Hyperglycemia       multivitamin, therapeutic with minerals Tabs tablet      Take 1 tablet by mouth daily        omeprazole 20 MG CR capsule    priLOSEC    60 capsule    Take 1 capsule (20 mg) by mouth 2 times daily    Gastroesophageal reflux disease without esophagitis       ondansetron 4 MG ODT tab    ZOFRAN-ODT    10 tablet    Take 1-2 tablets (4-8 mg) by mouth every 8 hours as needed for nausea    Malignant neoplasm of upper-outer quadrant of right breast in female, estrogen receptor negative (H)       prochlorperazine 10 MG tablet    COMPAZINE    30 tablet     Take 1 tablet (10 mg) by mouth every 6 hours as needed (Nausea/Vomiting)    Malignant neoplasm of upper-outer quadrant of right breast in female, estrogen receptor negative (H)       senna-docusate 8.6-50 MG per tablet    SENOKOT-S;PERICOLACE    30 tablet    Take 1-2 tablets by mouth 2 times daily    Malignant neoplasm of upper-outer quadrant of right breast in female, estrogen receptor negative (H)       * Notice:  This list has 2 medication(s) that are the same as other medications prescribed for you. Read the directions carefully, and ask your doctor or other care provider to review them with you.

## 2018-11-14 NOTE — LETTER
"    11/14/2018         RE: Luci Londono  [address]  [facility] [address]        Dear Colleague,    Thank you for referring your patient, Luci Londono, to the [facility]. Please see a copy of my visit note below.    Oncology Rooming Note    November 14, 2018 9:50 AM   Luci Londono is a 47 year old female who presents for:    Chief Complaint   Patient presents with     Oncology Clinic Visit     Malignant neoplasm of upper-outer quadrant of right breast in female, estrogen receptor negative (H)     Initial Vitals: BP (!) 139/92  Pulse 58  Temp 98.2  F (36.8  C) (Oral)  Resp 16  Ht 1.6 m (5' 2.99\")  Wt 87.9 kg (193 lb 12.8 oz)  SpO2 100%  BMI 34.34 kg/m2 Estimated body mass index is 34.34 kg/(m^2) as calculated from the following:    Height as of this encounter: 1.6 m (5' 2.99\").    Weight as of this encounter: 87.9 kg (193 lb 12.8 oz). Body surface area is 1.98 meters squared.  No Pain (0) Comment: Data Unavailable   No LMP recorded.  Allergies reviewed: Yes  Medications reviewed: Yes    Medications: Medication refills not needed today.  Pharmacy name entered into [facility]:    [facility] [address] [place], [place] - [address] AT [address] [place] [address] & [address] [facility] [address] [place], [place] - [address] [place] [address] AT [address]    Clinical concerns: NO    5 minutes for nursing intake (face to face time)        Emerald Pascual MA                ONCOLOGY FOLLOW [facility] VISIT    breast surgeon:  Dr. Stacey Ashford,   REASON FOR visit:  10/2018 dx right breast TN IDC, cT1cN1 disease on neoadjuvant chemo with DD AC    HISTORY OF PRESENT ILLNESS:    At age 47 amenorrhea with polycystic ovaries,  She felt a lump in her right breast in early October, 2018.   Her mammogram revealed a small mass in the right upper outer breast,   US revealed an 8mm hypoechoic mass at 12:00, 6cm FN and axillary US revealed a concerning lymph node which was also " "biopsied.   Her pathology from both breast and LN revealed a grade 3, invasive ductal carcinoma, ER/AL/her 2-.   PET found no distal disease.     She made informed decision to proceed with neoadjuvant DD AC  She reports she does daily self breast exams and noticed the lump three days prior to her mammogram. She reports some new right shoulder pain and low back pain which is chronic.      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     MEDICATIONS/ALLERY:  Reviewed in Epic system.      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 Deny ETOH/smoking       FAMILY HISTORY:    Negative for any type of cancers    REVIEW OF SYSTEMS:   She has bone pain, stomatitis, and fatigue         PHYSICAL EXAMINATION:   VITAL SIGNS: Blood pressure (!) 139/92, pulse 58, temperature 98.2  F (36.8  C), temperature source Oral, resp. rate 16, height 1.6 m (5' 2.99\"), weight 87.9 kg (193 lb 12.8 oz), SpO2 100 %, not currently breastfeeding.      ECOG 0    GENERAL APPEARANCE:  looks like her stated age, very pleasant, not in acute distress.   HEENT: The patient is normocephalic, atraumatic. Pupils are equally reactive to light.  Sclerae are anicteric.  Moist oral mucosa.  Negative pharynx.  No oral thrush. Stomatitis on left angular of mouth.    NECK:  Supple.  No jugular venous distention.  Thyroid is not palpable.   LYMPH NODES:  Superficial lymphadenopathy is not appreciable in the bilateral cervical, supraclavicular, axillary or inguinal areas.   CARDIOVASCULAR:  S1, S2 regular with no murmurs or gallops.  No carotid " or abdominal bruits.   PULMONARY:  Lungs are clear to auscultation and percussion bilaterally.  There is no wheezing or rhonchi.   GASTROINTESTINAL:  Abdomen is soft, nontender.  No hepatosplenomegaly.  No signs of ascites.  No mass appreciable.   MUSCULOSKELETAL/EXTREMITIES:  No edema.  No cyanotic changes.  No signs of joint deformity.  No lymphedema.   NEUROLOGIC:  Cranial nerves II-XII are grossly intact.  Sensation intact.  Muscle strength and muscle tone symmetrical, 5/5 throughout.   BACK:  No spinal or paraspinal tenderness.  No CVA tenderness.   SKIN:  No petechiae.  No rash.  No signs of cellulitis.   BREASTS: Right breast with mild ecchymosis on upper breast, no longer palpable 1.5cm mass at 12:00 post C1. No other masses.  Left breast is symmetrical with no skin or nipple changes. No masses on the left. Tissue is very dense throughout.          CURRENT LAB DATA REVIEWED  10/2018  Right breast 8mm hypoechoic mass at 12:00, 6cm FN and right axillary LN biopsy both found grade 3, invasive ductal carcinoma, ER/MD/her 2-.     Baseline cbc diff/CMP are fine.         CURRENT IMAGING REVIEWED  Baseline pre tx breast MRI 10/2018  In the right breast at 12:00 7 cm from the nipple, there is irregular heterogeneously enhancing mass, corresponding with the known biopsy-proven malignancy, which spans approximately 2.2 cm in maximal diameter, best appreciated on sagittal view, with surrounding nonmasslike enhancement likely related to postbiopsy change or DCIS.  Taken with the primary tumor, the entire span extends up to 3.8 cm.   There are multiple enlarged right axillary lymph nodes.      PET 10/2018  1. No evidence of distant metastasis.  2. Hypermetabolic focus in the upper outer quadrant right breast representing primary tumor. Hypermetabolic right axillary lymph nodes, consistent with metastatic node.  3. Indeterminate sub-4 mm pulmonary nodules, likely fissural lymph node in the right lung.  4. Extensive FDG  uptake by the brown fat in the neck and paraspinal region.    10/2018 dx MA -  revealed a small mass in the right upper outer breast, US revealed an 8mm hypoechoic mass at 12:00, 6cm FN and axillary US revealed a concerning lymph node        OLD DATA REVIEWED TODAY WITH SUMMARY:   Prior MA 2017, 2014 - negative.           ASSESSMENT AND PLAN:    1.  dx cT1cN1 right breast high grade IDC, TN at age 47 in 10/2018.     PET is negative for distal disease.      We discussed the side effects of chemo include not limit to N/V/hair loss/lower immunity/cardiac toxicity/rare leukemia/infusion related reaction and mortality.   She made informed decision to proceed DD AC -- taxol + carbo C1D1 10/24/2018.     She needs to be monitored closely during this intense tx.     2. Young age dx with TN breast cancer, she will need genetic counseling.   Result will direct us on the platinum use.     3. Stomatitis - advice abreva cream topical.     4. Bone pain from neulasta - can try claritin.     5. Nausea without vormiting - we discuss in detail the dex use, and prn anti emetic use.               Again, thank you for allowing me to participate in the care of your patient.        Sincerely,        Addie Murillo MD, MD

## 2018-11-14 NOTE — MR AVS SNAPSHOT
After Visit Summary   11/14/2018    Luci Londono    MRN: [id number]           Patient Information     Date Of Birth          1970        Visit Information        Provider Department      11/14/2018 12:00 PM Addie Murillo MD [facility] Cancer [facility]        Today's Diagnoses     Malignant neoplasm of upper-outer quadrant of right breast in female, estrogen receptor negative (H)    -  1    Stomatitis and mucositis        Bone pain        Nausea          Care Instructions    C2 today, f/u C3.  Genetic referral for early stage breast cancer      Patient is in [facility]          Follow-ups after your visit        Your next 10 appointments already scheduled     Nov 28, 2018 11:00 AM CST   Level 3 with  INFUSION CHAIR 7   [facility] and Infusion Center ([facility])    [facility] [employer]  [address]  [facility] [address]   [phone]            Nov 28, 2018 12:00 PM CST   Return Visit with Addie Murillo MD   [facility] ([facility])    [facility] [employer]  [address]  [facility] [address]   [phone]            Dec 18, 2018 11:00 AM CST   Ech Complete with SHCVECHR3   [facility] CV Echocardiography (Cardiovascular Imaging at [facility])    [address]  [facility] [address] [phone]           1.  Please bring or wear a comfortable two-piece outfit. 2.  You may eat, drink and take your normal medicines. 3.  For any questions that cannot be answered, please contact the ordering physician 4.  Please do not wear perfumes or scented lotions on the day of your exam.            Dec 18, 2018  2:30 PM CST   Return Visit with Elisa Pemberton PA-C   [facility] ([facility])    [address]  [facility] [address] [phone] OPT 2            Jan 17, 2019 11:15 AM CST  "  New Visit with Neida Fortune GC   Cancer Risk Management Program ([facility])    [facility] [place]  [address]  [place] [address] [phone]              Who to contact     If you have questions or need follow up information about today's clinic visit or your schedule please contact [facility] CANCER [facility] directly at [phone].  Normal or non-critical lab and imaging results will be communicated to you by MyChart, letter or phone within 4 business days after the clinic has received the results. If you do not hear from us within 7 days, please contact the clinic through MyChart or phone. If you have a critical or abnormal lab result, we will notify you by phone as soon as possible.  Submit refill requests through [employer] or call your pharmacy and they will forward the refill request to us. Please allow 3 business days for your refill to be completed.          Additional Information About Your Visit        Care EveryWhere ID     This is your Care EveryWhere ID. This could be used by other organizations to access your [place] medical records  [id number]        Your Vitals Were     Pulse Temperature Respirations Height Pulse Oximetry BMI (Body Mass Index)    58 98.2  F (36.8  C) (Oral) 16 1.6 m (5' 2.99\") 100% 34.34 kg/m2       Blood Pressure from Last 3 Encounters:   11/14/18 (!) 139/92   11/14/18 (!) 139/92   10/31/18 126/87    Weight from Last 3 Encounters:   11/14/18 87.9 kg (193 lb 12.8 oz)   11/14/18 87.9 kg (193 lb 12.8 oz)   10/31/18 87.5 kg (193 lb)              Today, you had the following     No orders found for display         Today's Medication Changes          These changes are accurate as of 11/14/18 12:07 PM.  If you have any questions, ask your nurse or doctor.               Start taking these medicines.        Dose/Directions    docosanol 10 % Crea cream   Commonly known as:  ABREVA   Used for:  Stomatitis and mucositis   Started by:  Addie Murillo " MD Shonda        Apply topically 5 times daily   Quantity:  1 g   Refills:  0            Where to get your medicines      These medications were sent to [facility] [place], [place] - [address]  [address] [place] [address]     Phone:  [phone]     docosanol 10 % Crea cream                Primary Care Provider Office Phone # Fax #    Stuart Charley Wills [phone] [address]  [facility] [address]        Equal Access to Services     [facility]: Hadii aad ku hadasho Soomaali, waaxda luqadaha, qaybta kaalmada adeegyada, waxblaze neumann . So [facility] [phone].    ATENCIÓN: Si habla español, tiene a dominique disposición servicios gratuitos de asistencia lingüística. [facility] [phone].    We comply with applicable federal civil rights laws and [place] laws. We do not discriminate on the basis of race, color, national origin, age, disability, sex, sexual orientation, or gender identity.            Thank you!     Thank you for choosing [facility]  for your care. Our goal is always to provide you with excellent care. Hearing back from our patients is one way we can continue to improve our services. Please take a few minutes to complete the written survey that you may receive in the mail after your visit with us. Thank you!             Your Updated Medication List - Protect others around you: Learn how to safely use, store and throw away your medicines at www.disposemymeds.org.          This list is accurate as of 11/14/18 12:07 PM.  Always use your most recent med list.                   Brand Name Dispense Instructions for use Diagnosis    ascorbic acid 1000 MG Tabs    vitamin C     Take 1,000 mg by mouth daily        dexamethasone 4 MG tablet    DECADRON    40 tablet    Take 2 tablets (8 mg) by mouth daily for 3 days Start on Day 2 of Cycles 1 through 4.    Malignant neoplasm of upper-outer quadrant of right  breast in female, estrogen receptor negative (H)       docosanol 10 % Crea cream    ABREVA    1 g    Apply topically 5 times daily    Stomatitis and mucositis       HYDROcodone-acetaminophen 5-325 MG per tablet    NORCO    15 tablet    Take 1-2 tablets by mouth every 4 hours as needed for moderate to severe pain    Malignant neoplasm of upper-outer quadrant of right breast in female, estrogen receptor negative (H)       levothyroxine 125 MCG tablet    SYNTHROID/LEVOTHROID    30 tablet    TAKE 1 TABLET BY MOUTH DAILY    Acquired hypothyroidism       * lidocaine-prilocaine cream    EMLA    30 g    Apply quarter-size amount of Emla cream topically over port site one hour prior to port access for comfort.    Malignant neoplasm of upper-outer quadrant of right breast in female, estrogen receptor negative (H)       * lidocaine-prilocaine cream    EMLA    30 g    Apply topically as needed for moderate pain Apply to port site 1 hour prior to accessing port    Malignant neoplasm of upper-outer quadrant of right breast in female, estrogen receptor negative (H)       loratadine 10 MG tablet    CLARITIN    90 tablet    Take 1 tablet (10 mg) by mouth daily    Malignant neoplasm of upper-outer quadrant of right breast in female, estrogen receptor negative (H)       LORazepam 0.5 MG tablet    ATIVAN    30 tablet    Take 1 tablet (0.5 mg) by mouth every 4 hours as needed (Anxiety, Nausea/Vomiting or Sleep)    Malignant neoplasm of upper-outer quadrant of right breast in female, estrogen receptor negative (H)       medroxyPROGESTERone 10 MG tablet    PROVERA    90 tablet    Take 1 tablet (10 mg) by mouth daily    Absence of menstruation       metFORMIN 500 MG 24 hr tablet    GLUCOPHAGE-XR    30 tablet    Take 1 tablet (500 mg) by mouth daily (with dinner)    Non morbid obesity due to excess calories, Hyperglycemia       multivitamin, therapeutic with minerals Tabs tablet      Take 1 tablet by mouth daily        omeprazole 20 MG CR  capsule    priLOSEC    60 capsule    Take 1 capsule (20 mg) by mouth 2 times daily    Gastroesophageal reflux disease without esophagitis       ondansetron 4 MG ODT tab    ZOFRAN-ODT    10 tablet    Take 1-2 tablets (4-8 mg) by mouth every 8 hours as needed for nausea    Malignant neoplasm of upper-outer quadrant of right breast in female, estrogen receptor negative (H)       prochlorperazine 10 MG tablet    COMPAZINE    30 tablet    Take 1 tablet (10 mg) by mouth every 6 hours as needed (Nausea/Vomiting)    Malignant neoplasm of upper-outer quadrant of right breast in female, estrogen receptor negative (H)       senna-docusate 8.6-50 MG per tablet    SENOKOT-S;PERICOLACE    30 tablet    Take 1-2 tablets by mouth 2 times daily    Malignant neoplasm of upper-outer quadrant of right breast in female, estrogen receptor negative (H)       * Notice:  This list has 2 medication(s) that are the same as other medications prescribed for you. Read the directions carefully, and ask your doctor or other care provider to review them with you.

## 2018-11-14 NOTE — PATIENT INSTRUCTIONS
Your On-body Neulasta Injector was applied to your left arm at 12:00.  At approximately 3:00pm on Thur 11/15, your On-body Injector will beep to let you know your dose delivery will begin in 2 minutes.  Your medication will be delivered over the next 45 minutes.  You can remove your Injector at 4:15pm.  Please make sure your Injector has a solid green light or has turned off prior to removing the device.  Please contact your provider at [phone] with questions or concerns.

## 2018-11-14 NOTE — PATIENT INSTRUCTIONS
C2 today, f/u C3.  Scheduled/eyal   Genetic referral for early stage breast cancer  Scheduled/eyal     Patient is in [facility]    AVS printed & given to patient/eyal

## 2018-11-14 NOTE — MR AVS SNAPSHOT
After Visit Summary   11/14/2018    Luci Londono    MRN: [id number]           Patient Information     Date Of Birth          1970        Visit Information        Provider Department      11/14/2018 9:00 AM  INFUSION CHAIR 18 [facility] and Infusion Center        Today's Diagnoses     Malignant neoplasm of upper-outer quadrant of right breast in female, estrogen receptor negative (H)    -  1      Care Instructions    Your On-body Neulasta Injector was applied to your left arm at 12:00.  At approximately 3:00pm on Thur 11/15, your On-body Injector will beep to let you know your dose delivery will begin in 2 minutes.  Your medication will be delivered over the next 45 minutes.  You can remove your Injector at 4:15pm.  Please make sure your Injector has a solid green light or has turned off prior to removing the device.  Please contact your provider at [phone] with questions or concerns.              Follow-ups after your visit        Your next 10 appointments already scheduled     Nov 28, 2018 11:00 AM CST   Level 3 with  INFUSION CHAIR 7   [facility]fusion [facility] ([facility])    [facility]  [address]  [facility] [address]   [phone]            Nov 28, 2018 12:00 PM CST   Return Visit with Addie Murillo MD   [facility] ([facility])    [facility]  [address]  [facility] [address]   [phone]            Dec 18, 2018 11:00 AM CST   Ech Complete with SHCVECHR3   [facility] CV Echocardiography (Cardiovascular Imaging at [facility])    [address] [place] [address] [phone]           1.  Please bring or wear a comfortable two-piece outfit. 2.  You may eat, drink and take your normal medicines. 3.  For any questions that cannot be answered, please contact the ordering physician 4.   "Please do not wear perfumes or scented lotions on the day of your exam.            Dec 18, 2018  2:30 PM CST   Return Visit with Elisa Pemberton PA-C   [facility] ([facility])    [address]  [place] [address] [phone] OPT 2            Jan 17, 2019 11:15 AM CST   New Visit with Neida Fortune GC   Cancer Risk Management Program ([facility])    [facility] [place]  [address]  [place] [address] [phone]              Who to contact     If you have questions or need follow up information about today's clinic visit or your schedule please contact [facility] CANCER CLINIC AND INFUSION CENTER directly at [phone].  Normal or non-critical lab and imaging results will be communicated to you by MyChart, letter or phone within 4 business days after the clinic has received the results. If you do not hear from us within 7 days, please contact the clinic through MyChart or phone. If you have a critical or abnormal lab result, we will notify you by phone as soon as possible.  Submit refill requests through [email or web address] or call your pharmacy and they will forward the refill request to us. Please allow 3 business days for your refill to be completed.          Additional Information About Your Visit        Care EveryWhere ID     This is your Care EveryWhere ID. This could be used by other organizations to access your [place] medical records  [id number]        Your Vitals Were     Pulse Temperature Respirations Height Pulse Oximetry BMI (Body Mass Index)    58 98.2  F (36.8  C) (Oral) 16 1.6 m (5' 2.99\") 100% 34.34 kg/m2       Blood Pressure from Last 3 Encounters:   11/14/18 (!) 139/92   11/14/18 (!) 139/92   10/31/18 126/87    Weight from Last 3 Encounters:   11/14/18 87.9 kg (193 lb 12.8 oz)   11/14/18 87.9 kg (193 lb 12.8 oz)   10/31/18 87.5 kg (193 lb)              We Performed the Following     CBC " with platelets differential     Nursing Communication 1          Today's Medication Changes          These changes are accurate as of 11/14/18 12:50 PM.  If you have any questions, ask your nurse or doctor.               Start taking these medicines.        Dose/Directions    docosanol 10 % Crea cream   Commonly known as:  ABREVA   Used for:  Stomatitis and mucositis   Started by:  Addie Murillo MD        Apply topically 5 times daily   Quantity:  1 g   Refills:  0            Where to get your medicines      These medications were sent to [facility] [place], [place] - [address]  [address] [facility] [[email or web address] [address], [place] [address]     Phone:  [phone]     docosanol 10 % Crea cream                Primary Care Provider Office Phone # Fax #    Stuart Charley Wills [phone] [address]  [facility] [address]        Equal Access to Services     [facility]: Hadii aad ku hadasho Soomaali, waaxda luqadaha, qaybta kaalmada adeegyada, waxay reina haysaln pedro neumnan . So [facility] [phone].    ATENCIÓN: Si habla español, tiene a dominique disposición servicios gratuitos de asistencia lingüística. Llame al [phone].    We comply with applicable federal civil rights laws and [place] laws. We do not discriminate on the basis of race, color, national origin, age, disability, sex, sexual orientation, or gender identity.            Thank you!     Thank you for choosing [facility] CANCER CLINIC AND INFUSION CENTER  for your care. Our goal is always to provide you with excellent care. Hearing back from our patients is one way we can continue to improve our services. Please take a few minutes to complete the written survey that you may receive in the mail after your visit with us. Thank you!             Your Updated Medication List - Protect others around you: Learn how to safely use, store and throw away your medicines at www.disposemymeds.org.          This list is accurate as  of 11/14/18 12:50 PM.  Always use your most recent med list.                   Brand Name Dispense Instructions for use Diagnosis    ascorbic acid 1000 MG Tabs    vitamin C     Take 1,000 mg by mouth daily        dexamethasone 4 MG tablet    DECADRON    40 tablet    Take 2 tablets (8 mg) by mouth daily for 3 days Start on Day 2 of Cycles 1 through 4.    Malignant neoplasm of upper-outer quadrant of right breast in female, estrogen receptor negative (H)       docosanol 10 % Crea cream    ABREVA    1 g    Apply topically 5 times daily    Stomatitis and mucositis       HYDROcodone-acetaminophen 5-325 MG per tablet    NORCO    15 tablet    Take 1-2 tablets by mouth every 4 hours as needed for moderate to severe pain    Malignant neoplasm of upper-outer quadrant of right breast in female, estrogen receptor negative (H)       levothyroxine 125 MCG tablet    SYNTHROID/LEVOTHROID    30 tablet    TAKE 1 TABLET BY MOUTH DAILY    Acquired hypothyroidism       * lidocaine-prilocaine cream    EMLA    30 g    Apply quarter-size amount of Emla cream topically over port site one hour prior to port access for comfort.    Malignant neoplasm of upper-outer quadrant of right breast in female, estrogen receptor negative (H)       * lidocaine-prilocaine cream    EMLA    30 g    Apply topically as needed for moderate pain Apply to port site 1 hour prior to accessing port    Malignant neoplasm of upper-outer quadrant of right breast in female, estrogen receptor negative (H)       loratadine 10 MG tablet    CLARITIN    90 tablet    Take 1 tablet (10 mg) by mouth daily    Malignant neoplasm of upper-outer quadrant of right breast in female, estrogen receptor negative (H)       LORazepam 0.5 MG tablet    ATIVAN    30 tablet    Take 1 tablet (0.5 mg) by mouth every 4 hours as needed (Anxiety, Nausea/Vomiting or Sleep)    Malignant neoplasm of upper-outer quadrant of right breast in female, estrogen receptor negative (H)        medroxyPROGESTERone 10 MG tablet    PROVERA    90 tablet    Take 1 tablet (10 mg) by mouth daily    Absence of menstruation       metFORMIN 500 MG 24 hr tablet    GLUCOPHAGE-XR    30 tablet    Take 1 tablet (500 mg) by mouth daily (with dinner)    Non morbid obesity due to excess calories, Hyperglycemia       multivitamin, therapeutic with minerals Tabs tablet      Take 1 tablet by mouth daily        omeprazole 20 MG CR capsule    priLOSEC    60 capsule    Take 1 capsule (20 mg) by mouth 2 times daily    Gastroesophageal reflux disease without esophagitis       ondansetron 4 MG ODT tab    ZOFRAN-ODT    10 tablet    Take 1-2 tablets (4-8 mg) by mouth every 8 hours as needed for nausea    Malignant neoplasm of upper-outer quadrant of right breast in female, estrogen receptor negative (H)       prochlorperazine 10 MG tablet    COMPAZINE    30 tablet    Take 1 tablet (10 mg) by mouth every 6 hours as needed (Nausea/Vomiting)    Malignant neoplasm of upper-outer quadrant of right breast in female, estrogen receptor negative (H)       senna-docusate 8.6-50 MG per tablet    SENOKOT-S;PERICOLACE    30 tablet    Take 1-2 tablets by mouth 2 times daily    Malignant neoplasm of upper-outer quadrant of right breast in female, estrogen receptor negative (H)       * Notice:  This list has 2 medication(s) that are the same as other medications prescribed for you. Read the directions carefully, and ask your doctor or other care provider to review them with you.

## 2018-11-14 NOTE — PROGRESS NOTES
Social Work Progress Note      Data/Intervention:  Patient Name:  Luci Londono  /Age:  1970 (47 year old)    Reason for Follow-Up:  Luci is a verenice 47-year-old woman with a new diagnosis of breast cancer who comes to clinic today for C2D1 Adriamycin/Cytoxan. This clinician met with Luci for routine psychosocial check-in and emotional support.     Intervention:   Luci reports today that she has been coping well with current treatment. Luci reports today that she understood after visit with Dr. Murillo that her cancer was already responding to treatment, and that tumor is smaller on palpation. Provided safe place for Luci to acknowledge sadness that has arisen in the context of recent hair loss. Validated and normalized feelings about change in appearance. Luci interested in wig support, extensive discussion surrounding differences in human hair and synthetic wigs. Luci reported that she is interested in having human hair wig, discussed resource of [employer] in community. Luci granted [employer] for [employer], reminded of support available through other local grants and resent email with additional information surrounding how to access these grants. Luci aware of how to reach out in case of psychosocial distress or need.      Plan:  1) Referral for [employer] faxed, Luci to follow-up to acquire human hair wig  2) This clinician will continue to follow for ongoing psychosocial support as pt continues to adjust to treatment and realize its impacts  3) This clinician will continue to collaborate with ongoing care team.     Please call or page if needs or concerns arise.     MIRA Julien, [facility]  Direct Phone: [phone]  Pager: [phone]

## 2018-11-14 NOTE — PROGRESS NOTES
"ONCOLOGY FOLLOW [facility] VISIT    breast surgeon:  Dr. Stacey Ashford,   REASON FOR visit:  10/2018 dx right breast TN IDC, cT1cN1 disease on neoadjuvant chemo with DD AC    HISTORY OF PRESENT ILLNESS:    At age 47 amenorrhea with polycystic ovaries,  She felt a lump in her right breast in early October, 2018.   Her mammogram revealed a small mass in the right upper outer breast,   US revealed an 8mm hypoechoic mass at 12:00, 6cm FN and axillary US revealed a concerning lymph node which was also biopsied.   Her pathology from both breast and LN revealed a grade 3, invasive ductal carcinoma, ER/CA/her 2-.   PET found no distal disease.     She made informed decision to proceed with neoadjuvant DD AC  She reports she does daily self breast exams and noticed the lump three days prior to her mammogram. She reports some new right shoulder pain and low back pain which is chronic.      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     MEDICATIONS/ALLERY:  Reviewed in Epic system.      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 Deny ETOH/smoking       FAMILY HISTORY:    Negative for any type of cancers    REVIEW OF SYSTEMS:   She has bone pain, stomatitis, and fatigue         PHYSICAL EXAMINATION:   VITAL SIGNS: Blood pressure (!) 139/92, pulse 58, temperature 98.2  F (36.8  C), temperature source Oral, resp. rate 16, height 1.6 m (5' 2.99\"), weight 87.9 kg (193 lb 12.8 oz), SpO2 100 %, not currently breastfeeding.      ECOG 0    GENERAL APPEARANCE:  looks like her stated age, very pleasant, not in acute " distress.   HEENT: The patient is normocephalic, atraumatic. Pupils are equally reactive to light.  Sclerae are anicteric.  Moist oral mucosa.  Negative pharynx.  No oral thrush. Stomatitis on left angular of mouth.    NECK:  Supple.  No jugular venous distention.  Thyroid is not palpable.   LYMPH NODES:  Superficial lymphadenopathy is not appreciable in the bilateral cervical, supraclavicular, axillary or inguinal areas.   CARDIOVASCULAR:  S1, S2 regular with no murmurs or gallops.  No carotid or abdominal bruits.   PULMONARY:  Lungs are clear to auscultation and percussion bilaterally.  There is no wheezing or rhonchi.   GASTROINTESTINAL:  Abdomen is soft, nontender.  No hepatosplenomegaly.  No signs of ascites.  No mass appreciable.   MUSCULOSKELETAL/EXTREMITIES:  No edema.  No cyanotic changes.  No signs of joint deformity.  No lymphedema.   NEUROLOGIC:  Cranial nerves II-XII are grossly intact.  Sensation intact.  Muscle strength and muscle tone symmetrical, 5/5 throughout.   BACK:  No spinal or paraspinal tenderness.  No CVA tenderness.   SKIN:  No petechiae.  No rash.  No signs of cellulitis.   BREASTS: Right breast with mild ecchymosis on upper breast, no longer palpable 1.5cm mass at 12:00 post C1. No other masses.  Left breast is symmetrical with no skin or nipple changes. No masses on the left. Tissue is very dense throughout.          CURRENT LAB DATA REVIEWED  10/2018  Right breast 8mm hypoechoic mass at 12:00, 6cm FN and right axillary LN biopsy both found grade 3, invasive ductal carcinoma, ER/SC/her 2-.     Baseline cbc diff/CMP are fine.         CURRENT IMAGING REVIEWED  Baseline pre tx breast MRI 10/2018  In the right breast at 12:00 7 cm from the nipple, there is irregular heterogeneously enhancing mass, corresponding with the known biopsy-proven malignancy, which spans approximately 2.2 cm in maximal diameter, best appreciated on sagittal view, with surrounding nonmasslike enhancement likely  related to postbiopsy change or DCIS.  Taken with the primary tumor, the entire span extends up to 3.8 cm.   There are multiple enlarged right axillary lymph nodes.      PET 10/2018  1. No evidence of distant metastasis.  2. Hypermetabolic focus in the upper outer quadrant right breast representing primary tumor. Hypermetabolic right axillary lymph nodes, consistent with metastatic node.  3. Indeterminate sub-4 mm pulmonary nodules, likely fissural lymph node in the right lung.  4. Extensive FDG uptake by the brown fat in the neck and paraspinal region.    10/2018 dx MA -  revealed a small mass in the right upper outer breast, US revealed an 8mm hypoechoic mass at 12:00, 6cm FN and axillary US revealed a concerning lymph node        OLD DATA REVIEWED TODAY WITH SUMMARY:   Prior MA 2017, 2014 - negative.           ASSESSMENT AND PLAN:    1.  dx cT1cN1 right breast high grade IDC, TN at age 47 in 10/2018.     PET is negative for distal disease.      We discussed the side effects of chemo include not limit to N/V/hair loss/lower immunity/cardiac toxicity/rare leukemia/infusion related reaction and mortality.   She made informed decision to proceed DD AC -- taxol + carbo C1D1 10/24/2018.     She needs to be monitored closely during this intense tx.     2. Young age dx with TN breast cancer, she will need genetic counseling.   Result will direct us on the platinum use.     3. Stomatitis - advice abreva cream topical.     4. Bone pain from neulasta - can try claritin.     5. Nausea without vormiting - we discuss in detail the dex use, and prn anti emetic use.

## 2018-11-25 ENCOUNTER — NURSE TRIAGE (OUTPATIENT)
Dept: NURSING | Facility: CLINIC | Age: 48
End: 2018-11-25

## 2018-11-25 ENCOUNTER — TELEPHONE (OUTPATIENT)
Dept: ONCOLOGY | Facility: CLINIC | Age: 48
End: 2018-11-25

## 2018-11-25 NOTE — TELEPHONE ENCOUNTER
"Patient calling stating her port has moved. Stating her port on chest has moved toward her left shoulder. Reporting increased pain since yesterday \"needle like pain\" intermittent. Rating pain \"6-7\" when it occurs. Pain radiates down left arm.     Paged Dr Cassy Mckeon through [facility] Answering Service at 710 a.m. To call patient at Phone . Patient was advised if no return call with in 20 minutes to call back.    Caller verbalized understanding. Denies further questions.    Elisa Carmen RN  [facility]      Reason for Disposition    [1] Follow-up call from patient regarding patient's clinical status AND [2] information urgent    Additional Information    Negative: Lab calling with strep throat test results and triager can call in prescription    Negative: Lab calling with urinalysis test results and triager can call in prescription    Negative: Medication questions    Negative: ED call to PCP    Negative: Physician call to PCP    Negative: Call about patient who is currently hospitalized    Negative: Lab or radiology calling with CRITICAL test results    Negative: [1] Prescription not at pharmacy AND [2] was prescribed today by PCP    Protocols used: PCP CALL - NO TRIAGE-ADULT-      "

## 2018-11-25 NOTE — TELEPHONE ENCOUNTER
I received call from patient that she feels line her port has moved and she has some pain in her left shoulder.  She denies redness or drainage from the port.  She denies fever.  Since it is the weekend, she would have to go to the ED to get it evaluated.  If she feels like she can wait, then she is to call the clinic tomorrow morning to come in to have it evaluated.  She expressed understanding.  Will forward message to Dr. Montoya.

## 2018-11-26 ENCOUNTER — TELEPHONE (OUTPATIENT)
Dept: ONCOLOGY | Facility: CLINIC | Age: 48
End: 2018-11-26

## 2018-11-26 ENCOUNTER — HOSPITAL ENCOUNTER (OUTPATIENT)
Facility: CLINIC | Age: 48
End: 2018-11-26
Attending: INTERNAL MEDICINE | Admitting: RADIOLOGY
Payer: COMMERCIAL

## 2018-11-26 ENCOUNTER — ONCOLOGY VISIT (OUTPATIENT)
Dept: ONCOLOGY | Facility: CLINIC | Age: 48
End: 2018-11-26
Attending: NURSE PRACTITIONER
Payer: COMMERCIAL

## 2018-11-26 ENCOUNTER — HOSPITAL ENCOUNTER (OUTPATIENT)
Dept: ULTRASOUND IMAGING | Facility: CLINIC | Age: 48
Discharge: HOME | End: 2018-11-26
Attending: NURSE PRACTITIONER | Admitting: NURSE PRACTITIONER
Payer: COMMERCIAL

## 2018-11-26 VITALS
BODY MASS INDEX: 33.67 KG/M2 | TEMPERATURE: 98.3 F | SYSTOLIC BLOOD PRESSURE: 137 MMHG | WEIGHT: 190 LBS | DIASTOLIC BLOOD PRESSURE: 89 MMHG

## 2018-11-26 DIAGNOSIS — Z17.1 MALIGNANT NEOPLASM OF UPPER-OUTER QUADRANT OF RIGHT BREAST IN FEMALE, ESTROGEN RECEPTOR NEGATIVE (H): ICD-10-CM

## 2018-11-26 DIAGNOSIS — C50.411 MALIGNANT NEOPLASM OF UPPER-OUTER QUADRANT OF RIGHT BREAST IN FEMALE, ESTROGEN RECEPTOR NEGATIVE (H): Primary | ICD-10-CM

## 2018-11-26 DIAGNOSIS — C50.411 MALIGNANT NEOPLASM OF UPPER-OUTER QUADRANT OF RIGHT BREAST IN FEMALE, ESTROGEN RECEPTOR NEGATIVE (H): ICD-10-CM

## 2018-11-26 DIAGNOSIS — E03.9 ACQUIRED HYPOTHYROIDISM: ICD-10-CM

## 2018-11-26 DIAGNOSIS — M25.522 PAIN IN JOINT INVOLVING UPPER ARM, LEFT: ICD-10-CM

## 2018-11-26 DIAGNOSIS — Z17.1 MALIGNANT NEOPLASM OF UPPER-OUTER QUADRANT OF RIGHT BREAST IN FEMALE, ESTROGEN RECEPTOR NEGATIVE (H): Primary | ICD-10-CM

## 2018-11-26 PROCEDURE — 93971 EXTREMITY STUDY: CPT | Mod: LT

## 2018-11-26 PROCEDURE — G0463 HOSPITAL OUTPT CLINIC VISIT: HCPCS

## 2018-11-26 PROCEDURE — 99213 OFFICE O/P EST LOW 20 MIN: CPT | Performed by: NURSE PRACTITIONER

## 2018-11-26 RX ORDER — VALACYCLOVIR HYDROCHLORIDE 1 G/1
2000 TABLET, FILM COATED ORAL 2 TIMES DAILY
Qty: 4 TABLET | Refills: 0 | Status: SHIPPED | OUTPATIENT
Start: 2018-11-26 | End: 2018-12-06

## 2018-11-26 RX ORDER — LEVOTHYROXINE SODIUM 125 UG/1
TABLET ORAL
Qty: 30 TABLET | Refills: 6 | Status: SHIPPED | OUTPATIENT
Start: 2018-11-26 | End: 2019-07-15

## 2018-11-26 ASSESSMENT — PAIN SCALES - GENERAL: PAINLEVEL: SEVERE PAIN (7)

## 2018-11-26 NOTE — PROGRESS NOTES
"Oncology Rooming Note    November 26, 2018 12:06 PM   Luci Londono is a 47 year old female who presents for:    Chief Complaint   Patient presents with     Oncology Clinic Visit     Port issues     Initial Vitals: /89 (BP Location: Left arm)  Temp 98.3  F (36.8  C) (Oral)  Wt 86.2 kg (190 lb)  BMI 33.67 kg/m2 Estimated body mass index is 33.67 kg/(m^2) as calculated from the following:    Height as of 11/14/18: 1.6 m (5' 2.99\").    Weight as of this encounter: 86.2 kg (190 lb). Body surface area is 1.96 meters squared.  Severe Pain (7) Comment: Data Unavailable   No LMP recorded.  Allergies reviewed: Yes  Medications reviewed: Yes    Medications: Medication refills not needed today.  Pharmacy name entered into [facility]:    [facility] [id number] [address] AT [address] [place] [address] & [address] [facility] [id number] - [address] AT [address]    Clinical concerns: Patient has pain in left shoulder, patient feels that her port has moved. Cold sore left upper lip not healing.     5 minutes for nursing intake (face to face time)     Marialuisa Casper RN              "

## 2018-11-26 NOTE — MR AVS SNAPSHOT
After Visit Summary   11/26/2018    Luci Londono    MRN: [id number]           Patient Information     Date Of Birth          1970        Visit Information        Provider Department      11/26/2018 11:30 AM Gadiel Crisostomo APRN [facility]        Today's Diagnoses     Malignant neoplasm of upper-outer quadrant of right breast in female, estrogen receptor negative (H)    -  1    Pain in joint involving upper arm, left          Care Instructions    Schedule today with  IR for port check     schedule for stat US Left arm to rule out DVT     Start valtrex       You are scheduled for a Port Check/Dye Study on Nov 28, 2018 @ 8:00am  [facility]   Check in at 7:15am  Nothing to eat or drink after midnight  You will need a            Follow-ups after your visit        Your next 10 appointments already scheduled     Nov 26, 2018  2:30 PM CST   US VENOUS with SHUS5   [facility] Ultrasound ([facility])    [address] [place] [id number]   [phone]           How do I prepare for my exam? (Food and drink instructions) No Food and Drink Restrictions.  How do I prepare for my exam? (Other instructions) You do not need to do anything special to prepare for your exam.  What should I wear: Wear comfortable clothes.  How long does the exam take: Most ultrasounds take 30 to 60 minutes.  What should I bring: Bring a list of your medicines, including vitamins, minerals and over-the-counter drugs. It is safest to leave personal items at home.  Do I need a :  No  is needed.  What do I need to tell my doctor: Tell your doctor about any allergies you may have.  What should I do after the exam: No restrictions, You may resume normal activities.  What is this test: An ultrasound uses sound waves to make pictures of the body. Sound waves do not cause pain. The only discomfort may be the pressure of the wand against your skin or  full bladder.  Who should I call with questions: If you have any questions, please call the Imaging Department where you will have your exam. Directions, parking instructions, and other information is available on our website, [email or web address].            Nov 28, 2018  8:00 AM CST   IR PORT CHECK LEFT with RHIR11, NIKOLASTESCHUYLER   [facility] Interventional Radiology ([facility])    [address]  [facility] [id number] [phone]           The day before the exam:   You may eat your regular diet.   You are encouraged to drink at least 8 eight ounce glasses of clear liquids.  Please wear loose clothing, such as a sweat suit or jogging clothes. Avoid snaps, zippers and other metal. We may ask you to undress and put on a hospital gown.  Please bring any scans or X-rays taken at other hospitals, if similar tests were done. Also bring a list of your medicines, including vitamins, minerals and over-the-counter drugs. It is safest to leave personal items at home.  Someone will need to drive you to and from the hospital.  Tell your doctor in advance:   If you have allergies to x-ray contrast or iodine.   If you are or may be pregnant.   If you are taking Coumadin (or any other blood thinners) 5 days prior to the exam for any special instructions.   If you are diabetic to determine if your insulin needs have to be adjusted for the exam.  Your doctor will:   Need to do a history and physical within 30 days before this procedure.   Obtain necessary laboratory tests prior to the exam (Basic Metabolic Panel, CBCP, PTT and INR)   (No labs needed if you are having a tunneled catheter exchange or removal)  If you were given sedation, you cannot drive for 24 hours after the procedure, and an adult must be with you until then.  If you have any questions, please call the Imaging Department where you will have your exam. Directions, parking instructions, and other information are available on our website,  [email or web address].            Nov 28, 2018 11:00 AM CST   Level 3 with SH INFUSION CHAIR 7   [facility] ([facility])    [facility]  [address]  [facility] [id number] [phone]            Nov 28, 2018 12:00 PM CST   Return Visit with Addie Murillo MD   [facility] ([facility])    [facility]  [address]  [facility] [address] [phone]            Dec 18, 2018 11:00 AM CST   Ech Complete with [id number]   [facility] CV Echocardiography (Cardiovascular Imaging at [facility])    [address]  [facility] [address] [phone]           1.  Please bring or wear a comfortable two-piece outfit. 2.  You may eat, drink and take your normal medicines. 3.  For any questions that cannot be answered, please contact the ordering physician 4.  Please do not wear perfumes or scented lotions on the day of your exam.            Dec 18, 2018  2:30 PM CST   Return Visit with Elisa Pemberton PA-C   [facility] ([facility])    [address] [facility] [address]  [facility] [address] [phone] OPT 2            Jan 17, 2019 11:15 AM CST   New Visit with Neida Fortune GC   [facility] ([facility])    [facility]  [address]  [facility] [address] [phone]              Future tests that were ordered for you today     Open Future Orders        Priority Expected Expires Ordered    US Upper Extremity Venous Duplex Left Routine  11/26/2019 11/26/2018    IR Port Check Left STAT  11/26/2019 11/26/2018            Who to contact     If you have questions or need follow up information about today's clinic visit or your schedule please contact [facility] directly at [phone].  Normal or non-critical lab and imaging  results will be communicated to you by MyChart, letter or phone within 4 business days after the clinic has received the results. If you do not hear from us within 7 days, please contact the clinic through MyChart or phone. If you have a critical or abnormal lab result, we will notify you by phone as soon as possible.  Submit refill requests through [facility] or call your pharmacy and they will forward the refill request to us. Please allow 3 business days for your refill to be completed.          Additional Information About Your Visit        Care EveryWhere ID     This is your Care EveryWhere ID. This could be used by other organizations to access your [facility] medical records  [id number]        Your Vitals Were     Temperature BMI (Body Mass Index)                98.3  F (36.8  C) (Oral) 33.67 kg/m2           Blood Pressure from Last 3 Encounters:   11/26/18 137/89   11/14/18 (!) 139/92   11/14/18 130/77    Weight from Last 3 Encounters:   11/26/18 86.2 kg (190 lb)   11/14/18 87.9 kg (193 lb 12.8 oz)   11/14/18 87.9 kg (193 lb 12.8 oz)                 Today's Medication Changes          These changes are accurate as of 11/26/18  1:59 PM.  If you have any questions, ask your nurse or doctor.               Start taking these medicines.        Dose/Directions    valACYclovir 1000 mg tablet   Commonly known as:  VALTREX   Used for:  Malignant neoplasm of upper-outer quadrant of right breast in female, estrogen receptor negative (H), Pain in joint involving upper arm, left   Started by:  Gadiel Crisostomo APRN CNP        Dose:  2000 mg   Take 2 tablets (2,000 mg) by mouth 2 times daily   Quantity:  4 tablet   Refills:  0            Where to get your medicines      These medications were sent to [facility] - [address]  [address], [place] [address]     Phone:  [phone]     valACYclovir 1000 mg tablet                Primary Care Provider Office Phone # Fax #    Jaxbfny  Charley Wills [phone]       [address]  [facility] [address]        Equal Access to Services     PINA PINEDA : Kalani raymon parikh shakira Marks, waeleanorda august, qaguanakitota kaernestoda juancarlos, josh longoria laaustinmanfred willem. So [facility] [phone].    ATENCIÓN: Si habla español, tiene a dominique disposición servicios gratuitos de asistencia lingüística. Llame al [phone].    We comply with applicable federal civil rights laws and [place] laws. We do not discriminate on the basis of race, color, national origin, age, disability, sex, sexual orientation, or gender identity.            Thank you!     Thank you for choosing [facility]  for your care. Our goal is always to provide you with excellent care. Hearing back from our patients is one way we can continue to improve our services. Please take a few minutes to complete the written survey that you may receive in the mail after your visit with us. Thank you!             Your Updated Medication List - Protect others around you: Learn how to safely use, store and throw away your medicines at www.disposemymeds.org.          This list is accurate as of 11/26/18  1:59 PM.  Always use your most recent med list.                   Brand Name Dispense Instructions for use Diagnosis    dexamethasone 4 MG tablet    DECADRON    40 tablet    Take 2 tablets (8 mg) by mouth daily for 3 days Start on Day 2 of Cycles 1 through 4.    Malignant neoplasm of upper-outer quadrant of right breast in female, estrogen receptor negative (H)       docosanol 10 % Crea cream    ABREVA    1 g    Apply topically 5 times daily    Stomatitis and mucositis       HYDROcodone-acetaminophen 5-325 MG tablet    NORCO    15 tablet    Take 1-2 tablets by mouth every 4 hours as needed for moderate to severe pain    Malignant neoplasm of upper-outer quadrant of right breast in female, estrogen receptor negative (H)       levothyroxine 125 MCG tablet     SYNTHROID/LEVOTHROID    30 tablet    TAKE 1 TABLET BY MOUTH DAILY    Acquired hypothyroidism       * lidocaine-prilocaine cream    EMLA    30 g    Apply quarter-size amount of Emla cream topically over port site one hour prior to port access for comfort.    Malignant neoplasm of upper-outer quadrant of right breast in female, estrogen receptor negative (H)       * lidocaine-prilocaine cream    EMLA    30 g    Apply topically as needed for moderate pain Apply to port site 1 hour prior to accessing port    Malignant neoplasm of upper-outer quadrant of right breast in female, estrogen receptor negative (H)       loratadine 10 MG tablet    CLARITIN    90 tablet    Take 1 tablet (10 mg) by mouth daily    Malignant neoplasm of upper-outer quadrant of right breast in female, estrogen receptor negative (H)       LORazepam 0.5 MG tablet    ATIVAN    30 tablet    Take 1 tablet (0.5 mg) by mouth every 4 hours as needed (Anxiety, Nausea/Vomiting or Sleep)    Malignant neoplasm of upper-outer quadrant of right breast in female, estrogen receptor negative (H)       medroxyPROGESTERone 10 MG tablet    PROVERA    90 tablet    Take 1 tablet (10 mg) by mouth daily    Absence of menstruation       metFORMIN 500 MG 24 hr tablet    GLUCOPHAGE-XR    30 tablet    Take 1 tablet (500 mg) by mouth daily (with dinner)    Non morbid obesity due to excess calories, Hyperglycemia       multivitamin, therapeutic with minerals Tabs tablet      Take 1 tablet by mouth daily        omeprazole 20 MG DR capsule    priLOSEC    60 capsule    Take 1 capsule (20 mg) by mouth 2 times daily    Gastroesophageal reflux disease without esophagitis       ondansetron 4 MG ODT tab    ZOFRAN-ODT    10 tablet    Take 1-2 tablets (4-8 mg) by mouth every 8 hours as needed for nausea    Malignant neoplasm of upper-outer quadrant of right breast in female, estrogen receptor negative (H)       prochlorperazine 10 MG tablet    COMPAZINE    30 tablet    Take 1 tablet (10  mg) by mouth every 6 hours as needed (Nausea/Vomiting)    Malignant neoplasm of upper-outer quadrant of right breast in female, estrogen receptor negative (H)       senna-docusate 8.6-50 MG per tablet    SENOKOT-S;PERICOLACE    30 tablet    Take 1-2 tablets by mouth 2 times daily    Malignant neoplasm of upper-outer quadrant of right breast in female, estrogen receptor negative (H)       valACYclovir 1000 mg tablet    VALTREX    4 tablet    Take 2 tablets (2,000 mg) by mouth 2 times daily    Malignant neoplasm of upper-outer quadrant of right breast in female, estrogen receptor negative (H), Pain in joint involving upper arm, left       vitamin C 1000 MG Tabs    ASCORBIC ACID     Take 1,000 mg by mouth daily        * Notice:  This list has 2 medication(s) that are the same as other medications prescribed for you. Read the directions carefully, and ask your doctor or other care provider to review them with you.

## 2018-11-26 NOTE — PROGRESS NOTES
S.  Patient seen as an add-on.  Patient complains of left reports pain.  It feels like the port is moving that is causing pain.  She feels that the port shifted downward.  Patient denies swelling in the area denies swelling in the left arm or neck.  She denies fever chills sweats denies redness bruising.  She continues to have sore on her lip.  She denies sores inside the mouth her p.o. is intact    14 point ROS of systems including Constitutional, Eyes, Respiratory, Cardiovascular, Gastroenterology, Genitourinary, Integumentary, Muscularskeletal, Psychiatric were all negative except for pertinent positives noted in my HPI.    O.  Physical Exam   Constitutional: She is well-developed, well-nourished, and in no distress.   HENT:   Head: Normocephalic.   Lip sore   Eyes: Pupils are equal, round, and reactive to light.   Neck: Normal range of motion.   Cardiovascular: Normal rate.    Pulmonary/Chest: Effort normal.   Abdominal: Soft.   Musculoskeletal: Normal range of motion.   Neurological: She is alert.   Skin: Skin is warm. No rash noted. No erythema. No pallor.   No signs or symptoms of infection.  On exam port is moving.       A/P    Port pain  -No signs or symptoms of infection  Schedule patient with IR for port check  Order venous ultrasound to rule out DVT    Lip sore  Start Valtrex take 2 tabs by mouth 2 times daily-treatment only for 2 days  Call if worsening of symptoms

## 2018-11-26 NOTE — PATIENT INSTRUCTIONS
Schedule today with  IR for port check     schedule for stat US Left arm to rule out DVT     Start valtrex       You are scheduled for a Port Check/Dye Study on Nov 28, 2018 @ 8:00am  [facility]   Check in at 7:15am  Nothing to eat or drink after midnight  You will need a

## 2018-11-26 NOTE — LETTER
"    11/26/2018         RE: Luci Londono  [address]  [facility] [address]        Dear Colleague,    Thank you for referring your patient, Luci Londono, to the [facility]. Please see a copy of my visit note below.    Oncology Rooming Note    November 26, 2018 12:06 PM   Luci Londono is a 47 year old female who presents for:    Chief Complaint   Patient presents with     Oncology Clinic Visit     Port issues     Initial Vitals: /89 (BP Location: Left arm)  Temp 98.3  F (36.8  C) (Oral)  Wt 86.2 kg (190 lb)  BMI 33.67 kg/m2 Estimated body mass index is 33.67 kg/(m^2) as calculated from the following:    Height as of 11/14/18: 1.6 m (5' 2.99\").    Weight as of this encounter: 86.2 kg (190 lb). Body surface area is 1.96 meters squared.  Severe Pain (7) Comment: Data Unavailable   No LMP recorded.  Allergies reviewed: Yes  Medications reviewed: Yes    Medications: Medication refills not needed today.  Pharmacy name entered into [employer]:    [id number] - [place], [place] - [address] AT [address] [place] [address] & [address] [id number] - [address] AT [address]    Clinical concerns: Patient has pain in left shoulder, patient feels that her port has moved. Cold sore left upper lip not healing.     5 minutes for nursing intake (face to face time)     Marialuisa Casper RN                S.  Patient seen as an add-on.  Patient complains of left reports pain.  It feels like the port is moving that is causing pain.  She feels that the port shifted downward.  Patient denies swelling in the area denies swelling in the left arm or neck.  She denies fever chills sweats denies redness bruising.  She continues to have sore on her lip.  She denies sores inside the mouth her p.o. is intact    O.  Physical Exam   Constitutional: She is well-developed, well-nourished, and in no distress.   HENT:   Head: Normocephalic. "   Lip sore   Eyes: Pupils are equal, round, and reactive to light.   Neck: Normal range of motion.   Cardiovascular: Normal rate.    Pulmonary/Chest: Effort normal.   Abdominal: Soft.   Musculoskeletal: Normal range of motion.   Neurological: She is alert.   Skin: Skin is warm. No rash noted. No erythema. No pallor.   No signs or symptoms of infection.  On exam port is moving.       A/P    Port pain  -No signs or symptoms of infection  Schedule patient with IR for port check  Order venous ultrasound to rule out DVT    Lip sore  Start Valtrex take 2 tabs by mouth 2 times daily-treatment only for 2 days  Call if worsening of symptoms      Again, thank you for allowing me to participate in the care of your patient.        Sincerely,        AMIE Hernandez CNP

## 2018-11-26 NOTE — TELEPHONE ENCOUNTER
"Requested Prescriptions   Pending Prescriptions Disp Refills     levothyroxine (SYNTHROID/LEVOTHROID) 125 MCG tablet [Pharmacy Med Name: LEVOTHYROXINE 0.125MG (125MCG) TAB]  Last Written Prescription Date:  09/17/2018  Last Fill Quantity: 30,  # refills: 0   Last office visit: 10/9/2018 with prescribing provider:  MARA TYLER    Future Office Visit:   Next 5 appointments (look out 90 days)     Nov 26, 2018 11:30 AM CST   Return Visit with AMIE Hernandez CNP   [facility] ([facility])    [facility] [place] [id number]  [facility] [address]   [phone]            Nov 28, 2018 12:00 PM CST   Return Visit with Addie Murillo MD   [facility] ([facility])    [facility]  [address]  [facility] [address]   [phone]            Dec 18, 2018  2:30 PM CST   Return Visit with Elisa Pemberton PA-C   [facility] ([facility])    [address] [facility] [facility] [address]  [place] [address] [phone] OPT 2                  30 tablet 0     Sig: TAKE 1 TABLET BY MOUTH DAILY    Thyroid Protocol Passed    11/26/2018  3:57 AM       Passed - Patient is 12 years or older       Passed - Recent (12 mo) or future (30 days) visit within the authorizing provider's specialty    Patient had office visit in the last 12 months or has a visit in the next 30 days with authorizing provider or within the authorizing provider's specialty.  See \"Patient Info\" tab in inbasket, or \"Choose Columns\" in Meds & Orders section of the refill encounter.             Passed - Normal TSH on file in past 12 months    Recent Labs   Lab Test  10/12/18   2206   TSH  3.04             Passed - No active pregnancy on record    If patient is pregnant or has had a positive pregnancy test, please check TSH.         Passed - No positive pregnancy test in past 12 months    If patient is pregnant or has had a " positive pregnancy test, please check TSH.

## 2018-11-26 NOTE — TELEPHONE ENCOUNTER
Patient called with her ultrasound result, she is aware that her ultrasound is negative for a DVT. Marialuisa Casper RN,BSN,OCN

## 2018-11-26 NOTE — TELEPHONE ENCOUNTER
Luci contacted clinic this AM stating that her port has moved and it feels like it is poking her. Patient will be added on today to see Gadiel at 1130 to have her port assessed. Marialuisa Casper RN,BSN,OCN

## 2018-11-28 ENCOUNTER — CARE COORDINATION (OUTPATIENT)
Dept: ONCOLOGY | Facility: CLINIC | Age: 48
End: 2018-11-28

## 2018-11-28 ENCOUNTER — ALLIED HEALTH/NURSE VISIT (OUTPATIENT)
Dept: ONCOLOGY | Facility: CLINIC | Age: 48
End: 2018-11-28

## 2018-11-28 ENCOUNTER — INFUSION THERAPY VISIT (OUTPATIENT)
Dept: INFUSION THERAPY | Facility: CLINIC | Age: 48
End: 2018-11-28
Attending: INTERNAL MEDICINE
Payer: COMMERCIAL

## 2018-11-28 ENCOUNTER — HOSPITAL ENCOUNTER (OUTPATIENT)
Facility: CLINIC | Age: 48
Discharge: HOME | End: 2018-11-28
Attending: RADIOLOGY | Admitting: RADIOLOGY
Payer: COMMERCIAL

## 2018-11-28 ENCOUNTER — ONCOLOGY VISIT (OUTPATIENT)
Dept: ONCOLOGY | Facility: CLINIC | Age: 48
End: 2018-11-28
Attending: INTERNAL MEDICINE
Payer: COMMERCIAL

## 2018-11-28 ENCOUNTER — APPOINTMENT (OUTPATIENT)
Dept: INTERVENTIONAL RADIOLOGY/VASCULAR | Facility: CLINIC | Age: 48
End: 2018-11-28
Attending: NURSE PRACTITIONER
Payer: COMMERCIAL

## 2018-11-28 ENCOUNTER — HOSPITAL ENCOUNTER (OUTPATIENT)
Facility: CLINIC | Age: 48
Setting detail: SPECIMEN
End: 2018-11-28
Attending: INTERNAL MEDICINE | Admitting: RADIOLOGY
Payer: COMMERCIAL

## 2018-11-28 ENCOUNTER — HOSPITAL ENCOUNTER (OUTPATIENT)
Facility: CLINIC | Age: 48
Setting detail: SPECIMEN
Discharge: HOME | End: 2018-11-28
Attending: INTERNAL MEDICINE | Admitting: INTERNAL MEDICINE
Payer: COMMERCIAL

## 2018-11-28 VITALS
HEART RATE: 75 BPM | SYSTOLIC BLOOD PRESSURE: 124 MMHG | RESPIRATION RATE: 18 BRPM | TEMPERATURE: 97.7 F | OXYGEN SATURATION: 100 % | DIASTOLIC BLOOD PRESSURE: 81 MMHG | WEIGHT: 195 LBS | BODY MASS INDEX: 35.67 KG/M2

## 2018-11-28 VITALS — BODY MASS INDEX: 35.88 KG/M2 | WEIGHT: 195 LBS | HEIGHT: 62 IN

## 2018-11-28 VITALS
HEART RATE: 69 BPM | OXYGEN SATURATION: 98 % | BODY MASS INDEX: 34.97 KG/M2 | WEIGHT: 190.04 LBS | HEIGHT: 62 IN | SYSTOLIC BLOOD PRESSURE: 131 MMHG | RESPIRATION RATE: 12 BRPM | TEMPERATURE: 98.8 F | DIASTOLIC BLOOD PRESSURE: 78 MMHG

## 2018-11-28 DIAGNOSIS — K12.30 STOMATITIS AND MUCOSITIS: ICD-10-CM

## 2018-11-28 DIAGNOSIS — H53.8 BLURRY VISION: ICD-10-CM

## 2018-11-28 DIAGNOSIS — Z17.1 MALIGNANT NEOPLASM OF UPPER-OUTER QUADRANT OF RIGHT BREAST IN FEMALE, ESTROGEN RECEPTOR NEGATIVE (H): ICD-10-CM

## 2018-11-28 DIAGNOSIS — Z71.9 COUNSELING NOS(V65.40): Primary | ICD-10-CM

## 2018-11-28 DIAGNOSIS — C50.411 MALIGNANT NEOPLASM OF UPPER-OUTER QUADRANT OF RIGHT BREAST IN FEMALE, ESTROGEN RECEPTOR NEGATIVE (H): ICD-10-CM

## 2018-11-28 DIAGNOSIS — K12.1 STOMATITIS AND MUCOSITIS: ICD-10-CM

## 2018-11-28 DIAGNOSIS — C50.411 MALIGNANT NEOPLASM OF UPPER-OUTER QUADRANT OF RIGHT BREAST IN FEMALE, ESTROGEN RECEPTOR NEGATIVE (H): Primary | ICD-10-CM

## 2018-11-28 DIAGNOSIS — M25.522 PAIN IN JOINT INVOLVING UPPER ARM, LEFT: ICD-10-CM

## 2018-11-28 DIAGNOSIS — Z17.1 MALIGNANT NEOPLASM OF UPPER-OUTER QUADRANT OF RIGHT BREAST IN FEMALE, ESTROGEN RECEPTOR NEGATIVE (H): Primary | ICD-10-CM

## 2018-11-28 DIAGNOSIS — M89.8X9 BONE PAIN: ICD-10-CM

## 2018-11-28 DIAGNOSIS — Z95.828 PORT-A-CATH IN PLACE: ICD-10-CM

## 2018-11-28 LAB
ALBUMIN SERPL-MCNC: 3.2 G/DL (ref 3.4–5)
ALP SERPL-CCNC: 102 U/L (ref 40–150)
ALT SERPL W P-5'-P-CCNC: 31 U/L (ref 0–50)
ANION GAP SERPL CALCULATED.3IONS-SCNC: 6 MMOL/L (ref 3–14)
AST SERPL W P-5'-P-CCNC: 10 U/L (ref 0–45)
BASOPHILS # BLD AUTO: 0 10E9/L (ref 0–0.2)
BASOPHILS NFR BLD AUTO: 0 %
BILIRUB SERPL-MCNC: 0.1 MG/DL (ref 0.2–1.3)
BUN SERPL-MCNC: 12 MG/DL (ref 7–30)
CALCIUM SERPL-MCNC: 8.7 MG/DL (ref 8.5–10.1)
CHLORIDE SERPL-SCNC: 102 MMOL/L (ref 94–109)
CO2 SERPL-SCNC: 29 MMOL/L (ref 20–32)
CREAT SERPL-MCNC: 0.67 MG/DL (ref 0.52–1.04)
DACRYOCYTES BLD QL SMEAR: SLIGHT
DIFFERENTIAL METHOD BLD: ABNORMAL
EOSINOPHIL # BLD AUTO: 0 10E9/L (ref 0–0.7)
EOSINOPHIL NFR BLD AUTO: 0 %
ERYTHROCYTE [DISTWIDTH] IN BLOOD BY AUTOMATED COUNT: 18 % (ref 10–15)
GFR SERPL CREATININE-BSD FRML MDRD: >90 ML/MIN/1.7M2
GLUCOSE SERPL-MCNC: 75 MG/DL (ref 70–99)
HCT VFR BLD AUTO: 32.8 % (ref 35–47)
HGB BLD-MCNC: 11 G/DL (ref 11.7–15.7)
LYMPHOCYTES # BLD AUTO: 3.4 10E9/L (ref 0.8–5.3)
LYMPHOCYTES NFR BLD AUTO: 17 %
MCH RBC QN AUTO: 26.5 PG (ref 26.5–33)
MCHC RBC AUTO-ENTMCNC: 33.5 G/DL (ref 31.5–36.5)
MCV RBC AUTO: 79 FL (ref 78–100)
METAMYELOCYTES # BLD: 0.2 10E9/L
METAMYELOCYTES NFR BLD MANUAL: 1 %
MONOCYTES # BLD AUTO: 0.4 10E9/L (ref 0–1.3)
MONOCYTES NFR BLD AUTO: 2 %
MYELOCYTES # BLD: 0.2 10E9/L
MYELOCYTES NFR BLD MANUAL: 1 %
NEUTROPHILS # BLD AUTO: 15.6 10E9/L (ref 1.6–8.3)
NEUTROPHILS NFR BLD AUTO: 79 %
OVALOCYTES BLD QL SMEAR: SLIGHT
PLATELET # BLD AUTO: 150 10E9/L (ref 150–450)
PLATELET # BLD EST: ABNORMAL 10*3/UL
POTASSIUM SERPL-SCNC: 3.6 MMOL/L (ref 3.4–5.3)
PROT SERPL-MCNC: 7 G/DL (ref 6.8–8.8)
RBC # BLD AUTO: 4.15 10E12/L (ref 3.8–5.2)
SODIUM SERPL-SCNC: 137 MMOL/L (ref 133–144)
WBC # BLD AUTO: 19.8 10E9/L (ref 4–11)

## 2018-11-28 PROCEDURE — 36598 INJ W/FLUOR EVAL CV DEVICE: CPT

## 2018-11-28 PROCEDURE — 80053 COMPREHEN METABOLIC PANEL: CPT | Performed by: INTERNAL MEDICINE

## 2018-11-28 PROCEDURE — 96377 APPLICATON ON-BODY INJECTOR: CPT | Mod: XS

## 2018-11-28 PROCEDURE — G0463 HOSPITAL OUTPT CLINIC VISIT: HCPCS | Mod: 25

## 2018-11-28 PROCEDURE — 96375 TX/PRO/DX INJ NEW DRUG ADDON: CPT

## 2018-11-28 PROCEDURE — 25000128 H RX IP 250 OP 636: Performed by: INTERNAL MEDICINE

## 2018-11-28 PROCEDURE — 96411 CHEMO IV PUSH ADDL DRUG: CPT

## 2018-11-28 PROCEDURE — 40000557 ZZH STATISTIC PORT-A-CATH ACCESS/FLUSHING

## 2018-11-28 PROCEDURE — 96367 TX/PROPH/DG ADDL SEQ IV INF: CPT

## 2018-11-28 PROCEDURE — 25000128 H RX IP 250 OP 636

## 2018-11-28 PROCEDURE — 85025 COMPLETE CBC W/AUTO DIFF WBC: CPT | Performed by: INTERNAL MEDICINE

## 2018-11-28 PROCEDURE — 96413 CHEMO IV INFUSION 1 HR: CPT

## 2018-11-28 PROCEDURE — 99215 OFFICE O/P EST HI 40 MIN: CPT | Performed by: INTERNAL MEDICINE

## 2018-11-28 RX ORDER — LORAZEPAM 2 MG/ML
0.5 INJECTION INTRAMUSCULAR EVERY 4 HOURS PRN
Status: CANCELLED
Start: 2018-11-28

## 2018-11-28 RX ORDER — EPINEPHRINE 1 MG/ML
0.3 INJECTION, SOLUTION INTRAMUSCULAR; SUBCUTANEOUS EVERY 5 MIN PRN
Status: CANCELLED | OUTPATIENT
Start: 2018-11-28

## 2018-11-28 RX ORDER — DIPHENHYDRAMINE HYDROCHLORIDE 50 MG/ML
50 INJECTION INTRAMUSCULAR; INTRAVENOUS
Status: CANCELLED
Start: 2018-11-28

## 2018-11-28 RX ORDER — HEPARIN SODIUM (PORCINE) LOCK FLUSH IV SOLN 100 UNIT/ML 100 UNIT/ML
SOLUTION INTRAVENOUS
Status: COMPLETED
Start: 2018-11-28 | End: 2018-11-28

## 2018-11-28 RX ORDER — SODIUM CHLORIDE 9 MG/ML
1000 INJECTION, SOLUTION INTRAVENOUS CONTINUOUS PRN
Status: CANCELLED
Start: 2018-11-28

## 2018-11-28 RX ORDER — DOXORUBICIN HYDROCHLORIDE 2 MG/ML
120 INJECTION, SOLUTION INTRAVENOUS ONCE
Status: COMPLETED | OUTPATIENT
Start: 2018-11-28 | End: 2018-11-28

## 2018-11-28 RX ORDER — EPINEPHRINE 0.3 MG/.3ML
0.3 INJECTION SUBCUTANEOUS EVERY 5 MIN PRN
Status: CANCELLED | OUTPATIENT
Start: 2018-11-28

## 2018-11-28 RX ORDER — METHYLPREDNISOLONE SODIUM SUCCINATE 125 MG/2ML
125 INJECTION, POWDER, LYOPHILIZED, FOR SOLUTION INTRAMUSCULAR; INTRAVENOUS
Status: CANCELLED
Start: 2018-11-28

## 2018-11-28 RX ORDER — PALONOSETRON 0.05 MG/ML
0.25 INJECTION, SOLUTION INTRAVENOUS ONCE
Status: CANCELLED
Start: 2018-11-28

## 2018-11-28 RX ORDER — HEPARIN SODIUM (PORCINE) LOCK FLUSH IV SOLN 100 UNIT/ML 100 UNIT/ML
5 SOLUTION INTRAVENOUS ONCE
Status: COMPLETED | OUTPATIENT
Start: 2018-11-28 | End: 2018-11-28

## 2018-11-28 RX ORDER — ALBUTEROL SULFATE 90 UG/1
1-2 AEROSOL, METERED RESPIRATORY (INHALATION)
Status: CANCELLED
Start: 2018-11-28

## 2018-11-28 RX ORDER — PALONOSETRON 0.05 MG/ML
0.25 INJECTION, SOLUTION INTRAVENOUS ONCE
Status: COMPLETED | OUTPATIENT
Start: 2018-11-28 | End: 2018-11-28

## 2018-11-28 RX ORDER — HEPARIN SODIUM (PORCINE) LOCK FLUSH IV SOLN 100 UNIT/ML 100 UNIT/ML
5 SOLUTION INTRAVENOUS ONCE
Status: CANCELLED
Start: 2018-11-28 | End: 2018-11-28

## 2018-11-28 RX ORDER — ALBUTEROL SULFATE 0.83 MG/ML
2.5 SOLUTION RESPIRATORY (INHALATION)
Status: CANCELLED | OUTPATIENT
Start: 2018-11-28

## 2018-11-28 RX ORDER — DOXORUBICIN HYDROCHLORIDE 2 MG/ML
60 INJECTION, SOLUTION INTRAVENOUS ONCE
Status: CANCELLED | OUTPATIENT
Start: 2018-11-28

## 2018-11-28 RX ORDER — MEPERIDINE HYDROCHLORIDE 25 MG/ML
25 INJECTION INTRAMUSCULAR; INTRAVENOUS; SUBCUTANEOUS EVERY 30 MIN PRN
Status: CANCELLED | OUTPATIENT
Start: 2018-11-28

## 2018-11-28 RX ADMIN — SODIUM CHLORIDE, PRESERVATIVE FREE 5 ML: 5 INJECTION INTRAVENOUS at 14:24

## 2018-11-28 RX ADMIN — PALONOSETRON HYDROCHLORIDE 0.25 MG: 0.25 INJECTION INTRAVENOUS at 12:52

## 2018-11-28 RX ADMIN — DEXAMETHASONE SODIUM PHOSPHATE: 10 INJECTION, SOLUTION INTRAMUSCULAR; INTRAVENOUS at 12:52

## 2018-11-28 RX ADMIN — PEGFILGRASTIM 6 MG: KIT SUBCUTANEOUS at 14:13

## 2018-11-28 RX ADMIN — CYCLOPHOSPHAMIDE 1200 MG: 2 INJECTION, POWDER, FOR SOLUTION INTRAVENOUS; ORAL at 13:40

## 2018-11-28 RX ADMIN — DOXORUBICIN HYDROCHLORIDE 120 MG: 2 INJECTION, SOLUTION INTRAVENOUS at 13:24

## 2018-11-28 RX ADMIN — SODIUM CHLORIDE 250 ML: 9 INJECTION, SOLUTION INTRAVENOUS at 12:36

## 2018-11-28 RX ADMIN — HEPARIN 500 UNITS: 100 SYRINGE at 08:17

## 2018-11-28 ASSESSMENT — PAIN SCALES - GENERAL: PAINLEVEL: NO PAIN (0)

## 2018-11-28 NOTE — PATIENT INSTRUCTIONS
Your On-body Neulasta Injector was applied to your left arm at 2:15pm.  At approximately 5:15 on 11/29, your On-body Injector will beep to let you know your dose delivery will begin in 2 minutes.  Your medication will be delivered over the next 45 minutes.  You can remove your Injector at 6:15pm.  Please make sure your Injector has a solid green light or has turned off prior to removing the device.  Please contact your provider at [phone] with questions or concerns.

## 2018-11-28 NOTE — PATIENT INSTRUCTIONS
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  Please arrange.  Forwarded to Neida Fortune/Eyal   C3 chemo, f/u with C4.  Scheduled/eyal       Patient is in [facility]

## 2018-11-28 NOTE — PROGRESS NOTES
ONCOLOGY FOLLOW [facility] VISIT    breast surgeon:  Dr. 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   Her mammogram revealed a small mass in the right upper outer breast,   US revealed an 8mm hypoechoic mass at 12:00, 6cm FN and axillary US revealed a concerning lymph node which was also biopsied.   Her pathology from both breast and LN revealed a grade 3, invasive ductal carcinoma, ER/WI/her 2-.   PET found no distal disease.     She made informed decision to proceed with neoadjuvant DD AC  She reports she does daily self breast exams and noticed the lump three days prior to her mammogram. She reports some new right shoulder pain and low back pain which is chronic.      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     MEDICATIONS/ALLERY:  Reviewed in Epic system.      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 Deny ETOH/smoking       FAMILY HISTORY:    Negative for any type of cancers    REVIEW OF SYSTEMS:   She has bone pain, stomatitis, and fatigue.          PHYSICAL EXAMINATION:   VITAL SIGNS: Blood pressure 124/81, pulse 75, temperature 97.7  F (36.5  C), temperature source Oral, resp. rate 18, weight 88.5 kg (195 lb), SpO2 100 %, not currently breastfeeding.      ECOG 0    GENERAL APPEARANCE:  looks like her stated age, very pleasant, not in acute distress.   HEENT: The patient is  normocephalic, atraumatic. Pupils are equally reactive to light.  Sclerae are anicteric.  Moist oral mucosa.  Negative pharynx.  No oral thrush. Stomatitis on left angular of mouth.    NECK:  Supple.  No jugular venous distention.  Thyroid is not palpable.   LYMPH NODES:  Superficial lymphadenopathy is not appreciable in the bilateral cervical, supraclavicular, axillary or inguinal areas.   CARDIOVASCULAR:  S1, S2 regular with no murmurs or gallops.  No carotid or abdominal bruits.   PULMONARY:  Lungs are clear to auscultation and percussion bilaterally.  There is no wheezing or rhonchi.   GASTROINTESTINAL:  Abdomen is soft, nontender.  No hepatosplenomegaly.  No signs of ascites.  No mass appreciable.   MUSCULOSKELETAL/EXTREMITIES:  No edema.  No cyanotic changes.  No signs of joint deformity.  No lymphedema.   NEUROLOGIC:  Cranial nerves II-XII are grossly intact.  Sensation intact.  Muscle strength and muscle tone symmetrical, 5/5 throughout.   BACK:  No spinal or paraspinal tenderness.  No CVA tenderness.   SKIN:  No petechiae.  No rash.  No signs of cellulitis.   BREASTS: Right breast with mild ecchymosis on upper breast, no longer palpable 1.5cm mass at 12:00 post C1. No other masses.  Left breast is symmetrical with no skin or nipple changes. No masses on the left. Tissue is very dense throughout.          CURRENT LAB DATA REVIEWED  10/2018  Right breast 8mm hypoechoic mass at 12:00, 6cm FN and right axillary LN biopsy both found grade 3, invasive ductal carcinoma, ER/PA/her 2-.     Baseline cbc diff/CMP are fine.         CURRENT IMAGING REVIEWED  Baseline pre tx breast MRI 10/2018  In the right breast at 12:00 7 cm from the nipple, there is irregular heterogeneously enhancing mass, corresponding with the known biopsy-proven malignancy, which spans approximately 2.2 cm in maximal diameter, best appreciated on sagittal view, with surrounding nonmasslike enhancement likely related to postbiopsy change or  DCIS.  Taken with the primary tumor, the entire span extends up to 3.8 cm.   There are multiple enlarged right axillary lymph nodes.      PET 10/2018  1. No evidence of distant metastasis.  2. Hypermetabolic focus in the upper outer quadrant right breast representing primary tumor. Hypermetabolic right axillary lymph nodes, consistent with metastatic node.  3. Indeterminate sub-4 mm pulmonary nodules, likely fissural lymph node in the right lung.  4. Extensive FDG uptake by the brown fat in the neck and paraspinal region.    10/2018 dx MA -  revealed a small mass in the right upper outer breast, US revealed an 8mm hypoechoic mass at 12:00, 6cm FN and axillary US revealed a concerning lymph node        OLD DATA REVIEWED TODAY WITH SUMMARY:   Prior MA 2017, 2014 - negative.           ASSESSMENT AND PLAN:    1.  dx cT1cN1 right breast high grade IDC, TN at age 47 in 10/2018.     PET is negative for distal disease.      We discussed the side effects of chemo include not limit to N/V/hair loss/lower immunity/cardiac toxicity/rare leukemia/infusion related reaction and mortality.   She made informed decision to proceed DD AC -- taxol + carbo C1D1 10/24/2018.     Plan restaging MRI post C4 AC.   She needs to be monitored closely during this intense tx.     2. Young age dx with TN breast cancer, she will need genetic counseling.   Result will direct us on the platinum use.     3. Stomatitis - advice abreva cream topical. S/p valtrex.    4. Bone pain from neulasta - can try claritin.     5. Nausea without vormiting - we discuss in detail the dex use, and prn anti emetic use.     6. She also reports new onset of intermittent blurring vision. She is informed this is likely from dex use with chemo causing cataract like changes. Can regress after chemo is done. But if persists and worse, will need to see ophthalmologist.

## 2018-11-28 NOTE — MR AVS SNAPSHOT
After Visit Summary   11/28/2018    Luci Londono    MRN: [id number]           Patient Information     Date Of Birth          1970        Visit Information        Provider Department      11/28/2018 11:00 AM  INFUSION CHAIR 7 [facility] [place] [facility] [place] [facility]        Today's Diagnoses     Malignant neoplasm of upper-outer quadrant of right breast in female, estrogen receptor negative (H)    -  1    Port-A-Cath in place          Care Instructions    Your On-body Neulasta Injector was applied to your left arm at 2:15pm.  At approximately 5:15 on 11/29, your On-body Injector will beep to let you know your dose delivery will begin in 2 minutes.  Your medication will be delivered over the next 45 minutes.  You can remove your Injector at 6:15pm.  Please make sure your Injector has a solid green light or has turned off prior to removing the device.  Please contact your provider at [phone] with questions or concerns.            Follow-ups after your visit        Your next 10 appointments already scheduled     Dec 12, 2018  9:30 AM CST   Level 3 with  INFUSION CHAIR 6   [facility] [place] [facility] [place] [facility] ([facility])    [facility]  [address]  [facility] [address]   [phone]            Dec 12, 2018 10:20 AM CST   Return Visit with Addie Murillo MD   [facility] [place] [facility] [place] ([facility])    [facility]  [address]  [facility] [address]   [phone]            Dec 18, 2018 11:00 AM CST   Ech Complete with SHCVECHR3   [facility] CV Echocardiography (Cardiovascular Imaging at [facility])    [address] [place] [address]  [address] [place] [address] [phone]           1.  Please bring or wear a comfortable two-piece outfit. 2.  You may eat, drink and take your normal medicines. 3.  For any questions that cannot be answered, please contact the  "ordering physician 4.  Please do not wear perfumes or scented lotions on the day of your exam.            Dec 18, 2018  2:30 PM CST   Return Visit with Elisa Pemberton PA-C   [facility] ([facility])    [address]  [place] [address]   [phone] OPT 2            Dec 26, 2018 10:00 AM CST   Level 3 with  INFUSION CHAIR 7   [facility] ([facility])    [facility]  [address]  [facility] [address] [phone]            Jan 17, 2019 11:15 AM CST   New Visit with Neida Fortune GC   Cancer Risk Management Program ([facility])    [facility]  [address]  [facility] [address] [phone]              Who to contact     If you have questions or need follow up information about today's clinic visit or your schedule please contact [facility] directly at [phone].  Normal or non-critical lab and imaging results will be communicated to you by MyChart, letter or phone within 4 business days after the clinic has received the results. If you do not hear from us within 7 days, please contact the clinic through MyChart or phone. If you have a critical or abnormal lab result, we will notify you by phone as soon as possible.  Submit refill requests through [employer] or call your pharmacy and they will forward the refill request to us. Please allow 3 business days for your refill to be completed.          Additional Information About Your Visit        Care EveryWhere ID     This is your Care EveryWhere ID. This could be used by other organizations to access your [facility] medical records  [id number]        Your Vitals Were     Height BMI (Body Mass Index)                1.575 m (5' 2\") 35.67 kg/m2           Blood Pressure from Last 3 Encounters:   11/28/18 131/78   11/28/18 124/81 "   11/26/18 137/89    Weight from Last 3 Encounters:   11/28/18 86.2 kg (190 lb 0.6 oz)   11/28/18 88.5 kg (195 lb)   11/28/18 88.5 kg (195 lb)              We Performed the Following     CBC with platelets differential     Comprehensive metabolic panel     Nursing Communication 1          Today's Medication Changes      Notice     This visit is during an admission. Changes to the med list made in this visit will be reflected in the After Visit Summary of the admission.             Primary Care Provider Office Phone # Fax #    Stuart Charley Wills [phone] [address] [facility] [address]        Equal Access to Services     [facility] : Kalani Marks, latisha koch, keiko bauer, josh neumann . So [facility] [phone].    ATENCIÓN: Si habla español, tiene a dominique disposición servicios gratuitos de asistencia lingüística. Llame al [phone].    We comply with applicable federal civil rights laws and [place] laws. We do not discriminate on the basis of race, color, national origin, age, disability, sex, sexual orientation, or gender identity.            Thank you!     Thank you for choosing [facility] AND [facility]  for your care. Our goal is always to provide you with excellent care. Hearing back from our patients is one way we can continue to improve our services. Please take a few minutes to complete the written survey that you may receive in the mail after your visit with us. Thank you!             Your Updated Medication List - Protect others around you: Learn how to safely use, store and throw away your medicines at www.disposemymeds.org.      Notice     This visit is during an admission. Changes to the med list made in this visit will be reflected in the After Visit Summary of the admission.

## 2018-11-28 NOTE — MR AVS SNAPSHOT
After Visit Summary   11/28/2018    Luci Londono    MRN: [id number]           Patient Information     Date Of Birth          1970        Visit Information        Provider Department      11/28/2018 12:00 PM Addie Murillo MD [facility]        Today's Diagnoses     Malignant neoplasm of upper-outer quadrant of right breast in female, estrogen receptor negative (H)    -  1    Pain in joint involving upper arm, left        Stomatitis and mucositis        Bone pain          Care Instructions    Need to see genetic counselor asap for TN breast cancer dx at young age.  Please arrange.  C3 chemo, f/u with C4.       Patient is in [facility]          Follow-ups after your visit        Your next 10 appointments already scheduled     Dec 12, 2018  9:30 AM CST   Level 3 with  INFUSION CHAIR 6   [facility] ([facility])    [facility]  [address]  [facility] [address]   [phone]            Dec 18, 2018 11:00 AM CST   Ech Complete with SHCVECHR3   [facility] CV Echocardiography (Cardiovascular Imaging at [facility])    [address]  [facility] [address] [phone]           1.  Please bring or wear a comfortable two-piece outfit. 2.  You may eat, drink and take your normal medicines. 3.  For any questions that cannot be answered, please contact the ordering physician 4.  Please do not wear perfumes or scented lotions on the day of your exam.            Dec 18, 2018  2:30 PM CST   Return Visit with Elisa Pemberton PA-C   [facility] ([facility] Clin[facility])    [address]  [facility] [address] [phone] OPT 2            Dec 26, 2018 10:00 AM CST   Level 3 with  INFUSION CHAIR 7   [facility] ([facility])    [facility]  [place]  [address]  [place] [address] [phone]            Jan 17, 2019 11:15 AM CST   New Visit with Neida Fortune GC   Cancer Risk Management Program ([facility])    [facility]  [address]  [place] [address] [phone]              Who to contact     If you have questions or need follow up information about today's clinic visit or your schedule please contact [facility] CANCER [facility] directly at [phone].  Normal or non-critical lab and imaging results will be communicated to you by MyChart, letter or phone within 4 business days after the clinic has received the results. If you do not hear from us within 7 days, please contact the clinic through MyChart or phone. If you have a critical or abnormal lab result, we will notify you by phone as soon as possible.  Submit refill requests through mobile mum or call your pharmacy and they will forward the refill request to us. Please allow 3 business days for your refill to be completed.          Additional Information About Your Visit        Care EveryWhere ID     This is your Care EveryWhere ID. This could be used by other organizations to access your [facility] medical records  [id number]        Your Vitals Were     Pulse Temperature Respirations Pulse Oximetry BMI (Body Mass Index)       75 97.7  F (36.5  C) (Oral) 18 100% 35.67 kg/m2        Blood Pressure from Last 3 Encounters:   11/28/18 131/78   11/28/18 124/81   11/26/18 137/89    Weight from Last 3 Encounters:   11/28/18 86.2 kg (190 lb 0.6 oz)   11/28/18 88.5 kg (195 lb)   11/28/18 88.5 kg (195 lb)              Today, you had the following     No orders found for display         Today's Medication Changes      Notice     This visit is during an admission. Changes to the med list made in this visit will be reflected in the After Visit Summary of the admission.             Primary Care Provider Office Phone # Fax #    Stuart Charley  MD [place] [phone]       [address]  [facility] [address]        Equal Access to Services     PINA PINEDA : Hadii aad ku hadcathiedenise Marks, latisha koch, shlomozoran clarosernestojulianne juancarlos, josh neptaliin hayaan gersonmaynor longoria nel bangura. So [facility] [phone].    ATENCIÓN: Si habla español, tiene a dominique disposición servicios gratuitos de asistencia lingüística. Llame al [phone].    We comply with applicable federal civil rights laws and [place] laws. We do not discriminate on the basis of race, color, national origin, age, disability, sex, sexual orientation, or gender identity.            Thank you!     Thank you for choosing [facility]ER [facility]  for your care. Our goal is always to provide you with excellent care. Hearing back from our patients is one way we can continue to improve our services. Please take a few minutes to complete the written survey that you may receive in the mail after your visit with us. Thank you!             Your Updated Medication List - Protect others around you: Learn how to safely use, store and throw away your medicines at www.disposemymeds.org.      Notice     This visit is during an admission. Changes to the med list made in this visit will be reflected in the After Visit Summary of the admission.

## 2018-11-28 NOTE — PROGRESS NOTES
Patient spoke with Mirtha  who stated that she is experiencing blurriness in her eyes. Consulted with Dr. Murillo in clinic who stated that that the blurriness is most likely secondary to the steroids. Per Dr. Murillo if the blurriness continues she can be referred to opthalmology. Writer will check with patient in 2 weeks to see if blurriness continues. Marialuisa Casper RN,BSN,OCN

## 2018-11-28 NOTE — PROGRESS NOTES
"Oncology Rooming Note    November 28, 2018 11:35 AM   Luci Londono is a 47 year old female who presents for:    Chief Complaint   Patient presents with     Oncology Clinic Visit     Initial Vitals: /81 (BP Location: Left arm, Patient Position: Sitting, Cuff Size: Adult Regular)  Pulse 75  Temp 97.7  F (36.5  C) (Oral)  Resp 18  Wt 88.5 kg (195 lb)  SpO2 100%  BMI 35.67 kg/m2 Estimated body mass index is 35.67 kg/(m^2) as calculated from the following:    Height as of an earlier encounter on 11/28/18: 1.575 m (5' 2\").    Weight as of this encounter: 88.5 kg (195 lb). Body surface area is 1.97 meters squared.  No Pain (0) Comment: Data Unavailable   No LMP recorded.  Allergies reviewed: Yes  Medications reviewed: Yes    Medications: MEDICATION REFILLS NEEDED TODAY. Provider was notified. Refill SHARON ESTRADA  Pharmacy name entered into [employer]:    [facility] [address] [place], [place] - [address] AT [address] [facility] [id number] [place] [address] [address] [place] [address] AT [address]    Clinical concerns: no   5 minutes for nursing intake (face to face time)          Emerald Pascual MA            "

## 2018-11-28 NOTE — PROGRESS NOTES
Infusion Nursing Note:  Luci Corbin Spring presents today for A/C.    Patient seen by provider today: Yes: Dr. Murillo   present during visit today: Not Applicable.    Note: N/A.    Intravenous Access:  Implanted Port already accessed at [facility] this morning.  Good blood return for port labs.    Treatment Conditions:  Lab Results   Component Value Date    HGB 11.0 11/28/2018     Lab Results   Component Value Date    WBC 19.8 11/28/2018      Lab Results   Component Value Date    ANEU 15.6 11/28/2018     Lab Results   Component Value Date     11/28/2018      Lab Results   Component Value Date     11/28/2018                   Lab Results   Component Value Date    POTASSIUM 3.6 11/28/2018           Lab Results   Component Value Date    MAG 1.8 10/12/2018            Lab Results   Component Value Date    CR 0.67 11/28/2018                   Lab Results   Component Value Date    MATHIEU 8.7 11/28/2018                Lab Results   Component Value Date    BILITOTAL 0.1 11/28/2018           Lab Results   Component Value Date    ALBUMIN 3.2 11/28/2018                    Lab Results   Component Value Date    ALT 31 11/28/2018           Lab Results   Component Value Date    AST 10 11/28/2018       Results reviewed, labs MET treatment parameters, ok to proceed with treatment.      Post Infusion Assessment:  Patient tolerated infusion but developed a headache after infusion was finished.  Site patent and intact, free from redness, edema or discomfort.  No evidence of extravasations.  Access discontinued per protocol.    Discharge Plan:   Patient and/or family verbalized understanding of discharge instructions and all questions answered.  Copy of AVS reviewed with patient and/or family.  Patient will return 12/12 for next appointment.  Patient discharged in stable condition accompanied by: self.  Departure Mode: Ambulatory.    Mike Painter RN    ONPRO  Was placed on patient's: back of left arm.    Was placed  at 215 PM    ONPRO injector device Lot number: [id number]    Patient education included: what patient can expect after application, what colored lights mean on the device, when to remove device, when and where to call with questions or issues, all patients questions answered and that Neulasta administration will occur at 515.    Patient tolerated administration well.

## 2018-11-28 NOTE — MR AVS SNAPSHOT
After Visit Summary   11/28/2018    Luci Londono    MRN: [id number]           Patient Information     Date Of Birth          1970        Visit Information        Provider Department      11/28/2018 4:17 PM Mirtha Toribio LICSW [facility]        Today's Diagnoses     Counseling NOS(V65.40)    -  1       Follow-ups after your visit        Your next 10 appointments already scheduled     Dec 07, 2018  8:00 AM CST   New Visit with Elisa Smith GC   [facility] ([facility])    [address] [address] [place] [address]   [phone]            Dec 12, 2018  9:30 AM CST   Level 3 with SH INFUSION CHAIR 6   [facility] ([facility])    [facility]  [address]  [facility] [address]   [phone]            Dec 12, 2018 10:20 AM CST   Return Visit with Addie Murillo MD   [facility] ([facility])    [facility]  [address]  [facility] [address]   [phone]            Dec 18, 2018 11:00 AM CST   Ech Complete with [id number]   [facility] CV Echocardiography ([facility] at [facility])    [address]  [facility] [address] [phone]           1.  Please bring or wear a comfortable two-piece outfit. 2.  You may eat, drink and take your normal medicines. 3.  For any questions that cannot be answered, please contact the ordering physician 4.  Please do not wear perfumes or scented lotions on the day of your exam.            Dec 18, 2018  2:30 PM CST   Return Visit with Elisa Pemberton PA-C   [facility] ([facility])    [address]  [facility] [address] [phone] OPT 2            Dec 26, 2018 10:00 AM CST   Level 3 with SH INFUSION CHAIR 7   [facility]fusion [facility] ([facility])    n [facility]  [address]  [facility] [address] [phone]              Who to contact     If you have questions or need follow up information about today's clinic visit or your schedule please contact [facility] CANCER [facility] directly at [phone].  Normal or non-critical lab and imaging results will be communicated to you by MyChart, letter or phone within 4 business days after the clinic has received the results. If you do not hear from us within 7 days, please contact the clinic through MyChart or phone. If you have a critical or abnormal lab result, we will notify you by phone as soon as possible.  Submit refill requests through [employer] or call your pharmacy and they will forward the refill request to us. Please allow 3 business days for your refill to be completed.          Additional Information About Your Visit        Care EveryWhere ID     This is your Care EveryWhere ID. This could be used by other organizations to access your [facility] medical records  [id number]         Blood Pressure from Last 3 Encounters:   11/28/18 124/81   11/28/18 131/78   11/26/18 137/89    Weight from Last 3 Encounters:   11/28/18 88.5 kg (195 lb)   11/28/18 86.2 kg (190 lb 0.6 oz)   11/28/18 88.5 kg (195 lb)              Today, you had the following     No orders found for display         Today's Medication Changes      Notice     This visit is on the same day as an admission, and a visit start time could not be determined. If the visit took place after discharge, manually review the med list with the patient.             Primary Care Provider Office Phone # Fax #    Stuart Wills [phone] [address]  [facility] [address]        Equal Access to Services     PINA PINEDA : Kalani Marks, wadasia koch, qaybta kaalmajulianne bauer, josh bangura. So [facility]  [phone].    ATENCIÓN: Si habla magan, tiene a dominique disposición servicios gratuitos de asistencia lingüística. Trice al [phone].    We comply with applicable federal civil rights laws and [place] laws. We do not discriminate on the basis of race, color, national origin, age, disability, sex, sexual orientation, or gender identity.            Thank you!     Thank you for choosing [facility] CANCER [facility]  for your care. Our goal is always to provide you with excellent care. Hearing back from our patients is one way we can continue to improve our services. Please take a few minutes to complete the written survey that you may receive in the mail after your visit with us. Thank you!             Your Updated Medication List - Protect others around you: Learn how to safely use, store and throw away your medicines at www.disposemymeds.org.      Notice     This visit is on the same day as an admission, and a visit start time could not be determined. If the visit took place after discharge, manually review the med list with the patient.

## 2018-11-28 NOTE — LETTER
"    11/28/2018         RE: Luci Londono  [address]  [place] [address]        Dear Colleague,    Thank you for referring your patient, Luci Londono, to the [facility]IC. Please see a copy of my visit note below.    Oncology Rooming Note    November 28, 2018 11:35 AM   Luci Londono is a 47 year old female who presents for:    Chief Complaint   Patient presents with     Oncology Clinic Visit     Initial Vitals: /81 (BP Location: Left arm, Patient Position: Sitting, Cuff Size: Adult Regular)  Pulse 75  Temp 97.7  F (36.5  C) (Oral)  Resp 18  Wt 88.5 kg (195 lb)  SpO2 100%  BMI 35.67 kg/m2 Estimated body mass index is 35.67 kg/(m^2) as calculated from the following:    Height as of an earlier encounter on 11/28/18: 1.575 m (5' 2\").    Weight as of this encounter: 88.5 kg (195 lb). Body surface area is 1.97 meters squared.  No Pain (0) Comment: Data Unavailable   No LMP recorded.  Allergies reviewed: Yes  Medications reviewed: Yes    Medications: MEDICATION REFILLS NEEDED TODAY. Provider was notified. Refill ATMISTY ABRAHAMADABILIO  Pharmacy name entered into [facility]:    [facility] [address] [place], [place] - [address] [place] [address] AT [address] [place] [address] & [address] [facility] [address] [place], [place] - [address] [place] [address] AT [address]    Clinical concerns: no   5 minutes for nursing intake (face to face time)          Emerald Pascual MA              ONCOLOGY FOLLOW UI VISIT    breast surgeon:  Dr. Staecy Ashford,     REASON FOR visit:  10/2018 dx right breast TN IDC, cT1cN1 disease on neoadjuvant chemo with DD AC    HISTORY OF PRESENT ILLNESS:    At age 47 amenorrhea with polycystic ovaries,  She felt a lump in her right breast in early October, 2018.   Her mammogram revealed a small mass in the right upper outer breast,   US revealed an 8mm hypoechoic mass at 12:00, 6cm FN and axillary US revealed a concerning lymph node which was also " biopsied.   Her pathology from both breast and LN revealed a grade 3, invasive ductal carcinoma, ER/MI/her 2-.   PET found no distal disease.     She made informed decision to proceed with neoadjuvant DD AC  She reports she does daily self breast exams and noticed the lump three days prior to her mammogram. She reports some new right shoulder pain and low back pain which is chronic.      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     MEDICATIONS/ALLERY:  Reviewed in Epic system.      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 Deny ETOH/smoking       FAMILY HISTORY:    Negative for any type of cancers    REVIEW OF SYSTEMS:   She has bone pain, stomatitis, and fatigue.          PHYSICAL EXAMINATION:   VITAL SIGNS: Blood pressure 124/81, pulse 75, temperature 97.7  F (36.5  C), temperature source Oral, resp. rate 18, weight 88.5 kg (195 lb), SpO2 100 %, not currently breastfeeding.      ECOG 0    GENERAL APPEARANCE:  looks like her stated age, very pleasant, not in acute distress.   HEENT: The patient is normocephalic, atraumatic. Pupils are equally reactive to light.  Sclerae are anicteric.  Moist oral mucosa.  Negative pharynx.  No oral thrush. Stomatitis on left angular of mouth.    NECK:  Supple.  No jugular venous distention.  Thyroid is not palpable.   LYMPH NODES:  Superficial lymphadenopathy is not appreciable in the bilateral cervical, supraclavicular, axillary or inguinal areas.   CARDIOVASCULAR:  S1, S2 regular with no murmurs or gallops.  No carotid or abdominal bruits.   PULMONARY:   Lungs are clear to auscultation and percussion bilaterally.  There is no wheezing or rhonchi.   GASTROINTESTINAL:  Abdomen is soft, nontender.  No hepatosplenomegaly.  No signs of ascites.  No mass appreciable.   MUSCULOSKELETAL/EXTREMITIES:  No edema.  No cyanotic changes.  No signs of joint deformity.  No lymphedema.   NEUROLOGIC:  Cranial nerves II-XII are grossly intact.  Sensation intact.  Muscle strength and muscle tone symmetrical, 5/5 throughout.   BACK:  No spinal or paraspinal tenderness.  No CVA tenderness.   SKIN:  No petechiae.  No rash.  No signs of cellulitis.   BREASTS: Right breast with mild ecchymosis on upper breast, no longer palpable 1.5cm mass at 12:00 post C1. No other masses.  Left breast is symmetrical with no skin or nipple changes. No masses on the left. Tissue is very dense throughout.          CURRENT LAB DATA REVIEWED  10/2018  Right breast 8mm hypoechoic mass at 12:00, 6cm FN and right axillary LN biopsy both found grade 3, invasive ductal carcinoma, ER/ID/her 2-.     Baseline cbc diff/CMP are fine.         CURRENT IMAGING REVIEWED  Baseline pre tx breast MRI 10/2018  In the right breast at 12:00 7 cm from the nipple, there is irregular heterogeneously enhancing mass, corresponding with the known biopsy-proven malignancy, which spans approximately 2.2 cm in maximal diameter, best appreciated on sagittal view, with surrounding nonmasslike enhancement likely related to postbiopsy change or DCIS.  Taken with the primary tumor, the entire span extends up to 3.8 cm.   There are multiple enlarged right axillary lymph nodes.      PET 10/2018  1. No evidence of distant metastasis.  2. Hypermetabolic focus in the upper outer quadrant right breast representing primary tumor. Hypermetabolic right axillary lymph nodes, consistent with metastatic node.  3. Indeterminate sub-4 mm pulmonary nodules, likely fissural lymph node in the right lung.  4. Extensive FDG uptake by the brown fat in the neck  and paraspinal region.    10/2018 dx MA -  revealed a small mass in the right upper outer breast, US revealed an 8mm hypoechoic mass at 12:00, 6cm FN and axillary US revealed a concerning lymph node        OLD DATA REVIEWED TODAY WITH SUMMARY:   Prior MA 2017, 2014 - negative.           ASSESSMENT AND PLAN:    1.  dx cT1cN1 right breast high grade IDC, TN at age 47 in 10/2018.     PET is negative for distal disease.      We discussed the side effects of chemo include not limit to N/V/hair loss/lower immunity/cardiac toxicity/rare leukemia/infusion related reaction and mortality.   She made informed decision to proceed DD AC -- taxol + carbo C1D1 10/24/2018.     Plan restaging MRI post C4 AC.   She needs to be monitored closely during this intense tx.     2. Young age dx with TN breast cancer, she will need genetic counseling.   Result will direct us on the platinum use.     3. Stomatitis - advice abreva cream topical. S/p valtrex.    4. Bone pain from neulasta - can try claritin.     5. Nausea without vormiting - we discuss in detail the dex use, and prn anti emetic use.               Again, thank you for allowing me to participate in the care of your patient.        Sincerely,        Addie Murilol MD, MD

## 2018-11-28 NOTE — PROGRESS NOTES
Pt here for left chest wall port check, port was accessed per protocol and aspirated with good blood return and flushed without difficulty, Dr Kemp at bedside and spoke to pt regarding fluro validation of flow, all questions were answered and consent signed, pt tolerated procedure well, no sedation used, call to [facility] infusion therapy and left detailed message regarding port check, port was left accessed with 20 gauge 3/4 inch port needle.

## 2018-11-30 ENCOUNTER — TELEPHONE (OUTPATIENT)
Dept: ONCOLOGY | Facility: CLINIC | Age: 48
End: 2018-11-30

## 2018-11-30 NOTE — TELEPHONE ENCOUNTER
----- Message from Rose Hines GC sent at 11/30/2018  8:04 AM CST -----  If she is willing to travel to the [facility], she can take one of my open return slots on 12/5. If none of the other days offered by my colleagues work for her, I could see her on 12/11 at 1:15pm, though that is less preferable as she would be my second add on for that day.    Thanks,  Rose    ----- Message -----     From: Elisa Smith GC     Sent: 11/30/2018   7:58 AM       To: Rose Hines GC, Neida Fortune GC, #    I have an open return slot at 8am in [place] next Friday.    Elisa   ----- Message -----     From: Neida Fortune GC     Sent: 11/30/2018   7:48 AM       To: Rose Hines GC, #    Good morning, how soon does she need an appointment?  My next opening for urgent add-on patients is 12/13 during an open return time slot. My colleagues may have additional availability.    I have included the cancer risk management schedulers to help facilitate making this appointment.      Neida  ----- Message -----     From: Yvonne Bee     Sent: 11/29/2018  12:04 PM       To: BRIONNA De Dios,     I am pasting instructions from Dr Murillo regarding this patient and a genetic appointment:     Need to see genetic counselor asap for TN breast cancer dx at young age.  Please arrange.    This patient is scheduled with you 1/17/19, but Dr Murillo would like the patient worked in sooner if possible. Than you so much for your assistance with this!!    Yvonne Meléndez Scheduling Staff

## 2018-11-30 NOTE — PROGRESS NOTES
Social Work Progress Note      Data/Intervention:  Patient Name:  Luci Londono  /Age:  1970 (47 year old)    Reason for Follow-Up:  Luci is a verenice 47-year-old woman with a new diagnosis of breast cancer who comes to clinic today for C3D1 Adriamycin/Cytoxan. This clinician met with Luci for routine psychosocial check-in and emotional support.     Intervention:   Luci reports that she feels that she has been coping well with treatment thus far aside from some occasional blurriness in the eye. Luci reports that it has not been of great concern to her, but acknowledges that it started when she began treatment. Supported Luci in completion of [employer] application today, and faxed in additional documents needed for financial support for mortgage. Luci endorses that she is looking forward to upcoming visit with Emmett Sherman this week, and continuing to work towards acquiring a wig. No psychosocial concerns reported or observed today.      Plan:  1) This clinician will continue to follow for ongoing psychosocial support as pt continues to adjust to treatment and realize its impacts  2) This clinician will continue to collaborate with ongoing care team.     Please call or page if needs or concerns arise.     MIRA Julien, [facility] Hospital[facility]  Direct Phone: [phone]  Pager: [phone]

## 2018-12-04 ENCOUNTER — TELEPHONE (OUTPATIENT)
Dept: ONCOLOGY | Facility: CLINIC | Age: 48
End: 2018-12-04

## 2018-12-04 NOTE — TELEPHONE ENCOUNTER
Message left for patient Re: to discuss appointment for genetic counseling on Friday 12-7-18 at [place] with Elisa Smith.

## 2018-12-06 ENCOUNTER — TELEPHONE (OUTPATIENT)
Dept: ONCOLOGY | Facility: CLINIC | Age: 48
End: 2018-12-06

## 2018-12-06 DIAGNOSIS — M25.522 PAIN IN JOINT INVOLVING UPPER ARM, LEFT: ICD-10-CM

## 2018-12-06 DIAGNOSIS — T45.1X5A MOUTH SORE SECONDARY TO CHEMOTHERAPY: Primary | ICD-10-CM

## 2018-12-06 DIAGNOSIS — C50.411 MALIGNANT NEOPLASM OF UPPER-OUTER QUADRANT OF RIGHT BREAST IN FEMALE, ESTROGEN RECEPTOR NEGATIVE (H): ICD-10-CM

## 2018-12-06 DIAGNOSIS — Z17.1 MALIGNANT NEOPLASM OF UPPER-OUTER QUADRANT OF RIGHT BREAST IN FEMALE, ESTROGEN RECEPTOR NEGATIVE (H): ICD-10-CM

## 2018-12-06 DIAGNOSIS — K13.79 MOUTH SORE SECONDARY TO CHEMOTHERAPY: Primary | ICD-10-CM

## 2018-12-06 RX ORDER — VALACYCLOVIR HYDROCHLORIDE 1 G/1
2000 TABLET, FILM COATED ORAL 2 TIMES DAILY
Qty: 4 TABLET | Refills: 0 | Status: SHIPPED | OUTPATIENT
Start: 2018-12-06 | End: 2019-01-09

## 2018-12-06 NOTE — TELEPHONE ENCOUNTER
Called patient to let her know that her Request for medical opinion through [facility] is completed. Patient requested to have it faxed to [facility] and then she will pick it up next week when she comes in for chemotherapy. Patient also requested additional refill of Valtrex for her mouth sore which has completely not gone away. Verified with GINGER Ramesh who ordered it and script was renewed. Marialuisa Casper RN,BSN,OCN

## 2018-12-06 NOTE — TELEPHONE ENCOUNTER
RECORDS STATUS - BREAST    RECORDS RECEIVED FROM: [facility]   DATE RECEIVED: 12/06/2018   NOTES STATUS DETAILS   OFFICE NOTE from referring provider Complete Epic   OFFICE NOTE from medical oncologist Complete Epic   OFFICE NOTE from surgeon Complete [facility]   OFFICE NOTE from radiation oncologist     DISCHARGE SUMMARY from hospital Complete Epic   DISCHARGE REPORT from the ER     OPERATIVE REPORT Complete Epic   MEDICATION LIST     CLINICAL TRIAL TREATMENTS TO DATE     LABS     PATHOLOGY REPORTS  (Tissue diagnosis, Stage, ER/NV percentage positive and intensity of staining, HER2 IHC, FISH, and all biopsies from breast and any distant metastasis)                 Complete Epic   GENONOMIC TESTING     TYPE:   (Next Generation Sequencing, including Foundation One testing, and Oncotype score)     IMAGING (NEED IMAGES & REPORT)     CT SCANS Complete PACS   MRI Complete PACS   MAMMO     ULTRASOUND Complete PACS   PET Complete PACS   BONE SCAN     BRAIN MRI

## 2018-12-07 ENCOUNTER — DOCUMENTATION ONLY (OUTPATIENT)
Dept: ONCOLOGY | Facility: CLINIC | Age: 48
End: 2018-12-07

## 2018-12-07 ENCOUNTER — PRE VISIT (OUTPATIENT)
Dept: ONCOLOGY | Facility: CLINIC | Age: 48
End: 2018-12-07

## 2018-12-07 NOTE — PROGRESS NOTES
12/7/2018    Luci Alert did not appear for her Cancer Risk Management Program genetic counseling appointment at the [facility].  If she would like to reschedule, appointments can be made by calling [phone].    Elisa Smith MS, [facility]  Licensed Genetic Counselor  P. [phone]  F. [phone]

## 2018-12-12 ENCOUNTER — HOSPITAL ENCOUNTER (OUTPATIENT)
Facility: CLINIC | Age: 48
Setting detail: SPECIMEN
Discharge: HOME | End: 2018-12-12
Attending: INTERNAL MEDICINE | Admitting: INTERNAL MEDICINE
Payer: COMMERCIAL

## 2018-12-12 ENCOUNTER — INFUSION THERAPY VISIT (OUTPATIENT)
Dept: INFUSION THERAPY | Facility: CLINIC | Age: 48
End: 2018-12-12
Attending: INTERNAL MEDICINE
Payer: COMMERCIAL

## 2018-12-12 ENCOUNTER — HOSPITAL ENCOUNTER (OUTPATIENT)
Age: 48
Discharge: HOME | End: 2018-12-12
Attending: INTERNAL MEDICINE | Admitting: INTERNAL MEDICINE
Payer: COMMERCIAL

## 2018-12-12 ENCOUNTER — ONCOLOGY VISIT (OUTPATIENT)
Dept: ONCOLOGY | Facility: CLINIC | Age: 48
End: 2018-12-12
Attending: INTERNAL MEDICINE
Payer: COMMERCIAL

## 2018-12-12 VITALS
RESPIRATION RATE: 18 BRPM | OXYGEN SATURATION: 99 % | TEMPERATURE: 98.2 F | SYSTOLIC BLOOD PRESSURE: 137 MMHG | WEIGHT: 196.6 LBS | DIASTOLIC BLOOD PRESSURE: 83 MMHG | HEART RATE: 102 BPM | BODY MASS INDEX: 36.18 KG/M2 | HEIGHT: 62 IN

## 2018-12-12 VITALS
TEMPERATURE: 98.2 F | BODY MASS INDEX: 36.18 KG/M2 | HEIGHT: 62 IN | RESPIRATION RATE: 18 BRPM | SYSTOLIC BLOOD PRESSURE: 137 MMHG | DIASTOLIC BLOOD PRESSURE: 83 MMHG | OXYGEN SATURATION: 99 % | HEART RATE: 102 BPM | WEIGHT: 196.6 LBS

## 2018-12-12 DIAGNOSIS — Z17.1 MALIGNANT NEOPLASM OF UPPER-OUTER QUADRANT OF RIGHT BREAST IN FEMALE, ESTROGEN RECEPTOR NEGATIVE (H): Primary | ICD-10-CM

## 2018-12-12 DIAGNOSIS — K13.79 MOUTH SORE SECONDARY TO CHEMOTHERAPY: ICD-10-CM

## 2018-12-12 DIAGNOSIS — Z95.828 PORT-A-CATH IN PLACE: ICD-10-CM

## 2018-12-12 DIAGNOSIS — J06.9 UPPER RESPIRATORY TRACT INFECTION, UNSPECIFIED TYPE: ICD-10-CM

## 2018-12-12 DIAGNOSIS — C50.411 MALIGNANT NEOPLASM OF UPPER-OUTER QUADRANT OF RIGHT BREAST IN FEMALE, ESTROGEN RECEPTOR NEGATIVE (H): Primary | ICD-10-CM

## 2018-12-12 DIAGNOSIS — M89.8X9 BONE PAIN: ICD-10-CM

## 2018-12-12 DIAGNOSIS — T45.1X5A MOUTH SORE SECONDARY TO CHEMOTHERAPY: ICD-10-CM

## 2018-12-12 LAB
ANISOCYTOSIS BLD QL SMEAR: ABNORMAL
BASO STIPL BLD QL SMEAR: PRESENT
BASOPHILS # BLD AUTO: 0.1 10E9/L (ref 0–0.2)
BASOPHILS NFR BLD AUTO: 1 %
DACRYOCYTES BLD QL SMEAR: SLIGHT
DIFFERENTIAL METHOD BLD: ABNORMAL
EOSINOPHIL # BLD AUTO: 0 10E9/L (ref 0–0.7)
EOSINOPHIL NFR BLD AUTO: 0 %
ERYTHROCYTE [DISTWIDTH] IN BLOOD BY AUTOMATED COUNT: 19.8 % (ref 10–15)
HCT VFR BLD AUTO: 31.4 % (ref 35–47)
HGB BLD-MCNC: 10.4 G/DL (ref 11.7–15.7)
LYMPHOCYTES # BLD AUTO: 0.9 10E9/L (ref 0.8–5.3)
LYMPHOCYTES NFR BLD AUTO: 8 %
MCH RBC QN AUTO: 26.7 PG (ref 26.5–33)
MCHC RBC AUTO-ENTMCNC: 33.1 G/DL (ref 31.5–36.5)
MCV RBC AUTO: 81 FL (ref 78–100)
METAMYELOCYTES # BLD: 0.2 10E9/L
METAMYELOCYTES NFR BLD MANUAL: 2 %
MONOCYTES # BLD AUTO: 0.2 10E9/L (ref 0–1.3)
MONOCYTES NFR BLD AUTO: 2 %
NEUTROPHILS # BLD AUTO: 10.1 10E9/L (ref 1.6–8.3)
NEUTROPHILS NFR BLD AUTO: 87 %
PLATELET # BLD AUTO: 181 10E9/L (ref 150–450)
PLATELET # BLD EST: ABNORMAL 10*3/UL
POLYCHROMASIA BLD QL SMEAR: SLIGHT
RBC # BLD AUTO: 3.9 10E12/L (ref 3.8–5.2)
SMUDGE CELLS BLD QL SMEAR: PRESENT
WBC # BLD AUTO: 11.6 10E9/L (ref 4–11)

## 2018-12-12 PROCEDURE — 85025 COMPLETE CBC W/AUTO DIFF WBC: CPT | Performed by: INTERNAL MEDICINE

## 2018-12-12 PROCEDURE — 96367 TX/PROPH/DG ADDL SEQ IV INF: CPT

## 2018-12-12 PROCEDURE — G0463 HOSPITAL OUTPT CLINIC VISIT: HCPCS | Mod: 25

## 2018-12-12 PROCEDURE — 96413 CHEMO IV INFUSION 1 HR: CPT

## 2018-12-12 PROCEDURE — 96377 APPLICATON ON-BODY INJECTOR: CPT | Mod: XS

## 2018-12-12 PROCEDURE — 96375 TX/PRO/DX INJ NEW DRUG ADDON: CPT

## 2018-12-12 PROCEDURE — 96411 CHEMO IV PUSH ADDL DRUG: CPT

## 2018-12-12 PROCEDURE — 25000128 H RX IP 250 OP 636: Performed by: INTERNAL MEDICINE

## 2018-12-12 PROCEDURE — 99215 OFFICE O/P EST HI 40 MIN: CPT | Performed by: INTERNAL MEDICINE

## 2018-12-12 RX ORDER — LORAZEPAM 2 MG/ML
0.5 INJECTION INTRAMUSCULAR EVERY 4 HOURS PRN
Status: CANCELLED
Start: 2018-12-12

## 2018-12-12 RX ORDER — PALONOSETRON 0.05 MG/ML
0.25 INJECTION, SOLUTION INTRAVENOUS ONCE
Status: COMPLETED | OUTPATIENT
Start: 2018-12-12 | End: 2018-12-12

## 2018-12-12 RX ORDER — METHYLPREDNISOLONE SODIUM SUCCINATE 125 MG/2ML
125 INJECTION, POWDER, LYOPHILIZED, FOR SOLUTION INTRAMUSCULAR; INTRAVENOUS
Status: CANCELLED
Start: 2018-12-12

## 2018-12-12 RX ORDER — MEPERIDINE HYDROCHLORIDE 25 MG/ML
25 INJECTION INTRAMUSCULAR; INTRAVENOUS; SUBCUTANEOUS EVERY 30 MIN PRN
Status: CANCELLED | OUTPATIENT
Start: 2018-12-12

## 2018-12-12 RX ORDER — ALBUTEROL SULFATE 0.83 MG/ML
2.5 SOLUTION RESPIRATORY (INHALATION)
Status: CANCELLED | OUTPATIENT
Start: 2018-12-12

## 2018-12-12 RX ORDER — DOXORUBICIN HYDROCHLORIDE 2 MG/ML
120 INJECTION, SOLUTION INTRAVENOUS ONCE
Status: COMPLETED | OUTPATIENT
Start: 2018-12-12 | End: 2018-12-12

## 2018-12-12 RX ORDER — AZITHROMYCIN 250 MG/1
250 TABLET, FILM COATED ORAL DAILY
Qty: 6 TABLET | Refills: 0 | Status: SHIPPED | OUTPATIENT
Start: 2018-12-12 | End: 2019-01-02

## 2018-12-12 RX ORDER — HEPARIN SODIUM (PORCINE) LOCK FLUSH IV SOLN 100 UNIT/ML 100 UNIT/ML
5 SOLUTION INTRAVENOUS ONCE
Status: CANCELLED
Start: 2018-12-12 | End: 2018-12-12

## 2018-12-12 RX ORDER — DOXORUBICIN HYDROCHLORIDE 2 MG/ML
60 INJECTION, SOLUTION INTRAVENOUS ONCE
Status: CANCELLED | OUTPATIENT
Start: 2018-12-12

## 2018-12-12 RX ORDER — EPINEPHRINE 0.3 MG/.3ML
0.3 INJECTION SUBCUTANEOUS EVERY 5 MIN PRN
Status: CANCELLED | OUTPATIENT
Start: 2018-12-12

## 2018-12-12 RX ORDER — HEPARIN SODIUM (PORCINE) LOCK FLUSH IV SOLN 100 UNIT/ML 100 UNIT/ML
5 SOLUTION INTRAVENOUS ONCE
Status: COMPLETED | OUTPATIENT
Start: 2018-12-12 | End: 2018-12-12

## 2018-12-12 RX ORDER — SODIUM CHLORIDE 9 MG/ML
1000 INJECTION, SOLUTION INTRAVENOUS CONTINUOUS PRN
Status: CANCELLED
Start: 2018-12-12

## 2018-12-12 RX ORDER — ALBUTEROL SULFATE 90 UG/1
1-2 AEROSOL, METERED RESPIRATORY (INHALATION)
Status: CANCELLED
Start: 2018-12-12

## 2018-12-12 RX ORDER — EPINEPHRINE 1 MG/ML
0.3 INJECTION, SOLUTION INTRAMUSCULAR; SUBCUTANEOUS EVERY 5 MIN PRN
Status: CANCELLED | OUTPATIENT
Start: 2018-12-12

## 2018-12-12 RX ORDER — DIPHENHYDRAMINE HYDROCHLORIDE 50 MG/ML
50 INJECTION INTRAMUSCULAR; INTRAVENOUS
Status: CANCELLED
Start: 2018-12-12

## 2018-12-12 RX ORDER — PALONOSETRON 0.05 MG/ML
0.25 INJECTION, SOLUTION INTRAVENOUS ONCE
Status: CANCELLED
Start: 2018-12-12

## 2018-12-12 RX ADMIN — SODIUM CHLORIDE, PRESERVATIVE FREE 5 ML: 5 INJECTION INTRAVENOUS at 13:00

## 2018-12-12 RX ADMIN — PALONOSETRON HYDROCHLORIDE 0.25 MG: 0.25 INJECTION INTRAVENOUS at 11:17

## 2018-12-12 RX ADMIN — DOXORUBICIN HYDROCHLORIDE 120 MG: 2 INJECTION, SOLUTION INTRAVENOUS at 11:54

## 2018-12-12 RX ADMIN — DEXAMETHASONE SODIUM PHOSPHATE: 10 INJECTION, SOLUTION INTRAMUSCULAR; INTRAVENOUS at 11:18

## 2018-12-12 RX ADMIN — PEGFILGRASTIM 6 MG: KIT SUBCUTANEOUS at 12:45

## 2018-12-12 RX ADMIN — SODIUM CHLORIDE 250 ML: 9 INJECTION, SOLUTION INTRAVENOUS at 11:17

## 2018-12-12 RX ADMIN — CYCLOPHOSPHAMIDE 1200 MG: 2 INJECTION, POWDER, FOR SOLUTION INTRAVENOUS; ORAL at 12:09

## 2018-12-12 ASSESSMENT — MIFFLIN-ST. JEOR
SCORE: 1480.15
SCORE: 1480.15

## 2018-12-12 ASSESSMENT — PAIN SCALES - GENERAL
PAINLEVEL: MODERATE PAIN (4)
PAINLEVEL: MILD PAIN (2)

## 2018-12-12 NOTE — PROGRESS NOTES
"Oncology Rooming Note    December 12, 2018 9:36 AM   Luci Londono is a 48 year old female who presents for:    Chief Complaint   Patient presents with     Oncology Clinic Visit     Malignant neoplasm of upper-outer quadrant of right breast in female, estrogen receptor negative (H)     Initial Vitals: /83   Pulse 102   Temp 98.2  F (36.8  C) (Oral)   Resp 18   Ht 1.575 m (5' 2.01\")   Wt 89.2 kg (196 lb 9.6 oz)   SpO2 99%   BMI 35.95 kg/m   Estimated body mass index is 35.95 kg/m  as calculated from the following:    Height as of this encounter: 1.575 m (5' 2.01\").    Weight as of this encounter: 89.2 kg (196 lb 9.6 oz). Body surface area is 1.98 meters squared.  No Pain (0) Comment: Data Unavailable   No LMP recorded.  Allergies reviewed: Yes  Medications reviewed: Yes    Medications: Medication refills not needed today.  Pharmacy name entered into [employer]:    [id number] - [place], [place] - [address] AT [address] & [address] [facility] [address] [place], [place] - [address] [place] [address] AT [address]    Clinical concerns: no   5 minutes for nursing intake (face to face time)          Emerald Pascual MA            "

## 2018-12-12 NOTE — PATIENT INSTRUCTIONS
Your On-body Neulasta Injector was applied to your left upper arm at 12:45pm.  At approximately 3:45pm on 12/13/18, your On-body Injector will beep to let you know your dose delivery will begin in 2 minutes.  Your medication will be delivered over the next 45 minutes.  You can remove your Injector when the black indicator line is on empty.  Please make sure your Injector has a solid green light or has turned off prior to removing the device.  Please contact your provider at [phone] with questions or concerns.

## 2018-12-12 NOTE — PROGRESS NOTES
"ONCOLOGY FOLLOW [facility] VISIT    breast surgeon:  Dr. 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   Her mammogram revealed a small mass in the right upper outer breast,   US revealed an 8mm hypoechoic mass at 12:00, 6cm FN and axillary US revealed a concerning lymph node which was also biopsied.   Her pathology from both breast and LN revealed a grade 3, invasive ductal carcinoma, ER/[place]/her 2-.   PET found no distal disease.   Breast MRI found entire span 3.8 cm.There are multiple enlarged right axillary lymph nodes.          She made informed decision to proceed with neoadjuvant DD AC  She reports she does daily self breast exams and noticed the lump three days prior to her mammogram. She reports some new right shoulder pain and low back pain which is chronic.      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     MEDICATIONS/ALLERY:  Reviewed in Epic system.      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 Deny ETOH/smoking       FAMILY HISTORY:    Negative for any type of cancers    REVIEW OF SYSTEMS:   She has bone pain, stomatitis, and fatigue. Reports URI. Also reports bone pain.        PHYSICAL EXAMINATION:   VITAL SIGNS: Blood pressure 137/83, pulse 102, temperature 98.2  F (36.8  C), temperature source Oral, resp. rate 18, height 1.575 m (5' 2.01\"), weight 89.2 kg (196 lb 9.6 oz), " SpO2 99 %, not currently breastfeeding.    ECOG 0    GENERAL APPEARANCE:  looks like her stated age, very pleasant, not in acute distress.   HEENT: The patient is normocephalic, atraumatic. Pupils are equally reactive to light.  Sclerae are anicteric.  Moist oral mucosa.  Negative pharynx.  No oral thrush. Stomatitis on left angular of mouth.    NECK:  Supple.  No jugular venous distention.  Thyroid is not palpable.   LYMPH NODES:  Superficial lymphadenopathy is not appreciable in the bilateral cervical, supraclavicular, axillary or inguinal areas.   CARDIOVASCULAR:  S1, S2 regular with no murmurs or gallops.  No carotid or abdominal bruits.   PULMONARY:  Lungs are clear to auscultation and percussion bilaterally.  There is no wheezing or rhonchi.   GASTROINTESTINAL:  Abdomen is soft, nontender.  No hepatosplenomegaly.  No signs of ascites.  No mass appreciable.   MUSCULOSKELETAL/EXTREMITIES:  No edema.  No cyanotic changes.  No signs of joint deformity.  No lymphedema.   NEUROLOGIC:  Cranial nerves II-XII are grossly intact.  Sensation intact.  Muscle strength and muscle tone symmetrical, 5/5 throughout.   BACK:  No spinal or paraspinal tenderness.  No CVA tenderness.   SKIN:  No petechiae.  No rash.  No signs of cellulitis.     BREASTS: Right breast with mild ecchymosis on upper breast, no longer palpable 1.5cm mass at 12:00 post C1. No other masses.  Left breast is symmetrical with no skin or nipple changes. No masses on the left. Tissue is very dense throughout.          CURRENT LAB DATA REVIEWED  10/2018  Right breast 8mm hypoechoic mass at 12:00, 6cm FN and right axillary LN biopsy both found grade 3, invasive ductal carcinoma, ER/[place]/her 2-.     Baseline cbc diff/CMP are fine.         CURRENT IMAGING REVIEWED  Baseline pre tx breast MRI 10/2018 -  In the right breast at 12:00 7 cm from the nipple, there is irregular heterogeneously enhancing mass, corresponding with the known biopsy-proven malignancy, which  spans approximately 2.2 cm in maximal diameter, best appreciated on sagittal view, with surrounding nonmasslike enhancement likely related to postbiopsy change or DCIS.  Taken with the primary tumor, the entire span extends up to 3.8 cm.   There are multiple enlarged right axillary lymph nodes.      PET 10/2018  1. No evidence of distant metastasis.  2. Hypermetabolic focus in the upper outer quadrant right breast representing primary tumor. Hypermetabolic right axillary lymph nodes, consistent with metastatic node.  3. Indeterminate sub-4 mm pulmonary nodules, likely fissural lymph node in the right lung.  4. Extensive FDG uptake by the brown fat in the neck and paraspinal region.    10/2018 dx MA -  revealed a small mass in the right upper outer breast, US revealed an 8mm hypoechoic mass at 12:00, 6cm FN and axillary US revealed a concerning lymph node        OLD DATA REVIEWED TODAY WITH SUMMARY:   Prior MA 2017, 2014 - negative.           ASSESSMENT AND PLAN:    1.  dx cT1cN1 right breast high grade IDC, TN at age 47 in 10/2018.     PET is negative for distal disease.      We discussed the side effects of chemo include not limit to N/V/hair loss/lower immunity/cardiac toxicity/rare leukemia/infusion related reaction and mortality.   She made informed decision to proceed DD AC -- taxol + carbo C1D1 10/24/2018.     Plan restaging MRI post C4 AC.   She needs to be monitored closely during this intense tx.     2. Young age dx with TN breast cancer, she will need genetic counseling.   Result will direct us on the platinum use.     3. Stomatitis - advice abreva cream topical. S/p valtrex.    4. Bone pain from neulasta - can try claritin.     5. Nausea without vormiting - we discuss in detail the dex use, and prn anti emetic use.     6. New URI symptoms. Since she is on chemo, will offer her Z pack.

## 2018-12-12 NOTE — LETTER
"    12/12/2018         RE: Luci Londono  [address]  [facility] [address]        Dear Colleague,    Thank you for referring your patient, Luci Londono, to the [facility]. Please see a copy of my visit note below.    Oncology Rooming Note    December 12, 2018 9:36 AM   Luci Londono is a 48 year old female who presents for:    Chief Complaint   Patient presents with     Oncology Clinic Visit     Malignant neoplasm of upper-outer quadrant of right breast in female, estrogen receptor negative (H)     Initial Vitals: /83   Pulse 102   Temp 98.2  F (36.8  C) (Oral)   Resp 18   Ht 1.575 m (5' 2.01\")   Wt 89.2 kg (196 lb 9.6 oz)   SpO2 99%   BMI 35.95 kg/m    Estimated body mass index is 35.95 kg/m  as calculated from the following:    Height as of this encounter: 1.575 m (5' 2.01\").    Weight as of this encounter: 89.2 kg (196 lb 9.6 oz). Body surface area is 1.98 meters squared.  No Pain (0) Comment: Data Unavailable   No LMP recorded.  Allergies reviewed: Yes  Medications reviewed: Yes    Medications: Medication refills not needed today.  Pharmacy name entered into [employer]:    [facility] [id number] - [address] AT [address] [place] [address] & [address] [facility] [address] [place], [place] - [address] [place] [address] AT [address]    Clinical concerns: no   5 minutes for nursing intake (face to face time)          Emerald Pascual MA              ONCOLOGY FOLLOW UI VISIT    breast surgeon:  Dr. 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   Her mammogram revealed a small mass in the right upper outer breast,   US revealed an 8mm hypoechoic mass at 12:00, 6cm FN and axillary US revealed a concerning lymph node which was " "also biopsied.   Her pathology from both breast and LN revealed a grade 3, invasive ductal carcinoma, ER/[place]/her 2-.   PET found no distal disease.   Breast MRI found entire span 3.8 cm.There are multiple enlarged right axillary lymph nodes.          She made informed decision to proceed with neoadjuvant DD AC  She reports she does daily self breast exams and noticed the lump three days prior to her mammogram. She reports some new right shoulder pain and low back pain which is chronic.      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     MEDICATIONS/ALLERY:  Reviewed in Epic system.      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 Deny ETOH/smoking       FAMILY HISTORY:    Negative for any type of cancers    REVIEW OF SYSTEMS:   She has bone pain, stomatitis, and fatigue. Reports URI. Also reports bone pain.        PHYSICAL EXAMINATION:   VITAL SIGNS: Blood pressure 137/83, pulse 102, temperature 98.2  F (36.8  C), temperature source Oral, resp. rate 18, height 1.575 m (5' 2.01\"), weight 89.2 kg (196 lb 9.6 oz), SpO2 99 %, not currently breastfeeding.    ECOG 0    GENERAL APPEARANCE:  looks like her stated age, very pleasant, not in acute distress.   HEENT: The patient is normocephalic, atraumatic. Pupils are equally reactive to light.  Sclerae are anicteric.  Moist oral mucosa.  Negative pharynx.  No oral thrush. Stomatitis on left angular of mouth.    NECK:  Supple.  No jugular venous distention.  Thyroid is not palpable.   LYMPH NODES:  Superficial lymphadenopathy is not appreciable in the " bilateral cervical, supraclavicular, axillary or inguinal areas.   CARDIOVASCULAR:  S1, S2 regular with no murmurs or gallops.  No carotid or abdominal bruits.   PULMONARY:  Lungs are clear to auscultation and percussion bilaterally.  There is no wheezing or rhonchi.   GASTROINTESTINAL:  Abdomen is soft, nontender.  No hepatosplenomegaly.  No signs of ascites.  No mass appreciable.   MUSCULOSKELETAL/EXTREMITIES:  No edema.  No cyanotic changes.  No signs of joint deformity.  No lymphedema.   NEUROLOGIC:  Cranial nerves II-XII are grossly intact.  Sensation intact.  Muscle strength and muscle tone symmetrical, 5/5 throughout.   BACK:  No spinal or paraspinal tenderness.  No CVA tenderness.   SKIN:  No petechiae.  No rash.  No signs of cellulitis.     BREASTS: Right breast with mild ecchymosis on upper breast, no longer palpable 1.5cm mass at 12:00 post C1. No other masses.  Left breast is symmetrical with no skin or nipple changes. No masses on the left. Tissue is very dense throughout.          CURRENT LAB DATA REVIEWED  10/2018  Right breast 8mm hypoechoic mass at 12:00, 6cm FN and right axillary LN biopsy both found grade 3, invasive ductal carcinoma, ER/[place]/her 2-.     Baseline cbc diff/CMP are fine.         CURRENT IMAGING REVIEWED  Baseline pre tx breast MRI 10/2018 -  In the right breast at 12:00 7 cm from the nipple, there is irregular heterogeneously enhancing mass, corresponding with the known biopsy-proven malignancy, which spans approximately 2.2 cm in maximal diameter, best appreciated on sagittal view, with surrounding nonmasslike enhancement likely related to postbiopsy change or DCIS.  Taken with the primary tumor, the entire span extends up to 3.8 cm.   There are multiple enlarged right axillary lymph nodes.      PET 10/2018  1. No evidence of distant metastasis.  2. Hypermetabolic focus in the upper outer quadrant right breast representing primary tumor. Hypermetabolic right axillary lymph nodes,  consistent with metastatic node.  3. Indeterminate sub-4 mm pulmonary nodules, likely fissural lymph node in the right lung.  4. Extensive FDG uptake by the brown fat in the neck and paraspinal region.    10/2018 dx MA -  revealed a small mass in the right upper outer breast, US revealed an 8mm hypoechoic mass at 12:00, 6cm FN and axillary US revealed a concerning lymph node        OLD DATA REVIEWED TODAY WITH SUMMARY:   Prior MA 2017, 2014 - negative.           ASSESSMENT AND PLAN:    1.  dx cT1cN1 right breast high grade IDC, TN at age 47 in 10/2018.     PET is negative for distal disease.      We discussed the side effects of chemo include not limit to N/V/hair loss/lower immunity/cardiac toxicity/rare leukemia/infusion related reaction and mortality.   She made informed decision to proceed DD AC -- taxol + carbo C1D1 10/24/2018.     Plan restaging MRI post C4 AC.   She needs to be monitored closely during this intense tx.     2. Young age dx with TN breast cancer, she will need genetic counseling.   Result will direct us on the platinum use.     3. Stomatitis - advice abreva cream topical. S/p valtrex.    4. Bone pain from neulasta - can try claritin.     5. Nausea without vormiting - we discuss in detail the dex use, and prn anti emetic use.     6. New URI symptoms. Since she is on chemo, will offer her Z pack.             Again, thank you for allowing me to participate in the care of your patient.        Sincerely,        Addie Murillo MD, MD

## 2018-12-12 NOTE — PATIENT INSTRUCTIONS
Chemo today.   Repeat breast MRI week.  Scheduled/eyal   F/u 1/2/2018 with chemo. Scheduled/janice     Patient is in N infusion CY      AVS printed & given to patient/eyal

## 2018-12-12 NOTE — PROGRESS NOTES
Infusion Nursing Note:  Luci Londono presents today for C4 D1 Adriamycin, Cytoxan.    Patient seen by provider today: Yes: Dr. Murillo   present during visit today: Not Applicable.    Note: Ok to proceed with chemo today per Dr. Murillo. Patient verbalizes understanding of correct dosing/schedule of oral dexamethasone as ordered, denies need for refill at this time.      ONPRO  Was placed on patient's: back of left arm.    Was placed at 12:45 PM    ONPRO injector device Lot number: [id number]    Patient education included: what patient can expect after application, what colored lights mean on the device, when to remove device, when and where to call with questions or issues, all patients questions answered and that Neulasta administration will occur tomorrow at 3:45pm.    Patient tolerated administration well.      Intravenous Access:  Labs drawn without difficulty.  Implanted Port.    Treatment Conditions:  Lab Results   Component Value Date    HGB 10.4 12/12/2018     Lab Results   Component Value Date    WBC 11.6 12/12/2018      Lab Results   Component Value Date    ANEU 10.1 12/12/2018     Lab Results   Component Value Date     12/12/2018      Results reviewed, labs MET treatment parameters, ok to proceed with treatment.  Cardiac MRI completed 10/26/18: 64%      Post Infusion Assessment:  Patient tolerated infusion without incident.  Blood return noted pre and post infusion.  Blood return noted during administration every 2cc during adriamycin push per protocol.  Site patent and intact, free from redness, edema or discomfort.  No evidence of extravasations.  Access discontinued per protocol.    Discharge Plan:   Discharge instructions reviewed with: Patient.  Patient and/or family verbalized understanding of discharge instructions and all questions answered.  Copy of AVS reviewed with patient and/or family.  Patient will return 12/26 for next appointment.  Patient discharged in stable condition  accompanied by: self.  Departure Mode: Ambulatory.    Becky Mcfadden RN

## 2018-12-13 NOTE — PROGRESS NOTES
Patient called to check and see how the blurriness in her eyes is doing. Patient stated that the blurriness is better. She is aware to call clinic if the blurriness gets worse. Marialuisa Casper RN,BSN,OCN

## 2018-12-18 ENCOUNTER — OFFICE VISIT (OUTPATIENT)
Dept: CARDIOLOGY | Facility: CLINIC | Age: 48
End: 2018-12-18
Attending: INTERNAL MEDICINE
Payer: COMMERCIAL

## 2018-12-18 ENCOUNTER — HOSPITAL ENCOUNTER (OUTPATIENT)
Dept: MRI IMAGING | Facility: CLINIC | Age: 48
End: 2018-12-18
Attending: INTERNAL MEDICINE
Payer: COMMERCIAL

## 2018-12-18 VITALS
BODY MASS INDEX: 34.66 KG/M2 | SYSTOLIC BLOOD PRESSURE: 110 MMHG | DIASTOLIC BLOOD PRESSURE: 70 MMHG | HEIGHT: 63 IN | HEART RATE: 80 BPM | WEIGHT: 195.6 LBS

## 2018-12-18 DIAGNOSIS — Z17.1 MALIGNANT NEOPLASM OF UPPER-OUTER QUADRANT OF RIGHT BREAST IN FEMALE, ESTROGEN RECEPTOR NEGATIVE (H): ICD-10-CM

## 2018-12-18 DIAGNOSIS — R07.89 CHEST PRESSURE: ICD-10-CM

## 2018-12-18 DIAGNOSIS — C50.411 MALIGNANT NEOPLASM OF UPPER-OUTER QUADRANT OF RIGHT BREAST IN FEMALE, ESTROGEN RECEPTOR NEGATIVE (H): ICD-10-CM

## 2018-12-18 PROCEDURE — 25800025 ZZH RX 258: Performed by: INTERNAL MEDICINE

## 2018-12-18 PROCEDURE — 25500064 ZZH RX 255 OP 636: Performed by: INTERNAL MEDICINE

## 2018-12-18 PROCEDURE — 99214 OFFICE O/P EST MOD 30 MIN: CPT | Performed by: PHYSICIAN ASSISTANT

## 2018-12-18 PROCEDURE — 77059 MR BREAST BILATERAL W/O & W CONTRAST: CPT

## 2018-12-18 PROCEDURE — 93306 TTE W/DOPPLER COMPLETE: CPT

## 2018-12-18 PROCEDURE — A9585 GADOBUTROL INJECTION: HCPCS | Performed by: INTERNAL MEDICINE

## 2018-12-18 PROCEDURE — 93306 TTE W/DOPPLER COMPLETE: CPT | Mod: 26 | Performed by: INTERNAL MEDICINE

## 2018-12-18 RX ORDER — ACYCLOVIR 200 MG/1
30 CAPSULE ORAL ONCE
Status: COMPLETED | OUTPATIENT
Start: 2018-12-18 | End: 2018-12-18

## 2018-12-18 RX ORDER — GADOBUTROL 604.72 MG/ML
9 INJECTION INTRAVENOUS ONCE
Status: COMPLETED | OUTPATIENT
Start: 2018-12-18 | End: 2018-12-18

## 2018-12-18 RX ORDER — GADOBUTROL 604.72 MG/ML
9 INJECTION INTRAVENOUS ONCE
Status: DISCONTINUED | OUTPATIENT
Start: 2018-12-18 | End: 2018-12-18

## 2018-12-18 RX ADMIN — GADOBUTROL 9 ML: 604.72 INJECTION INTRAVENOUS at 14:33

## 2018-12-18 RX ADMIN — SODIUM CHLORIDE 30 ML: 9 INJECTION, SOLUTION INTRAMUSCULAR; INTRAVENOUS; SUBCUTANEOUS at 14:35

## 2018-12-18 ASSESSMENT — MIFFLIN-ST. JEOR: SCORE: 1491.37

## 2018-12-18 NOTE — PROGRESS NOTES
Cardiology Clinic Progress Note    Luci Londono MRN# [id number]   YOB: 1970 Age: 48 year old          Assessment and Plan:     In summary, Luci Londono presents today to follow up on an echocardiogram performed this morning, after completing four rounds of chemotherapy with Adriamycin. TTE today is stable and shows no evidence of cardiotoxicity.     Plan:  - No changes recommended. She'll need another TTE in six months and f/u with Dr. Sanchez after that.         History of Presenting Illness:      Luci Londono is a pleasant 48 year old patient of  Dr. Sanchez who presents today to review her recent echo.    The patient has a history of the followin. R-sided infiltrating ductal breast cancer which is ER CA and HER-2 receptor negative; on neoadjuvant chemotherapy with Adriamycin (potentially cardiotoxic) and Cytoxan followed by Taxol and carboplatin  2. PCOS  3. Prediabetes on metformin  4. Mild hyperlipidemia, with an LDL of 123    She was recently diagnosed with breast cancer in October of this year. In preparation for chemotherapy, an echocardiogram was ordered by her oncologist, and given a question of diastolic dysfunction she was referred to Dr. Sanchez. He personally reviewed the TTE which showed a normal ejection fraction visually at 55-60% with a normal LV GLS of < -18%, and overall no definite evidence for diastolic dysfn. Her EKG showed an anteroseptal TW abnormality (ischemia vs normal variant) and she complained of some atypical chest pressure over the past month. Therefore, a stress MRI was recommended. This was very benign, as detailed below. She was cleared to continue chemotherapy as scheduled, with plans for a repeat ECHO with strain after 4 cycles of chemotherapy with Adriamycin to ensure no evidence of cardiotoxicity at that time. She had this today, and this appears stable from prior with an LVEF of 55%, GLS of -19.2, E/E' avg of 9.8, and normal atrial  size. A borderline-reduced RV systolic fn is noted although Dr. Sanchez has reviewed this and feels it looks normal.      Today, the patient states she's feeling alright. She continues to note mild, intermittent chest pressure which is unchanged from her prior visit. She suffers with intermittent back pain and leg pain after starting the chemo. She feels easily fatigued during the day but denies dyspnea. She states she isn't sleeping particularly well due to pain and also due to lack of routine as she isn't working currently. She denies PND, orthopnea, edema, claudication, palpitations, near syncope or syncope. She was seen by oncology last week and started on a Zpack for new URI symptoms which have now resolved. She denies history of sleep apnea, asthma or COPD. She lives at home with her four children, her sister lives nearby.          Review of Systems:     12-pt ROS is negative except for as noted in the HPI.          Physical Exam:     Vitals: There were no vitals taken for this visit.  Wt Readings from Last 3 Encounters:   12/12/18 89.2 kg (196 lb 9.6 oz)   12/12/18 89.2 kg (196 lb 9.6 oz)   11/28/18 88.5 kg (195 lb)       Constitutional:  Patient is pleasant, alert, cooperative, and in NAD.  HEENT:  NCAT. PERRLA. EOM's intact.   Neck:  CVP appears normal.   Pulmonary: Normal respiratory effort. CTAB.   Cardiac: RRR, normal S1/S2, +S4, no murmur or rub. No RV heave.  Vascular: Pulses in the upper and lower extremities are 2+ and equal bilaterally.  Extremities: No edema, erythema, cyanosis or tenderness appreciated.  Psych: Appropriate affect.        Data:     Cardiac Diagnostics reviewed:  Type Date Result   TTE 12/18/18 The left ventricle is normal in size. Left ventricular systolic function is normal. The visual ejection fraction is estimated at 55%. Diastolic Doppler findings (E/E' ratio and/or other parameters) suggest left ventricular filling pressures are indeterminate. Global peak LV longitudinal strain  is averaged at -19.2%. This is within reported normal limits (normal <-18%). Normal left ventricular wall motion.  The right ventricle is normal size. The right ventricular systolic function is borderline reduced.    10/23/18 All four valves are normal in structure and function.  The left ventricle is normal in size and the left ventricular ejection  fraction is normal.  The visual ejection fraction is estimated at 55-60% and the calculated LVEF = 52% with no regional wall motion abnormalities noted.  Global peak LV longitudinal strain is normal averaged at -19.2%. This is  within reported normal limits (normal <-18%).  There is Grade I or early diastolic dysfunction but the diastolic Doppler  findings (E/E' ratio and/or other parameters) suggest left ventricular filling pressures are indeterminate.  There is no comparison study available.   CMR 10/26/18 1. The LV is normal in cavity size and wall thickness. The global systolic function is normal. The LVEF is  64%. There are no regional wall motion abnormalities.  2. The RV is normal in cavity size. The global systolic function is normal. The RVEF is 56%.   3. Both atria are normal in size.  4. There is no significant valvular disease.   5. Late gadolinium enhancement imaging shows no MI, fibrosis or infiltrative disease.   6. Regadenoson stress perfusion imaging shows no ischemia.  7.  There is no pericardial effusion or thickening.     CONCLUSIONS:   The LV is normal in cavity size and wall thickness. The global systolic function is normal. The LVEF is  64%.    Late gadolinium enhancement imaging shows no MI, fibrosis or infiltrative disease.   Regadenoson stress perfusion imaging shows no ischemia.   EKG 10/15/18 SR, anteroseptal TW abn     Labs reviewed:  Recent Labs   Lab Test 10/12/18  2206 08/21/18  1201 10/31/17  1239 07/25/17  1026 06/30/16  1214   LDL  --  123*  --  138* 123*   HDL  --  57  --  69 64   NHDL  --  161*  --  151* 135*   CHOL  --  218*  --   220* 199   TRIG  --  188*  --  67 58   TSH 3.04 56.21* 0.03* 13.95* 3.09       Lab Results   Component Value Date    WBC 11.6 (H) 12/12/2018    RBC 3.90 12/12/2018    HGB 10.4 (L) 12/12/2018    HCT 31.4 (L) 12/12/2018    MCV 81 12/12/2018    MCH 26.7 12/12/2018    MCHC 33.1 12/12/2018    RDW 19.8 (H) 12/12/2018     12/12/2018       Lab Results   Component Value Date     11/28/2018    POTASSIUM 3.6 11/28/2018    CHLORIDE 102 11/28/2018    CO2 29 11/28/2018    ANIONGAP 6 11/28/2018    GLC 75 11/28/2018    BUN 12 11/28/2018    CR 0.67 11/28/2018    GFRESTIMATED >90 11/28/2018    GFRESTBLACK >90 11/28/2018    MATHIEU 8.7 11/28/2018      Lab Results   Component Value Date    AST 10 11/28/2018    ALT 31 11/28/2018       Lab Results   Component Value Date    A1C 5.8 (H) 10/17/2018       No results found for: INR        Problem List:     Patient Active Problem List   Diagnosis     Myalgia and myositis     Intervertebral lumbar disc disorder with myelopathy, lumbar region     Gastroesophageal reflux disease without esophagitis     Helicobacter pylori infection     Acquired hypothyroidism     Disorder of metabolism     Polycystic ovaries     Vitamin D insufficiency     BMI 35     Rosacea     Absence of menstruation     Chronic left-sided low back pain with left-sided sciatica     Pinguecula of both eyes     Presbyopia     Impaired fasting glucose     Migraine without aura and without status migrainosus, not intractable     Class 1 obesity due to excess calories with serious comorbidity and body mass index (BMI) of 33.0 to 33.9 in adult     Morbid obesity (H)     Myalgia w hx of recurrence since 2012     Mixed hyperlipidemia     Malignant neoplasm of upper-outer quadrant of right breast in female, estrogen receptor negative (H)     Drug or medicinal substance causing adverse effect in therapeutic use, initial encounter     Abnormal electrocardiogram     Port-A-Cath in place           Medications:     Current  Outpatient Medications   Medication Sig Dispense Refill     ascorbic acid (VITAMIN C) 1000 MG TABS Take 1,000 mg by mouth daily       azithromycin (ZITHROMAX) 250 MG tablet Take 1 tablet (250 mg) by mouth daily 6 tablet 0     dexamethasone (DECADRON) 4 MG tablet Take 2 tablets (8 mg) by mouth daily for 3 days Start on Day 2 of Cycles 1 through 4. 40 tablet 3     docosanol (ABREVA) 10 % CREA cream Apply topically 5 times daily 1 g 0     HYDROcodone-acetaminophen (NORCO) 5-325 MG per tablet Take 1-2 tablets by mouth every 4 hours as needed for moderate to severe pain 15 tablet 0     levothyroxine (SYNTHROID/LEVOTHROID) 125 MCG tablet TAKE 1 TABLET BY MOUTH DAILY 30 tablet 6     lidocaine-prilocaine (EMLA) cream Apply topically as needed for moderate pain Apply to port site 1 hour prior to accessing port 30 g 3     lidocaine-prilocaine (EMLA) cream Apply quarter-size amount of Emla cream topically over port site one hour prior to port access for comfort. 30 g 3     loratadine (CLARITIN) 10 MG tablet Take 1 tablet (10 mg) by mouth daily 90 tablet 1     LORazepam (ATIVAN) 0.5 MG tablet Take 1 tablet (0.5 mg) by mouth every 4 hours as needed (Anxiety, Nausea/Vomiting or Sleep) 30 tablet 5     medroxyPROGESTERone (PROVERA) 10 MG tablet Take 1 tablet (10 mg) by mouth daily 90 tablet 3     metFORMIN (GLUCOPHAGE-XR) 500 MG 24 hr tablet Take 1 tablet (500 mg) by mouth daily (with dinner) 30 tablet 11     multivitamin, therapeutic with minerals (THERA-VIT-M) TABS tablet Take 1 tablet by mouth daily       omeprazole (PRILOSEC) 20 MG CR capsule Take 1 capsule (20 mg) by mouth 2 times daily 60 capsule 11     ondansetron (ZOFRAN-ODT) 4 MG ODT tab Take 1-2 tablets (4-8 mg) by mouth every 8 hours as needed for nausea 10 tablet 0     prochlorperazine (COMPAZINE) 10 MG tablet Take 1 tablet (10 mg) by mouth every 6 hours as needed (Nausea/Vomiting) 30 tablet 5     senna-docusate (SENOKOT-S;PERICOLACE) 8.6-50 MG per tablet Take 1-2  tablets by mouth 2 times daily 30 tablet 0     valACYclovir (VALTREX) 1000 mg tablet Take 2 tablets (2,000 mg) by mouth 2 times daily 4 tablet 0           Past Medical History:     Past Medical History:   Diagnosis Date     Hypertension goal BP (blood pressure) < 140/80 2/18/2014     Lateral epicondylitis, right dominant elbow since late 10-17 11/1/2017     Port-A-Cath in place 10/26/2018     Thyroid disease      Past Surgical History:   Procedure Laterality Date     HAND SURGERY  2002    left     INSERT PORT VASCULAR ACCESS N/A 10/18/2018    Procedure: INSERTION OF VASCULAR PORT;  Surgeon: Stacey Ashford MD;  Location: [facility] OR     TUBAL LIGATION       Family History   Problem Relation Age of Onset     Diabetes Mother      Unknown/Adopted Father      Coronary Artery Disease No family hx of      Hypertension No family hx of      Hyperlipidemia No family hx of      Cerebrovascular Disease No family hx of      Breast Cancer No family hx of      Colon Cancer No family hx of      Prostate Cancer No family hx of      Other Cancer No family hx of      Depression No family hx of      Anxiety Disorder No family hx of      Mental Illness No family hx of      Substance Abuse No family hx of      Anesthesia Reaction No family hx of      Asthma No family hx of      Osteoporosis No family hx of      Genetic Disorder No family hx of      Thyroid Disease No family hx of      Obesity No family hx of      Social History     Socioeconomic History     Marital status:      Spouse name: Not on file     Number of children: Not on file     Years of education: Not on file     Highest education level: Not on file   Social Needs     Financial resource strain: Not on file     Food insecurity - worry: Not on file     Food insecurity - inability: Not on file     Transportation needs - medical: Not on file     Transportation needs - non-medical: Not on file   Occupational History     Not on file   Tobacco Use     Smoking status: Never  Smoker     Smokeless tobacco: Never Used   Substance and Sexual Activity     Alcohol use: No     Drug use: No     Sexual activity: Yes     Partners: Male     Birth control/protection: Pill   Other Topics Concern     Parent/sibling w/ CABG, MI or angioplasty before 65F 55M? No   Social History Narrative     Not on file           Allergies:   Patient has no known allergies.      Elisa Pemberton PA-C  [facility]  Pager: [phone]

## 2018-12-18 NOTE — LETTER
2018    ALEENA TYLER MD  [address]  [facility] [address]    RE: Luci Londono       Dear Colleague,    I had the pleasure of seeing Luci Londono in the [facility].    Cardiology Clinic Progress Note    Luci Londono MRN# [id number]   YOB: 1970 Age: 48 year old          Assessment and Plan:     In summary, Luci Londono presents today to follow up on an echocardiogram performed this morning, after completing four rounds of chemotherapy with Adriamycin. TTE today is stable and shows no evidence of cardiotoxicity.     Plan:  - No changes recommended. She'll need another TTE in six months and f/u with Dr. Sanchez after that.         History of Presenting Illness:      Luci Londono is a pleasant 48 year old patient of  Dr. Sanchez who presents today to review her recent echo.    The patient has a history of the followin. R-sided infiltrating ductal breast cancer which is ER MD and HER-2 receptor negative; on neoadjuvant chemotherapy with Adriamycin (potentially cardiotoxic) and Cytoxan followed by Taxol and carboplatin  2. PCOS  3. Prediabetes on metformin  4. Mild hyperlipidemia, with an LDL of 123    She was recently diagnosed with breast cancer in October of this year. In preparation for chemotherapy, an echocardiogram was ordered by her oncologist, and given a question of diastolic dysfunction she was referred to Dr. Sanchez. He personally reviewed the TTE which showed a normal ejection fraction visually at 55-60% with a normal LV GLS of < -18%, and overall no definite evidence for diastolic dysfn. Her EKG showed an anteroseptal TW abnormality (ischemia vs normal variant) and she complained of some atypical chest pressure over the past month. Therefore, a stress MRI was recommended. This was very benign, as detailed below. She was cleared to continue chemotherapy as scheduled, with plans for a repeat ECHO  with strain after 4 cycles of chemotherapy with Adriamycin to ensure no evidence of cardiotoxicity at that time. She had this today, and this appears stable from prior with an LVEF of 55%, GLS of -19.2, E/E' avg of 9.8, and normal atrial size. A borderline-reduced RV systolic fn is noted although Dr. Sanchez has reviewed this and feels it looks normal.      Today, the patient states she's feeling alright. She continues to note mild, intermittent chest pressure which is unchanged from her prior visit. She suffers with intermittent back pain and leg pain after starting the chemo. She feels easily fatigued during the day but denies dyspnea. She states she isn't sleeping particularly well due to pain and also due to lack of routine as she isn't working currently. She denies PND, orthopnea, edema, claudication, palpitations, near syncope or syncope. She was seen by oncology last week and started on a Zpack for new URI symptoms which have now resolved. She denies history of sleep apnea, asthma or COPD. She lives at home with her four children, her sister lives nearby.          Review of Systems:     12-pt ROS is negative except for as noted in the HPI.          Physical Exam:     Vitals: There were no vitals taken for this visit.  Wt Readings from Last 3 Encounters:   12/12/18 89.2 kg (196 lb 9.6 oz)   12/12/18 89.2 kg (196 lb 9.6 oz)   11/28/18 88.5 kg (195 lb)       Constitutional:  Patient is pleasant, alert, cooperative, and in NAD.  HEENT:  NCAT. PERRLA. EOM's intact.   Neck:  CVP appears normal.   Pulmonary: Normal respiratory effort. CTAB.   Cardiac: RRR, normal S1/S2, +S4, no murmur or rub. No RV heave.  Vascular: Pulses in the upper and lower extremities are 2+ and equal bilaterally.  Extremities: No edema, erythema, cyanosis or tenderness appreciated.  Psych: Appropriate affect.        Data:     Cardiac Diagnostics reviewed:  Type Date Result   TTE 12/18/18 The left ventricle is normal in size. Left ventricular  systolic function is normal. The visual ejection fraction is estimated at 55%. Diastolic Doppler findings (E/E' ratio and/or other parameters) suggest left ventricular filling pressures are indeterminate. Global peak LV longitudinal strain is averaged at -19.2%. This is within reported normal limits (normal <-18%). Normal left ventricular wall motion.  The right ventricle is normal size. The right ventricular systolic function is borderline reduced.    10/23/18 All four valves are normal in structure and function.  The left ventricle is normal in size and the left ventricular ejection  fraction is normal.  The visual ejection fraction is estimated at 55-60% and the calculated LVEF = 52% with no regional wall motion abnormalities noted.  Global peak LV longitudinal strain is normal averaged at -19.2%. This is  within reported normal limits (normal <-18%).  There is Grade I or early diastolic dysfunction but the diastolic Doppler  findings (E/E' ratio and/or other parameters) suggest left ventricular filling pressures are indeterminate.  There is no comparison study available.   CMR 10/26/18 1. The LV is normal in cavity size and wall thickness. The global systolic function is normal. The LVEF is  64%. There are no regional wall motion abnormalities.  2. The RV is normal in cavity size. The global systolic function is normal. The RVEF is 56%.   3. Both atria are normal in size.  4. There is no significant valvular disease.   5. Late gadolinium enhancement imaging shows no MI, fibrosis or infiltrative disease.   6. Regadenoson stress perfusion imaging shows no ischemia.  7.  There is no pericardial effusion or thickening.     CONCLUSIONS:   The LV is normal in cavity size and wall thickness. The global systolic function is normal. The LVEF is  64%.    Late gadolinium enhancement imaging shows no MI, fibrosis or infiltrative disease.   Regadenoson stress perfusion imaging shows no ischemia.   EKG 10/15/18 SR,  anteroseptal TW abn     Labs reviewed:  Recent Labs   Lab Test 10/12/18  2206 08/21/18  1201 10/31/17  1239 07/25/17  1026 06/30/16  1214   LDL  --  123*  --  138* 123*   HDL  --  57  --  69 64   NHDL  --  161*  --  151* 135*   CHOL  --  218*  --  220* 199   TRIG  --  188*  --  67 58   TSH 3.04 56.21* 0.03* 13.95* 3.09       Lab Results   Component Value Date    WBC 11.6 (H) 12/12/2018    RBC 3.90 12/12/2018    HGB 10.4 (L) 12/12/2018    HCT 31.4 (L) 12/12/2018    MCV 81 12/12/2018    MCH 26.7 12/12/2018    MCHC 33.1 12/12/2018    RDW 19.8 (H) 12/12/2018     12/12/2018       Lab Results   Component Value Date     11/28/2018    POTASSIUM 3.6 11/28/2018    CHLORIDE 102 11/28/2018    CO2 29 11/28/2018    ANIONGAP 6 11/28/2018    GLC 75 11/28/2018    BUN 12 11/28/2018    CR 0.67 11/28/2018    GFRESTIMATED >90 11/28/2018    GFRESTBLACK >90 11/28/2018    MATHIEU 8.7 11/28/2018      Lab Results   Component Value Date    AST 10 11/28/2018    ALT 31 11/28/2018       Lab Results   Component Value Date    A1C 5.8 (H) 10/17/2018       No results found for: INR        Problem List:     Patient Active Problem List   Diagnosis     Myalgia and myositis     Intervertebral lumbar disc disorder with myelopathy, lumbar region     Gastroesophageal reflux disease without esophagitis     Helicobacter pylori infection     Acquired hypothyroidism     Disorder of metabolism     Polycystic ovaries     Vitamin D insufficiency     BMI 35     Rosacea     Absence of menstruation     Chronic left-sided low back pain with left-sided sciatica     Pinguecula of both eyes     Presbyopia     Impaired fasting glucose     Migraine without aura and without status migrainosus, not intractable     Class 1 obesity due to excess calories with serious comorbidity and body mass index (BMI) of 33.0 to 33.9 in adult     Morbid obesity (H)     Myalgia w hx of recurrence since 2012     Mixed hyperlipidemia     Malignant neoplasm of upper-outer quadrant of  right breast in female, estrogen receptor negative (H)     Drug or medicinal substance causing adverse effect in therapeutic use, initial encounter     Abnormal electrocardiogram     Port-A-Cath in place           Medications:     Current Outpatient Medications   Medication Sig Dispense Refill     ascorbic acid (VITAMIN C) 1000 MG TABS Take 1,000 mg by mouth daily       azithromycin (ZITHROMAX) 250 MG tablet Take 1 tablet (250 mg) by mouth daily 6 tablet 0     dexamethasone (DECADRON) 4 MG tablet Take 2 tablets (8 mg) by mouth daily for 3 days Start on Day 2 of Cycles 1 through 4. 40 tablet 3     docosanol (ABREVA) 10 % CREA cream Apply topically 5 times daily 1 g 0     HYDROcodone-acetaminophen (NORCO) 5-325 MG per tablet Take 1-2 tablets by mouth every 4 hours as needed for moderate to severe pain 15 tablet 0     levothyroxine (SYNTHROID/LEVOTHROID) 125 MCG tablet TAKE 1 TABLET BY MOUTH DAILY 30 tablet 6     lidocaine-prilocaine (EMLA) cream Apply topically as needed for moderate pain Apply to port site 1 hour prior to accessing port 30 g 3     lidocaine-prilocaine (EMLA) cream Apply quarter-size amount of Emla cream topically over port site one hour prior to port access for comfort. 30 g 3     loratadine (CLARITIN) 10 MG tablet Take 1 tablet (10 mg) by mouth daily 90 tablet 1     LORazepam (ATIVAN) 0.5 MG tablet Take 1 tablet (0.5 mg) by mouth every 4 hours as needed (Anxiety, Nausea/Vomiting or Sleep) 30 tablet 5     medroxyPROGESTERone (PROVERA) 10 MG tablet Take 1 tablet (10 mg) by mouth daily 90 tablet 3     metFORMIN (GLUCOPHAGE-XR) 500 MG 24 hr tablet Take 1 tablet (500 mg) by mouth daily (with dinner) 30 tablet 11     multivitamin, therapeutic with minerals (THERA-VIT-M) TABS tablet Take 1 tablet by mouth daily       omeprazole (PRILOSEC) 20 MG CR capsule Take 1 capsule (20 mg) by mouth 2 times daily 60 capsule 11     ondansetron (ZOFRAN-ODT) 4 MG ODT tab Take 1-2 tablets (4-8 mg) by mouth every 8 hours as  needed for nausea 10 tablet 0     prochlorperazine (COMPAZINE) 10 MG tablet Take 1 tablet (10 mg) by mouth every 6 hours as needed (Nausea/Vomiting) 30 tablet 5     senna-docusate (SENOKOT-S;PERICOLACE) 8.6-50 MG per tablet Take 1-2 tablets by mouth 2 times daily 30 tablet 0     valACYclovir (VALTREX) 1000 mg tablet Take 2 tablets (2,000 mg) by mouth 2 times daily 4 tablet 0           Past Medical History:     Past Medical History:   Diagnosis Date     Hypertension goal BP (blood pressure) < 140/80 2/18/2014     Lateral epicondylitis, right dominant elbow since late 10-17 11/1/2017     Port-A-Cath in place 10/26/2018     Thyroid disease      Past Surgical History:   Procedure Laterality Date     HAND SURGERY  2002    left     INSERT PORT VASCULAR ACCESS N/A 10/18/2018    Procedure: INSERTION OF VASCULAR PORT;  Surgeon: Stacey Ashford MD;  Location: [facility] OR     TUBAL LIGATION       Family History   Problem Relation Age of Onset     Diabetes Mother      Unknown/Adopted Father      Coronary Artery Disease No family hx of      Hypertension No family hx of      Hyperlipidemia No family hx of      Cerebrovascular Disease No family hx of      Breast Cancer No family hx of      Colon Cancer No family hx of      Prostate Cancer No family hx of      Other Cancer No family hx of      Depression No family hx of      Anxiety Disorder No family hx of      Mental Illness No family hx of      Substance Abuse No family hx of      Anesthesia Reaction No family hx of      Asthma No family hx of      Osteoporosis No family hx of      Genetic Disorder No family hx of      Thyroid Disease No family hx of      Obesity No family hx of      Social History     Socioeconomic History     Marital status:      Spouse name: Not on file     Number of children: Not on file     Years of education: Not on file     Highest education level: Not on file   Social Needs     Financial resource strain: Not on file     Food insecurity - worry: Not on  file     Food insecurity - inability: Not on file     Transportation needs - medical: Not on file     Transportation needs - non-medical: Not on file   Occupational History     Not on file   Tobacco Use     Smoking status: Never Smoker     Smokeless tobacco: Never Used   Substance and Sexual Activity     Alcohol use: No     Drug use: No     Sexual activity: Yes     Partners: Male     Birth control/protection: Pill   Other Topics Concern     Parent/sibling w/ CABG, MI or angioplasty before 65F 55M? No   Social History Narrative     Not on file           Allergies:   Patient has no known allergies.      Elisa Pemberton PA-C  [facility]  Pager: [phone]      Thank you for allowing me to participate in the care of your patient.    Sincerely,     Elisa Pemberton PA-C     [facility]

## 2018-12-18 NOTE — LETTER
2018    ALEENA TYLER MD  [address]  [facility] [address]    RE: Luci Londono       Dear Colleague,    I had the pleasure of seeing Luci Londono in the [facility].    Cardiology Clinic Progress Note    Luci Londono MRN# [id number]   YOB: 1970 Age: 48 year old          Assessment and Plan:     In summary, Luci Londono presents today to follow up on an echocardiogram performed this morning, after completing four rounds of chemotherapy with Adriamycin. TTE today is stable and shows no evidence of cardiotoxicity.     Plan:  - No changes recommended. She'll need another TTE in six months and f/u with Dr. Sanchez after that.         History of Presenting Illness:      Luci Londono is a pleasant 48 year old patient of  Dr. Sanchez who presents today to review her recent echo.    The patient has a history of the followin. R-sided infiltrating ductal breast cancer which is ER MN and HER-2 receptor negative; on neoadjuvant chemotherapy with Adriamycin (potentially cardiotoxic) and Cytoxan followed by Taxol and carboplatin  2. PCOS  3. Prediabetes on metformin  4. Mild hyperlipidemia, with an LDL of 123    She was recently diagnosed with breast cancer in October of this year. In preparation for chemotherapy, an echocardiogram was ordered by her oncologist, and given a question of diastolic dysfunction she was referred to Dr. Sanchez. He personally reviewed the TTE which showed a normal ejection fraction visually at 55-60% with a normal LV GLS of < -18%, and overall no definite evidence for diastolic dysfn. Her EKG showed an anteroseptal TW abnormality (ischemia vs normal variant) and she complained of some atypical chest pressure over the past month. Therefore, a stress MRI was recommended. This was very benign, as detailed below. She was cleared to continue chemotherapy as scheduled, with plans for a repeat ECHO  with strain after 4 cycles of chemotherapy with Adriamycin to ensure no evidence of cardiotoxicity at that time. She had this today, and this appears stable from prior with an LVEF of 55%, GLS of -19.2, E/E' avg of 9.8, and normal atrial size. A borderline-reduced RV systolic fn is noted although Dr. Sanchez has reviewed this and feels it looks normal.      Today, the patient states she's feeling alright. She continues to note mild, intermittent chest pressure which is unchanged from her prior visit. She suffers with intermittent back pain and leg pain after starting the chemo. She feels easily fatigued during the day but denies dyspnea. She states she isn't sleeping particularly well due to pain and also due to lack of routine as she isn't working currently. She denies PND, orthopnea, edema, claudication, palpitations, near syncope or syncope. She was seen by oncology last week and started on a Zpack for new URI symptoms which have now resolved. She denies history of sleep apnea, asthma or COPD. She lives at home with her four children, her sister lives nearby.          Review of Systems:     12-pt ROS is negative except for as noted in the HPI.          Physical Exam:     Vitals: There were no vitals taken for this visit.  Wt Readings from Last 3 Encounters:   12/12/18 89.2 kg (196 lb 9.6 oz)   12/12/18 89.2 kg (196 lb 9.6 oz)   11/28/18 88.5 kg (195 lb)       Constitutional:  Patient is pleasant, alert, cooperative, and in NAD.  HEENT:  NCAT. PERRLA. EOM's intact.   Neck:  CVP appears normal.   Pulmonary: Normal respiratory effort. CTAB.   Cardiac: RRR, normal S1/S2, +S4, no murmur or rub. No RV heave.  Vascular: Pulses in the upper and lower extremities are 2+ and equal bilaterally.  Extremities: No edema, erythema, cyanosis or tenderness appreciated.  Psych: Appropriate affect.        Data:     Cardiac Diagnostics reviewed:  Type Date Result   TTE 12/18/18 The left ventricle is normal in size. Left ventricular  systolic function is normal. The visual ejection fraction is estimated at 55%. Diastolic Doppler findings (E/E' ratio and/or other parameters) suggest left ventricular filling pressures are indeterminate. Global peak LV longitudinal strain is averaged at -19.2%. This is within reported normal limits (normal <-18%). Normal left ventricular wall motion.  The right ventricle is normal size. The right ventricular systolic function is borderline reduced.    10/23/18 All four valves are normal in structure and function.  The left ventricle is normal in size and the left ventricular ejection  fraction is normal.  The visual ejection fraction is estimated at 55-60% and the calculated LVEF = 52% with no regional wall motion abnormalities noted.  Global peak LV longitudinal strain is normal averaged at -19.2%. This is  within reported normal limits (normal <-18%).  There is Grade I or early diastolic dysfunction but the diastolic Doppler  findings (E/E' ratio and/or other parameters) suggest left ventricular filling pressures are indeterminate.  There is no comparison study available.   CMR 10/26/18 1. The LV is normal in cavity size and wall thickness. The global systolic function is normal. The LVEF is  64%. There are no regional wall motion abnormalities.  2. The RV is normal in cavity size. The global systolic function is normal. The RVEF is 56%.   3. Both atria are normal in size.  4. There is no significant valvular disease.   5. Late gadolinium enhancement imaging shows no MI, fibrosis or infiltrative disease.   6. Regadenoson stress perfusion imaging shows no ischemia.  7.  There is no pericardial effusion or thickening.     CONCLUSIONS:   The LV is normal in cavity size and wall thickness. The global systolic function is normal. The LVEF is  64%.    Late gadolinium enhancement imaging shows no MI, fibrosis or infiltrative disease.   Regadenoson stress perfusion imaging shows no ischemia.   EKG 10/15/18 SR,  anteroseptal TW abn     Labs reviewed:  Recent Labs   Lab Test 10/12/18  2206 08/21/18  1201 10/31/17  1239 07/25/17  1026 06/30/16  1214   LDL  --  123*  --  138* 123*   HDL  --  57  --  69 64   NHDL  --  161*  --  151* 135*   CHOL  --  218*  --  220* 199   TRIG  --  188*  --  67 58   TSH 3.04 56.21* 0.03* 13.95* 3.09       Lab Results   Component Value Date    WBC 11.6 (H) 12/12/2018    RBC 3.90 12/12/2018    HGB 10.4 (L) 12/12/2018    HCT 31.4 (L) 12/12/2018    MCV 81 12/12/2018    MCH 26.7 12/12/2018    MCHC 33.1 12/12/2018    RDW 19.8 (H) 12/12/2018     12/12/2018       Lab Results   Component Value Date     11/28/2018    POTASSIUM 3.6 11/28/2018    CHLORIDE 102 11/28/2018    CO2 29 11/28/2018    ANIONGAP 6 11/28/2018    GLC 75 11/28/2018    BUN 12 11/28/2018    CR 0.67 11/28/2018    GFRESTIMATED >90 11/28/2018    GFRESTBLACK >90 11/28/2018    MATHIEU 8.7 11/28/2018      Lab Results   Component Value Date    AST 10 11/28/2018    ALT 31 11/28/2018       Lab Results   Component Value Date    A1C 5.8 (H) 10/17/2018       No results found for: INR        Problem List:     Patient Active Problem List   Diagnosis     Myalgia and myositis     Intervertebral lumbar disc disorder with myelopathy, lumbar region     Gastroesophageal reflux disease without esophagitis     Helicobacter pylori infection     Acquired hypothyroidism     Disorder of metabolism     Polycystic ovaries     Vitamin D insufficiency     BMI 35     Rosacea     Absence of menstruation     Chronic left-sided low back pain with left-sided sciatica     Pinguecula of both eyes     Presbyopia     Impaired fasting glucose     Migraine without aura and without status migrainosus, not intractable     Class 1 obesity due to excess calories with serious comorbidity and body mass index (BMI) of 33.0 to 33.9 in adult     Morbid obesity (H)     Myalgia w hx of recurrence since 2012     Mixed hyperlipidemia     Malignant neoplasm of upper-outer quadrant of  right breast in female, estrogen receptor negative (H)     Drug or medicinal substance causing adverse effect in therapeutic use, initial encounter     Abnormal electrocardiogram     Port-A-Cath in place           Medications:     Current Outpatient Medications   Medication Sig Dispense Refill     ascorbic acid (VITAMIN C) 1000 MG TABS Take 1,000 mg by mouth daily       azithromycin (ZITHROMAX) 250 MG tablet Take 1 tablet (250 mg) by mouth daily 6 tablet 0     dexamethasone (DECADRON) 4 MG tablet Take 2 tablets (8 mg) by mouth daily for 3 days Start on Day 2 of Cycles 1 through 4. 40 tablet 3     docosanol (ABREVA) 10 % CREA cream Apply topically 5 times daily 1 g 0     HYDROcodone-acetaminophen (NORCO) 5-325 MG per tablet Take 1-2 tablets by mouth every 4 hours as needed for moderate to severe pain 15 tablet 0     levothyroxine (SYNTHROID/LEVOTHROID) 125 MCG tablet TAKE 1 TABLET BY MOUTH DAILY 30 tablet 6     lidocaine-prilocaine (EMLA) cream Apply topically as needed for moderate pain Apply to port site 1 hour prior to accessing port 30 g 3     lidocaine-prilocaine (EMLA) cream Apply quarter-size amount of Emla cream topically over port site one hour prior to port access for comfort. 30 g 3     loratadine (CLARITIN) 10 MG tablet Take 1 tablet (10 mg) by mouth daily 90 tablet 1     LORazepam (ATIVAN) 0.5 MG tablet Take 1 tablet (0.5 mg) by mouth every 4 hours as needed (Anxiety, Nausea/Vomiting or Sleep) 30 tablet 5     medroxyPROGESTERone (PROVERA) 10 MG tablet Take 1 tablet (10 mg) by mouth daily 90 tablet 3     metFORMIN (GLUCOPHAGE-XR) 500 MG 24 hr tablet Take 1 tablet (500 mg) by mouth daily (with dinner) 30 tablet 11     multivitamin, therapeutic with minerals (THERA-VIT-M) TABS tablet Take 1 tablet by mouth daily       omeprazole (PRILOSEC) 20 MG CR capsule Take 1 capsule (20 mg) by mouth 2 times daily 60 capsule 11     ondansetron (ZOFRAN-ODT) 4 MG ODT tab Take 1-2 tablets (4-8 mg) by mouth every 8 hours as  needed for nausea 10 tablet 0     prochlorperazine (COMPAZINE) 10 MG tablet Take 1 tablet (10 mg) by mouth every 6 hours as needed (Nausea/Vomiting) 30 tablet 5     senna-docusate (SENOKOT-S;PERICOLACE) 8.6-50 MG per tablet Take 1-2 tablets by mouth 2 times daily 30 tablet 0     valACYclovir (VALTREX) 1000 mg tablet Take 2 tablets (2,000 mg) by mouth 2 times daily 4 tablet 0           Past Medical History:     Past Medical History:   Diagnosis Date     Hypertension goal BP (blood pressure) < 140/80 2/18/2014     Lateral epicondylitis, right dominant elbow since late 10-17 11/1/2017     Port-A-Cath in place 10/26/2018     Thyroid disease      Past Surgical History:   Procedure Laterality Date     HAND SURGERY  2002    left     INSERT PORT VASCULAR ACCESS N/A 10/18/2018    Procedure: INSERTION OF VASCULAR PORT;  Surgeon: Stacey Ashford MD;  Location: [facility] OR     TUBAL LIGATION       Family History   Problem Relation Age of Onset     Diabetes Mother      Unknown/Adopted Father      Coronary Artery Disease No family hx of      Hypertension No family hx of      Hyperlipidemia No family hx of      Cerebrovascular Disease No family hx of      Breast Cancer No family hx of      Colon Cancer No family hx of      Prostate Cancer No family hx of      Other Cancer No family hx of      Depression No family hx of      Anxiety Disorder No family hx of      Mental Illness No family hx of      Substance Abuse No family hx of      Anesthesia Reaction No family hx of      Asthma No family hx of      Osteoporosis No family hx of      Genetic Disorder No family hx of      Thyroid Disease No family hx of      Obesity No family hx of      Social History     Socioeconomic History     Marital status:      Spouse name: Not on file     Number of children: Not on file     Years of education: Not on file     Highest education level: Not on file   Social Needs     Financial resource strain: Not on file     Food insecurity - worry: Not on  file     Food insecurity - inability: Not on file     Transportation needs - medical: Not on file     Transportation needs - non-medical: Not on file   Occupational History     Not on file   Tobacco Use     Smoking status: Never Smoker     Smokeless tobacco: Never Used   Substance and Sexual Activity     Alcohol use: No     Drug use: No     Sexual activity: Yes     Partners: Male     Birth control/protection: Pill   Other Topics Concern     Parent/sibling w/ CABG, MI or angioplasty before 65F 55M? No   Social History Narrative     Not on file           Allergies:   Patient has no known allergies.      Elisa Pemberton PA-C  [facility]  Pager: [phone]      Thank you for allowing me to participate in the care of your patient.      Sincerely,     Elisa Pemberton PA-C     [facility]    cc:   Casey Sanchez MD  [address]  [address]  [place] [address]

## 2018-12-26 ENCOUNTER — HOSPITAL ENCOUNTER (OUTPATIENT)
Facility: CLINIC | Age: 48
Setting detail: SPECIMEN
Discharge: HOME | End: 2018-12-26
Attending: INTERNAL MEDICINE | Admitting: INTERNAL MEDICINE
Payer: COMMERCIAL

## 2018-12-26 ENCOUNTER — INFUSION THERAPY VISIT (OUTPATIENT)
Dept: INFUSION THERAPY | Facility: CLINIC | Age: 48
End: 2018-12-26
Attending: INTERNAL MEDICINE
Payer: COMMERCIAL

## 2018-12-26 VITALS
BODY MASS INDEX: 34.94 KG/M2 | RESPIRATION RATE: 16 BRPM | HEART RATE: 77 BPM | HEIGHT: 63 IN | WEIGHT: 197.2 LBS | TEMPERATURE: 98.4 F | SYSTOLIC BLOOD PRESSURE: 130 MMHG | DIASTOLIC BLOOD PRESSURE: 84 MMHG | OXYGEN SATURATION: 100 %

## 2018-12-26 DIAGNOSIS — Z95.828 PORT-A-CATH IN PLACE: ICD-10-CM

## 2018-12-26 DIAGNOSIS — Z17.1 MALIGNANT NEOPLASM OF UPPER-OUTER QUADRANT OF RIGHT BREAST IN FEMALE, ESTROGEN RECEPTOR NEGATIVE (H): Primary | ICD-10-CM

## 2018-12-26 DIAGNOSIS — C50.411 MALIGNANT NEOPLASM OF UPPER-OUTER QUADRANT OF RIGHT BREAST IN FEMALE, ESTROGEN RECEPTOR NEGATIVE (H): Primary | ICD-10-CM

## 2018-12-26 LAB
ALBUMIN SERPL-MCNC: 3.2 G/DL (ref 3.4–5)
ALP SERPL-CCNC: 122 U/L (ref 40–150)
ALT SERPL W P-5'-P-CCNC: 31 U/L (ref 0–50)
ANISOCYTOSIS BLD QL SMEAR: ABNORMAL
AST SERPL W P-5'-P-CCNC: 15 U/L (ref 0–45)
BASOPHILS # BLD AUTO: 0 10E9/L (ref 0–0.2)
BASOPHILS NFR BLD AUTO: 0 %
BILIRUB DIRECT SERPL-MCNC: <0.1 MG/DL (ref 0–0.2)
BILIRUB SERPL-MCNC: 0.1 MG/DL (ref 0.2–1.3)
DACRYOCYTES BLD QL SMEAR: SLIGHT
DIFFERENTIAL METHOD BLD: ABNORMAL
EOSINOPHIL # BLD AUTO: 0 10E9/L (ref 0–0.7)
EOSINOPHIL NFR BLD AUTO: 0 %
ERYTHROCYTE [DISTWIDTH] IN BLOOD BY AUTOMATED COUNT: 21.6 % (ref 10–15)
HCT VFR BLD AUTO: 30.6 % (ref 35–47)
HGB BLD-MCNC: 10.1 G/DL (ref 11.7–15.7)
LYMPHOCYTES # BLD AUTO: 0.9 10E9/L (ref 0.8–5.3)
LYMPHOCYTES NFR BLD AUTO: 7 %
MCH RBC QN AUTO: 27.2 PG (ref 26.5–33)
MCHC RBC AUTO-ENTMCNC: 33 G/DL (ref 31.5–36.5)
MCV RBC AUTO: 82 FL (ref 78–100)
METAMYELOCYTES # BLD: 0.1 10E9/L
METAMYELOCYTES NFR BLD MANUAL: 1 %
MONOCYTES # BLD AUTO: 1.4 10E9/L (ref 0–1.3)
MONOCYTES NFR BLD AUTO: 11 %
NEUTROPHILS # BLD AUTO: 10.5 10E9/L (ref 1.6–8.3)
NEUTROPHILS NFR BLD AUTO: 81 %
NRBC # BLD AUTO: 0.7 10*3/UL
NRBC BLD AUTO-RTO: 5 /100
PLATELET # BLD AUTO: 152 10E9/L (ref 150–450)
PLATELET # BLD EST: ABNORMAL 10*3/UL
POLYCHROMASIA BLD QL SMEAR: SLIGHT
PROT SERPL-MCNC: 6.7 G/DL (ref 6.8–8.8)
RBC # BLD AUTO: 3.72 10E12/L (ref 3.8–5.2)
WBC # BLD AUTO: 13 10E9/L (ref 4–11)

## 2018-12-26 PROCEDURE — 96413 CHEMO IV INFUSION 1 HR: CPT

## 2018-12-26 PROCEDURE — 36593 DECLOT VASCULAR DEVICE: CPT

## 2018-12-26 PROCEDURE — 25000128 H RX IP 250 OP 636: Performed by: INTERNAL MEDICINE

## 2018-12-26 PROCEDURE — 96367 TX/PROPH/DG ADDL SEQ IV INF: CPT

## 2018-12-26 PROCEDURE — 85025 COMPLETE CBC W/AUTO DIFF WBC: CPT | Performed by: INTERNAL MEDICINE

## 2018-12-26 PROCEDURE — 25000132 ZZH RX MED GY IP 250 OP 250 PS 637: Performed by: INTERNAL MEDICINE

## 2018-12-26 PROCEDURE — 80076 HEPATIC FUNCTION PANEL: CPT | Performed by: INTERNAL MEDICINE

## 2018-12-26 RX ORDER — ALBUTEROL SULFATE 90 UG/1
1-2 AEROSOL, METERED RESPIRATORY (INHALATION)
Status: CANCELLED
Start: 2018-12-26

## 2018-12-26 RX ORDER — EPINEPHRINE 0.3 MG/.3ML
0.3 INJECTION SUBCUTANEOUS EVERY 5 MIN PRN
Status: CANCELLED | OUTPATIENT
Start: 2018-12-26

## 2018-12-26 RX ORDER — SODIUM CHLORIDE 9 MG/ML
1000 INJECTION, SOLUTION INTRAVENOUS CONTINUOUS PRN
Status: CANCELLED
Start: 2018-12-26

## 2018-12-26 RX ORDER — HEPARIN SODIUM (PORCINE) LOCK FLUSH IV SOLN 100 UNIT/ML 100 UNIT/ML
5 SOLUTION INTRAVENOUS ONCE
Status: COMPLETED | OUTPATIENT
Start: 2018-12-26 | End: 2018-12-26

## 2018-12-26 RX ORDER — ALBUTEROL SULFATE 0.83 MG/ML
2.5 SOLUTION RESPIRATORY (INHALATION)
Status: CANCELLED | OUTPATIENT
Start: 2018-12-26

## 2018-12-26 RX ORDER — LORAZEPAM 2 MG/ML
0.5 INJECTION INTRAMUSCULAR EVERY 4 HOURS PRN
Status: CANCELLED
Start: 2018-12-26

## 2018-12-26 RX ORDER — DIPHENHYDRAMINE HCL 25 MG
50 CAPSULE ORAL ONCE
Status: COMPLETED | OUTPATIENT
Start: 2018-12-26 | End: 2018-12-26

## 2018-12-26 RX ORDER — EPINEPHRINE 1 MG/ML
0.3 INJECTION, SOLUTION INTRAMUSCULAR; SUBCUTANEOUS EVERY 5 MIN PRN
Status: CANCELLED | OUTPATIENT
Start: 2018-12-26

## 2018-12-26 RX ORDER — HEPARIN SODIUM (PORCINE) LOCK FLUSH IV SOLN 100 UNIT/ML 100 UNIT/ML
5 SOLUTION INTRAVENOUS ONCE
Status: CANCELLED
Start: 2018-12-26 | End: 2018-12-26

## 2018-12-26 RX ORDER — DIPHENHYDRAMINE HCL 25 MG
50 CAPSULE ORAL ONCE
Status: CANCELLED
Start: 2018-12-26

## 2018-12-26 RX ORDER — METHYLPREDNISOLONE SODIUM SUCCINATE 125 MG/2ML
125 INJECTION, POWDER, LYOPHILIZED, FOR SOLUTION INTRAMUSCULAR; INTRAVENOUS
Status: CANCELLED
Start: 2018-12-26

## 2018-12-26 RX ORDER — MEPERIDINE HYDROCHLORIDE 25 MG/ML
25 INJECTION INTRAMUSCULAR; INTRAVENOUS; SUBCUTANEOUS EVERY 30 MIN PRN
Status: CANCELLED | OUTPATIENT
Start: 2018-12-26

## 2018-12-26 RX ORDER — DIPHENHYDRAMINE HYDROCHLORIDE 50 MG/ML
50 INJECTION INTRAMUSCULAR; INTRAVENOUS
Status: CANCELLED
Start: 2018-12-26

## 2018-12-26 RX ADMIN — SODIUM CHLORIDE 250 ML: 9 INJECTION, SOLUTION INTRAVENOUS at 14:12

## 2018-12-26 RX ADMIN — DIPHENHYDRAMINE HYDROCHLORIDE 50 MG: 25 CAPSULE ORAL at 14:12

## 2018-12-26 RX ADMIN — FAMOTIDINE 20 MG: 20 INJECTION, SOLUTION INTRAVENOUS at 14:39

## 2018-12-26 RX ADMIN — PACLITAXEL 158 MG: 6 INJECTION, SOLUTION INTRAVENOUS at 14:58

## 2018-12-26 RX ADMIN — ALTEPLASE 2 MG: 2.2 INJECTION, POWDER, LYOPHILIZED, FOR SOLUTION INTRAVENOUS at 13:17

## 2018-12-26 RX ADMIN — HEPARIN 5 ML: 100 SYRINGE at 16:03

## 2018-12-26 RX ADMIN — DEXAMETHASONE SODIUM PHOSPHATE 20 MG: 10 INJECTION, SOLUTION INTRAMUSCULAR; INTRAVENOUS at 14:21

## 2018-12-26 ASSESSMENT — PAIN SCALES - GENERAL: PAINLEVEL: NO PAIN (0)

## 2018-12-26 ASSESSMENT — MIFFLIN-ST. JEOR: SCORE: 1498.49

## 2018-12-26 NOTE — PROGRESS NOTES
Infusion Nursing Note:  Luci Londono presents today for C5D1 Taxol.    Patient seen by provider today: No   present during visit today: Not Applicable.    Note: port with very sluggish blood return, unable to get enough for labs. Port has been difficult last couple visits. Will cathflo today. .    Intravenous Access:  Lab draw site left a/c, Needle type bf, Gauge 23.  Implanted Port.    Treatment Conditions:  Lab Results   Component Value Date    HGB 10.1 12/26/2018     Lab Results   Component Value Date    WBC 13.0 12/26/2018      Lab Results   Component Value Date    ANEU 10.5 12/26/2018     Lab Results   Component Value Date     12/26/2018      Lab Results   Component Value Date     11/28/2018                   Lab Results   Component Value Date    POTASSIUM 3.6 11/28/2018           Lab Results   Component Value Date    MAG 1.8 10/12/2018            Lab Results   Component Value Date    CR 0.67 11/28/2018                   Lab Results   Component Value Date    MATHIEU 8.7 11/28/2018                Lab Results   Component Value Date    BILITOTAL 0.1 12/26/2018           Lab Results   Component Value Date    ALBUMIN 3.2 12/26/2018                    Lab Results   Component Value Date    ALT 31 12/26/2018           Lab Results   Component Value Date    AST 15 12/26/2018       Results reviewed, labs MET treatment parameters, ok to proceed with treatment.      Post Infusion Assessment:  Patient tolerated infusion without incident.  Blood return noted pre and post infusion.  Site patent and intact, free from redness, edema or discomfort.  No evidence of extravasations.  Access discontinued per protocol.    Discharge Plan:   Discharge instructions reviewed with: Patient and Family.  Patient and/or family verbalized understanding of discharge instructions and all questions answered.  Copy of AVS reviewed with patient and/or family.  Patient will return 1/2/2019 for next  appointment.  Patient discharged in stable condition accompanied by: self and family.  Departure Mode: Ambulatory.    TRELL Araiza RN (discharge)

## 2018-12-27 ENCOUNTER — TELEPHONE (OUTPATIENT)
Dept: ONCOLOGY | Facility: CLINIC | Age: 48
End: 2018-12-27

## 2018-12-27 NOTE — TELEPHONE ENCOUNTER
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 Letter will be written and signed by GINGER Ramesh. Patient will  letter tomorrow.

## 2018-12-27 NOTE — LETTER
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 Luci is unable to lift, and experiences weakness, fatigue, nausea, diarrhea, vomiting, immunosuppression secondary to her chemotherapy treatments. If you have any questions or concerns please contact me at [phone], option 4.            Sincerely,      Gadiel Crisostomo NP

## 2018-12-28 ENCOUNTER — TELEPHONE (OUTPATIENT)
Dept: ONCOLOGY | Facility: CLINIC | Age: 48
End: 2018-12-28

## 2018-12-28 NOTE — TELEPHONE ENCOUNTER
Patient in clinic today to  work letter. Inquired how she is tolerating Taxol. Luci stated that she is tired denies any nausea. Marialuisa Casper RN,BSN,OCN

## 2018-12-28 NOTE — TELEPHONE ENCOUNTER
Called patient, left message to let her know letter is completed stating that she is currently unable to return back to work. Left message inquiring how she is tolerating Taxol.

## 2019-01-02 ENCOUNTER — INFUSION THERAPY VISIT (OUTPATIENT)
Dept: INFUSION THERAPY | Facility: CLINIC | Age: 49
End: 2019-01-02
Attending: INTERNAL MEDICINE
Payer: COMMERCIAL

## 2019-01-02 ENCOUNTER — ALLIED HEALTH/NURSE VISIT (OUTPATIENT)
Dept: ONCOLOGY | Facility: CLINIC | Age: 49
End: 2019-01-02

## 2019-01-02 ENCOUNTER — ONCOLOGY VISIT (OUTPATIENT)
Dept: ONCOLOGY | Facility: CLINIC | Age: 49
End: 2019-01-02
Attending: INTERNAL MEDICINE
Payer: COMMERCIAL

## 2019-01-02 ENCOUNTER — HOSPITAL ENCOUNTER (OUTPATIENT)
Facility: CLINIC | Age: 49
Setting detail: SPECIMEN
End: 2019-01-02
Attending: INTERNAL MEDICINE
Payer: COMMERCIAL

## 2019-01-02 ENCOUNTER — HOSPITAL ENCOUNTER (OUTPATIENT)
Facility: CLINIC | Age: 49
Setting detail: SPECIMEN
Discharge: HOME | End: 2019-01-02
Attending: INTERNAL MEDICINE | Admitting: INTERNAL MEDICINE
Payer: COMMERCIAL

## 2019-01-02 VITALS
BODY MASS INDEX: 35.08 KG/M2 | SYSTOLIC BLOOD PRESSURE: 126 MMHG | TEMPERATURE: 98.2 F | HEART RATE: 80 BPM | HEIGHT: 63 IN | WEIGHT: 198 LBS | RESPIRATION RATE: 16 BRPM | OXYGEN SATURATION: 100 % | DIASTOLIC BLOOD PRESSURE: 82 MMHG

## 2019-01-02 VITALS
BODY MASS INDEX: 35.08 KG/M2 | WEIGHT: 198 LBS | HEART RATE: 80 BPM | TEMPERATURE: 98.2 F | HEIGHT: 63 IN | OXYGEN SATURATION: 100 % | DIASTOLIC BLOOD PRESSURE: 82 MMHG | SYSTOLIC BLOOD PRESSURE: 126 MMHG

## 2019-01-02 DIAGNOSIS — R11.0 NAUSEA: ICD-10-CM

## 2019-01-02 DIAGNOSIS — J10.1 INFLUENZA A: ICD-10-CM

## 2019-01-02 DIAGNOSIS — Z17.1 MALIGNANT NEOPLASM OF UPPER-OUTER QUADRANT OF RIGHT BREAST IN FEMALE, ESTROGEN RECEPTOR NEGATIVE (H): Primary | ICD-10-CM

## 2019-01-02 DIAGNOSIS — C50.411 MALIGNANT NEOPLASM OF UPPER-OUTER QUADRANT OF RIGHT BREAST IN FEMALE, ESTROGEN RECEPTOR NEGATIVE (H): Primary | ICD-10-CM

## 2019-01-02 DIAGNOSIS — K29.70 GASTRITIS WITHOUT BLEEDING, UNSPECIFIED CHRONICITY, UNSPECIFIED GASTRITIS TYPE: ICD-10-CM

## 2019-01-02 DIAGNOSIS — Z71.9 COUNSELING NOS(V65.40): Primary | ICD-10-CM

## 2019-01-02 DIAGNOSIS — Z95.828 PORT-A-CATH IN PLACE: ICD-10-CM

## 2019-01-02 LAB
ALBUMIN SERPL-MCNC: 3.1 G/DL (ref 3.4–5)
ALP SERPL-CCNC: 70 U/L (ref 40–150)
ALT SERPL W P-5'-P-CCNC: 34 U/L (ref 0–50)
ANION GAP SERPL CALCULATED.3IONS-SCNC: 8 MMOL/L (ref 3–14)
AST SERPL W P-5'-P-CCNC: 13 U/L (ref 0–45)
BASOPHILS # BLD AUTO: 0.1 10E9/L (ref 0–0.2)
BASOPHILS NFR BLD AUTO: 0.4 %
BILIRUB SERPL-MCNC: 0.2 MG/DL (ref 0.2–1.3)
BUN SERPL-MCNC: 11 MG/DL (ref 7–30)
CALCIUM SERPL-MCNC: 8.7 MG/DL (ref 8.5–10.1)
CHLORIDE SERPL-SCNC: 100 MMOL/L (ref 94–109)
CO2 SERPL-SCNC: 30 MMOL/L (ref 20–32)
CREAT SERPL-MCNC: 0.6 MG/DL (ref 0.52–1.04)
DIFFERENTIAL METHOD BLD: ABNORMAL
EOSINOPHIL # BLD AUTO: 0 10E9/L (ref 0–0.7)
EOSINOPHIL NFR BLD AUTO: 0.1 %
ERYTHROCYTE [DISTWIDTH] IN BLOOD BY AUTOMATED COUNT: 22.5 % (ref 10–15)
GFR SERPL CREATININE-BSD FRML MDRD: >90 ML/MIN/{1.73_M2}
GLUCOSE SERPL-MCNC: 75 MG/DL (ref 70–99)
HCT VFR BLD AUTO: 30.2 % (ref 35–47)
HGB BLD-MCNC: 10.1 G/DL (ref 11.7–15.7)
IMM GRANULOCYTES # BLD: 0.3 10E9/L (ref 0–0.4)
IMM GRANULOCYTES NFR BLD: 2.6 %
LYMPHOCYTES # BLD AUTO: 2.4 10E9/L (ref 0.8–5.3)
LYMPHOCYTES NFR BLD AUTO: 19.1 %
MCH RBC QN AUTO: 27.5 PG (ref 26.5–33)
MCHC RBC AUTO-ENTMCNC: 33.4 G/DL (ref 31.5–36.5)
MCV RBC AUTO: 82 FL (ref 78–100)
MONOCYTES # BLD AUTO: 0.9 10E9/L (ref 0–1.3)
MONOCYTES NFR BLD AUTO: 6.7 %
NEUTROPHILS # BLD AUTO: 9.1 10E9/L (ref 1.6–8.3)
NEUTROPHILS NFR BLD AUTO: 71.1 %
NRBC # BLD AUTO: 0.2 10*3/UL
NRBC BLD AUTO-RTO: 2 /100
PLATELET # BLD AUTO: 425 10E9/L (ref 150–450)
POTASSIUM SERPL-SCNC: 3.6 MMOL/L (ref 3.4–5.3)
PROT SERPL-MCNC: 6.5 G/DL (ref 6.8–8.8)
RBC # BLD AUTO: 3.67 10E12/L (ref 3.8–5.2)
SODIUM SERPL-SCNC: 138 MMOL/L (ref 133–144)
WBC # BLD AUTO: 12.8 10E9/L (ref 4–11)

## 2019-01-02 PROCEDURE — 99215 OFFICE O/P EST HI 40 MIN: CPT | Performed by: INTERNAL MEDICINE

## 2019-01-02 PROCEDURE — 96413 CHEMO IV INFUSION 1 HR: CPT

## 2019-01-02 PROCEDURE — 85025 COMPLETE CBC W/AUTO DIFF WBC: CPT | Performed by: INTERNAL MEDICINE

## 2019-01-02 PROCEDURE — 25000132 ZZH RX MED GY IP 250 OP 250 PS 637: Performed by: INTERNAL MEDICINE

## 2019-01-02 PROCEDURE — 96367 TX/PROPH/DG ADDL SEQ IV INF: CPT

## 2019-01-02 PROCEDURE — 80053 COMPREHEN METABOLIC PANEL: CPT | Performed by: INTERNAL MEDICINE

## 2019-01-02 PROCEDURE — 96417 CHEMO IV INFUS EACH ADDL SEQ: CPT

## 2019-01-02 PROCEDURE — 25000128 H RX IP 250 OP 636: Performed by: INTERNAL MEDICINE

## 2019-01-02 RX ORDER — SODIUM CHLORIDE 9 MG/ML
1000 INJECTION, SOLUTION INTRAVENOUS CONTINUOUS PRN
Status: CANCELLED
Start: 2019-01-02

## 2019-01-02 RX ORDER — ALBUTEROL SULFATE 90 UG/1
1-2 AEROSOL, METERED RESPIRATORY (INHALATION)
Status: CANCELLED
Start: 2019-01-02

## 2019-01-02 RX ORDER — DIPHENHYDRAMINE HYDROCHLORIDE 50 MG/ML
50 INJECTION INTRAMUSCULAR; INTRAVENOUS
Status: CANCELLED
Start: 2019-01-02

## 2019-01-02 RX ORDER — METHYLPREDNISOLONE SODIUM SUCCINATE 125 MG/2ML
125 INJECTION, POWDER, LYOPHILIZED, FOR SOLUTION INTRAMUSCULAR; INTRAVENOUS
Status: CANCELLED
Start: 2019-01-02

## 2019-01-02 RX ORDER — ALBUTEROL SULFATE 0.83 MG/ML
2.5 SOLUTION RESPIRATORY (INHALATION)
Status: CANCELLED | OUTPATIENT
Start: 2019-01-02

## 2019-01-02 RX ORDER — MEPERIDINE HYDROCHLORIDE 25 MG/ML
25 INJECTION INTRAMUSCULAR; INTRAVENOUS; SUBCUTANEOUS EVERY 30 MIN PRN
Status: CANCELLED | OUTPATIENT
Start: 2019-01-02

## 2019-01-02 RX ORDER — EPINEPHRINE 0.3 MG/.3ML
0.3 INJECTION SUBCUTANEOUS EVERY 5 MIN PRN
Status: CANCELLED | OUTPATIENT
Start: 2019-01-02

## 2019-01-02 RX ORDER — DIPHENHYDRAMINE HCL 25 MG
50 CAPSULE ORAL ONCE
Status: COMPLETED | OUTPATIENT
Start: 2019-01-02 | End: 2019-01-02

## 2019-01-02 RX ORDER — HEPARIN SODIUM (PORCINE) LOCK FLUSH IV SOLN 100 UNIT/ML 100 UNIT/ML
5 SOLUTION INTRAVENOUS ONCE
Status: CANCELLED
Start: 2019-01-02 | End: 2019-01-02

## 2019-01-02 RX ORDER — HEPARIN SODIUM (PORCINE) LOCK FLUSH IV SOLN 100 UNIT/ML 100 UNIT/ML
5 SOLUTION INTRAVENOUS ONCE
Status: COMPLETED | OUTPATIENT
Start: 2019-01-02 | End: 2019-01-02

## 2019-01-02 RX ORDER — LORAZEPAM 2 MG/ML
0.5 INJECTION INTRAMUSCULAR EVERY 4 HOURS PRN
Status: CANCELLED
Start: 2019-01-02

## 2019-01-02 RX ORDER — OXYCODONE AND ACETAMINOPHEN 5; 325 MG/1; MG/1
1-2 TABLET ORAL EVERY 4 HOURS PRN
Qty: 10 TABLET | Refills: 0 | Status: SHIPPED | OUTPATIENT
Start: 2019-01-02 | End: 2019-03-27

## 2019-01-02 RX ORDER — EPINEPHRINE 1 MG/ML
0.3 INJECTION, SOLUTION INTRAMUSCULAR; SUBCUTANEOUS EVERY 5 MIN PRN
Status: CANCELLED | OUTPATIENT
Start: 2019-01-02

## 2019-01-02 RX ADMIN — DEXAMETHASONE SODIUM PHOSPHATE 20 MG: 10 INJECTION, SOLUTION INTRAMUSCULAR; INTRAVENOUS at 12:35

## 2019-01-02 RX ADMIN — DIPHENHYDRAMINE HYDROCHLORIDE 50 MG: 25 CAPSULE ORAL at 12:32

## 2019-01-02 RX ADMIN — FAMOTIDINE 20 MG: 20 INJECTION, SOLUTION INTRAVENOUS at 12:55

## 2019-01-02 RX ADMIN — HEPARIN 5 ML: 100 SYRINGE at 14:49

## 2019-01-02 RX ADMIN — SODIUM CHLORIDE 250 ML: 9 INJECTION, SOLUTION INTRAVENOUS at 12:33

## 2019-01-02 RX ADMIN — CARBOPLATIN 295 MG: 10 INJECTION, SOLUTION INTRAVENOUS at 14:17

## 2019-01-02 RX ADMIN — PACLITAXEL 158 MG: 6 INJECTION, SOLUTION INTRAVENOUS at 13:15

## 2019-01-02 ASSESSMENT — MIFFLIN-ST. JEOR
SCORE: 1502.25
SCORE: 1502.25

## 2019-01-02 ASSESSMENT — PAIN SCALES - GENERAL
PAINLEVEL: MODERATE PAIN (5)
PAINLEVEL: MILD PAIN (2)

## 2019-01-02 NOTE — PROGRESS NOTES
Social Work Progress Note      Data/Intervention:  Patient Name:  Luci Londono  /Age:  1970 (48 year old)    Reason for Follow-Up:  Luci is a 48-year-old woman with a diagnosis of breast cancer who presents for C6D1 Taxol and visit with Dr. Murillo. This clinician met with pt today per pt request, pt accompanied by daughter.     Intervention:   Luci reports that she continues to be coping well from a mood standpoint, but acknowledges that greatest concerns at present continue to be financial. Luci appreciative of receiving financial assistance from [employer], and is agreeable for further applications for financial aid. Discussed options available through [employer] and [employer]. Provided copy of application for [employer] to pt today, and made plan to meet again at next weeks appointment to complete and mail application.     Plan:  1) This clinician will plan to meet with pt at next infusion visit to complete [employer] application  2) This clinician will notify pt when [employer] applications are open 2019  3) This clinician will continue to follow for ongoing psychosocial support as pt continues to adjust to treatment and realize its impacts    Please call or page if needs or concerns arise.     MIRA Julien, LICSW  Direct Phone: [phone]  Pager: [phone]

## 2019-01-02 NOTE — PATIENT INSTRUCTIONS
Add CMP and carbo stating today for her wkly chemo.  Scheduled/Tia  F/u in 2-3 with chemo. Scheduled/Tia      Patient in [facility] infusion      Avs printed and given to patient

## 2019-01-02 NOTE — LETTER
"    1/2/2019         RE: Luci Londono  [address]  [facility] [address]        Dear Colleague,    Thank you for referring your patient, Luci Londono, to the [facility]. Please see a copy of my visit note below.    ONCOLOGY FOLLOW [facility] VISIT    breast surgeon:  Dr. 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   Her mammogram revealed a small mass in the right upper outer breast,   US revealed an 8mm hypoechoic mass at 12:00, 6cm FN and axillary US revealed a concerning lymph node which was also biopsied.   Her pathology from both breast and LN revealed a grade 3, invasive ductal carcinoma, ER/KY/her 2-.   PET found no distal disease.   Breast MRI found entire span 3.8 cm.There are multiple enlarged right axillary lymph nodes.        She made informed decision to proceed with neoadjuvant DD AC  She has drastic clinical response by PE and MRI.   She continued on wkly taxol. Carbo is added in W2.     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     MEDICATIONS/ALLERY:  Reviewed in Epic system.      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 Deny ETOH/smoking       FAMILY HISTORY:    Negative for any type of cancers    REVIEW OF SYSTEMS:   She has some \"heaviness\" in her stomach area.   + diarrhea, with soft BM.        PHYSICAL EXAMINATION:   VITAL " "SIGNS: Blood pressure 126/82, pulse 80, temperature 98.2  F (36.8  C), temperature source Oral, height 1.6 m (5' 3\"), weight 89.8 kg (198 lb), SpO2 100 %, not currently breastfeeding.    ECOG 0    GENERAL APPEARANCE:  looks like her stated age, very pleasant, not in acute distress.   HEENT: The patient is normocephalic, atraumatic. Pupils are equally reactive to light.  Sclerae are anicteric.  Moist oral mucosa.  Negative pharynx.  No oral thrush. Stomatitis on left angular of mouth.    NECK:  Supple.  No jugular venous distention.  Thyroid is not palpable.   LYMPH NODES:  Superficial lymphadenopathy is not appreciable in the bilateral cervical, supraclavicular, axillary or inguinal areas.   CARDIOVASCULAR:  S1, S2 regular with no murmurs or gallops.  No carotid or abdominal bruits.   PULMONARY:  Lungs are clear to auscultation and percussion bilaterally.  There is no wheezing or rhonchi.   GASTROINTESTINAL:  Abdomen is soft, nontender.  No hepatosplenomegaly.  No signs of ascites.  No mass appreciable.   MUSCULOSKELETAL/EXTREMITIES:  No edema.  No cyanotic changes.  No signs of joint deformity.  No lymphedema.   NEUROLOGIC:  Cranial nerves II-XII are grossly intact.  Sensation intact.  Muscle strength and muscle tone symmetrical, 5/5 throughout.   BACK:  No spinal or paraspinal tenderness.  No CVA tenderness.   SKIN:  No petechiae.  No rash.  No signs of cellulitis.   BREASTS: Right breast with mild ecchymosis on upper breast, no longer palpable 1.5cm mass at 12:00 post C1. No other masses.  Left breast is symmetrical with no skin or nipple changes. No masses on the left. Tissue is very dense throughout.          CURRENT LAB DATA REVIEWED  Cbc diff is fine.       10/2018  Right breast 8mm hypoechoic mass at 12:00, 6cm FN and right axillary LN biopsy both found grade 3, invasive ductal carcinoma, ER/SD/her 2-.     Baseline cbc diff/CMP are fine.         CURRENT IMAGING REVIEWED  Breast MRI post AC 12/2018  1. " Enhancing mass superiorly in the RIGHT breast is  significantly decreased in size since 10/17/2018, measuring  approximately 1.1 x 0.3 x 0.5 cm in today's study (series 5 image 52 and series 13 image 30), decreased from 1.1 x 1.6 x 1.0 cm.  2. Enlarged RIGHT axillary lymph node is slightly decreased since that time as well, now measuring 0.9 x 1.2 x 1.0 cm (series 5 image 68 and series 13 image 19), decreased from 1.3 x 1.2 x 1.4 cm.     Baseline pre tx breast MRI 10/2018 -  In the right breast at 12:00 7 cm from the nipple, there is irregular heterogeneously enhancing mass, corresponding with the known biopsy-proven malignancy, which spans approximately 2.2 cm in maximal diameter, best appreciated on sagittal view, with surrounding nonmasslike enhancement likely related to postbiopsy change or DCIS.  Taken with the primary tumor, the entire span extends up to 3.8 cm.   There are multiple enlarged right axillary lymph nodes.      PET 10/2018  1. No evidence of distant metastasis.  2. Hypermetabolic focus in the upper outer quadrant right breast representing primary tumor. Hypermetabolic right axillary lymph nodes, consistent with metastatic node.  3. Indeterminate sub-4 mm pulmonary nodules, likely fissural lymph node in the right lung.  4. Extensive FDG uptake by the brown fat in the neck and paraspinal region.    10/2018 dx MA -  revealed a small mass in the right upper outer breast, US revealed an 8mm hypoechoic mass at 12:00, 6cm FN and axillary US revealed a concerning lymph node        OLD DATA REVIEWED TODAY WITH SUMMARY:   Prior MA 2017, 2014 - negative.           ASSESSMENT AND PLAN:    1.  dx cT1cN1 right breast high grade IDC, TN at age 47 in 10/2018.     PET is negative for distal disease.      She made informed decision to proceed DD AC -- taxol + carbo C1D1 10/24/2018.     She had good MRI response post C4 AC.     She did ok with W1 taxol. Will add wkly carbo today in W2.     She needs to be monitored  closely during this intense tx.     2. Young age dx with TN breast cancer, she will need genetic counseling.   Result will direct us on the platinum use.     3. Nausea without vormiting -  Advice prn anti emetic use.     4. Gastritis - we discussed the zantac use and diet.             Again, thank you for allowing me to participate in the care of your patient.        Sincerely,        Addie Murillo MD, MD

## 2019-01-02 NOTE — PROGRESS NOTES
Infusion Nursing Note:  Luci Londono presents today for C6D1 Taxol.    Patient seen by provider today: Yes: Dr. Murillo   present during visit today: Not Applicable.    Note: Dr. Murillo added Carboplatin to today's infusion.    Intravenous Access:  Labs drawn without difficulty.  Implanted Port.    Treatment Conditions:  Lab Results   Component Value Date    HGB 10.1 01/02/2019     Lab Results   Component Value Date    WBC 12.8 01/02/2019      Lab Results   Component Value Date    ANEU 9.1 01/02/2019     Lab Results   Component Value Date     01/02/2019      Lab Results   Component Value Date     01/02/2019                   Lab Results   Component Value Date    POTASSIUM 3.6 01/02/2019           Lab Results   Component Value Date    MAG 1.8 10/12/2018            Lab Results   Component Value Date    CR 0.60 01/02/2019                   Lab Results   Component Value Date    MATHIEU 8.7 01/02/2019                Lab Results   Component Value Date    BILITOTAL 0.2 01/02/2019           Lab Results   Component Value Date    ALBUMIN 3.1 01/02/2019                    Lab Results   Component Value Date    ALT 34 01/02/2019           Lab Results   Component Value Date    AST 13 01/02/2019       Results reviewed, labs MET treatment parameters, ok to proceed with treatment.      Post Infusion Assessment:  Patient tolerated infusion without incident.  Blood return noted pre and post infusion.  Site patent and intact, free from redness, edema or discomfort.  No evidence of extravasations.  Access discontinued per protocol.    Discharge Plan:   Patient declined prescription refills.  Discharge instructions reviewed with: Patient.  Patient and/or family verbalized understanding of discharge instructions and all questions answered.  Copy of AVS reviewed with patient and/or family.  Patient will return 1/9/19 for next appointment.  Patient discharged in stable condition accompanied by: self and  daughter.  Departure Mode: Ambulatory.    Monique Barnes RN

## 2019-01-02 NOTE — PROGRESS NOTES
"ONCOLOGY FOLLOW [facility] VISIT    breast surgeon:  Dr. 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   Her mammogram revealed a small mass in the right upper outer breast,   US revealed an 8mm hypoechoic mass at 12:00, 6cm FN and axillary US revealed a concerning lymph node which was also biopsied.   Her pathology from both breast and LN revealed a grade 3, invasive ductal carcinoma, ER/[place]/her 2-.   PET found no distal disease.   Breast MRI found entire span 3.8 cm.There are multiple enlarged right axillary lymph nodes.        She made informed decision to proceed with neoadjuvant DD AC  She has drastic clinical response by PE and MRI.   She continued on wkly taxol. Carbo is added in W2.     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     MEDICATIONS/ALLERY:  Reviewed in Epic system.      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 Deny ETOH/smoking       FAMILY HISTORY:    Negative for any type of cancers    REVIEW OF SYSTEMS:   She has some \"heaviness\" in her stomach area.   + diarrhea, with soft BM.        PHYSICAL EXAMINATION:   VITAL SIGNS: Blood pressure 126/82, pulse 80, temperature 98.2  F (36.8  C), temperature source Oral, height 1.6 m (5' 3\"), weight 89.8 kg (198 lb), SpO2 100 %, not currently breastfeeding.    ECOG 0    GENERAL APPEARANCE:  looks like her stated age, very " pleasant, not in acute distress.   HEENT: The patient is normocephalic, atraumatic. Pupils are equally reactive to light.  Sclerae are anicteric.  Moist oral mucosa.  Negative pharynx.  No oral thrush. Stomatitis on left angular of mouth.    NECK:  Supple.  No jugular venous distention.  Thyroid is not palpable.   LYMPH NODES:  Superficial lymphadenopathy is not appreciable in the bilateral cervical, supraclavicular, axillary or inguinal areas.   CARDIOVASCULAR:  S1, S2 regular with no murmurs or gallops.  No carotid or abdominal bruits.   PULMONARY:  Lungs are clear to auscultation and percussion bilaterally.  There is no wheezing or rhonchi.   GASTROINTESTINAL:  Abdomen is soft, nontender.  No hepatosplenomegaly.  No signs of ascites.  No mass appreciable.   MUSCULOSKELETAL/EXTREMITIES:  No edema.  No cyanotic changes.  No signs of joint deformity.  No lymphedema.   NEUROLOGIC:  Cranial nerves II-XII are grossly intact.  Sensation intact.  Muscle strength and muscle tone symmetrical, 5/5 throughout.   BACK:  No spinal or paraspinal tenderness.  No CVA tenderness.   SKIN:  No petechiae.  No rash.  No signs of cellulitis.   BREASTS: Right breast with mild ecchymosis on upper breast, no longer palpable 1.5cm mass at 12:00 post C1. No other masses.  Left breast is symmetrical with no skin or nipple changes. No masses on the left. Tissue is very dense throughout.          CURRENT LAB DATA REVIEWED  Cbc diff is fine.       10/2018  Right breast 8mm hypoechoic mass at 12:00, 6cm FN and right axillary LN biopsy both found grade 3, invasive ductal carcinoma, ER/[place]/her 2-.     Baseline cbc diff/CMP are fine.         CURRENT IMAGING REVIEWED  Breast MRI post AC 12/2018  1. Enhancing mass superiorly in the RIGHT breast is  significantly decreased in size since 10/17/2018, measuring  approximately 1.1 x 0.3 x 0.5 cm in today's study (series 5 image 52 and series 13 image 30), decreased from 1.1 x 1.6 x 1.0 cm.  2. Enlarged  RIGHT axillary lymph node is slightly decreased since that time as well, now measuring 0.9 x 1.2 x 1.0 cm (series 5 image 68 and series 13 image 19), decreased from 1.3 x 1.2 x 1.4 cm.     Baseline pre tx breast MRI 10/2018 -  In the right breast at 12:00 7 cm from the nipple, there is irregular heterogeneously enhancing mass, corresponding with the known biopsy-proven malignancy, which spans approximately 2.2 cm in maximal diameter, best appreciated on sagittal view, with surrounding nonmasslike enhancement likely related to postbiopsy change or DCIS.  Taken with the primary tumor, the entire span extends up to 3.8 cm.   There are multiple enlarged right axillary lymph nodes.      PET 10/2018  1. No evidence of distant metastasis.  2. Hypermetabolic focus in the upper outer quadrant right breast representing primary tumor. Hypermetabolic right axillary lymph nodes, consistent with metastatic node.  3. Indeterminate sub-4 mm pulmonary nodules, likely fissural lymph node in the right lung.  4. Extensive FDG uptake by the brown fat in the neck and paraspinal region.    10/2018 dx MA -  revealed a small mass in the right upper outer breast, US revealed an 8mm hypoechoic mass at 12:00, 6cm FN and axillary US revealed a concerning lymph node        OLD DATA REVIEWED TODAY WITH SUMMARY:   Prior MA 2017, 2014 - negative.           ASSESSMENT AND PLAN:    1.  dx cT1cN1 right breast high grade IDC, TN at age 47 in 10/2018.     PET is negative for distal disease.      She made informed decision to proceed DD AC -- taxol + carbo C1D1 10/24/2018.     She had good MRI response post C4 AC.     She did ok with W1 taxol. Will add wkly carbo today in W2.     She needs to be monitored closely during this intense tx.     2. Young age dx with TN breast cancer, she will need genetic counseling.   Result will direct us on the platinum use.     3. Nausea without vormiting -  Advice prn anti emetic use.     4. Gastritis - we discussed the  zantac use and diet.

## 2019-01-04 ENCOUNTER — TELEPHONE (OUTPATIENT)
Dept: ONCOLOGY | Facility: CLINIC | Age: 49
End: 2019-01-04

## 2019-01-04 ENCOUNTER — HOSPITAL ENCOUNTER (EMERGENCY)
Facility: CLINIC | Age: 49
Discharge: HOME | End: 2019-01-04
Attending: EMERGENCY MEDICINE | Admitting: EMERGENCY MEDICINE
Payer: COMMERCIAL

## 2019-01-04 VITALS
DIASTOLIC BLOOD PRESSURE: 91 MMHG | HEIGHT: 61 IN | RESPIRATION RATE: 18 BRPM | TEMPERATURE: 98.1 F | SYSTOLIC BLOOD PRESSURE: 141 MMHG | WEIGHT: 195 LBS | BODY MASS INDEX: 36.82 KG/M2 | OXYGEN SATURATION: 100 %

## 2019-01-04 DIAGNOSIS — R10.13 EPIGASTRIC PAIN: ICD-10-CM

## 2019-01-04 DIAGNOSIS — M79.10 MYALGIA: ICD-10-CM

## 2019-01-04 LAB
ALBUMIN SERPL-MCNC: 3 G/DL (ref 3.4–5)
ALP SERPL-CCNC: 69 U/L (ref 40–150)
ALT SERPL W P-5'-P-CCNC: 37 U/L (ref 0–50)
ANION GAP SERPL CALCULATED.3IONS-SCNC: 8 MMOL/L (ref 3–14)
AST SERPL W P-5'-P-CCNC: 16 U/L (ref 0–45)
BASOPHILS # BLD AUTO: 0 10E9/L (ref 0–0.2)
BASOPHILS NFR BLD AUTO: 0.1 %
BILIRUB SERPL-MCNC: 0.3 MG/DL (ref 0.2–1.3)
BUN SERPL-MCNC: 14 MG/DL (ref 7–30)
CALCIUM SERPL-MCNC: 8.4 MG/DL (ref 8.5–10.1)
CHLORIDE SERPL-SCNC: 100 MMOL/L (ref 94–109)
CK SERPL-CCNC: 30 U/L (ref 30–225)
CO2 SERPL-SCNC: 27 MMOL/L (ref 20–32)
CREAT SERPL-MCNC: 0.59 MG/DL (ref 0.52–1.04)
DIFFERENTIAL METHOD BLD: ABNORMAL
EOSINOPHIL # BLD AUTO: 0 10E9/L (ref 0–0.7)
EOSINOPHIL NFR BLD AUTO: 0 %
ERYTHROCYTE [DISTWIDTH] IN BLOOD BY AUTOMATED COUNT: 23.2 % (ref 10–15)
GFR SERPL CREATININE-BSD FRML MDRD: >90 ML/MIN/{1.73_M2}
GLUCOSE SERPL-MCNC: 81 MG/DL (ref 70–99)
HCT VFR BLD AUTO: 29.8 % (ref 35–47)
HGB BLD-MCNC: 10.1 G/DL (ref 11.7–15.7)
IMM GRANULOCYTES # BLD: 0.1 10E9/L (ref 0–0.4)
IMM GRANULOCYTES NFR BLD: 0.9 %
INTERPRETATION ECG - MUSE: NORMAL
LIPASE SERPL-CCNC: 131 U/L (ref 73–393)
LYMPHOCYTES # BLD AUTO: 1.4 10E9/L (ref 0.8–5.3)
LYMPHOCYTES NFR BLD AUTO: 17.4 %
MCH RBC QN AUTO: 27.9 PG (ref 26.5–33)
MCHC RBC AUTO-ENTMCNC: 33.9 G/DL (ref 31.5–36.5)
MCV RBC AUTO: 82 FL (ref 78–100)
MONOCYTES # BLD AUTO: 0.6 10E9/L (ref 0–1.3)
MONOCYTES NFR BLD AUTO: 7.8 %
NEUTROPHILS # BLD AUTO: 6 10E9/L (ref 1.6–8.3)
NEUTROPHILS NFR BLD AUTO: 73.8 %
NRBC # BLD AUTO: 0.1 10*3/UL
NRBC BLD AUTO-RTO: 1 /100
PLATELET # BLD AUTO: 320 10E9/L (ref 150–450)
POTASSIUM SERPL-SCNC: 3.5 MMOL/L (ref 3.4–5.3)
PROT SERPL-MCNC: 6.4 G/DL (ref 6.8–8.8)
RBC # BLD AUTO: 3.62 10E12/L (ref 3.8–5.2)
SODIUM SERPL-SCNC: 135 MMOL/L (ref 133–144)
WBC # BLD AUTO: 8.2 10E9/L (ref 4–11)

## 2019-01-04 PROCEDURE — 96361 HYDRATE IV INFUSION ADD-ON: CPT

## 2019-01-04 PROCEDURE — 25000128 H RX IP 250 OP 636: Performed by: EMERGENCY MEDICINE

## 2019-01-04 PROCEDURE — 96374 THER/PROPH/DIAG INJ IV PUSH: CPT

## 2019-01-04 PROCEDURE — 85025 COMPLETE CBC W/AUTO DIFF WBC: CPT | Performed by: EMERGENCY MEDICINE

## 2019-01-04 PROCEDURE — 82550 ASSAY OF CK (CPK): CPT | Performed by: EMERGENCY MEDICINE

## 2019-01-04 PROCEDURE — 83690 ASSAY OF LIPASE: CPT | Performed by: EMERGENCY MEDICINE

## 2019-01-04 PROCEDURE — 93005 ELECTROCARDIOGRAM TRACING: CPT

## 2019-01-04 PROCEDURE — 25000132 ZZH RX MED GY IP 250 OP 250 PS 637: Performed by: EMERGENCY MEDICINE

## 2019-01-04 PROCEDURE — 99285 EMERGENCY DEPT VISIT HI MDM: CPT | Mod: 25

## 2019-01-04 PROCEDURE — 80053 COMPREHEN METABOLIC PANEL: CPT | Performed by: EMERGENCY MEDICINE

## 2019-01-04 PROCEDURE — 25000125 ZZHC RX 250: Performed by: EMERGENCY MEDICINE

## 2019-01-04 RX ORDER — HYDROMORPHONE HYDROCHLORIDE 1 MG/ML
0.5 INJECTION, SOLUTION INTRAMUSCULAR; INTRAVENOUS; SUBCUTANEOUS ONCE
Status: COMPLETED | OUTPATIENT
Start: 2019-01-04 | End: 2019-01-04

## 2019-01-04 RX ORDER — HYDROMORPHONE HYDROCHLORIDE 1 MG/ML
0.5 INJECTION, SOLUTION INTRAMUSCULAR; INTRAVENOUS; SUBCUTANEOUS
Status: COMPLETED | OUTPATIENT
Start: 2019-01-04 | End: 2019-01-04

## 2019-01-04 RX ORDER — HEPARIN SODIUM (PORCINE) LOCK FLUSH IV SOLN 100 UNIT/ML 100 UNIT/ML
100 SOLUTION INTRAVENOUS ONCE
Status: COMPLETED | OUTPATIENT
Start: 2019-01-04 | End: 2019-01-04

## 2019-01-04 RX ADMIN — LIDOCAINE HYDROCHLORIDE 30 ML: 20 SOLUTION ORAL; TOPICAL at 10:22

## 2019-01-04 RX ADMIN — Medication 0.5 MG: at 10:02

## 2019-01-04 RX ADMIN — Medication 0.5 MG: at 09:38

## 2019-01-04 RX ADMIN — HEPARIN SODIUM (PORCINE) LOCK FLUSH IV SOLN 100 UNIT/ML 100 UNITS: 100 SOLUTION at 11:00

## 2019-01-04 RX ADMIN — SODIUM CHLORIDE 1000 ML: 9 INJECTION, SOLUTION INTRAVENOUS at 09:38

## 2019-01-04 ASSESSMENT — ENCOUNTER SYMPTOMS
DIFFICULTY URINATING: 0
DYSURIA: 0
HEMATURIA: 0
BLOOD IN STOOL: 0
FEVER: 0
ABDOMINAL PAIN: 1
MYALGIAS: 1
CONSTIPATION: 0
DIARRHEA: 0
ARTHRALGIAS: 1
SHORTNESS OF BREATH: 0
CHEST TIGHTNESS: 0

## 2019-01-04 ASSESSMENT — MIFFLIN-ST. JEOR: SCORE: 1456.89

## 2019-01-04 NOTE — ED PROVIDER NOTES
History     Chief Complaint:  Knee Pain    HPI   Luci Londono is a 48 year old female with a complex past medical history significant for stage 2 breast cancer, post status right lumpectomy, hypertension and thyroid disease, who presents to the emergency department with bilateral knee pain. The patient underwent chemotherapy 2 days ago. She follows with Dr. Murillo from oncology. This morning she awoke with bilateral thigh pain. She notes that she developed this once prior in November following chemotherapy, although today her pain is more severe. This was resolved last time with oxycodone. She denies injury or trauma/fall. In addition to this, the patient also reports pain in her epigastrium, which she states is uncommon following treatment. She is otherwise afebrile, and denies associated chest pain or shortness of breath. No changes in urination, constipation, diarrhea or bloody stools.  She has not taking anything for pain prior to ED presentation.    Allergies:  No known drug allergies    Medications:    Levothyroxine   Ativan  Provera  Metformin  Prilosec  Compazine  Valtrex    Past Medical History:    Stage 2 Breast cancer  Migraines  Thyroid disease  Hypertension   Hyperlipidemia  GERD    Past Surgical History:    Left hand surgery  Insert port vascular access  Tubal ligation  Right lumpectomy     Family History:    Diabetes, mother     Social History:  The patient was accompanied to the ED by her daughter.  Smoking Status: Never  Smokeless Tobacco: Never  Alcohol Use: No  Marital Status:   [2]     Review of Systems   Constitutional: Negative for fever.   Respiratory: Negative for chest tightness and shortness of breath.    Cardiovascular: Negative for chest pain.   Gastrointestinal: Positive for abdominal pain. Negative for blood in stool, constipation and diarrhea.   Genitourinary: Negative for difficulty urinating, dysuria and hematuria.   Musculoskeletal: Positive for arthralgias and  "myalgias.        Bilateral thigh pain    All other systems reviewed and are negative.    Physical Exam     Patient Vitals for the past 24 hrs:   BP Temp Temp src Heart Rate Resp SpO2 Height Weight   01/04/19 1024 (!) 141/91 -- -- -- -- 100 % -- --   01/04/19 0916 130/90 98.1  F (36.7  C) Oral 77 18 100 % 1.549 m (5' 1\") 88.5 kg (195 lb)     Physical Exam  General: Alert and cooperative with exam. Patient in moderate distress. Normal mentation.  Head:  Scalp is NC/AT  Eyes:  No scleral icterus, PERRL  ENT:  The external nose and ears are normal. The oropharynx is normal and without erythema; mucus membranes are moist. Uvula midline, no evidence of deep space infection.  Neck:  Normal range of motion without rigidity.  CV:  Regular rate and rhythm    No pathologic murmur   Resp:  Breath sounds are clear bilaterally    Non-labored, no retractions or accessory muscle use  GI:  Abdomen is soft, no distension.  mild epigastric tenderness. No peritoneal signs  MS:  No lower extremity edema. CMS intact to lower extremity, Compartments soft, normal ROM. Tenderness to inferior quadricepts bilateral.   Skin:  Warm and dry, No rash or lesions noted. Port present in left chest. C/D/I  Neuro: Oriented x 3. No gross motor deficits.    Emergency Department Course     ECG (09:28:10):  Rate 72 bpm. NH interval 142. QRS duration 84. QT/QTc 376/411. P-R-T axes 42 3 18. Normal sinus rhythm. Nonspecific T wave abnormality. No significant changes as of 10/26/18. Interpreted at 0933 by Teja Harrell DO.    Laboratory:  Laboratory findings were communicated with the patient who voiced understanding of the findings.    CBC: HGB 10.1 (L), o/w WNL (WBC 8.2, )  CMP: Ca 8.4 (L), Albumin 3.0 (L), protein total 6.4 (L), o/w WNL (Creatinine 0.59)    Lipase: 131  CK total: 30    Interventions:  0938 - NS Bolus 1,000mL IV  0938 - Dilaudid injection 0.5 mg IV  1002 - Dilaudid injection 0.5 mg IV  1022 - GI Cocktail - Maalox 15 mL, " Viscous Lidocaine 15 mL, 30 mL suspension PO  Heparin 100 unit/mL injection 100 units IV   Medications   heparin 100 UNIT/ML injection 100 Units (not administered)   HYDROmorphone (PF) (DILAUDID) injection 0.5 mg (0.5 mg Intravenous Given 1/4/19 0938)   0.9% sodium chloride BOLUS (1,000 mLs Intravenous New Bag 1/4/19 0938)   HYDROmorphone (PF) (DILAUDID) injection 0.5 mg (0.5 mg Intravenous Given 1/4/19 1002)   lidocaine VISCOUS (XYLOCAINE) 2 % 15 mL, alum & mag hydroxide-simethicone (MYLANTA ES/MAALOX  ES) 15 mL GI Cocktail (30 mLs Oral Given 1/4/19 1022)        Emergency Department Course:  Nursing notes and vitals reviewed.    0920: I performed an exam of the patient as documented above.     1013: Patient rechecked and updated. Patient reports that the pain in her legs have improved bilaterally although she contonies to have pain in her epigastrium.     1036: Patient rechecked and updated.     Findings and plan explained to the Patient. Patient discharged home with instructions regarding supportive care, medications, and reasons to return. The importance of close follow-up was reviewed.     Impression & Plan      Medical Decision Making:  Patient is a 48-year-old female with history of breast cancer currently undergoing chemotherapy who presents with myalgias to her thighs bilaterally as well as epigastric pain.  Patient's medical history and records were reviewed.  Initial consideration for, but not limited to, infectious process, electrolyte abnormality, MSK pain, rhabdomyolysis, side effect of chemotherapy, pancreatitis, atypical biliary pathology, gastritis/GERD, among others.  Labs obtained and essentially unremarkable as noted above; continues to have chronic anemia without change from previous.  EKG without evidence of ischemia, infarction, or arrhythmia; no change from previous; atypical ACS unlikely.  She was noted to have mild epigastric tenderness to palpation and this resolved with GI cocktail; likely  gastritis/GERD; recommended Pepcid as needed for symptom relief.  Leg pain resolved with Dilaudid in the ED.  Patient able to ambulate.  No indication for emergent imaging at this time.  Recommended that patient take previously prescribed narcotic medication as needed for breakthrough pain. Myalgias likely secondary to recent chemotherapy.  Patient to follow-up closely with oncologist.  Return precautions discussed.  Patient discharged home.  At time of discharge patient was hemodynamically stable, neurologically intact, afebrile, and pain was well controlled.    Diagnosis:    ICD-10-CM    1. Epigastric pain R10.13    2. Myalgia M79.10        Disposition:  discharged to home    Carmelita Garcia  1/4/2019    EMERGENCY DEPARTMENT  Carmelita CHEN am serving as a scribe at 9:20 AM on 1/4/2019 to document services personally performed by Teja Harrell DO, based on my observations and the provider's statements to me.       Teja Harrell DO  01/04/19 1053

## 2019-01-04 NOTE — DISCHARGE INSTRUCTIONS
Recommend Pepcid as needed for stomach pain (available over-the-counter); use as directed    Take previously prescribed pain medication as needed for leg pain; may also consider Tylenol (acetaminophen) as needed for leg pain.  Do not exceed 4 g of Tylenol/acetaminophen in 1 day.

## 2019-01-04 NOTE — ED AVS SNAPSHOT
Emergency Department  [address] [place] [address]  Phone:  [phone]  Fax:  [phone]                                    Luci Londono   MRN: [id number]    Department:   Emergency Department   Date of Visit:  1/4/2019           After Visit Summary Signature Page    I have received my discharge instructions, and my questions have been answered. I have discussed any challenges I see with this plan with the nurse or doctor.    ..........................................................................................................................................  Patient/Patient Representative Signature      ..........................................................................................................................................  Patient Representative Print Name and Relationship to Patient    ..................................................               ................................................  Date                                   Time    ..........................................................................................................................................  Reviewed by Signature/Title    ...................................................              ..............................................  Date                                               Time          22EPIC Rev 08/18

## 2019-01-04 NOTE — ED NOTES
Bed: ED04  Expected date:   Expected time:   Means of arrival:   Comments:  [facility] - 434 - 48 F omar knee pain eta 0922

## 2019-01-04 NOTE — TELEPHONE ENCOUNTER
Patient called clinic secondary to her recent visit to the Emergency room. Patient was experiencing terrible pain in her knees and indigestion in the emergency room.She was instructed to take her prescribed Percocet.  Stated to patient that writer will relay information to Dr. Murillo to see if she may want to reduce her Taxol secondary to the bone pain. Writer will route to Dr. Murillo and follow up. Patient is scheduled for Taxol again 1/9/19. Marialuisa Casper RN,BSN,OCN'

## 2019-01-07 ENCOUNTER — TELEPHONE (OUTPATIENT)
Dept: ONCOLOGY | Facility: CLINIC | Age: 49
End: 2019-01-07

## 2019-01-07 NOTE — TELEPHONE ENCOUNTER
[employer] called inquiring about patient's treatment plan. Called [employer] at [phone], ref # [id number] regarding patient's disability. Consulted with representative regarding Luci's plan of care and estimated return to work. Marialuisa Casper RN,BSN,OCN

## 2019-01-08 NOTE — TELEPHONE ENCOUNTER
Contacted Luci, she is aware that Dr. Murillo decreased her Taxol dose by 10%. Contacted infusion charge RN regarding dose reduction for chemotherapy tomorrow. Marialuisa Casper RN,BSN,OCN

## 2019-01-09 ENCOUNTER — INFUSION THERAPY VISIT (OUTPATIENT)
Dept: INFUSION THERAPY | Facility: CLINIC | Age: 49
End: 2019-01-09
Attending: INTERNAL MEDICINE
Payer: COMMERCIAL

## 2019-01-09 ENCOUNTER — HOSPITAL ENCOUNTER (OUTPATIENT)
Facility: CLINIC | Age: 49
Setting detail: SPECIMEN
Discharge: HOME | End: 2019-01-09
Attending: INTERNAL MEDICINE | Admitting: INTERNAL MEDICINE
Payer: COMMERCIAL

## 2019-01-09 VITALS
HEIGHT: 61 IN | WEIGHT: 199 LBS | TEMPERATURE: 97.7 F | SYSTOLIC BLOOD PRESSURE: 124 MMHG | BODY MASS INDEX: 37.57 KG/M2 | DIASTOLIC BLOOD PRESSURE: 79 MMHG | RESPIRATION RATE: 16 BRPM | HEART RATE: 87 BPM

## 2019-01-09 DIAGNOSIS — C50.411 MALIGNANT NEOPLASM OF UPPER-OUTER QUADRANT OF RIGHT BREAST IN FEMALE, ESTROGEN RECEPTOR NEGATIVE (H): Primary | ICD-10-CM

## 2019-01-09 DIAGNOSIS — Z17.1 MALIGNANT NEOPLASM OF UPPER-OUTER QUADRANT OF RIGHT BREAST IN FEMALE, ESTROGEN RECEPTOR NEGATIVE (H): Primary | ICD-10-CM

## 2019-01-09 DIAGNOSIS — Z95.828 PORT-A-CATH IN PLACE: ICD-10-CM

## 2019-01-09 DIAGNOSIS — M25.522 PAIN IN JOINT INVOLVING UPPER ARM, LEFT: ICD-10-CM

## 2019-01-09 LAB
ALBUMIN SERPL-MCNC: 3.1 G/DL (ref 3.4–5)
ALP SERPL-CCNC: 62 U/L (ref 40–150)
ALT SERPL W P-5'-P-CCNC: 37 U/L (ref 0–50)
ANION GAP SERPL CALCULATED.3IONS-SCNC: 9 MMOL/L (ref 3–14)
AST SERPL W P-5'-P-CCNC: 11 U/L (ref 0–45)
BASOPHILS # BLD AUTO: 0 10E9/L (ref 0–0.2)
BASOPHILS NFR BLD AUTO: 0.1 %
BILIRUB SERPL-MCNC: 0.4 MG/DL (ref 0.2–1.3)
BUN SERPL-MCNC: 14 MG/DL (ref 7–30)
CALCIUM SERPL-MCNC: 8.2 MG/DL (ref 8.5–10.1)
CHLORIDE SERPL-SCNC: 102 MMOL/L (ref 94–109)
CO2 SERPL-SCNC: 28 MMOL/L (ref 20–32)
CREAT SERPL-MCNC: 0.62 MG/DL (ref 0.52–1.04)
DIFFERENTIAL METHOD BLD: ABNORMAL
EOSINOPHIL # BLD AUTO: 0 10E9/L (ref 0–0.7)
EOSINOPHIL NFR BLD AUTO: 0.3 %
ERYTHROCYTE [DISTWIDTH] IN BLOOD BY AUTOMATED COUNT: 23.9 % (ref 10–15)
GFR SERPL CREATININE-BSD FRML MDRD: >90 ML/MIN/{1.73_M2}
GLUCOSE SERPL-MCNC: 107 MG/DL (ref 70–99)
HCT VFR BLD AUTO: 31.6 % (ref 35–47)
HGB BLD-MCNC: 10.5 G/DL (ref 11.7–15.7)
IMM GRANULOCYTES # BLD: 0.3 10E9/L (ref 0–0.4)
IMM GRANULOCYTES NFR BLD: 3 %
LYMPHOCYTES # BLD AUTO: 2.3 10E9/L (ref 0.8–5.3)
LYMPHOCYTES NFR BLD AUTO: 20.7 %
MCH RBC QN AUTO: 27.9 PG (ref 26.5–33)
MCHC RBC AUTO-ENTMCNC: 33.2 G/DL (ref 31.5–36.5)
MCV RBC AUTO: 84 FL (ref 78–100)
MONOCYTES # BLD AUTO: 0.9 10E9/L (ref 0–1.3)
MONOCYTES NFR BLD AUTO: 8.6 %
NEUTROPHILS # BLD AUTO: 7.4 10E9/L (ref 1.6–8.3)
NEUTROPHILS NFR BLD AUTO: 67.3 %
NRBC # BLD AUTO: 0.1 10*3/UL
NRBC BLD AUTO-RTO: 1 /100
PLATELET # BLD AUTO: 304 10E9/L (ref 150–450)
POTASSIUM SERPL-SCNC: 3.5 MMOL/L (ref 3.4–5.3)
PROT SERPL-MCNC: 6.3 G/DL (ref 6.8–8.8)
RBC # BLD AUTO: 3.76 10E12/L (ref 3.8–5.2)
SODIUM SERPL-SCNC: 139 MMOL/L (ref 133–144)
WBC # BLD AUTO: 11 10E9/L (ref 4–11)

## 2019-01-09 PROCEDURE — 96367 TX/PROPH/DG ADDL SEQ IV INF: CPT

## 2019-01-09 PROCEDURE — 96413 CHEMO IV INFUSION 1 HR: CPT

## 2019-01-09 PROCEDURE — 96417 CHEMO IV INFUS EACH ADDL SEQ: CPT

## 2019-01-09 PROCEDURE — 25000128 H RX IP 250 OP 636: Performed by: INTERNAL MEDICINE

## 2019-01-09 PROCEDURE — 85025 COMPLETE CBC W/AUTO DIFF WBC: CPT | Performed by: INTERNAL MEDICINE

## 2019-01-09 PROCEDURE — 80053 COMPREHEN METABOLIC PANEL: CPT | Performed by: INTERNAL MEDICINE

## 2019-01-09 PROCEDURE — 25000132 ZZH RX MED GY IP 250 OP 250 PS 637: Performed by: INTERNAL MEDICINE

## 2019-01-09 RX ORDER — EPINEPHRINE 0.3 MG/.3ML
0.3 INJECTION SUBCUTANEOUS EVERY 5 MIN PRN
Status: CANCELLED | OUTPATIENT
Start: 2019-01-09

## 2019-01-09 RX ORDER — EPINEPHRINE 1 MG/ML
0.3 INJECTION, SOLUTION INTRAMUSCULAR; SUBCUTANEOUS EVERY 5 MIN PRN
Status: CANCELLED | OUTPATIENT
Start: 2019-01-09

## 2019-01-09 RX ORDER — ALBUTEROL SULFATE 0.83 MG/ML
2.5 SOLUTION RESPIRATORY (INHALATION)
Status: CANCELLED | OUTPATIENT
Start: 2019-01-09

## 2019-01-09 RX ORDER — DIPHENHYDRAMINE HYDROCHLORIDE 50 MG/ML
50 INJECTION INTRAMUSCULAR; INTRAVENOUS
Status: CANCELLED
Start: 2019-01-09

## 2019-01-09 RX ORDER — METHYLPREDNISOLONE SODIUM SUCCINATE 125 MG/2ML
125 INJECTION, POWDER, LYOPHILIZED, FOR SOLUTION INTRAMUSCULAR; INTRAVENOUS
Status: CANCELLED
Start: 2019-01-09

## 2019-01-09 RX ORDER — HEPARIN SODIUM (PORCINE) LOCK FLUSH IV SOLN 100 UNIT/ML 100 UNIT/ML
5 SOLUTION INTRAVENOUS ONCE
Status: CANCELLED
Start: 2019-01-09 | End: 2019-01-09

## 2019-01-09 RX ORDER — MEPERIDINE HYDROCHLORIDE 25 MG/ML
25 INJECTION INTRAMUSCULAR; INTRAVENOUS; SUBCUTANEOUS EVERY 30 MIN PRN
Status: CANCELLED | OUTPATIENT
Start: 2019-01-09

## 2019-01-09 RX ORDER — DIPHENHYDRAMINE HCL 25 MG
50 CAPSULE ORAL ONCE
Status: COMPLETED | OUTPATIENT
Start: 2019-01-09 | End: 2019-01-09

## 2019-01-09 RX ORDER — HEPARIN SODIUM (PORCINE) LOCK FLUSH IV SOLN 100 UNIT/ML 100 UNIT/ML
5 SOLUTION INTRAVENOUS ONCE
Status: COMPLETED | OUTPATIENT
Start: 2019-01-09 | End: 2019-01-09

## 2019-01-09 RX ORDER — SODIUM CHLORIDE 9 MG/ML
1000 INJECTION, SOLUTION INTRAVENOUS CONTINUOUS PRN
Status: CANCELLED
Start: 2019-01-09

## 2019-01-09 RX ORDER — LORAZEPAM 2 MG/ML
0.5 INJECTION INTRAMUSCULAR EVERY 4 HOURS PRN
Status: CANCELLED
Start: 2019-01-09

## 2019-01-09 RX ORDER — VALACYCLOVIR HYDROCHLORIDE 1 G/1
2000 TABLET, FILM COATED ORAL 2 TIMES DAILY
Qty: 4 TABLET | Refills: 0 | Status: SHIPPED | OUTPATIENT
Start: 2019-01-09 | End: 2019-01-30

## 2019-01-09 RX ORDER — ALBUTEROL SULFATE 90 UG/1
1-2 AEROSOL, METERED RESPIRATORY (INHALATION)
Status: CANCELLED
Start: 2019-01-09

## 2019-01-09 RX ADMIN — CARBOPLATIN 280 MG: 10 INJECTION, SOLUTION INTRAVENOUS at 12:18

## 2019-01-09 RX ADMIN — SODIUM CHLORIDE 250 ML: 9 INJECTION, SOLUTION INTRAVENOUS at 10:24

## 2019-01-09 RX ADMIN — FAMOTIDINE 20 MG: 20 INJECTION, SOLUTION INTRAVENOUS at 10:25

## 2019-01-09 RX ADMIN — DEXAMETHASONE SODIUM PHOSPHATE: 10 INJECTION, SOLUTION INTRAMUSCULAR; INTRAVENOUS at 10:47

## 2019-01-09 RX ADMIN — PACLITAXEL 139 MG: 6 INJECTION, SOLUTION INTRAVENOUS at 11:13

## 2019-01-09 RX ADMIN — DIPHENHYDRAMINE HYDROCHLORIDE 50 MG: 25 CAPSULE ORAL at 10:23

## 2019-01-09 RX ADMIN — SODIUM CHLORIDE, PRESERVATIVE FREE 5 ML: 5 INJECTION INTRAVENOUS at 12:57

## 2019-01-09 ASSESSMENT — MIFFLIN-ST. JEOR: SCORE: 1474.78

## 2019-01-09 NOTE — PROGRESS NOTES
Infusion Nursing Note:  Luci Londono presents today for C7D1 Carbo/Taxol--dose reduced today d/t bone pain (recent ER visit).    Patient seen by provider today: No   present during visit today: Not Applicable.    Note: N/A.    Intravenous Access:  Labs drawn without difficulty.  Implanted Port.    Treatment Conditions:  Lab Results   Component Value Date    HGB 10.5 01/09/2019     Lab Results   Component Value Date    WBC 11.0 01/09/2019      Lab Results   Component Value Date    ANEU 7.4 01/09/2019     Lab Results   Component Value Date     01/09/2019      Lab Results   Component Value Date     01/09/2019                   Lab Results   Component Value Date    POTASSIUM 3.5 01/09/2019           Lab Results   Component Value Date    MAG 1.8 10/12/2018            Lab Results   Component Value Date    CR 0.62 01/09/2019                   Lab Results   Component Value Date    MATHIEU 8.2 01/09/2019                Lab Results   Component Value Date    BILITOTAL 0.4 01/09/2019           Lab Results   Component Value Date    ALBUMIN 3.1 01/09/2019                    Lab Results   Component Value Date    ALT 37 01/09/2019           Lab Results   Component Value Date    AST 11 01/09/2019       Results reviewed, labs MET treatment parameters, ok to proceed with treatment.      Post Infusion Assessment:  Patient tolerated infusion without incident.  Blood return noted pre and post infusion.  Site patent and intact, free from redness, edema or discomfort.  No evidence of extravasations.  Access discontinued per protocol.    Discharge Plan:   Prescription refills given for Valtrex-ok per Dr. Murillo.  Discharge instructions reviewed with: Patient.  Patient and/or family verbalized understanding of discharge instructions and all questions answered.  Copy of AVS reviewed with patient and/or family.  Patient will return 1/16/19 for next appointment.  Patient discharged in stable condition accompanied by:  self.  Departure Mode: Ambulatory.    Lloyd Staples RN

## 2019-01-10 DIAGNOSIS — E66.09 NON MORBID OBESITY DUE TO EXCESS CALORIES: ICD-10-CM

## 2019-01-10 DIAGNOSIS — R73.9 HYPERGLYCEMIA: ICD-10-CM

## 2019-01-10 NOTE — TELEPHONE ENCOUNTER
"Requested Prescriptions   Pending Prescriptions Disp Refills     metFORMIN (GLUCOPHAGE-XR) 500 MG 24 hr tablet [Pharmacy Med Name: METFORMIN ER 500MG 24HR TABS]  Last Written Prescription Date:  8/25/2017  Last Fill Quantity: 30 tablet,  # refills: 11   Last office visit: 10/9/2018 with prescribing provider:  MARA Wills   Future Office Visit:   Next 5 appointments (look out 90 days)    Jan 16, 2019 10:20 AM CST  Return Visit with Addie Murillo MD  [facility] ([facility]  [address] [facility] [address]  [facility] [address]  [phone]          30 tablet 0     Sig: TAKE 1 TABLET(500 MG) BY MOUTH DAILY WITH DINNER    Biguanide Agents Passed - 1/10/2019 10:11 AM       Passed - Blood pressure less than 140/90 in past 6 months    BP Readings from Last 3 Encounters:   01/09/19 124/79   01/04/19 (!) 141/91   01/02/19 126/82                Passed - Patient is age 10 or older       Passed - Recent (12 mo) or future (30 days) visit within the authorizing provider's specialty     Patient had office visit in the last 12 months or has a visit in the next 30 days with authorizing provider or within the authorizing provider's specialty.  See \"Patient Info\" tab in inbasket, or \"Choose Columns\" in Meds & Orders section of the refill encounter.             Passed - Patient does NOT have a diagnosis of CHF.       Passed - Medication is active on med list       Passed - Patient is not pregnant       Passed - Patient has not had a positive pregnancy test within the past 12 mos.            "

## 2019-01-11 ENCOUNTER — HOSPITAL ENCOUNTER (EMERGENCY)
Facility: CLINIC | Age: 49
Discharge: HOME | End: 2019-01-11
Attending: EMERGENCY MEDICINE | Admitting: EMERGENCY MEDICINE
Payer: COMMERCIAL

## 2019-01-11 ENCOUNTER — TELEPHONE (OUTPATIENT)
Dept: ONCOLOGY | Facility: CLINIC | Age: 49
End: 2019-01-11

## 2019-01-11 VITALS
RESPIRATION RATE: 16 BRPM | TEMPERATURE: 97.7 F | OXYGEN SATURATION: 98 % | SYSTOLIC BLOOD PRESSURE: 119 MMHG | DIASTOLIC BLOOD PRESSURE: 80 MMHG | HEART RATE: 71 BPM

## 2019-01-11 DIAGNOSIS — C50.919 MALIGNANT NEOPLASM OF FEMALE BREAST, UNSPECIFIED ESTROGEN RECEPTOR STATUS, UNSPECIFIED LATERALITY, UNSPECIFIED SITE OF BREAST (H): ICD-10-CM

## 2019-01-11 DIAGNOSIS — M79.10 MYALGIA: ICD-10-CM

## 2019-01-11 LAB
ANION GAP SERPL CALCULATED.3IONS-SCNC: 10 MMOL/L (ref 3–14)
BASOPHILS # BLD AUTO: 0 10E9/L (ref 0–0.2)
BASOPHILS NFR BLD AUTO: 0 %
BUN SERPL-MCNC: 13 MG/DL (ref 7–30)
CALCIUM SERPL-MCNC: 8.5 MG/DL (ref 8.5–10.1)
CHLORIDE SERPL-SCNC: 97 MMOL/L (ref 94–109)
CK SERPL-CCNC: 34 U/L (ref 30–225)
CO2 SERPL-SCNC: 27 MMOL/L (ref 20–32)
CREAT SERPL-MCNC: 0.6 MG/DL (ref 0.52–1.04)
DIFFERENTIAL METHOD BLD: ABNORMAL
EOSINOPHIL # BLD AUTO: 0 10E9/L (ref 0–0.7)
EOSINOPHIL NFR BLD AUTO: 0 %
ERYTHROCYTE [DISTWIDTH] IN BLOOD BY AUTOMATED COUNT: 24.7 % (ref 10–15)
GFR SERPL CREATININE-BSD FRML MDRD: >90 ML/MIN/{1.73_M2}
GLUCOSE SERPL-MCNC: 95 MG/DL (ref 70–99)
HCT VFR BLD AUTO: 30.9 % (ref 35–47)
HGB BLD-MCNC: 10.4 G/DL (ref 11.7–15.7)
IMM GRANULOCYTES # BLD: 0.1 10E9/L (ref 0–0.4)
IMM GRANULOCYTES NFR BLD: 0.7 %
LYMPHOCYTES # BLD AUTO: 0.5 10E9/L (ref 0.8–5.3)
LYMPHOCYTES NFR BLD AUTO: 5.4 %
MCH RBC QN AUTO: 28.1 PG (ref 26.5–33)
MCHC RBC AUTO-ENTMCNC: 33.7 G/DL (ref 31.5–36.5)
MCV RBC AUTO: 84 FL (ref 78–100)
MONOCYTES # BLD AUTO: 0.4 10E9/L (ref 0–1.3)
MONOCYTES NFR BLD AUTO: 4.6 %
NEUTROPHILS # BLD AUTO: 7.4 10E9/L (ref 1.6–8.3)
NEUTROPHILS NFR BLD AUTO: 89.3 %
NRBC # BLD AUTO: 0.1 10*3/UL
NRBC BLD AUTO-RTO: 1 /100
PLATELET # BLD AUTO: 255 10E9/L (ref 150–450)
POTASSIUM SERPL-SCNC: 4 MMOL/L (ref 3.4–5.3)
RBC # BLD AUTO: 3.7 10E12/L (ref 3.8–5.2)
SODIUM SERPL-SCNC: 134 MMOL/L (ref 133–144)
WBC # BLD AUTO: 8.3 10E9/L (ref 4–11)

## 2019-01-11 PROCEDURE — 96376 TX/PRO/DX INJ SAME DRUG ADON: CPT

## 2019-01-11 PROCEDURE — 25000128 H RX IP 250 OP 636: Performed by: EMERGENCY MEDICINE

## 2019-01-11 PROCEDURE — 82550 ASSAY OF CK (CPK): CPT | Performed by: EMERGENCY MEDICINE

## 2019-01-11 PROCEDURE — 96374 THER/PROPH/DIAG INJ IV PUSH: CPT

## 2019-01-11 PROCEDURE — 80048 BASIC METABOLIC PNL TOTAL CA: CPT | Performed by: EMERGENCY MEDICINE

## 2019-01-11 PROCEDURE — 85025 COMPLETE CBC W/AUTO DIFF WBC: CPT | Performed by: EMERGENCY MEDICINE

## 2019-01-11 PROCEDURE — 96361 HYDRATE IV INFUSION ADD-ON: CPT

## 2019-01-11 PROCEDURE — 99285 EMERGENCY DEPT VISIT HI MDM: CPT | Mod: 25

## 2019-01-11 RX ORDER — HYDROMORPHONE HYDROCHLORIDE 1 MG/ML
.5-1 INJECTION, SOLUTION INTRAMUSCULAR; INTRAVENOUS; SUBCUTANEOUS
Status: DISCONTINUED | OUTPATIENT
Start: 2019-01-11 | End: 2019-01-11 | Stop reason: HOSPADM

## 2019-01-11 RX ORDER — SODIUM CHLORIDE 9 MG/ML
1000 INJECTION, SOLUTION INTRAVENOUS CONTINUOUS
Status: DISCONTINUED | OUTPATIENT
Start: 2019-01-11 | End: 2019-01-11 | Stop reason: HOSPADM

## 2019-01-11 RX ORDER — HEPARIN SODIUM (PORCINE) LOCK FLUSH IV SOLN 100 UNIT/ML 100 UNIT/ML
500 SOLUTION INTRAVENOUS ONCE
Status: COMPLETED | OUTPATIENT
Start: 2019-01-11 | End: 2019-01-11

## 2019-01-11 RX ORDER — HYDROMORPHONE HYDROCHLORIDE 2 MG/1
2 TABLET ORAL EVERY 6 HOURS PRN
Qty: 10 TABLET | Refills: 0 | Status: SHIPPED | OUTPATIENT
Start: 2019-01-11 | End: 2019-01-30

## 2019-01-11 RX ADMIN — HYDROMORPHONE HYDROCHLORIDE 1 MG: 1 INJECTION, SOLUTION INTRAMUSCULAR; INTRAVENOUS; SUBCUTANEOUS at 09:03

## 2019-01-11 RX ADMIN — HEPARIN 500 UNITS: 100 SYRINGE at 11:37

## 2019-01-11 RX ADMIN — HYDROMORPHONE HYDROCHLORIDE 0.5 MG: 1 INJECTION, SOLUTION INTRAMUSCULAR; INTRAVENOUS; SUBCUTANEOUS at 11:11

## 2019-01-11 RX ADMIN — SODIUM CHLORIDE 1000 ML: 9 INJECTION, SOLUTION INTRAVENOUS at 09:00

## 2019-01-11 ASSESSMENT — ENCOUNTER SYMPTOMS
MYALGIAS: 1
WEAKNESS: 0
NUMBNESS: 0

## 2019-01-11 NOTE — ED PROVIDER NOTES
History     Chief Complaint:  Leg Pain    HPI   Luci Londono is a 48 year old female with a history of stage 2 breast cancer on chemotherapy, status post right lumpectomy, hypertension, and hyperlipidemia who presents to the ED for evaluation of bilateral leg pain. The patient reports that she has developed leg pain following chemotherapy a few times, particularly recently after upping her chemotherapy rounds to once weekly from once biweekly. She was last seen for this here by Dr. Harrell on 1/4, where she had relief after dilaudid, and was able to control the pain at home with oxycodone. She last had chemotherapy 2 days ago. At 0100 this morning, she woke up with a severe onset of bilateral thigh pain again, worse on the right. This persisted through the night, so she presented to the ED for evaluation. Here in the ED, she states she has been taking her oxycodone at home, without relief. She denies any numbness, tingling, weakness, or other symptoms or concerns. Of note, she has spoken to her oncologist about this, Dr. Murillo. This week was her 7th round of chemotherapy. Of note, she did have a syncopal episode in the car, though has been hyperventilating.    Allergies:  NKDA    Medications:    Abreva  Levothyroxine  EMLA  Claritin  Ativan  Provera  Metformin  Prilosec  Compazine  Zofran  Oxycodone  Senokot  Valtrex    Past Medical History:    HTN  Hyperlipidemia  Lateral epicondylitis  Thyroid disease  PCOS  H. Pylori  GERD  Lumbar disc disorder  Obesity  Migraines  Breast cancer    Past Surgical History:    Hand surgery  Port insertion  Tubal ligation    Family History:    Diabetes    Social History:  Marital Status:   [2]  Negative for tobacco use.  Negative for alcohol use.  Negative for drug use.     Review of Systems   Musculoskeletal: Positive for myalgias.   Neurological: Positive for syncope. Negative for weakness and numbness.   All other systems reviewed and are negative.      Physical  Exam     Patient Vitals for the past 24 hrs:   BP Temp Temp src Pulse Heart Rate Resp SpO2   01/11/19 1000 120/87 -- -- -- -- 16 100 %   01/11/19 0930 130/84 -- -- 69 -- -- 100 %   01/11/19 0918 -- -- -- -- 71 16 100 %   01/11/19 0900 132/87 -- -- -- -- -- 100 %   01/11/19 0823 (!) 150/101 97.7  F (36.5  C) Oral -- 95 24 100 %     Physical Exam   Nursing note and vitals reviewed.  Constitutional:  Oriented to person, place, and time. Cooperative. Appears uncomfortable and hyperventilating.   HENT:   Nose:    Nose normal.   Mouth/Throat:   Mucous membranes are normal.   Eyes:    Conjunctivae normal and EOM are normal.      Pupils are equal, round, and reactive to light.   Neck:    Trachea normal.   Cardiovascular:  Normal rate, regular rhythm, normal heart sounds and normal pulses. No murmur heard.      2+ DP pulses bilaterally.  Pulmonary/Chest:  Effort normal and breath sounds normal.   Abdominal:   Soft. Normal appearance and bowel sounds are normal.      There is no tenderness.      There is no rebound and no CVA tenderness.   Musculoskeletal:  Extremities atraumatic x 4.      Some reproducible tenderness to palpation in both anterior thighs.     Normal appearance and full range of motion at the hip and knees bilaterally, both active and passive.   Lymphadenopathy:  No cervical adenopathy.   Neurological:   Alert and oriented to person, place, and time. Normal strength.      No cranial nerve deficit or sensory deficit. GCS eye subscore is 4. GCS verbal subscore is 5. GCS motor subscore is 6.   Skin:    Skin is intact. No rash noted.   Psychiatric:   Anxious.    Emergency Department Course     Laboratory:  CBC: WBC: 8.3, HGB: 10.4 (L), PLT: 255  BMP: All WNL (Creatinine: 0.60)    CK: 34    Interventions:  0900 NS 1L IV  0903 Dilaudid, 1 mg, IV injection    Emergency Department Course:  Nursing notes and vitals reviewed. (0840) I performed an exam of the patient as documented above.     IV inserted. Medicine  administered as documented above. Blood drawn. This was sent to the lab for further testing, results above.    (1059) I rechecked the patient and discussed the results of her workup thus far.     Findings and plan explained to the Patient. Patient discharged home with instructions regarding supportive care, medications, and reasons to return. The importance of close follow-up was reviewed. The patient was prescribed Dilaudid.    I personally reviewed the laboratory results with the Patient and answered all related questions prior to discharge.     Impression & Plan      Medical Decision Making:  Luci Londono is a 48 year old female who came in for further evaluation of recurrent bilateral lower extremity pain.  This is something that has happened to her now a few times after receiving her chemotherapy.  I felt it was reasonable to repeat some of the blood work that was done the other day as well, and that blood work is unremarkable.  She received significant relief of her pain with IV Dilaudid here.  I think she is safe for discharge, however I will provide her a small prescription for Dilaudid to see if that might provide her better relief at home.  She is to follow-up with her oncologist regarding this as well and discuss things further.  She should return with any concerns or worsening symptoms.    Diagnosis:    ICD-10-CM    1. Myalgia M79.10    2. Malignant neoplasm of female breast, unspecified estrogen receptor status, unspecified laterality, unspecified site of breast (H) C50.919      Disposition:  discharged to home    Discharge Medications:     Medication List      Started    HYDROmorphone 2 MG tablet  Commonly known as:  DILAUDID  2 mg, Oral, EVERY 6 HOURS PRN          Scribe Disclosure:  I, Bertha Sanchez, am serving as a scribe on 1/11/2019 at 8:32 AM to personally document services performed by Haja Granger MD based on my observations and the provider's statements to me.     Bertha  Laura  1/11/2019    EMERGENCY DEPARTMENT       Haja Granger MD  01/11/19 4612

## 2019-01-11 NOTE — TELEPHONE ENCOUNTER
Luci called clinic stating that she had to go the Emergency room again after receiving Taxol 2 days again secondary to the terrible bone pain that she is experiencing in her hips, knees, and elbow. Patient was taking Percocet with no relief. She went to the emergency room and [facility] and was given IV pain medication and sent home with oral dilaudid. Message will be routed to Dr. Murillo, Luci is scheduled to see her this Wednesday. Marialuisa Casper RN,BSN,OCN

## 2019-01-11 NOTE — ED AVS SNAPSHOT
Emergency Department  [address] [place] [id number]  Phone:  [phone]  Fax:  [phone]                                    Luci Londono   MRN: [id number]    Department:   Emergency Department   Date of Visit:  1/11/2019           After Visit Summary Signature Page    I have received my discharge instructions, and my questions have been answered. I have discussed any challenges I see with this plan with the nurse or doctor.    ..........................................................................................................................................  Patient/Patient Representative Signature      ..........................................................................................................................................  Patient Representative Print Name and Relationship to Patient    ..................................................               ................................................  Date                                   Time    ..........................................................................................................................................  Reviewed by Signature/Title    ...................................................              ..............................................  Date                                               Time          22EPIC Rev 08/18

## 2019-01-14 ENCOUNTER — TELEPHONE (OUTPATIENT)
Dept: ONCOLOGY | Facility: CLINIC | Age: 49
End: 2019-01-14

## 2019-01-14 ENCOUNTER — ONCOLOGY VISIT (OUTPATIENT)
Dept: ONCOLOGY | Facility: CLINIC | Age: 49
End: 2019-01-14
Attending: NURSE PRACTITIONER
Payer: COMMERCIAL

## 2019-01-14 ENCOUNTER — HOSPITAL ENCOUNTER (OUTPATIENT)
Facility: CLINIC | Age: 49
Setting detail: SPECIMEN
End: 2019-01-14
Attending: NURSE PRACTITIONER
Payer: COMMERCIAL

## 2019-01-14 ENCOUNTER — HOSPITAL ENCOUNTER (OUTPATIENT)
Dept: GENERAL RADIOLOGY | Facility: CLINIC | Age: 49
Discharge: HOME | End: 2019-01-14
Admitting: NURSE PRACTITIONER
Payer: COMMERCIAL

## 2019-01-14 VITALS
DIASTOLIC BLOOD PRESSURE: 83 MMHG | BODY MASS INDEX: 37.66 KG/M2 | TEMPERATURE: 98.7 F | SYSTOLIC BLOOD PRESSURE: 128 MMHG | HEART RATE: 107 BPM | WEIGHT: 199.2 LBS

## 2019-01-14 DIAGNOSIS — Z17.1 MALIGNANT NEOPLASM OF UPPER-OUTER QUADRANT OF RIGHT BREAST IN FEMALE, ESTROGEN RECEPTOR NEGATIVE (H): ICD-10-CM

## 2019-01-14 DIAGNOSIS — R05.8 PRODUCTIVE COUGH: ICD-10-CM

## 2019-01-14 DIAGNOSIS — C50.411 MALIGNANT NEOPLASM OF UPPER-OUTER QUADRANT OF RIGHT BREAST IN FEMALE, ESTROGEN RECEPTOR NEGATIVE (H): Primary | ICD-10-CM

## 2019-01-14 DIAGNOSIS — Z17.1 MALIGNANT NEOPLASM OF UPPER-OUTER QUADRANT OF RIGHT BREAST IN FEMALE, ESTROGEN RECEPTOR NEGATIVE (H): Primary | ICD-10-CM

## 2019-01-14 DIAGNOSIS — J10.1 INFLUENZA A: ICD-10-CM

## 2019-01-14 DIAGNOSIS — C50.411 MALIGNANT NEOPLASM OF UPPER-OUTER QUADRANT OF RIGHT BREAST IN FEMALE, ESTROGEN RECEPTOR NEGATIVE (H): ICD-10-CM

## 2019-01-14 LAB
ALBUMIN SERPL-MCNC: 3.1 G/DL (ref 3.4–5)
ALP SERPL-CCNC: 61 U/L (ref 40–150)
ALT SERPL W P-5'-P-CCNC: 41 U/L (ref 0–50)
ANION GAP SERPL CALCULATED.3IONS-SCNC: 6 MMOL/L (ref 3–14)
AST SERPL W P-5'-P-CCNC: 9 U/L (ref 0–45)
BASOPHILS # BLD AUTO: 0 10E9/L (ref 0–0.2)
BASOPHILS NFR BLD AUTO: 0.1 %
BILIRUB SERPL-MCNC: 0.4 MG/DL (ref 0.2–1.3)
BUN SERPL-MCNC: 14 MG/DL (ref 7–30)
CALCIUM SERPL-MCNC: 8.3 MG/DL (ref 8.5–10.1)
CHLORIDE SERPL-SCNC: 102 MMOL/L (ref 94–109)
CO2 SERPL-SCNC: 29 MMOL/L (ref 20–32)
CREAT SERPL-MCNC: 0.56 MG/DL (ref 0.52–1.04)
DIFFERENTIAL METHOD BLD: ABNORMAL
EOSINOPHIL # BLD AUTO: 0 10E9/L (ref 0–0.7)
EOSINOPHIL NFR BLD AUTO: 0 %
ERYTHROCYTE [DISTWIDTH] IN BLOOD BY AUTOMATED COUNT: 25 % (ref 10–15)
FLUAV+FLUBV AG SPEC QL: NEGATIVE
FLUAV+FLUBV AG SPEC QL: POSITIVE
GFR SERPL CREATININE-BSD FRML MDRD: >90 ML/MIN/{1.73_M2}
GLUCOSE SERPL-MCNC: 122 MG/DL (ref 70–99)
HCT VFR BLD AUTO: 30.7 % (ref 35–47)
HGB BLD-MCNC: 10.5 G/DL (ref 11.7–15.7)
IMM GRANULOCYTES # BLD: 0 10E9/L (ref 0–0.4)
IMM GRANULOCYTES NFR BLD: 0.3 %
LYMPHOCYTES # BLD AUTO: 0.3 10E9/L (ref 0.8–5.3)
LYMPHOCYTES NFR BLD AUTO: 2.7 %
MCH RBC QN AUTO: 29.1 PG (ref 26.5–33)
MCHC RBC AUTO-ENTMCNC: 34.2 G/DL (ref 31.5–36.5)
MCV RBC AUTO: 85 FL (ref 78–100)
MONOCYTES # BLD AUTO: 0.3 10E9/L (ref 0–1.3)
MONOCYTES NFR BLD AUTO: 2.3 %
NEUTROPHILS # BLD AUTO: 11.8 10E9/L (ref 1.6–8.3)
NEUTROPHILS NFR BLD AUTO: 94.6 %
NRBC # BLD AUTO: 0 10*3/UL
NRBC BLD AUTO-RTO: 0 /100
PLATELET # BLD AUTO: 293 10E9/L (ref 150–450)
POTASSIUM SERPL-SCNC: 4 MMOL/L (ref 3.4–5.3)
PROT SERPL-MCNC: 6.7 G/DL (ref 6.8–8.8)
RBC # BLD AUTO: 3.61 10E12/L (ref 3.8–5.2)
SODIUM SERPL-SCNC: 137 MMOL/L (ref 133–144)
SPECIMEN SOURCE: ABNORMAL
WBC # BLD AUTO: 12.4 10E9/L (ref 4–11)

## 2019-01-14 PROCEDURE — 85025 COMPLETE CBC W/AUTO DIFF WBC: CPT | Performed by: NURSE PRACTITIONER

## 2019-01-14 PROCEDURE — 87804 INFLUENZA ASSAY W/OPTIC: CPT | Performed by: NURSE PRACTITIONER

## 2019-01-14 PROCEDURE — G0463 HOSPITAL OUTPT CLINIC VISIT: HCPCS

## 2019-01-14 PROCEDURE — 71046 X-RAY EXAM CHEST 2 VIEWS: CPT

## 2019-01-14 PROCEDURE — 99213 OFFICE O/P EST LOW 20 MIN: CPT | Performed by: NURSE PRACTITIONER

## 2019-01-14 PROCEDURE — 80053 COMPREHEN METABOLIC PANEL: CPT | Performed by: NURSE PRACTITIONER

## 2019-01-14 PROCEDURE — 36415 COLL VENOUS BLD VENIPUNCTURE: CPT

## 2019-01-14 RX ORDER — LEVOFLOXACIN 500 MG/1
500 TABLET, FILM COATED ORAL DAILY
Qty: 10 TABLET | Refills: 0 | Status: SHIPPED | OUTPATIENT
Start: 2019-01-14 | End: 2019-01-30

## 2019-01-14 RX ORDER — OSELTAMIVIR PHOSPHATE 75 MG/1
75 CAPSULE ORAL 2 TIMES DAILY
Qty: 10 CAPSULE | Refills: 0 | Status: SHIPPED | OUTPATIENT
Start: 2019-01-14 | End: 2019-01-30

## 2019-01-14 RX ORDER — METFORMIN HCL 500 MG
TABLET, EXTENDED RELEASE 24 HR ORAL
Qty: 30 TABLET | Refills: 0 | Status: SHIPPED | OUTPATIENT
Start: 2019-01-14 | End: 2019-02-17

## 2019-01-14 RX ORDER — CODEINE PHOSPHATE AND GUAIFENESIN 10; 100 MG/5ML; MG/5ML
1-2 SOLUTION ORAL EVERY 4 HOURS PRN
Qty: 180 ML | Refills: 0 | Status: SHIPPED | OUTPATIENT
Start: 2019-01-14 | End: 2019-01-30

## 2019-01-14 ASSESSMENT — PAIN SCALES - GENERAL: PAINLEVEL: SEVERE PAIN (6)

## 2019-01-14 NOTE — TELEPHONE ENCOUNTER
"Patient called concerned about a significant upper respiratory illness with a cough that is \" upsetting her stomach\". Patient would like to be seen or be advised for medication recommendation.    Will route message to Marialuisa MIMS RN to return call to patient with further instructions.     Nhung Tejada    "

## 2019-01-14 NOTE — PROGRESS NOTES
"Oncology Rooming Note    January 14, 2019 3:05 PM   Luci Londono is a 48 year old female who presents for:    Chief Complaint   Patient presents with     Oncology Clinic Visit     Breast Cancer, cough     Initial Vitals: /83   Pulse 107   Temp 98.7  F (37.1  C)   Wt 90.4 kg (199 lb 3.2 oz)   BMI 37.66 kg/m   Estimated body mass index is 37.66 kg/m  as calculated from the following:    Height as of 1/9/19: 1.549 m (5' 0.98\").    Weight as of this encounter: 90.4 kg (199 lb 3.2 oz). Body surface area is 1.97 meters squared.  Severe Pain (6) Comment: Data Unavailable   No LMP recorded.  Allergies reviewed: Yes  Medications reviewed: Yes    Medications: Medication refills not needed today.  Pharmacy name entered into [employer]:    [facility] [address] [place], [place] - [address] [place] [address] AT [address] [place] [address] & [address] [facility] [id number] [place] [address] AT [address]    Clinical concerns: Patient added on to see carmen today for increased cough, pain in her chest.    10  minutes for nursing intake (face to face time)     Marialuisa Casper RN      Patient here for Blood draw  Labs drawn:   CBC/diff/PLT, CMP  Peripheral left AC  gauge, 23 needle type BF    Denies concerns or issues that need to be addressed prior to appt with MD Marialuisa Casper            "

## 2019-01-14 NOTE — PROGRESS NOTES
S.  Patient with breast cancer she was seen as an add-on for follow-up of cough.  Patient noticed cough yesterday in the morning it is productive green.  She denies sore throat ear pain denies chest pain denies shortness of breath.  She denies chills sweats.  Denies nausea vomiting diarrhea denies abdominal pain   ROS: 14 point ROS neg other than the symptoms noted above in the HPI.    O. Physical Exam   Constitutional: She appears well-developed.   HENT:   Head: Normocephalic.   Eyes: Pupils are equal, round, and reactive to light.   Neck: Normal range of motion.   Cardiovascular: Normal rate.   Pulmonary/Chest: Effort normal and breath sounds normal.   Abdominal: Soft. Bowel sounds are normal.   Musculoskeletal: Normal range of motion.     A/P   This is a 48-year-old female with breast cancer who is receiving weekly Taxol.    Cough-productive  Will order chest x-ray CBC CMP  Swab for influenza  This patient is currently on chemo.  I will start her on Levaquin.  And Robitussin-AC cough syrup  Patient is asked to call in the event of worsening of cough fever chills sweats shortness of breath.  She has follow-up appointment with Dr Murillo on Wednesday    Addendum   Positive for Influenza A - start tamiflu 75 mg po BID x 5 days

## 2019-01-14 NOTE — LETTER
"    1/14/2019         RE: Luci Londono  [address]  [facility] [address]        Dear Colleague,    Thank you for referring your patient, Luci Londono, to the [facility]. Please see a copy of my visit note below.    Oncology Rooming Note    January 14, 2019 3:05 PM   Luci Londono is a 48 year old female who presents for:    Chief Complaint   Patient presents with     Oncology Clinic Visit     Breast Cancer, cough     Initial Vitals: /83   Pulse 107   Temp 98.7  F (37.1  C)   Wt 90.4 kg (199 lb 3.2 oz)   BMI 37.66 kg/m    Estimated body mass index is 37.66 kg/m  as calculated from the following:    Height as of 1/9/19: 1.549 m (5' 0.98\").    Weight as of this encounter: 90.4 kg (199 lb 3.2 oz). Body surface area is 1.97 meters squared.  Severe Pain (6) Comment: Data Unavailable   No LMP recorded.  Allergies reviewed: Yes  Medications reviewed: Yes    Medications: Medication refills not needed today.  Pharmacy name entered into [employer]:    [facility] [address] [place], [place] - [address] AT [address] [place] [address] & [address] [facility] [address] [address] AT [address]    Clinical concerns: Patient added on to see carmen today for increased cough, pain in her chest.    10  minutes for nursing intake (face to face time)     Marialuisa Casper RN      Patient here for Blood draw  Labs drawn:   CBC/diff/PLT, CMP  Peripheral left AC  gauge, 23 needle type BF    Denies concerns or issues that need to be addressed prior to appt with MD Marialuisa MORIN.  Patient with breast cancer she was seen as an add-on for follow-up of cough.  Patient noticed cough yesterday in the morning it is productive green.  She denies sore throat ear pain denies chest pain denies shortness of breath.  She denies chills sweats.  Denies nausea vomiting diarrhea denies abdominal pain   ROS: 14 point ROS neg other than the " symptoms noted above in the HPI.    O. Physical Exam   Constitutional: She appears well-developed.   HENT:   Head: Normocephalic.   Eyes: Pupils are equal, round, and reactive to light.   Neck: Normal range of motion.   Cardiovascular: Normal rate.   Pulmonary/Chest: Effort normal and breath sounds normal.   Abdominal: Soft. Bowel sounds are normal.   Musculoskeletal: Normal range of motion.     A/P   This is a 48-year-old female with breast cancer who is receiving weekly Taxol.    Cough-productive  Will order chest x-ray CBC CMP  Swab for influenza  This patient is currently on chemo.  I will start her on Levaquin.  And Robitussin-AC cough syrup  Patient is asked to call in the event of worsening of cough fever chills sweats shortness of breath.  She has follow-up appointment with Dr Murillo on Wednesday    Again, thank you for allowing me to participate in the care of your patient.        Sincerely,        AMIE Hernandez CNP

## 2019-01-14 NOTE — TELEPHONE ENCOUNTER
Routing refill request to provider for review/approval because:  A break in medication has not been filled since 2017.    RIKI WoodN, RN  [employer] [facility]

## 2019-01-14 NOTE — TELEPHONE ENCOUNTER
Patient called back she will be added on to be accessed by GINGER Ramesh today at 0130 PM. Marialuisa Casper RN,BSN,OCN

## 2019-01-15 ENCOUNTER — TELEPHONE (OUTPATIENT)
Dept: ONCOLOGY | Facility: CLINIC | Age: 49
End: 2019-01-15

## 2019-01-15 NOTE — TELEPHONE ENCOUNTER
Contacted Luci back to see how she is feeling. Requested call back to see if she picked up her Tamiflu and if the cough medicine helped. Marialuisa Casper RN,BSN,OCN

## 2019-01-15 NOTE — TELEPHONE ENCOUNTER
Patient called back, she is wondering if she can take the Tamiflu with the Levaquin and prescribed cough medicine. Verified with GINGER Ramesh and informed Luci that it is ok for her to take all three medications. Patient is scheduled to see Dr. Murillo tomorrow. Marialuisa Casper RN,BSN,OCN

## 2019-01-16 ENCOUNTER — INFUSION THERAPY VISIT (OUTPATIENT)
Dept: INFUSION THERAPY | Facility: CLINIC | Age: 49
End: 2019-01-16
Attending: INTERNAL MEDICINE
Payer: COMMERCIAL

## 2019-01-16 ENCOUNTER — ONCOLOGY VISIT (OUTPATIENT)
Dept: ONCOLOGY | Facility: CLINIC | Age: 49
End: 2019-01-16
Attending: INTERNAL MEDICINE
Payer: COMMERCIAL

## 2019-01-16 ENCOUNTER — HOSPITAL ENCOUNTER (OUTPATIENT)
Facility: CLINIC | Age: 49
Setting detail: SPECIMEN
Discharge: HOME | End: 2019-01-16
Attending: INTERNAL MEDICINE | Admitting: INTERNAL MEDICINE
Payer: COMMERCIAL

## 2019-01-16 VITALS
DIASTOLIC BLOOD PRESSURE: 79 MMHG | WEIGHT: 198.2 LBS | RESPIRATION RATE: 22 BRPM | BODY MASS INDEX: 37.47 KG/M2 | TEMPERATURE: 98.3 F | SYSTOLIC BLOOD PRESSURE: 117 MMHG | HEART RATE: 98 BPM | OXYGEN SATURATION: 100 %

## 2019-01-16 VITALS
RESPIRATION RATE: 22 BRPM | BODY MASS INDEX: 37.47 KG/M2 | OXYGEN SATURATION: 100 % | SYSTOLIC BLOOD PRESSURE: 117 MMHG | HEART RATE: 98 BPM | DIASTOLIC BLOOD PRESSURE: 79 MMHG | TEMPERATURE: 98.3 F | WEIGHT: 198.2 LBS

## 2019-01-16 DIAGNOSIS — Z17.1 MALIGNANT NEOPLASM OF UPPER-OUTER QUADRANT OF RIGHT BREAST IN FEMALE, ESTROGEN RECEPTOR NEGATIVE (H): Primary | ICD-10-CM

## 2019-01-16 DIAGNOSIS — M89.8X9 BONE PAIN: ICD-10-CM

## 2019-01-16 DIAGNOSIS — J10.1 INFLUENZA A: ICD-10-CM

## 2019-01-16 DIAGNOSIS — R05.8 PRODUCTIVE COUGH: ICD-10-CM

## 2019-01-16 DIAGNOSIS — Z17.1 MALIGNANT NEOPLASM OF UPPER-OUTER QUADRANT OF RIGHT BREAST IN FEMALE, ESTROGEN RECEPTOR NEGATIVE (H): ICD-10-CM

## 2019-01-16 DIAGNOSIS — C50.411 MALIGNANT NEOPLASM OF UPPER-OUTER QUADRANT OF RIGHT BREAST IN FEMALE, ESTROGEN RECEPTOR NEGATIVE (H): ICD-10-CM

## 2019-01-16 DIAGNOSIS — Z95.828 PORT-A-CATH IN PLACE: Primary | ICD-10-CM

## 2019-01-16 DIAGNOSIS — C50.411 MALIGNANT NEOPLASM OF UPPER-OUTER QUADRANT OF RIGHT BREAST IN FEMALE, ESTROGEN RECEPTOR NEGATIVE (H): Primary | ICD-10-CM

## 2019-01-16 LAB
ALBUMIN SERPL-MCNC: 3 G/DL (ref 3.4–5)
ALP SERPL-CCNC: 70 U/L (ref 40–150)
ALT SERPL W P-5'-P-CCNC: 45 U/L (ref 0–50)
ANION GAP SERPL CALCULATED.3IONS-SCNC: 8 MMOL/L (ref 3–14)
AST SERPL W P-5'-P-CCNC: 20 U/L (ref 0–45)
BASOPHILS # BLD AUTO: 0 10E9/L (ref 0–0.2)
BASOPHILS NFR BLD AUTO: 0.1 %
BILIRUB SERPL-MCNC: 0.2 MG/DL (ref 0.2–1.3)
BUN SERPL-MCNC: 16 MG/DL (ref 7–30)
CALCIUM SERPL-MCNC: 8.6 MG/DL (ref 8.5–10.1)
CHLORIDE SERPL-SCNC: 102 MMOL/L (ref 94–109)
CO2 SERPL-SCNC: 29 MMOL/L (ref 20–32)
CREAT SERPL-MCNC: 0.73 MG/DL (ref 0.52–1.04)
DIFFERENTIAL METHOD BLD: ABNORMAL
EOSINOPHIL # BLD AUTO: 0.1 10E9/L (ref 0–0.7)
EOSINOPHIL NFR BLD AUTO: 0.7 %
ERYTHROCYTE [DISTWIDTH] IN BLOOD BY AUTOMATED COUNT: 25.2 % (ref 10–15)
GFR SERPL CREATININE-BSD FRML MDRD: >90 ML/MIN/{1.73_M2}
GLUCOSE SERPL-MCNC: 101 MG/DL (ref 70–99)
HCT VFR BLD AUTO: 31.1 % (ref 35–47)
HGB BLD-MCNC: 10.4 G/DL (ref 11.7–15.7)
IMM GRANULOCYTES # BLD: 0.1 10E9/L (ref 0–0.4)
IMM GRANULOCYTES NFR BLD: 1.6 %
LYMPHOCYTES # BLD AUTO: 1.1 10E9/L (ref 0.8–5.3)
LYMPHOCYTES NFR BLD AUTO: 13.2 %
MCH RBC QN AUTO: 28.9 PG (ref 26.5–33)
MCHC RBC AUTO-ENTMCNC: 33.4 G/DL (ref 31.5–36.5)
MCV RBC AUTO: 86 FL (ref 78–100)
MONOCYTES # BLD AUTO: 0.5 10E9/L (ref 0–1.3)
MONOCYTES NFR BLD AUTO: 5.9 %
NEUTROPHILS # BLD AUTO: 6.8 10E9/L (ref 1.6–8.3)
NEUTROPHILS NFR BLD AUTO: 78.5 %
NRBC # BLD AUTO: 0.1 10*3/UL
NRBC BLD AUTO-RTO: 1 /100
PLATELET # BLD AUTO: 255 10E9/L (ref 150–450)
POTASSIUM SERPL-SCNC: 3.8 MMOL/L (ref 3.4–5.3)
PROT SERPL-MCNC: 6.5 G/DL (ref 6.8–8.8)
RBC # BLD AUTO: 3.6 10E12/L (ref 3.8–5.2)
SODIUM SERPL-SCNC: 139 MMOL/L (ref 133–144)
WBC # BLD AUTO: 8.6 10E9/L (ref 4–11)

## 2019-01-16 PROCEDURE — 36593 DECLOT VASCULAR DEVICE: CPT

## 2019-01-16 PROCEDURE — 96360 HYDRATION IV INFUSION INIT: CPT

## 2019-01-16 PROCEDURE — 80053 COMPREHEN METABOLIC PANEL: CPT | Performed by: INTERNAL MEDICINE

## 2019-01-16 PROCEDURE — 25000128 H RX IP 250 OP 636: Performed by: INTERNAL MEDICINE

## 2019-01-16 PROCEDURE — 85025 COMPLETE CBC W/AUTO DIFF WBC: CPT | Performed by: INTERNAL MEDICINE

## 2019-01-16 PROCEDURE — 99215 OFFICE O/P EST HI 40 MIN: CPT | Performed by: INTERNAL MEDICINE

## 2019-01-16 PROCEDURE — G0463 HOSPITAL OUTPT CLINIC VISIT: HCPCS | Mod: 25

## 2019-01-16 RX ORDER — HEPARIN SODIUM (PORCINE) LOCK FLUSH IV SOLN 100 UNIT/ML 100 UNIT/ML
5 SOLUTION INTRAVENOUS ONCE
Status: CANCELLED
Start: 2019-01-16 | End: 2019-01-16

## 2019-01-16 RX ORDER — HEPARIN SODIUM (PORCINE) LOCK FLUSH IV SOLN 100 UNIT/ML 100 UNIT/ML
5 SOLUTION INTRAVENOUS ONCE
Status: COMPLETED | OUTPATIENT
Start: 2019-01-16 | End: 2019-01-16

## 2019-01-16 RX ADMIN — SODIUM CHLORIDE, PRESERVATIVE FREE 5 ML: 5 INJECTION INTRAVENOUS at 12:09

## 2019-01-16 RX ADMIN — SODIUM CHLORIDE 1000 ML: 9 INJECTION, SOLUTION INTRAVENOUS at 11:03

## 2019-01-16 RX ADMIN — ALTEPLASE 2 MG: 2.2 INJECTION, POWDER, LYOPHILIZED, FOR SOLUTION INTRAVENOUS at 09:31

## 2019-01-16 ASSESSMENT — PAIN SCALES - GENERAL: PAINLEVEL: NO PAIN (0)

## 2019-01-16 NOTE — PROGRESS NOTES
"Oncology Rooming Note    January 16, 2019 10:16 AM   Luci Londono is a 48 year old female who presents for:    Chief Complaint   Patient presents with     Oncology Clinic Visit     Malignant neoplasm of upper-outer quadrant of right breast in female, estrogen receptor negative (H)     Initial Vitals: /79   Pulse 98   Temp 98.3  F (36.8  C) (Oral)   Resp 22   Wt 89.9 kg (198 lb 3.2 oz)   SpO2 100%   BMI 37.47 kg/m   Estimated body mass index is 37.47 kg/m  as calculated from the following:    Height as of 1/9/19: 1.549 m (5' 0.98\").    Weight as of this encounter: 89.9 kg (198 lb 3.2 oz). Body surface area is 1.97 meters squared.  No Pain (0) Comment: Data Unavailable   No LMP recorded.  Allergies reviewed: Yes  Medications reviewed: Yes    Medications: Medication refills not needed today.  Pharmacy name entered into [employer]:    [facility] [address] [place], [place] - [address] [place] [address] AT [address] & [address] [facility] [id number] [place] [address] AT [address]    Clinical concerns: no      5 minutes for nursing intake (face to face time)          Emerald Pascual MA              "

## 2019-01-16 NOTE — PROGRESS NOTES
Infusion Nursing Note:  Luci Londono presents today for IVF---CHEMO HELD .    Patient seen by provider today: Yes: Dr Murillo   present during visit today: Not Applicable.    Note: pt currently positive for influenza A. Treatment will be held.  IVF will be given  Intravenous Access:  Implanted Port.    Treatment Conditions:  Not Applicable.      Post Infusion Assessment:  Patient tolerated infusion without incident.  Site patent and intact, free from redness, edema or discomfort.  No evidence of extravasations.  Access discontinued per protocol.    Discharge Plan:   Discharge instructions reviewed with: Patient.  Patient and/or family verbalized understanding of discharge instructions and all questions answered.  Copy of AVS reviewed with patient and/or family.  Patient will return Jan 23 for next appointment.  Patient discharged in stable condition accompanied by: self.  Departure Mode: Ambulatory.    Amie Manuel RN

## 2019-01-16 NOTE — PROGRESS NOTES
ONCOLOGY FOLLOW [facility] VISIT    breast surgeon:  Dr. 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   Her mammogram revealed a small mass in the right upper outer breast,   US revealed an 8mm hypoechoic mass at 12:00, 6cm FN and axillary US revealed a concerning lymph node which was also biopsied.   Her pathology from both breast and LN revealed a grade 3, invasive ductal carcinoma, ER/TN/her 2-.   PET found no distal disease.   Breast MRI found entire span 3.8 cm.There are multiple enlarged right axillary lymph nodes.        She made informed decision to proceed with neoadjuvant DD AC  She has drastic clinical response by PE and MRI.   She continued on wkly taxol. Carbo is added in W2.     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     MEDICATIONS/ALLERY:  Reviewed in Epic system.      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 Deny ETOH/smoking       FAMILY HISTORY:    Negative for any type of cancers    REVIEW OF SYSTEMS:   She reports sever bone pain with wkly taxol.  Reports cough congestion, found to have Influenza A by swab 1/14/2019.    PHYSICAL EXAMINATION:   VITAL SIGNS: Blood pressure 117/79, pulse 98, temperature 98.3  F (36.8  C), temperature source Oral, resp. rate 22, weight 89.9 kg (198 lb 3.2 oz), SpO2 100 %, not currently breastfeeding.    ECOG 0    GENERAL APPEARANCE:   looks like her stated age, very pleasant, not in acute distress.   HEENT: The patient is normocephalic, atraumatic. Pupils are equally reactive to light.  Sclerae are anicteric.  Moist oral mucosa.  Negative pharynx.  No oral thrush. Stomatitis on left angular of mouth.    NECK:  Supple.  No jugular venous distention.  Thyroid is not palpable.   LYMPH NODES:  Superficial lymphadenopathy is not appreciable in the bilateral cervical, supraclavicular, axillary or inguinal areas.   CARDIOVASCULAR:  S1, S2 regular with no murmurs or gallops.  No carotid or abdominal bruits.   PULMONARY:  Lungs are clear to auscultation and percussion bilaterally.  There is no wheezing or rhonchi.   GASTROINTESTINAL:  Abdomen is soft, nontender.  No hepatosplenomegaly.  No signs of ascites.  No mass appreciable.   MUSCULOSKELETAL/EXTREMITIES:  No edema.  No cyanotic changes.  No signs of joint deformity.  No lymphedema.   NEUROLOGIC:  Cranial nerves II-XII are grossly intact.  Sensation intact.  Muscle strength and muscle tone symmetrical, 5/5 throughout.   BACK:  No spinal or paraspinal tenderness.  No CVA tenderness.   SKIN:  No petechiae.  No rash.  No signs of cellulitis.   BREASTS: Right breast with mild ecchymosis on upper breast, no longer palpable 1.5cm mass at 12:00 post C1. No other masses.  Left breast is symmetrical with no skin or nipple changes. No masses on the left. Tissue is very dense throughout.          CURRENT LAB DATA REVIEWED  Cbc diff is fine. CMP is ok.       CURRENT IMAGING REVIEWED  CXR 1/2019 - negative       OLD DATA REVIEWED TODAY WITH SUMMARY:   10/2018  Right breast 8mm hypoechoic mass at 12:00, 6cm FN and right axillary LN biopsy both found grade 3, invasive ductal carcinoma, ER/[place]/her 2-.         Breast MRI post AC 12/2018  1. Enhancing mass superiorly in the RIGHT breast is  significantly decreased in size since 10/17/2018, measuring  approximately 1.1 x 0.3 x 0.5 cm in today's study (series 5 image 52  and series 13 image 30), decreased from 1.1 x 1.6 x 1.0 cm.  2. Enlarged RIGHT axillary lymph node is slightly decreased since that time as well, now measuring 0.9 x 1.2 x 1.0 cm (series 5 image 68 and series 13 image 19), decreased from 1.3 x 1.2 x 1.4 cm.     Baseline pre tx breast MRI 10/2018 -  In the right breast at 12:00 7 cm from the nipple, there is irregular heterogeneously enhancing mass, corresponding with the known biopsy-proven malignancy, which spans approximately 2.2 cm in maximal diameter, best appreciated on sagittal view, with surrounding nonmasslike enhancement likely related to postbiopsy change or DCIS.  Taken with the primary tumor, the entire span extends up to 3.8 cm.   There are multiple enlarged right axillary lymph nodes.      PET 10/2018  1. No evidence of distant metastasis.  2. Hypermetabolic focus in the upper outer quadrant right breast representing primary tumor. Hypermetabolic right axillary lymph nodes, consistent with metastatic node.  3. Indeterminate sub-4 mm pulmonary nodules, likely fissural lymph node in the right lung.  4. Extensive FDG uptake by the brown fat in the neck and paraspinal region.    10/2018 dx MA -  revealed a small mass in the right upper outer breast, US revealed an 8mm hypoechoic mass at 12:00, 6cm FN and axillary US revealed a concerning lymph node                ASSESSMENT AND PLAN:    1.  dx cT1cN1 right breast high grade IDC, TN at age 47 in 10/2018.     PET is negative for distal disease.      She made informed decision to proceed DD AC -- taxol + carbo C1D1 10/24/2018.     She had good MRI response post C4 AC.     She did ok with W1 taxol. Will add wkly carbo today in W2.   Delay today's tx by 1 week due to newly dx influenza.     She needs to be monitored closely during this intense tx.     2. Young age dx with TN breast cancer, she will need genetic counseling.   Result will direct us on the platinum use.     3. URI with cough - to continue levaquin  and Robitussin AC prn.     4. Influenza A found 2 days ago-- advice tamiflu for 5 days.     5. Post taxol bone pain - 10% dose reduction on taxol. Can use decadron 4 mg prn for this. She claims percocet is not helpful.

## 2019-01-23 ENCOUNTER — ALLIED HEALTH/NURSE VISIT (OUTPATIENT)
Dept: ONCOLOGY | Facility: CLINIC | Age: 49
End: 2019-01-23

## 2019-01-23 ENCOUNTER — INFUSION THERAPY VISIT (OUTPATIENT)
Dept: INFUSION THERAPY | Facility: CLINIC | Age: 49
End: 2019-01-23
Attending: INTERNAL MEDICINE
Payer: COMMERCIAL

## 2019-01-23 ENCOUNTER — HOSPITAL ENCOUNTER (OUTPATIENT)
Facility: CLINIC | Age: 49
Setting detail: SPECIMEN
Discharge: HOME | End: 2019-01-23
Attending: INTERNAL MEDICINE | Admitting: INTERNAL MEDICINE
Payer: COMMERCIAL

## 2019-01-23 VITALS
BODY MASS INDEX: 37.81 KG/M2 | HEART RATE: 76 BPM | OXYGEN SATURATION: 99 % | TEMPERATURE: 98 F | WEIGHT: 200 LBS | SYSTOLIC BLOOD PRESSURE: 118 MMHG | RESPIRATION RATE: 16 BRPM | DIASTOLIC BLOOD PRESSURE: 75 MMHG

## 2019-01-23 DIAGNOSIS — C50.411 MALIGNANT NEOPLASM OF UPPER-OUTER QUADRANT OF RIGHT BREAST IN FEMALE, ESTROGEN RECEPTOR NEGATIVE (H): Primary | ICD-10-CM

## 2019-01-23 DIAGNOSIS — Z95.828 PORT-A-CATH IN PLACE: ICD-10-CM

## 2019-01-23 DIAGNOSIS — Z17.1 MALIGNANT NEOPLASM OF UPPER-OUTER QUADRANT OF RIGHT BREAST IN FEMALE, ESTROGEN RECEPTOR NEGATIVE (H): Primary | ICD-10-CM

## 2019-01-23 DIAGNOSIS — Z71.9 COUNSELING NOS(V65.40): Primary | ICD-10-CM

## 2019-01-23 LAB
ALBUMIN SERPL-MCNC: 3 G/DL (ref 3.4–5)
ALP SERPL-CCNC: 75 U/L (ref 40–150)
ALT SERPL W P-5'-P-CCNC: 51 U/L (ref 0–50)
ANION GAP SERPL CALCULATED.3IONS-SCNC: 7 MMOL/L (ref 3–14)
AST SERPL W P-5'-P-CCNC: 27 U/L (ref 0–45)
BASOPHILS # BLD AUTO: 0 10E9/L (ref 0–0.2)
BASOPHILS NFR BLD AUTO: 0.2 %
BILIRUB SERPL-MCNC: 0.3 MG/DL (ref 0.2–1.3)
BUN SERPL-MCNC: 8 MG/DL (ref 7–30)
CALCIUM SERPL-MCNC: 8.8 MG/DL (ref 8.5–10.1)
CHLORIDE SERPL-SCNC: 104 MMOL/L (ref 94–109)
CO2 SERPL-SCNC: 30 MMOL/L (ref 20–32)
CREAT SERPL-MCNC: 0.68 MG/DL (ref 0.52–1.04)
DIFFERENTIAL METHOD BLD: ABNORMAL
EOSINOPHIL # BLD AUTO: 0 10E9/L (ref 0–0.7)
EOSINOPHIL NFR BLD AUTO: 0.6 %
ERYTHROCYTE [DISTWIDTH] IN BLOOD BY AUTOMATED COUNT: 23.8 % (ref 10–15)
GFR SERPL CREATININE-BSD FRML MDRD: >90 ML/MIN/{1.73_M2}
GLUCOSE SERPL-MCNC: 111 MG/DL (ref 70–99)
HCT VFR BLD AUTO: 33.8 % (ref 35–47)
HGB BLD-MCNC: 11 G/DL (ref 11.7–15.7)
IMM GRANULOCYTES # BLD: 0 10E9/L (ref 0–0.4)
IMM GRANULOCYTES NFR BLD: 0.2 %
LYMPHOCYTES # BLD AUTO: 1 10E9/L (ref 0.8–5.3)
LYMPHOCYTES NFR BLD AUTO: 18.5 %
MCH RBC QN AUTO: 28.6 PG (ref 26.5–33)
MCHC RBC AUTO-ENTMCNC: 32.5 G/DL (ref 31.5–36.5)
MCV RBC AUTO: 88 FL (ref 78–100)
MONOCYTES # BLD AUTO: 0.5 10E9/L (ref 0–1.3)
MONOCYTES NFR BLD AUTO: 9.8 %
NEUTROPHILS # BLD AUTO: 3.7 10E9/L (ref 1.6–8.3)
NEUTROPHILS NFR BLD AUTO: 70.7 %
NRBC # BLD AUTO: 0 10*3/UL
NRBC BLD AUTO-RTO: 0 /100
PLATELET # BLD AUTO: 190 10E9/L (ref 150–450)
POTASSIUM SERPL-SCNC: 3.9 MMOL/L (ref 3.4–5.3)
PROT SERPL-MCNC: 6.8 G/DL (ref 6.8–8.8)
RBC # BLD AUTO: 3.85 10E12/L (ref 3.8–5.2)
SODIUM SERPL-SCNC: 141 MMOL/L (ref 133–144)
WBC # BLD AUTO: 5.2 10E9/L (ref 4–11)

## 2019-01-23 PROCEDURE — 80053 COMPREHEN METABOLIC PANEL: CPT | Performed by: INTERNAL MEDICINE

## 2019-01-23 PROCEDURE — 85025 COMPLETE CBC W/AUTO DIFF WBC: CPT | Performed by: INTERNAL MEDICINE

## 2019-01-23 PROCEDURE — 25000132 ZZH RX MED GY IP 250 OP 250 PS 637: Performed by: INTERNAL MEDICINE

## 2019-01-23 PROCEDURE — 96417 CHEMO IV INFUS EACH ADDL SEQ: CPT

## 2019-01-23 PROCEDURE — 96413 CHEMO IV INFUSION 1 HR: CPT

## 2019-01-23 PROCEDURE — 96367 TX/PROPH/DG ADDL SEQ IV INF: CPT

## 2019-01-23 PROCEDURE — 25000128 H RX IP 250 OP 636: Performed by: INTERNAL MEDICINE

## 2019-01-23 RX ORDER — LORAZEPAM 2 MG/ML
0.5 INJECTION INTRAMUSCULAR EVERY 4 HOURS PRN
Status: CANCELLED
Start: 2019-01-23

## 2019-01-23 RX ORDER — EPINEPHRINE 0.3 MG/.3ML
0.3 INJECTION SUBCUTANEOUS EVERY 5 MIN PRN
Status: CANCELLED | OUTPATIENT
Start: 2019-01-23

## 2019-01-23 RX ORDER — METHYLPREDNISOLONE SODIUM SUCCINATE 125 MG/2ML
125 INJECTION, POWDER, LYOPHILIZED, FOR SOLUTION INTRAMUSCULAR; INTRAVENOUS
Status: CANCELLED
Start: 2019-01-23

## 2019-01-23 RX ORDER — ALBUTEROL SULFATE 0.83 MG/ML
2.5 SOLUTION RESPIRATORY (INHALATION)
Status: CANCELLED | OUTPATIENT
Start: 2019-01-23

## 2019-01-23 RX ORDER — DIPHENHYDRAMINE HCL 25 MG
50 CAPSULE ORAL ONCE
Status: COMPLETED | OUTPATIENT
Start: 2019-01-23 | End: 2019-01-23

## 2019-01-23 RX ORDER — SODIUM CHLORIDE 9 MG/ML
1000 INJECTION, SOLUTION INTRAVENOUS CONTINUOUS PRN
Status: CANCELLED
Start: 2019-01-23

## 2019-01-23 RX ORDER — DIPHENHYDRAMINE HYDROCHLORIDE 50 MG/ML
50 INJECTION INTRAMUSCULAR; INTRAVENOUS
Status: CANCELLED
Start: 2019-01-23

## 2019-01-23 RX ORDER — ALBUTEROL SULFATE 90 UG/1
1-2 AEROSOL, METERED RESPIRATORY (INHALATION)
Status: CANCELLED
Start: 2019-01-23

## 2019-01-23 RX ORDER — HEPARIN SODIUM (PORCINE) LOCK FLUSH IV SOLN 100 UNIT/ML 100 UNIT/ML
5 SOLUTION INTRAVENOUS ONCE
Status: COMPLETED | OUTPATIENT
Start: 2019-01-23 | End: 2019-01-23

## 2019-01-23 RX ORDER — HEPARIN SODIUM (PORCINE) LOCK FLUSH IV SOLN 100 UNIT/ML 100 UNIT/ML
5 SOLUTION INTRAVENOUS ONCE
Status: CANCELLED
Start: 2019-01-23 | End: 2019-01-23

## 2019-01-23 RX ORDER — MEPERIDINE HYDROCHLORIDE 25 MG/ML
25 INJECTION INTRAMUSCULAR; INTRAVENOUS; SUBCUTANEOUS EVERY 30 MIN PRN
Status: CANCELLED | OUTPATIENT
Start: 2019-01-23

## 2019-01-23 RX ORDER — EPINEPHRINE 1 MG/ML
0.3 INJECTION, SOLUTION INTRAMUSCULAR; SUBCUTANEOUS EVERY 5 MIN PRN
Status: CANCELLED | OUTPATIENT
Start: 2019-01-23

## 2019-01-23 RX ADMIN — PACLITAXEL 139 MG: 6 INJECTION, SOLUTION INTRAVENOUS at 12:01

## 2019-01-23 RX ADMIN — SODIUM CHLORIDE 250 ML: 9 INJECTION, SOLUTION INTRAVENOUS at 11:11

## 2019-01-23 RX ADMIN — CARBOPLATIN 260 MG: 10 INJECTION, SOLUTION INTRAVENOUS at 13:10

## 2019-01-23 RX ADMIN — DIPHENHYDRAMINE HYDROCHLORIDE 50 MG: 25 CAPSULE ORAL at 11:10

## 2019-01-23 RX ADMIN — SODIUM CHLORIDE, PRESERVATIVE FREE 5 ML: 5 INJECTION INTRAVENOUS at 13:43

## 2019-01-23 RX ADMIN — DEXAMETHASONE SODIUM PHOSPHATE: 10 INJECTION, SOLUTION INTRAMUSCULAR; INTRAVENOUS at 11:34

## 2019-01-23 RX ADMIN — FAMOTIDINE 20 MG: 20 INJECTION, SOLUTION INTRAVENOUS at 11:11

## 2019-01-23 ASSESSMENT — PAIN SCALES - GENERAL: PAINLEVEL: NO PAIN (0)

## 2019-01-23 NOTE — PROGRESS NOTES
Social Work Progress Note      Data/Intervention:  Patient Name:  Luci Londono  /Age:  1970 (48 year old)    Reason for Follow-Up:  Luci is a 48-year-old woman with a diagnosis of breast cancer who presents for C8D1 Carbo/Taxol. This clinician met with pt today for psychosocial check-in and emotional support.     Intervention:   Luci reports that she is doing better from a physical standpoint than she was last week when she was coping with the flu. Luci reports that she has been more distressed with the need to go to the emergency room for leg pain after treatment, and reported that she is hoping to discuss at greater length with providers today plan for medication to address leg discomfort. Luci reports that she was pleased to receive additional financial assistance from [employer], and is appreciative of sister's willingness to continue to support Luci and family in purchasing food. Luci brings in copies of needed bills for [employer] application, which this clinician will send in on Luci's behalf per her request. No other psychosocial concerns or needs reported at present time.     Plan:  1) This clinician will notify pt when [employer] applications are open 2019  2) This clinician will continue to follow for ongoing psychosocial support as pt continues to adjust to treatment and realize its impacts    Please call or page if needs or concerns arise.     MIRA Julien, LICSW  Direct Phone: [phone]  Pager: [phone]

## 2019-01-23 NOTE — PROGRESS NOTES
Infusion Nursing Note:  Luci Londono presents today for carbo/taxol D1C8.    Patient seen by provider today: No   present during visit today: Not Applicable.    Note: N/A.    Intravenous Access:  Implanted Port. Minimal blood return, butterfly done for lab draw. Flushes well.     Treatment Conditions:  Lab Results   Component Value Date    HGB 11.0 01/23/2019     Lab Results   Component Value Date    WBC 5.2 01/23/2019      Lab Results   Component Value Date    ANEU 3.7 01/23/2019     Lab Results   Component Value Date     01/23/2019      Lab Results   Component Value Date     01/23/2019                   Lab Results   Component Value Date    POTASSIUM 3.9 01/23/2019           Lab Results   Component Value Date    MAG 1.8 10/12/2018            Lab Results   Component Value Date    CR 0.68 01/23/2019                   Lab Results   Component Value Date    MATHIEU 8.8 01/23/2019                Lab Results   Component Value Date    BILITOTAL 0.3 01/23/2019           Lab Results   Component Value Date    ALBUMIN 3.0 01/23/2019                    Lab Results   Component Value Date    ALT 51 01/23/2019           Lab Results   Component Value Date    AST 27 01/23/2019       Results reviewed, labs MET treatment parameters, ok to proceed with treatment.      Post Infusion Assessment:  Patient tolerated infusion without incident.  Blood return noted pre and post infusion.  Site patent and intact, free from redness, edema or discomfort.  No evidence of extravasations.  Access discontinued per protocol.    Discharge Plan:   Discharge instructions reviewed with: Patient.  Patient and/or family verbalized understanding of discharge instructions and all questions answered.  Copy of AVS reviewed with patient and/or family.  Patient will return 1/30 for next appointment.  Patient discharged in stable condition accompanied by: self.  Departure Mode: Ambulatory.    Stacey Larios,  RN

## 2019-01-30 ENCOUNTER — INFUSION THERAPY VISIT (OUTPATIENT)
Dept: INFUSION THERAPY | Facility: CLINIC | Age: 49
End: 2019-01-30
Attending: INTERNAL MEDICINE
Payer: COMMERCIAL

## 2019-01-30 ENCOUNTER — HOSPITAL ENCOUNTER (OUTPATIENT)
Facility: CLINIC | Age: 49
Setting detail: SPECIMEN
Discharge: HOME | End: 2019-01-30
Attending: INTERNAL MEDICINE | Admitting: INTERNAL MEDICINE
Payer: COMMERCIAL

## 2019-01-30 VITALS
OXYGEN SATURATION: 100 % | HEIGHT: 61 IN | TEMPERATURE: 98.1 F | BODY MASS INDEX: 39.2 KG/M2 | RESPIRATION RATE: 16 BRPM | WEIGHT: 207.6 LBS | SYSTOLIC BLOOD PRESSURE: 142 MMHG | DIASTOLIC BLOOD PRESSURE: 84 MMHG | HEART RATE: 78 BPM

## 2019-01-30 DIAGNOSIS — Z95.828 PORT-A-CATH IN PLACE: ICD-10-CM

## 2019-01-30 DIAGNOSIS — C50.411 MALIGNANT NEOPLASM OF UPPER-OUTER QUADRANT OF RIGHT BREAST IN FEMALE, ESTROGEN RECEPTOR NEGATIVE (H): Primary | ICD-10-CM

## 2019-01-30 DIAGNOSIS — Z17.1 MALIGNANT NEOPLASM OF UPPER-OUTER QUADRANT OF RIGHT BREAST IN FEMALE, ESTROGEN RECEPTOR NEGATIVE (H): Primary | ICD-10-CM

## 2019-01-30 LAB
ALBUMIN SERPL-MCNC: 3 G/DL (ref 3.4–5)
ALP SERPL-CCNC: 63 U/L (ref 40–150)
ALT SERPL W P-5'-P-CCNC: 43 U/L (ref 0–50)
ANION GAP SERPL CALCULATED.3IONS-SCNC: 5 MMOL/L (ref 3–14)
AST SERPL W P-5'-P-CCNC: 20 U/L (ref 0–45)
BASOPHILS # BLD AUTO: 0 10E9/L (ref 0–0.2)
BASOPHILS NFR BLD AUTO: 0.3 %
BILIRUB DIRECT SERPL-MCNC: <0.1 MG/DL (ref 0–0.2)
BILIRUB SERPL-MCNC: 0.3 MG/DL (ref 0.2–1.3)
BUN SERPL-MCNC: 12 MG/DL (ref 7–30)
CALCIUM SERPL-MCNC: 8.5 MG/DL (ref 8.5–10.1)
CHLORIDE SERPL-SCNC: 103 MMOL/L (ref 94–109)
CO2 SERPL-SCNC: 30 MMOL/L (ref 20–32)
CREAT SERPL-MCNC: 0.58 MG/DL (ref 0.52–1.04)
DIFFERENTIAL METHOD BLD: ABNORMAL
EOSINOPHIL # BLD AUTO: 0 10E9/L (ref 0–0.7)
EOSINOPHIL NFR BLD AUTO: 0.6 %
ERYTHROCYTE [DISTWIDTH] IN BLOOD BY AUTOMATED COUNT: 22 % (ref 10–15)
GFR SERPL CREATININE-BSD FRML MDRD: >90 ML/MIN/{1.73_M2}
GLUCOSE SERPL-MCNC: 71 MG/DL (ref 70–99)
HCT VFR BLD AUTO: 29.8 % (ref 35–47)
HGB BLD-MCNC: 9.9 G/DL (ref 11.7–15.7)
IMM GRANULOCYTES # BLD: 0.1 10E9/L (ref 0–0.4)
IMM GRANULOCYTES NFR BLD: 1.6 %
LYMPHOCYTES # BLD AUTO: 2 10E9/L (ref 0.8–5.3)
LYMPHOCYTES NFR BLD AUTO: 31.5 %
MCH RBC QN AUTO: 29.1 PG (ref 26.5–33)
MCHC RBC AUTO-ENTMCNC: 33.2 G/DL (ref 31.5–36.5)
MCV RBC AUTO: 88 FL (ref 78–100)
MONOCYTES # BLD AUTO: 0.2 10E9/L (ref 0–1.3)
MONOCYTES NFR BLD AUTO: 3.4 %
NEUTROPHILS # BLD AUTO: 4 10E9/L (ref 1.6–8.3)
NEUTROPHILS NFR BLD AUTO: 62.6 %
NRBC # BLD AUTO: 0 10*3/UL
NRBC BLD AUTO-RTO: 1 /100
PLATELET # BLD AUTO: 195 10E9/L (ref 150–450)
POTASSIUM SERPL-SCNC: 3.9 MMOL/L (ref 3.4–5.3)
PROT SERPL-MCNC: 6.3 G/DL (ref 6.8–8.8)
RBC # BLD AUTO: 3.4 10E12/L (ref 3.8–5.2)
SODIUM SERPL-SCNC: 138 MMOL/L (ref 133–144)
WBC # BLD AUTO: 6.5 10E9/L (ref 4–11)

## 2019-01-30 PROCEDURE — 96375 TX/PRO/DX INJ NEW DRUG ADDON: CPT

## 2019-01-30 PROCEDURE — 96413 CHEMO IV INFUSION 1 HR: CPT

## 2019-01-30 PROCEDURE — 80053 COMPREHEN METABOLIC PANEL: CPT | Performed by: INTERNAL MEDICINE

## 2019-01-30 PROCEDURE — 25000128 H RX IP 250 OP 636: Performed by: INTERNAL MEDICINE

## 2019-01-30 PROCEDURE — 85025 COMPLETE CBC W/AUTO DIFF WBC: CPT | Performed by: INTERNAL MEDICINE

## 2019-01-30 PROCEDURE — 82248 BILIRUBIN DIRECT: CPT | Performed by: INTERNAL MEDICINE

## 2019-01-30 PROCEDURE — 96417 CHEMO IV INFUS EACH ADDL SEQ: CPT

## 2019-01-30 PROCEDURE — 25000132 ZZH RX MED GY IP 250 OP 250 PS 637: Performed by: INTERNAL MEDICINE

## 2019-01-30 RX ORDER — HEPARIN SODIUM (PORCINE) LOCK FLUSH IV SOLN 100 UNIT/ML 100 UNIT/ML
5 SOLUTION INTRAVENOUS ONCE
Status: CANCELLED
Start: 2019-01-30 | End: 2019-01-30

## 2019-01-30 RX ORDER — EPINEPHRINE 0.3 MG/.3ML
0.3 INJECTION SUBCUTANEOUS EVERY 5 MIN PRN
Status: CANCELLED | OUTPATIENT
Start: 2019-01-30

## 2019-01-30 RX ORDER — SODIUM CHLORIDE 9 MG/ML
1000 INJECTION, SOLUTION INTRAVENOUS CONTINUOUS PRN
Status: CANCELLED
Start: 2019-01-30

## 2019-01-30 RX ORDER — HEPARIN SODIUM (PORCINE) LOCK FLUSH IV SOLN 100 UNIT/ML 100 UNIT/ML
5 SOLUTION INTRAVENOUS ONCE
Status: COMPLETED | OUTPATIENT
Start: 2019-01-30 | End: 2019-01-30

## 2019-01-30 RX ORDER — ALBUTEROL SULFATE 0.83 MG/ML
2.5 SOLUTION RESPIRATORY (INHALATION)
Status: CANCELLED | OUTPATIENT
Start: 2019-01-30

## 2019-01-30 RX ORDER — ALBUTEROL SULFATE 90 UG/1
1-2 AEROSOL, METERED RESPIRATORY (INHALATION)
Status: CANCELLED
Start: 2019-01-30

## 2019-01-30 RX ORDER — EPINEPHRINE 1 MG/ML
0.3 INJECTION, SOLUTION INTRAMUSCULAR; SUBCUTANEOUS EVERY 5 MIN PRN
Status: CANCELLED | OUTPATIENT
Start: 2019-01-30

## 2019-01-30 RX ORDER — DIPHENHYDRAMINE HCL 25 MG
50 CAPSULE ORAL ONCE
Status: COMPLETED | OUTPATIENT
Start: 2019-01-30 | End: 2019-01-30

## 2019-01-30 RX ORDER — MEPERIDINE HYDROCHLORIDE 25 MG/ML
25 INJECTION INTRAMUSCULAR; INTRAVENOUS; SUBCUTANEOUS EVERY 30 MIN PRN
Status: CANCELLED | OUTPATIENT
Start: 2019-01-30

## 2019-01-30 RX ORDER — METHYLPREDNISOLONE SODIUM SUCCINATE 125 MG/2ML
125 INJECTION, POWDER, LYOPHILIZED, FOR SOLUTION INTRAMUSCULAR; INTRAVENOUS
Status: CANCELLED
Start: 2019-01-30

## 2019-01-30 RX ORDER — LORAZEPAM 2 MG/ML
0.5 INJECTION INTRAMUSCULAR EVERY 4 HOURS PRN
Status: CANCELLED
Start: 2019-01-30

## 2019-01-30 RX ORDER — DIPHENHYDRAMINE HYDROCHLORIDE 50 MG/ML
50 INJECTION INTRAMUSCULAR; INTRAVENOUS
Status: CANCELLED
Start: 2019-01-30

## 2019-01-30 RX ADMIN — SODIUM CHLORIDE 250 ML: 9 INJECTION, SOLUTION INTRAVENOUS at 10:55

## 2019-01-30 RX ADMIN — CARBOPLATIN 300 MG: 10 INJECTION, SOLUTION INTRAVENOUS at 12:35

## 2019-01-30 RX ADMIN — HEPARIN SODIUM (PORCINE) LOCK FLUSH IV SOLN 100 UNIT/ML 5 ML: 100 SOLUTION at 13:10

## 2019-01-30 RX ADMIN — DEXAMETHASONE SODIUM PHOSPHATE: 10 INJECTION, SOLUTION INTRAMUSCULAR; INTRAVENOUS at 10:55

## 2019-01-30 RX ADMIN — FAMOTIDINE 20 MG: 20 INJECTION, SOLUTION INTRAVENOUS at 11:11

## 2019-01-30 RX ADMIN — PACLITAXEL 139 MG: 6 INJECTION, SOLUTION INTRAVENOUS at 11:28

## 2019-01-30 RX ADMIN — DIPHENHYDRAMINE HYDROCHLORIDE 50 MG: 25 CAPSULE ORAL at 10:56

## 2019-01-30 ASSESSMENT — MIFFLIN-ST. JEOR: SCORE: 1513.79

## 2019-01-30 ASSESSMENT — PAIN SCALES - GENERAL: PAINLEVEL: NO PAIN (0)

## 2019-01-30 NOTE — PROGRESS NOTES
Infusion Nursing Note:  Luci Londono presents today for C9 D1 Taxol, Carbo.    Patient seen by provider today: No   present during visit today: Not Applicable.    Note: Patient feeling well overall today. Previous URI symptoms now resolved.    Intravenous Access:  Labs drawn without difficulty.  Implanted Port.    Treatment Conditions:  Lab Results   Component Value Date    HGB 9.9 01/30/2019     Lab Results   Component Value Date    WBC 6.5 01/30/2019      Lab Results   Component Value Date    ANEU 4.0 01/30/2019     Lab Results   Component Value Date     01/30/2019      Lab Results   Component Value Date     01/30/2019                   Lab Results   Component Value Date    POTASSIUM 3.9 01/30/2019           Lab Results   Component Value Date    CR 0.58 01/30/2019                   Lab Results   Component Value Date    MATHIEU 8.5 01/30/2019                Lab Results   Component Value Date    BILITOTAL 0.3 01/30/2019           Lab Results   Component Value Date    ALBUMIN 3.0 01/30/2019                    Lab Results   Component Value Date    ALT 43 01/30/2019           Lab Results   Component Value Date    AST 20 01/30/2019     Results reviewed, labs MET treatment parameters, ok to proceed with treatment.      Post Infusion Assessment:  Patient tolerated infusion without incident.  Blood return noted pre and post infusion.  Site patent and intact, free from redness, edema or discomfort.  No evidence of extravasations.  Access discontinued per protocol.    Discharge Plan:   Discharge instructions reviewed with: Patient.  Patient and/or family verbalized understanding of discharge instructions and all questions answered.  Copy of AVS reviewed with patient and/or family.  Patient will return 2/6/19 for next appointment.  Patient discharged in stable condition accompanied by: daughter.  Departure Mode: Ambulatory.    Becky Mcfadden RN

## 2019-02-06 ENCOUNTER — HOSPITAL ENCOUNTER (OUTPATIENT)
Facility: CLINIC | Age: 49
Setting detail: SPECIMEN
Discharge: HOME | End: 2019-02-06
Attending: INTERNAL MEDICINE | Admitting: INTERNAL MEDICINE
Payer: COMMERCIAL

## 2019-02-06 ENCOUNTER — ONCOLOGY VISIT (OUTPATIENT)
Dept: ONCOLOGY | Facility: CLINIC | Age: 49
End: 2019-02-06
Attending: INTERNAL MEDICINE
Payer: COMMERCIAL

## 2019-02-06 ENCOUNTER — ALLIED HEALTH/NURSE VISIT (OUTPATIENT)
Dept: ONCOLOGY | Facility: CLINIC | Age: 49
End: 2019-02-06

## 2019-02-06 ENCOUNTER — INFUSION THERAPY VISIT (OUTPATIENT)
Dept: INFUSION THERAPY | Facility: CLINIC | Age: 49
End: 2019-02-06
Attending: INTERNAL MEDICINE
Payer: COMMERCIAL

## 2019-02-06 VITALS
HEIGHT: 61 IN | DIASTOLIC BLOOD PRESSURE: 91 MMHG | SYSTOLIC BLOOD PRESSURE: 128 MMHG | RESPIRATION RATE: 16 BRPM | OXYGEN SATURATION: 99 % | HEART RATE: 120 BPM | WEIGHT: 202 LBS | TEMPERATURE: 98 F | BODY MASS INDEX: 38.14 KG/M2

## 2019-02-06 VITALS
WEIGHT: 202 LBS | RESPIRATION RATE: 16 BRPM | DIASTOLIC BLOOD PRESSURE: 91 MMHG | HEIGHT: 61 IN | TEMPERATURE: 98 F | BODY MASS INDEX: 38.14 KG/M2 | SYSTOLIC BLOOD PRESSURE: 128 MMHG | HEART RATE: 120 BPM | OXYGEN SATURATION: 99 %

## 2019-02-06 DIAGNOSIS — Z71.9 COUNSELING NOS(V65.40): Primary | ICD-10-CM

## 2019-02-06 DIAGNOSIS — Z17.1 MALIGNANT NEOPLASM OF UPPER-OUTER QUADRANT OF RIGHT BREAST IN FEMALE, ESTROGEN RECEPTOR NEGATIVE (H): Primary | ICD-10-CM

## 2019-02-06 DIAGNOSIS — M89.8X9 BONE PAIN: ICD-10-CM

## 2019-02-06 DIAGNOSIS — C50.411 MALIGNANT NEOPLASM OF UPPER-OUTER QUADRANT OF RIGHT BREAST IN FEMALE, ESTROGEN RECEPTOR NEGATIVE (H): Primary | ICD-10-CM

## 2019-02-06 DIAGNOSIS — Z95.828 PORT-A-CATH IN PLACE: ICD-10-CM

## 2019-02-06 DIAGNOSIS — J06.9 UPPER RESPIRATORY TRACT INFECTION, UNSPECIFIED TYPE: ICD-10-CM

## 2019-02-06 LAB
ALBUMIN SERPL-MCNC: 3.3 G/DL (ref 3.4–5)
ALP SERPL-CCNC: 71 U/L (ref 40–150)
ALT SERPL W P-5'-P-CCNC: 43 U/L (ref 0–50)
ANION GAP SERPL CALCULATED.3IONS-SCNC: 11 MMOL/L (ref 3–14)
AST SERPL W P-5'-P-CCNC: 22 U/L (ref 0–45)
BASOPHILS # BLD AUTO: 0 10E9/L (ref 0–0.2)
BASOPHILS NFR BLD AUTO: 0.1 %
BILIRUB SERPL-MCNC: 0.2 MG/DL (ref 0.2–1.3)
BUN SERPL-MCNC: 11 MG/DL (ref 7–30)
CALCIUM SERPL-MCNC: 9 MG/DL (ref 8.5–10.1)
CHLORIDE SERPL-SCNC: 104 MMOL/L (ref 94–109)
CO2 SERPL-SCNC: 25 MMOL/L (ref 20–32)
CREAT SERPL-MCNC: 0.64 MG/DL (ref 0.52–1.04)
DIFFERENTIAL METHOD BLD: ABNORMAL
EOSINOPHIL # BLD AUTO: 0 10E9/L (ref 0–0.7)
EOSINOPHIL NFR BLD AUTO: 0 %
ERYTHROCYTE [DISTWIDTH] IN BLOOD BY AUTOMATED COUNT: 21.1 % (ref 10–15)
GFR SERPL CREATININE-BSD FRML MDRD: >90 ML/MIN/{1.73_M2}
GLUCOSE SERPL-MCNC: 153 MG/DL (ref 70–99)
HCT VFR BLD AUTO: 31.5 % (ref 35–47)
HGB BLD-MCNC: 10.5 G/DL (ref 11.7–15.7)
IMM GRANULOCYTES # BLD: 0 10E9/L (ref 0–0.4)
IMM GRANULOCYTES NFR BLD: 0.4 %
LYMPHOCYTES # BLD AUTO: 1.5 10E9/L (ref 0.8–5.3)
LYMPHOCYTES NFR BLD AUTO: 22.3 %
MCH RBC QN AUTO: 29.2 PG (ref 26.5–33)
MCHC RBC AUTO-ENTMCNC: 33.3 G/DL (ref 31.5–36.5)
MCV RBC AUTO: 88 FL (ref 78–100)
MONOCYTES # BLD AUTO: 0.3 10E9/L (ref 0–1.3)
MONOCYTES NFR BLD AUTO: 4.6 %
NEUTROPHILS # BLD AUTO: 4.9 10E9/L (ref 1.6–8.3)
NEUTROPHILS NFR BLD AUTO: 72.6 %
NRBC # BLD AUTO: 0 10*3/UL
NRBC BLD AUTO-RTO: 0 /100
PLATELET # BLD AUTO: 388 10E9/L (ref 150–450)
POTASSIUM SERPL-SCNC: 3.5 MMOL/L (ref 3.4–5.3)
PROT SERPL-MCNC: 6.9 G/DL (ref 6.8–8.8)
RBC # BLD AUTO: 3.6 10E12/L (ref 3.8–5.2)
SODIUM SERPL-SCNC: 140 MMOL/L (ref 133–144)
WBC # BLD AUTO: 6.7 10E9/L (ref 4–11)

## 2019-02-06 PROCEDURE — 99214 OFFICE O/P EST MOD 30 MIN: CPT | Performed by: INTERNAL MEDICINE

## 2019-02-06 PROCEDURE — 85025 COMPLETE CBC W/AUTO DIFF WBC: CPT | Performed by: INTERNAL MEDICINE

## 2019-02-06 PROCEDURE — 80053 COMPREHEN METABOLIC PANEL: CPT | Performed by: INTERNAL MEDICINE

## 2019-02-06 PROCEDURE — 25000128 H RX IP 250 OP 636: Performed by: INTERNAL MEDICINE

## 2019-02-06 PROCEDURE — G0463 HOSPITAL OUTPT CLINIC VISIT: HCPCS | Mod: 25

## 2019-02-06 PROCEDURE — 96367 TX/PROPH/DG ADDL SEQ IV INF: CPT

## 2019-02-06 PROCEDURE — 25000132 ZZH RX MED GY IP 250 OP 250 PS 637: Performed by: INTERNAL MEDICINE

## 2019-02-06 PROCEDURE — 96413 CHEMO IV INFUSION 1 HR: CPT

## 2019-02-06 PROCEDURE — 96415 CHEMO IV INFUSION ADDL HR: CPT

## 2019-02-06 RX ORDER — DIPHENHYDRAMINE HCL 25 MG
50 CAPSULE ORAL ONCE
Status: COMPLETED | OUTPATIENT
Start: 2019-02-06 | End: 2019-02-06

## 2019-02-06 RX ORDER — ALBUTEROL SULFATE 90 UG/1
1-2 AEROSOL, METERED RESPIRATORY (INHALATION)
Status: CANCELLED
Start: 2019-02-06

## 2019-02-06 RX ORDER — HEPARIN SODIUM (PORCINE) LOCK FLUSH IV SOLN 100 UNIT/ML 100 UNIT/ML
5 SOLUTION INTRAVENOUS ONCE
Status: CANCELLED
Start: 2019-02-06 | End: 2019-02-06

## 2019-02-06 RX ORDER — EPINEPHRINE 1 MG/ML
0.3 INJECTION, SOLUTION INTRAMUSCULAR; SUBCUTANEOUS EVERY 5 MIN PRN
Status: CANCELLED | OUTPATIENT
Start: 2019-02-06

## 2019-02-06 RX ORDER — EPINEPHRINE 0.3 MG/.3ML
0.3 INJECTION SUBCUTANEOUS EVERY 5 MIN PRN
Status: CANCELLED | OUTPATIENT
Start: 2019-02-06

## 2019-02-06 RX ORDER — MEPERIDINE HYDROCHLORIDE 25 MG/ML
25 INJECTION INTRAMUSCULAR; INTRAVENOUS; SUBCUTANEOUS EVERY 30 MIN PRN
Status: CANCELLED | OUTPATIENT
Start: 2019-02-06

## 2019-02-06 RX ORDER — SODIUM CHLORIDE 9 MG/ML
1000 INJECTION, SOLUTION INTRAVENOUS CONTINUOUS PRN
Status: CANCELLED
Start: 2019-02-06

## 2019-02-06 RX ORDER — DIPHENHYDRAMINE HYDROCHLORIDE 50 MG/ML
50 INJECTION INTRAMUSCULAR; INTRAVENOUS
Status: CANCELLED
Start: 2019-02-06

## 2019-02-06 RX ORDER — HEPARIN SODIUM (PORCINE) LOCK FLUSH IV SOLN 100 UNIT/ML 100 UNIT/ML
5 SOLUTION INTRAVENOUS ONCE
Status: COMPLETED | OUTPATIENT
Start: 2019-02-06 | End: 2019-02-06

## 2019-02-06 RX ORDER — DIPHENHYDRAMINE HCL 25 MG
50 CAPSULE ORAL ONCE
Status: CANCELLED
Start: 2019-02-06

## 2019-02-06 RX ORDER — ALBUTEROL SULFATE 0.83 MG/ML
2.5 SOLUTION RESPIRATORY (INHALATION)
Status: CANCELLED | OUTPATIENT
Start: 2019-02-06

## 2019-02-06 RX ORDER — LORAZEPAM 2 MG/ML
0.5 INJECTION INTRAMUSCULAR EVERY 4 HOURS PRN
Status: CANCELLED
Start: 2019-02-06

## 2019-02-06 RX ORDER — METHYLPREDNISOLONE SODIUM SUCCINATE 125 MG/2ML
125 INJECTION, POWDER, LYOPHILIZED, FOR SOLUTION INTRAMUSCULAR; INTRAVENOUS
Status: CANCELLED
Start: 2019-02-06

## 2019-02-06 RX ADMIN — HEPARIN SODIUM (PORCINE) LOCK FLUSH IV SOLN 100 UNIT/ML 5 ML: 100 SOLUTION at 11:43

## 2019-02-06 RX ADMIN — PACLITAXEL 139 MG: 6 INJECTION, SOLUTION INTRAVENOUS at 10:02

## 2019-02-06 RX ADMIN — DIPHENHYDRAMINE HYDROCHLORIDE 50 MG: 25 CAPSULE ORAL at 09:25

## 2019-02-06 RX ADMIN — FAMOTIDINE 20 MG: 20 INJECTION, SOLUTION INTRAVENOUS at 09:44

## 2019-02-06 RX ADMIN — DEXAMETHASONE SODIUM PHOSPHATE: 10 INJECTION, SOLUTION INTRAMUSCULAR; INTRAVENOUS at 09:25

## 2019-02-06 RX ADMIN — CARBOPLATIN 275 MG: 10 INJECTION, SOLUTION INTRAVENOUS at 11:07

## 2019-02-06 RX ADMIN — SODIUM CHLORIDE 250 ML: 9 INJECTION, SOLUTION INTRAVENOUS at 09:25

## 2019-02-06 ASSESSMENT — MIFFLIN-ST. JEOR
SCORE: 1488.39
SCORE: 1488.39

## 2019-02-06 NOTE — PROGRESS NOTES
Met briefly with pt today for psychosocial check-in and support. No psychosocial concerns reported or observed today. Pt brought in final documentation for [employer], this clinician mailed today. Will continue to follow for ongoing psychosocial support. Pt knows to reach out in case of distress or need.     MIRA Julien, LICSW  Phone: [phone]  Pager: [phone]    [facility]: M, T  *every other Thursday, 8am-4:30pm  [facility]: W, F, *every other Thursday, 8am-4:30pm

## 2019-02-06 NOTE — LETTER
"    2/6/2019         RE: Luci Londono  [address]  [facility] [address]        Dear Colleague,    Thank you for referring your patient, Luci Londono, to the [facility]. Please see a copy of my visit note below.    Oncology Rooming Note    February 6, 2019 7:59 AM   Luci Londono is a 48 year old female who presents for:    Chief Complaint   Patient presents with     Oncology Clinic Visit     Malignant neoplasm of upper-outer quadrant of right breast in female, estrogen receptor negative (H)     Initial Vitals: BP (!) 128/91   Pulse 120   Temp 98  F (36.7  C) (Oral)   Resp 16   Ht 1.549 m (5' 0.98\")   Wt 91.6 kg (202 lb 0 oz)   SpO2 99%   BMI 38.19 kg/m    Estimated body mass index is 38.19 kg/m  as calculated from the following:    Height as of this encounter: 1.549 m (5' 0.98\").    Weight as of this encounter: 91.6 kg (202 lb 0 oz). Body surface area is 1.99 meters squared.  Data Unavailable Comment: Data Unavailable   No LMP recorded.  Allergies reviewed: Yes  Medications reviewed: Yes    Medications: Medication refills not needed today.  Pharmacy name entered into [facility]:    [id number] - [address] AT [address] [place] [address] & [address] [facility] [id number] [address] AT [address]    Clinical concerns: no   5 minutes for nursing intake (face to face time)          Emerald Pascual MA                ONCOLOGY FOLLOW UIP VISIT    breast surgeon:  Dr. 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   Her mammogram revealed a small mass in the right upper outer breast,   US revealed an 8mm hypoechoic mass at 12:00, 6cm FN and axillary US revealed a concerning lymph node which " "was also biopsied.   Her pathology from both breast and LN revealed a grade 3, invasive ductal carcinoma, ER/[place]/her 2-.   PET found no distal disease.   Breast MRI found entire span 3.8 cm.There are multiple enlarged right axillary lymph nodes.        She made informed decision to proceed with neoadjuvant DD AC  She had drastic clinical response by PE and MRI.   She continued on wkly taxol. Carbo is added in W2.     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     MEDICATIONS/ALLERY:  Reviewed in Epic system.      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 Deny ETOH/smoking       FAMILY HISTORY:    Negative for any type of cancers    REVIEW OF SYSTEMS:   She reports sever bone pain with wkly taxol. Did not respond to narcotic, but to dex. Taxol dose is also slightly reduced.   S/p Influenza A by swab 1/14/2019.       PHYSICAL EXAMINATION:   VITAL SIGNS: Blood pressure (!) 128/91, pulse 120, temperature 98  F (36.7  C), temperature source Oral, resp. rate 16, height 1.549 m (5' 0.98\"), weight 91.6 kg (202 lb 0 oz), SpO2 99 %, not currently breastfeeding.    ECOG 0    GENERAL APPEARANCE:  looks like her stated age, very pleasant, not in acute distress.   HEENT: The patient is normocephalic, atraumatic. Pupils are equally reactive to light.  Sclerae are anicteric.  Moist oral mucosa.  Negative pharynx.  No oral thrush. Stomatitis on left angular of mouth.    NECK:  Supple.  No jugular venous distention.  Thyroid is not palpable.   LYMPH NODES:  Superficial lymphadenopathy is not appreciable in the " bilateral cervical, supraclavicular, axillary or inguinal areas.   CARDIOVASCULAR:  S1, S2 regular with no murmurs or gallops.  No carotid or abdominal bruits.   PULMONARY:  Lungs are clear to auscultation and percussion bilaterally.  There is no wheezing or rhonchi.   GASTROINTESTINAL:  Abdomen is soft, nontender.  No hepatosplenomegaly.  No signs of ascites.  No mass appreciable.   MUSCULOSKELETAL/EXTREMITIES:  No edema.  No cyanotic changes.  No signs of joint deformity.  No lymphedema.   NEUROLOGIC:  Cranial nerves II-XII are grossly intact.  Sensation intact.  Muscle strength and muscle tone symmetrical, 5/5 throughout.   BACK:  No spinal or paraspinal tenderness.  No CVA tenderness.   SKIN:  No petechiae.  No rash.  No signs of cellulitis.   BREASTS: Right breast with mild ecchymosis on upper breast, no longer palpable 1.5cm mass at 12:00 post C1. No other masses.  Left breast is symmetrical with no skin or nipple changes. No masses on the left. Tissue is very dense throughout.          CURRENT LAB DATA REVIEWED  Cbc diff is fine. CMP is ok.       CURRENT IMAGING REVIEWED  CXR 1/2019 - negative       OLD DATA REVIEWED TODAY WITH SUMMARY:     Breast MRI post AC 12/2018  1. Enhancing mass superiorly in the RIGHT breast is significantly decreased in size since 10/17/2018, measuring approximately 1.1 x 0.3 x 0.5 cm in today's study (series 5 image 52 and series 13 image 30), decreased from 1.1 x 1.6 x 1.0 cm.  2. Enlarged RIGHT axillary lymph node is slightly decreased since that time as well, now measuring 0.9 x 1.2 x 1.0 cm (series 5 image 68 and series 13 image 19), decreased from 1.3 x 1.2 x 1.4 cm.     10/2018  Right breast 8mm hypoechoic mass at 12:00, 6cm FN and right axillary LN biopsy both found grade 3, invasive ductal carcinoma, ER/[place]/her 2-.       Baseline pre tx breast MRI 10/2018 -  In the right breast at 12:00 7 cm from the nipple, there is irregular heterogeneously enhancing mass, corresponding  with the known biopsy-proven malignancy, which spans approximately 2.2 cm in maximal diameter, best appreciated on sagittal view, with surrounding nonmasslike enhancement likely related to postbiopsy change or DCIS.  Taken with the primary tumor, the entire span extends up to 3.8 cm.   There are multiple enlarged right axillary lymph nodes.      PET 10/2018  1. No evidence of distant metastasis.  2. Hypermetabolic focus in the upper outer quadrant right breast representing primary tumor. Hypermetabolic right axillary lymph nodes, consistent with metastatic node.  3. Indeterminate sub-4 mm pulmonary nodules, likely fissural lymph node in the right lung.  4. Extensive FDG uptake by the brown fat in the neck and paraspinal region.    10/2018 dx [place] -  revealed a small mass in the right upper outer breast, US revealed an 8mm hypoechoic mass at 12:00, 6cm FN and axillary US revealed a concerning lymph node                ASSESSMENT AND PLAN:    1.  dx cT1cN1 right breast high grade IDC, TN at age 47 in 10/2018.     PET is negative for distal disease.      She made informed decision to proceed DD AC -- taxol + carbo C1D1 10/24/2018.     She had good MRI response post C4 AC.     She did ok with W1 taxol. Will add wkly carbo today in W2.       She needs to be monitored closely during this intense tx.     2. Young age dx with TN breast cancer, she will need genetic counseling.   She is not able to see them till April.   We are adding carbo anyway.     3. Post taxol bone pain - 10% dose reduction on taxol.   She did not respond to claritin, and pain med.   Advised 4 mg dex 2 doses. So far this worked ok.     4. Recurrent URI - s/p Influenza tx. Recommend high dose vit C.         Again, thank you for allowing me to participate in the care of your patient.        Sincerely,        Addie Murillo MD, MD

## 2019-02-06 NOTE — PROGRESS NOTES
"Oncology Rooming Note    February 6, 2019 7:59 AM   Luci Londono is a 48 year old female who presents for:    Chief Complaint   Patient presents with     Oncology Clinic Visit     Malignant neoplasm of upper-outer quadrant of right breast in female, estrogen receptor negative (H)     Initial Vitals: BP (!) 128/91   Pulse 120   Temp 98  F (36.7  C) (Oral)   Resp 16   Ht 1.549 m (5' 0.98\")   Wt 91.6 kg (202 lb 0 oz)   SpO2 99%   BMI 38.19 kg/m   Estimated body mass index is 38.19 kg/m  as calculated from the following:    Height as of this encounter: 1.549 m (5' 0.98\").    Weight as of this encounter: 91.6 kg (202 lb 0 oz). Body surface area is 1.99 meters squared.  Data Unavailable Comment: Data Unavailable   No LMP recorded.  Allergies reviewed: Yes  Medications reviewed: Yes    Medications: Medication refills not needed today.  Pharmacy name entered into basno:    [id number] - [place], [place] - [address] [place] [address] AT [address] & [address] [facility] [id number] [place] [address] AT [address]    Clinical concerns: no   5 minutes for nursing intake (face to face time)          Emerald Pascual MA              "

## 2019-02-06 NOTE — PROGRESS NOTES
Infusion Nursing Note:  Luci Londono presents today for taxol/carbo.    Patient seen by provider today: Yes:    present during visit today: Not Applicable.    Note: N/A.    Intravenous Access:  Implanted Port.    Treatment Conditions:  Lab Results   Component Value Date    HGB 10.5 02/06/2019     Lab Results   Component Value Date    WBC 6.7 02/06/2019      Lab Results   Component Value Date    ANEU 4.9 02/06/2019     Lab Results   Component Value Date     02/06/2019      Lab Results   Component Value Date     02/06/2019                   Lab Results   Component Value Date    POTASSIUM 3.5 02/06/2019           Lab Results   Component Value Date    MAG 1.8 10/12/2018            Lab Results   Component Value Date    CR 0.64 02/06/2019                   Lab Results   Component Value Date    MATHIEU 9.0 02/06/2019                Lab Results   Component Value Date    BILITOTAL 0.2 02/06/2019           Lab Results   Component Value Date    ALBUMIN 3.3 02/06/2019                    Lab Results   Component Value Date    ALT 43 02/06/2019           Lab Results   Component Value Date    AST 22 02/06/2019       Results reviewed, labs MET treatment parameters, ok to proceed with treatment.      Post Infusion Assessment:  Patient tolerated infusion without incident.  Blood return noted pre and post infusion.  Site patent and intact, free from redness, edema or discomfort.  No evidence of extravasations.  Access discontinued per protocol.    Discharge Plan:   Patient and/or family verbalized understanding of discharge instructions and all questions answered.  Copy of AVS reviewed with patient and/or family.  Patient will return 2/13 for next appointment.  Patient discharged in stable condition accompanied by: self.  Departure Mode: Ambulatory.    Nereida Ortiz RN

## 2019-02-06 NOTE — PROGRESS NOTES
"ONCOLOGY FOLLOW [facility] VISIT    breast surgeon:  Dr. 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   Her mammogram revealed a small mass in the right upper outer breast,   US revealed an 8mm hypoechoic mass at 12:00, 6cm FN and axillary US revealed a concerning lymph node which was also biopsied.   Her pathology from both breast and LN revealed a grade 3, invasive ductal carcinoma, ER/[place]/her 2-.   PET found no distal disease.   Breast MRI found entire span 3.8 cm.There are multiple enlarged right axillary lymph nodes.        She made informed decision to proceed with neoadjuvant DD AC  She had drastic clinical response by PE and MRI.   She continued on wkly taxol. Carbo is added in W2.     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     MEDICATIONS/ALLERY:  Reviewed in Epic system.      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 Deny ETOH/smoking       FAMILY HISTORY:    Negative for any type of cancers    REVIEW OF SYSTEMS:   She reports sever bone pain with wkly taxol. Did not respond to narcotic, but to dex. Taxol dose is also slightly reduced.   S/p Influenza A by swab 1/14/2019.       PHYSICAL EXAMINATION:   VITAL SIGNS: Blood pressure (!) 128/91, pulse 120, temperature 98  F (36.7  C), temperature source Oral, resp. rate 16, height 1.549 m (5' 0.98\"), weight 91.6 kg (202 lb 0 oz), " SpO2 99 %, not currently breastfeeding.    ECOG 0    GENERAL APPEARANCE:  looks like her stated age, very pleasant, not in acute distress.   HEENT: The patient is normocephalic, atraumatic. Pupils are equally reactive to light.  Sclerae are anicteric.  Moist oral mucosa.  Negative pharynx.  No oral thrush. Stomatitis on left angular of mouth.    NECK:  Supple.  No jugular venous distention.  Thyroid is not palpable.   LYMPH NODES:  Superficial lymphadenopathy is not appreciable in the bilateral cervical, supraclavicular, axillary or inguinal areas.   CARDIOVASCULAR:  S1, S2 regular with no murmurs or gallops.  No carotid or abdominal bruits.   PULMONARY:  Lungs are clear to auscultation and percussion bilaterally.  There is no wheezing or rhonchi.   GASTROINTESTINAL:  Abdomen is soft, nontender.  No hepatosplenomegaly.  No signs of ascites.  No mass appreciable.   MUSCULOSKELETAL/EXTREMITIES:  No edema.  No cyanotic changes.  No signs of joint deformity.  No lymphedema.   NEUROLOGIC:  Cranial nerves II-XII are grossly intact.  Sensation intact.  Muscle strength and muscle tone symmetrical, 5/5 throughout.   BACK:  No spinal or paraspinal tenderness.  No CVA tenderness.   SKIN:  No petechiae.  No rash.  No signs of cellulitis.   BREASTS: Right breast with mild ecchymosis on upper breast, no longer palpable 1.5cm mass at 12:00 post C1. No other masses.  Left breast is symmetrical with no skin or nipple changes. No masses on the left. Tissue is very dense throughout.          CURRENT LAB DATA REVIEWED  Cbc diff is fine. CMP is ok.       CURRENT IMAGING REVIEWED  CXR 1/2019 - negative       OLD DATA REVIEWED TODAY WITH SUMMARY:     Breast MRI post AC 12/2018  1. Enhancing mass superiorly in the RIGHT breast is significantly decreased in size since 10/17/2018, measuring approximately 1.1 x 0.3 x 0.5 cm in today's study (series 5 image 52 and series 13 image 30), decreased from 1.1 x 1.6 x 1.0 cm.  2. Enlarged RIGHT  axillary lymph node is slightly decreased since that time as well, now measuring 0.9 x 1.2 x 1.0 cm (series 5 image 68 and series 13 image 19), decreased from 1.3 x 1.2 x 1.4 cm.     10/2018  Right breast 8mm hypoechoic mass at 12:00, 6cm FN and right axillary LN biopsy both found grade 3, invasive ductal carcinoma, ER/[place]/her 2-.       Baseline pre tx breast MRI 10/2018 -  In the right breast at 12:00 7 cm from the nipple, there is irregular heterogeneously enhancing mass, corresponding with the known biopsy-proven malignancy, which spans approximately 2.2 cm in maximal diameter, best appreciated on sagittal view, with surrounding nonmasslike enhancement likely related to postbiopsy change or DCIS.  Taken with the primary tumor, the entire span extends up to 3.8 cm.   There are multiple enlarged right axillary lymph nodes.      PET 10/2018  1. No evidence of distant metastasis.  2. Hypermetabolic focus in the upper outer quadrant right breast representing primary tumor. Hypermetabolic right axillary lymph nodes, consistent with metastatic node.  3. Indeterminate sub-4 mm pulmonary nodules, likely fissural lymph node in the right lung.  4. Extensive FDG uptake by the brown fat in the neck and paraspinal region.    10/2018 dx [place] -  revealed a small mass in the right upper outer breast, US revealed an 8mm hypoechoic mass at 12:00, 6cm FN and axillary US revealed a concerning lymph node                ASSESSMENT AND PLAN:    1.  dx cT1cN1 right breast high grade IDC, TN at age 47 in 10/2018.     PET is negative for distal disease.      She made informed decision to proceed DD AC -- taxol + carbo C1D1 10/24/2018.     She had good MRI response post C4 AC.     She did ok with W1 taxol. Will add wkly carbo today in W2.       She needs to be monitored closely during this intense tx.     2. Young age dx with [place] breast cancer, she will need genetic counseling.   She is not able to see them till April.   We are adding carbo  anyway.     3. Post taxol bone pain - 10% dose reduction on taxol.   She did not respond to claritin, and pain med.   Advised 4 mg dex 2 doses. So far this worked ok.     4. Recurrent URI - s/p Influenza tx. Recommend high dose vit C.

## 2019-02-13 ENCOUNTER — CARE COORDINATION (OUTPATIENT)
Dept: ONCOLOGY | Facility: CLINIC | Age: 49
End: 2019-02-13

## 2019-02-13 ENCOUNTER — HOSPITAL ENCOUNTER (OUTPATIENT)
Facility: CLINIC | Age: 49
Setting detail: SPECIMEN
Discharge: HOME | End: 2019-02-13
Attending: INTERNAL MEDICINE | Admitting: INTERNAL MEDICINE
Payer: COMMERCIAL

## 2019-02-13 ENCOUNTER — ALLIED HEALTH/NURSE VISIT (OUTPATIENT)
Dept: ONCOLOGY | Facility: CLINIC | Age: 49
End: 2019-02-13

## 2019-02-13 ENCOUNTER — INFUSION THERAPY VISIT (OUTPATIENT)
Dept: INFUSION THERAPY | Facility: CLINIC | Age: 49
End: 2019-02-13
Attending: INTERNAL MEDICINE
Payer: COMMERCIAL

## 2019-02-13 VITALS
WEIGHT: 207.2 LBS | SYSTOLIC BLOOD PRESSURE: 119 MMHG | OXYGEN SATURATION: 98 % | BODY MASS INDEX: 39.12 KG/M2 | DIASTOLIC BLOOD PRESSURE: 77 MMHG | TEMPERATURE: 98 F | HEIGHT: 61 IN | HEART RATE: 112 BPM

## 2019-02-13 DIAGNOSIS — Z17.1 MALIGNANT NEOPLASM OF UPPER-OUTER QUADRANT OF RIGHT BREAST IN FEMALE, ESTROGEN RECEPTOR NEGATIVE (H): Primary | ICD-10-CM

## 2019-02-13 DIAGNOSIS — M89.8X9 BONE PAIN: ICD-10-CM

## 2019-02-13 DIAGNOSIS — Z95.828 PORT-A-CATH IN PLACE: ICD-10-CM

## 2019-02-13 DIAGNOSIS — C50.411 MALIGNANT NEOPLASM OF UPPER-OUTER QUADRANT OF RIGHT BREAST IN FEMALE, ESTROGEN RECEPTOR NEGATIVE (H): Primary | ICD-10-CM

## 2019-02-13 DIAGNOSIS — E87.6 HYPOKALEMIA: ICD-10-CM

## 2019-02-13 DIAGNOSIS — Z71.9 COUNSELING NOS(V65.40): Primary | ICD-10-CM

## 2019-02-13 DIAGNOSIS — G62.9 NEUROPATHY: Primary | ICD-10-CM

## 2019-02-13 LAB
ALBUMIN SERPL-MCNC: 2.1 G/DL (ref 3.4–5)
ALP SERPL-CCNC: 51 U/L (ref 40–150)
ALT SERPL W P-5'-P-CCNC: 44 U/L (ref 0–50)
ANION GAP SERPL CALCULATED.3IONS-SCNC: 8 MMOL/L (ref 3–14)
AST SERPL W P-5'-P-CCNC: 24 U/L (ref 0–45)
BASOPHILS # BLD AUTO: 0 10E9/L (ref 0–0.2)
BASOPHILS NFR BLD AUTO: 0.3 %
BILIRUB SERPL-MCNC: 0.3 MG/DL (ref 0.2–1.3)
BUN SERPL-MCNC: 6 MG/DL (ref 7–30)
CALCIUM SERPL-MCNC: 6.2 MG/DL (ref 8.5–10.1)
CHLORIDE SERPL-SCNC: 117 MMOL/L (ref 94–109)
CO2 SERPL-SCNC: 21 MMOL/L (ref 20–32)
CREAT SERPL-MCNC: 0.51 MG/DL (ref 0.52–1.04)
DIFFERENTIAL METHOD BLD: ABNORMAL
EOSINOPHIL # BLD AUTO: 0 10E9/L (ref 0–0.7)
EOSINOPHIL NFR BLD AUTO: 0.3 %
ERYTHROCYTE [DISTWIDTH] IN BLOOD BY AUTOMATED COUNT: 21.1 % (ref 10–15)
GFR SERPL CREATININE-BSD FRML MDRD: >90 ML/MIN/{1.73_M2}
GLUCOSE SERPL-MCNC: 91 MG/DL (ref 70–99)
HCT VFR BLD AUTO: 30.5 % (ref 35–47)
HGB BLD-MCNC: 10 G/DL (ref 11.7–15.7)
IMM GRANULOCYTES # BLD: 0 10E9/L (ref 0–0.4)
IMM GRANULOCYTES NFR BLD: 0.8 %
LYMPHOCYTES # BLD AUTO: 1.2 10E9/L (ref 0.8–5.3)
LYMPHOCYTES NFR BLD AUTO: 34.5 %
MCH RBC QN AUTO: 29.9 PG (ref 26.5–33)
MCHC RBC AUTO-ENTMCNC: 32.8 G/DL (ref 31.5–36.5)
MCV RBC AUTO: 91 FL (ref 78–100)
MONOCYTES # BLD AUTO: 0.3 10E9/L (ref 0–1.3)
MONOCYTES NFR BLD AUTO: 8.8 %
NEUTROPHILS # BLD AUTO: 2 10E9/L (ref 1.6–8.3)
NEUTROPHILS NFR BLD AUTO: 55.3 %
NRBC # BLD AUTO: 0.1 10*3/UL
NRBC BLD AUTO-RTO: 2 /100
PLATELET # BLD AUTO: 227 10E9/L (ref 150–450)
POTASSIUM SERPL-SCNC: 2.8 MMOL/L (ref 3.4–5.3)
PROT SERPL-MCNC: 4.6 G/DL (ref 6.8–8.8)
RBC # BLD AUTO: 3.35 10E12/L (ref 3.8–5.2)
SODIUM SERPL-SCNC: 146 MMOL/L (ref 133–144)
WBC # BLD AUTO: 3.5 10E9/L (ref 4–11)

## 2019-02-13 PROCEDURE — 80053 COMPREHEN METABOLIC PANEL: CPT | Performed by: INTERNAL MEDICINE

## 2019-02-13 PROCEDURE — 96413 CHEMO IV INFUSION 1 HR: CPT

## 2019-02-13 PROCEDURE — 96367 TX/PROPH/DG ADDL SEQ IV INF: CPT

## 2019-02-13 PROCEDURE — 25800030 ZZH RX IP 258 OP 636: Performed by: INTERNAL MEDICINE

## 2019-02-13 PROCEDURE — 25000132 ZZH RX MED GY IP 250 OP 250 PS 637: Performed by: INTERNAL MEDICINE

## 2019-02-13 PROCEDURE — 96417 CHEMO IV INFUS EACH ADDL SEQ: CPT

## 2019-02-13 PROCEDURE — 25000128 H RX IP 250 OP 636: Performed by: INTERNAL MEDICINE

## 2019-02-13 PROCEDURE — 85025 COMPLETE CBC W/AUTO DIFF WBC: CPT | Performed by: INTERNAL MEDICINE

## 2019-02-13 RX ORDER — DEXAMETHASONE 4 MG/1
4 TABLET ORAL 2 TIMES DAILY PRN
COMMUNITY
End: 2019-02-13 | Stop reason: ALTCHOICE

## 2019-02-13 RX ORDER — SODIUM CHLORIDE 9 MG/ML
1000 INJECTION, SOLUTION INTRAVENOUS CONTINUOUS PRN
Status: CANCELLED
Start: 2019-02-13

## 2019-02-13 RX ORDER — EPINEPHRINE 1 MG/ML
0.3 INJECTION, SOLUTION INTRAMUSCULAR; SUBCUTANEOUS EVERY 5 MIN PRN
Status: CANCELLED | OUTPATIENT
Start: 2019-02-13

## 2019-02-13 RX ORDER — DEXAMETHASONE 4 MG/1
4 TABLET ORAL 2 TIMES DAILY WITH MEALS
Qty: 30 TABLET | Refills: 0 | Status: SHIPPED | OUTPATIENT
Start: 2019-02-13 | End: 2019-05-09

## 2019-02-13 RX ORDER — GABAPENTIN 100 MG/1
100 CAPSULE ORAL 3 TIMES DAILY
Qty: 270 CAPSULE | Refills: 1 | Status: SHIPPED | OUTPATIENT
Start: 2019-02-13 | End: 2019-03-13

## 2019-02-13 RX ORDER — MEPERIDINE HYDROCHLORIDE 25 MG/ML
25 INJECTION INTRAMUSCULAR; INTRAVENOUS; SUBCUTANEOUS EVERY 30 MIN PRN
Status: CANCELLED | OUTPATIENT
Start: 2019-02-13

## 2019-02-13 RX ORDER — HEPARIN SODIUM (PORCINE) LOCK FLUSH IV SOLN 100 UNIT/ML 100 UNIT/ML
5 SOLUTION INTRAVENOUS ONCE
Status: COMPLETED | OUTPATIENT
Start: 2019-02-13 | End: 2019-02-13

## 2019-02-13 RX ORDER — ALBUTEROL SULFATE 90 UG/1
1-2 AEROSOL, METERED RESPIRATORY (INHALATION)
Status: CANCELLED
Start: 2019-02-13

## 2019-02-13 RX ORDER — HEPARIN SODIUM (PORCINE) LOCK FLUSH IV SOLN 100 UNIT/ML 100 UNIT/ML
5 SOLUTION INTRAVENOUS ONCE
Status: CANCELLED
Start: 2019-02-13 | End: 2019-02-13

## 2019-02-13 RX ORDER — LORAZEPAM 2 MG/ML
0.5 INJECTION INTRAMUSCULAR EVERY 4 HOURS PRN
Status: CANCELLED
Start: 2019-02-13

## 2019-02-13 RX ORDER — EPINEPHRINE 0.3 MG/.3ML
0.3 INJECTION SUBCUTANEOUS EVERY 5 MIN PRN
Status: CANCELLED | OUTPATIENT
Start: 2019-02-13

## 2019-02-13 RX ORDER — DIPHENHYDRAMINE HYDROCHLORIDE 50 MG/ML
50 INJECTION INTRAMUSCULAR; INTRAVENOUS
Status: CANCELLED
Start: 2019-02-13

## 2019-02-13 RX ORDER — POTASSIUM CHLORIDE 1500 MG/1
40 TABLET, EXTENDED RELEASE ORAL ONCE
Qty: 3 TABLET | Refills: 0 | Status: ON HOLD | OUTPATIENT
Start: 2019-02-13 | End: 2019-05-15

## 2019-02-13 RX ORDER — METHYLPREDNISOLONE SODIUM SUCCINATE 125 MG/2ML
125 INJECTION, POWDER, LYOPHILIZED, FOR SOLUTION INTRAMUSCULAR; INTRAVENOUS
Status: CANCELLED
Start: 2019-02-13

## 2019-02-13 RX ORDER — DIPHENHYDRAMINE HCL 25 MG
50 CAPSULE ORAL ONCE
Status: COMPLETED | OUTPATIENT
Start: 2019-02-13 | End: 2019-02-13

## 2019-02-13 RX ORDER — ALBUTEROL SULFATE 0.83 MG/ML
2.5 SOLUTION RESPIRATORY (INHALATION)
Status: CANCELLED | OUTPATIENT
Start: 2019-02-13

## 2019-02-13 RX ADMIN — DEXAMETHASONE SODIUM PHOSPHATE: 10 INJECTION, SOLUTION INTRAMUSCULAR; INTRAVENOUS at 10:38

## 2019-02-13 RX ADMIN — FAMOTIDINE 20 MG: 20 INJECTION, SOLUTION INTRAVENOUS at 10:59

## 2019-02-13 RX ADMIN — DIPHENHYDRAMINE HYDROCHLORIDE 50 MG: 25 CAPSULE ORAL at 10:27

## 2019-02-13 RX ADMIN — POTASSIUM CHLORIDE 20 MEQ: 2 INJECTION, SOLUTION, CONCENTRATE INTRAVENOUS at 14:29

## 2019-02-13 RX ADMIN — PACLITAXEL 139 MG: 6 INJECTION, SOLUTION INTRAVENOUS at 11:26

## 2019-02-13 RX ADMIN — CARBOPLATIN 300 MG: 10 INJECTION, SOLUTION INTRAVENOUS at 12:32

## 2019-02-13 RX ADMIN — SODIUM CHLORIDE 250 ML: 9 INJECTION, SOLUTION INTRAVENOUS at 10:38

## 2019-02-13 RX ADMIN — HEPARIN SODIUM (PORCINE) LOCK FLUSH IV SOLN 100 UNIT/ML 5 ML: 100 SOLUTION at 13:07

## 2019-02-13 ASSESSMENT — PAIN SCALES - GENERAL: PAINLEVEL: MILD PAIN (2)

## 2019-02-13 ASSESSMENT — MIFFLIN-ST. JEOR: SCORE: 1511.97

## 2019-02-13 NOTE — PROGRESS NOTES
Luci in infusion today. She is currently experiencing Neuropathy in her fingertips/toes. Consulted with Dr. Murillo who recommended cryotherapy to her fingertips and toes while receiving Taxol. Patient was also prescribed Neurontin 100 mg TID for Neuropathy. Patient also requested a letter for social security and her work. Marialuisa Casper RN,BSN,OCN

## 2019-02-13 NOTE — LETTER
February 13, 2019      TO: Whom it may concern:          My patient Luci is currently going through weekly chemotherapy treatments through the end of March 2019. Luci will then have Breast Surgery in which she will need 6 weeks for recovery. At this time she will be unable to return back to work until 6/1/19. If you have any questions please contact me at [phone], option 4.           Sincerely,        Dr. Addie Murillo MD

## 2019-02-13 NOTE — PROGRESS NOTES
"Social Work Progress Note      Data/Intervention:  Patient Name:  Luci Londono  /Age:  1970 (48 year old)    Reason for Follow-Up: Luci is a 48-year-old woman with a diagnosis of breast cancer who comes to clinic for C11D1 Taxol/Carboplatin. This clinician met with pt for routine psychosocial check-in and emotional support.     Intervention:   Luci reports that she has been coping well with treatment thus far and is relieved that she did not experience the leg pain over the past 2 weeks with new medication from Dr. Murillo. Luci reports that she has been experiencing increased neuropathy over the past 2 days that \"comes and goes.\" Luci reports that it does not bother her, and that she is avoiding getting food out of the refrigerator, and that her children help by massaging her hands and feet. Discussed Cancer Rehab services and assistance that PT has surrounding nerve changes, as well as benefits of acupuncture in assistance with this. Provided general information to Luci regarding [facility] program.     Luci reported that she understands that next step after chemotherapy will be surgery, and that she understands that procedure will be breast sparing, which she is relieved by. Luci reports that she has no concerns at present in anticipation for surgical intervention. Normalized that range of emotional experience in anticipation of surgery is normal, and that SW support remains available as Luci gets closer to surgery date and gathers more information.     Resources Provided:  Cancer Rehab Program  [facility]    Plan:  Previously provided patient/family with writer's contact information and availability. Will continue to follow for ongoing psychosocial support as pt continues to adjust to treatment and realize its impacts. Will continue to collaborate with pt's ongoing care team.     Please call or page if needs or concerns arise.     MIRA Julien, " PAU  Direct Phone: [phone]  Pager: [phone]

## 2019-02-13 NOTE — PROGRESS NOTES
Infusion Nursing Note:  Luci Londono presents today for C11D1 Taxol/Carboplatin.    Patient seen by provider today: No   present during visit today: Not Applicable.    Note: K+ 2.8 today, pt to receive potassium replacement per protocol, IV and PO doses ordered.  Pt stated today that she has now noticed numbness and tingling in her hands and feet and has significant discomfort with this. Dr Murillo notified and ordered for pt to start Gabapentin PO.     Intravenous Access:  Implanted Port.    Treatment Conditions:  Lab Results   Component Value Date    HGB 10.0 02/13/2019     Lab Results   Component Value Date    WBC 3.5 02/13/2019      Lab Results   Component Value Date    ANEU 2.0 02/13/2019     Lab Results   Component Value Date     02/13/2019      Lab Results   Component Value Date     02/13/2019                   Lab Results   Component Value Date    POTASSIUM 2.8 02/13/2019           Lab Results   Component Value Date    MAG 1.8 10/12/2018            Lab Results   Component Value Date    CR 0.51 02/13/2019                   Lab Results   Component Value Date    MATHIEU 6.2 02/13/2019                Lab Results   Component Value Date    BILITOTAL 0.3 02/13/2019           Lab Results   Component Value Date    ALBUMIN 2.1 02/13/2019                    Lab Results   Component Value Date    ALT 44 02/13/2019           Lab Results   Component Value Date    AST 24 02/13/2019       Results reviewed, labs MET treatment parameters, ok to proceed with treatment--chemo, and pt will receive potassium replacement.       Post Infusion Assessment:  Patient tolerated infusion without incident.  Blood return noted pre and post infusion.  Site patent and intact, free from redness, edema or discomfort.  No evidence of extravasations.  Access discontinued per protocol.    Discharge Plan:   Prescription refills given for Decadron and Ativan. New medication-Gabapentin, and Potassium -new med teaching by  pharmacy  Discharge instructions reviewed with: Patient.  Patient and/or family verbalized understanding of discharge instructions and all questions answered.  Copy of AVS reviewed with patient and/or family.  Patient will return as scheduled   for next appointment.  Patient discharged in stable condition accompanied by: self.  Departure Mode: Ambulatory.    Amie Manuel RN

## 2019-02-14 DIAGNOSIS — E87.6 HYPOKALEMIA: Primary | ICD-10-CM

## 2019-02-18 ENCOUNTER — TELEPHONE (OUTPATIENT)
Dept: ONCOLOGY | Facility: CLINIC | Age: 49
End: 2019-02-18

## 2019-02-18 NOTE — TELEPHONE ENCOUNTER
Luci called clinic stating that she is experiencing numbness/tingling in her finger and toes. She did do cyrotherapy with her last infusion. Recommended vitamin B6 100 mg BiD, and Glutamine powder 30 grams in divided does daily. Message will be routed to Dr. Murillo for recommendations. Marialuisa Casper RN,BSN,OCN

## 2019-02-19 RX ORDER — SODIUM CHLORIDE 9 MG/ML
1000 INJECTION, SOLUTION INTRAVENOUS CONTINUOUS PRN
Status: CANCELLED
Start: 2019-02-20

## 2019-02-19 RX ORDER — ALBUTEROL SULFATE 0.83 MG/ML
2.5 SOLUTION RESPIRATORY (INHALATION)
Status: CANCELLED | OUTPATIENT
Start: 2019-02-20

## 2019-02-19 RX ORDER — METHYLPREDNISOLONE SODIUM SUCCINATE 125 MG/2ML
125 INJECTION, POWDER, LYOPHILIZED, FOR SOLUTION INTRAMUSCULAR; INTRAVENOUS
Status: CANCELLED
Start: 2019-02-20

## 2019-02-19 RX ORDER — EPINEPHRINE 1 MG/ML
0.3 INJECTION, SOLUTION INTRAMUSCULAR; SUBCUTANEOUS EVERY 5 MIN PRN
Status: CANCELLED | OUTPATIENT
Start: 2019-02-20

## 2019-02-19 RX ORDER — DIPHENHYDRAMINE HYDROCHLORIDE 50 MG/ML
50 INJECTION INTRAMUSCULAR; INTRAVENOUS
Status: CANCELLED
Start: 2019-02-20

## 2019-02-19 RX ORDER — MEPERIDINE HYDROCHLORIDE 25 MG/ML
25 INJECTION INTRAMUSCULAR; INTRAVENOUS; SUBCUTANEOUS EVERY 30 MIN PRN
Status: CANCELLED | OUTPATIENT
Start: 2019-02-20

## 2019-02-19 RX ORDER — LORAZEPAM 2 MG/ML
0.5 INJECTION INTRAMUSCULAR EVERY 4 HOURS PRN
Status: CANCELLED
Start: 2019-02-20

## 2019-02-19 RX ORDER — EPINEPHRINE 0.3 MG/.3ML
0.3 INJECTION SUBCUTANEOUS EVERY 5 MIN PRN
Status: CANCELLED | OUTPATIENT
Start: 2019-02-20

## 2019-02-19 RX ORDER — DIPHENHYDRAMINE HCL 25 MG
50 CAPSULE ORAL ONCE
Status: CANCELLED
Start: 2019-02-20

## 2019-02-19 RX ORDER — ALBUTEROL SULFATE 90 UG/1
1-2 AEROSOL, METERED RESPIRATORY (INHALATION)
Status: CANCELLED
Start: 2019-02-20

## 2019-02-20 ENCOUNTER — INFUSION THERAPY VISIT (OUTPATIENT)
Dept: INFUSION THERAPY | Facility: CLINIC | Age: 49
End: 2019-02-20
Attending: INTERNAL MEDICINE
Payer: COMMERCIAL

## 2019-02-20 ENCOUNTER — TELEPHONE (OUTPATIENT)
Dept: ONCOLOGY | Facility: CLINIC | Age: 49
End: 2019-02-20

## 2019-02-20 ENCOUNTER — HOSPITAL ENCOUNTER (OUTPATIENT)
Facility: CLINIC | Age: 49
Setting detail: SPECIMEN
Discharge: HOME | End: 2019-02-20
Attending: INTERNAL MEDICINE | Admitting: NURSE PRACTITIONER
Payer: COMMERCIAL

## 2019-02-20 VITALS
HEIGHT: 61 IN | WEIGHT: 210 LBS | SYSTOLIC BLOOD PRESSURE: 146 MMHG | BODY MASS INDEX: 39.65 KG/M2 | DIASTOLIC BLOOD PRESSURE: 89 MMHG | TEMPERATURE: 98.8 F | RESPIRATION RATE: 18 BRPM | HEART RATE: 108 BPM | OXYGEN SATURATION: 100 %

## 2019-02-20 DIAGNOSIS — C50.411 MALIGNANT NEOPLASM OF UPPER-OUTER QUADRANT OF RIGHT BREAST IN FEMALE, ESTROGEN RECEPTOR NEGATIVE (H): Primary | ICD-10-CM

## 2019-02-20 DIAGNOSIS — Z17.1 MALIGNANT NEOPLASM OF UPPER-OUTER QUADRANT OF RIGHT BREAST IN FEMALE, ESTROGEN RECEPTOR NEGATIVE (H): Primary | ICD-10-CM

## 2019-02-20 DIAGNOSIS — Z95.828 PORT-A-CATH IN PLACE: ICD-10-CM

## 2019-02-20 LAB
ALBUMIN SERPL-MCNC: 3 G/DL (ref 3.4–5)
ALP SERPL-CCNC: 78 U/L (ref 40–150)
ALT SERPL W P-5'-P-CCNC: 58 U/L (ref 0–50)
ANION GAP SERPL CALCULATED.3IONS-SCNC: 12 MMOL/L (ref 3–14)
AST SERPL W P-5'-P-CCNC: 27 U/L (ref 0–45)
BASOPHILS # BLD AUTO: 0 10E9/L (ref 0–0.2)
BASOPHILS NFR BLD AUTO: 0.3 %
BILIRUB SERPL-MCNC: 0.2 MG/DL (ref 0.2–1.3)
BUN SERPL-MCNC: 6 MG/DL (ref 7–30)
CALCIUM SERPL-MCNC: 8.3 MG/DL (ref 8.5–10.1)
CHLORIDE SERPL-SCNC: 104 MMOL/L (ref 94–109)
CO2 SERPL-SCNC: 23 MMOL/L (ref 20–32)
CREAT SERPL-MCNC: 0.56 MG/DL (ref 0.52–1.04)
DIFFERENTIAL METHOD BLD: ABNORMAL
EOSINOPHIL # BLD AUTO: 0 10E9/L (ref 0–0.7)
EOSINOPHIL NFR BLD AUTO: 0.3 %
ERYTHROCYTE [DISTWIDTH] IN BLOOD BY AUTOMATED COUNT: 19.1 % (ref 10–15)
GFR SERPL CREATININE-BSD FRML MDRD: >90 ML/MIN/{1.73_M2}
GLUCOSE SERPL-MCNC: 118 MG/DL (ref 70–99)
HCT VFR BLD AUTO: 29 % (ref 35–47)
HGB BLD-MCNC: 9.8 G/DL (ref 11.7–15.7)
IMM GRANULOCYTES # BLD: 0 10E9/L (ref 0–0.4)
IMM GRANULOCYTES NFR BLD: 0.5 %
LYMPHOCYTES # BLD AUTO: 1.2 10E9/L (ref 0.8–5.3)
LYMPHOCYTES NFR BLD AUTO: 31.5 %
MCH RBC QN AUTO: 30.9 PG (ref 26.5–33)
MCHC RBC AUTO-ENTMCNC: 33.8 G/DL (ref 31.5–36.5)
MCV RBC AUTO: 92 FL (ref 78–100)
MONOCYTES # BLD AUTO: 0.2 10E9/L (ref 0–1.3)
MONOCYTES NFR BLD AUTO: 4.1 %
NEUTROPHILS # BLD AUTO: 2.5 10E9/L (ref 1.6–8.3)
NEUTROPHILS NFR BLD AUTO: 63.3 %
NRBC # BLD AUTO: 0 10*3/UL
NRBC BLD AUTO-RTO: 1 /100
PLATELET # BLD AUTO: 153 10E9/L (ref 150–450)
POTASSIUM SERPL-SCNC: 3.8 MMOL/L (ref 3.4–5.3)
PROT SERPL-MCNC: 6.4 G/DL (ref 6.8–8.8)
RBC # BLD AUTO: 3.17 10E12/L (ref 3.8–5.2)
SODIUM SERPL-SCNC: 139 MMOL/L (ref 133–144)
WBC # BLD AUTO: 3.9 10E9/L (ref 4–11)

## 2019-02-20 PROCEDURE — 25800030 ZZH RX IP 258 OP 636: Performed by: NURSE PRACTITIONER

## 2019-02-20 PROCEDURE — 25000132 ZZH RX MED GY IP 250 OP 250 PS 637: Performed by: NURSE PRACTITIONER

## 2019-02-20 PROCEDURE — 96417 CHEMO IV INFUS EACH ADDL SEQ: CPT

## 2019-02-20 PROCEDURE — 85025 COMPLETE CBC W/AUTO DIFF WBC: CPT | Performed by: NURSE PRACTITIONER

## 2019-02-20 PROCEDURE — 80053 COMPREHEN METABOLIC PANEL: CPT | Performed by: NURSE PRACTITIONER

## 2019-02-20 PROCEDURE — 96367 TX/PROPH/DG ADDL SEQ IV INF: CPT

## 2019-02-20 PROCEDURE — 25000128 H RX IP 250 OP 636: Performed by: INTERNAL MEDICINE

## 2019-02-20 PROCEDURE — 25000128 H RX IP 250 OP 636: Performed by: NURSE PRACTITIONER

## 2019-02-20 PROCEDURE — 96413 CHEMO IV INFUSION 1 HR: CPT

## 2019-02-20 RX ORDER — DIPHENHYDRAMINE HCL 25 MG
50 CAPSULE ORAL ONCE
Status: COMPLETED | OUTPATIENT
Start: 2019-02-20 | End: 2019-02-20

## 2019-02-20 RX ORDER — HEPARIN SODIUM (PORCINE) LOCK FLUSH IV SOLN 100 UNIT/ML 100 UNIT/ML
5 SOLUTION INTRAVENOUS ONCE
Status: CANCELLED
Start: 2019-02-20 | End: 2019-02-20

## 2019-02-20 RX ORDER — HEPARIN SODIUM (PORCINE) LOCK FLUSH IV SOLN 100 UNIT/ML 100 UNIT/ML
5 SOLUTION INTRAVENOUS ONCE
Status: COMPLETED | OUTPATIENT
Start: 2019-02-20 | End: 2019-02-20

## 2019-02-20 RX ADMIN — FAMOTIDINE 20 MG: 20 INJECTION, SOLUTION INTRAVENOUS at 09:53

## 2019-02-20 RX ADMIN — DEXAMETHASONE SODIUM PHOSPHATE: 10 INJECTION, SOLUTION INTRAMUSCULAR; INTRAVENOUS at 09:36

## 2019-02-20 RX ADMIN — CARBOPLATIN 300 MG: 10 INJECTION, SOLUTION INTRAVENOUS at 11:16

## 2019-02-20 RX ADMIN — SODIUM CHLORIDE 250 ML: 9 INJECTION, SOLUTION INTRAVENOUS at 09:29

## 2019-02-20 RX ADMIN — HEPARIN SODIUM (PORCINE) LOCK FLUSH IV SOLN 100 UNIT/ML 5 ML: 100 SOLUTION at 11:48

## 2019-02-20 RX ADMIN — DIPHENHYDRAMINE HYDROCHLORIDE 50 MG: 25 CAPSULE ORAL at 09:29

## 2019-02-20 RX ADMIN — PACLITAXEL 139 MG: 6 INJECTION, SOLUTION INTRAVENOUS at 10:12

## 2019-02-20 ASSESSMENT — MIFFLIN-ST. JEOR: SCORE: 1524.67

## 2019-02-20 NOTE — TELEPHONE ENCOUNTER
Called Luci with her normal potassium result. Luci stated that she is having pain on the right side of her stomach. She stated that she has had regular bowel movements. She will call clinic if right stomach pain persists. Marialuisa Casper RN,BSN,OCN

## 2019-02-20 NOTE — PROGRESS NOTES
Infusion Nursing Note:  Luci Londono presents today for C12D1 Carbo/Taxol.    Patient seen by provider today: No   present during visit today: Not Applicable.    Note: Patient complaining of some epigastric pain that radiates to her right flank.  She denies fever, chills, problems urinating, diarrhea, constipation, nausea, vomiting or acid reflux/heartburn.  She denies starting any new medications (she hasn't started the B6 and glutamine powder yet that was recommended by Marialuisa for her neuropathy).  Patient will see Dr. Murillo next week.  Patient instructed to monitor pain and call if pain worsens or new symptoms arise prior to her appt next week.  Pt verbalizes understanding and agrees with plan.     Ice to hands and feet during Taxol infusion.      Intravenous Access:  Labs drawn without difficulty.  Implanted Port.    Treatment Conditions:  Lab Results   Component Value Date    HGB 9.8 02/20/2019     Lab Results   Component Value Date    WBC 3.9 02/20/2019      Lab Results   Component Value Date    ANEU 2.5 02/20/2019     Lab Results   Component Value Date     02/20/2019      Lab Results   Component Value Date     02/20/2019                   Lab Results   Component Value Date    POTASSIUM 3.8 02/20/2019           Lab Results   Component Value Date    MAG 1.8 10/12/2018            Lab Results   Component Value Date    CR 0.56 02/20/2019                   Lab Results   Component Value Date    MATHIEU 8.3 02/20/2019                Lab Results   Component Value Date    BILITOTAL 0.2 02/20/2019           Lab Results   Component Value Date    ALBUMIN 3.0 02/20/2019                    Lab Results   Component Value Date    ALT 58 02/20/2019           Lab Results   Component Value Date    AST 27 02/20/2019       Results reviewed, labs MET treatment parameters, ok to proceed with treatment.      Post Infusion Assessment:  Patient tolerated infusion without incident.  Blood return noted pre and  post infusion.  Site patent and intact, free from redness, edema or discomfort.  No evidence of extravasations.  Access discontinued per protocol.    Discharge Plan:   Patient declined prescription refills.  Patient states she has enough decadron at home.  Discharge instructions reviewed with: Patient.  Patient and/or family verbalized understanding of discharge instructions and all questions answered.  Copy of AVS reviewed with patient and/or family.  Patient will return 2/27/19 for next appointment.  Patient discharged in stable condition accompanied by: self.  Departure Mode: Ambulatory.    Lloyd Staples RN

## 2019-02-27 ENCOUNTER — HOSPITAL ENCOUNTER (OUTPATIENT)
Facility: CLINIC | Age: 49
Setting detail: SPECIMEN
Discharge: HOME | End: 2019-02-27
Attending: INTERNAL MEDICINE | Admitting: INTERNAL MEDICINE
Payer: COMMERCIAL

## 2019-02-27 ENCOUNTER — INFUSION THERAPY VISIT (OUTPATIENT)
Dept: INFUSION THERAPY | Facility: CLINIC | Age: 49
End: 2019-02-27
Attending: INTERNAL MEDICINE
Payer: COMMERCIAL

## 2019-02-27 VITALS
RESPIRATION RATE: 18 BRPM | WEIGHT: 209.8 LBS | DIASTOLIC BLOOD PRESSURE: 86 MMHG | SYSTOLIC BLOOD PRESSURE: 126 MMHG | TEMPERATURE: 97.7 F | HEIGHT: 61 IN | HEART RATE: 121 BPM | BODY MASS INDEX: 39.61 KG/M2

## 2019-02-27 DIAGNOSIS — Z17.1 MALIGNANT NEOPLASM OF UPPER-OUTER QUADRANT OF RIGHT BREAST IN FEMALE, ESTROGEN RECEPTOR NEGATIVE (H): Primary | ICD-10-CM

## 2019-02-27 DIAGNOSIS — C50.411 MALIGNANT NEOPLASM OF UPPER-OUTER QUADRANT OF RIGHT BREAST IN FEMALE, ESTROGEN RECEPTOR NEGATIVE (H): Primary | ICD-10-CM

## 2019-02-27 DIAGNOSIS — Z95.828 PORT-A-CATH IN PLACE: ICD-10-CM

## 2019-02-27 LAB
ALBUMIN SERPL-MCNC: 3.2 G/DL (ref 3.4–5)
ALP SERPL-CCNC: 74 U/L (ref 40–150)
ALT SERPL W P-5'-P-CCNC: 68 U/L (ref 0–50)
ANION GAP SERPL CALCULATED.3IONS-SCNC: 6 MMOL/L (ref 3–14)
AST SERPL W P-5'-P-CCNC: 24 U/L (ref 0–45)
BASOPHILS # BLD AUTO: 0 10E9/L (ref 0–0.2)
BASOPHILS NFR BLD AUTO: 0 %
BILIRUB DIRECT SERPL-MCNC: <0.1 MG/DL (ref 0–0.2)
BILIRUB SERPL-MCNC: 0.2 MG/DL (ref 0.2–1.3)
BUN SERPL-MCNC: 5 MG/DL (ref 7–30)
CALCIUM SERPL-MCNC: 8.7 MG/DL (ref 8.5–10.1)
CHLORIDE SERPL-SCNC: 107 MMOL/L (ref 94–109)
CO2 SERPL-SCNC: 26 MMOL/L (ref 20–32)
CREAT SERPL-MCNC: 0.64 MG/DL (ref 0.52–1.04)
DIFFERENTIAL METHOD BLD: ABNORMAL
EOSINOPHIL # BLD AUTO: 0 10E9/L (ref 0–0.7)
EOSINOPHIL NFR BLD AUTO: 0.4 %
ERYTHROCYTE [DISTWIDTH] IN BLOOD BY AUTOMATED COUNT: 18.3 % (ref 10–15)
GFR SERPL CREATININE-BSD FRML MDRD: >90 ML/MIN/{1.73_M2}
GLUCOSE SERPL-MCNC: 113 MG/DL (ref 70–99)
HCT VFR BLD AUTO: 28.1 % (ref 35–47)
HGB BLD-MCNC: 9.5 G/DL (ref 11.7–15.7)
IMM GRANULOCYTES # BLD: 0 10E9/L (ref 0–0.4)
IMM GRANULOCYTES NFR BLD: 0.4 %
LYMPHOCYTES # BLD AUTO: 1.2 10E9/L (ref 0.8–5.3)
LYMPHOCYTES NFR BLD AUTO: 45.5 %
MCH RBC QN AUTO: 30.8 PG (ref 26.5–33)
MCHC RBC AUTO-ENTMCNC: 33.8 G/DL (ref 31.5–36.5)
MCV RBC AUTO: 91 FL (ref 78–100)
MONOCYTES # BLD AUTO: 0.1 10E9/L (ref 0–1.3)
MONOCYTES NFR BLD AUTO: 3.7 %
NEUTROPHILS # BLD AUTO: 1.3 10E9/L (ref 1.6–8.3)
NEUTROPHILS NFR BLD AUTO: 50 %
NRBC # BLD AUTO: 0 10*3/UL
NRBC BLD AUTO-RTO: 1 /100
PLATELET # BLD AUTO: 128 10E9/L (ref 150–450)
POTASSIUM SERPL-SCNC: 3.7 MMOL/L (ref 3.4–5.3)
PROT SERPL-MCNC: 6.6 G/DL (ref 6.8–8.8)
RBC # BLD AUTO: 3.08 10E12/L (ref 3.8–5.2)
SODIUM SERPL-SCNC: 139 MMOL/L (ref 133–144)
WBC # BLD AUTO: 2.7 10E9/L (ref 4–11)

## 2019-02-27 PROCEDURE — 36593 DECLOT VASCULAR DEVICE: CPT

## 2019-02-27 PROCEDURE — 85025 COMPLETE CBC W/AUTO DIFF WBC: CPT | Performed by: INTERNAL MEDICINE

## 2019-02-27 PROCEDURE — 82248 BILIRUBIN DIRECT: CPT | Performed by: INTERNAL MEDICINE

## 2019-02-27 PROCEDURE — 36415 COLL VENOUS BLD VENIPUNCTURE: CPT

## 2019-02-27 PROCEDURE — 80053 COMPREHEN METABOLIC PANEL: CPT | Performed by: INTERNAL MEDICINE

## 2019-02-27 PROCEDURE — 25000128 H RX IP 250 OP 636: Performed by: INTERNAL MEDICINE

## 2019-02-27 RX ORDER — HEPARIN SODIUM (PORCINE) LOCK FLUSH IV SOLN 100 UNIT/ML 100 UNIT/ML
5 SOLUTION INTRAVENOUS ONCE
Status: CANCELLED
Start: 2019-02-27 | End: 2019-02-27

## 2019-02-27 RX ORDER — HEPARIN SODIUM (PORCINE) LOCK FLUSH IV SOLN 100 UNIT/ML 100 UNIT/ML
5 SOLUTION INTRAVENOUS ONCE
Status: COMPLETED | OUTPATIENT
Start: 2019-02-27 | End: 2019-02-27

## 2019-02-27 RX ADMIN — ALTEPLASE 2 MG: 2.2 INJECTION, POWDER, LYOPHILIZED, FOR SOLUTION INTRAVENOUS at 09:28

## 2019-02-27 RX ADMIN — HEPARIN SODIUM (PORCINE) LOCK FLUSH IV SOLN 100 UNIT/ML 5 ML: 100 SOLUTION at 10:08

## 2019-02-27 ASSESSMENT — MIFFLIN-ST. JEOR: SCORE: 1523.77

## 2019-02-27 NOTE — PROGRESS NOTES
Infusion Nursing Note:  Luci Londono presents today for C13D1 Carbo/Taxol.    Patient seen by provider today: No   present during visit today: Not Applicable.    Note: Patient complaining of increased neuropathy to bilateral hands and feet.  Dr. Humphrey notified and orders to increase Neurontin to 200mg TID given.  Patient aware and agrees with plan.  Dose changed in epic.    ANC 1.3 today.  Per Dr. Humphrey, patient can decide whether she wants to proceed with treatment today with 3 days of neupogen or hold for 1 week.  Patient decided to hold chemo today and will return 3/6 for labs/possible chemo/exam with Ge.  Chemo plan deferred to 3/6.  Neutropenic precautions reviewed with patient and instructed to call our office with fever 100.4 or greater.  Pt verbalizes understanding and agrees with plan.       Intravenous Access:  Labs drawn from right ac without difficulty.  Implanted Port--no blood return noted.  TPA instilled at 0930.  TPA removed at 1000 with excellent blood return noted.     Treatment Conditions:  Lab Results   Component Value Date    HGB 9.5 02/27/2019     Lab Results   Component Value Date    WBC 2.7 02/27/2019      Lab Results   Component Value Date    ANEU 1.3 02/27/2019     Lab Results   Component Value Date     02/27/2019      Lab Results   Component Value Date     02/27/2019                   Lab Results   Component Value Date    POTASSIUM 3.7 02/27/2019           Lab Results   Component Value Date    MAG 1.8 10/12/2018            Lab Results   Component Value Date    CR 0.64 02/27/2019                   Lab Results   Component Value Date    MATHIEU 8.7 02/27/2019                Lab Results   Component Value Date    BILITOTAL 0.2 02/27/2019           Lab Results   Component Value Date    ALBUMIN 3.2 02/27/2019                    Lab Results   Component Value Date    ALT 68 02/27/2019           Lab Results   Component Value Date    AST 24 02/27/2019       Results  reviewed, labs did NOT meet treatment parameters: see note above.      Post Infusion Assessment:  Blood return noted pre and post infusion.  Site patent and intact, free from redness, edema or discomfort.  No evidence of extravasations.  Access discontinued per protocol.    Discharge Plan:   Patient declined prescription refills.  Discharge instructions reviewed with: Patient.  Patient and/or family verbalized understanding of discharge instructions and all questions answered.  Copy of AVS reviewed with patient and/or family.  Patient will return 3/6/19 for next appointment.  Patient discharged in stable condition accompanied by: mother.  Departure Mode: Ambulatory.    Lloyd Staples RN

## 2019-03-06 ENCOUNTER — HOSPITAL ENCOUNTER (OUTPATIENT)
Facility: CLINIC | Age: 49
Setting detail: SPECIMEN
Discharge: HOME | End: 2019-03-06
Attending: INTERNAL MEDICINE | Admitting: INTERNAL MEDICINE
Payer: COMMERCIAL

## 2019-03-06 ENCOUNTER — ONCOLOGY VISIT (OUTPATIENT)
Dept: ONCOLOGY | Facility: CLINIC | Age: 49
End: 2019-03-06
Attending: INTERNAL MEDICINE
Payer: COMMERCIAL

## 2019-03-06 ENCOUNTER — INFUSION THERAPY VISIT (OUTPATIENT)
Dept: INFUSION THERAPY | Facility: CLINIC | Age: 49
End: 2019-03-06
Attending: INTERNAL MEDICINE
Payer: COMMERCIAL

## 2019-03-06 VITALS
DIASTOLIC BLOOD PRESSURE: 86 MMHG | BODY MASS INDEX: 40.06 KG/M2 | TEMPERATURE: 98.1 F | OXYGEN SATURATION: 100 % | HEART RATE: 104 BPM | SYSTOLIC BLOOD PRESSURE: 135 MMHG | HEIGHT: 61 IN | WEIGHT: 212.2 LBS | RESPIRATION RATE: 16 BRPM

## 2019-03-06 VITALS
DIASTOLIC BLOOD PRESSURE: 86 MMHG | OXYGEN SATURATION: 100 % | BODY MASS INDEX: 40.06 KG/M2 | HEART RATE: 104 BPM | WEIGHT: 212.2 LBS | SYSTOLIC BLOOD PRESSURE: 135 MMHG | HEIGHT: 61 IN | RESPIRATION RATE: 16 BRPM | TEMPERATURE: 98.1 F

## 2019-03-06 DIAGNOSIS — G62.9 NEUROPATHY: ICD-10-CM

## 2019-03-06 DIAGNOSIS — Z95.828 PORT-A-CATH IN PLACE: ICD-10-CM

## 2019-03-06 DIAGNOSIS — Z17.1 MALIGNANT NEOPLASM OF UPPER-OUTER QUADRANT OF RIGHT BREAST IN FEMALE, ESTROGEN RECEPTOR NEGATIVE (H): Primary | ICD-10-CM

## 2019-03-06 DIAGNOSIS — C50.411 MALIGNANT NEOPLASM OF UPPER-OUTER QUADRANT OF RIGHT BREAST IN FEMALE, ESTROGEN RECEPTOR NEGATIVE (H): Primary | ICD-10-CM

## 2019-03-06 DIAGNOSIS — T45.1X5A CHEMOTHERAPY-INDUCED NEUTROPENIA (H): ICD-10-CM

## 2019-03-06 DIAGNOSIS — M89.8X9 BONE PAIN: ICD-10-CM

## 2019-03-06 DIAGNOSIS — D70.1 CHEMOTHERAPY-INDUCED NEUTROPENIA (H): ICD-10-CM

## 2019-03-06 DIAGNOSIS — R79.89 ELEVATED LFTS: ICD-10-CM

## 2019-03-06 LAB
ALBUMIN SERPL-MCNC: 3.1 G/DL (ref 3.4–5)
ALP SERPL-CCNC: 86 U/L (ref 40–150)
ALT SERPL W P-5'-P-CCNC: 119 U/L (ref 0–50)
ANION GAP SERPL CALCULATED.3IONS-SCNC: 7 MMOL/L (ref 3–14)
AST SERPL W P-5'-P-CCNC: 56 U/L (ref 0–45)
BASOPHILS # BLD AUTO: 0 10E9/L (ref 0–0.2)
BASOPHILS NFR BLD AUTO: 0 %
BILIRUB DIRECT SERPL-MCNC: <0.1 MG/DL (ref 0–0.2)
BILIRUB SERPL-MCNC: 0.3 MG/DL (ref 0.2–1.3)
BUN SERPL-MCNC: 7 MG/DL (ref 7–30)
CALCIUM SERPL-MCNC: 8.4 MG/DL (ref 8.5–10.1)
CHLORIDE SERPL-SCNC: 109 MMOL/L (ref 94–109)
CO2 SERPL-SCNC: 27 MMOL/L (ref 20–32)
CREAT SERPL-MCNC: 0.63 MG/DL (ref 0.52–1.04)
DIFFERENTIAL METHOD BLD: ABNORMAL
EOSINOPHIL # BLD AUTO: 0 10E9/L (ref 0–0.7)
EOSINOPHIL NFR BLD AUTO: 0.5 %
ERYTHROCYTE [DISTWIDTH] IN BLOOD BY AUTOMATED COUNT: 18.8 % (ref 10–15)
GFR SERPL CREATININE-BSD FRML MDRD: >90 ML/MIN/{1.73_M2}
GLUCOSE SERPL-MCNC: 127 MG/DL (ref 70–99)
HCT VFR BLD AUTO: 28.5 % (ref 35–47)
HGB BLD-MCNC: 9.4 G/DL (ref 11.7–15.7)
IMM GRANULOCYTES # BLD: 0 10E9/L (ref 0–0.4)
IMM GRANULOCYTES NFR BLD: 0 %
LYMPHOCYTES # BLD AUTO: 1.3 10E9/L (ref 0.8–5.3)
LYMPHOCYTES NFR BLD AUTO: 61.6 %
MCH RBC QN AUTO: 31 PG (ref 26.5–33)
MCHC RBC AUTO-ENTMCNC: 33 G/DL (ref 31.5–36.5)
MCV RBC AUTO: 94 FL (ref 78–100)
MONOCYTES # BLD AUTO: 0.2 10E9/L (ref 0–1.3)
MONOCYTES NFR BLD AUTO: 9.4 %
NEUTROPHILS # BLD AUTO: 0.6 10E9/L (ref 1.6–8.3)
NEUTROPHILS NFR BLD AUTO: 28.5 %
NRBC # BLD AUTO: 0 10*3/UL
NRBC BLD AUTO-RTO: 0 /100
PLATELET # BLD AUTO: 242 10E9/L (ref 150–450)
POTASSIUM SERPL-SCNC: 3.7 MMOL/L (ref 3.4–5.3)
PROT SERPL-MCNC: 6.7 G/DL (ref 6.8–8.8)
RBC # BLD AUTO: 3.03 10E12/L (ref 3.8–5.2)
SODIUM SERPL-SCNC: 143 MMOL/L (ref 133–144)
WBC # BLD AUTO: 2 10E9/L (ref 4–11)

## 2019-03-06 PROCEDURE — 82248 BILIRUBIN DIRECT: CPT | Performed by: INTERNAL MEDICINE

## 2019-03-06 PROCEDURE — 99215 OFFICE O/P EST HI 40 MIN: CPT | Performed by: INTERNAL MEDICINE

## 2019-03-06 PROCEDURE — 25000128 H RX IP 250 OP 636: Performed by: INTERNAL MEDICINE

## 2019-03-06 PROCEDURE — 80053 COMPREHEN METABOLIC PANEL: CPT | Performed by: INTERNAL MEDICINE

## 2019-03-06 PROCEDURE — G0463 HOSPITAL OUTPT CLINIC VISIT: HCPCS

## 2019-03-06 PROCEDURE — 85025 COMPLETE CBC W/AUTO DIFF WBC: CPT | Performed by: INTERNAL MEDICINE

## 2019-03-06 RX ORDER — HEPARIN SODIUM (PORCINE) LOCK FLUSH IV SOLN 100 UNIT/ML 100 UNIT/ML
5 SOLUTION INTRAVENOUS ONCE
Status: COMPLETED | OUTPATIENT
Start: 2019-03-06 | End: 2019-03-06

## 2019-03-06 RX ORDER — HEPARIN SODIUM (PORCINE) LOCK FLUSH IV SOLN 100 UNIT/ML 100 UNIT/ML
5 SOLUTION INTRAVENOUS ONCE
Status: CANCELLED
Start: 2019-03-06 | End: 2019-03-06

## 2019-03-06 RX ADMIN — HEPARIN SODIUM (PORCINE) LOCK FLUSH IV SOLN 100 UNIT/ML 5 ML: 100 SOLUTION at 09:06

## 2019-03-06 ASSESSMENT — MIFFLIN-ST. JEOR
SCORE: 1532.78
SCORE: 1534.65

## 2019-03-06 ASSESSMENT — PAIN SCALES - GENERAL: PAINLEVEL: NO PAIN (0)

## 2019-03-06 NOTE — PROGRESS NOTES
ONCOLOGY FOLLOW [facility] VISIT    breast surgeon:  Dr. 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   Her mammogram revealed a small mass in the right upper outer breast,   US revealed an 8mm hypoechoic mass at 12:00, 6cm FN and axillary US revealed a concerning lymph node which was also biopsied.   Her pathology from both breast and LN revealed a grade 3, invasive ductal carcinoma, ER/WA/her 2-.   PET found no distal disease.   Breast MRI found entire span 3.8 cm.There are multiple enlarged right axillary lymph nodes.        She made informed decision to proceed with neoadjuvant DD AC  She had drastic clinical response by PE and MRI.   She continued on wkly taxol. Carbo is added in W2. Carbo is discontinue 3/6/2019 due to persistent neutropenia.     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     MEDICATIONS/ALLERY:  Reviewed in Epic system.      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 Deny ETOH/smoking       FAMILY HISTORY:    Negative for any type of cancers    REVIEW OF SYSTEMS:   She reports sever bone pain with wkly taxol. Did not respond to narcotic, but to dex. Taxol dose is also slightly reduced.   S/p Influenza A by swab 1/14/2019.   Reports tingling of finger tips.       PHYSICAL EXAMINATION:   VITAL SIGNS: Blood pressure 135/86, pulse 104, temperature 98.1  F (36.7  C),  "resp. rate 16, height 1.546 m (5' 0.87\"), weight 96.3 kg (212 lb 3.2 oz), SpO2 100 %, not currently breastfeeding.    ECOG 0    GENERAL APPEARANCE:  looks like her stated age, very pleasant, not in acute distress.   HEENT: The patient is normocephalic, atraumatic. Pupils are equally reactive to light.  Sclerae are anicteric.  Moist oral mucosa.  Negative pharynx.  No oral thrush. Stomatitis on left angular of mouth.    NECK:  Supple.  No jugular venous distention.  Thyroid is not palpable.   LYMPH NODES:  Superficial lymphadenopathy is not appreciable in the bilateral cervical, supraclavicular, axillary or inguinal areas.   CARDIOVASCULAR:  S1, S2 regular with no murmurs or gallops.  No carotid or abdominal bruits.   PULMONARY:  Lungs are clear to auscultation and percussion bilaterally.  There is no wheezing or rhonchi.   GASTROINTESTINAL:  Abdomen is soft, nontender.  No hepatosplenomegaly.  No signs of ascites.  No mass appreciable.   MUSCULOSKELETAL/EXTREMITIES:  No edema.  No cyanotic changes.  No signs of joint deformity.  No lymphedema.   NEUROLOGIC:  Cranial nerves II-XII are grossly intact.  Sensation intact.  Muscle strength and muscle tone symmetrical, 5/5 throughout.   BACK:  No spinal or paraspinal tenderness.  No CVA tenderness.   SKIN:  No petechiae.  No rash.  No signs of cellulitis.   BREASTS: Right has no longer palpable 1.5cm mass at 12:00 post C1. No other masses.  Left breast is symmetrical with no skin or nipple changes. No masses on the left. Tissue is very dense throughout.          CURRENT LAB DATA REVIEWED  ANC 0.6 today, mild elevated LFT.       CURRENT IMAGING REVIEWED  CXR 1/2019 - negative       OLD DATA REVIEWED TODAY WITH SUMMARY:     Breast MRI post AC 12/2018  1. Enhancing mass superiorly in the RIGHT breast is significantly decreased in size since 10/17/2018, measuring approximately 1.1 x 0.3 x 0.5 cm in today's study (series 5 image 52 and series 13 image 30), decreased from 1.1 x " 1.6 x 1.0 cm.  2. Enlarged RIGHT axillary lymph node is slightly decreased since that time as well, now measuring 0.9 x 1.2 x 1.0 cm (series 5 image 68 and series 13 image 19), decreased from 1.3 x 1.2 x 1.4 cm.     10/2018  Right breast 8mm hypoechoic mass at 12:00, 6cm FN and right axillary LN biopsy both found grade 3, invasive ductal carcinoma, ER/MS/her 2-.       Baseline pre tx breast MRI 10/2018 -  In the right breast at 12:00 7 cm from the nipple, there is irregular heterogeneously enhancing mass, corresponding with the known biopsy-proven malignancy, which spans approximately 2.2 cm in maximal diameter, best appreciated on sagittal view, with surrounding nonmasslike enhancement likely related to postbiopsy change or DCIS.  Taken with the primary tumor, the entire span extends up to 3.8 cm.   There are multiple enlarged right axillary lymph nodes.      PET 10/2018  1. No evidence of distant metastasis.  2. Hypermetabolic focus in the upper outer quadrant right breast representing primary tumor. Hypermetabolic right axillary lymph nodes, consistent with metastatic node.  3. Indeterminate sub-4 mm pulmonary nodules, likely fissural lymph node in the right lung.  4. Extensive FDG uptake by the brown fat in the neck and paraspinal region.    10/2018 dx MA -  revealed a small mass in the right upper outer breast, US revealed an 8mm hypoechoic mass at 12:00, 6cm FN and axillary US revealed a concerning lymph node                ASSESSMENT AND PLAN:    1.  dx cT1cN1 right breast high grade IDC, [place] at age 47 in 10/2018.     PET is negative for distal disease.      She made informed decision to proceed DD AC, C1D1 10/24/2018.     She had good MRI response post C4 AC.     She did ok with W1 taxol. Added wkly carbo today in W2. Carbo is discontinued 3/6/2019 due to persistent neutropenia.       She needs to be monitored closely during this intense tx.     2. Young age dx with [place] breast cancer, she will need genetic  counseling.   She is not able to see them till April.   We are adding carbo anyway.     3. Post taxol bone pain - 10% dose reduction on taxol.   She did not respond to claritin, and pain med.   Advised prophylactic 4 mg dex 2 doses. So far this worked ok.     4. New elevated LFT 3/6/2019. Will hold chemo today, no ETOH or tylenol.     5. Neuropathy developing in 3/2019. Advice cryo therapy, vit B6 oral.

## 2019-03-06 NOTE — PROGRESS NOTES
Infusion Nursing Note:  Luci Londono presents today for Taxol Carbo C13D1 .    Patient seen by provider today: Yes: Ge   present during visit today: Not Applicable.    Note: N/A.    Intravenous Access:  Implanted Port.    Treatment Conditions:  Lab Results   Component Value Date    HGB 9.4 03/06/2019     Lab Results   Component Value Date    WBC 2.0 03/06/2019      Lab Results   Component Value Date    ANEU 0.6 03/06/2019     Lab Results   Component Value Date     03/06/2019      Lab Results   Component Value Date     03/06/2019                   Lab Results   Component Value Date    POTASSIUM 3.7 03/06/2019           Lab Results   Component Value Date    MAG 1.8 10/12/2018            Lab Results   Component Value Date    CR 0.63 03/06/2019                   Lab Results   Component Value Date    MATHIEU 8.4 03/06/2019                Lab Results   Component Value Date    BILITOTAL 0.3 03/06/2019           Lab Results   Component Value Date    ALBUMIN 3.1 03/06/2019                    Lab Results   Component Value Date     03/06/2019           Lab Results   Component Value Date    AST 56 03/06/2019       Results reviewed, labs did NOT meet treatment parameters: .      Post Infusion Assessment:  Blood return noted  Site patent and intact, free from redness, edema or discomfort.  No evidence of extravasations.  Access discontinued per protocol.    Discharge Plan:   Discharge instructions reviewed with: Patient.  Patient and/or family verbalized understanding of discharge instructions and all questions answered.  Copy of AVS reviewed with patient and/or family.  Patient will return 3/13/19 for next appointment.  Patient discharged in stable condition accompanied by: self.  Departure Mode: Ambulatory.    Stacey Larios RN

## 2019-03-06 NOTE — LETTER
"    3/6/2019         RE: Luci Londono  [address]  [facility] [address]        Dear Colleague,    Thank you for referring your patient, Luci Londono, to the [facility]. Please see a copy of my visit note below.    Oncology Rooming Note    March 6, 2019 8:22 AM   Luci Londono is a 48 year old female who presents for:    Chief Complaint   Patient presents with     Oncology Clinic Visit     Malignant neoplasm of upper-outer quadrant of right breast in female, estrogen receptor negative (H)     Initial Vitals: /86   Pulse 104   Temp 98.1  F (36.7  C)   Resp 16   Ht 1.546 m (5' 0.87\")   Wt 96.3 kg (212 lb 3.2 oz)   SpO2 100%   BMI 40.27 kg/m    Estimated body mass index is 40.27 kg/m  as calculated from the following:    Height as of this encounter: 1.546 m (5' 0.87\").    Weight as of this encounter: 96.3 kg (212 lb 3.2 oz). Body surface area is 2.03 meters squared.  No Pain (0) Comment: Data Unavailable   No LMP recorded.  Allergies reviewed: Yes  Medications reviewed: Yes    Medications: MEDICATION REFILLS NEEDED TODAY. Provider was notified. [facility]  Pharmacy name entered into [facility]:    [facility] [address] [place], [place] - [address] [place] [address] AT [address] [facility] [address] [place], [place] - [address] [place] [address] AT [address]    Clinical concerns: no          Emerald Pascual MA                ONCOLOGY FOLLOW UIP VISIT    breast surgeon:  Dr. 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   Her mammogram revealed a small mass in the right upper outer breast,   US revealed an 8mm hypoechoic mass at 12:00, 6cm FN and axillary US revealed a concerning lymph node which was also biopsied.   Her " "pathology from both breast and LN revealed a grade 3, invasive ductal carcinoma, ER/ME/her 2-.   PET found no distal disease.   Breast MRI found entire span 3.8 cm.There are multiple enlarged right axillary lymph nodes.        She made informed decision to proceed with neoadjuvant DD AC  She had drastic clinical response by PE and MRI.   She continued on wkly taxol. Carbo is added in W2. Carbo is discontinue 3/6/2019 due to persistent neutropenia.     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     MEDICATIONS/ALLERY:  Reviewed in Epic system.      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 Deny ETOH/smoking       FAMILY HISTORY:    Negative for any type of cancers    REVIEW OF SYSTEMS:   She reports sever bone pain with wkly taxol. Did not respond to narcotic, but to dex. Taxol dose is also slightly reduced.   S/p Influenza A by swab 1/14/2019.   Reports tingling of finger tips.       PHYSICAL EXAMINATION:   VITAL SIGNS: Blood pressure 135/86, pulse 104, temperature 98.1  F (36.7  C), resp. rate 16, height 1.546 m (5' 0.87\"), weight 96.3 kg (212 lb 3.2 oz), SpO2 100 %, not currently breastfeeding.    ECOG 0    GENERAL APPEARANCE:  looks like her stated age, very pleasant, not in acute distress.   HEENT: The patient is normocephalic, atraumatic. Pupils are equally reactive to light.  Sclerae are anicteric.  Moist oral mucosa.  Negative pharynx.  No oral thrush. Stomatitis on left angular of mouth.    NECK:  Supple.  No jugular venous distention.  Thyroid is not palpable.   LYMPH NODES:  " Superficial lymphadenopathy is not appreciable in the bilateral cervical, supraclavicular, axillary or inguinal areas.   CARDIOVASCULAR:  S1, S2 regular with no murmurs or gallops.  No carotid or abdominal bruits.   PULMONARY:  Lungs are clear to auscultation and percussion bilaterally.  There is no wheezing or rhonchi.   GASTROINTESTINAL:  Abdomen is soft, nontender.  No hepatosplenomegaly.  No signs of ascites.  No mass appreciable.   MUSCULOSKELETAL/EXTREMITIES:  No edema.  No cyanotic changes.  No signs of joint deformity.  No lymphedema.   NEUROLOGIC:  Cranial nerves II-XII are grossly intact.  Sensation intact.  Muscle strength and muscle tone symmetrical, 5/5 throughout.   BACK:  No spinal or paraspinal tenderness.  No CVA tenderness.   SKIN:  No petechiae.  No rash.  No signs of cellulitis.   BREASTS: Right has no longer palpable 1.5cm mass at 12:00 post C1. No other masses.  Left breast is symmetrical with no skin or nipple changes. No masses on the left. Tissue is very dense throughout.          CURRENT LAB DATA REVIEWED  ANC 0.6 today, mild elevated LFT.       CURRENT IMAGING REVIEWED  CXR 1/2019 - negative       OLD DATA REVIEWED TODAY WITH SUMMARY:     Breast MRI post AC 12/2018  1. Enhancing mass superiorly in the RIGHT breast is significantly decreased in size since 10/17/2018, measuring approximately 1.1 x 0.3 x 0.5 cm in today's study (series 5 image 52 and series 13 image 30), decreased from 1.1 x 1.6 x 1.0 cm.  2. Enlarged RIGHT axillary lymph node is slightly decreased since that time as well, now measuring 0.9 x 1.2 x 1.0 cm (series 5 image 68 and series 13 image 19), decreased from 1.3 x 1.2 x 1.4 cm.     10/2018  Right breast 8mm hypoechoic mass at 12:00, 6cm FN and right axillary LN biopsy both found grade 3, invasive ductal carcinoma, ER/VT/her 2-.       Baseline pre tx breast MRI 10/2018 -  In the right breast at 12:00 7 cm from the nipple, there is irregular heterogeneously enhancing mass,  corresponding with the known biopsy-proven malignancy, which spans approximately 2.2 cm in maximal diameter, best appreciated on sagittal view, with surrounding nonmasslike enhancement likely related to postbiopsy change or DCIS.  Taken with the primary tumor, the entire span extends up to 3.8 cm.   There are multiple enlarged right axillary lymph nodes.      PET 10/2018  1. No evidence of distant metastasis.  2. Hypermetabolic focus in the upper outer quadrant right breast representing primary tumor. Hypermetabolic right axillary lymph nodes, consistent with metastatic node.  3. Indeterminate sub-4 mm pulmonary nodules, likely fissural lymph node in the right lung.  4. Extensive FDG uptake by the brown fat in the neck and paraspinal region.    10/2018 dx MA -  revealed a small mass in the right upper outer breast, US revealed an 8mm hypoechoic mass at 12:00, 6cm FN and axillary US revealed a concerning lymph node                ASSESSMENT AND PLAN:    1.  dx cT1cN1 right breast high grade IDC, TN at age 47 in 10/2018.     PET is negative for distal disease.      She made informed decision to proceed DD AC, C1D1 10/24/2018.     She had good MRI response post C4 AC.     She did ok with W1 taxol. Added wkly carbo today in W2. Carbo is discontinued 3/6/2019 due to persistent neutropenia.       She needs to be monitored closely during this intense tx.     2. Young age dx with TN breast cancer, she will need genetic counseling.   She is not able to see them till April.   We are adding carbo anyway.     3. Post taxol bone pain - 10% dose reduction on taxol.   She did not respond to claritin, and pain med.   Advised prophylactic 4 mg dex 2 doses. So far this worked ok.     4. New elevated LFT 3/6/2019. Will hold chemo today, no ETOH or tylenol.     5. Neuropathy developing in 3/2019. Advice cryo therapy, vit B6 oral.           Again, thank you for allowing me to participate in the care of your patient.         Sincerely,        Addie Murillo MD, MD

## 2019-03-06 NOTE — PROGRESS NOTES
"Oncology Rooming Note    March 6, 2019 8:22 AM   Luci Londono is a 48 year old female who presents for:    Chief Complaint   Patient presents with     Oncology Clinic Visit     Malignant neoplasm of upper-outer quadrant of right breast in female, estrogen receptor negative (H)     Initial Vitals: /86   Pulse 104   Temp 98.1  F (36.7  C)   Resp 16   Ht 1.546 m (5' 0.87\")   Wt 96.3 kg (212 lb 3.2 oz)   SpO2 100%   BMI 40.27 kg/m   Estimated body mass index is 40.27 kg/m  as calculated from the following:    Height as of this encounter: 1.546 m (5' 0.87\").    Weight as of this encounter: 96.3 kg (212 lb 3.2 oz). Body surface area is 2.03 meters squared.  No Pain (0) Comment: Data Unavailable   No LMP recorded.  Allergies reviewed: Yes  Medications reviewed: Yes    Medications: MEDICATION REFILLS NEEDED TODAY. Provider was notified. Refill [facility]  Pharmacy name entered into [facility]:    [facility] [id number] - [place], [place] - [address] AT [address] [id number] [address] [facility] [address] [place] [address] AT [address]    Clinical concerns: no          Emerald Pascual MA              "

## 2019-03-13 ENCOUNTER — ALLIED HEALTH/NURSE VISIT (OUTPATIENT)
Dept: ONCOLOGY | Facility: CLINIC | Age: 49
End: 2019-03-13

## 2019-03-13 ENCOUNTER — INFUSION THERAPY VISIT (OUTPATIENT)
Dept: INFUSION THERAPY | Facility: CLINIC | Age: 49
End: 2019-03-13
Attending: INTERNAL MEDICINE
Payer: COMMERCIAL

## 2019-03-13 ENCOUNTER — HOSPITAL ENCOUNTER (OUTPATIENT)
Facility: CLINIC | Age: 49
Setting detail: SPECIMEN
Discharge: HOME | End: 2019-03-13
Attending: INTERNAL MEDICINE | Admitting: INTERNAL MEDICINE
Payer: COMMERCIAL

## 2019-03-13 VITALS
OXYGEN SATURATION: 98 % | RESPIRATION RATE: 16 BRPM | HEIGHT: 61 IN | SYSTOLIC BLOOD PRESSURE: 132 MMHG | BODY MASS INDEX: 39.57 KG/M2 | WEIGHT: 209.6 LBS | DIASTOLIC BLOOD PRESSURE: 80 MMHG | TEMPERATURE: 98.2 F | HEART RATE: 107 BPM

## 2019-03-13 DIAGNOSIS — Z95.828 PORT-A-CATH IN PLACE: ICD-10-CM

## 2019-03-13 DIAGNOSIS — C50.411 MALIGNANT NEOPLASM OF UPPER-OUTER QUADRANT OF RIGHT BREAST IN FEMALE, ESTROGEN RECEPTOR NEGATIVE (H): Primary | ICD-10-CM

## 2019-03-13 DIAGNOSIS — Z71.9 COUNSELING NOS(V65.40): Primary | ICD-10-CM

## 2019-03-13 DIAGNOSIS — Z17.1 MALIGNANT NEOPLASM OF UPPER-OUTER QUADRANT OF RIGHT BREAST IN FEMALE, ESTROGEN RECEPTOR NEGATIVE (H): Primary | ICD-10-CM

## 2019-03-13 LAB
ALBUMIN SERPL-MCNC: 3.1 G/DL (ref 3.4–5)
ALP SERPL-CCNC: 86 U/L (ref 40–150)
ALT SERPL W P-5'-P-CCNC: 123 U/L (ref 0–50)
ANION GAP SERPL CALCULATED.3IONS-SCNC: 8 MMOL/L (ref 3–14)
AST SERPL W P-5'-P-CCNC: 61 U/L (ref 0–45)
BASOPHILS # BLD AUTO: 0 10E9/L (ref 0–0.2)
BASOPHILS NFR BLD AUTO: 0 %
BILIRUB DIRECT SERPL-MCNC: <0.1 MG/DL (ref 0–0.2)
BILIRUB SERPL-MCNC: 0.2 MG/DL (ref 0.2–1.3)
BUN SERPL-MCNC: 8 MG/DL (ref 7–30)
CALCIUM SERPL-MCNC: 8.6 MG/DL (ref 8.5–10.1)
CHLORIDE SERPL-SCNC: 107 MMOL/L (ref 94–109)
CO2 SERPL-SCNC: 26 MMOL/L (ref 20–32)
CREAT SERPL-MCNC: 0.66 MG/DL (ref 0.52–1.04)
DIFFERENTIAL METHOD BLD: ABNORMAL
EOSINOPHIL # BLD AUTO: 0 10E9/L (ref 0–0.7)
EOSINOPHIL NFR BLD AUTO: 0 %
ERYTHROCYTE [DISTWIDTH] IN BLOOD BY AUTOMATED COUNT: 17.9 % (ref 10–15)
GFR SERPL CREATININE-BSD FRML MDRD: >90 ML/MIN/{1.73_M2}
GLUCOSE SERPL-MCNC: 140 MG/DL (ref 70–99)
HCT VFR BLD AUTO: 30.3 % (ref 35–47)
HGB BLD-MCNC: 9.9 G/DL (ref 11.7–15.7)
IMM GRANULOCYTES # BLD: 0 10E9/L (ref 0–0.4)
IMM GRANULOCYTES NFR BLD: 0 %
LYMPHOCYTES # BLD AUTO: 1.6 10E9/L (ref 0.8–5.3)
LYMPHOCYTES NFR BLD AUTO: 46.8 %
MCH RBC QN AUTO: 30.6 PG (ref 26.5–33)
MCHC RBC AUTO-ENTMCNC: 32.7 G/DL (ref 31.5–36.5)
MCV RBC AUTO: 94 FL (ref 78–100)
MONOCYTES # BLD AUTO: 0.4 10E9/L (ref 0–1.3)
MONOCYTES NFR BLD AUTO: 10.3 %
NEUTROPHILS # BLD AUTO: 1.5 10E9/L (ref 1.6–8.3)
NEUTROPHILS NFR BLD AUTO: 42.9 %
NRBC # BLD AUTO: 0 10*3/UL
NRBC BLD AUTO-RTO: 0 /100
PLATELET # BLD AUTO: 271 10E9/L (ref 150–450)
POTASSIUM SERPL-SCNC: 3.6 MMOL/L (ref 3.4–5.3)
PROT SERPL-MCNC: 6.9 G/DL (ref 6.8–8.8)
RBC # BLD AUTO: 3.24 10E12/L (ref 3.8–5.2)
SODIUM SERPL-SCNC: 141 MMOL/L (ref 133–144)
WBC # BLD AUTO: 3.4 10E9/L (ref 4–11)

## 2019-03-13 PROCEDURE — 96375 TX/PRO/DX INJ NEW DRUG ADDON: CPT

## 2019-03-13 PROCEDURE — 25000125 ZZHC RX 250: Performed by: INTERNAL MEDICINE

## 2019-03-13 PROCEDURE — 25800030 ZZH RX IP 258 OP 636: Performed by: INTERNAL MEDICINE

## 2019-03-13 PROCEDURE — 96367 TX/PROPH/DG ADDL SEQ IV INF: CPT

## 2019-03-13 PROCEDURE — 80053 COMPREHEN METABOLIC PANEL: CPT | Performed by: INTERNAL MEDICINE

## 2019-03-13 PROCEDURE — 82248 BILIRUBIN DIRECT: CPT | Performed by: INTERNAL MEDICINE

## 2019-03-13 PROCEDURE — 25000128 H RX IP 250 OP 636: Performed by: INTERNAL MEDICINE

## 2019-03-13 PROCEDURE — 85025 COMPLETE CBC W/AUTO DIFF WBC: CPT | Performed by: INTERNAL MEDICINE

## 2019-03-13 PROCEDURE — 96413 CHEMO IV INFUSION 1 HR: CPT

## 2019-03-13 PROCEDURE — 25000132 ZZH RX MED GY IP 250 OP 250 PS 637: Performed by: INTERNAL MEDICINE

## 2019-03-13 RX ORDER — DIPHENHYDRAMINE HCL 25 MG
50 CAPSULE ORAL ONCE
Status: COMPLETED | OUTPATIENT
Start: 2019-03-13 | End: 2019-03-13

## 2019-03-13 RX ORDER — GABAPENTIN 100 MG/1
200 CAPSULE ORAL 3 TIMES DAILY
Qty: 180 CAPSULE | Refills: 1 | Status: SHIPPED | OUTPATIENT
Start: 2019-03-13 | End: 2019-05-22

## 2019-03-13 RX ORDER — ALBUTEROL SULFATE 90 UG/1
1-2 AEROSOL, METERED RESPIRATORY (INHALATION)
Status: CANCELLED
Start: 2019-03-13

## 2019-03-13 RX ORDER — SODIUM CHLORIDE 9 MG/ML
1000 INJECTION, SOLUTION INTRAVENOUS CONTINUOUS PRN
Status: CANCELLED
Start: 2019-03-13

## 2019-03-13 RX ORDER — MEPERIDINE HYDROCHLORIDE 25 MG/ML
25 INJECTION INTRAMUSCULAR; INTRAVENOUS; SUBCUTANEOUS EVERY 30 MIN PRN
Status: CANCELLED | OUTPATIENT
Start: 2019-03-13

## 2019-03-13 RX ORDER — HEPARIN SODIUM (PORCINE) LOCK FLUSH IV SOLN 100 UNIT/ML 100 UNIT/ML
5 SOLUTION INTRAVENOUS ONCE
Status: COMPLETED | OUTPATIENT
Start: 2019-03-13 | End: 2019-03-13

## 2019-03-13 RX ORDER — HEPARIN SODIUM (PORCINE) LOCK FLUSH IV SOLN 100 UNIT/ML 100 UNIT/ML
5 SOLUTION INTRAVENOUS ONCE
Status: CANCELLED
Start: 2019-03-13 | End: 2019-03-13

## 2019-03-13 RX ORDER — ALBUTEROL SULFATE 0.83 MG/ML
2.5 SOLUTION RESPIRATORY (INHALATION)
Status: CANCELLED | OUTPATIENT
Start: 2019-03-13

## 2019-03-13 RX ORDER — METHYLPREDNISOLONE SODIUM SUCCINATE 125 MG/2ML
125 INJECTION, POWDER, LYOPHILIZED, FOR SOLUTION INTRAMUSCULAR; INTRAVENOUS
Status: CANCELLED
Start: 2019-03-13

## 2019-03-13 RX ORDER — EPINEPHRINE 1 MG/ML
0.3 INJECTION, SOLUTION INTRAMUSCULAR; SUBCUTANEOUS EVERY 5 MIN PRN
Status: CANCELLED | OUTPATIENT
Start: 2019-03-13

## 2019-03-13 RX ORDER — DIPHENHYDRAMINE HYDROCHLORIDE 50 MG/ML
50 INJECTION INTRAMUSCULAR; INTRAVENOUS
Status: CANCELLED
Start: 2019-03-13

## 2019-03-13 RX ORDER — EPINEPHRINE 0.3 MG/.3ML
0.3 INJECTION SUBCUTANEOUS EVERY 5 MIN PRN
Status: CANCELLED | OUTPATIENT
Start: 2019-03-13

## 2019-03-13 RX ORDER — LORAZEPAM 2 MG/ML
0.5 INJECTION INTRAMUSCULAR EVERY 4 HOURS PRN
Status: CANCELLED
Start: 2019-03-13

## 2019-03-13 RX ADMIN — HEPARIN SODIUM (PORCINE) LOCK FLUSH IV SOLN 100 UNIT/ML 5 ML: 100 SOLUTION at 11:43

## 2019-03-13 RX ADMIN — SODIUM CHLORIDE 250 ML: 9 INJECTION, SOLUTION INTRAVENOUS at 09:55

## 2019-03-13 RX ADMIN — PACLITAXEL 139 MG: 6 INJECTION, SOLUTION INTRAVENOUS at 10:30

## 2019-03-13 RX ADMIN — DEXAMETHASONE SODIUM PHOSPHATE: 10 INJECTION, SOLUTION INTRAMUSCULAR; INTRAVENOUS at 09:58

## 2019-03-13 RX ADMIN — DIPHENHYDRAMINE HYDROCHLORIDE 50 MG: 25 CAPSULE ORAL at 09:55

## 2019-03-13 RX ADMIN — FAMOTIDINE 20 MG: 10 INJECTION INTRAVENOUS at 10:12

## 2019-03-13 ASSESSMENT — PAIN SCALES - GENERAL: PAINLEVEL: NO PAIN (0)

## 2019-03-13 ASSESSMENT — MIFFLIN-ST. JEOR: SCORE: 1520.99

## 2019-03-13 NOTE — PROGRESS NOTES
Social Work Progress Note      Data/Intervention:  Patient Name:  Luci Londono  /Age:  1970 (48 year old)    Reason for Follow-Up: Luci is a 48-year-old woman with a diagnosis of breast cancer who comes to clinic for C13D1 Taxol/Carboplatin. This clinician met with pt for routine psychosocial check-in and emotional support.     Intervention:   Luci reports today that she has ongoing stress pertaining to finances. Luci reports that she is planning to meet with the [facility] to apply for additional support in the next few days, and that she is interested in pursing additional options for financial assistance for electricity costs as she has concerns about whether electricity will be turned off due to non-payment. This clinician provided education to Luci about the Energy Assistance program through [facility] that she needs to apply for through the [facility], once approved by the [facility] to mail an application packet ([phone]).     Luci continues to await determination from [employer] [facility]. This clinician emailed organization and received word back that there are a few documents that are outstanding in Luci's application. Luci updated with needed documentation.     Luci reports that she had applied for SSDI but receiving an initial denial due to lack of medical documentation. Luci plans to discuss this with [facility] today. This clinician provided Luci with [employer] contact information for additional legal guidance and support surrounding application    Plan:  Previously provided patient/family with writer's contact information and availability. Will continue to follow for ongoing psychosocial support as pt continues to adjust to treatment and realize its impacts. Will continue to collaborate with pt's ongoing care team.     Please call or page if needs or concerns arise.     MIRA Julien, [facility] HospitalSW  Direct Phone: [phone]  Pager: [phone]

## 2019-03-13 NOTE — PROGRESS NOTES
Infusion Nursing Note:  Luci Londono presents today for C13 D1 Taxol.    Patient seen by provider today: No   present during visit today: Not Applicable.    Note: Patient states she's feeling well overall, no new concerns today. Continues with neuropathy in bilateral fingertips, unchanged. Reviewed labs from today with Dr. Murillo, orders as follows:    Ok to proceed with Taxol today with ANC 1500 and elevated ALT/AST  Advise patient to avoid alcohol and tylenol    Reviewed above with patient, verbalized understanding.    Intravenous Access:  Labs drawn without difficulty.  Implanted Port.    Treatment Conditions:  Lab Results   Component Value Date    HGB 9.9 03/13/2019     Lab Results   Component Value Date    WBC 3.4 03/13/2019      Lab Results   Component Value Date    ANEU 1.5 03/13/2019     Lab Results   Component Value Date     03/13/2019      Lab Results   Component Value Date     03/13/2019                   Lab Results   Component Value Date    POTASSIUM 3.6 03/13/2019           Lab Results   Component Value Date    CR 0.66 03/13/2019                   Lab Results   Component Value Date    MATHIEU 8.6 03/13/2019                Lab Results   Component Value Date    BILITOTAL 0.2 03/13/2019           Lab Results   Component Value Date    ALBUMIN 3.1 03/13/2019                    Lab Results   Component Value Date     03/13/2019           Lab Results   Component Value Date    AST 61 03/13/2019     Results reviewed, labs did NOT meet treatment parameters: see note above.      Post Infusion Assessment:  Patient tolerated infusion without incident.  Blood return noted pre and post infusion.  Site patent and intact, free from redness, edema or discomfort.  No evidence of extravasations.  Access discontinued per protocol.    Discharge Plan:   Prescription refills given for ativan and gabapentin.  Discharge instructions reviewed with: Patient.  Patient and/or family verbalized  understanding of discharge instructions and all questions answered.  Copy of AVS reviewed with patient and/or family.  Patient will return 3/20/19 for next appointment.  Patient discharged in stable condition accompanied by: self.  Departure Mode: Ambulatory.  Face to Face time: 10 minutes.    Becky Mcfadden RN

## 2019-03-20 ENCOUNTER — HOSPITAL ENCOUNTER (OUTPATIENT)
Facility: CLINIC | Age: 49
Setting detail: SPECIMEN
Discharge: HOME | End: 2019-03-20
Attending: INTERNAL MEDICINE | Admitting: INTERNAL MEDICINE
Payer: COMMERCIAL

## 2019-03-20 ENCOUNTER — ALLIED HEALTH/NURSE VISIT (OUTPATIENT)
Dept: ONCOLOGY | Facility: CLINIC | Age: 49
End: 2019-03-20

## 2019-03-20 ENCOUNTER — INFUSION THERAPY VISIT (OUTPATIENT)
Dept: INFUSION THERAPY | Facility: CLINIC | Age: 49
End: 2019-03-20
Attending: INTERNAL MEDICINE
Payer: COMMERCIAL

## 2019-03-20 VITALS
RESPIRATION RATE: 18 BRPM | HEART RATE: 96 BPM | OXYGEN SATURATION: 100 % | BODY MASS INDEX: 40.12 KG/M2 | SYSTOLIC BLOOD PRESSURE: 126 MMHG | DIASTOLIC BLOOD PRESSURE: 85 MMHG | TEMPERATURE: 98.1 F | WEIGHT: 211.4 LBS

## 2019-03-20 DIAGNOSIS — Z71.9 COUNSELING NOS(V65.40): Primary | ICD-10-CM

## 2019-03-20 DIAGNOSIS — Z17.1 MALIGNANT NEOPLASM OF UPPER-OUTER QUADRANT OF RIGHT BREAST IN FEMALE, ESTROGEN RECEPTOR NEGATIVE (H): Primary | ICD-10-CM

## 2019-03-20 DIAGNOSIS — C50.411 MALIGNANT NEOPLASM OF UPPER-OUTER QUADRANT OF RIGHT BREAST IN FEMALE, ESTROGEN RECEPTOR NEGATIVE (H): Primary | ICD-10-CM

## 2019-03-20 DIAGNOSIS — Z95.828 PORT-A-CATH IN PLACE: ICD-10-CM

## 2019-03-20 LAB
ALBUMIN SERPL-MCNC: 3.1 G/DL (ref 3.4–5)
ALP SERPL-CCNC: 85 U/L (ref 40–150)
ALT SERPL W P-5'-P-CCNC: 82 U/L (ref 0–50)
ANION GAP SERPL CALCULATED.3IONS-SCNC: 11 MMOL/L (ref 3–14)
AST SERPL W P-5'-P-CCNC: 38 U/L (ref 0–45)
BASOPHILS # BLD AUTO: 0 10E9/L (ref 0–0.2)
BASOPHILS NFR BLD AUTO: 0.2 %
BILIRUB SERPL-MCNC: 0.2 MG/DL (ref 0.2–1.3)
BUN SERPL-MCNC: 7 MG/DL (ref 7–30)
CALCIUM SERPL-MCNC: 8.8 MG/DL (ref 8.5–10.1)
CHLORIDE SERPL-SCNC: 104 MMOL/L (ref 94–109)
CO2 SERPL-SCNC: 26 MMOL/L (ref 20–32)
CREAT SERPL-MCNC: 0.64 MG/DL (ref 0.52–1.04)
DIFFERENTIAL METHOD BLD: ABNORMAL
EOSINOPHIL # BLD AUTO: 0 10E9/L (ref 0–0.7)
EOSINOPHIL NFR BLD AUTO: 0.2 %
ERYTHROCYTE [DISTWIDTH] IN BLOOD BY AUTOMATED COUNT: 17 % (ref 10–15)
GFR SERPL CREATININE-BSD FRML MDRD: >90 ML/MIN/{1.73_M2}
GLUCOSE SERPL-MCNC: 152 MG/DL (ref 70–99)
HCT VFR BLD AUTO: 30.7 % (ref 35–47)
HGB BLD-MCNC: 10 G/DL (ref 11.7–15.7)
IMM GRANULOCYTES # BLD: 0.1 10E9/L (ref 0–0.4)
IMM GRANULOCYTES NFR BLD: 1.1 %
LYMPHOCYTES # BLD AUTO: 1.5 10E9/L (ref 0.8–5.3)
LYMPHOCYTES NFR BLD AUTO: 26.1 %
MCH RBC QN AUTO: 29.9 PG (ref 26.5–33)
MCHC RBC AUTO-ENTMCNC: 32.6 G/DL (ref 31.5–36.5)
MCV RBC AUTO: 92 FL (ref 78–100)
MONOCYTES # BLD AUTO: 0.3 10E9/L (ref 0–1.3)
MONOCYTES NFR BLD AUTO: 5.9 %
NEUTROPHILS # BLD AUTO: 3.7 10E9/L (ref 1.6–8.3)
NEUTROPHILS NFR BLD AUTO: 66.5 %
NRBC # BLD AUTO: 0.1 10*3/UL
NRBC BLD AUTO-RTO: 1 /100
PLATELET # BLD AUTO: 312 10E9/L (ref 150–450)
POTASSIUM SERPL-SCNC: 3.6 MMOL/L (ref 3.4–5.3)
PROT SERPL-MCNC: 6.7 G/DL (ref 6.8–8.8)
RBC # BLD AUTO: 3.34 10E12/L (ref 3.8–5.2)
SODIUM SERPL-SCNC: 141 MMOL/L (ref 133–144)
WBC # BLD AUTO: 5.6 10E9/L (ref 4–11)

## 2019-03-20 PROCEDURE — 25000128 H RX IP 250 OP 636: Performed by: INTERNAL MEDICINE

## 2019-03-20 PROCEDURE — 25000132 ZZH RX MED GY IP 250 OP 250 PS 637: Performed by: INTERNAL MEDICINE

## 2019-03-20 PROCEDURE — 80053 COMPREHEN METABOLIC PANEL: CPT | Performed by: INTERNAL MEDICINE

## 2019-03-20 PROCEDURE — 96413 CHEMO IV INFUSION 1 HR: CPT

## 2019-03-20 PROCEDURE — 96367 TX/PROPH/DG ADDL SEQ IV INF: CPT

## 2019-03-20 PROCEDURE — 85025 COMPLETE CBC W/AUTO DIFF WBC: CPT | Performed by: INTERNAL MEDICINE

## 2019-03-20 PROCEDURE — 25800030 ZZH RX IP 258 OP 636: Performed by: INTERNAL MEDICINE

## 2019-03-20 RX ORDER — SODIUM CHLORIDE 9 MG/ML
1000 INJECTION, SOLUTION INTRAVENOUS CONTINUOUS PRN
Status: CANCELLED
Start: 2019-03-20

## 2019-03-20 RX ORDER — HEPARIN SODIUM (PORCINE) LOCK FLUSH IV SOLN 100 UNIT/ML 100 UNIT/ML
5 SOLUTION INTRAVENOUS ONCE
Status: CANCELLED
Start: 2019-03-20 | End: 2019-03-20

## 2019-03-20 RX ORDER — DIPHENHYDRAMINE HCL 25 MG
50 CAPSULE ORAL ONCE
Status: CANCELLED
Start: 2019-03-20

## 2019-03-20 RX ORDER — DIPHENHYDRAMINE HCL 25 MG
50 CAPSULE ORAL ONCE
Status: COMPLETED | OUTPATIENT
Start: 2019-03-20 | End: 2019-03-20

## 2019-03-20 RX ORDER — HEPARIN SODIUM (PORCINE) LOCK FLUSH IV SOLN 100 UNIT/ML 100 UNIT/ML
5 SOLUTION INTRAVENOUS ONCE
Status: COMPLETED | OUTPATIENT
Start: 2019-03-20 | End: 2019-03-20

## 2019-03-20 RX ORDER — EPINEPHRINE 1 MG/ML
0.3 INJECTION, SOLUTION INTRAMUSCULAR; SUBCUTANEOUS EVERY 5 MIN PRN
Status: CANCELLED | OUTPATIENT
Start: 2019-03-20

## 2019-03-20 RX ORDER — MEPERIDINE HYDROCHLORIDE 25 MG/ML
25 INJECTION INTRAMUSCULAR; INTRAVENOUS; SUBCUTANEOUS EVERY 30 MIN PRN
Status: CANCELLED | OUTPATIENT
Start: 2019-03-20

## 2019-03-20 RX ORDER — EPINEPHRINE 0.3 MG/.3ML
0.3 INJECTION SUBCUTANEOUS EVERY 5 MIN PRN
Status: CANCELLED | OUTPATIENT
Start: 2019-03-20

## 2019-03-20 RX ORDER — LORAZEPAM 2 MG/ML
0.5 INJECTION INTRAMUSCULAR EVERY 4 HOURS PRN
Status: CANCELLED
Start: 2019-03-20

## 2019-03-20 RX ORDER — DIPHENHYDRAMINE HYDROCHLORIDE 50 MG/ML
50 INJECTION INTRAMUSCULAR; INTRAVENOUS
Status: CANCELLED
Start: 2019-03-20

## 2019-03-20 RX ORDER — METHYLPREDNISOLONE SODIUM SUCCINATE 125 MG/2ML
125 INJECTION, POWDER, LYOPHILIZED, FOR SOLUTION INTRAMUSCULAR; INTRAVENOUS
Status: CANCELLED
Start: 2019-03-20

## 2019-03-20 RX ORDER — ALBUTEROL SULFATE 0.83 MG/ML
2.5 SOLUTION RESPIRATORY (INHALATION)
Status: CANCELLED | OUTPATIENT
Start: 2019-03-20

## 2019-03-20 RX ORDER — ALBUTEROL SULFATE 90 UG/1
1-2 AEROSOL, METERED RESPIRATORY (INHALATION)
Status: CANCELLED
Start: 2019-03-20

## 2019-03-20 RX ADMIN — PACLITAXEL 139 MG: 6 INJECTION, SOLUTION INTRAVENOUS at 10:49

## 2019-03-20 RX ADMIN — SODIUM CHLORIDE 250 ML: 9 INJECTION, SOLUTION INTRAVENOUS at 10:09

## 2019-03-20 RX ADMIN — DIPHENHYDRAMINE HYDROCHLORIDE 50 MG: 25 CAPSULE ORAL at 09:58

## 2019-03-20 RX ADMIN — DEXAMETHASONE SODIUM PHOSPHATE: 10 INJECTION, SOLUTION INTRAMUSCULAR; INTRAVENOUS at 10:09

## 2019-03-20 RX ADMIN — HEPARIN SODIUM (PORCINE) LOCK FLUSH IV SOLN 100 UNIT/ML 5 ML: 100 SOLUTION at 11:54

## 2019-03-20 RX ADMIN — FAMOTIDINE 20 MG: 20 INJECTION, SOLUTION INTRAVENOUS at 10:26

## 2019-03-20 NOTE — PROGRESS NOTES
Infusion Nursing Note:  Luci Londono presents today for C14D1 Taxol.    Patient seen by provider today: No    Note: Patient reports continuing neuropathy in bilateral fingers and toes.  Denies increase in the neuropathy.  Does report increased myalgias over the weekend which have now resolved.    Intravenous Access:  Implanted Port.      Treatment Conditions:  Lab Results   Component Value Date    HGB 10.0 03/20/2019     Lab Results   Component Value Date    WBC 5.6 03/20/2019      Lab Results   Component Value Date    ANEU 3.7 03/20/2019     Lab Results   Component Value Date     03/20/2019      Lab Results   Component Value Date     03/20/2019                   Lab Results   Component Value Date    POTASSIUM 3.6 03/20/2019           Lab Results   Component Value Date    MAG 1.8 10/12/2018            Lab Results   Component Value Date    CR 0.64 03/20/2019                   Lab Results   Component Value Date    MATHIEU 8.8 03/20/2019                Lab Results   Component Value Date    BILITOTAL 0.2 03/20/2019           Lab Results   Component Value Date    ALBUMIN 3.1 03/20/2019                    Lab Results   Component Value Date    ALT 82 03/20/2019           Lab Results   Component Value Date    AST 38 03/20/2019       Results reviewed, labs MET treatment parameters, ok to proceed with treatment.      Post Infusion Assessment:  Patient tolerated infusion without incident.  Blood return noted pre and post infusion.  Site patent and intact, free from redness, edema or discomfort.  No evidence of extravasations.  Access discontinued per protocol.    Discharge Plan:   Prescription refills given for Dexamethasone.  Discharge instructions reviewed with: Patient.  Patient and/or family verbalized understanding of discharge instructions and all questions answered.  Copy of AVS reviewed with patient and/or family.  Patient will return 3/27/19 for next appointment.  Patient discharged in stable  condition accompanied by: self.  Departure Mode: Ambulatory.    Angle Kasper RN

## 2019-03-20 NOTE — PROGRESS NOTES
Social Work Progress Note      Data/Intervention:  Patient Name:  Luci Londono  /Age:  1970 (48 year old)    Reason for Follow-Up:  Luci comes to clinic today for C14D1 Taxol, this clinician met briefly with Luci after treatment complete for psychosocial check-in and emotional support.     Intervention:   Luci reports that overall she had a good week, acknowledging some pain at hips and tight muscles in back on Saturday after last infusion. Luci reports that she feels strongly that she does not want to take more pain medication, and reports that getting massage from her children was helpful for pain management. Discussed community resources for massage support. Reviewed with Luci information from the [employer] regarding her application, this clinician made plan with [employer] to re-submit application as material they are asking to include was in previous application. Luci reports that she too went to social security and completed a new application for SSDI, Luc[facility] also [facility] and will receive food stamps and knows where to go to apply for utility assistance. Luci reports that greatest financial concerns continue to be utilities and rent.     Resources Provided:  [employer]- Road to Recovery Program  Pathways    Plan:  1) This clinician to follow-up with [employer] surrounding application status and specifics as to which additional materials are needed to complete submission. This clinician will follow-up with Luci at next infusion for additional support.   2) This clinician will continue to collaborate with multidisciplinary team.      Please call or page if needs or concerns arise.     MIRA Julien, LICSW  Direct Phone: [phone]  Pager: [phone]

## 2019-03-26 RX ORDER — SODIUM CHLORIDE 9 MG/ML
1000 INJECTION, SOLUTION INTRAVENOUS CONTINUOUS PRN
Status: CANCELLED
Start: 2019-03-27

## 2019-03-26 RX ORDER — ALBUTEROL SULFATE 90 UG/1
1-2 AEROSOL, METERED RESPIRATORY (INHALATION)
Status: CANCELLED
Start: 2019-03-27

## 2019-03-26 RX ORDER — LORAZEPAM 2 MG/ML
0.5 INJECTION INTRAMUSCULAR EVERY 4 HOURS PRN
Status: CANCELLED
Start: 2019-03-27

## 2019-03-26 RX ORDER — ALBUTEROL SULFATE 0.83 MG/ML
2.5 SOLUTION RESPIRATORY (INHALATION)
Status: CANCELLED | OUTPATIENT
Start: 2019-03-27

## 2019-03-26 RX ORDER — EPINEPHRINE 1 MG/ML
0.3 INJECTION, SOLUTION INTRAMUSCULAR; SUBCUTANEOUS EVERY 5 MIN PRN
Status: CANCELLED | OUTPATIENT
Start: 2019-03-27

## 2019-03-26 RX ORDER — MEPERIDINE HYDROCHLORIDE 25 MG/ML
25 INJECTION INTRAMUSCULAR; INTRAVENOUS; SUBCUTANEOUS EVERY 30 MIN PRN
Status: CANCELLED | OUTPATIENT
Start: 2019-03-27

## 2019-03-26 RX ORDER — EPINEPHRINE 0.3 MG/.3ML
0.3 INJECTION SUBCUTANEOUS EVERY 5 MIN PRN
Status: CANCELLED | OUTPATIENT
Start: 2019-03-27

## 2019-03-26 RX ORDER — METHYLPREDNISOLONE SODIUM SUCCINATE 125 MG/2ML
125 INJECTION, POWDER, LYOPHILIZED, FOR SOLUTION INTRAMUSCULAR; INTRAVENOUS
Status: CANCELLED
Start: 2019-03-27

## 2019-03-26 RX ORDER — DIPHENHYDRAMINE HCL 25 MG
50 CAPSULE ORAL ONCE
Status: CANCELLED
Start: 2019-03-27

## 2019-03-26 RX ORDER — DIPHENHYDRAMINE HYDROCHLORIDE 50 MG/ML
50 INJECTION INTRAMUSCULAR; INTRAVENOUS
Status: CANCELLED
Start: 2019-03-27

## 2019-03-27 ENCOUNTER — INFUSION THERAPY VISIT (OUTPATIENT)
Dept: INFUSION THERAPY | Facility: CLINIC | Age: 49
End: 2019-03-27
Attending: INTERNAL MEDICINE
Payer: COMMERCIAL

## 2019-03-27 ENCOUNTER — HOSPITAL ENCOUNTER (OUTPATIENT)
Facility: CLINIC | Age: 49
Setting detail: SPECIMEN
Discharge: HOME | End: 2019-03-27
Attending: INTERNAL MEDICINE | Admitting: NURSE PRACTITIONER
Payer: COMMERCIAL

## 2019-03-27 ENCOUNTER — ALLIED HEALTH/NURSE VISIT (OUTPATIENT)
Dept: ONCOLOGY | Facility: CLINIC | Age: 49
End: 2019-03-27

## 2019-03-27 ENCOUNTER — ONCOLOGY VISIT (OUTPATIENT)
Dept: ONCOLOGY | Facility: CLINIC | Age: 49
End: 2019-03-27
Attending: INTERNAL MEDICINE
Payer: COMMERCIAL

## 2019-03-27 VITALS
OXYGEN SATURATION: 100 % | TEMPERATURE: 98.1 F | BODY MASS INDEX: 39.8 KG/M2 | HEART RATE: 113 BPM | WEIGHT: 210.8 LBS | HEIGHT: 61 IN | DIASTOLIC BLOOD PRESSURE: 90 MMHG | SYSTOLIC BLOOD PRESSURE: 136 MMHG | RESPIRATION RATE: 18 BRPM

## 2019-03-27 VITALS
TEMPERATURE: 98.1 F | SYSTOLIC BLOOD PRESSURE: 136 MMHG | BODY MASS INDEX: 39.8 KG/M2 | OXYGEN SATURATION: 100 % | DIASTOLIC BLOOD PRESSURE: 90 MMHG | HEART RATE: 113 BPM | RESPIRATION RATE: 18 BRPM | HEIGHT: 61 IN | WEIGHT: 210.8 LBS

## 2019-03-27 DIAGNOSIS — Z17.1 MALIGNANT NEOPLASM OF UPPER-OUTER QUADRANT OF RIGHT BREAST IN FEMALE, ESTROGEN RECEPTOR NEGATIVE (H): Primary | ICD-10-CM

## 2019-03-27 DIAGNOSIS — Z71.9 COUNSELING NOS(V65.40): Primary | ICD-10-CM

## 2019-03-27 DIAGNOSIS — C50.411 MALIGNANT NEOPLASM OF UPPER-OUTER QUADRANT OF RIGHT BREAST IN FEMALE, ESTROGEN RECEPTOR NEGATIVE (H): Primary | ICD-10-CM

## 2019-03-27 DIAGNOSIS — Z95.828 PORT-A-CATH IN PLACE: ICD-10-CM

## 2019-03-27 DIAGNOSIS — E78.2 MIXED HYPERLIPIDEMIA: ICD-10-CM

## 2019-03-27 LAB
ALBUMIN SERPL-MCNC: 3.3 G/DL (ref 3.4–5)
ALP SERPL-CCNC: 75 U/L (ref 40–150)
ALT SERPL W P-5'-P-CCNC: 71 U/L (ref 0–50)
ANION GAP SERPL CALCULATED.3IONS-SCNC: 10 MMOL/L (ref 3–14)
AST SERPL W P-5'-P-CCNC: 39 U/L (ref 0–45)
BASOPHILS # BLD AUTO: 0 10E9/L (ref 0–0.2)
BASOPHILS NFR BLD AUTO: 0.2 %
BILIRUB SERPL-MCNC: 0.3 MG/DL (ref 0.2–1.3)
BUN SERPL-MCNC: 7 MG/DL (ref 7–30)
CALCIUM SERPL-MCNC: 9.2 MG/DL (ref 8.5–10.1)
CHLORIDE SERPL-SCNC: 104 MMOL/L (ref 94–109)
CO2 SERPL-SCNC: 25 MMOL/L (ref 20–32)
CREAT SERPL-MCNC: 0.66 MG/DL (ref 0.52–1.04)
DIFFERENTIAL METHOD BLD: ABNORMAL
EOSINOPHIL # BLD AUTO: 0.1 10E9/L (ref 0–0.7)
EOSINOPHIL NFR BLD AUTO: 1.4 %
ERYTHROCYTE [DISTWIDTH] IN BLOOD BY AUTOMATED COUNT: 17.4 % (ref 10–15)
GFR SERPL CREATININE-BSD FRML MDRD: >90 ML/MIN/{1.73_M2}
GLUCOSE SERPL-MCNC: 133 MG/DL (ref 70–99)
HCT VFR BLD AUTO: 33.8 % (ref 35–47)
HGB BLD-MCNC: 11.1 G/DL (ref 11.7–15.7)
IMM GRANULOCYTES # BLD: 0 10E9/L (ref 0–0.4)
IMM GRANULOCYTES NFR BLD: 0.8 %
LYMPHOCYTES # BLD AUTO: 1.5 10E9/L (ref 0.8–5.3)
LYMPHOCYTES NFR BLD AUTO: 30.2 %
MCH RBC QN AUTO: 30.3 PG (ref 26.5–33)
MCHC RBC AUTO-ENTMCNC: 32.8 G/DL (ref 31.5–36.5)
MCV RBC AUTO: 92 FL (ref 78–100)
MONOCYTES # BLD AUTO: 0.2 10E9/L (ref 0–1.3)
MONOCYTES NFR BLD AUTO: 4.1 %
NEUTROPHILS # BLD AUTO: 3.1 10E9/L (ref 1.6–8.3)
NEUTROPHILS NFR BLD AUTO: 63.3 %
NRBC # BLD AUTO: 0 10*3/UL
NRBC BLD AUTO-RTO: 1 /100
PLATELET # BLD AUTO: 378 10E9/L (ref 150–450)
POTASSIUM SERPL-SCNC: 3.7 MMOL/L (ref 3.4–5.3)
PROT SERPL-MCNC: 7 G/DL (ref 6.8–8.8)
RBC # BLD AUTO: 3.66 10E12/L (ref 3.8–5.2)
SODIUM SERPL-SCNC: 139 MMOL/L (ref 133–144)
WBC # BLD AUTO: 4.9 10E9/L (ref 4–11)

## 2019-03-27 PROCEDURE — 25000128 H RX IP 250 OP 636: Performed by: NURSE PRACTITIONER

## 2019-03-27 PROCEDURE — 25800030 ZZH RX IP 258 OP 636: Performed by: NURSE PRACTITIONER

## 2019-03-27 PROCEDURE — 96413 CHEMO IV INFUSION 1 HR: CPT

## 2019-03-27 PROCEDURE — G0463 HOSPITAL OUTPT CLINIC VISIT: HCPCS | Mod: 25

## 2019-03-27 PROCEDURE — 25000132 ZZH RX MED GY IP 250 OP 250 PS 637: Performed by: NURSE PRACTITIONER

## 2019-03-27 PROCEDURE — 96375 TX/PRO/DX INJ NEW DRUG ADDON: CPT

## 2019-03-27 PROCEDURE — 80053 COMPREHEN METABOLIC PANEL: CPT | Performed by: NURSE PRACTITIONER

## 2019-03-27 PROCEDURE — 85025 COMPLETE CBC W/AUTO DIFF WBC: CPT | Performed by: NURSE PRACTITIONER

## 2019-03-27 PROCEDURE — 25000128 H RX IP 250 OP 636: Performed by: INTERNAL MEDICINE

## 2019-03-27 PROCEDURE — 99213 OFFICE O/P EST LOW 20 MIN: CPT | Performed by: NURSE PRACTITIONER

## 2019-03-27 RX ORDER — HEPARIN SODIUM (PORCINE) LOCK FLUSH IV SOLN 100 UNIT/ML 100 UNIT/ML
5 SOLUTION INTRAVENOUS ONCE
Status: CANCELLED
Start: 2019-03-27 | End: 2019-03-27

## 2019-03-27 RX ORDER — HEPARIN SODIUM (PORCINE) LOCK FLUSH IV SOLN 100 UNIT/ML 100 UNIT/ML
5 SOLUTION INTRAVENOUS ONCE
Status: COMPLETED | OUTPATIENT
Start: 2019-03-27 | End: 2019-03-27

## 2019-03-27 RX ORDER — DIPHENHYDRAMINE HCL 25 MG
50 CAPSULE ORAL ONCE
Status: COMPLETED | OUTPATIENT
Start: 2019-03-27 | End: 2019-03-27

## 2019-03-27 RX ADMIN — FAMOTIDINE 20 MG: 20 INJECTION, SOLUTION INTRAVENOUS at 10:20

## 2019-03-27 RX ADMIN — DIPHENHYDRAMINE HYDROCHLORIDE 50 MG: 25 CAPSULE ORAL at 10:14

## 2019-03-27 RX ADMIN — PACLITAXEL 139 MG: 6 INJECTION, SOLUTION INTRAVENOUS at 10:55

## 2019-03-27 RX ADMIN — HEPARIN SODIUM (PORCINE) LOCK FLUSH IV SOLN 100 UNIT/ML 5 ML: 100 SOLUTION at 11:59

## 2019-03-27 RX ADMIN — DEXAMETHASONE SODIUM PHOSPHATE: 10 INJECTION, SOLUTION INTRAMUSCULAR; INTRAVENOUS at 10:38

## 2019-03-27 RX ADMIN — SODIUM CHLORIDE 250 ML: 9 INJECTION, SOLUTION INTRAVENOUS at 10:17

## 2019-03-27 ASSESSMENT — MIFFLIN-ST. JEOR
SCORE: 1526.43
SCORE: 1526.43

## 2019-03-27 NOTE — PROGRESS NOTES
Infusion Nursing Note:  Luci Londono presents today for Taxol.    Patient seen by provider today: Yes: Gadiel   present during visit today: Not Applicable.    Note: Pt reports worsening neuropathy today. Requested pt discuss with Gadiel at her appt this morning.    Intravenous Access:  Labs drawn without difficulty.  Implanted Port.    Treatment Conditions:  Lab Results   Component Value Date    HGB 11.1 03/27/2019     Lab Results   Component Value Date    WBC 4.9 03/27/2019      Lab Results   Component Value Date    ANEU 3.1 03/27/2019     Lab Results   Component Value Date     03/27/2019      Results reviewed, labs MET treatment parameters, ok to proceed with treatment.      Post Infusion Assessment:  Patient tolerated infusion without incident.  Blood return noted pre and post infusion.  Site patent and intact, free from redness, edema or discomfort.  No evidence of extravasations.  Access discontinued per protocol.       Discharge Plan:   Discharge instructions reviewed with: Patient.  Patient and/or family verbalized understanding of discharge instructions and all questions answered.  Copy of AVS reviewed with patient and/or family.  Patient will return as scheduled for next appointment.  Patient discharged in stable condition accompanied by: self.  Departure Mode: Ambulatory.    Ayde Curry RN

## 2019-03-27 NOTE — LETTER
"    3/27/2019         RE: Luci Londono  [address] [address]  [facility] [address]        Dear Colleague,    Thank you for referring your patient, Luci Londono, to the [facility]. Please see a copy of my visit note below.    Oncology Rooming Note    March 27, 2019 9:24 AM   Luci Londono is a 48 year old female who presents for:    Chief Complaint   Patient presents with     Oncology Clinic Visit     Malignant neoplasm of upper-outer quadrant of right breast in female, estrogen receptor negative (H)     Initial Vitals: /90   Pulse 113   Temp 98.1  F (36.7  C) (Oral)   Resp 18   Ht 1.546 m (5' 0.87\")   Wt 95.6 kg (210 lb 12.8 oz)   SpO2 100%   BMI 40.01 kg/m    Estimated body mass index is 40.01 kg/m  as calculated from the following:    Height as of this encounter: 1.546 m (5' 0.87\").    Weight as of this encounter: 95.6 kg (210 lb 12.8 oz). Body surface area is 2.03 meters squared.  Data Unavailable Comment: Data Unavailable   No LMP recorded.  Allergies reviewed: Yes  Medications reviewed: Yes    Medications: Medication refills not needed today.  Pharmacy name entered into [employer]:    [id number] - [place], [place] - [address] [place] [address] AT [address] [facility] [id number] [address] AT [address]    Clinical concerns: no       Emerald Pascual MA          Oncology/Hematology Visit Note  Mar 27, 2019    Reason for Visit: follow up of breast cancer [place] IDC, cT1cN1       History of Present Illness:   Per Dr. Murillo note  At age 47 amenorrhea with polycystic ovaries,  She felt a lump in her right breast in early October, 2018.   Her mammogram revealed a small mass in the right upper outer breast,   US revealed an 8mm hypoechoic mass at 12:00, 6cm FN and axillary US revealed a concerning lymph node which was also biopsied.   Her pathology from both breast and LN revealed a grade 3, invasive ductal " carcinoma, ER/UT/her 2-.   PET found no distal disease.   Breast MRI found entire span 3.8 cm.There are multiple enlarged right axillary lymph nodes.      Patient status post neoadjuvant chemo DDAC   -She is currently on weekly Taxol      Interval History:  Patient complains of mild mutant neuropathy she is taking Neurontin which helps with the symptoms. She would like to proceed with the chemotherapy.  She denies swelling of the fingers.  Denies fever chills sweats denies cough no shortness of breath.  Denies nausea vomiting diarrhea denies abdominal pain energy and appetite are good    Review of Systems:  14 point ROS of systems including Constitutional, Eyes, Respiratory, Cardiovascular, Gastroenterology, Genitourinary, Integumentary, Muscularskeletal, Psychiatric were all negative except for pertinent positives noted in my HPI.      Current Outpatient Medications   Medication Sig Dispense Refill     ascorbic acid (VITAMIN C) 1000 MG TABS Take 1,000 mg by mouth daily       dexamethasone (DECADRON) 4 MG tablet Take 4 mg by mouth 2 times daily (with meals). 30 tablet 0     gabapentin (NEURONTIN) 100 MG capsule Take 2 capsules (200 mg) by mouth 3 times daily 180 capsule 1     levothyroxine (SYNTHROID/LEVOTHROID) 125 MCG tablet TAKE 1 TABLET BY MOUTH DAILY 30 tablet 6     lidocaine-prilocaine (EMLA) cream Apply quarter-size amount of Emla cream topically over port site one hour prior to port access for comfort. 30 g 3     loratadine (CLARITIN) 10 MG tablet Take 1 tablet (10 mg) by mouth daily 90 tablet 1     LORazepam (ATIVAN) 0.5 MG tablet Take 1 tablet (0.5 mg) by mouth every 4 hours as needed (Anxiety, Nausea/Vomiting or Sleep) 30 tablet 5     medroxyPROGESTERone (PROVERA) 10 MG tablet Take 1 tablet (10 mg) by mouth daily 90 tablet 3     metFORMIN (GLUCOPHAGE-XR) 500 MG 24 hr tablet TAKE 1 TABLET(500 MG) BY MOUTH DAILY WITH DINNER 90 tablet 1     multivitamin, therapeutic with minerals (THERA-VIT-M) TABS tablet  "Take 1 tablet by mouth daily       omeprazole (PRILOSEC) 20 MG CR capsule Take 1 capsule (20 mg) by mouth 2 times daily 60 capsule 11     ondansetron (ZOFRAN-ODT) 4 MG ODT tab Take 1-2 tablets (4-8 mg) by mouth every 8 hours as needed for nausea 10 tablet 0     prochlorperazine (COMPAZINE) 10 MG tablet Take 1 tablet (10 mg) by mouth every 6 hours as needed (Nausea/Vomiting) 30 tablet 5     senna-docusate (SENOKOT-S;PERICOLACE) 8.6-50 MG per tablet Take 1-2 tablets by mouth 2 times daily 30 tablet 0       Physical Examination:  General: The patient is a pleasant female in no acute distress.  /90   Pulse 113   Temp 98.1  F (36.7  C) (Oral)   Resp 18   Ht 1.546 m (5' 0.87\")   Wt 95.6 kg (210 lb 12.8 oz)   SpO2 100%   BMI 40.01 kg/m     HEENT: EOMI, PERRL. Sclerae are anicteric. Oral mucosa is pink and moist with no lesions or thrush.   Lymph: Neck is supple with no lymphadenopathy in the cervical or supraclavicular areas.   Heart: Regular rate and rhythm.   Lungs: Clear to auscultation bilaterally.   GI: Bowel sounds present, soft, nontender with no palpable hepatosplenomegaly or masses.   Extremities: No lower extremity edema noted bilaterally.   Skin: No rashes, petechiae, or bruising noted on exposed skin.    Laboratory Data:  Results for orders placed or performed in visit on 03/27/19 (from the past 24 hour(s))   CBC with platelets diff   Result Value Ref Range    WBC 4.9 4.0 - 11.0 10e9/L    RBC Count 3.66 (L) 3.8 - 5.2 10e12/L    Hemoglobin 11.1 (L) 11.7 - 15.7 g/dL    Hematocrit 33.8 (L) 35.0 - 47.0 %    MCV 92 78 - 100 fl    MCH 30.3 26.5 - 33.0 pg    MCHC 32.8 31.5 - 36.5 g/dL    RDW 17.4 (H) 10.0 - 15.0 %    Platelet Count 378 150 - 450 10e9/L    Diff Method Automated Method     % Neutrophils 63.3 %    % Lymphocytes 30.2 %    % Monocytes 4.1 %    % Eosinophils 1.4 %    % Basophils 0.2 %    % Immature Granulocytes 0.8 %    Nucleated RBCs 1 (H) 0 /100    Absolute Neutrophil 3.1 1.6 - 8.3 10e9/L    " Absolute Lymphocytes 1.5 0.8 - 5.3 10e9/L    Absolute Monocytes 0.2 0.0 - 1.3 10e9/L    Absolute Eosinophils 0.1 0.0 - 0.7 10e9/L    Absolute Basophils 0.0 0.0 - 0.2 10e9/L    Abs Immature Granulocytes 0.0 0 - 0.4 10e9/L    Absolute Nucleated RBC 0.0    Comprehensive metabolic panel   Result Value Ref Range    Sodium 139 133 - 144 mmol/L    Potassium 3.7 3.4 - 5.3 mmol/L    Chloride 104 94 - 109 mmol/L    Carbon Dioxide 25 20 - 32 mmol/L    Anion Gap 10 3 - 14 mmol/L    Glucose 133 (H) 70 - 99 mg/dL    Urea Nitrogen 7 7 - 30 mg/dL    Creatinine 0.66 0.52 - 1.04 mg/dL    GFR Estimate >90 >60 mL/min/[1.73_m2]    GFR Estimate If Black >90 >60 mL/min/[1.73_m2]    Calcium 9.2 8.5 - 10.1 mg/dL    Bilirubin Total 0.3 0.2 - 1.3 mg/dL    Albumin 3.3 (L) 3.4 - 5.0 g/dL    Protein Total 7.0 6.8 - 8.8 g/dL    Alkaline Phosphatase 75 40 - 150 U/L    ALT 71 (H) 0 - 50 U/L    AST 39 0 - 45 U/L         Assessment and Plan:    This is a 48-year-old female with    Right side breast cancer [place] IDC, cT1cN1   Currently on weekly Paclitaxel   Besides  mild intermittent neuropathy she has been tolerating treatment well.  Patient wants to continue with the same dose chemo labs reviewed  Okay to proceed with chemotherapy today  Schedule with Dr. Murillo next week last cycle of the chemo    Neuropathy secondary to chemotherapy  Mild intermittent symptoms  Patient does not want us to decrease the dose since she has 1 more chemo infusion next week  Can you with gabapentin  Call if worsening of symptoms    AMIE Hernandez CNP  Hem/Onc   [facility]               Again, thank you for allowing me to participate in the care of your patient.        Sincerely,        AMIE Hernandez CNP

## 2019-03-27 NOTE — PROGRESS NOTES
"Social Work Progress Note      Data/Intervention:  Patient Name:  Luci Londono  /Age:  1970 (48 year old)    Reason for Follow-Up:  Luci comes to clinic today for C15D1 Taxol, this clinician met briefly with Luci for planned psychosocial check-in and emotional support.     Intervention:   Luci reports that she experienced neuropathy daily in hands and feet this week, which is worse than it had been the week before. Luci reports that she feels strongly that she did not want a reduction in her dose and wanted to \"tough it out the last 2 cycles.\" Luci reports that massages from her children have been helpful for neuropathy support as well. Reviewed with Luci information from [employer] surrounding outstanding information from her application. Luci plans to collect this information and bring to next clinic visit for submission.      Plan:  1) This clinician will follow-up with Luci at next infusion for additional support.   2) This clinician will continue to collaborate with multidisciplinary team.      Please call or page if needs or concerns arise.     MIRA Julien, [facility]  Direct Phone: [phone]  Pager: [phone]  "

## 2019-03-27 NOTE — PROGRESS NOTES
"Oncology Rooming Note    March 27, 2019 9:24 AM   Luci Londono is a 48 year old female who presents for:    Chief Complaint   Patient presents with     Oncology Clinic Visit     Malignant neoplasm of upper-outer quadrant of right breast in female, estrogen receptor negative (H)     Initial Vitals: /90   Pulse 113   Temp 98.1  F (36.7  C) (Oral)   Resp 18   Ht 1.546 m (5' 0.87\")   Wt 95.6 kg (210 lb 12.8 oz)   SpO2 100%   BMI 40.01 kg/m   Estimated body mass index is 40.01 kg/m  as calculated from the following:    Height as of this encounter: 1.546 m (5' 0.87\").    Weight as of this encounter: 95.6 kg (210 lb 12.8 oz). Body surface area is 2.03 meters squared.  Data Unavailable Comment: Data Unavailable   No LMP recorded.  Allergies reviewed: Yes  Medications reviewed: Yes    Medications: Medication refills not needed today.  Pharmacy name entered into [employer]:    [id number] - [place], [place] - [address] AT [address] [facility] [address] [facility] [address] [place] [address] AT [address]    Clinical concerns: no       Emerald Pascual MA        "

## 2019-03-27 NOTE — PROGRESS NOTES
Oncology/Hematology Visit Note  Mar 27, 2019    Reason for Visit: follow up of breast cancer [place] IDC, cT1cN1       History of Present Illness:   Per Dr. Murillo note  At age 47 amenorrhea with polycystic ovaries,  She felt a lump in her right breast in early October, 2018.   Her mammogram revealed a small mass in the right upper outer breast,   US revealed an 8mm hypoechoic mass at 12:00, 6cm FN and axillary US revealed a concerning lymph node which was also biopsied.   Her pathology from both breast and LN revealed a grade 3, invasive ductal carcinoma, ER/MI/her 2-.   PET found no distal disease.   Breast MRI found entire span 3.8 cm.There are multiple enlarged right axillary lymph nodes.      Patient status post neoadjuvant chemo DDAC   -She is currently on weekly Taxol      Interval History:  Patient complains of mild mutant neuropathy she is taking Neurontin which helps with the symptoms. She would like to proceed with the chemotherapy.  She denies swelling of the fingers.  Denies fever chills sweats denies cough no shortness of breath.  Denies nausea vomiting diarrhea denies abdominal pain energy and appetite are good    Review of Systems:  14 point ROS of systems including Constitutional, Eyes, Respiratory, Cardiovascular, Gastroenterology, Genitourinary, Integumentary, Muscularskeletal, Psychiatric were all negative except for pertinent positives noted in my HPI.      Current Outpatient Medications   Medication Sig Dispense Refill     ascorbic acid (VITAMIN C) 1000 MG TABS Take 1,000 mg by mouth daily       dexamethasone (DECADRON) 4 MG tablet Take 4 mg by mouth 2 times daily (with meals). 30 tablet 0     gabapentin (NEURONTIN) 100 MG capsule Take 2 capsules (200 mg) by mouth 3 times daily 180 capsule 1     levothyroxine (SYNTHROID/LEVOTHROID) 125 MCG tablet TAKE 1 TABLET BY MOUTH DAILY 30 tablet 6     lidocaine-prilocaine (EMLA) cream Apply quarter-size amount of Emla cream topically over port site one hour prior  "to port access for comfort. 30 g 3     loratadine (CLARITIN) 10 MG tablet Take 1 tablet (10 mg) by mouth daily 90 tablet 1     LORazepam (ATIVAN) 0.5 MG tablet Take 1 tablet (0.5 mg) by mouth every 4 hours as needed (Anxiety, Nausea/Vomiting or Sleep) 30 tablet 5     medroxyPROGESTERone (PROVERA) 10 MG tablet Take 1 tablet (10 mg) by mouth daily 90 tablet 3     metFORMIN (GLUCOPHAGE-XR) 500 MG 24 hr tablet TAKE 1 TABLET(500 MG) BY MOUTH DAILY WITH DINNER 90 tablet 1     multivitamin, therapeutic with minerals (THERA-VIT-M) TABS tablet Take 1 tablet by mouth daily       omeprazole (PRILOSEC) 20 MG CR capsule Take 1 capsule (20 mg) by mouth 2 times daily 60 capsule 11     ondansetron (ZOFRAN-ODT) 4 MG ODT tab Take 1-2 tablets (4-8 mg) by mouth every 8 hours as needed for nausea 10 tablet 0     prochlorperazine (COMPAZINE) 10 MG tablet Take 1 tablet (10 mg) by mouth every 6 hours as needed (Nausea/Vomiting) 30 tablet 5     senna-docusate (SENOKOT-S;PERICOLACE) 8.6-50 MG per tablet Take 1-2 tablets by mouth 2 times daily 30 tablet 0       Physical Examination:  General: The patient is a pleasant female in no acute distress.  /90   Pulse 113   Temp 98.1  F (36.7  C) (Oral)   Resp 18   Ht 1.546 m (5' 0.87\")   Wt 95.6 kg (210 lb 12.8 oz)   SpO2 100%   BMI 40.01 kg/m    HEENT: EOMI, PERRL. Sclerae are anicteric. Oral mucosa is pink and moist with no lesions or thrush.   Lymph: Neck is supple with no lymphadenopathy in the cervical or supraclavicular areas.   Heart: Regular rate and rhythm.   Lungs: Clear to auscultation bilaterally.   GI: Bowel sounds present, soft, nontender with no palpable hepatosplenomegaly or masses.   Extremities: No lower extremity edema noted bilaterally.   Skin: No rashes, petechiae, or bruising noted on exposed skin.    Laboratory Data:  Results for orders placed or performed in visit on 03/27/19 (from the past 24 hour(s))   CBC with platelets diff   Result Value Ref Range    WBC 4.9 " 4.0 - 11.0 10e9/L    RBC Count 3.66 (L) 3.8 - 5.2 10e12/L    Hemoglobin 11.1 (L) 11.7 - 15.7 g/dL    Hematocrit 33.8 (L) 35.0 - 47.0 %    MCV 92 78 - 100 fl    MCH 30.3 26.5 - 33.0 pg    MCHC 32.8 31.5 - 36.5 g/dL    RDW 17.4 (H) 10.0 - 15.0 %    Platelet Count 378 150 - 450 10e9/L    Diff Method Automated Method     % Neutrophils 63.3 %    % Lymphocytes 30.2 %    % Monocytes 4.1 %    % Eosinophils 1.4 %    % Basophils 0.2 %    % Immature Granulocytes 0.8 %    Nucleated RBCs 1 (H) 0 /100    Absolute Neutrophil 3.1 1.6 - 8.3 10e9/L    Absolute Lymphocytes 1.5 0.8 - 5.3 10e9/L    Absolute Monocytes 0.2 0.0 - 1.3 10e9/L    Absolute Eosinophils 0.1 0.0 - 0.7 10e9/L    Absolute Basophils 0.0 0.0 - 0.2 10e9/L    Abs Immature Granulocytes 0.0 0 - 0.4 10e9/L    Absolute Nucleated RBC 0.0    Comprehensive metabolic panel   Result Value Ref Range    Sodium 139 133 - 144 mmol/L    Potassium 3.7 3.4 - 5.3 mmol/L    Chloride 104 94 - 109 mmol/L    Carbon Dioxide 25 20 - 32 mmol/L    Anion Gap 10 3 - 14 mmol/L    Glucose 133 (H) 70 - 99 mg/dL    Urea Nitrogen 7 7 - 30 mg/dL    Creatinine 0.66 0.52 - 1.04 mg/dL    GFR Estimate >90 >60 mL/min/[1.73_m2]    GFR Estimate If Black >90 >60 mL/min/[1.73_m2]    Calcium 9.2 8.5 - 10.1 mg/dL    Bilirubin Total 0.3 0.2 - 1.3 mg/dL    Albumin 3.3 (L) 3.4 - 5.0 g/dL    Protein Total 7.0 6.8 - 8.8 g/dL    Alkaline Phosphatase 75 40 - 150 U/L    ALT 71 (H) 0 - 50 U/L    AST 39 0 - 45 U/L         Assessment and Plan:    This is a 48-year-old female with    Right side breast cancer TN IDC, cT1cN1   Currently on weekly Paclitaxel   Besides  mild intermittent neuropathy she has been tolerating treatment well.  Patient wants to continue with the same dose chemo labs reviewed  Okay to proceed with chemotherapy today  Schedule with Dr. Murillo next week last cycle of the chemo    Neuropathy secondary to chemotherapy  Mild intermittent symptoms  Patient does not want us to decrease the dose since she has 1  more chemo infusion next week  Can you with gabapentin  Call if worsening of symptoms    AMIE Hernandez CNP  Hem/Onc   [facility]

## 2019-03-28 NOTE — TELEPHONE ENCOUNTER
RECORDS STATUS - BREAST    RECORDS REQUESTED FROM: Internal   DATE REQUESTED: 03/28/19   NOTES DETAILS STATUS   OFFICE NOTE from referring provider Internal 03.06.19, oncology Visit, Dr. Murillo   OFFICE NOTE from medical oncologist     OFFICE NOTE from surgeon     OFFICE NOTE from radiation oncologist     DISCHARGE SUMMARY from hospital     DISCHARGE REPORT from the ER     OPERATIVE REPORT     MEDICATION LIST     CLINICAL TRIAL TREATMENTS TO DATE     LABS     PATHOLOGY REPORTS  (Tissue diagnosis, Stage, ER/NC percentage positive and intensity of staining, HER2 IHC, FISH, and all biopsies from breast and any distant metastasis)                 Internal 10.12.18, HER 2 TYLER FISH  10.12.18, Surgical Path, R Breast mass     GENONOMIC TESTING     TYPE:   (Next Generation Sequencing, including Foundation One testing, and Oncotype score)     IMAGING (NEED IMAGES & REPORT)     CT SCANS     MRI     MAMMO     ULTRASOUND     PET     BONE SCAN     BRAIN MRI

## 2019-04-03 ENCOUNTER — ONCOLOGY VISIT (OUTPATIENT)
Dept: ONCOLOGY | Facility: CLINIC | Age: 49
End: 2019-04-03
Attending: INTERNAL MEDICINE
Payer: COMMERCIAL

## 2019-04-03 ENCOUNTER — HOSPITAL ENCOUNTER (OUTPATIENT)
Facility: CLINIC | Age: 49
Setting detail: SPECIMEN
Discharge: HOME | End: 2019-04-03
Attending: INTERNAL MEDICINE | Admitting: INTERNAL MEDICINE
Payer: COMMERCIAL

## 2019-04-03 ENCOUNTER — INFUSION THERAPY VISIT (OUTPATIENT)
Dept: INFUSION THERAPY | Facility: CLINIC | Age: 49
End: 2019-04-03
Attending: INTERNAL MEDICINE
Payer: COMMERCIAL

## 2019-04-03 ENCOUNTER — ALLIED HEALTH/NURSE VISIT (OUTPATIENT)
Dept: ONCOLOGY | Facility: CLINIC | Age: 49
End: 2019-04-03

## 2019-04-03 VITALS
BODY MASS INDEX: 40.37 KG/M2 | RESPIRATION RATE: 16 BRPM | TEMPERATURE: 98 F | WEIGHT: 213.8 LBS | DIASTOLIC BLOOD PRESSURE: 83 MMHG | HEART RATE: 109 BPM | SYSTOLIC BLOOD PRESSURE: 125 MMHG | HEIGHT: 61 IN | OXYGEN SATURATION: 97 %

## 2019-04-03 VITALS
BODY MASS INDEX: 40.37 KG/M2 | SYSTOLIC BLOOD PRESSURE: 119 MMHG | DIASTOLIC BLOOD PRESSURE: 80 MMHG | RESPIRATION RATE: 16 BRPM | HEART RATE: 92 BPM | TEMPERATURE: 98 F | OXYGEN SATURATION: 97 % | HEIGHT: 61 IN | WEIGHT: 213.8 LBS

## 2019-04-03 DIAGNOSIS — R79.89 ELEVATED LFTS: ICD-10-CM

## 2019-04-03 DIAGNOSIS — Z17.1 MALIGNANT NEOPLASM OF UPPER-OUTER QUADRANT OF RIGHT BREAST IN FEMALE, ESTROGEN RECEPTOR NEGATIVE (H): Primary | ICD-10-CM

## 2019-04-03 DIAGNOSIS — M89.8X9 BONE PAIN: ICD-10-CM

## 2019-04-03 DIAGNOSIS — C50.411 MALIGNANT NEOPLASM OF UPPER-OUTER QUADRANT OF RIGHT BREAST IN FEMALE, ESTROGEN RECEPTOR NEGATIVE (H): Primary | ICD-10-CM

## 2019-04-03 DIAGNOSIS — Z95.828 PORT-A-CATH IN PLACE: ICD-10-CM

## 2019-04-03 DIAGNOSIS — G62.9 NEUROPATHY: ICD-10-CM

## 2019-04-03 DIAGNOSIS — Z71.9 COUNSELING NOS(V65.40): Primary | ICD-10-CM

## 2019-04-03 LAB
ALBUMIN SERPL-MCNC: 3.1 G/DL (ref 3.4–5)
ALP SERPL-CCNC: 73 U/L (ref 40–150)
ALT SERPL W P-5'-P-CCNC: 66 U/L (ref 0–50)
ANION GAP SERPL CALCULATED.3IONS-SCNC: 9 MMOL/L (ref 3–14)
AST SERPL W P-5'-P-CCNC: 37 U/L (ref 0–45)
BASOPHILS # BLD AUTO: 0 10E9/L (ref 0–0.2)
BASOPHILS NFR BLD AUTO: 0.3 %
BILIRUB SERPL-MCNC: 0.2 MG/DL (ref 0.2–1.3)
BUN SERPL-MCNC: 6 MG/DL (ref 7–30)
CALCIUM SERPL-MCNC: 8.9 MG/DL (ref 8.5–10.1)
CHLORIDE SERPL-SCNC: 105 MMOL/L (ref 94–109)
CO2 SERPL-SCNC: 24 MMOL/L (ref 20–32)
CREAT SERPL-MCNC: 0.71 MG/DL (ref 0.52–1.04)
DIFFERENTIAL METHOD BLD: ABNORMAL
EOSINOPHIL # BLD AUTO: 0 10E9/L (ref 0–0.7)
EOSINOPHIL NFR BLD AUTO: 0.7 %
ERYTHROCYTE [DISTWIDTH] IN BLOOD BY AUTOMATED COUNT: 17.3 % (ref 10–15)
GFR SERPL CREATININE-BSD FRML MDRD: >90 ML/MIN/{1.73_M2}
GLUCOSE SERPL-MCNC: 135 MG/DL (ref 70–99)
HCT VFR BLD AUTO: 32.9 % (ref 35–47)
HGB BLD-MCNC: 11 G/DL (ref 11.7–15.7)
IMM GRANULOCYTES # BLD: 0.1 10E9/L (ref 0–0.4)
IMM GRANULOCYTES NFR BLD: 1 %
LYMPHOCYTES # BLD AUTO: 1.5 10E9/L (ref 0.8–5.3)
LYMPHOCYTES NFR BLD AUTO: 25.6 %
MCH RBC QN AUTO: 30.8 PG (ref 26.5–33)
MCHC RBC AUTO-ENTMCNC: 33.4 G/DL (ref 31.5–36.5)
MCV RBC AUTO: 92 FL (ref 78–100)
MONOCYTES # BLD AUTO: 0.2 10E9/L (ref 0–1.3)
MONOCYTES NFR BLD AUTO: 3.5 %
NEUTROPHILS # BLD AUTO: 4.1 10E9/L (ref 1.6–8.3)
NEUTROPHILS NFR BLD AUTO: 68.9 %
NRBC # BLD AUTO: 0.1 10*3/UL
NRBC BLD AUTO-RTO: 1 /100
PLATELET # BLD AUTO: 370 10E9/L (ref 150–450)
POTASSIUM SERPL-SCNC: 3.7 MMOL/L (ref 3.4–5.3)
PROT SERPL-MCNC: 6.6 G/DL (ref 6.8–8.8)
RBC # BLD AUTO: 3.57 10E12/L (ref 3.8–5.2)
SODIUM SERPL-SCNC: 138 MMOL/L (ref 133–144)
WBC # BLD AUTO: 6 10E9/L (ref 4–11)

## 2019-04-03 PROCEDURE — 25000128 H RX IP 250 OP 636: Performed by: INTERNAL MEDICINE

## 2019-04-03 PROCEDURE — 96413 CHEMO IV INFUSION 1 HR: CPT

## 2019-04-03 PROCEDURE — 25800030 ZZH RX IP 258 OP 636: Performed by: INTERNAL MEDICINE

## 2019-04-03 PROCEDURE — 25000132 ZZH RX MED GY IP 250 OP 250 PS 637: Performed by: INTERNAL MEDICINE

## 2019-04-03 PROCEDURE — 80053 COMPREHEN METABOLIC PANEL: CPT | Performed by: INTERNAL MEDICINE

## 2019-04-03 PROCEDURE — 36415 COLL VENOUS BLD VENIPUNCTURE: CPT

## 2019-04-03 PROCEDURE — 99215 OFFICE O/P EST HI 40 MIN: CPT | Performed by: INTERNAL MEDICINE

## 2019-04-03 PROCEDURE — 85025 COMPLETE CBC W/AUTO DIFF WBC: CPT | Performed by: INTERNAL MEDICINE

## 2019-04-03 PROCEDURE — 96367 TX/PROPH/DG ADDL SEQ IV INF: CPT

## 2019-04-03 RX ORDER — EPINEPHRINE 0.3 MG/.3ML
0.3 INJECTION SUBCUTANEOUS EVERY 5 MIN PRN
Status: CANCELLED | OUTPATIENT
Start: 2019-04-03

## 2019-04-03 RX ORDER — HEPARIN SODIUM (PORCINE) LOCK FLUSH IV SOLN 100 UNIT/ML 100 UNIT/ML
5 SOLUTION INTRAVENOUS ONCE
Status: COMPLETED | OUTPATIENT
Start: 2019-04-03 | End: 2019-04-03

## 2019-04-03 RX ORDER — METHYLPREDNISOLONE SODIUM SUCCINATE 125 MG/2ML
125 INJECTION, POWDER, LYOPHILIZED, FOR SOLUTION INTRAMUSCULAR; INTRAVENOUS
Status: CANCELLED
Start: 2019-04-03

## 2019-04-03 RX ORDER — HEPARIN SODIUM (PORCINE) LOCK FLUSH IV SOLN 100 UNIT/ML 100 UNIT/ML
5 SOLUTION INTRAVENOUS ONCE
Status: CANCELLED
Start: 2019-04-03 | End: 2019-04-03

## 2019-04-03 RX ORDER — ALBUTEROL SULFATE 90 UG/1
1-2 AEROSOL, METERED RESPIRATORY (INHALATION)
Status: CANCELLED
Start: 2019-04-03

## 2019-04-03 RX ORDER — DIPHENHYDRAMINE HYDROCHLORIDE 50 MG/ML
50 INJECTION INTRAMUSCULAR; INTRAVENOUS
Status: CANCELLED
Start: 2019-04-03

## 2019-04-03 RX ORDER — EPINEPHRINE 1 MG/ML
0.3 INJECTION, SOLUTION INTRAMUSCULAR; SUBCUTANEOUS EVERY 5 MIN PRN
Status: CANCELLED | OUTPATIENT
Start: 2019-04-03

## 2019-04-03 RX ORDER — DIPHENHYDRAMINE HCL 25 MG
50 CAPSULE ORAL ONCE
Status: COMPLETED | OUTPATIENT
Start: 2019-04-03 | End: 2019-04-03

## 2019-04-03 RX ORDER — DIPHENHYDRAMINE HCL 25 MG
50 CAPSULE ORAL ONCE
Status: CANCELLED
Start: 2019-04-03

## 2019-04-03 RX ORDER — MEPERIDINE HYDROCHLORIDE 25 MG/ML
25 INJECTION INTRAMUSCULAR; INTRAVENOUS; SUBCUTANEOUS EVERY 30 MIN PRN
Status: CANCELLED | OUTPATIENT
Start: 2019-04-03

## 2019-04-03 RX ORDER — ALBUTEROL SULFATE 0.83 MG/ML
2.5 SOLUTION RESPIRATORY (INHALATION)
Status: CANCELLED | OUTPATIENT
Start: 2019-04-03

## 2019-04-03 RX ORDER — LORAZEPAM 2 MG/ML
0.5 INJECTION INTRAMUSCULAR EVERY 4 HOURS PRN
Status: CANCELLED
Start: 2019-04-03

## 2019-04-03 RX ORDER — SODIUM CHLORIDE 9 MG/ML
1000 INJECTION, SOLUTION INTRAVENOUS CONTINUOUS PRN
Status: CANCELLED
Start: 2019-04-03

## 2019-04-03 RX ADMIN — DEXAMETHASONE SODIUM PHOSPHATE: 10 INJECTION, SOLUTION INTRAMUSCULAR; INTRAVENOUS at 10:04

## 2019-04-03 RX ADMIN — HEPARIN SODIUM (PORCINE) LOCK FLUSH IV SOLN 100 UNIT/ML 5 ML: 100 SOLUTION at 11:52

## 2019-04-03 RX ADMIN — FAMOTIDINE 20 MG: 20 INJECTION, SOLUTION INTRAVENOUS at 10:24

## 2019-04-03 RX ADMIN — SODIUM CHLORIDE 250 ML: 9 INJECTION, SOLUTION INTRAVENOUS at 10:02

## 2019-04-03 RX ADMIN — PACLITAXEL 158 MG: 6 INJECTION, SOLUTION INTRAVENOUS at 10:44

## 2019-04-03 RX ADMIN — DIPHENHYDRAMINE HYDROCHLORIDE 50 MG: 25 CAPSULE ORAL at 10:02

## 2019-04-03 ASSESSMENT — MIFFLIN-ST. JEOR
SCORE: 1540.04
SCORE: 1540.04

## 2019-04-03 NOTE — PROGRESS NOTES
Infusion Nursing Note:  Luci Londono presents today for C16D1 taxol.    Patient seen by provider today: Yes: Dr. Murillo   present during visit today: Not Applicable.    Note: N/A.    Intravenous Access:  Lab draw site lac, Needle type bf, Gauge 23  Port with minimal blood return. Labs drawn peripherally.  Implanted Port.    Treatment Conditions:  Lab Results   Component Value Date    HGB 11.0 04/03/2019     Lab Results   Component Value Date    WBC 6.0 04/03/2019      Lab Results   Component Value Date    ANEU 4.1 04/03/2019     Lab Results   Component Value Date     04/03/2019      Lab Results   Component Value Date     04/03/2019                   Lab Results   Component Value Date    POTASSIUM 3.7 04/03/2019           Lab Results   Component Value Date    MAG 1.8 10/12/2018            Lab Results   Component Value Date    CR 0.71 04/03/2019                   Lab Results   Component Value Date    MATHIEU 8.9 04/03/2019                Lab Results   Component Value Date    BILITOTAL 0.2 04/03/2019           Lab Results   Component Value Date    ALBUMIN 3.1 04/03/2019                    Lab Results   Component Value Date    ALT 66 04/03/2019           Lab Results   Component Value Date    AST 37 04/03/2019       Results reviewed, labs MET treatment parameters, ok to proceed with treatment.      Post Infusion Assessment:  Patient tolerated infusion without incident.  Site patent and intact, free from redness, edema or discomfort.  No evidence of extravasations.  Access discontinued per protocol.       Discharge Plan:   Copy of AVS reviewed with patient and/or family.  Patient will return after surgery for next appointment.  Patient discharged in stable condition accompanied by: daughter.  Departure Mode: Ambulatory.    Danielito Rodriges RN

## 2019-04-03 NOTE — PROGRESS NOTES
"Oncology Rooming Note    April 3, 2019 9:10 AM   Luci Londono is a 48 year old female who presents for:    Chief Complaint   Patient presents with     Oncology Clinic Visit     Malignant neoplasm of upper-outer quadrant of right breast in female, estrogen receptor negative (H)     Initial Vitals: /83   Pulse 109   Temp 98  F (36.7  C) (Oral)   Resp 16   Ht 1.546 m (5' 0.87\")   Wt 97 kg (213 lb 12.8 oz)   SpO2 97%   BMI 40.58 kg/m   Estimated body mass index is 40.58 kg/m  as calculated from the following:    Height as of this encounter: 1.546 m (5' 0.87\").    Weight as of this encounter: 97 kg (213 lb 12.8 oz). Body surface area is 2.04 meters squared.  Data Unavailable Comment: Data Unavailable   No LMP recorded.  Allergies reviewed: Yes  Medications reviewed: Yes    Medications: Medication refills not needed today.  Pharmacy name entered into [employer]:    [facility] - [place], [place] - [address] [place] [address] AT [address] [place] [address] & [address] [facility] [address] - [facility] [address] [place] [address] AT [address]    Clinical concerns: no         Emerald Pascual MA            "

## 2019-04-03 NOTE — PROGRESS NOTES
ONCOLOGY FOLLOW [facility] VISIT    breast surgeon:  Dr. 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   Her mammogram revealed a small mass in the right upper outer breast,   US revealed an 8mm hypoechoic mass at 12:00, 6cm FN and axillary US revealed a concerning lymph node which was also biopsied.   Her pathology from both breast and LN revealed a grade 3, invasive ductal carcinoma, ER/[place]/her 2-.   PET found no distal disease.   Breast MRI found entire span 3.8 cm.There are multiple enlarged right axillary lymph nodes.        She made informed decision to proceed with neoadjuvant DD AC  She had drastic clinical response by PE and MRI.   She continued on wkly taxol. Carbo was added in W2. Carbo then was  discontinued 3/6/2019 due to persistent neutropenia.     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     MEDICATIONS/ALLERY:  Reviewed in Epic system.      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 Deny ETOH/smoking       FAMILY HISTORY:    Negative for any type of cancers    REVIEW OF SYSTEMS:   She reports sever bone pain with wkly taxol. Did not respond to narcotic, but to dex. Taxol dose is also slightly reduced.   S/p Influenza A by swab 1/14/2019.   Reports tingling of finger tips.       PHYSICAL EXAMINATION:   VITAL SIGNS: Blood pressure 125/83, pulse 109, temperature 98  F (36.7  " C), temperature source Oral, resp. rate 16, height 1.546 m (5' 0.87\"), weight 97 kg (213 lb 12.8 oz), SpO2 97 %, not currently breastfeeding.    ECOG 0    GENERAL APPEARANCE:  looks like her stated age, very pleasant, not in acute distress.   HEENT: The patient is normocephalic, atraumatic. Pupils are equally reactive to light.  Sclerae are anicteric.  Moist oral mucosa.  Negative pharynx.  No oral thrush. Stomatitis on left angular of mouth.    NECK:  Supple.  No jugular venous distention.  Thyroid is not palpable.   LYMPH NODES:  Superficial lymphadenopathy is not appreciable in the bilateral cervical, supraclavicular, axillary or inguinal areas.   CARDIOVASCULAR:  S1, S2 regular with no murmurs or gallops.  No carotid or abdominal bruits.   PULMONARY:  Lungs are clear to auscultation and percussion bilaterally.  There is no wheezing or rhonchi.   GASTROINTESTINAL:  Abdomen is soft, nontender.  No hepatosplenomegaly.  No signs of ascites.  No mass appreciable.   MUSCULOSKELETAL/EXTREMITIES:  No edema.  No cyanotic changes.  No signs of joint deformity.  No lymphedema.   NEUROLOGIC:  Cranial nerves II-XII are grossly intact.  Sensation intact.  Muscle strength and muscle tone symmetrical, 5/5 throughout.   BACK:  No spinal or paraspinal tenderness.  No CVA tenderness.   SKIN:  No petechiae.  No rash.  No signs of cellulitis.   BREASTS: Right has no longer palpable 1.5cm mass at 12:00 post C1. No other masses. No palpable BRENNON.   Left breast is symmetrical with no skin or nipple changes. No masses on the left. Tissue is very dense throughout.          CURRENT LAB DATA REVIEWED  Cbc diff are good today,  mild elevated LFT.       CURRENT IMAGING REVIEWED  CXR 1/2019 - negative       OLD DATA REVIEWED TODAY WITH SUMMARY:   Breast MRI post AC 12/2018  1. Enhancing mass superiorly in the RIGHT breast is significantly decreased in size since 10/17/2018, measuring approximately 1.1 x 0.3 x 0.5 cm in today's study (series 5 " image 52 and series 13 image 30), decreased from 1.1 x 1.6 x 1.0 cm.  2. Enlarged RIGHT axillary lymph node is slightly decreased since that time as well, now measuring 0.9 x 1.2 x 1.0 cm (series 5 image 68 and series 13 image 19), decreased from 1.3 x 1.2 x 1.4 cm.     10/2018  Right breast 8mm hypoechoic mass at 12:00, 6cm FN and right axillary LN biopsy both found grade 3, invasive ductal carcinoma, ER/MT/her 2-.       Baseline pre tx breast MRI 10/2018 -  In the right breast at 12:00 7 cm from the nipple, there is irregular heterogeneously enhancing mass, corresponding with the known biopsy-proven malignancy, which spans approximately 2.2 cm in maximal diameter, best appreciated on sagittal view, with surrounding nonmasslike enhancement likely related to postbiopsy change or DCIS.  Taken with the primary tumor, the entire span extends up to 3.8 cm.   There are multiple enlarged right axillary lymph nodes.      PET 10/2018  1. No evidence of distant metastasis.  2. Hypermetabolic focus in the upper outer quadrant right breast representing primary tumor. Hypermetabolic right axillary lymph nodes, consistent with metastatic node.  3. Indeterminate sub-4 mm pulmonary nodules, likely fissural lymph node in the right lung.  4. Extensive FDG uptake by the brown fat in the neck and paraspinal region.    10/2018 dx MA -  revealed a small mass in the right upper outer breast, US revealed an 8mm hypoechoic mass at 12:00, 6cm FN and axillary US revealed a concerning lymph node                ASSESSMENT AND PLAN:    1.  dx cT1cN1 right breast high grade IDC, TN at age 47 in 10/2018.     PET is negative for distal disease.      She made informed decision to proceed DD AC, C1D1 10/24/2018.     She had good MRI response post C4 AC.     She did ok with W1 taxol. Added wkly carbo today in W2. Carbo was discontinued 3/6/2019 due to persistent neutropenia.     She is to have MRI restaging and see Dr. Carpio for surgery.     She  needs to be monitored closely during this intense tx.     2. Young age dx with TN breast cancer, she will need genetic counseling.   She is not able to see them till April.     3. Post taxol bone pain - 10% dose reduction on taxol.   She did not respond to claritin, and pain med.   Advised prophylactic 4 mg dex 2 doses. So far this worked ok.     4. New elevated LFT 3/6/2019. Will hold chemo today, no ETOH or tylenol.     5. Neuropathy developing in 3/2019. Advice cryo therapy, vit B6 oral.

## 2019-04-03 NOTE — PROGRESS NOTES
Social Work Progress Note      Data/Intervention:  Patient Name:  Luci Londono  /Age:  1970 (48 year old)    Reason for Follow-Up:  Luci comes to clinic today for C16D1 Taxol, this clinician met briefly with Luci for planned psychosocial check-in and emotional support.     Intervention:   Luci comes to clinic accompanied by her daughter for last cycle of chemotherapy. Luci in good spirits, understandably pleased to be at last cycle. Provided safe place for Luci to recognize what process of coping with treatment has felt like, and feelings pertaining to upcoming surgery. Met with Luci to assist her in completion of [employer] re-certification form, as well as to send in final paperwork to [employer]. No other psychosocial concerns or needs reported or expressed.     Plan:  1) This clinician faxed in [employer] re-certification paperwork  2) This clinician mailed in paperwork to [employer] on Luci's behalf  3) This clinician will continue to collaborate with multidisciplinary team and continue to follow for ongoing psychosocial support as needed.      Please call or page if needs or concerns arise.     MIRA Julien, [facility]  Direct Phone: [phone]  Pager: [phone]

## 2019-04-03 NOTE — LETTER
"    4/3/2019         RE: Luci Londono  [address]  [facility] [address]        Dear Colleague,    Thank you for referring your patient, Luci Londono, to the [facility]. Please see a copy of my visit note below.    Oncology Rooming Note    April 3, 2019 9:10 AM   Luci Londono is a 48 year old female who presents for:    Chief Complaint   Patient presents with     Oncology Clinic Visit     Malignant neoplasm of upper-outer quadrant of right breast in female, estrogen receptor negative (H)     Initial Vitals: /83   Pulse 109   Temp 98  F (36.7  C) (Oral)   Resp 16   Ht 1.546 m (5' 0.87\")   Wt 97 kg (213 lb 12.8 oz)   SpO2 97%   BMI 40.58 kg/m    Estimated body mass index is 40.58 kg/m  as calculated from the following:    Height as of this encounter: 1.546 m (5' 0.87\").    Weight as of this encounter: 97 kg (213 lb 12.8 oz). Body surface area is 2.04 meters squared.  Data Unavailable Comment: Data Unavailable   No LMP recorded.  Allergies reviewed: Yes  Medications reviewed: Yes    Medications: Medication refills not needed today.  Pharmacy name entered into [facility] [facility]:    [id number] - [place], [place] - [address] [place] [address] AT [address] [facility] [id number] [address] AT [address]    Clinical concerns: no         Emerald Pascual MA              ONCOLOGY FOLLOW [facility] VISIT    breast surgeon:  Dr. 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   Her mammogram revealed a small mass in the right upper outer breast,   US revealed an 8mm hypoechoic mass at 12:00, 6cm FN and axillary US revealed a concerning lymph node which was also biopsied.   Her pathology from both breast and LN " "revealed a grade 3, invasive ductal carcinoma, ER/[place]/her 2-.   PET found no distal disease.   Breast MRI found entire span 3.8 cm.There are multiple enlarged right axillary lymph nodes.        She made informed decision to proceed with neoadjuvant DD AC  She had drastic clinical response by PE and MRI.   She continued on wkly taxol. Carbo was added in W2. Carbo then was  discontinued 3/6/2019 due to persistent neutropenia.     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     MEDICATIONS/ALLERY:  Reviewed in Epic system.      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 Deny ETOH/smoking       FAMILY HISTORY:    Negative for any type of cancers    REVIEW OF SYSTEMS:   She reports sever bone pain with wkly taxol. Did not respond to narcotic, but to dex. Taxol dose is also slightly reduced.   S/p Influenza A by swab 1/14/2019.   Reports tingling of finger tips.       PHYSICAL EXAMINATION:   VITAL SIGNS: Blood pressure 125/83, pulse 109, temperature 98  F (36.7  C), temperature source Oral, resp. rate 16, height 1.546 m (5' 0.87\"), weight 97 kg (213 lb 12.8 oz), SpO2 97 %, not currently breastfeeding.    ECOG 0    GENERAL APPEARANCE:  looks like her stated age, very pleasant, not in acute distress.   HEENT: The patient is normocephalic, atraumatic. Pupils are equally reactive to light.  Sclerae are anicteric.  Moist oral mucosa.  Negative pharynx.  No oral thrush. Stomatitis on left angular of mouth.    NECK:  Supple.  No jugular venous distention.  Thyroid is not palpable.   LYMPH NODES:  Superficial " lymphadenopathy is not appreciable in the bilateral cervical, supraclavicular, axillary or inguinal areas.   CARDIOVASCULAR:  S1, S2 regular with no murmurs or gallops.  No carotid or abdominal bruits.   PULMONARY:  Lungs are clear to auscultation and percussion bilaterally.  There is no wheezing or rhonchi.   GASTROINTESTINAL:  Abdomen is soft, nontender.  No hepatosplenomegaly.  No signs of ascites.  No mass appreciable.   MUSCULOSKELETAL/EXTREMITIES:  No edema.  No cyanotic changes.  No signs of joint deformity.  No lymphedema.   NEUROLOGIC:  Cranial nerves II-XII are grossly intact.  Sensation intact.  Muscle strength and muscle tone symmetrical, 5/5 throughout.   BACK:  No spinal or paraspinal tenderness.  No CVA tenderness.   SKIN:  No petechiae.  No rash.  No signs of cellulitis.   BREASTS: Right has no longer palpable 1.5cm mass at 12:00 post C1. No other masses. No palpable BRENNON.   Left breast is symmetrical with no skin or nipple changes. No masses on the left. Tissue is very dense throughout.          CURRENT LAB DATA REVIEWED  Cbc diff are good today,  mild elevated LFT.       CURRENT IMAGING REVIEWED  CXR 1/2019 - negative       OLD DATA REVIEWED TODAY WITH SUMMARY:   Breast MRI post AC 12/2018  1. Enhancing mass superiorly in the RIGHT breast is significantly decreased in size since 10/17/2018, measuring approximately 1.1 x 0.3 x 0.5 cm in today's study (series 5 image 52 and series 13 image 30), decreased from 1.1 x 1.6 x 1.0 cm.  2. Enlarged RIGHT axillary lymph node is slightly decreased since that time as well, now measuring 0.9 x 1.2 x 1.0 cm (series 5 image 68 and series 13 image 19), decreased from 1.3 x 1.2 x 1.4 cm.     10/2018  Right breast 8mm hypoechoic mass at 12:00, 6cm FN and right axillary LN biopsy both found grade 3, invasive ductal carcinoma, ER/[place]/her 2-.       Baseline pre tx breast MRI 10/2018 -  In the right breast at 12:00 7 cm from the nipple, there is irregular heterogeneously  enhancing mass, corresponding with the known biopsy-proven malignancy, which spans approximately 2.2 cm in maximal diameter, best appreciated on sagittal view, with surrounding nonmasslike enhancement likely related to postbiopsy change or DCIS.  Taken with the primary tumor, the entire span extends up to 3.8 cm.   There are multiple enlarged right axillary lymph nodes.      PET 10/2018  1. No evidence of distant metastasis.  2. Hypermetabolic focus in the upper outer quadrant right breast representing primary tumor. Hypermetabolic right axillary lymph nodes, consistent with metastatic node.  3. Indeterminate sub-4 mm pulmonary nodules, likely fissural lymph node in the right lung.  4. Extensive FDG uptake by the brown fat in the neck and paraspinal region.    10/2018 dx MA -  revealed a small mass in the right upper outer breast, US revealed an 8mm hypoechoic mass at 12:00, 6cm FN and axillary US revealed a concerning lymph node                ASSESSMENT AND PLAN:    1.  dx cT1cN1 right breast high grade IDC, TN at age 47 in 10/2018.     PET is negative for distal disease.      She made informed decision to proceed DD AC, C1D1 10/24/2018.     She had good MRI response post C4 AC.     She did ok with W1 taxol. Added wkly carbo today in W2. Carbo was discontinued 3/6/2019 due to persistent neutropenia.     She is to have MRI restaging and see Dr. Carpio for surgery.     She needs to be monitored closely during this intense tx.     2. Young age dx with TN breast cancer, she will need genetic counseling.   She is not able to see them till April.     3. Post taxol bone pain - 10% dose reduction on taxol.   She did not respond to claritin, and pain med.   Advised prophylactic 4 mg dex 2 doses. So far this worked ok.     4. New elevated LFT 3/6/2019. Will hold chemo today, no ETOH or tylenol.     5. Neuropathy developing in 3/2019. Advice cryo therapy, vit B6 oral.           Again, thank you for allowing me to  participate in the care of your patient.        Sincerely,        Addie Murillo MD, MD

## 2019-04-08 ENCOUNTER — ONCOLOGY VISIT (OUTPATIENT)
Dept: ONCOLOGY | Facility: CLINIC | Age: 49
End: 2019-04-08
Attending: NURSE PRACTITIONER
Payer: COMMERCIAL

## 2019-04-08 ENCOUNTER — HOSPITAL ENCOUNTER (OUTPATIENT)
Dept: MRI IMAGING | Facility: CLINIC | Age: 49
End: 2019-04-08
Attending: INTERNAL MEDICINE | Admitting: RADIOLOGY
Payer: COMMERCIAL

## 2019-04-08 ENCOUNTER — HOSPITAL ENCOUNTER (OUTPATIENT)
Facility: CLINIC | Age: 49
Discharge: HOME | End: 2019-04-08
Admitting: INTERNAL MEDICINE
Payer: COMMERCIAL

## 2019-04-08 ENCOUNTER — TELEPHONE (OUTPATIENT)
Dept: ONCOLOGY | Facility: CLINIC | Age: 49
End: 2019-04-08

## 2019-04-08 VITALS
WEIGHT: 211 LBS | HEIGHT: 61 IN | BODY MASS INDEX: 39.84 KG/M2 | SYSTOLIC BLOOD PRESSURE: 148 MMHG | TEMPERATURE: 98.1 F | OXYGEN SATURATION: 100 % | RESPIRATION RATE: 16 BRPM | DIASTOLIC BLOOD PRESSURE: 83 MMHG | HEART RATE: 87 BPM

## 2019-04-08 DIAGNOSIS — Z17.1 MALIGNANT NEOPLASM OF UPPER-OUTER QUADRANT OF RIGHT BREAST IN FEMALE, ESTROGEN RECEPTOR NEGATIVE (H): Primary | ICD-10-CM

## 2019-04-08 DIAGNOSIS — Z17.1 MALIGNANT NEOPLASM OF UPPER-OUTER QUADRANT OF RIGHT BREAST IN FEMALE, ESTROGEN RECEPTOR NEGATIVE (H): ICD-10-CM

## 2019-04-08 DIAGNOSIS — C50.411 MALIGNANT NEOPLASM OF UPPER-OUTER QUADRANT OF RIGHT BREAST IN FEMALE, ESTROGEN RECEPTOR NEGATIVE (H): ICD-10-CM

## 2019-04-08 DIAGNOSIS — C50.411 MALIGNANT NEOPLASM OF UPPER-OUTER QUADRANT OF RIGHT BREAST IN FEMALE, ESTROGEN RECEPTOR NEGATIVE (H): Primary | ICD-10-CM

## 2019-04-08 PROCEDURE — A9585 GADOBUTROL INJECTION: HCPCS | Performed by: INTERNAL MEDICINE

## 2019-04-08 PROCEDURE — G0463 HOSPITAL OUTPT CLINIC VISIT: HCPCS

## 2019-04-08 PROCEDURE — 99214 OFFICE O/P EST MOD 30 MIN: CPT | Performed by: NURSE PRACTITIONER

## 2019-04-08 PROCEDURE — 25800025 ZZH RX 258: Performed by: INTERNAL MEDICINE

## 2019-04-08 PROCEDURE — 25500064 ZZH RX 255 OP 636: Performed by: INTERNAL MEDICINE

## 2019-04-08 PROCEDURE — G0463 HOSPITAL OUTPT CLINIC VISIT: HCPCS | Mod: 25

## 2019-04-08 PROCEDURE — 77049 MRI BREAST C-+ W/CAD BI: CPT

## 2019-04-08 RX ORDER — GADOBUTROL 604.72 MG/ML
10 INJECTION INTRAVENOUS ONCE
Status: COMPLETED | OUTPATIENT
Start: 2019-04-08 | End: 2019-04-08

## 2019-04-08 RX ORDER — ACYCLOVIR 200 MG/1
60 CAPSULE ORAL ONCE
Status: COMPLETED | OUTPATIENT
Start: 2019-04-08 | End: 2019-04-08

## 2019-04-08 RX ADMIN — SODIUM CHLORIDE 60 ML: 9 INJECTION, SOLUTION INTRAMUSCULAR; INTRAVENOUS; SUBCUTANEOUS at 09:33

## 2019-04-08 RX ADMIN — GADOBUTROL 10 ML: 604.72 INJECTION INTRAVENOUS at 09:32

## 2019-04-08 ASSESSMENT — MIFFLIN-ST. JEOR: SCORE: 1527.4

## 2019-04-08 ASSESSMENT — PAIN SCALES - GENERAL: PAINLEVEL: EXTREME PAIN (8)

## 2019-04-08 NOTE — LETTER
"    4/8/2019         RE: Luci Londono  [address]  [facility] [address]        Dear Colleague,    Thank you for referring your patient, Luci Londono, to the [facility]. Please see a copy of my visit note below.    Oncology Rooming Note    April 8, 2019 10:58 AM   Luci Londono is a 48 year old female who presents for:    Chief Complaint   Patient presents with     Oncology Clinic Visit     Initial Vitals: /83   Pulse 87   Temp 98.1  F (36.7  C) (Oral)   Resp 16   Ht 1.546 m (5' 0.87\")   Wt 95.7 kg (211 lb)   SpO2 100%   BMI 40.04 kg/m    Estimated body mass index is 40.04 kg/m  as calculated from the following:    Height as of this encounter: 1.546 m (5' 0.87\").    Weight as of this encounter: 95.7 kg (211 lb). Body surface area is 2.03 meters squared.  Extreme Pain (8) Comment: Data Unavailable   No LMP recorded.  Allergies reviewed: Yes  Medications reviewed: Yes    Medications: Medication refills not needed today.  Pharmacy name entered into [employer]:    [id number] - [place], [place] - [address] [place] [address] AT [address] [place] [address] & [address] [facility] [address] [address] AT [facility]    Clinical concerns: pain all over, numbness, can't sleep      Sophie Rodriguez CMA              Oncology/Hematology Visit Note  Apr 8, 2019    Reason for Visit: follow up of breast cancer TN IDC, cT1cN1       History of Present Illness:   Per Dr. Murillo note  At age 47 amenorrhea with polycystic ovaries,  She felt a lump in her right breast in early October, 2018.   Her mammogram revealed a small mass in the right upper outer breast,   US revealed an 8mm hypoechoic mass at 12:00, 6cm FN and axillary US revealed a concerning lymph node which was also biopsied.   Her pathology from both breast and LN revealed a grade 3, invasive ductal carcinoma, ER/MT/her 2-.   PET found no distal disease.   Breast MRI found entire span " "3.8 cm.There are multiple enlarged right axillary lymph nodes.      Patient status post neoadjuvant chemo DDAC   -She is currently on weekly Taxol      Interval History:  Patient complains of mild mutant neuropathy she is taking Neurontin which helps with the symptoms. She would like to proceed with the chemotherapy.  She denies swelling of the fingers.  Denies fever chills sweats denies cough no shortness of breath.  Denies nausea vomiting diarrhea denies abdominal pain energy and appetite are good    Review of Systems:  14 point ROS of systems including Constitutional, Eyes, Respiratory, Cardiovascular, Gastroenterology, Genitourinary, Integumentary, Muscularskeletal, Psychiatric were all negative except for pertinent positives noted in my HPI.      No current outpatient medications on file.       Physical Examination:  General: The patient is a pleasant female in no acute distress.  /83   Pulse 87   Temp 98.1  F (36.7  C) (Oral)   Resp 16   Ht 1.546 m (5' 0.87\")   Wt 95.7 kg (211 lb)   SpO2 100%   BMI 40.04 kg/m     HEENT: EOMI, PERRL. Sclerae are anicteric. Oral mucosa is pink and moist with no lesions or thrush.   Lymph: Neck is supple with no lymphadenopathy in the cervical or supraclavicular areas.   Heart: Regular rate and rhythm.   Lungs: Clear to auscultation bilaterally.   GI: Bowel sounds present, soft, nontender with no palpable hepatosplenomegaly or masses.   Extremities: No lower extremity edema noted bilaterally.   Skin: No rashes, petechiae, or bruising noted on exposed skin.    Laboratory Data:  Results for orders placed or performed during the hospital encounter of 04/08/19 (from the past 24 hour(s))   MR Breast Bilateral w/o & w Contrast    Narrative    Exam: Breast MRI, with and without Contrast, and with color encoding     History/Indication: RIGHT breast cancer, post neoadjuvant  chemotherapy.    Comparison: 12/18/2018 and 10/17/2018.    Technique: Precontrast gradient recall " axial nonfat sat T1.  Precontrast gradient recall axial fat-sat T1. Precontrast STIR axial  fat sat. Postcontrast gradient recall axial fat-sat T1 with dynamic  postcontrast acquisitions at 2, 4 and 6 minutes with subtractions.  Delayed high-resolution gradient recall sagittal fat-sat T1. MIP of  subtractions, axial coronal and sagittal. Color encoding of post  contrast dynamic acquisitions. IV contrast: 10 mL Gadavist    Breast composition: Scattered fibroglandular tissue    Background parenchymal enhancement: Minimal    Findings: No concerning areas of enhancement are seen in either  breast. Specifically, no residual abnormal enhancement associated with  biopsied RIGHT breast invasive ductal carcinoma with DCIS.     No appreciable lymphadenopathy. Specifically, the biopsied metastatic  node in the RIGHT axilla is further decreased in size, measuring up to  6 mm in the sagittal plane (series 13 image 19), decreased from 10 mm  when measured in a similar fashion on 12/18/2018 and 13 mm on  10/17/2018.      Impression    Impression: BI-RADS CATEGORY: 6 - Known Biopsy-Proven  Malignancy-Appropriate Action Should Be Taken.  Findings indicate a positive response to neoadjuvant chemotherapy with  no appreciable residual abnormal enhancement in the RIGHT breast.  Additionally, RIGHT axillary metastatic lymph node now appears normal,  significantly decreased in size since 10/17/2018 and 12/18/2018.    Recommendation: Continued oncologic/surgical management.    CARSON CAGE MD         Assessment and Plan:    This is a 48-year-old female with    Right side breast cancer TN IDC, cT1cN1   She completed chemo on 04/03/2019  He had an MRI of the breast done today  She is scheduled to see Dr. Ashford for surgery   I asked patient to make appointment with Dr. Murillo after  surgery    Neuropathy secondary to chemotherapy  She is done with chemo now.  Hopefully the symptoms get little better  She is currently taking Neurontin 200 mg  3 times a day  Increase the Neurontin at bedtime dose to 300 mg  We talked about acupuncture continue with vitamin b6      AMIE Hernandez CNP  Hem/Onc   [facility]               Again, thank you for allowing me to participate in the care of your patient.        Sincerely,        AMIE Hernandez CNP

## 2019-04-08 NOTE — PROGRESS NOTES
"Oncology/Hematology Visit Note  Apr 8, 2019    Reason for Visit: follow up of breast cancer TN IDC, cT1cN1       History of Present Illness:   Per Dr. Murillo note  At age 47 amenorrhea with polycystic ovaries,  She felt a lump in her right breast in early October, 2018.   Her mammogram revealed a small mass in the right upper outer breast,   US revealed an 8mm hypoechoic mass at 12:00, 6cm FN and axillary US revealed a concerning lymph node which was also biopsied.   Her pathology from both breast and LN revealed a grade 3, invasive ductal carcinoma, ER/DC/her 2-.   PET found no distal disease.   Breast MRI found entire span 3.8 cm.There are multiple enlarged right axillary lymph nodes.      Patient status post neoadjuvant chemo DDAC   -She is currently on weekly Taxol      Interval History:  Patient complains of mild mutant neuropathy she is taking Neurontin which helps with the symptoms. She would like to proceed with the chemotherapy.  She denies swelling of the fingers.  Denies fever chills sweats denies cough no shortness of breath.  Denies nausea vomiting diarrhea denies abdominal pain energy and appetite are good    Review of Systems:  14 point ROS of systems including Constitutional, Eyes, Respiratory, Cardiovascular, Gastroenterology, Genitourinary, Integumentary, Muscularskeletal, Psychiatric were all negative except for pertinent positives noted in my HPI.      No current outpatient medications on file.       Physical Examination:  General: The patient is a pleasant female in no acute distress.  /83   Pulse 87   Temp 98.1  F (36.7  C) (Oral)   Resp 16   Ht 1.546 m (5' 0.87\")   Wt 95.7 kg (211 lb)   SpO2 100%   BMI 40.04 kg/m    HEENT: EOMI, PERRL. Sclerae are anicteric. Oral mucosa is pink and moist with no lesions or thrush.   Lymph: Neck is supple with no lymphadenopathy in the cervical or supraclavicular areas.   Heart: Regular rate and rhythm.   Lungs: Clear to auscultation bilaterally.   GI: " Bowel sounds present, soft, nontender with no palpable hepatosplenomegaly or masses.   Extremities: No lower extremity edema noted bilaterally.   Skin: No rashes, petechiae, or bruising noted on exposed skin.    Laboratory Data:  Results for orders placed or performed during the hospital encounter of 04/08/19 (from the past 24 hour(s))   MR Breast Bilateral w/o & w Contrast    Narrative    Exam: Breast MRI, with and without Contrast, and with color encoding     History/Indication: RIGHT breast cancer, post neoadjuvant  chemotherapy.    Comparison: 12/18/2018 and 10/17/2018.    Technique: Precontrast gradient recall axial nonfat sat T1.  Precontrast gradient recall axial fat-sat T1. Precontrast STIR axial  fat sat. Postcontrast gradient recall axial fat-sat T1 with dynamic  postcontrast acquisitions at 2, 4 and 6 minutes with subtractions.  Delayed high-resolution gradient recall sagittal fat-sat T1. MIP of  subtractions, axial coronal and sagittal. Color encoding of post  contrast dynamic acquisitions. IV contrast: 10 mL Gadavist    Breast composition: Scattered fibroglandular tissue    Background parenchymal enhancement: Minimal    Findings: No concerning areas of enhancement are seen in either  breast. Specifically, no residual abnormal enhancement associated with  biopsied RIGHT breast invasive ductal carcinoma with DCIS.     No appreciable lymphadenopathy. Specifically, the biopsied metastatic  node in the RIGHT axilla is further decreased in size, measuring up to  6 mm in the sagittal plane (series 13 image 19), decreased from 10 mm  when measured in a similar fashion on 12/18/2018 and 13 mm on  10/17/2018.      Impression    Impression: BI-RADS CATEGORY: 6 - Known Biopsy-Proven  Malignancy-Appropriate Action Should Be Taken.  Findings indicate a positive response to neoadjuvant chemotherapy with  no appreciable residual abnormal enhancement in the RIGHT breast.  Additionally, RIGHT axillary metastatic lymph  node now appears normal,  significantly decreased in size since 10/17/2018 and 12/18/2018.    Recommendation: Continued oncologic/surgical management.    CARSON CAGE MD         Assessment and Plan:    This is a 48-year-old female with    Right side breast cancer TN IDC, cT1cN1   She completed chemo on 04/03/2019  He had an MRI of the breast done today  She is scheduled to see Dr. Ashford for surgery   I asked patient to make appointment with Dr. Murillo after  surgery    Neuropathy secondary to chemotherapy  She is done with chemo now.  Hopefully the symptoms get little better  She is currently taking Neurontin 200 mg 3 times a day  Increase the Neurontin at bedtime dose to 300 mg  We talked about acupuncture continue with vitamin b6      AMIE Hernandez CNP  Hem/Onc   [facility]

## 2019-04-08 NOTE — PROGRESS NOTES
"Oncology Rooming Note    April 8, 2019 10:58 AM   Luci Londono is a 48 year old female who presents for:    Chief Complaint   Patient presents with     Oncology Clinic Visit     Initial Vitals: /83   Pulse 87   Temp 98.1  F (36.7  C) (Oral)   Resp 16   Ht 1.546 m (5' 0.87\")   Wt 95.7 kg (211 lb)   SpO2 100%   BMI 40.04 kg/m   Estimated body mass index is 40.04 kg/m  as calculated from the following:    Height as of this encounter: 1.546 m (5' 0.87\").    Weight as of this encounter: 95.7 kg (211 lb). Body surface area is 2.03 meters squared.  Extreme Pain (8) Comment: Data Unavailable   No LMP recorded.  Allergies reviewed: Yes  Medications reviewed: Yes    Medications: Medication refills not needed today.  Pharmacy name entered into [employer]:    [facility] [id number] [place], [place] - [address] AT [address] [place] [address] & [address] [facility] [id number] [address]    Clinical concerns: pain all over, numbness, can't sleep      Sophie Rodriguez, [facility]            "

## 2019-04-08 NOTE — TELEPHONE ENCOUNTER
Pt came over to us from MRI c/o numbness and pain over the weekend. She has been added on to see Gadiel at 11:00.   Sekou Galeano

## 2019-04-12 ENCOUNTER — CARE COORDINATION (OUTPATIENT)
Dept: SURGERY | Facility: CLINIC | Age: 49
End: 2019-04-12

## 2019-04-12 ENCOUNTER — OFFICE VISIT (OUTPATIENT)
Dept: SURGERY | Facility: CLINIC | Age: 49
End: 2019-04-12
Payer: COMMERCIAL

## 2019-04-12 VITALS
SYSTOLIC BLOOD PRESSURE: 122 MMHG | DIASTOLIC BLOOD PRESSURE: 82 MMHG | HEART RATE: 105 BPM | RESPIRATION RATE: 16 BRPM | WEIGHT: 211 LBS | OXYGEN SATURATION: 100 % | HEIGHT: 61 IN | BODY MASS INDEX: 39.84 KG/M2

## 2019-04-12 DIAGNOSIS — C50.411 MALIGNANT NEOPLASM OF UPPER-OUTER QUADRANT OF RIGHT BREAST IN FEMALE, ESTROGEN RECEPTOR NEGATIVE (H): Primary | ICD-10-CM

## 2019-04-12 DIAGNOSIS — Z17.1 MALIGNANT NEOPLASM OF UPPER-OUTER QUADRANT OF RIGHT BREAST IN FEMALE, ESTROGEN RECEPTOR NEGATIVE (H): Primary | ICD-10-CM

## 2019-04-12 PROCEDURE — 99213 OFFICE O/P EST LOW 20 MIN: CPT | Performed by: SURGERY

## 2019-04-12 ASSESSMENT — MIFFLIN-ST. JEOR: SCORE: 1529.47

## 2019-04-12 NOTE — LETTER
2019    RE: Luci Londono  :  1970    This is to certify that Luci Londono has been under my care for 5/15/2019 surgery.      Patient is NOT able to return to work/school at all from:   May / 15 / 2019 to  May / 22 / 2019    Patient is able to return to work/school without restrictions as of   May / 23 / 2019    Subject to change due to healing process.  If you have any questions please feel free to call our clinic at [phone] and speak with nursing.        Sincerely,          HECTOR JOSEPH/emkiana

## 2019-04-12 NOTE — PROGRESS NOTES
Accompanied Luci to her follow up appointment today with Dr. Ashford.      At the end of the consultation, we reviewed plan of care and education. Luci has decided to proceed with seed localized, right breast lumpectomy, seed localized right axillary lymph node, and right sentinel lymph node biopsy.    Luci has my contact  information and knows to contact me or Radha, Nurse Navigator, in the future with any questions/concerns.     María LYNN, RN, OCN  Oncology Care Coordinator  [facility]  [phone]

## 2019-04-12 NOTE — NURSING NOTE
Breast Patients    BREAST PATIENTS (ALL)    1-Do you have any of the following symptoms? Lump(s) or Mass(es)  2-In which breast are you having the symptoms? right  3-Do you use hormones?  No  4-Have you had a Mammogram? Yes  Where: -  Date: -  5-Have you ever had a breast cyst drained? No  6-Have you ever had a breast biopsy? Yes  Side: RT  Date: 10/2018  7-Have you ever had a Breast Cancer? No   8-Is there a history of Breast Cancer in your family? No  9-Have you ever had Ovarian Cancer? No  10-Is there a history of Ovarian Cancer in your family? No  11-Summarize your caffeine intake (i.e. coffee, tea, chocolate, soda etc.): COFFEE    BREAST PATIENTS (FEMALE)    12-What age did your periods begin? 14  13-Date your last menstrual period began? 11/1/2018  14-Number of full-term pregnancies: 3  15-Your age when your first child was born? 33  16-Did you nurse your children? Yes  17-Are you pregnant now? No  18-Have you begun menopause? No  19-Have you had either ovary removed?No  20-Do you have breast implants? No

## 2019-04-12 NOTE — PROGRESS NOTES
[facility] Follow Up Note    CHIEF COMPLAINT:  Right breast cancer    HISTORY OF PRESENT ILLNESS:  Luci Londono is a 48 year old female who is seen in follow up for right breast cancer. She originally presented in October 2018 with a palpable lump in her right breast. She had imaging which revealed an 8mm mass at 12:00, 6cm FN. This was biopsied as well as a concerning axillary node and found to be grade 3 invasive ductal carcinoma, ER/ME/Her 2 negative with positive axillary node. Breast MRI identified area measuring 3.8cm of concern. PET negative for distant disease. She has see Dr. Murillo and underwent neoadjuvant chemotherapy. She is now presenting to discuss surgical options following chemotherapy. She had a follow up MRI on 4/8/2019 which revealed no areas of enhancement in either breast and no lymphadenopathy - previously biopsied node appears normal now.     Luci reports she overall did fine with chemotherapy. She has some neuropathy in fingers and feet. she no longer can feel the mass in her breast.      REVIEW OF SYSTEMS:  Constitutional:  Negative for chills, fatigue, fever and weight change.  Eyes:  Negative for blurred vision, eye pain and photophobia.  ENT:  Negative for hearing problems, ENT pain, congestion, rhinorrhea, epistaxis, hoarseness and dental problems.  Cardiovascular:  Negative for chest pain, palpitations, tachycardia, orthopnea and edema.  Respiratory:  Negative for cough, dyspnea and hemoptysis.  Gastrointestinal:  Negative for abdominal pain, heartburn, constipation, diarrhea and stool changes.  Musculoskeletal:  Negative for arthralgias, back pain and myalgias.  Integumentary/Breast:  See HPI.    Past Medical History:   Diagnosis Date     Hypertension goal BP (blood pressure) < 140/80 2/18/2014     Lateral epicondylitis, right dominant elbow since late 10-17 11/1/2017     Port-A-Cath in place 10/26/2018     Thyroid disease        Past Surgical History:    Procedure Laterality Date     HAND SURGERY  2002    left     INSERT PORT VASCULAR ACCESS N/A 10/18/2018    Procedure: INSERTION OF VASCULAR PORT;  Surgeon: Stacey Ashford MD;  Location: [facility] OR     TUBAL LIGATION         Family History   Problem Relation Age of Onset     Diabetes Mother      Unknown/Adopted Father      Coronary Artery Disease No family hx of      Hypertension No family hx of      Hyperlipidemia No family hx of      Cerebrovascular Disease No family hx of      Breast Cancer No family hx of      Colon Cancer No family hx of      Prostate Cancer No family hx of      Other Cancer No family hx of      Depression No family hx of      Anxiety Disorder No family hx of      Mental Illness No family hx of      Substance Abuse No family hx of      Anesthesia Reaction No family hx of      Asthma No family hx of      Osteoporosis No family hx of      Genetic Disorder No family hx of      Thyroid Disease No family hx of      Obesity No family hx of        Social History     Tobacco Use     Smoking status: Never Smoker     Smokeless tobacco: Never Used   Substance Use Topics     Alcohol use: No       Patient Active Problem List   Diagnosis     Myalgia and myositis     Intervertebral lumbar disc disorder with myelopathy, lumbar region     Gastroesophageal reflux disease without esophagitis     Helicobacter pylori infection     Acquired hypothyroidism     Disorder of metabolism     Polycystic ovaries     Vitamin D insufficiency     BMI 35     Rosacea     Absence of menstruation     Chronic left-sided low back pain with left-sided sciatica     Pinguecula of both eyes     Presbyopia     Impaired fasting glucose     Migraine without aura and without status migrainosus, not intractable     Class 1 obesity due to excess calories with serious comorbidity and body mass index (BMI) of 33.0 to 33.9 in adult     Morbid obesity (H)     Myalgia w hx of recurrence since 2012     Mixed hyperlipidemia     Malignant neoplasm of  upper-outer quadrant of right breast in female, estrogen receptor negative (H)     Drug or medicinal substance causing adverse effect in therapeutic use, initial encounter     Abnormal electrocardiogram     Port-A-Cath in place     No Known Allergies  Current Outpatient Medications   Medication Sig Dispense Refill     ascorbic acid (VITAMIN C) 1000 MG TABS Take 1,000 mg by mouth daily       dexamethasone (DECADRON) 4 MG tablet Take 4 mg by mouth 2 times daily (with meals). 30 tablet 0     gabapentin (NEURONTIN) 100 MG capsule Take 2 capsules (200 mg) by mouth 3 times daily 180 capsule 1     levothyroxine (SYNTHROID/LEVOTHROID) 125 MCG tablet TAKE 1 TABLET BY MOUTH DAILY 30 tablet 6     lidocaine-prilocaine (EMLA) cream Apply quarter-size amount of Emla cream topically over port site one hour prior to port access for comfort. 30 g 3     loratadine (CLARITIN) 10 MG tablet Take 1 tablet (10 mg) by mouth daily 90 tablet 1     LORazepam (ATIVAN) 0.5 MG tablet Take 1 tablet (0.5 mg) by mouth every 4 hours as needed (Anxiety, Nausea/Vomiting or Sleep) 30 tablet 5     medroxyPROGESTERone (PROVERA) 10 MG tablet Take 1 tablet (10 mg) by mouth daily 90 tablet 3     metFORMIN (GLUCOPHAGE-XR) 500 MG 24 hr tablet TAKE 1 TABLET(500 MG) BY MOUTH DAILY WITH DINNER 90 tablet 1     multivitamin, therapeutic with minerals (THERA-VIT-M) TABS tablet Take 1 tablet by mouth daily       omeprazole (PRILOSEC) 20 MG CR capsule Take 1 capsule (20 mg) by mouth 2 times daily 60 capsule 11     ondansetron (ZOFRAN-ODT) 4 MG ODT tab Take 1-2 tablets (4-8 mg) by mouth every 8 hours as needed for nausea 10 tablet 0     prochlorperazine (COMPAZINE) 10 MG tablet Take 1 tablet (10 mg) by mouth every 6 hours as needed (Nausea/Vomiting) 30 tablet 5     senna-docusate (SENOKOT-S;PERICOLACE) 8.6-50 MG per tablet Take 1-2 tablets by mouth 2 times daily 30 tablet 0     Vitals: /82 (BP Location: Left arm, Cuff Size: Adult Regular)   Pulse 105   Resp 16  "  Ht 1.549 m (5' 1\")   Wt 95.7 kg (211 lb)   LMP 11/01/2018 (Exact Date)   SpO2 100%   Breastfeeding? No   BMI 39.87 kg/m    BMI= Body mass index is 39.87 kg/m .    EXAM:  GENERAL: healthy, alert and no distress   BREAST:  The breasts appear large and symmetric with no overlying skin changes.  The nipples are normal bilaterally.  There is no dimpling or thickening of the skin.  No mass is appreciated in either breast.  The breast tissue is generally soft.  There is no axillary or supraclavicular lymphadenopathy.  CARDIOVASCULAR:  RRR  RESPIRATORY: nonlabored breathing  NECK: Neck supple. No adenopathy. Thyroid symmetric, normal size,, Carotids without bruits.  SKIN: No suspicious lesions or rashes  LYMPH: Normal cervical lymph nodes      ASSESSMENT/PLAN:  Luci Londono is s/p neoadjuvant chemotherapy for a 3.8cm grade 3 invasive ductal carcinoma, ER/MO/Her 2 negative with positive axillary node. As above her MRI reveals no residual enhancement at site of prior mass and in axilla. We discussed surgical options. She would like to proceed with seed localized right breast lumpectomy with seed localized right axillary node excision and SLNB. We discussed the risks, benefits, indications and alternatives to surgery and she is comfortable with this plan. She understands she will benefit from adjuvant radiation. We will look at scheduling 4-6 weeks from her last chemotherapy treatment which was on 4/3/2019.       Stacey Ashford MD  [facility]  [phone]        Please route or send letter to:  Primary Care Provider (PCP) and Referring Provider      "

## 2019-04-15 ENCOUNTER — TELEPHONE (OUTPATIENT)
Dept: SURGERY | Facility: PHYSICIAN GROUP | Age: 49
End: 2019-04-15

## 2019-04-15 NOTE — TELEPHONE ENCOUNTER
Type of surgery: Seed localized right breast lumpectomy with seed localized axillary node and right sentinel lymph node biopsy, possible right axillary node dissection and port removal  Location of surgery: [facility]  Date and time of surgery: 5/15/19 at 10:40am  Surgeon: Dr. Stacey Ashford  Pre-Op Appt Date: Patient to schedule  Post-Op Appt Date: Patient to schedule   Packet sent out: Yes  Pre-cert/Authorization completed:  Not Applicable  Date: 4/15/19

## 2019-04-18 ENCOUNTER — TELEPHONE (OUTPATIENT)
Dept: ONCOLOGY | Facility: CLINIC | Age: 49
End: 2019-04-18

## 2019-04-18 NOTE — TELEPHONE ENCOUNTER
Cheikh Verma she will come in 5/29/19 at 1000 to review her surgical pathology with Dr. Murillo. Marialuisa Casper RN,BSN,OCN

## 2019-04-25 ENCOUNTER — ONCOLOGY VISIT (OUTPATIENT)
Dept: ONCOLOGY | Facility: CLINIC | Age: 49
End: 2019-04-25
Attending: INTERNAL MEDICINE
Payer: COMMERCIAL

## 2019-04-25 ENCOUNTER — PRE VISIT (OUTPATIENT)
Dept: ONCOLOGY | Facility: CLINIC | Age: 49
End: 2019-04-25

## 2019-04-25 DIAGNOSIS — Z17.1 MALIGNANT NEOPLASM OF UPPER-OUTER QUADRANT OF RIGHT BREAST IN FEMALE, ESTROGEN RECEPTOR NEGATIVE (H): Primary | ICD-10-CM

## 2019-04-25 DIAGNOSIS — C50.411 MALIGNANT NEOPLASM OF UPPER-OUTER QUADRANT OF RIGHT BREAST IN FEMALE, ESTROGEN RECEPTOR NEGATIVE (H): Primary | ICD-10-CM

## 2019-04-25 LAB — MISCELLANEOUS TEST: NORMAL

## 2019-04-25 PROCEDURE — 96040 ZZH GENETIC COUNSELING, EACH 30 MINUTES: CPT | Performed by: GENETIC COUNSELOR, MS

## 2019-04-25 NOTE — PROGRESS NOTES
4/25/2019    Referring Provider: Stacey Ashford MD    Presenting Information:   I met with Luci Alert today for genetic counseling at the Cancer Risk Management Program at the [facility] to discuss her personal history of breast cancer.  She is here today to review this history and available genetic testing options.    Personal History:  Luci is a 48 year old female. She was diagnosed with triple negative invasive ductal carcinoma of her right breast at age 47; treatment has included chemotherapy, and she has surgery (lumpectomy) scheduled for 5/15/2019.  Luci has her ovaries, fallopian tubes and uterus in place. She reported no tobacco use and no alcohol use.    Family History: (Please see scanned pedigree for detailed family history information)    Luci reports no family history of cancer    Her family is from [facility]  There is no known [employer] ancestry on either side of her family.    Discussion:    Luci's personal history of triple negative breast cancer is suggestive of a possible hereditary cancer syndrome.    We reviewed the features of sporadic, familial, and hereditary cancers.  Based on her personal history, Luci meets current National Comprehensive Cancer Network (NCCN) criteria for genetic testing of BRCA1/2.      We discussed the natural history and genetics of Hereditary Breast and Ovarian Cancer syndrome caused by mutations in the BRCA1/2 genes. We discussed that there are additional genes that could cause increased risk for breast cancer. As many of these genes present with overlapping features in a family and accurate cancer risk cannot always be established based upon the pedigree analysis alone, it would be reasonable for Luci to consider panel genetic testing to analyze multiple genes at once.    A detailed handout regarding hereditary breast cancer and the information we discussed was provided to Luci at the end of our appointment today and can be  found in the after visit summary. Topics included: inheritance pattern, cancer risks, cancer screening recommendations, and also risks, benefits and limitations of testing.    We reviewed genetic testing options for hereditary breast and gynecologic cancers: actionable high/moderate breast and gynecologic cancer risk custom panel (CustomNext-Cancer, 19 genes, a combination of GynPlus and BRCAplus + NBN, STK11, and NF1) and expanded high and moderate risk panel (OvaNext, 25 genes).  Luci expressed an interest in learning as much information as possible from the testing. She opted for the OvaNext panel.  Genetic testing is available for 25 genes associated with hereditary gynecologic, breast, and related cancers: OvaNext (KENN, BARD1, BRCA1, BRCA2, BRIP1, CDH1, CHEK2, DICER1, EPCAM, MLH1, MRE11A, MSH2, MSH6, MUTYH, NBN, NF1, PALB2, PMS2, PTEN, RAD50, RAD51C, RAD51D, SMARCA4, STK11, and TP53).  We discussed that some of the genes in the OvaNext panel are associated with specific hereditary cancer syndromes and published management guidelines: Hereditary Breast and Ovarian Cancer syndrome (BRCA1, BRCA2), Bernard syndrome (MLH1, MSH2, MSH6, PMS2, EPCAM), Hereditary Diffuse Gastric Cancer (CDH1), Cowden syndrome (PTEN), Li Fraumeni syndrome (TP53), Peutz-Jeghers syndrome (STK11), MUTYH Associated Polyposis (MUTYH), and Neurofibromatosis type 1 (NF1).    Risk-reducing salpingo-oophorectomy can be considered in women with mutations in BRIP1, RAD51C, or RAD51D. Breast and/or other cancer risk management guidelines are available for KENN, CHEK2, PALB2, NF1, and NBN.  The remaining genes (BARD1, DICER1, MRE11A, RAD50, and SMARCA4) are associated with increased cancer risk and may allow us to make medical recommendations when mutations are identified.    Luci was provided with a detailed brochure from [employer] explaining the OvaNext testing.    Consent was obtained and genetic testing for OvaNext was sent to [facility]. We discussed that results for 8 genes could be obtained within 1-2 weeks for surgical decision making.  Luci stated that she would feel most comfortable placing testing on hold to first determine insurance coverage and expected out of pocket cost.  I informed Luci that results may not be available prior to surgery if placed on hold for insurance.  She plans to follow up in one week to revisit this decision, should results be needed sooner than the current expected report date.     Medical Management: For Luci, we reviewed that the information from genetic testing may determine:    surgery to treat Luci's active cancer diagnosis (i.e. lumpectomy versus bilateral mastectomy),    additional cancer screening for which Luci may qualify (i.e. mammogram and breast MRI if appropriate, earlier/more frequent colonoscopies, more frequent dermatologic exams, etc.),    options for risk reducing surgeries Luci could consider (i.e. bilateral mastectomy, surgery to remove her ovaries and/or uterus, etc.),      and targeted chemotherapies for certain cancers if needed in the future (i.e. immunotherapy for individuals with Bernard syndrome, PARP inhibitors, etc.).     These recommendations will be discussed in detail once genetic testing is completed.     Plan:  1) Today Luci elected to proceed with the OvaNext gene panel offered through "[employer].  2) This information should be available in 3-4 weeks.  Luci elected to place testing on hold for prior authorization. She is encouraged to contact me should she need to obtain results prior to her surgery, as a prior authorization hold may delay results.    3) Luci will be contacted to discuss the results.    Face to face time: 40 minutes    Neida Fortune MS, [facility]  Licensed Genetic Counselor  [phone]

## 2019-04-25 NOTE — LETTER
4/25/2019         RE: Luci Londono  [address] [facility] [address]        Dear Colleague,    Thank you for referring your patient, Luci Londono, to the CANCER RISK MANAGEMENT PROGRAM. Please see a copy of my visit note below.    4/25/2019    Referring Provider: Stacey Ashford MD    Presenting Information:   I met with Luci Alert today for genetic counseling at the Cancer Risk Management Program at the [facility] to discuss her personal history of breast cancer.  She is here today to review this history and available genetic testing options.    Personal History:  Luci is a 48 year old female. She was diagnosed with triple negative invasive ductal carcinoma of her right breast at age 47; treatment has included chemotherapy, and she has surgery (lumpectomy) scheduled for 5/15/2019.  Luci has her ovaries, fallopian tubes and uterus in place. She reported no tobacco use and no alcohol use.    Family History: (Please see scanned pedigree for detailed family history information)    Luci reports no family history of cancer    Her family is from [facility]  There is no known Ashkenazi Mandaeism ancestry on either side of her family.    Discussion:    Luci's personal history of triple negative breast cancer is suggestive of a possible hereditary cancer syndrome.    We reviewed the features of sporadic, familial, and hereditary cancers.  Based on her personal history, Luci meets current National Comprehensive Cancer Network (NCCN) criteria for genetic testing of BRCA1/2.      We discussed the natural history and genetics of Hereditary Breast and Ovarian Cancer syndrome caused by mutations in the BRCA1/2 genes. We discussed that there are additional genes that could cause increased risk for breast cancer. As many of these genes present with overlapping features in a family and accurate cancer risk cannot always be established based upon the pedigree analysis alone,  it would be reasonable for Luci to consider panel genetic testing to analyze multiple genes at once.    A detailed handout regarding hereditary breast cancer and the information we discussed was provided to Luci at the end of our appointment today and can be found in the after visit summary. Topics included: inheritance pattern, cancer risks, cancer screening recommendations, and also risks, benefits and limitations of testing.    We reviewed genetic testing options for hereditary breast and gynecologic cancers: actionable high/moderate breast and gynecologic cancer risk custom panel (CustomNext-Cancer, 19 genes, a combination of GynPlus and BRCAplus + NBN, STK11, and NF1) and expanded high and moderate risk panel (OvaNext, 25 genes).  Luci expressed an interest in learning as much information as possible from the testing. She opted for the OvaNext panel.  Genetic testing is available for 25 genes associated with hereditary gynecologic, breast, and related cancers: OvaNext (KENN, BARD1, BRCA1, BRCA2, BRIP1, CDH1, CHEK2, DICER1, EPCAM, MLH1, MRE11A, MSH2, MSH6, MUTYH, NBN, NF1, PALB2, PMS2, PTEN, RAD50, RAD51C, RAD51D, SMARCA4, STK11, and TP53).  We discussed that some of the genes in the OvaNext panel are associated with specific hereditary cancer syndromes and published management guidelines: Hereditary Breast and Ovarian Cancer syndrome (BRCA1, BRCA2), Bernard syndrome (MLH1, MSH2, MSH6, PMS2, EPCAM), Hereditary Diffuse Gastric Cancer (CDH1), Cowden syndrome (PTEN), Li Fraumeni syndrome (TP53), Peutz-Jeghers syndrome (STK11), MUTYH Associated Polyposis (MUTYH), and Neurofibromatosis type 1 (NF1).    Risk-reducing salpingo-oophorectomy can be considered in women with mutations in BRIP1, RAD51C, or RAD51D. Breast and/or other cancer risk management guidelines are available for KENN, CHEK2, PALB2, NF1, and NBN.  The remaining genes (BARD1, DICER1, MRE11A, RAD50, and SMARCA4) are associated with increased cancer  risk and may allow us to make medical recommendations when mutations are identified.    Luci was provided with a detailed brochure from [facility] explaining the OvaNext testing.    Consent was obtained and genetic testing for OvaNext was sent to [facility]. We discussed that results for 8 genes could be obtained within 1-2 weeks for surgical decision making.  Luci stated that she would feel most comfortable placing testing on hold to first determine insurance coverage and expected out of pocket cost.  I informed Luci that results may not be available prior to surgery if placed on hold for insurance.  She plans to follow up in one week to revisit this decision, should results be needed sooner than the current expected report date.     Medical Management: For Luci, we reviewed that the information from genetic testing may determine:    surgery to treat Luci's active cancer diagnosis (i.e. lumpectomy versus bilateral mastectomy),    additional cancer screening for which Luci may qualify (i.e. mammogram and breast MRI if appropriate, earlier/more frequent colonoscopies, more frequent dermatologic exams, etc.),    options for risk reducing surgeries Luci could consider (i.e. bilateral mastectomy, surgery to remove her ovaries and/or uterus, etc.),      and targeted chemotherapies for certain cancers if needed in the future (i.e. immunotherapy for individuals with Bernard syndrome, PARP inhibitors, etc.).     These recommendations will be discussed in detail once genetic testing is completed.     Plan:  1) Today Luci elected to proceed with the OvaNext gene panel offered through [facility].  2) This information should be available in 3-4 weeks.  Luci elected to place testing on hold for prior authorization. She is encouraged to contact me should she need to obtain results prior to her surgery, as a prior authorization hold may delay results.    3) Luci will be contacted  to discuss the results.    Face to face time: 40 minutes    Neida Fortune MS, [facility]  Licensed Genetic Counselor  [phone]                 Again, thank you for allowing me to participate in the care of your patient.        Sincerely,        Neida Fortune GC

## 2019-04-25 NOTE — PATIENT INSTRUCTIONS
Assessing Cancer Risk  Only about 5-10% of cancers are thought to be due to an inherited cancer susceptibility gene.    These families often have:    Several people with the same or related types of cancer    Cancers diagnosed at a young age (before age 50)    Individuals with more than one primary cancer    Multiple generations of the family affected with cancer    Some people may be candidates for genetic testing of more than one gene.  For these families, genetic testing using a cancer panel may be offered.  These panels will test different genes known to increase the risk for breast, ovarian, uterine, and/or other cancers. All of the genes discussed below have published clinical management guidelines for individuals who are found to carry a mutation. The purpose of this handout is to serve as a brief summary of the genes analyzed by the panels used to inquire about hereditary breast and gynecologic cancer:  KENN, BRCA1, BRCA2, BRIP1, CDH1, CHEK2, MLH1, MSH2, MSH6, PMS2, EPCAM, PTEN, PALB2, RAD51C, RAD51D, and TP53.  ______________________________________________________________________________  Hereditary Breast and Ovarian Cancer Syndrome   (BRCA1 and BRCA2)  A single mutation in one of the copies of BRCA1 or BRCA2 increases the risk for breast and ovarian cancer, among others.  The risk for pancreatic cancer and melanoma may also be slightly increased in some families.  The chart below shows the chance that someone with a BRCA mutation would develop cancer in his or her lifetime1,2,3,4.        A person s ethnic background is also important to consider, as individuals of Ashkenazi Christianity ancestry have a higher chance of having a BRCA gene mutation.  There are three BRCA mutations that occur more frequently in this population.    Bernard Syndrome   (MLH1, MSH2, MSH6, PMS2, and EPCAM)  Currently five genes are known to cause Bernard Syndrome: MLH1, MSH2, MSH6, PMS2, and EPCAM.  A single mutation in one of the  Bernard Syndrome genes increases the risk for colon, endometrial, ovarian, and stomach cancers.  Other cancers that occur less commonly in Bernard Syndrome include urinary tract, skin, and brain cancers.  The chart below shows the chance that a person with Bernard syndrome would develop cancer in his or her lifetime5.      *Cancer risk varies depending on Bernard syndrome gene found    Cowden Syndrome   (PTEN)  Cowden syndrome is a hereditary condition that increases the risk for breast, thyroid, endometrial, colon, and kidney cancer.  Cowden syndrome is caused by a mutation in the PTEN gene.  A single mutation in one of the copies of PTEN causes Cowden syndrome and increases cancer risk.  The chart below shows the chance that someone with a PTEN mutation would develop cancer in their lifetime6,7.  Other benign features seen in some individuals with Cowden syndrome include benign skin lesions (facial papules, keratoses, lipomas), learning disability, autism, thyroid nodules, colon polyps, and larger head size.      *One recent study found breast cancer risk to be increased to 85%    Li-Fraumeni Syndrome   (TP53)  Li-Fraumeni Syndrome (LFS) is a cancer predisposition syndrome caused by a mutation in the TP53 gene. A single mutation in one of the copies of TP53 increases the risk for multiple cancers. Individuals with LFS are at an increased risk for developing cancer at a young age. The lifetime risk for development of a LFS-associated cancer is 50% by age 30 and 90% by age 60.   Core Cancers: Sarcomas, Breast, Brain, Lung, Leukemias/Lymphomas, Adrenocortical carcinomas  Other Cancers: Gastrointestinal, Thyroid, Skin, Genitourinary    Hereditary Diffuse Gastric Cancer   (CDH1)  Currently, one gene is known to cause hereditary diffuse gastric cancer (HDGC): CDH1.  Individuals with HDGC are at increased risk for diffuse gastric cancer and lobular breast cancer. Of people diagnosed with HDGC, 30-50% have a mutation in the CDH1  gene.  This suggests there are likely other genes that may cause HDGC that have not been identified yet.      Lifetime Cancer Risks    General Population HDGC    Diffuse Gastric  <1% ~80%   Breast 12% 39-52%         Additional Genes  KENN  KENN is a moderate-risk breast cancer gene. Women who have a mutation in KENN can have between a 2-4 fold increased risk for breast cancer compared to the general population8. KENN mutations have also been associated with increased risk for pancreatic cancer, however an estimate of this cancer risk is not well understood9. Individuals who inherit two KENN mutations have a condition called ataxia-telangiectasia (AT).  This rare autosomal recessive condition affects the nervous system and immune system, and is associated with progressive cerebellar ataxia beginning in childhood.  Individuals with ataxia-telangiectasia often have a weakened immune system and have an increased risk for childhood cancers.    PALB2  Mutations in PALB2 have been shown to increase the risk of breast cancer up to 33-58% in some families; where individuals fall within this risk range is dependent upon family gwmeanu32. PALB2 mutations have also been associated with increased risk for pancreatic cancer, although this risk has not been quantified yet.  Individuals who inherit two PALB2 mutations--one from their mother and one from their father--have a condition called Fanconi Anemia.  This rare autosomal recessive condition is associated with short stature, developmental delay, bone marrow failure, and increased risk for childhood cancers.    CHEK2   CHEK2 is a moderate-risk breast cancer gene.  Women who have a mutation in CHEK2 have around a 2-fold increased risk for breast cancer compared to the general population, and this risk may be higher depending upon family history.11,12,13 Mutations in CHEK2 have also been shown to increase the risk of a number of other cancers, including colon and prostate, however  these cancer risks are currently not well understood.    BRIP1, RAD51C and RAD51D  Mutations in BRIP1, RAD51C, and RAD51D have been shown to increase the risk of ovarian cancer and possibly female breast cancer as well14,15 .       Lifetime Cancer Risk    General Population BRIP1 RAD51C RAD51D   Ovarian 1-2% ~5-8% ~5-9% ~7-15%           Inheritance  All of the cancer syndromes reviewed above are inherited in an autosomal dominant pattern.  This means that if a parent has a mutation, each of his or her children will have a 50% chance of inheriting that same mutation.  Therefore, each child--male or female--would have a 50% chance of being at increased risk for developing cancer.      Image obtained from Genetics Home Reference, 2013     Mutations in some genes can occur de reema, which means that a person s mutation occurred for the first time in them and was not inherited from a parent.  Now that they have the mutation, however, it can be passed on to future generations.    Genetic Testing  Genetic testing involves a blood test and will look at the genetic information in the KENN, BRCA1, BRCA2, BRIP1, CDH1, CHEK2, MLH1, MSH2, MSH6, PMS2, EPCAM, PTEN, PALB2, RAD51C, RAD51D, and TP53 genes for any harmful mutations that are associated with increased cancer risk.  If possible, it is recommended that the person(s) who has had cancer be tested before other family members.  That person will give us the most useful information about whether or not a specific gene is associated with the cancer in the family.    Results  There are three possible results of genetic testing:    Positive--a harmful mutation was identified in one or more of the genes    Negative--no mutation was identified in any of the genes on this panel    Variant of unknown significance--a variation in one of the genes was identified, but it is unclear how this impacts cancer risk in the family    Advantages and Disadvantages   There are advantages and  disadvantages to genetic testing.    Advantages    May clarify your cancer risk    Can help you make medical decisions    May explain the cancers in your family    May give useful information to your family members (if you share your results)    Disadvantages    Possible negative emotional impact of learning about inherited cancer risk    Uncertainty in interpreting a negative test result in some situations    Possible genetic discrimination concerns (see below)    Genetic Information Nondiscrimination Act (MARI)  MARI is a federal law that protects individuals from health insurance or employment discrimination based on a genetic test result alone.  Although rare, there are currently no legal discrimination protections in terms of life insurance, long term care, or disability insurances.  Visit the [employer] [place] website to learn more.    Reducing Cancer Risk  All of the genes described above have nationally recognized cancer screening guidelines that would be recommended for individuals who test positive.  In addition to increased cancer screening, surgeries may be offered or recommended to reduce cancer risk.  Recommendations are based upon an individual s genetic test result as well as their personal and family history of cancer.    Questions to Think About Regarding Genetic Testing:    What effect will the test result have on me and my relationship with my family members if I have an inherited gene mutation?  If I don t have a gene mutation?    Should I share my test results, and how will my family react to this news, which may also affect them?    Are my children ready to learn new information that may one day affect their own health?    Hereditary Cancer Resources    FORCE: Facing Our Risk of Cancer Empowered facingourrisk.org   Bright Pink bebrightpink.org   Li-Fraumeni Syndrome Association lfsassociation.org   PTEN World PTENworld.com   No stomach for cancer, Inc.  nostomachforcancer.org   Stomach cancer relief network Scrnet.org   Collaborative Group of the Americas on Inherited Colorectal Cancer (CGA) cgaicc.com    Cancer Care cancercare.org   American Cancer Society (ACS) cancer.org   National Cancer Eliot (NCI) cancer.gov     Please call us if you have any questions or concerns.   Cancer Risk Management Program [phone] ([phone])  ? Elisa Smith, MS, [facility]  [phone]  ? Carrie Delacruz, MS, [facility]  [phone]  ? Neida Fortune, MS, [facility]  [phone]  ? Valentina Baugh, MS, [facility]  [phone]  ? Rose Donny, MS, [facility] [phone]    References  1. Bharati NASH, Aristides PDP, Joana S, Chandana LEONARDO, Afshin JE, Hans JL, Sonny N, Erin H, Swapna O, Charmaine A, Chitra B, Edd P, Jodi S, Ramsey DM, Andrés N, Roseanne E, Lluvia H, Oswaldo E, Lubinski J, Gronwald J, Jono B, Tulinius H, Thorlacius S, Eerola H, Jennie H, Catalino K, Echo OP. Average risks of breast and ovarian cancer associated with BRCA1 or BRCA2 mutations detected in case series unselected for family history: a combined analysis of 222 studies. Am J Hum Sonali. 2003;72:1117-30.  2. Soraida N, Cindy M, Cristiane G.  BRCA1 and BRCA2 Hereditary Breast and Ovarian Cancer. Gene Reviews online. 2013.  3. Zenon YC, Nathaly S, Luciano G, Lambert S. Breast cancer risk among male BRCA1 and BRCA2 mutation carriers. J Natl Cancer Inst. 2007;99:1811-4.  4. Gerhard ALVARES, Meme I, Raza J, Dasha E, Tanja ER, Heather F. Risk of breast cancer in male BRCA2 carriers. J Med Sonali. 2010;47:710-1.  5. National Comprehensive Cancer Network. Clinical practice guidelines in oncology, colorectal cancer screening. Available online (registration required). 2015.  6. Francisco NATARAJAN, Eldon J, Rupa J, Kenji LA, Mickey MS, Eng C. Lifetime cancer risks in individuals with germline PTEN mutations. Clin Cancer Res. 2012;18:400-7.  7. Shoshana KAY. Cowden Syndrome: A Critical Review of the Clinical Literature. J Sonali .  2009:18:13-27.  8. Milka A, Kolby D, Patrick S, Katrin P, Alli T, Pacheco M, Cole B, Celena H, Sebastian R, Herve K, Lisseth L, Gerhard DG, Ramsey D, Toney DF, Jesse MR, The Breast Cancer Susceptibility Collaboration () & Elaina CONWAY. KENN mutations that cause ataxia-telangiectasia are breast cancer susceptibility alleles. Nature Genetics. 2006;38:873-875  9. Reynaldo N , Sheng Y, Raissa J, Ken L, Kashif GM , Manolo ML, Gallinger S, Richardson AG, Syngal S, Charis ML, Yoandy J , Kris R, Tevin SZ, Arlene JR, Jennifer VE, Anitra M, Vodaniel B, Tom N, Eliana RH, Thomas KW, and Morales AP. KENN mutations in patients with hereditary pancreatic cancer. Cancer Discover. 2012;2:41-46  10. Bharati GERBER, et al. Breast-Cancer Risk in Families with Mutations in PALB2. NEJM. 2014; 371(6):497-506.  11. CHEK2 Breast Cancer Case-Control Consortium. CHEK2*1100delC and susceptibility to breast cancer: A collaborative analysis involving 10,860 breast cancer cases and 9,065 controls from 10 studies. Am J Hum Sonali, 74 (2004), pp. 0236-7910  12. Iban T, Monalisa S, Caden K, et al. Spectrum of Mutations in BRCA1, BRCA2, CHEK2, and TP53 in Families at High Risk of Breast Cancer. BENNETT. 2006;295(12):6020-5776.   13. Roshan C, Su D, Sohan A, et al. Risk of breast cancer in women with a CHEK2 mutation with and without a family history of breast cancer. J Clin Oncol. 2011;29:5446-0111.  14. Lamin GOOD, Andrew E, Cecilio SJ, et al. Contribution of germline mutations in the RAD51B, RAD51C, and RAD51D genes to ovarian cancer in the population. J Clin Oncol. 2015;33(26):6766-8752. Doi:10.1200/JCO.2015.61.2408.  15. Rhianna T, Jackie SULLIVAN, Edith P, et al. Mutations in BRIP1 confer high risk of ovarian cancer. Sayra Sonali. 2011;43(11):0351-0025. doi:10.1038/ng.955.

## 2019-04-25 NOTE — LETTER
Cancer Risk Management  Program Locations    [facility]  Mailing Address  Cancer Risk Management Program  [facility]  [address]  [place] [address]    New patient appointments  [phone]  April 26, 2019    Luci Londono  [address]  [facility] [address]      Dear Luci,    It was a pleasure meeting with you at the [facility] on 4/25/2019.  Here is a copy of the progress note from your recent genetic counseling visit to the Cancer Risk Management Program.  If you have any additional questions, please feel free to call.    Referring Provider: Stacey Ashford MD    Presenting Information:   I met with Luci Alert today for genetic counseling at the Cancer Risk Management Program at the [facility] to discuss her personal history of breast cancer.  She is here today to review this history and available genetic testing options.    Personal History:  Luci is a 48 year old female. She was diagnosed with triple negative invasive ductal carcinoma of her right breast at age 47; treatment has included chemotherapy, and she has surgery (lumpectomy) scheduled for 5/15/2019.  Luci has her ovaries, fallopian tubes and uterus in place. She reported no tobacco use and no alcohol use.    Family History: (Please see scanned pedigree for detailed family history information)    Luci reports no family history of cancer    Her family is from [facility]  There is no known Ashkenazi Sabianism ancestry on either side of her family.    Discussion:    Luci's personal history of triple negative breast cancer is suggestive of a possible hereditary cancer syndrome.    We reviewed the features of sporadic, familial, and hereditary cancers.  Based on her personal history, Luci  meets current National Comprehensive Cancer Network (NCCN) criteria for genetic testing of BRCA1/2.      We discussed the natural history and genetics of Hereditary Breast and Ovarian Cancer syndrome caused by mutations in the BRCA1/2 genes. We discussed that there are additional genes that could cause increased risk for breast cancer. As many of these genes present with overlapping features in a family and accurate cancer risk cannot always be established based upon the pedigree analysis alone, it would be reasonable for [facility] to consider panel genetic testing to analyze multiple genes at once.    A detailed handout regarding hereditary breast cancer and the information we discussed was provided to Luci at the end of our appointment today and can be found in the after visit summary. Topics included: inheritance pattern, cancer risks, cancer screening recommendations, and also risks, benefits and limitations of testing.    We reviewed genetic testing options for hereditary breast and gynecologic cancers: actionable high/moderate breast and gynecologic cancer risk custom panel (CustomNext-Cancer, 19 genes, a combination of GynPlus and BRCAplus + NBN, STK11, and NF1) and expanded high and moderate risk panel (OvaNext, 25 genes).  Luci expressed an interest in learning as much information as possible from the testing. She opted for the OvaNext panel.  Genetic testing is available for 25 genes associated with hereditary gynecologic, breast, and related cancers: OvaNext (KENN, BARD1, BRCA1, BRCA2, BRIP1, CDH1, CHEK2, DICER1, EPCAM, MLH1, MRE11A, MSH2, MSH6, MUTYH, NBN, NF1, PALB2, PMS2, PTEN, RAD50, RAD51C, RAD51D, SMARCA4, STK11, and TP53).  We discussed that some of the genes in the OvaNext panel are associated with specific hereditary cancer syndromes and published management guidelines: Hereditary Breast and Ovarian Cancer syndrome (BRCA1, BRCA2), Bernard syndrome (MLH1, MSH2, MSH6, PMS2, EPCAM), Hereditary  Diffuse Gastric Cancer (CDH1), Cowden syndrome (PTEN), Li Fraumeni syndrome (TP53), Peutz-Jeghers syndrome (STK11), MUTYH Associated Polyposis (MUTYH), and Neurofibromatosis type 1 (NF1).    Risk-reducing salpingo-oophorectomy can be considered in women with mutations in BRIP1, RAD51C, or RAD51D. Breast and/or other cancer risk management guidelines are available for KENN, CHEK2, PALB2, NF1, and NBN.  The remaining genes (BARD1, DICER1, MRE11A, RAD50, and SMARCA4) are associated with increased cancer risk and may allow us to make medical recommendations when mutations are identified.    Luci was provided with a detailed brochure from [facility] explaining the OvaNext testing.    Consent was obtained and genetic testing for OvaNext was sent to [facility]. We discussed that results for 8 genes could be obtained within 1-2 weeks for surgical decision making.  Luci stated that she would feel most comfortable placing testing on hold to first determine insurance coverage and expected out of pocket cost.  I informed Luci that results may not be available prior to surgery if placed on hold for insurance.  She plans to follow up in one week to revisit this decision, should results be needed sooner than the current expected report date.     Medical Management: For Luci, we reviewed that the information from genetic testing may determine:    surgery to treat Luci's active cancer diagnosis (i.e. lumpectomy versus bilateral mastectomy),    additional cancer screening for which Luci may qualify (i.e. mammogram and breast MRI if appropriate, earlier/more frequent colonoscopies, more frequent dermatologic exams, etc.),    options for risk reducing surgeries Luci could consider (i.e. bilateral mastectomy, surgery to remove her ovaries and/or uterus, etc.),      and targeted chemotherapies for certain cancers if needed in the future (i.e. immunotherapy for individuals with Bernard syndrome, PARP  inhibitors, etc.).     These recommendations will be discussed in detail once genetic testing is completed.     Plan:  1) Today Luci elected to proceed with the OvaNext gene panel offered through [facility].  2) This information should be available in 3-4 weeks.  Luci elected to place testing on hold for prior authorization. She is encouraged to contact me should she need to obtain results prior to her surgery, as a prior authorization hold may delay results.    3) Luci will be contacted to discuss the results.    Neida Fortune MS, [facility]  Licensed Genetic Counselor  [phone]

## 2019-05-08 ENCOUNTER — TELEPHONE (OUTPATIENT)
Dept: ONCOLOGY | Facility: CLINIC | Age: 49
End: 2019-05-08

## 2019-05-08 NOTE — Clinical Note
Please print and send a copy of this letter and the patient's genetic testing report to the patient.    Please enclose test report: SEND OUTS MISCELLANEOUS order  [[id number]] (Order [id number])

## 2019-05-08 NOTE — LETTER
Cancer Risk Management  Program [facility]  [facility]  [facility]  Mailing Address  Cancer Risk Management Program  [facility]  [address]  [place] [address]    New patient appointments  [phone]  May 10, 2019    Luci Londono  [address]  [facility] [address]      Dear Relative:  The purpose of this letter is to inform you that your relative recently underwent genetic counseling and genetic testing due to the history of breast cancer.  The testing done through [facility] identified a mutation in the PALB2 gene.  Specifically, the mutation is called p.[id number]*.  The accession number linked to their test result is [id number].   A mutation (or change in the genetic code) causes a specific gene to stop working properly.  Ultimately, individuals who have a mutation in this PALB2 gene have an increased risk for certain cancers.  These risks may be influenced by the number of family members with cancer, ages of onset, and one s relationship to those affected individuals.  PALB2 mutations increase the lifetime risk of breast cancer up to approximately 33-58% over a woman s lifetime, compared to 12% in the general population.  PALB2 mutations have also been associated with an increased risk for pancreatic and possibly ovarian cancer.  Both men and women have a 50% chance of passing this mutation on to each of their children.  If individuals are found to have a mutation in the PALB2 gene, we would recommend increased breast cancer screening at a younger age. Additional screening may be considered based on family history.    In rare situations in which both parents have a mutation in the PALB2 gene, their children each may have a 25% risk for Fanconi anemia. Fanconi anemia is a rare condition with  onset in childhood.  Fanconi anemia often results in physical abnormalities, growth delays, bone marrow failure, and increased risk for cancer.  For individuals of childbearing age with PALB2 mutations, genetic counseling and genetic testing may be advised for their partners.  I understand that this may be surprising, unexpected, and even scary news.  Because this mutation has been identified in your family, you are at risk for having the same mutation.  As mentioned earlier, your children may also be at risk.    Scheduling a genetic counseling appointment does not mean you have to undergo genetic testing.  The decision to pursue such testing is a very personal one that is discussed in more detail during the session.  Much of cancer genetic counseling is providing valuable information to individuals who are impacted by genetic information such as this.    If you are interested in scheduling a genetic counseling appointment at [facility], please call [phone] to schedule an appointment. You can also find a genetic counselor close to you or at another health system at [facility]  Sincerely,   Neida Fortune MS, [facility]  Licensed Genetic Counselor

## 2019-05-08 NOTE — TELEPHONE ENCOUNTER
"5/8/2019    Referring Provider: Stacey Ashford MD    Presenting Information:   I spoke to Luci by phone today to discuss her genetic testing results. Her blood was drawn on 4/25/2019. OvaNext testing was ordered from [facility]. This testing was done because of Luci's breast cancer diagnosis.    Genetic Testing Results: POSITIVE  Luci is POSITIVE for a PALB2 mutation. Specifically her mutation is called p.[id number]*. We discussed that this mutation is associated with an increased risk for several cancers, including breast, pancreatic and ovarian cancer. We discussed the impact of this testing on Luci in detail.     Luci was also found to have variants of uncertain significance (VUS) in the BRCA2 and MLH1 genes. It is currently not clear if these two variants are associated with increased cancer risk (such as Hereditary Breast and Ovarian Cancer syndrome due to harmful mutations in the BRCA2 gene, or Bernard syndrome due to harmful mutation in the MLH1 gene). Given the uncertain significance of this result, medical management decisions should NOT be made based on these two variants alone.    BRCA2 p.T77A (c.229A>G); Variant, Unknown Significance    MLH1 p.I25V (c.73A>G); Variant, Unknown Significance    Of note, Luci tested negative for mutations in the KENN, BARD1, BRCA1, BRCA2, BRIP1, CDH1, CHEK2, DICER1, EPCAM, MLH1, MRE11A, MSH2, MSH6, MUTYH, NBN, NF1, PMS2, PTEN, RAD50, RAD51C, RAD51D, SMARCA4, STK11, and TP53 genes. This test involved sequencing and deletion/duplication analysis of all genes with the exceptions of EPCAM (deletions/duplications only).    A copy of the test report can be found in the Laboratory tab, dated 4/25/2019, and named \"SEND OUTS MISC TEST\". The report is scanned in as a linked document.    Cancer Risks:    Individuals with a PALB2 mutation are at increased risk of certain cancers.  These risks may be influenced by family history:     The lifetime breast cancer risk for " women who carry one PALB2 mutation is approximately 33-58%, compared to the lifetime population risk of breast cancer of 12%. These risks may be influenced by the number of family members with breast cancer, ages of onset, and relationship to these individuals.      We also discussed the association of PALB2 mutations with increased risk for pancreatic and ovarian cancer; however, these risks are currently not well defined.     Though no exact lifetime risks are available at this time, there may be increased risks for other cancers, as well.    We reviewed that the gene PALB2 is currently considered a moderate-risk gene. This means that mutations in this gene increase the risk for certain cancers, but are unlikely to be the single cause for an individual's cancer. There are likely other genetic and/or environmental risk factors that, in combination with a PALB2 gene mutation, may be associated with Luci's breast cancer diagnosis.    Recommendations for Screening and Management:   We discussed the following screening recommendations for individuals who carry one PALB2 mutation:    Women with PALB2 mutations qualify for high risk breast screening, per the National Comprehensive Cancer Network (NCCN).  - High risk breast cancer screening recommendations include annual mammograms and annual breast MRI's, alternating every 6 months.  It is typically recommended that this screening begin at age 30, though this can be discussed in more detail with a woman's medical providers.  - There is currently insufficient data regarding the benefit of risk-reducing mastectomy for women who carry a PALB2 mutation; however this may be considered based on family history.  Luci is encouraged to discuss surgical treatment options with Dr. Ashford, as she has surgery (lumpectomy) scheduled for next week    Screening for pancreatic cancer can be considered based on family history; Luci reports no family history of pancreatic  cancer.  She is encouraged to contact me should there be changes to her family history, as this may change recommendations for screening for Luci and her relatives.     At this time, no management recommendations have been made for PALB2 carriers regarding ovarian or other cancers at this time, as there is currently insufficient evidence regarding these risks.    We discussed that Luci could participate in our Cancer Risk Management Program in which our nursing specialist provides an individual screening plan and assists with medical management. Luci was interested in discussing surgical options with Dr. Ahsford; should she proceed with lumpectomy, a referral can be placed to see IAN Paez for high risk breast screening.    Of note, the above information is based on our current understanding of Luci's genetic findings. Luci is encouraged to reach out to me regularly regarding any pertinent updates to her personal and/or family history of cancer, as our understanding of the genetic findings in her family may change over time.     Implications for Family Members:  We reviewed that mutations in the PALB2 gene are inherited in an autosomal dominant pattern. This means that each of Luci's children have a 50% chance of inheriting the same mutation. Likewise, each of her siblings has a 50% risk of having the same mutation. Extended relatives may also carry this mutation, and she is encouraged to share this information with her family members on both sides of the family. I am happy to help her relatives connect with a genetic counselor in their area if they would like to discuss testing.    In rare situations in which both parents have a mutation in the PALB2 gene, their children each have a 25% risk for Fanconi anemia. Fanconi anemia is a rare condition with onset in childhood that often results in physical abnormalities, growth delays, bone marrow failure, and increased risk for cancer.  "For individuals of childbearing age with PALB2 mutations, genetic counseling and genetic testing may be advised for their partners.    Support Resources:  National and local support resources for hereditary breast cancer, such as Facing Our Risk Of Cancer Empowered (FORCE), are available and several are included with her test results.  A \"Dear Relative\" letter will be provided for Luci to share with her family. She is encouraged to contact me should she need additional information/resources regarding her test result.     Plan:  1.  I will provide Luci with a copy of her test results and support resources following our discussion, including a  \"Dear Relative\" letter for Luci to share with her family members.  2.  She plans to follow up with Dr. Ashford  3.  A referral to IAN Paez can be placed should high risk breast screening be needed following her surgery next week, and Luci was encouraged to contact me to revisit the need for additional screening.      If Luci has additional questions, I encouraged her to contact me directly at [phone].     Time spent over phone: 25 minutes    Neida Fortune MS, [facility]  Licensed Genetic Counselor  [phone]            "

## 2019-05-08 NOTE — LETTER
Cancer Risk Management  Program [facility]  [facility]  Mailing Address  Cancer Risk Management Program  [facility]  [address]  [place] [address]    New patient appointments  [phone]  May 10, 2019    Luci Londono  [address]  [facility] [address]      Dear Luci,    It was a pleasure speaking with you on the phone on 5/8/2019.  Here is a copy of the progress note from our discussion.  If you have any additional questions, please feel free to call.    Referring Provider: Stacey Ashford MD    Presenting Information:   I spoke to Luci by phone today to discuss her genetic testing results. Her blood was drawn on 4/25/2019. OvaNext testing was ordered from [facility]. This testing was done because of Luci's breast cancer diagnosis.    Genetic Testing Results: POSITIVE  Luci is POSITIVE for a PALB2 mutation. Specifically her mutation is called p.[id number]*. We discussed that this mutation is associated with an increased risk for several cancers, including breast, pancreatic and ovarian cancer. We discussed the impact of this testing on Luci in detail.     Luci was also found to have variants of uncertain significance (VUS) in the BRCA2 and MLH1 genes. It is currently not clear if these two variants are associated with increased cancer risk (such as Hereditary Breast and Ovarian Cancer syndrome due to harmful mutations in the BRCA2 gene, or Bernadr syndrome due to harmful mutation in the MLH1 gene). Given the uncertain significance of this result, medical management decisions should NOT be made based on these two variants alone.    BRCA2 p.T77A (c.229A>G); Variant, Unknown Significance    MLH1 p.I25V (c.73A>G); Variant, Unknown Significance    Of note, Luci tested  negative for mutations in the KENN, BARD1, BRCA1, BRCA2, BRIP1, CDH1, CHEK2, DICER1, EPCAM, MLH1, MRE11A, MSH2, MSH6, MUTYH, NBN, NF1, PMS2, PTEN, RAD50, RAD51C, RAD51D, SMARCA4, STK11, and TP53 genes. This test involved sequencing and deletion/duplication analysis of all genes with the exceptions of EPCAM (deletions/duplications only).    Cancer Risks:    Individuals with a PALB2 mutation are at increased risk of certain cancers.  These risks may be influenced by family history:     The lifetime breast cancer risk for women who carry one PALB2 mutation is approximately 33-58%, compared to the lifetime population risk of breast cancer of 12%. These risks may be influenced by the number of family members with breast cancer, ages of onset, and relationship to these individuals.      We also discussed the association of PALB2 mutations with increased risk for pancreatic and ovarian cancer; however, these risks are currently not well defined.     Though no exact lifetime risks are available at this time, there may be increased risks for other cancers, as well.    We reviewed that the gene PALB2 is currently considered a moderate-risk gene. This means that mutations in this gene increase the risk for certain cancers, but are unlikely to be the single cause for an individual's cancer. There are likely other genetic and/or environmental risk factors that, in combination with a PALB2 gene mutation, may be associated with Luci's breast cancer diagnosis.    Recommendations for Screening and Management:   We discussed the following screening recommendations for individuals who carry one PALB2 mutation:    Women with PALB2 mutations qualify for high risk breast screening, per the National Comprehensive Cancer Network (NCCN).  - High risk breast cancer screening recommendations include annual mammograms and annual breast MRI's, alternating every 6 months.  It is typically recommended that this screening begin at age 30, though  this can be discussed in more detail with a woman's medical providers.  - There is currently insufficient data regarding the benefit of risk-reducing mastectomy for women who carry a PALB2 mutation; however this may be considered based on family history.  Luci is encouraged to discuss surgical treatment options with Dr. Ashford, as she has surgery (lumpectomy) scheduled for next week    Screening for pancreatic cancer can be considered based on family history; Luci reports no family history of pancreatic cancer.  She is encouraged to contact me should there be changes to her family history, as this may change recommendations for screening for Luci and her relatives.     At this time, no management recommendations have been made for PALB2 carriers regarding ovarian or other cancers at this time, as there is currently insufficient evidence regarding these risks.    We discussed that Luci could participate in our Cancer Risk Management Program in which our nursing specialist provides an individual screening plan and assists with medical management. Luci was interested in discussing surgical options with Dr. Ashford; should she proceed with lumpectomy, a referral can be placed to see IAN Paez for high risk breast screening.    Of note, the above information is based on our current understanding of Luci's genetic findings. Luci is encouraged to reach out to me regularly regarding any pertinent updates to her personal and/or family history of cancer, as our understanding of the genetic findings in her family may change over time.     Implications for Family Members:  We reviewed that mutations in the PALB2 gene are inherited in an autosomal dominant pattern. This means that each of Luci's children have a 50% chance of inheriting the same mutation. Likewise, each of her siblings has a 50% risk of having the same mutation. Extended relatives may also carry this mutation, and she  "is encouraged to share this information with her family members on both sides of the family. I am happy to help her relatives connect with a genetic counselor in their area if they would like to discuss testing.    In rare situations in which both parents have a mutation in the PALB2 gene, their children each have a 25% risk for Fanconi anemia. Fanconi anemia is a rare condition with onset in childhood that often results in physical abnormalities, growth delays, bone marrow failure, and increased risk for cancer. For individuals of childbearing age with PALB2 mutations, genetic counseling and genetic testing may be advised for their partners.    Support Resources:  National and local support resources for hereditary breast cancer, such as Facing Our Risk Of Cancer Empowered (FORCE), are available and several are included with her test results.  A \"Dear Relative\" letter will be provided for Luci to share with her family. She is encouraged to contact me should she need additional information/resources regarding her test result.     Plan:  1.  I will provide Luci with a copy of her test results and support resources following our discussion, including a  \"Dear Relative\" letter for Luci to share with her family members.  2.  She plans to follow up with Dr. Ashford  3.  A referral to IAN Paez will be placed should high risk breast screening be needed following her surgery next week, and Luci was encouraged to contact me to revisit the need for additional screening.      If Luci has additional questions, I encouraged her to contact me directly at [phone].     Neida Fortune MS, [facility]  Licensed Genetic Counselor  [phone]                              "

## 2019-05-09 ENCOUNTER — ANCILLARY PROCEDURE (OUTPATIENT)
Dept: GENERAL RADIOLOGY | Facility: CLINIC | Age: 49
End: 2019-05-09
Attending: FAMILY MEDICINE
Payer: COMMERCIAL

## 2019-05-09 ENCOUNTER — OFFICE VISIT (OUTPATIENT)
Dept: FAMILY MEDICINE | Facility: CLINIC | Age: 49
End: 2019-05-09
Payer: COMMERCIAL

## 2019-05-09 VITALS
HEART RATE: 91 BPM | WEIGHT: 218 LBS | BODY MASS INDEX: 41.19 KG/M2 | SYSTOLIC BLOOD PRESSURE: 128 MMHG | RESPIRATION RATE: 16 BRPM | OXYGEN SATURATION: 99 % | TEMPERATURE: 98.3 F | DIASTOLIC BLOOD PRESSURE: 76 MMHG

## 2019-05-09 DIAGNOSIS — E03.9 ACQUIRED HYPOTHYROIDISM: ICD-10-CM

## 2019-05-09 DIAGNOSIS — K21.9 GASTROESOPHAGEAL REFLUX DISEASE WITHOUT ESOPHAGITIS: Chronic | ICD-10-CM

## 2019-05-09 DIAGNOSIS — Z01.818 PREOP GENERAL PHYSICAL EXAM: ICD-10-CM

## 2019-05-09 DIAGNOSIS — Z01.818 PREOP GENERAL PHYSICAL EXAM: Primary | ICD-10-CM

## 2019-05-09 DIAGNOSIS — E66.01 MORBID OBESITY (H): ICD-10-CM

## 2019-05-09 DIAGNOSIS — C50.411 MALIGNANT NEOPLASM OF UPPER-OUTER QUADRANT OF RIGHT BREAST IN FEMALE, ESTROGEN RECEPTOR NEGATIVE (H): ICD-10-CM

## 2019-05-09 DIAGNOSIS — R94.31 ABNORMAL ELECTROCARDIOGRAM: ICD-10-CM

## 2019-05-09 DIAGNOSIS — Z17.1 MALIGNANT NEOPLASM OF UPPER-OUTER QUADRANT OF RIGHT BREAST IN FEMALE, ESTROGEN RECEPTOR NEGATIVE (H): ICD-10-CM

## 2019-05-09 DIAGNOSIS — E74.39 GLUCOSE INTOLERANCE: ICD-10-CM

## 2019-05-09 DIAGNOSIS — E78.5 HYPERLIPIDEMIA WITH TARGET LDL LESS THAN 130: ICD-10-CM

## 2019-05-09 DIAGNOSIS — E78.2 MIXED HYPERLIPIDEMIA: ICD-10-CM

## 2019-05-09 LAB
ERYTHROCYTE [DISTWIDTH] IN BLOOD BY AUTOMATED COUNT: 17 % (ref 10–15)
HBA1C MFR BLD: 5.8 % (ref 0–5.6)
HCT VFR BLD AUTO: 37.1 % (ref 35–47)
HGB BLD-MCNC: 12 G/DL (ref 11.7–15.7)
LAB SCANNED RESULT: ABNORMAL
MCH RBC QN AUTO: 29.1 PG (ref 26.5–33)
MCHC RBC AUTO-ENTMCNC: 32.3 G/DL (ref 31.5–36.5)
MCV RBC AUTO: 90 FL (ref 78–100)
PLATELET # BLD AUTO: 372 10E9/L (ref 150–450)
RBC # BLD AUTO: 4.13 10E12/L (ref 3.8–5.2)
WBC # BLD AUTO: 6.3 10E9/L (ref 4–11)

## 2019-05-09 PROCEDURE — 84443 ASSAY THYROID STIM HORMONE: CPT | Performed by: FAMILY MEDICINE

## 2019-05-09 PROCEDURE — 99214 OFFICE O/P EST MOD 30 MIN: CPT | Mod: 25 | Performed by: FAMILY MEDICINE

## 2019-05-09 PROCEDURE — 93000 ELECTROCARDIOGRAM COMPLETE: CPT | Performed by: FAMILY MEDICINE

## 2019-05-09 PROCEDURE — 36415 COLL VENOUS BLD VENIPUNCTURE: CPT | Performed by: FAMILY MEDICINE

## 2019-05-09 PROCEDURE — 71046 X-RAY EXAM CHEST 2 VIEWS: CPT | Mod: FY

## 2019-05-09 PROCEDURE — 85027 COMPLETE CBC AUTOMATED: CPT | Performed by: FAMILY MEDICINE

## 2019-05-09 PROCEDURE — 83036 HEMOGLOBIN GLYCOSYLATED A1C: CPT | Performed by: FAMILY MEDICINE

## 2019-05-09 ASSESSMENT — PATIENT HEALTH QUESTIONNAIRE - PHQ9
SUM OF ALL RESPONSES TO PHQ QUESTIONS 1-9: 5
10. IF YOU CHECKED OFF ANY PROBLEMS, HOW DIFFICULT HAVE THESE PROBLEMS MADE IT FOR YOU TO DO YOUR WORK, TAKE CARE OF THINGS AT HOME, OR GET ALONG WITH OTHER PEOPLE: SOMEWHAT DIFFICULT
SUM OF ALL RESPONSES TO PHQ QUESTIONS 1-9: 5

## 2019-05-09 NOTE — PROGRESS NOTES
[address]  [address]  [facility] [address]  [phone]  Dept: [phone]    PRE-OP EVALUATION:  Today's date: 2019    Luci Londono (: 1970) presents for pre-operative evaluation assessment as requested by Dr. Stacey Ashford.  She requires evaluation and anesthesia risk assessment prior to undergoing surgery/procedure for treatment of breast cancer .    Fax number for surgical facility: [phone]  Primary Physician: Stuart Wills  Type of Anesthesia Anticipated: to be determined    Patient has a Health Care Directive or Living Will:  NO    Preop Questions 2019   What are you having done? breast Actome   1.  Do you have a history of Heart attack, stroke, stent, coronary bypass surgery, or other heart surgery? No   2.  Do you ever have any pain or discomfort in your chest? YES -  Mid chest area not with exertion intermittent mild    3.  Do you have a history of  Heart Failure? No   4.   Are you troubled by shortness of breath when:  walking on a level surface, or up a slight hill, or at night? YES -  Intermittent with speed walking    5.  Do you currently have a cold, bronchitis or other respiratory infection? No   6.  Do you have a cough, shortness of breath, or wheezing? No   7.  Do you sometimes get pains in the calves of your legs when you walk? YES -  Both back of heels    8. Do you or anyone in your family have previous history of blood clots? No   9.  Do you or does anyone in your family have a serious bleeding problem such as prolonged bleeding following surgeries or cuts? No   10. Have you ever had problems with anemia or been told to take iron pills? No   11. Have you had any abnormal blood loss such as black, tarry or bloody stools, or abnormal vaginal bleeding? No   12. Have you ever had a blood transfusion? No   13. Have you or any of your relatives ever had problems with anesthesia? No   14. Do you  have sleep apnea, excessive snoring or daytime drowsiness? No   15. Do you have any prosthetic heart valves? No   16. Do you have prosthetic joints? No   17. Is there any chance that you may be pregnant? No         HPI:     HPI related to upcoming procedure:  BREAST CANCER FOUND IN OCTOBER 2018  DECIDED TO DO CHEMOTHERAPY FIRST   PLAN TO HAVE LUMPECTOMY  AND AXILLARY RESECTION ACCORDING TO PATIENT   DR HECTOR DUMAS  AFTER SUCCESSFUL CHEMO TREATMENT RIGHT OUTER BREAST LUMP  7 MONTHS OF CHEMOTHERAPY   STAGE 2 BREAST RIGHT UPPER OUTER QUANDRANT   RIGHT AXILLARY NODE INVOLVEMENT AS WELL        HISTORY OF HYPERLIPIDEMIA     HISTORY OF MYALGIAS     HISTORY OF MIGRAINES     SIGNIFICANT OBESITY     CHRONIC LOWER BACK PAIN     HISTORY OF IMPAIRED FASTING GLUCOSE     METFORMIN TREATMENT FOR IMPAIRED GLUCOSE AND WEIGHT     HISTORY OF PRESBYOPIA     HISTORY OF GASTROESOPHAGEAL REFLUX DISEASE WITHOUT ESOPHAGITIS     HISTORY OF HYPOTHYROIDISM       HYPERLIPIDEMIA - Patient has a long history of significant Hyperlipidemia requiring medication for treatment with recent good control. Patient reports no problems or side effects with the medication.                                                                                                                                                       .    MEDICAL HISTORY:     Patient Active Problem List    Diagnosis Date Noted     BMI 35 02/19/2014     Priority: High     Intervertebral lumbar disc disorder with myelopathy, lumbar region 11/13/2012     Priority: High     Acquired hypothyroidism 11/13/2012     Priority: High     Over replaced  Problem list name updated by automated process. Provider to review       Port-A-Cath in place 10/26/2018     Priority: Medium     Drug or medicinal substance causing adverse effect in therapeutic use, initial encounter 10/25/2018     Priority: Medium     Abnormal electrocardiogram 10/25/2018     Priority: Medium     Malignant neoplasm of  upper-outer quadrant of right breast in female, estrogen receptor negative (H) 10/17/2018     Priority: Medium     Mixed hyperlipidemia 08/23/2018     Priority: Medium     Myalgia w hx of recurrence since 2012 08/22/2018     Priority: Medium     Morbid obesity (H) 08/21/2018     Priority: Medium     Migraine without aura and without status migrainosus, not intractable 11/01/2017     Priority: Medium     Class 1 obesity due to excess calories with serious comorbidity and body mass index (BMI) of 33.0 to 33.9 in adult 11/01/2017     Priority: Medium     Impaired fasting glucose 10/31/2017     Priority: Medium     Pinguecula of both eyes 09/07/2017     Priority: Medium     Last Assessment & Plan:   Mild bilateral nasal pingueculae, right > left.   - Start ATs BID PRN both eyes   - Reassurance given       Presbyopia 09/07/2017     Priority: Medium     Last Assessment & Plan:   - Recommend +1.50 OTC reading glasses       Chronic left-sided low back pain with left-sided sciatica 01/31/2017     Priority: Medium     Absence of menstruation 06/18/2015     Priority: Medium     Rosacea 03/19/2014     Priority: Medium     Myalgia and myositis 11/13/2012     Priority: Medium     Problem list name updated by automated process. Provider to review       Gastroesophageal reflux disease without esophagitis 11/13/2012     Priority: Medium     Helicobacter pylori infection 11/13/2012     Priority: Medium     Problem list name updated by automated process. Provider to review       Disorder of metabolism 11/13/2012     Priority: Medium     Problem list name updated by automated process. Provider to review       Polycystic ovaries 11/13/2012     Priority: Medium     Vitamin D insufficiency 01/31/2014     Priority: Low      Past Medical History:   Diagnosis Date     Hypertension goal BP (blood pressure) < 140/80 2/18/2014     Lateral epicondylitis, right dominant elbow since late 10-17 11/1/2017     Port-A-Cath in place 10/26/2018      Thyroid disease      Past Surgical History:   Procedure Laterality Date     HAND SURGERY  2002    left     INSERT PORT VASCULAR ACCESS N/A 10/18/2018    Procedure: INSERTION OF VASCULAR PORT;  Surgeon: Stacey Ashford MD;  Location: [facility] OR     TUBAL LIGATION       Current Outpatient Medications   Medication Sig Dispense Refill     ascorbic acid (VITAMIN C) 1000 MG TABS Take 1,000 mg by mouth daily       gabapentin (NEURONTIN) 100 MG capsule Take 2 capsules (200 mg) by mouth 3 times daily 180 capsule 1     levothyroxine (SYNTHROID/LEVOTHROID) 125 MCG tablet TAKE 1 TABLET BY MOUTH DAILY 30 tablet 6     lidocaine-prilocaine (EMLA) cream Apply quarter-size amount of Emla cream topically over port site one hour prior to port access for comfort. 30 g 3     loratadine (CLARITIN) 10 MG tablet Take 1 tablet (10 mg) by mouth daily 90 tablet 1     LORazepam (ATIVAN) 0.5 MG tablet Take 1 tablet (0.5 mg) by mouth every 4 hours as needed (Anxiety, Nausea/Vomiting or Sleep) 30 tablet 5     metFORMIN (GLUCOPHAGE-XR) 500 MG 24 hr tablet TAKE 1 TABLET(500 MG) BY MOUTH DAILY WITH DINNER 90 tablet 1     multivitamin, therapeutic with minerals (THERA-VIT-M) TABS tablet Take 1 tablet by mouth daily       omeprazole (PRILOSEC) 20 MG CR capsule Take 1 capsule (20 mg) by mouth 2 times daily 60 capsule 11     OTC products: None, except as noted above    No Known Allergies   Latex Allergy: NO    Social History     Tobacco Use     Smoking status: Never Smoker     Smokeless tobacco: Never Used   Substance Use Topics     Alcohol use: No     History   Drug Use No     Component      Latest Ref Rng & Units 4/3/2019   WBC      4.0 - 11.0 10e9/L 6.0   RBC Count      3.8 - 5.2 10e12/L 3.57 (L)   Hemoglobin      11.7 - 15.7 g/dL 11.0 (L)   Hematocrit      35.0 - 47.0 % 32.9 (L)   MCV      78 - 100 fl 92   MCH      26.5 - 33.0 pg 30.8   MCHC      31.5 - 36.5 g/dL 33.4   RDW      10.0 - 15.0 % 17.3 (H)   Platelet Count      150 - 450 10e9/L 370   Diff  Method       Automated Method   % Neutrophils      % 68.9   % Lymphocytes      % 25.6   % Monocytes      % 3.5   % Eosinophils      % 0.7   % Basophils      % 0.3   % Immature Granulocytes      % 1.0   Nucleated RBCs      0 /100 1 (H)   Absolute Neutrophil      1.6 - 8.3 10e9/L 4.1   Absolute Lymphocytes      0.8 - 5.3 10e9/L 1.5   Absolute Monocytes      0.0 - 1.3 10e9/L 0.2   Absolute Eosinophils      0.0 - 0.7 10e9/L 0.0   Absolute Basophils      0.0 - 0.2 10e9/L 0.0   Abs Immature Granulocytes      0 - 0.4 10e9/L 0.1   Absolute Nucleated RBC       0.1   Sodium      133 - 144 mmol/L 138   Potassium      3.4 - 5.3 mmol/L 3.7   Chloride      94 - 109 mmol/L 105   Carbon Dioxide      20 - 32 mmol/L 24   Anion Gap      3 - 14 mmol/L 9   Glucose      70 - 99 mg/dL 135 (H)   Urea Nitrogen      7 - 30 mg/dL 6 (L)   Creatinine      0.52 - 1.04 mg/dL 0.71   GFR Estimate      >60 mL/min/1.73:m2 >90   GFR Estimate If Black      >60 mL/min/1.73:m2 >90   Calcium      8.5 - 10.1 mg/dL 8.9   Bilirubin Total      0.2 - 1.3 mg/dL 0.2   Albumin      3.4 - 5.0 g/dL 3.1 (L)   Protein Total      6.8 - 8.8 g/dL 6.6 (L)   Alkaline Phosphatase      40 - 150 U/L 73   ALT      0 - 50 U/L 66 (H)   AST      0 - 45 U/L 37     REVIEW OF SYSTEMS:     has Myalgia and myositis; Intervertebral lumbar disc disorder with myelopathy, lumbar region; Gastroesophageal reflux disease without esophagitis; Helicobacter pylori infection; Acquired hypothyroidism; Disorder of metabolism; Polycystic ovaries; Vitamin D insufficiency; BMI 35; Rosacea; Absence of menstruation; Chronic left-sided low back pain with left-sided sciatica; Pinguecula of both eyes; Presbyopia; Impaired fasting glucose; Migraine without aura and without status migrainosus, not intractable; Class 1 obesity due to excess calories with serious comorbidity and body mass index (BMI) of 33.0 to 33.9 in adult; Morbid obesity (H); Myalgia w hx of recurrence since 2012; Mixed hyperlipidemia;  Malignant neoplasm of upper-outer quadrant of right breast in female, estrogen receptor negative (H); Drug or medicinal substance causing adverse effect in therapeutic use, initial encounter; Abnormal electrocardiogram; and Port-A-Cath in place on their problem list.  Review Of Systems  Skin: negative  Eyes: Pingueculae both eyes  Ears/Nose/Throat: negative  Respiratory: No shortness of breath, dyspnea on exertion, cough, or hemoptysis  Cardiovascular: negative occasional chest heaviness but no  Association with exertion  Worsening obesity with chemotherapy  Breast cancer right upper outer quadrant estrogen receptor negative  Gastrointestinal: GASTROESOPHAGEAL REFLUX DISEASE   Genitourinary: Bilateral polycystic ovary syndrome  Patient is no longer having periods for the last 5 months  Musculoskeletal: CHRONIC LOWER BACK PAIN   Neurologic: negative  Psychiatric: negative  Hematologic/Lymphatic/Immunologic: negative  Mild anemia hemoglobin 11  Endocrine:         EXAM:   /76   Pulse 91   Temp 98.3  F (36.8  C) (Tympanic)   Resp 16   Wt 98.9 kg (218 lb)   SpO2 99%   BMI 41.19 kg/m      GENERAL APPEARANCE: healthy, alert and no distress morbid obesity     EYES: EOMI, PERRL     HENT: ear canals and TM's normal and nose and mouth without ulcers or lesions     NECK: no adenopathy, no asymmetry, masses, or scars and thyroid normal to palpation     RESP: lungs clear to auscultation - no rales, rhonchi or wheezes     CV: regular rates and rhythm, normal S1 S2, no S3 or S4 and no murmur, click or rub     ABDOMEN:  soft, nontender, no HSM or masses and bowel sounds normal     MS: extremities normal- no gross deformities noted, no evidence of inflammation in joints, FROM in all extremities.     SKIN: no suspicious lesions or rashes     NEURO: Normal strength and tone, sensory exam grossly normal, mentation intact and speech normal     PSYCH: mentation appears normal. and affect normal/bright     LYMPHATICS: No  cervical adenopathy    DIAGNOSTICS:   EKG: unchanged from previous tracings, nonspecific ST-T wave changes which are unchanged from previous tracing less than 3 months ago  Chest XRay    Recent Labs   Lab Test 04/03/19  0830 03/27/19  0905  10/17/18  1347  08/21/18  1201   HGB 11.0* 11.1*   < >  --    < > 12.1    378   < >  --    < > 395    139   < >  --    < >  --    POTASSIUM 3.7 3.7   < >  --    < >  --    CR 0.71 0.66   < >  --    < >  --    A1C  --   --   --  5.8*  --  5.6    < > = values in this interval not displayed.        IMPRESSION:   Reason for surgery/procedure: Breast cancer right upper outer quadrant of the breast after chemotherapy  Diagnosis/reason for consult: History of chemotherapy breast cancer  Morbid obesity  Hypothyroidism  Borderline ELECTROCARDIOGRAM WITH  nonspecific ST-T wave changes    The proposed surgical procedure is considered LOW risk.    REVISED CARDIAC RISK INDEX  The patient has the following serious cardiovascular risks for perioperative complications such as (MI, PE, VFib and 3  AV Block):  No serious cardiac risks  INTERPRETATION: 1 risks: Class II (low risk - 0.9% complication rate)    The patient has the following additional risks for perioperative complications:  No identified additional risks  The 10-year ASCVD risk score ([place] JONELLE Jr., et al., 2013) is: 1.4%    Values used to calculate the score:      Age: 48 years      Sex: Female      Is Non- : Yes      Diabetic: No      Tobacco smoker: No      Systolic Blood Pressure: 128 mmHg      Is BP treated: No      HDL Cholesterol: 57 mg/dL      Total Cholesterol: 218 mg/dL      ICD-10-CM    1. Preop general physical exam Z01.818 EKG 12-lead complete w/read - Clinics     XR Chest 2 Views   2. Malignant neoplasm of upper-outer quadrant of right breast in female, estrogen receptor negative (H) C50.411     Z17.1    3. Glucose intolerance E74.39 Hemoglobin A1c   4. Mixed hyperlipidemia E78.2    5.  Gastroesophageal reflux disease without esophagitis K21.9    6. Morbid obesity (H) E66.01        RECOMMENDATIONS:     --Consult hospital rounder / IM to assist post-op medical management    --Patient is to take all scheduled medications on the day of surgery EXCEPT for modifications listed below.    APPROVAL GIVEN to proceed with proposed procedure, without further diagnostic evaluation       Signed Electronically by: ALEENA TYLER MD    Copy of this evaluation report is provided to requesting physician.    [facility] [facility] Preop Guidelines    Revised Cardiac Risk Index

## 2019-05-09 NOTE — LETTER
May 10, 2019      Luci Londono  [address]  [facility] [address]        Dear ,    We are writing to inform you of your test results.    NORMAL COMPLETE BLOOD PANEL WBCS RBCS AND PLTS   RED CELL WIDTH  UP SLIGHTLY NOT SIGNIFICANT   NORMAL THREE MONTH GLUCOSE AVERAGE   IMPAIRED GLUCOSE TOLERANCE NOTED     Resulted Orders   Hemoglobin A1c   Result Value Ref Range    Hemoglobin A1C 5.8 (H) 0 - 5.6 %      Comment:      Normal <5.7% Prediabetes 5.7-6.4%  Diabetes 6.5% or higher - adopted from ADA   consensus guidelines.     CBC with platelets   Result Value Ref Range    WBC 6.3 4.0 - 11.0 10e9/L    RBC Count 4.13 3.8 - 5.2 10e12/L    Hemoglobin 12.0 11.7 - 15.7 g/dL    Hematocrit 37.1 35.0 - 47.0 %    MCV 90 78 - 100 fl    MCH 29.1 26.5 - 33.0 pg    MCHC 32.3 31.5 - 36.5 g/dL    RDW 17.0 (H) 10.0 - 15.0 %    Platelet Count 372 150 - 450 10e9/L       If you have any questions or concerns, please call the clinic at the number listed above.       Sincerely,        ALEENA TYLER MD

## 2019-05-09 NOTE — PATIENT INSTRUCTIONS
(Z01.818) Preop general physical exam  Comment:    Plan: EKG 12-lead complete w/read - Clinics, XR Chest        2 Views           (E74.39) Glucose intolerance  (primary encounter diagnosis)  Comment:    Plan: Hemoglobin A1c

## 2019-05-10 LAB — TSH SERPL DL<=0.005 MIU/L-ACNC: 8.17 MU/L (ref 0.4–4)

## 2019-05-15 ENCOUNTER — HOSPITAL ENCOUNTER (OUTPATIENT)
Dept: MAMMOGRAPHY | Facility: CLINIC | Age: 49
End: 2019-05-15
Attending: SURGERY
Payer: COMMERCIAL

## 2019-05-15 ENCOUNTER — ANESTHESIA EVENT (OUTPATIENT)
Dept: SURGERY | Facility: CLINIC | Age: 49
End: 2019-05-15
Payer: COMMERCIAL

## 2019-05-15 ENCOUNTER — HOSPITAL ENCOUNTER (OUTPATIENT)
Facility: CLINIC | Age: 49
Discharge: HOME | End: 2019-05-16
Attending: SURGERY | Admitting: SURGERY
Payer: COMMERCIAL

## 2019-05-15 ENCOUNTER — ANESTHESIA (OUTPATIENT)
Dept: SURGERY | Facility: CLINIC | Age: 49
End: 2019-05-15
Payer: COMMERCIAL

## 2019-05-15 ENCOUNTER — HOSPITAL ENCOUNTER (OUTPATIENT)
Dept: NUCLEAR MEDICINE | Facility: CLINIC | Age: 49
Setting detail: NUCLEAR MEDICINE
Discharge: HOME | End: 2019-05-15
Attending: SURGERY | Admitting: SURGERY
Payer: COMMERCIAL

## 2019-05-15 ENCOUNTER — APPOINTMENT (OUTPATIENT)
Dept: SURGERY | Facility: PHYSICIAN GROUP | Age: 49
End: 2019-05-15
Payer: COMMERCIAL

## 2019-05-15 DIAGNOSIS — C50.411 MALIGNANT NEOPLASM OF UPPER-OUTER QUADRANT OF RIGHT BREAST IN FEMALE, ESTROGEN RECEPTOR NEGATIVE (H): ICD-10-CM

## 2019-05-15 DIAGNOSIS — Z17.1 MALIGNANT NEOPLASM OF UPPER-OUTER QUADRANT OF RIGHT BREAST IN FEMALE, ESTROGEN RECEPTOR NEGATIVE (H): ICD-10-CM

## 2019-05-15 DIAGNOSIS — G89.18 POSTOPERATIVE PAIN: Primary | ICD-10-CM

## 2019-05-15 PROBLEM — C50.919 BREAST CANCER (H): Status: ACTIVE | Noted: 2019-05-15

## 2019-05-15 LAB — HCG UR QL: NEGATIVE

## 2019-05-15 PROCEDURE — 34300033 ZZH RX 343: Performed by: SURGERY

## 2019-05-15 PROCEDURE — 25000125 ZZHC RX 250: Performed by: SURGERY

## 2019-05-15 PROCEDURE — 88307 TISSUE EXAM BY PATHOLOGIST: CPT | Performed by: SURGERY

## 2019-05-15 PROCEDURE — 25000125 ZZHC RX 250: Performed by: NURSE ANESTHETIST, CERTIFIED REGISTERED

## 2019-05-15 PROCEDURE — 36590 REMOVAL TUNNELED CV CATH: CPT | Performed by: SURGERY

## 2019-05-15 PROCEDURE — 25000128 H RX IP 250 OP 636: Performed by: SURGERY

## 2019-05-15 PROCEDURE — 25000128 H RX IP 250 OP 636: Performed by: ANESTHESIOLOGY

## 2019-05-15 PROCEDURE — A9520 TC99 TILMANOCEPT DIAG 0.5MCI: HCPCS | Performed by: SURGERY

## 2019-05-15 PROCEDURE — 38792 RA TRACER ID OF SENTINL NODE: CPT

## 2019-05-15 PROCEDURE — 81025 URINE PREGNANCY TEST: CPT | Performed by: ANESTHESIOLOGY

## 2019-05-15 PROCEDURE — 40000986 MA POST PROCEDURE RIGHT

## 2019-05-15 PROCEDURE — 25000132 ZZH RX MED GY IP 250 OP 250 PS 637: Performed by: PHYSICIAN ASSISTANT

## 2019-05-15 PROCEDURE — 88307 TISSUE EXAM BY PATHOLOGIST: CPT | Mod: 26,76 | Performed by: SURGERY

## 2019-05-15 PROCEDURE — 19307 MAST MOD RAD: CPT | Mod: RT | Performed by: SURGERY

## 2019-05-15 PROCEDURE — 88331 PATH CONSLTJ SURG 1 BLK 1SPC: CPT | Mod: 26,76 | Performed by: SURGERY

## 2019-05-15 PROCEDURE — 36000056 ZZH SURGERY LEVEL 3 1ST 30 MIN: Performed by: SURGERY

## 2019-05-15 PROCEDURE — 25000566 ZZH SEVOFLURANE, EA 15 MIN: Performed by: SURGERY

## 2019-05-15 PROCEDURE — 37000008 ZZH ANESTHESIA TECHNICAL FEE, 1ST 30 MIN: Performed by: SURGERY

## 2019-05-15 PROCEDURE — 88305 TISSUE EXAM BY PATHOLOGIST: CPT | Performed by: SURGERY

## 2019-05-15 PROCEDURE — 25000128 H RX IP 250 OP 636: Performed by: NURSE ANESTHETIST, CERTIFIED REGISTERED

## 2019-05-15 PROCEDURE — 37000009 ZZH ANESTHESIA TECHNICAL FEE, EACH ADDTL 15 MIN: Performed by: SURGERY

## 2019-05-15 PROCEDURE — 19307 MAST MOD RAD: CPT | Mod: AS | Performed by: PHYSICIAN ASSISTANT

## 2019-05-15 PROCEDURE — 25800030 ZZH RX IP 258 OP 636: Performed by: NURSE ANESTHETIST, CERTIFIED REGISTERED

## 2019-05-15 PROCEDURE — 40000170 ZZH STATISTIC PRE-PROCEDURE ASSESSMENT II: Performed by: SURGERY

## 2019-05-15 PROCEDURE — 40000935 ZZH STATISTIC OUTPATIENT (NON-OBS) EVE

## 2019-05-15 PROCEDURE — 25800030 ZZH RX IP 258 OP 636: Performed by: SURGERY

## 2019-05-15 PROCEDURE — 40000936 ZZH STATISTIC OUTPATIENT (NON-OBS) NIGHT

## 2019-05-15 PROCEDURE — 71000012 ZZH RECOVERY PHASE 1 LEVEL 1 FIRST HR: Performed by: SURGERY

## 2019-05-15 PROCEDURE — 71000013 ZZH RECOVERY PHASE 1 LEVEL 1 EA ADDTL HR: Performed by: SURGERY

## 2019-05-15 PROCEDURE — 40000268 MA BREAST SPECIMEN RIGHT OR

## 2019-05-15 PROCEDURE — 19286 PERQ DEV BREAST ADD US IMAG: CPT

## 2019-05-15 PROCEDURE — 88300 SURGICAL PATH GROSS: CPT | Performed by: SURGERY

## 2019-05-15 PROCEDURE — 88300 SURGICAL PATH GROSS: CPT | Mod: 26 | Performed by: SURGERY

## 2019-05-15 PROCEDURE — 36000058 ZZH SURGERY LEVEL 3 EA 15 ADDTL MIN: Performed by: SURGERY

## 2019-05-15 PROCEDURE — 10035 PLMT SFT TISS LOCLZJ DEV 1ST: CPT

## 2019-05-15 PROCEDURE — 88331 PATH CONSLTJ SURG 1 BLK 1SPC: CPT | Performed by: SURGERY

## 2019-05-15 PROCEDURE — 88305 TISSUE EXAM BY PATHOLOGIST: CPT | Mod: 26 | Performed by: SURGERY

## 2019-05-15 PROCEDURE — 25800030 ZZH RX IP 258 OP 636: Performed by: PHYSICIAN ASSISTANT

## 2019-05-15 PROCEDURE — 40000934 ZZH STATISTIC OUTPATIENT (NON-OBS) DAY

## 2019-05-15 PROCEDURE — 25000128 H RX IP 250 OP 636: Performed by: PHYSICIAN ASSISTANT

## 2019-05-15 PROCEDURE — 27210794 ZZH OR GENERAL SUPPLY STERILE: Performed by: SURGERY

## 2019-05-15 RX ORDER — ACETAMINOPHEN 325 MG/1
650 TABLET ORAL EVERY 6 HOURS PRN
Status: DISCONTINUED | OUTPATIENT
Start: 2019-05-15 | End: 2019-05-16 | Stop reason: HOSPADM

## 2019-05-15 RX ORDER — NALOXONE HYDROCHLORIDE 0.4 MG/ML
.1-.4 INJECTION, SOLUTION INTRAMUSCULAR; INTRAVENOUS; SUBCUTANEOUS
Status: DISCONTINUED | OUTPATIENT
Start: 2019-05-15 | End: 2019-05-16 | Stop reason: HOSPADM

## 2019-05-15 RX ORDER — ONDANSETRON 2 MG/ML
INJECTION INTRAMUSCULAR; INTRAVENOUS PRN
Status: DISCONTINUED | OUTPATIENT
Start: 2019-05-15 | End: 2019-05-15

## 2019-05-15 RX ORDER — SODIUM CHLORIDE 9 MG/ML
INJECTION, SOLUTION INTRAVENOUS CONTINUOUS
Status: DISCONTINUED | OUTPATIENT
Start: 2019-05-15 | End: 2019-05-16

## 2019-05-15 RX ORDER — LIDOCAINE 40 MG/G
CREAM TOPICAL
Status: DISCONTINUED | OUTPATIENT
Start: 2019-05-15 | End: 2019-05-16 | Stop reason: HOSPADM

## 2019-05-15 RX ORDER — LORAZEPAM 0.5 MG/1
0.5 TABLET ORAL EVERY 4 HOURS PRN
Status: DISCONTINUED | OUTPATIENT
Start: 2019-05-15 | End: 2019-05-16 | Stop reason: HOSPADM

## 2019-05-15 RX ORDER — ONDANSETRON 2 MG/ML
4 INJECTION INTRAMUSCULAR; INTRAVENOUS EVERY 30 MIN PRN
Status: DISCONTINUED | OUTPATIENT
Start: 2019-05-15 | End: 2019-05-15 | Stop reason: HOSPADM

## 2019-05-15 RX ORDER — HYDROCODONE BITARTRATE AND ACETAMINOPHEN 5; 325 MG/1; MG/1
1-2 TABLET ORAL EVERY 4 HOURS PRN
Qty: 20 TABLET | Refills: 0 | Status: SHIPPED | OUTPATIENT
Start: 2019-05-15 | End: 2020-01-15

## 2019-05-15 RX ORDER — BUPIVACAINE HYDROCHLORIDE 2.5 MG/ML
INJECTION, SOLUTION EPIDURAL; INFILTRATION; INTRACAUDAL
Status: DISCONTINUED
Start: 2019-05-15 | End: 2019-05-15 | Stop reason: HOSPADM

## 2019-05-15 RX ORDER — HYDROMORPHONE HYDROCHLORIDE 1 MG/ML
0.2 INJECTION, SOLUTION INTRAMUSCULAR; INTRAVENOUS; SUBCUTANEOUS
Status: DISCONTINUED | OUTPATIENT
Start: 2019-05-15 | End: 2019-05-16 | Stop reason: HOSPADM

## 2019-05-15 RX ORDER — LIDOCAINE HYDROCHLORIDE 20 MG/ML
INJECTION, SOLUTION INFILTRATION; PERINEURAL PRN
Status: DISCONTINUED | OUTPATIENT
Start: 2019-05-15 | End: 2019-05-15

## 2019-05-15 RX ORDER — SODIUM CHLORIDE, SODIUM LACTATE, POTASSIUM CHLORIDE, CALCIUM CHLORIDE 600; 310; 30; 20 MG/100ML; MG/100ML; MG/100ML; MG/100ML
INJECTION, SOLUTION INTRAVENOUS CONTINUOUS
Status: DISCONTINUED | OUTPATIENT
Start: 2019-05-15 | End: 2019-05-15 | Stop reason: HOSPADM

## 2019-05-15 RX ORDER — PROCHLORPERAZINE MALEATE 10 MG
10 TABLET ORAL EVERY 6 HOURS PRN
Status: DISCONTINUED | OUTPATIENT
Start: 2019-05-15 | End: 2019-05-16 | Stop reason: HOSPADM

## 2019-05-15 RX ORDER — DEXAMETHASONE SODIUM PHOSPHATE 4 MG/ML
INJECTION, SOLUTION INTRA-ARTICULAR; INTRALESIONAL; INTRAMUSCULAR; INTRAVENOUS; SOFT TISSUE PRN
Status: DISCONTINUED | OUTPATIENT
Start: 2019-05-15 | End: 2019-05-15

## 2019-05-15 RX ORDER — LIDOCAINE HYDROCHLORIDE 10 MG/ML
INJECTION, SOLUTION EPIDURAL; INFILTRATION; INTRACAUDAL; PERINEURAL
Status: DISCONTINUED
Start: 2019-05-15 | End: 2019-05-15 | Stop reason: HOSPADM

## 2019-05-15 RX ORDER — CEFAZOLIN SODIUM 1 G/3ML
1 INJECTION, POWDER, FOR SOLUTION INTRAMUSCULAR; INTRAVENOUS SEE ADMIN INSTRUCTIONS
Status: DISCONTINUED | OUTPATIENT
Start: 2019-05-15 | End: 2019-05-15 | Stop reason: HOSPADM

## 2019-05-15 RX ORDER — LEVOTHYROXINE SODIUM 125 UG/1
125 TABLET ORAL DAILY
Status: DISCONTINUED | OUTPATIENT
Start: 2019-05-15 | End: 2019-05-16 | Stop reason: HOSPADM

## 2019-05-15 RX ORDER — NALOXONE HYDROCHLORIDE 0.4 MG/ML
.1-.4 INJECTION, SOLUTION INTRAMUSCULAR; INTRAVENOUS; SUBCUTANEOUS
Status: DISCONTINUED | OUTPATIENT
Start: 2019-05-15 | End: 2019-05-15 | Stop reason: HOSPADM

## 2019-05-15 RX ORDER — GABAPENTIN 100 MG/1
200 CAPSULE ORAL 3 TIMES DAILY
Status: DISCONTINUED | OUTPATIENT
Start: 2019-05-15 | End: 2019-05-16 | Stop reason: HOSPADM

## 2019-05-15 RX ORDER — FENTANYL CITRATE 50 UG/ML
INJECTION, SOLUTION INTRAMUSCULAR; INTRAVENOUS PRN
Status: DISCONTINUED | OUTPATIENT
Start: 2019-05-15 | End: 2019-05-15

## 2019-05-15 RX ORDER — PROPOFOL 10 MG/ML
INJECTION, EMULSION INTRAVENOUS CONTINUOUS PRN
Status: DISCONTINUED | OUTPATIENT
Start: 2019-05-15 | End: 2019-05-15

## 2019-05-15 RX ORDER — HYDROCODONE BITARTRATE AND ACETAMINOPHEN 5; 325 MG/1; MG/1
1-2 TABLET ORAL
Status: DISCONTINUED | OUTPATIENT
Start: 2019-05-15 | End: 2019-05-15

## 2019-05-15 RX ORDER — SODIUM CHLORIDE, SODIUM LACTATE, POTASSIUM CHLORIDE, CALCIUM CHLORIDE 600; 310; 30; 20 MG/100ML; MG/100ML; MG/100ML; MG/100ML
INJECTION, SOLUTION INTRAVENOUS CONTINUOUS PRN
Status: DISCONTINUED | OUTPATIENT
Start: 2019-05-15 | End: 2019-05-15

## 2019-05-15 RX ORDER — LIDOCAINE 40 MG/G
CREAM TOPICAL
Status: DISCONTINUED | OUTPATIENT
Start: 2019-05-15 | End: 2019-05-15 | Stop reason: HOSPADM

## 2019-05-15 RX ORDER — METFORMIN HCL 500 MG
500 TABLET, EXTENDED RELEASE 24 HR ORAL
Status: DISCONTINUED | OUTPATIENT
Start: 2019-05-15 | End: 2019-05-16 | Stop reason: HOSPADM

## 2019-05-15 RX ORDER — FENTANYL CITRATE 50 UG/ML
25-50 INJECTION, SOLUTION INTRAMUSCULAR; INTRAVENOUS EVERY 5 MIN PRN
Status: DISCONTINUED | OUTPATIENT
Start: 2019-05-15 | End: 2019-05-15 | Stop reason: HOSPADM

## 2019-05-15 RX ORDER — ONDANSETRON 4 MG/1
4 TABLET, ORALLY DISINTEGRATING ORAL EVERY 30 MIN PRN
Status: DISCONTINUED | OUTPATIENT
Start: 2019-05-15 | End: 2019-05-15 | Stop reason: HOSPADM

## 2019-05-15 RX ORDER — CEFAZOLIN SODIUM 2 G/100ML
2 INJECTION, SOLUTION INTRAVENOUS
Status: COMPLETED | OUTPATIENT
Start: 2019-05-15 | End: 2019-05-15

## 2019-05-15 RX ORDER — PROPOFOL 10 MG/ML
INJECTION, EMULSION INTRAVENOUS PRN
Status: DISCONTINUED | OUTPATIENT
Start: 2019-05-15 | End: 2019-05-15

## 2019-05-15 RX ORDER — OXYCODONE HYDROCHLORIDE 5 MG/1
5 TABLET ORAL
Status: DISCONTINUED | OUTPATIENT
Start: 2019-05-15 | End: 2019-05-15 | Stop reason: HOSPADM

## 2019-05-15 RX ORDER — HYDROMORPHONE HYDROCHLORIDE 1 MG/ML
.3-.5 INJECTION, SOLUTION INTRAMUSCULAR; INTRAVENOUS; SUBCUTANEOUS EVERY 10 MIN PRN
Status: DISCONTINUED | OUTPATIENT
Start: 2019-05-15 | End: 2019-05-15 | Stop reason: HOSPADM

## 2019-05-15 RX ORDER — AMOXICILLIN 250 MG
1-2 CAPSULE ORAL 2 TIMES DAILY PRN
Qty: 30 TABLET | Refills: 0 | Status: SHIPPED | OUTPATIENT
Start: 2019-05-15 | End: 2020-09-03

## 2019-05-15 RX ORDER — HYDROCODONE BITARTRATE AND ACETAMINOPHEN 5; 325 MG/1; MG/1
1-2 TABLET ORAL EVERY 4 HOURS PRN
Status: DISCONTINUED | OUTPATIENT
Start: 2019-05-15 | End: 2019-05-16 | Stop reason: HOSPADM

## 2019-05-15 RX ORDER — ONDANSETRON 2 MG/ML
4 INJECTION INTRAMUSCULAR; INTRAVENOUS EVERY 6 HOURS PRN
Status: DISCONTINUED | OUTPATIENT
Start: 2019-05-15 | End: 2019-05-16 | Stop reason: HOSPADM

## 2019-05-15 RX ORDER — ONDANSETRON 4 MG/1
4 TABLET, ORALLY DISINTEGRATING ORAL EVERY 6 HOURS PRN
Status: DISCONTINUED | OUTPATIENT
Start: 2019-05-15 | End: 2019-05-16 | Stop reason: HOSPADM

## 2019-05-15 RX ADMIN — TILMANOCEPT 0.55 MILLICURIE: KIT at 09:40

## 2019-05-15 RX ADMIN — PHENYLEPHRINE HYDROCHLORIDE 100 MCG: 10 INJECTION, SOLUTION INTRAMUSCULAR; INTRAVENOUS; SUBCUTANEOUS at 11:12

## 2019-05-15 RX ADMIN — MIDAZOLAM 2 MG: 1 INJECTION INTRAMUSCULAR; INTRAVENOUS at 10:36

## 2019-05-15 RX ADMIN — PROPOFOL 200 MCG/KG/MIN: 10 INJECTION, EMULSION INTRAVENOUS at 10:41

## 2019-05-15 RX ADMIN — HYDROMORPHONE HYDROCHLORIDE 0.5 MG: 1 INJECTION, SOLUTION INTRAMUSCULAR; INTRAVENOUS; SUBCUTANEOUS at 13:00

## 2019-05-15 RX ADMIN — PROPOFOL: 10 INJECTION, EMULSION INTRAVENOUS at 11:38

## 2019-05-15 RX ADMIN — LIDOCAINE HYDROCHLORIDE 100 MG: 20 INJECTION, SOLUTION INFILTRATION; PERINEURAL at 10:41

## 2019-05-15 RX ADMIN — HYDROMORPHONE HYDROCHLORIDE 0.5 MG: 1 INJECTION, SOLUTION INTRAMUSCULAR; INTRAVENOUS; SUBCUTANEOUS at 13:36

## 2019-05-15 RX ADMIN — SODIUM CHLORIDE: 9 INJECTION, SOLUTION INTRAVENOUS at 14:38

## 2019-05-15 RX ADMIN — PHENYLEPHRINE HYDROCHLORIDE: 10 INJECTION, SOLUTION INTRAMUSCULAR; INTRAVENOUS; SUBCUTANEOUS at 11:32

## 2019-05-15 RX ADMIN — PROPOFOL 200 MG: 10 INJECTION, EMULSION INTRAVENOUS at 10:41

## 2019-05-15 RX ADMIN — FENTANYL CITRATE 25 MCG: 50 INJECTION, SOLUTION INTRAMUSCULAR; INTRAVENOUS at 12:12

## 2019-05-15 RX ADMIN — PHENYLEPHRINE HYDROCHLORIDE 0.2 MCG/KG/MIN: 10 INJECTION, SOLUTION INTRAMUSCULAR; INTRAVENOUS; SUBCUTANEOUS at 11:14

## 2019-05-15 RX ADMIN — PHENYLEPHRINE HYDROCHLORIDE 100 MCG: 10 INJECTION, SOLUTION INTRAMUSCULAR; INTRAVENOUS; SUBCUTANEOUS at 11:08

## 2019-05-15 RX ADMIN — DEXMEDETOMIDINE HYDROCHLORIDE 8 MCG: 100 INJECTION, SOLUTION INTRAVENOUS at 10:41

## 2019-05-15 RX ADMIN — CEFAZOLIN SODIUM 2 G: 2 INJECTION, SOLUTION INTRAVENOUS at 10:45

## 2019-05-15 RX ADMIN — SODIUM CHLORIDE, POTASSIUM CHLORIDE, SODIUM LACTATE AND CALCIUM CHLORIDE: 600; 310; 30; 20 INJECTION, SOLUTION INTRAVENOUS at 10:36

## 2019-05-15 RX ADMIN — ONDANSETRON 4 MG: 2 INJECTION INTRAMUSCULAR; INTRAVENOUS at 10:45

## 2019-05-15 RX ADMIN — DEXAMETHASONE SODIUM PHOSPHATE 4 MG: 4 INJECTION, SOLUTION INTRA-ARTICULAR; INTRALESIONAL; INTRAMUSCULAR; INTRAVENOUS; SOFT TISSUE at 10:41

## 2019-05-15 RX ADMIN — METFORMIN HYDROCHLORIDE 500 MG: 500 TABLET, EXTENDED RELEASE ORAL at 19:17

## 2019-05-15 RX ADMIN — FENTANYL CITRATE 25 MCG: 50 INJECTION, SOLUTION INTRAMUSCULAR; INTRAVENOUS at 11:39

## 2019-05-15 RX ADMIN — LIDOCAINE HYDROCHLORIDE 10 ML: 10 INJECTION, SOLUTION INFILTRATION; PERINEURAL at 08:22

## 2019-05-15 RX ADMIN — OMEPRAZOLE 20 MG: 20 CAPSULE, DELAYED RELEASE ORAL at 21:07

## 2019-05-15 RX ADMIN — PHENYLEPHRINE HYDROCHLORIDE 100 MCG: 10 INJECTION, SOLUTION INTRAMUSCULAR; INTRAVENOUS; SUBCUTANEOUS at 10:51

## 2019-05-15 RX ADMIN — PHENYLEPHRINE HYDROCHLORIDE 100 MCG: 10 INJECTION, SOLUTION INTRAMUSCULAR; INTRAVENOUS; SUBCUTANEOUS at 11:01

## 2019-05-15 RX ADMIN — SODIUM CHLORIDE, POTASSIUM CHLORIDE, SODIUM LACTATE AND CALCIUM CHLORIDE: 600; 310; 30; 20 INJECTION, SOLUTION INTRAVENOUS at 11:33

## 2019-05-15 RX ADMIN — LEVOTHYROXINE SODIUM 125 MCG: 125 TABLET ORAL at 16:42

## 2019-05-15 RX ADMIN — DEXMEDETOMIDINE HYDROCHLORIDE 12 MCG: 100 INJECTION, SOLUTION INTRAVENOUS at 10:56

## 2019-05-15 RX ADMIN — GABAPENTIN 200 MG: 100 CAPSULE ORAL at 16:42

## 2019-05-15 RX ADMIN — Medication 0.2 MG: at 14:57

## 2019-05-15 RX ADMIN — HYDROMORPHONE HYDROCHLORIDE 0.5 MG: 1 INJECTION, SOLUTION INTRAMUSCULAR; INTRAVENOUS; SUBCUTANEOUS at 13:20

## 2019-05-15 RX ADMIN — FENTANYL CITRATE 50 MCG: 50 INJECTION, SOLUTION INTRAMUSCULAR; INTRAVENOUS at 10:41

## 2019-05-15 RX ADMIN — GABAPENTIN 200 MG: 100 CAPSULE ORAL at 21:07

## 2019-05-15 RX ADMIN — FENTANYL CITRATE 25 MCG: 50 INJECTION, SOLUTION INTRAMUSCULAR; INTRAVENOUS at 11:44

## 2019-05-15 RX ADMIN — FENTANYL CITRATE 50 MCG: 50 INJECTION, SOLUTION INTRAMUSCULAR; INTRAVENOUS at 10:50

## 2019-05-15 RX ADMIN — FENTANYL CITRATE 25 MCG: 50 INJECTION, SOLUTION INTRAMUSCULAR; INTRAVENOUS at 12:03

## 2019-05-15 ASSESSMENT — ACTIVITIES OF DAILY LIVING (ADL)
AMBULATION: 0-->INDEPENDENT
TOILETING: 0-->INDEPENDENT
DRESS: 0-->INDEPENDENT
TRANSFERRING: 0-->INDEPENDENT
RETIRED_COMMUNICATION: 0-->UNDERSTANDS/COMMUNICATES WITHOUT DIFFICULTY
SWALLOWING: 0-->SWALLOWS FOODS/LIQUIDS WITHOUT DIFFICULTY
COGNITION: 0 - NO COGNITION ISSUES REPORTED
RETIRED_EATING: 0-->INDEPENDENT
FALL_HISTORY_WITHIN_LAST_SIX_MONTHS: NO
BATHING: 0-->INDEPENDENT

## 2019-05-15 ASSESSMENT — LIFESTYLE VARIABLES: TOBACCO_USE: 0

## 2019-05-15 ASSESSMENT — MIFFLIN-ST. JEOR
SCORE: 1606.13
SCORE: 1547.61

## 2019-05-15 ASSESSMENT — COPD QUESTIONNAIRES: COPD: 0

## 2019-05-15 NOTE — ANESTHESIA CARE TRANSFER NOTE
Patient: Luci Corbin Alert    Procedure(s):  SEED LOCALIZATION BREAST LUMPECTOMY, SEED LOCALIZATION AXILLARY BREAST BIOPSY, SENTINEL NODE , REMOVAL OF VASCULAR PORT ACCESS AND RIGHT  AXILLARY NODE DISSECTION  BIOPSY, LYMPH NODE, SENTINEL  REMOVAL, VASCULAR ACCESS PORT  LYMPHADENECTOMY, AXILLARY    Diagnosis: RIGHT BREAST CANCER  Diagnosis Additional Information: No value filed.    Anesthesia Type:   General, LMA     Note:  Airway :Face Mask  Patient transferred to:PACU  Handoff Report: Identifed the Patient, Identified the Reponsible Provider, Reviewed the pertinent medical history, Discussed the surgical course, Reviewed Intra-OP anesthesia mangement and issues during anesthesia, Set expectations for post-procedure period and Allowed opportunity for questions and acknowledgement of understanding      Vitals: (Last set prior to Anesthesia Care Transfer)    CRNA VITALS  5/15/2019 1216 - 5/15/2019 1252      5/15/2019             Pulse:  96    SpO2:  98 %    Resp Rate (set):  10                Electronically Signed By: AMIE Sunshine CRNA  May 15, 2019  12:52 PM

## 2019-05-15 NOTE — OP NOTE
[facility] Breast Surgery Operative Note      Pre-operative diagnosis: Right breast invasive ductal carcinoma   Post-operative diagnosis: Right breast invasive ductal carcinoma     Procedure: 1.  RIGHT SEED LOCALIZED PARTIAL MASTECTOMY  2.  RIGHT SENTINEL LYMPH NODE BIOPSY  3.  INTRAOPERATIVE INJECTION OF METHYLENE BLUE TRACER  4. RIGHT SEED LOCALIZED NODE EXCISION  5. RIGHT LIMITED AXILLARY NODE DISSECTION  6. PORT REMOVAL     Surgeon: Stacey Ashford MD   Assistant(s):  Yarelis Bullard PA-C  The PA s assistance was medically necessary to provide adequate exposure in the operating field, maintain hemostasis, cutting suture, clamping and ligating bleeding vessels, and visualization of anatomic structures throughout the surgical procedure.      Anesthesia: General    Estimated blood loss:   25 cc     Specimens: ID Type Source Tests Collected by Time Destination   A : Right Axillary [place] Lymph Node with Seed Tissue Axilla, Right SURGICAL PATHOLOGY EXAM Stacey Ashford MD 5/15/2019 11:22 AM    B : Explanted Vascular Access Port Other (specify in comments) Other SURGICAL PATHOLOGY EXAM Stacey Ashford MD 5/15/2019 11:00 AM    C : RIGHT AXILLARY SENTINEL LYMPH NODE  Tissue Axilla, Right SURGICAL PATHOLOGY EXAM Stacey Ashford MD 5/15/2019 11:31 AM    D : Right Breast Mass Tissue Breast, Right SURGICAL PATHOLOGY EXAM Stacey Ashford MD 5/15/2019 11:45 AM    E : Right Breast Mass Medial Margin Tissue Breast, Right SURGICAL PATHOLOGY EXAM Stacey Ashford MD 5/15/2019 11:45 AM    F : Right Breast Mass Inferior Margin Tissue Breast, Right SURGICAL PATHOLOGY EXAM Stacey Ashford MD 5/15/2019 11:46 AM    G : Right Breast Mass Lateral Margin Tissue Breast, Right SURGICAL PATHOLOGY EXAM Stacey Ashford MD 5/15/2019 11:46 AM    H : Right Breast Mass Posterior Margin Tissue Breast, Right SURGICAL PATHOLOGY EXAM Stacey Ashford MD 5/15/2019 11:47 AM    I : Right Breast Mass Superior Margin Tissue Breast, Right  SURGICAL PATHOLOGY EXAM Stacey Ashford MD 5/15/2019 11:49 AM    J : RIGHT AXILLARY LYMPH NODE Tissue Axilla, Right SURGICAL PATHOLOGY EXAM Stacey Ashford MD 5/15/2019 12:05 PM         INDICATION:  Luci is a 48yof who originally presented in October 2018 with a palpable lump in her right breast. She had imaging which revealed an 8mm mass at 12:00, 6cm FN. This was biopsied as well as a concerning axillary node and found to be grade 3 invasive ductal carcinoma, ER/VT/Her 2 negative with positive axillary node. Breast MRI identified area measuring 3.8cm of concern. PET negative for distant disease. She has see Dr. Murillo and underwent neoadjuvant chemotherapy. She had a follow up MRI on 4/8/2019 which revealed no areas of enhancement in either breast and no lymphadenopathy - previously biopsied node appears normal now. She elected to proceed with seed localized lumpectomy with SLNB and seed localized excision of the biopsied node, possible node dissection.     DESCRIPTION OF PROCEDURE: The patient was placed on the table in supine position. General anesthetic was induced. Perioperative antibiotics were given. 3 ml of methylene blue dye was injected in subareolar position within the deep dermal lymphatics. The right breast and axilla were prepped and draped in standard sterile fashion.  We began with port removal. We anesthetized the skin of the upper left chest at the prior incision for port placement.  A #15 blade was used to make a skin incision. The subcutaneous tissue was divided with cautery. The port was encountered. The prolene sutures were cut and removed. The port was removed from the pocket. A figure of 8 3-0 vicryl suture was placed around the catheter track and the catheter was then removed and the suture tied down. Pressure was held at the neck for 5 minutes. Hemostasis was excellent. We then closed the subcutaneous tissue with 3-0 interrupted Vicryl sutures.  The skin was closed with a running 4-0  Vicryl subcuticular suture and steri strips were placed.   We than began the right sentinel lymph node biopsy. We first used the neoprobe to identify the area up high counts related to the seed placed in the previously biopsied node. The patient had also been injected with a radionuclear pharmaceutical preoperatively.  We used the Neoprobe to localize an area of increased activity in the axilla. We made an incision in the skin overlying that area of activity. The incision was carried down into the subcutaneous tissue and into the axillary space with the Bovie electrocautery. We then localized an area of increased activity, and isolated a lymph node from surrounding tissues. This node was enlarged and felt as though there may be two nodes within the tissue. Bioptics was completed and revealed the seed and clip within the node. This was sent to pathology for frozen section. We then changed the mode on the neoprobe to technetium to assist in identifying the sentinel lymph node. There was a single node identified deep in the axilla along the chest wall which was blue. This node had high counts and was sent to pathology for frozen section. Pathology called in and reported the first node with the seed had evidence of residual carcinoma. The sentinel node was negative for malignancy. We then removed further eusebio tissue in the axilla which was challenging due to the patient's body habitus with her obesity. The axillary vein was identified and eusebio tissue inferior to the vein was removed beginning deep to the pectoralis muscle and traveling laterally as well as medially. The thoracodorsal complex was protected. The structures were not skeletonized.  There were no clinically positive nodes upon thorough evaluation by palpation of the axilla.   Hemostasis was assured.  We then closed the incision with interrupted subcutaneous 3-0 Vicryl sutures, a running 4-0 Monocryl subcuticular suture and Steri strips. The nodes were sent  to pathology for routine evaluation.     We then directed our attention to the breast. We used the seed placed in the [facility] as well as the post-seed mammograms to localize the area of interest. We made a curvilinear incision centered at the 12 o'clock position. We carried the incision down using electrocautery into the breast tissue and excised the area of interest, including the seed.  The neoprobe was used to guide the dissection. The mass was removed in its entirety with some surrounding benign appearing breast tissue. After the specimen was removed it had a high signal with the neoprobe. Once the mass was removed, it was oriented with Mendoza dyes. A specimen mammogram was obtained and revealed both the seed and clip. The specimen was then sent to pathology for review.  The wound was then examined for bleeding and hemostasis was achieved using electrocautery.  I elected to separately excise margins and these were sent individually to pathology with ink at the new margin.     Hemostasis was maintained throughout with electrocautery. The field was irrigated with sterile saline.     Clips were placed at the 12, 3, 6, and 9 o'clock positions of the lumpectomy cavity as well as posterior and anterior.  The lumpectomy cavity was reapproximated with several interrupted 3-0 vicryl sutures. The skin was closed with a deep dermal 3-0 vicryl and running 4-0 Monocryl subcuticular suture and steri strips.  The patient tolerated the procedure well.  Sponge and instrument counts were correct.      Stacey Ashford MD  [facility]  [phone]

## 2019-05-15 NOTE — BRIEF OP NOTE
[facility]    Brief Operative Note    Pre-operative diagnosis: RIGHT BREAST CANCER  Post-operative diagnosis Breast Cancer  Procedure: Procedure(s):  SEED LOCALIZATION BREAST LUMPECTOMY, SEED LOCALIZATION AXILLARY BREAST BIOPSY, SENTINEL NODE , REMOVAL OF VASCULAR PORT ACCESS AND RIGHT  AXILLARY NODE DISSECTION  BIOPSY, LYMPH NODE, SENTINEL  REMOVAL, VASCULAR ACCESS PORT  LYMPHADENECTOMY, AXILLARY  Surgeon: Surgeon(s) and Role:     * Stacey Ashford MD - Primary     * Yarelis Bullard PA-C - Assisting  Anesthesia: General   Estimated blood loss: 25 mL  Drains: 15 round SALVADOR  Specimens:   ID Type Source Tests Collected by Time Destination   A : Right Axillary [place] Lymph Node with Seed Tissue Axilla, Right SURGICAL PATHOLOGY EXAM Stacey Ashford MD 5/15/2019 11:22 AM    B : Explanted Vascular Access Port Other (specify in comments) Other SURGICAL PATHOLOGY EXAM Stacey Ashford MD 5/15/2019 11:00 AM    C : RIGHT AXILLARY SENTINEL LYMPH NODE  Tissue Axilla, Right SURGICAL PATHOLOGY EXAM Stacey Ashford MD 5/15/2019 11:31 AM    D : Right Breast Mass Tissue Breast, Right SURGICAL PATHOLOGY EXAM Stacey Ashford MD 5/15/2019 11:45 AM    E : Right Breast Mass Medial Margin Tissue Breast, Right SURGICAL PATHOLOGY EXAM Stacey Ashford MD 5/15/2019 11:45 AM    F : Right Breast Mass Inferior Margin Tissue Breast, Right SURGICAL PATHOLOGY EXAM Stacey Ashford MD 5/15/2019 11:46 AM    G : Right Breast Mass Lateral Margin Tissue Breast, Right SURGICAL PATHOLOGY EXAM Stacey Ashford MD 5/15/2019 11:46 AM    H : Right Breast Mass Posterior Margin Tissue Breast, Right SURGICAL PATHOLOGY EXAM Stacey Ashford MD 5/15/2019 11:47 AM    I : Right Breast Mass Superior Margin Tissue Breast, Right SURGICAL PATHOLOGY EXAM Stacey Ashford MD 5/15/2019 11:49 AM    J : RIGHT AXILLARY LYMPH NODE Tissue Axilla, Right SURGICAL PATHOLOGY EXAM Stacey Ashford MD 5/15/2019 12:05 PM      Findings:   as  above.  Complications: None.  Implants:    Implant Name Type Inv. Item Serial No.  Lot No. LRB No. Used   CATH PORT POWERPORT CLEARVUE ISP [id number] Catheter CATH PORT POWERPORT CLEARVUE ISP [id number]  [facility] [id number] Left 1

## 2019-05-15 NOTE — PLAN OF CARE
A&Ox4, VSS on RA. Capno WDL. C/o 5/10 pain managed with PRN IV dilaudid. Incisions CDI. ACE wrap in place. Up SBA. Clear liq diet, voiding. Offered broth, pt refused stating not hungry. IV infusing. Plan to DC tomorrow.

## 2019-05-15 NOTE — ANESTHESIA PREPROCEDURE EVALUATION
Anesthesia Pre-Procedure Evaluation    Patient: Luci Londono   MRN: [id number] : 1970          Preoperative Diagnosis: RIGHT BREAST CANCER    Procedure(s):  SEED LOCALIZATION BREAST LUMPECTOMY, SEED LOCALIZATION AXILLARY BREAST BIOPSY, SENTINEL NODE , REMOVAL OF VASCULAR PORT ACCESS AND POSSIBLE AXILLARY NODE DISSECTION  BIOPSY, LYMPH NODE, SENTINEL  REMOVAL, VASCULAR ACCESS PORT  LYMPHADENECTOMY, AXILLARY    Past Medical History:   Diagnosis Date     Hypertension goal BP (blood pressure) < 140/80 2014     Lateral epicondylitis, right dominant elbow since late 10-17 2017     Port-A-Cath in place 10/26/2018     Thyroid disease      Past Surgical History:   Procedure Laterality Date     HAND SURGERY      left     INSERT PORT VASCULAR ACCESS N/A 10/18/2018    Procedure: INSERTION OF VASCULAR PORT;  Surgeon: Stacey Ashford MD;  Location: [facility] OR     TUBAL LIGATION         Anesthesia Evaluation     . Pt has had prior anesthetic. Type: General    No history of anesthetic complications          ROS/MED HX    ENT/Pulmonary:      (-) tobacco use, asthma and COPD   Neurologic:      (-) CVA, TIA and Neuropathy   Cardiovascular:     (+) hypertension----. : . . . :. .      (-) CAD, irregular heartbeat/palpitations and stent   METS/Exercise Tolerance:     Hematologic:        (-) anemia   Musculoskeletal:         GI/Hepatic:     (+) GERD      (-) liver disease   Renal/Genitourinary:      (-) renal disease   Endo:     (+) thyroid problem hypothyroidism, Obesity, .   (-) Type I DM and Type II DM   Psychiatric:         Infectious Disease:  - neg infectious disease ROS       Malignancy:         Other:                          Physical Exam  Normal systems: cardiovascular, pulmonary and dental    Airway   Mallampati: II  TM distance: >3 FB  Neck ROM: full    Dental     Cardiovascular   Rhythm and rate: regular and normal      Pulmonary    breath sounds clear to auscultation        Reading Physician  Reading Date Result Priority   Nieves Carlin MD 2018       Narrative     [id number]  [id number]  [id number]  [id number]^[email or web address]        [facility]  [facility]  Echocardiography Laboratory  [address]  [place] [address]  Phone [phone]  Fax [phone]        Name: DANO LOERA  MRN: [id number]  : 1970  Study Date: 2018 10:54 AM  Age: 48 yrs  Gender: Female  Patient Location: [facility]  Reason For Study: Chemotherapy  Ordering Physician: NIKI HAZEL  Referring Physician: NIKI HAZEL  Performed By: Rosanne Oliver     BSA: 1.9 m2  Height: 62 in  Weight: 196 lb  HR: 88  BP: 135/84 mmHg  _____________________________________________________________________________  __     Procedure  Complete Echo Adult.     _____________________________________________________________________________  __        Interpretation Summary     The visual ejection fraction is estimated at 55%.  Global peak LV longitudinal strain is averaged at -19.2%. This is within  reported normal limits (normal <-18%).  The right ventricular systolic function is borderline         Lab Results   Component Value Date    WBC 6.3 2019    HGB 12.0 2019    HCT 37.1 2019     2019    SED 17 2018     2019    POTASSIUM 3.7 2019    CHLORIDE 105 2019    CO2 24 2019    BUN 6 (L) 2019    CR 0.71 2019     (H) 2019    MATHIEU 8.9 2019    PHOS 3.4 02/15/2012    MAG 1.8 10/12/2018    ALBUMIN 3.1 (L) 2019    PROTTOTAL 6.6 (L) 2019    ALT 66 (H) 2019    AST 37 2019    ALKPHOS 73 2019    BILITOTAL 0.2 2019    LIPASE 131 2019    AMYLASE 77 2013    TSH 8.17 (H) 2019    T4 0.56 (L) 2018    HCG Negative 05/15/2019       Preop Vitals  BP Readings from Last 3 Encounters:   05/15/19 166/81   19 128/76   19 122/82  "   Pulse Readings from Last 3 Encounters:   05/15/19 93   05/09/19 91   04/12/19 105      Resp Readings from Last 3 Encounters:   05/15/19 18   05/09/19 16   04/12/19 16    SpO2 Readings from Last 3 Encounters:   05/15/19 99%   05/09/19 99%   04/12/19 100%      Temp Readings from Last 1 Encounters:   05/15/19 36.2  C (97.1  F) (Oral)    Ht Readings from Last 1 Encounters:   05/15/19 1.549 m (5' 1\")      Wt Readings from Last 1 Encounters:   05/15/19 97.5 kg (215 lb)    Estimated body mass index is 40.62 kg/m  as calculated from the following:    Height as of this encounter: 1.549 m (5' 1\").    Weight as of this encounter: 97.5 kg (215 lb).       Anesthesia Plan      History & Physical Review  History and physical reviewed and following examination; no interval change.    ASA Status:  3 .    NPO Status:  > 6 hours    Plan for General and LMA with Intravenous and Propofol induction. Maintenance will be Balanced.    PONV prophylaxis:  Ondansetron (or other 5HT-3) and Dexamethasone or Solumedrol    Propofol infusion      Postoperative Care  Postoperative pain management:  Oral pain medications and Multi-modal analgesia.      Consents  Anesthetic plan, risks, benefits and alternatives discussed with:  Patient..                 Ellis Perez MD  "

## 2019-05-15 NOTE — DISCHARGE INSTRUCTIONS
DRAIN CARE  You have a SALVADOR drain.  In order to empty this, open the tab/vent on the drain and record how much liquid you remove. To properly close the drain, squeeze the bulb (with tab/vent open) then close the tab while still squeezing the bulb. You should notice that liquid moves in the tubing toward the bulb if it is properly closed.   You should also strip the drain tubing once daily to avoid any clogging. You do this by gently pinching the drain, starting near the skin, and stretching the tubing out this way until you reach the bulb. Do not pull hard on the drain tubing to the point of pulling the drain away from the skin.       [facility]  Discharge Instructions: Post-Operative Breast Surgery    ACTIVITY    Take frequent short walks and increase your activity gradually.      Avoid strenuous physical activity or heavy lifting greater than 15-20 lbs. for 1-2 weeks with arm on the surgery side.  You may climb stairs.    Gentle rotation and stretching of your arms and shoulders will prevent joint stiffness.    You may drive without restrictions when you are not using any prescription pain medication and feel comfortable in a car.    You may return to work/school when you are comfortable without any prescription pain medication.    WOUND CARE    You may remove your outer dressing and shower 48 hours after the surgery.  Pat your incisions dry and leave them open to air.  Re-apply dressing (Band-Aids or gauze/tape) as needed for drainage.    You may have steri-strips (looks like white tape) or Dermabond (looks like glue) on your incisions.  You may peel off the steri-strips 2 weeks after your surgery if they have not peeled off on their own.  If you have Dermabond, it will peel up and fall off on its own.    Do not soak your incisions in a tub or pool for 2 weeks.     Do not apply any lotions, creams, or ointments to your incisions.    A ridge under your incisions is normal and  will gradually resolve.    Wear a supportive bra for 1-2 weeks, day and night.    DIET    Start with liquids, then gradually resume your regular diet as tolerated.     Drink plenty of liquids to stay hydrated.    PAIN    Expect some tenderness and discomfort at the incision site(s).  Use the prescribed pain medication at your discretion.  Expect gradual resolution of your pain over several days.    You may take ibuprofen with food (unless you have been told not to) instead of or in addition to your prescribed pain medication.  If you are taking Norco or Percocet, do not take any additional acetaminophen/APAP/Tylenol.    Do not drink alcohol or drive while you are taking pain medications.    You may apply ice to your incisions in 20 minute intervals as needed for the next 48 hours.      EXPECTATIONS    Pain medications can cause constipation.  Limit use when possible.  Take over the counter stool softener/stimulant, such as Colace or Senna, 1-2 times a day with plenty of water.  You may take a mild over the counter laxative, such as Miralax or a suppository, as needed.      You may discontinue these medications once you are having regular bowel movements and/or are no longer taking your narcotic pain medication.    Blue dye may have been used during your surgery to locate lymph nodes and can cause your urine to be blue/green for several days after surgery.  This is not a cause for concern and will resolve on its own.     RETURN APPOINTMENT    Follow up with your surgeon, Dr. Ashford, next week in clinic.  Please call the office at [phone] to schedule your appointment.      CALL OUR OFFICE -[phone] IF YOU HAVE:     Chills or fever above 101 F.    Increased redness, warmth, or drainage at your incisions.    Significant bleeding.    Pain not relieved by your pain medication or rest.    Increasing pain after the first 48 hours.    Any other concerns or questions.    Revised October 2018

## 2019-05-15 NOTE — ANESTHESIA POSTPROCEDURE EVALUATION
Patient: Luci Corbin Alert    Procedure(s):  SEED LOCALIZATION BREAST LUMPECTOMY, SEED LOCALIZATION AXILLARY BREAST BIOPSY, SENTINEL NODE , REMOVAL OF VASCULAR PORT ACCESS AND RIGHT  AXILLARY NODE DISSECTION  BIOPSY, LYMPH NODE, SENTINEL  REMOVAL, VASCULAR ACCESS PORT  LYMPHADENECTOMY, AXILLARY    Diagnosis:RIGHT BREAST CANCER  Diagnosis Additional Information: No value filed.    Anesthesia Type:  General, LMA    Note:  Anesthesia Post Evaluation    Patient location during evaluation: PACU  Patient participation: Able to fully participate in evaluation  Level of consciousness: awake and alert  Pain management: adequate  Airway patency: patent  Cardiovascular status: acceptable  Respiratory status: acceptable  Hydration status: acceptable  PONV: none     Anesthetic complications: None          Last vitals:  Vitals:    05/15/19 1427 05/15/19 1512 05/15/19 1537   BP: 116/67  117/63   Pulse:      Resp: 18 16 16   Temp: 36.2  C (97.2  F)  35.9  C (96.7  F)   SpO2: 95%  98%         Electronically Signed By: Ellis Perez MD  May 15, 2019  4:28 PM

## 2019-05-15 NOTE — PROGRESS NOTES
SBAR Seed Localization    SITUATION:  Patient to breast imaging center for imaging guided seed localizations before breast lumpectomy or excision biopsy with sentinel node injection.    BACKGROUND:  Breast imaging cancer, breast abnormality  Ordered procedure completed: Yes  Special needs identified: No     ASSESSMENT:  SBAR report called to patient care unit because of unexpected event in radiology: No  Allergies and medication list reviewed prior to procedure. Yes  Skin cleansed with ChloraPrep One-Step.  Anesthesia: approximately 10ml of 1% Lidocaine injection subcutaneous before seed insertion administered by the radiologist.   Gauze dressing over insertion site(s).  Post procedure mammogram completed: Yes    Patient tolerance:well    RECOMMENDATIONS:  Patient transferred to Same Day Surgery in stable condition via wheelchair with Breast Imaging Staff.    Please call [facility] [phone] if there are any questions.

## 2019-05-16 VITALS
SYSTOLIC BLOOD PRESSURE: 120 MMHG | HEIGHT: 61 IN | HEART RATE: 87 BPM | RESPIRATION RATE: 16 BRPM | WEIGHT: 227.9 LBS | TEMPERATURE: 96.5 F | DIASTOLIC BLOOD PRESSURE: 58 MMHG | OXYGEN SATURATION: 100 % | BODY MASS INDEX: 43.03 KG/M2

## 2019-05-16 LAB
COPATH REPORT: NORMAL
GLUCOSE BLDC GLUCOMTR-MCNC: 104 MG/DL (ref 70–99)

## 2019-05-16 PROCEDURE — 25000132 ZZH RX MED GY IP 250 OP 250 PS 637: Performed by: PHYSICIAN ASSISTANT

## 2019-05-16 PROCEDURE — 82962 GLUCOSE BLOOD TEST: CPT

## 2019-05-16 RX ADMIN — HYDROCODONE BITARTRATE AND ACETAMINOPHEN 2 TABLET: 5; 325 TABLET ORAL at 00:20

## 2019-05-16 RX ADMIN — GABAPENTIN 200 MG: 100 CAPSULE ORAL at 07:41

## 2019-05-16 RX ADMIN — LEVOTHYROXINE SODIUM 125 MCG: 125 TABLET ORAL at 07:43

## 2019-05-16 RX ADMIN — HYDROCODONE BITARTRATE AND ACETAMINOPHEN 2 TABLET: 5; 325 TABLET ORAL at 11:35

## 2019-05-16 RX ADMIN — HYDROCODONE BITARTRATE AND ACETAMINOPHEN 2 TABLET: 5; 325 TABLET ORAL at 07:41

## 2019-05-16 RX ADMIN — OMEPRAZOLE 20 MG: 20 CAPSULE, DELAYED RELEASE ORAL at 07:41

## 2019-05-16 NOTE — PLAN OF CARE
Patient is A+OX4, VSS on RA. Dressings CDI, ace wrap in place. SALVADOR drain in place, serosanguineous drainage. Regular diet, no nausea/vomiting. Pain managed with ice and norco. Voiding in bathroom. Up with SBA. Probable discharge today.

## 2019-05-16 NOTE — PLAN OF CARE
A/ox4. Up with SBA. L and R dressings CDI, ACE wrap in place. SALVADOR with serosanguineous drng. Tolerating full liquid diet. R incision pain decreased with ice. VSS, on room air. Voiding adequately. Plan for probable discharge home tomorrow.

## 2019-05-16 NOTE — DISCHARGE SUMMARY
Patient discharged to home on 5/16/2019 at 12:43 PM via wheelchair with sister driving. All belongings with patient. Patient education given and all questions answered. Medication regimen discussed with patient. Upcoming appointments also discussed. Patient verbalized understanding.

## 2019-05-16 NOTE — PLAN OF CARE
A&Ox4, VSS on RA. Capno WDL. C/o pain managed with PRN norco. Incisions CDI. ACE wrap in place. Up ad darnell. Regular diet, voiding adequately. R SALVADOR drain in place to bulb suction. IV SL. Plan to discharge today.

## 2019-05-16 NOTE — PROGRESS NOTES
Surgery    Pt sitting up in bed  Complains of mild pain from shoulder down to mid back on the side  Otherwise doing well    B/P: 120/58, T: 96.5, P: 87, R: 16  Incision - dressings in place - clean/dry/intact  SALVADOR with blood tinged sero sang    A/P: s/p seed localized lumpectomy, seed localized lymph node bx, sentinel node bx and axillary node bx  - doing well  - home today    Yarelis Bullard PA-C

## 2019-05-21 ENCOUNTER — OFFICE VISIT (OUTPATIENT)
Dept: SURGERY | Facility: CLINIC | Age: 49
End: 2019-05-21
Payer: COMMERCIAL

## 2019-05-21 DIAGNOSIS — Z09 SURGERY FOLLOW-UP EXAMINATION: Primary | ICD-10-CM

## 2019-05-21 PROCEDURE — 99024 POSTOP FOLLOW-UP VISIT: CPT | Performed by: SURGERY

## 2019-05-21 RX ORDER — HYDROCODONE BITARTRATE AND ACETAMINOPHEN 5; 325 MG/1; MG/1
1 TABLET ORAL EVERY 6 HOURS PRN
Qty: 15 TABLET | Refills: 0 | Status: SHIPPED | OUTPATIENT
Start: 2019-05-21 | End: 2019-06-11

## 2019-05-21 NOTE — PROGRESS NOTES
[facility] Breast Surgery Postoperative Note    S: Luci reports she is having pain in the right axilla. norco helps. She is not icing. Minimal drain output.     Breasts: lumpectomy incision healing well. No fluid collection. Axillary incision healing well. SALVADOR drain removed. There is moderate swelling in the axilla.     Pathology:   SPECIMEN(S):   A: Right axillary lymph node   B: Right axillary lymph node   C: Explanted vascular access port   D: Right breast mass   E: Medial margin, right breast mass   F: Inferior margin, right breast mass   G: Lateral margin, right breast mass   H: Posterior margin, right breast mass   I: Superior margin, right breast mass   J: Right axillary lymph node     FINAL DIAGNOSIS:   <<<<<  A: Lymph node, right axillary, sentinel, excision   - Metastatic carcinoma consistent with breast primary involving one of one    lymph node and measuring 1.6 mm,   biopsy site changes, extracapsular extension not identified     B: Lymph node, right axillary, sentinel, excision   - Benign lymph node (1)     C: Explanted vascular access port     D: Breast, right, excision   - Benign breast tissue with biopsy site changes, no evidence of residual   invasive or in situ malignancy     E: Breast, right, medial margin, excision   - Benign breast tissue, no evidence of in situ or invasive malignancy     F: Breast, right, inferior margin, excision   - Benign breast tissue, no evidence of in situ or invasive malignancy     G: Breast, right, lateral margin, excision   - Benign breast tissue, no evidence of in situ or invasive malignancy     H: Breast, right, posterior margin, excision   -Benign breast tissue, no evidence of in situ or invasive malignancy     I: Breast, right, superior margin, excision   - Benign breast tissue, no evidence of in situ or invasive malignancy     J: Lymph nodes, right axillary, excision   - Benign lymph nodes (4)     A/P  Luci is a 48yof who originally presented in October 2018  with a palpable lump in her right breast. She had imaging which revealed an 8mm mass at 12:00, 6cm FN. This was biopsied as well as a concerning axillary node and found to be grade 3 invasive ductal carcinoma, ER/OK/Her 2 negative with positive axillary node. Breast MRI identified area measuring 3.8cm of concern. PET negative for distant disease. She has see Dr. Murillo and underwent neoadjuvant chemotherapy. She had a follow up MRI on 4/8/2019 which revealed no areas of enhancement in either breast and no lymphadenopathy - previously biopsied node appears normal now. She elected to proceed with seed localized lumpectomy with SLNB and seed localized excision of the biopsied node, possible node dissection.     Her final pathology was reviewed today. She had a complete response in the breast and had 1.6mm of residual carcinoma in the previously biopsied node, 1/6 nodes positive.     I placed a referral to radiation oncology. She has a follow up with Dr. Murillo scheduled. I would like to see her back in 3-4 weeks for follow up. She was given a handout with exercises to start. Her drain was removed today. I encouraged her to start icing to help with swelling.     Thank you for the opportunity to help in her care.    Stacey Ashford  [facility]  [phone]    Please route or send letter to:  Primary Care Provider (PCP) and Referring Provider

## 2019-05-21 NOTE — PATIENT INSTRUCTIONS
You are scheduled for the following appointments:   1) Dr. Haja North at [facility] [place] on 5/29/19 at 1pm   2) Dr. Stacey Ashford at [facility] [place] on 6/11/19 at 1pm    Please call Shana at [facility] with any questions or concerns.

## 2019-05-21 NOTE — LETTER
[facility]  [address]  [place] [address] [phone]          May 21, 2019    RE:  Luci Londono                                                                                                                                                       [address] [facility] [address]            To whom it may concern:    Luci Londono is under my professional care following surgery and are unable to return to work until 7/1/2019. She will see me for follow up in mid June to confirm this return to work date. Please call with further questions or concerns.       Sincerely,        Stacey Ashford MD  [facility]  [phone]

## 2019-05-22 DIAGNOSIS — G62.9 NEUROPATHY: ICD-10-CM

## 2019-05-22 RX ORDER — GABAPENTIN 100 MG/1
200 CAPSULE ORAL 3 TIMES DAILY
Qty: 180 CAPSULE | Refills: 1 | Status: SHIPPED | OUTPATIENT
Start: 2019-05-22 | End: 2020-01-15

## 2019-05-29 ENCOUNTER — TRANSFERRED RECORDS (OUTPATIENT)
Dept: HEALTH INFORMATION MANAGEMENT | Facility: CLINIC | Age: 49
End: 2019-05-29

## 2019-05-29 ENCOUNTER — ONCOLOGY VISIT (OUTPATIENT)
Dept: ONCOLOGY | Facility: CLINIC | Age: 49
End: 2019-05-29
Attending: INTERNAL MEDICINE
Payer: COMMERCIAL

## 2019-05-29 VITALS
BODY MASS INDEX: 38.24 KG/M2 | RESPIRATION RATE: 18 BRPM | SYSTOLIC BLOOD PRESSURE: 121 MMHG | HEART RATE: 91 BPM | TEMPERATURE: 98 F | DIASTOLIC BLOOD PRESSURE: 83 MMHG | WEIGHT: 215.8 LBS | OXYGEN SATURATION: 100 % | HEIGHT: 63 IN

## 2019-05-29 DIAGNOSIS — Z15.01 MONOALLELIC MUTATION OF PALB2 GENE: ICD-10-CM

## 2019-05-29 DIAGNOSIS — Z17.1 MALIGNANT NEOPLASM OF UPPER-OUTER QUADRANT OF RIGHT BREAST IN FEMALE, ESTROGEN RECEPTOR NEGATIVE (H): Primary | ICD-10-CM

## 2019-05-29 DIAGNOSIS — M25.511 ACUTE PAIN OF RIGHT SHOULDER: ICD-10-CM

## 2019-05-29 DIAGNOSIS — Z15.09 MONOALLELIC MUTATION OF PALB2 GENE: ICD-10-CM

## 2019-05-29 DIAGNOSIS — G62.9 NEUROPATHY: ICD-10-CM

## 2019-05-29 DIAGNOSIS — R79.89 ELEVATED LFTS: ICD-10-CM

## 2019-05-29 DIAGNOSIS — Z15.89 MONOALLELIC MUTATION OF PALB2 GENE: ICD-10-CM

## 2019-05-29 DIAGNOSIS — C50.411 MALIGNANT NEOPLASM OF UPPER-OUTER QUADRANT OF RIGHT BREAST IN FEMALE, ESTROGEN RECEPTOR NEGATIVE (H): Primary | ICD-10-CM

## 2019-05-29 PROCEDURE — G0463 HOSPITAL OUTPT CLINIC VISIT: HCPCS

## 2019-05-29 PROCEDURE — 99215 OFFICE O/P EST HI 40 MIN: CPT | Performed by: INTERNAL MEDICINE

## 2019-05-29 ASSESSMENT — MIFFLIN-ST. JEOR: SCORE: 1582.99

## 2019-05-29 ASSESSMENT — PAIN SCALES - GENERAL: PAINLEVEL: EXTREME PAIN (9)

## 2019-05-29 NOTE — PROGRESS NOTES
ONCOLOGY FOLLOW [facility] VISIT    breast surgeon:  Dr. 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   Her mammogram revealed a small mass in the right upper outer breast,   US revealed an 8mm hypoechoic mass at 12:00, 6cm FN and axillary US revealed a concerning lymph node which was also biopsied.   Her pathology from both breast and LN revealed a grade 3, invasive ductal carcinoma, ER/NH/her 2-.   PET found no distal disease.   Breast MRI found entire span 3.8 cm.There are multiple enlarged right axillary lymph nodes.        She made informed decision to proceed with neoadjuvant DD AC  She had drastic clinical response by PE and MRI.   She continued on wkly taxol. Carbo was added in W2. Carbo then was  discontinued 3/6/2019 due to persistent neutropenia.     She had lumpectomy 5/2019 found to have [id number] (1/6) disease.     She is tested 4/2019 heterozygous positive for PALB2 p.E837 and BRCA2 variant unknown significance p.T77A, MLH1 variant, unknow significance p.I25V      PAST MEDICAL HISTORY  Hypothyroidism  Pre diabetic  Morbid obesity  Mixed hyper lipidemia  Pinguecula of both eyes, prebyopia  Chronic left side LBP with left side sciatica  amenorrhea with polycystic ovaries  Hx of H Pylori infection  Vit D deficiency      Ob/Gyn Hx:menarche at age 13yo, 3 children, 1st at age 32, Pre-menopausal, a few months of OCP use, no HRT, no fertility treatment.     MEDICATIONS/ALLERY:  Reviewed in Epic system.      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 Deny ETOH/smoking       FAMILY HISTORY:    Negative for any type of cancers    REVIEW OF SYSTEMS:   She has lots of post op right shoulder pain with decreased ROM.     PHYSICAL  "EXAMINATION:   VITAL SIGNS: Blood pressure 121/83, pulse 91, temperature 98  F (36.7  C), temperature source Oral, resp. rate 18, height 1.6 m (5' 3\"), weight 97.9 kg (215 lb 12.8 oz), SpO2 100 %, not currently breastfeeding.    ECOG 0    GENERAL APPEARANCE:  looks like her stated age, very pleasant, not in acute distress.   HEENT: The patient is normocephalic, atraumatic. Pupils are equally reactive to light.  Sclerae are anicteric.  Moist oral mucosa.  Negative pharynx.  No oral thrush. Stomatitis on left angular of mouth.    NECK:  Supple.  No jugular venous distention.  Thyroid is not palpable.   LYMPH NODES:  Superficial lymphadenopathy is not appreciable in the bilateral cervical, supraclavicular, axillary or inguinal areas.   CARDIOVASCULAR:  S1, S2 regular with no murmurs or gallops.  No carotid or abdominal bruits.   PULMONARY:  Lungs are clear to auscultation and percussion bilaterally.  There is no wheezing or rhonchi.   GASTROINTESTINAL:  Abdomen is soft, nontender.  No hepatosplenomegaly.  No signs of ascites.  No mass appreciable.   MUSCULOSKELETAL/EXTREMITIES:  No edema.  No cyanotic changes.  No signs of joint deformity.  No lymphedema.   NEUROLOGIC:  Cranial nerves II-XII are grossly intact.  Sensation intact.  Muscle strength and muscle tone symmetrical, 5/5 throughout.   BACK:  No spinal or paraspinal tenderness.  No CVA tenderness.   SKIN:  No petechiae.  No rash.  No signs of cellulitis.   BREASTS: Right breast has well healed lumpectomy scar and sLN scar.   Left breast is symmetrical with no skin or nipple changes. No masses on the left. Tissue is very dense throughout.          CURRENT LAB DATA REVIEWED  lumpectomy 5/2019 found to have [id number] (1/6) disease.     She is tested 4/2019 heterozygous positive for PALB2 p.E837 and BRCA2 variant unknown significance p.T77A, MLH1 variant, unknow significance p.I25V        CURRENT IMAGING REVIEWED  CXR 1/2019 - negative       OLD DATA REVIEWED TODAY " WITH SUMMARY:   Breast MRI post AC 12/2018  1. Enhancing mass superiorly in the RIGHT breast is significantly decreased in size since 10/17/2018, measuring approximately 1.1 x 0.3 x 0.5 cm in today's study (series 5 image 52 and series 13 image 30), decreased from 1.1 x 1.6 x 1.0 cm.  2. Enlarged RIGHT axillary lymph node is slightly decreased since that time as well, now measuring 0.9 x 1.2 x 1.0 cm (series 5 image 68 and series 13 image 19), decreased from 1.3 x 1.2 x 1.4 cm.     10/2018  Right breast 8mm hypoechoic mass at 12:00, 6cm FN and right axillary LN biopsy both found grade 3, invasive ductal carcinoma, ER/IA/her 2-.       Baseline pre tx breast MRI 10/2018 -  In the right breast at 12:00 7 cm from the nipple, there is irregular heterogeneously enhancing mass, corresponding with the known biopsy-proven malignancy, which spans approximately 2.2 cm in maximal diameter, best appreciated on sagittal view, with surrounding nonmasslike enhancement likely related to postbiopsy change or DCIS.  Taken with the primary tumor, the entire span extends up to 3.8 cm.   There are multiple enlarged right axillary lymph nodes.      PET 10/2018  1. No evidence of distant metastasis.  2. Hypermetabolic focus in the upper outer quadrant right breast representing primary tumor. Hypermetabolic right axillary lymph nodes, consistent with metastatic node.  3. Indeterminate sub-4 mm pulmonary nodules, likely fissural lymph node in the right lung.  4. Extensive FDG uptake by the brown fat in the neck and paraspinal region.    10/2018 dx MA -  revealed a small mass in the right upper outer breast, US revealed an 8mm hypoechoic mass at 12:00, 6cm FN and axillary US revealed a concerning lymph node                ASSESSMENT AND PLAN:    1.  dx cT1cN1 right breast high grade IDC, TN at age 47 in 10/2018.   PET is negative for distal disease.      She made informed decision to proceed DD AC, C1D1 10/24/2018.   She had good MRI response  post C4 AC.   She did ok with W1 taxol. Added wkly carbo today in W2. Carbo was discontinued 3/6/2019 due to persistent neutropenia.     She had lumpectomy 5/2019 found to have [id number] (1/6) disease.     Post lumpectomy RT is recommended.     We discussed the option of adjuvant systemic tx post RT in none pCR TN post neoadjuvant chemo.   Oral xeloda data and side effects profile are discussed. She is open.   Trial option of  is discussed, she is open.       2. Young age dx with TN breast cancer, she saw genetic counseling.   She is tested 4/2019 heterozygous positive for PALB2 p.E837 and BRCA2 variant unknown significance p.T77A, MLH1 variant, unknow significance p.I25V    We discussed the 33-58% life time risk of breast cancer, increased (% unknown) risk of ovarian cancer and pancreatic cancer.     She will be a good candidate for future breast MRI screening.   Body image screening for pancreatic cancer protocol is undefined.     3. Post op severe right shoulder pain - advice PT evaluation.     4. New elevated LFT in 3/6/2019 and 4/2019. advcie no ETOH or tylenol.     5. Neuropathy developing in 3/2019. Advice  vit B6 oral.

## 2019-05-29 NOTE — PROGRESS NOTES
"Oncology Rooming Note    May 29, 2019 9:45 AM   Luci Londono is a 48 year old female who presents for:    Chief Complaint   Patient presents with     Oncology Clinic Visit     Breast cancer (H)     Initial Vitals: /83 (BP Location: Left arm, Patient Position: Sitting, Cuff Size: Adult Regular)   Pulse 91   Temp 98  F (36.7  C) (Oral)   Resp 18   Ht 1.6 m (5' 3\")   Wt 97.9 kg (215 lb 12.8 oz)   SpO2 100%   BMI 38.23 kg/m   Estimated body mass index is 38.23 kg/m  as calculated from the following:    Height as of this encounter: 1.6 m (5' 3\").    Weight as of this encounter: 97.9 kg (215 lb 12.8 oz). Body surface area is 2.09 meters squared.  Extreme Pain (9) Comment: Data Unavailable   No LMP recorded.  Allergies reviewed: Yes  Medications reviewed: Yes    Medications: MEDICATION REFILLS NEEDED TODAY. Provider was notified. Refill GABAPENTIN  Pharmacy name entered into [facility]:    [id number] - [place], [place] - [address] [place] [address] AT [address] [facility] [address] [place] [address] AT [address]    Clinical concerns: no     Emerald Pascual CMA              "

## 2019-05-29 NOTE — LETTER
"    5/29/2019         RE: Luci Londono  [address]  [facility] [address]        Dear Colleague,    Thank you for referring your patient, Luci Londono, to the [facility]. Please see a copy of my visit note below.    Oncology Rooming Note    May 29, 2019 9:45 AM   Luci Londono is a 48 year old female who presents for:    Chief Complaint   Patient presents with     Oncology Clinic Visit     Breast cancer (H)     Initial Vitals: /83 (BP Location: Left arm, Patient Position: Sitting, Cuff Size: Adult Regular)   Pulse 91   Temp 98  F (36.7  C) (Oral)   Resp 18   Ht 1.6 m (5' 3\")   Wt 97.9 kg (215 lb 12.8 oz)   SpO2 100%   BMI 38.23 kg/m    Estimated body mass index is 38.23 kg/m  as calculated from the following:    Height as of this encounter: 1.6 m (5' 3\").    Weight as of this encounter: 97.9 kg (215 lb 12.8 oz). Body surface area is 2.09 meters squared.  Extreme Pain (9) Comment: Data Unavailable   No LMP recorded.  Allergies reviewed: Yes  Medications reviewed: Yes    Medications: MEDICATION REFILLS NEEDED TODAY. Provider was notified. Refill GABAPENTIN  Pharmacy name entered into [facility]:    [facility] [id number] [address] AT [address] [facility] [address] [place], [place] - [address] [place] [address] AT [address]    Clinical concerns: no     Emerald Pascual CMA                ONCOLOGY FOLLOW [facility] VISIT    breast surgeon:  Dr. 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   Her mammogram revealed a small mass in the right upper outer breast,   US revealed an 8mm hypoechoic mass at 12:00, 6cm FN and axillary US revealed a concerning lymph node which was also biopsied.   Her pathology " "from both breast and LN revealed a grade 3, invasive ductal carcinoma, ER/[place]/her 2-.   PET found no distal disease.   Breast MRI found entire span 3.8 cm.There are multiple enlarged right axillary lymph nodes.        She made informed decision to proceed with neoadjuvant DD AC  She had drastic clinical response by PE and MRI.   She continued on wkly taxol. Carbo was added in W2. Carbo then was  discontinued 3/6/2019 due to persistent neutropenia.     She had lumpectomy 5/2019 found to have [id number] (1/6) disease.     She is tested 4/2019 heterozygous positive for PALB2 p.E837 and BRCA2 variant unknown significance p.T77A, MLH1 variant, unknow significance p.I25V      PAST MEDICAL HISTORY  Hypothyroidism  Pre diabetic  Morbid obesity  Mixed hyper lipidemia  Pinguecula of both eyes, prebyopia  Chronic left side LBP with left side sciatica  amenorrhea with polycystic ovaries  Hx of H Pylori infection  Vit D deficiency      Ob/Gyn Hx:menarche at age 13yo, 3 children, 1st at age 32, Pre-menopausal, a few months of OCP use, no HRT, no fertility treatment.     MEDICATIONS/ALLERY:  Reviewed in Epic system.      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 Deny ETOH/smoking       FAMILY HISTORY:    Negative for any type of cancers    REVIEW OF SYSTEMS:   She has lots of post op right shoulder pain with decreased ROM.     PHYSICAL EXAMINATION:   VITAL SIGNS: Blood pressure 121/83, pulse 91, temperature 98  F (36.7  C), temperature source Oral, resp. rate 18, height 1.6 m (5' 3\"), weight 97.9 kg (215 lb 12.8 oz), SpO2 100 %, not currently breastfeeding.    ECOG 0    GENERAL APPEARANCE:  looks like her stated age, very pleasant, not in acute distress.   HEENT: The patient is normocephalic, atraumatic. Pupils are equally reactive to light.  Sclerae are anicteric.  Moist oral mucosa.  Negative pharynx.  No oral thrush. Stomatitis on left angular of " mouth.    NECK:  Supple.  No jugular venous distention.  Thyroid is not palpable.   LYMPH NODES:  Superficial lymphadenopathy is not appreciable in the bilateral cervical, supraclavicular, axillary or inguinal areas.   CARDIOVASCULAR:  S1, S2 regular with no murmurs or gallops.  No carotid or abdominal bruits.   PULMONARY:  Lungs are clear to auscultation and percussion bilaterally.  There is no wheezing or rhonchi.   GASTROINTESTINAL:  Abdomen is soft, nontender.  No hepatosplenomegaly.  No signs of ascites.  No mass appreciable.   MUSCULOSKELETAL/EXTREMITIES:  No edema.  No cyanotic changes.  No signs of joint deformity.  No lymphedema.   NEUROLOGIC:  Cranial nerves II-XII are grossly intact.  Sensation intact.  Muscle strength and muscle tone symmetrical, 5/5 throughout.   BACK:  No spinal or paraspinal tenderness.  No CVA tenderness.   SKIN:  No petechiae.  No rash.  No signs of cellulitis.   BREASTS: Right breast has well healed lumpectomy scar and sLN scar.   Left breast is symmetrical with no skin or nipple changes. No masses on the left. Tissue is very dense throughout.          CURRENT LAB DATA REVIEWED  lumpectomy 5/2019 found to have [id number] (1/6) disease.     She is tested 4/2019 heterozygous positive for PALB2 p.E837 and BRCA2 variant unknown significance p.T77A, MLH1 variant, unknow significance p.I25V        CURRENT IMAGING REVIEWED  CXR 1/2019 - negative       OLD DATA REVIEWED TODAY WITH SUMMARY:   Breast MRI post AC 12/2018  1. Enhancing mass superiorly in the RIGHT breast is significantly decreased in size since 10/17/2018, measuring approximately 1.1 x 0.3 x 0.5 cm in today's study (series 5 image 52 and series 13 image 30), decreased from 1.1 x 1.6 x 1.0 cm.  2. Enlarged RIGHT axillary lymph node is slightly decreased since that time as well, now measuring 0.9 x 1.2 x 1.0 cm (series 5 image 68 and series 13 image 19), decreased from 1.3 x 1.2 x 1.4 cm.     10/2018  Right breast 8mm hypoechoic  mass at 12:00, 6cm FN and right axillary LN biopsy both found grade 3, invasive ductal carcinoma, ER/MA/her 2-.       Baseline pre tx breast MRI 10/2018 -  In the right breast at 12:00 7 cm from the nipple, there is irregular heterogeneously enhancing mass, corresponding with the known biopsy-proven malignancy, which spans approximately 2.2 cm in maximal diameter, best appreciated on sagittal view, with surrounding nonmasslike enhancement likely related to postbiopsy change or DCIS.  Taken with the primary tumor, the entire span extends up to 3.8 cm.   There are multiple enlarged right axillary lymph nodes.      PET 10/2018  1. No evidence of distant metastasis.  2. Hypermetabolic focus in the upper outer quadrant right breast representing primary tumor. Hypermetabolic right axillary lymph nodes, consistent with metastatic node.  3. Indeterminate sub-4 mm pulmonary nodules, likely fissural lymph node in the right lung.  4. Extensive FDG uptake by the brown fat in the neck and paraspinal region.    10/2018 dx MA -  revealed a small mass in the right upper outer breast, US revealed an 8mm hypoechoic mass at 12:00, 6cm FN and axillary US revealed a concerning lymph node                ASSESSMENT AND PLAN:    1.  dx cT1cN1 right breast high grade IDC, TN at age 47 in 10/2018.   PET is negative for distal disease.      She made informed decision to proceed DD AC, C1D1 10/24/2018.   She had good MRI response post C4 AC.   She did ok with W1 taxol. Added wkly carbo today in W2. Carbo was discontinued 3/6/2019 due to persistent neutropenia.     She had lumpectomy 5/2019 found to have [id number] (1/6) disease.     Post lumpectomy RT is recommended.     We discussed the option of adjuvant systemic tx post RT in none pCR TN post neoadjuvant chemo.   Oral xeloda data and side effects profile are discussed. She is open.   Trial option of  is discussed, she is open.       2. Young age dx with TN breast cancer, she saw genetic  counseling.   She is tested 4/2019 heterozygous positive for PALB2 p.E837 and BRCA2 variant unknown significance p.T77A, MLH1 variant, unknow significance p.I25V    We discussed the 33-58% life time risk of breast cancer, increased (% unknown) risk of ovarian cancer and pancreatic cancer.     She will be a good candidate for future breast MRI screening.   Body image screening for pancreatic cancer protocol is undefined.     3. Post op severe right shoulder pain - advice PT evaluation.     4. New elevated LFT in 3/6/2019 and 4/2019. advcie no ETOH or tylenol.     5. Neuropathy developing in 3/2019. Advice  vit B6 oral.           Again, thank you for allowing me to participate in the care of your patient.        Sincerely,        Addie Murillo MD, MD

## 2019-06-03 ENCOUNTER — TELEPHONE (OUTPATIENT)
Dept: MAMMOGRAPHY | Facility: CLINIC | Age: 49
End: 2019-06-03

## 2019-06-03 DIAGNOSIS — K21.9 GASTROESOPHAGEAL REFLUX DISEASE WITHOUT ESOPHAGITIS: Chronic | ICD-10-CM

## 2019-06-03 NOTE — TELEPHONE ENCOUNTER
Breast Nurse Care Coordination post op call placed to patient today.    Luci is s/p Right seed localized lumpectomy, right sentinel lymph node biopsy, on 05/15/2019. Dr. Ashford reviewed patient's final surgical pathology results with Luci at her first post op visit on 5/21/2019.    Patient reports good pain control with oxycodone, and just plain tylenol. She is currently taking only 1 tabs of oxycodone approximately 1-2 times per day.  Luci states some days she only takes the oxycodone at bedtime, but is primarily taking plain tylenol for discomfort.  She states the incision is clean, dry, pink and intact. Patient denies fevers. Patient reports having some pulling and intermittent discomfort in the right axilla and right arm when trying to reach for items.  Dr. Murillo has placed an order for referral to Physical Therapy and patient states she has initial appointment this week.(6/5/19)    Patient is tolerating fluids, eating well balanced meals, and urinating with no difficulty. She states she is having regular BM's and is gradually increasing her activity.    Patient is scheduled to see Dr. Ashford for a 2nd Post Op visit on 6/11/2019 @ 1300. I informed patient to call me with any questions or concerns.    Patient verbalized understanding and agrees with the plan of care.    Radha Herndon RN BSN  Breast Nurse Coordinator  [facility]  [phone]

## 2019-06-03 NOTE — TELEPHONE ENCOUNTER
"Requested Prescriptions   Pending Prescriptions Disp Refills     omeprazole (PRILOSEC) 20 MG DR capsule  Last Written Prescription Date:  8/25/2017  Last Fill Quantity: 60 capsule,  # refills: 11   Last office visit: 5/9/2019 with prescribing provider:  MARA Wills   Future Office Visit:   Next 5 appointments (look out 90 days)    Jul 01, 2019 10:15 AM CDT  Return Visit with Neida Fortune GC  Cancer Risk Management Program ([facility]) [facility]  [address]  [facility] [address]  [phone]          60 capsule 11     Sig: Take 1 capsule (20 mg) by mouth 2 times daily       PPI Protocol Passed - 6/3/2019 10:40 AM        Passed - Not on Clopidogrel (unless Pantoprazole ordered)        Passed - No diagnosis of osteoporosis on record        Passed - Recent (12 mo) or future (30 days) visit within the authorizing provider's specialty     Patient had office visit in the last 12 months or has a visit in the next 30 days with authorizing provider or within the authorizing provider's specialty.  See \"Patient Info\" tab in inbasket, or \"Choose Columns\" in Meds & Orders section of the refill encounter.              Passed - Medication is active on med list        Passed - Patient is age 18 or older        Passed - No active pregnacy on record        Passed - No positive pregnancy test in past 12 months           "

## 2019-06-05 ENCOUNTER — THERAPY VISIT (OUTPATIENT)
Dept: PHYSICAL THERAPY | Facility: CLINIC | Age: 49
End: 2019-06-05
Attending: INTERNAL MEDICINE
Payer: COMMERCIAL

## 2019-06-05 DIAGNOSIS — M25.511 ACUTE PAIN OF RIGHT SHOULDER: ICD-10-CM

## 2019-06-05 PROCEDURE — 97140 MANUAL THERAPY 1/> REGIONS: CPT | Mod: GP | Performed by: PHYSICAL THERAPIST

## 2019-06-05 PROCEDURE — 97162 PT EVAL MOD COMPLEX 30 MIN: CPT | Mod: GP | Performed by: PHYSICAL THERAPIST

## 2019-06-05 PROCEDURE — 97110 THERAPEUTIC EXERCISES: CPT | Mod: GP | Performed by: PHYSICAL THERAPIST

## 2019-06-05 NOTE — PROGRESS NOTES
[facility] Initial Evaluation  Subjective:  The history is provided by the patient.   Luci Londono is a 48 year old female with a right shoulder condition.      This is a new condition  Pt reports R shoulder pain radiating upto her medial hand since 5/15/19; pt had a lumpectomy on 5/15/19 for malignant breast cancer which was diagnosed last October; her shoulder pain started right after the surgery; MD recommended PT for shoulder mobility, pt has not been able to start radiation due to restricted shoulder ROM    Pt has been given a few shoulder exercises 3x/day.    Patient reports pain:  Medial.  Radiates to:  Upper arm and lower arm.  Pain is described as sharp and burning  and reported as 7/10.  Associated symptoms:  Loss of strength, loss of motion/stiffness, tingling and numbness.   Symptoms are exacerbated by using arm at shoulder level, lying on extremity, using arm behind back and using arm overhead and relieved by analgesics (Sleep meds, oxycodone ).  Since onset symptoms are gradually improving.        General health as reported by patient is good.  Pertinent medical history includes:  Diabetes and cancer (thyroid ).    Other surgeries include:  Cancer surgery.  Current medications:  Thyroid medication, pain medication and sleep medication.  Current occupation is CNA .  Patient is currently not working due to present treatment problem (off work since 8 months ).                                  Objective:  System              Cervical/Thoracic Evaluation  Cervical AROM: normal               Neural Tension:      Right side:  Radial; Ulnar and Median positive.                   Shoulder Evaluation:  ROM:  AROM:    Flexion:  Left:  165    Right:  80  Extension: Left: 40Right: 30  Abduction:  Left: 170   Right:  70          Horizontal Adduction:  Left:  Wnl    Right:  Max loss            PROM:              External Rotation:  Right:  50                    Strength:    Flexion:  Left:5/5   Pain:    Right: 2-/5     Pain:   Extension:  Left: 5/5    Pain:    Right: 3-/5    Pain:  Abduction:  Left: 4/5   Pain:+    Right: 2-/5     Pain:    Internal Rotation:  Left:5/5     Pain:    Right: 4+/5      Pain:-  External Rotation:   Left:5/5     Pain:   Right:4+/5      Pain:-                       ROM:  AROM:      Flexion Elbow:  Right:wnl  Extension Elbow:  Right: 30              PROM:      Flexion Elbow:  Right: wnl  Extension Elbow: Right: 30              Pain: severe pan with elbow ext           Palpation:  Palpation elbow/wrist: medial upper arm distally     Right wrist/elbow tenderness present at:Medial Epicondyle                                General     ROS    Assessment/Plan:    Patient is a 48 year old female with right side shoulder complaints.    Patient has the following significant findings with corresponding treatment plan.                Diagnosis 1:  Shoulder stiffness and upper arm pain post lumpectomy   Pain -  hot/cold therapy, manual therapy, self management, education, directional preference exercise and home program  Decreased ROM/flexibility - manual therapy, therapeutic exercise and home program  Decreased joint mobility - manual therapy, therapeutic exercise and home program  Decreased strength - therapeutic exercise, therapeutic activities and home program  Impaired muscle performance - neuro re-education and home program  Decreased function - therapeutic activities  Impaired posture - neuro re-education    Therapy Evaluation Codes:   1) History comprised of:   Personal factors that impact the plan of care:      Work status.    Comorbidity factors that impact the plan of care are:      Cancer and Diabetes.     Medications impacting care: Pain.  2) Examination of Body Systems comprised of:   Body structures and functions that impact the plan of care:      Elbow and Shoulder.   Activity limitations that impact the plan of care are:      Dressing, Lifting, Working and  Sleeping.  3) Clinical presentation characteristics are:   Evolving/Changing.  4) Decision-Making    High complexity using standardized patient assessment instrument and/or measureable assessment of functional outcome.  Cumulative Therapy Evaluation is: Moderate complexity.    Previous and current functional limitations:  (See Goal Flow Sheet for this information)    Short term and Long term goals: (See Goal Flow Sheet for this information)     Communication ability:  Patient appears to be able to clearly communicate and understand verbal and written communication and follow directions correctly.  Treatment Explanation - The following has been discussed with the patient:   RX ordered/plan of care  Anticipated outcomes  Possible risks and side effects  This patient would benefit from PT intervention to resume normal activities.   Rehab potential is good.    Frequency:  2 X week, once daily  Duration:  for 2 weeks tapering to 1 X a week over 6 weeks  Discharge Plan:  Achieve all LTG.  Independent in home treatment program.  Return to previous functional level by discharge.  Reach maximal therapeutic benefit.    Please refer to the daily flowsheet for treatment today, total treatment time and time spent performing 1:1 timed codes.

## 2019-06-07 PROBLEM — M25.511 ACUTE PAIN OF RIGHT SHOULDER: Status: ACTIVE | Noted: 2019-06-07

## 2019-06-07 NOTE — PROGRESS NOTES
[facility] Breast Surgery Postoperative Note    S: Luci reports improvement in her right ROM after PT. She does still have cording present in her axilla and continues to see PT twice a week. She reports right upper quadrant abdominal pain for the past few days which is new. It is not related to eating. It is tender in a specific location on the right upper abdomen. There has been no trauma to the area. She also reports worsening bilateral lower extremity edema. She is not on any medications at baseline.     Breasts: right breast upper incision is well healed. Axillary incision is healing well. No seromas.   Abd: soft, tender to light palpation in the right upper abdomen. No rebound or guarding. No hernias.     A/P  Luci Londono is recovering from a right breast lumpectomy with right SLNB on 5/15/2019 after neoadjuvant chemotherapy. She had an excellent response with no residual cancer in the breast and 1.6mm of disease in the previously biopsied positive axillary node. She is here for one month follow up.  She will follow up with me in 6 months with left mammogram at that time. Given her abdominal pain, an abdominal ultrasound would be helpful for further evaluation. I would also like her to see her PCP, Dr. Wills for evaluation given lower extremity edema.       Thank you for the opportunity to help in her care.    Stacey Ashford  [facility]  [phone]    Please route or send letter to:  Primary Care Provider (PCP) and Referring Provider

## 2019-06-11 ENCOUNTER — OFFICE VISIT (OUTPATIENT)
Dept: SURGERY | Facility: CLINIC | Age: 49
End: 2019-06-11
Payer: COMMERCIAL

## 2019-06-11 DIAGNOSIS — Z09 SURGERY FOLLOW-UP EXAMINATION: Primary | ICD-10-CM

## 2019-06-11 PROCEDURE — 99024 POSTOP FOLLOW-UP VISIT: CPT | Performed by: SURGERY

## 2019-06-11 NOTE — PATIENT INSTRUCTIONS
You are scheduled for the following appointments:   1) Abdominal ultrasound at [facility] on 6/13/19 at 3pm   2) Dr. Stuart Wills at [facility] on 6/20/19 at 8am    Please contact Shana at [facility] with any questions or concerns.

## 2019-06-11 NOTE — LETTER
[facility]  [address]  [address]  [place] [facility] [place] [facility] [address]  [phone]          June 11, 2019    RE:  Luci Londono                                                                                                                                                       [address] [place] [address]            To whom it may concern:    Luci Londono is under my professional care following surgery. She is able to return to work without restrictions on 7/8/2019. Please call with questions or concerns.       Sincerely,        Stacey Ashford MD  [facility]  [phone]

## 2019-06-11 NOTE — PROGRESS NOTES
[facility] Initial Evaluation  Subjective:                                     General health as reported by patient is good.  Pertinent medical history includes:  Diabetes and thyroid problems.    Other surgeries include:  Cancer surgery.  Current medications:  Thyroid medication.  Current occupation is CNA.    Primary job tasks include:  Lifting and other (Pushing, pulling).                                Objective:  System    Physical Exam    General     ROS    Assessment/Plan:

## 2019-06-13 ENCOUNTER — THERAPY VISIT (OUTPATIENT)
Dept: PHYSICAL THERAPY | Facility: CLINIC | Age: 49
End: 2019-06-13
Payer: COMMERCIAL

## 2019-06-13 ENCOUNTER — HOSPITAL ENCOUNTER (OUTPATIENT)
Dept: ULTRASOUND IMAGING | Facility: CLINIC | Age: 49
Discharge: HOME | End: 2019-06-13
Attending: SURGERY | Admitting: SURGERY
Payer: COMMERCIAL

## 2019-06-13 DIAGNOSIS — M25.511 ACUTE PAIN OF RIGHT SHOULDER: ICD-10-CM

## 2019-06-13 DIAGNOSIS — Z09 SURGERY FOLLOW-UP EXAMINATION: ICD-10-CM

## 2019-06-13 PROCEDURE — 76700 US EXAM ABDOM COMPLETE: CPT

## 2019-06-13 PROCEDURE — 97110 THERAPEUTIC EXERCISES: CPT | Mod: GP | Performed by: PHYSICAL THERAPIST

## 2019-06-19 ENCOUNTER — TRANSFERRED RECORDS (OUTPATIENT)
Dept: HEALTH INFORMATION MANAGEMENT | Facility: CLINIC | Age: 49
End: 2019-06-19

## 2019-06-20 ENCOUNTER — OFFICE VISIT (OUTPATIENT)
Dept: FAMILY MEDICINE | Facility: CLINIC | Age: 49
End: 2019-06-20
Payer: COMMERCIAL

## 2019-06-20 ENCOUNTER — PATIENT OUTREACH (OUTPATIENT)
Dept: ONCOLOGY | Facility: CLINIC | Age: 49
End: 2019-06-20

## 2019-06-20 ENCOUNTER — THERAPY VISIT (OUTPATIENT)
Dept: PHYSICAL THERAPY | Facility: CLINIC | Age: 49
End: 2019-06-20
Payer: COMMERCIAL

## 2019-06-20 VITALS
BODY MASS INDEX: 37.55 KG/M2 | SYSTOLIC BLOOD PRESSURE: 112 MMHG | DIASTOLIC BLOOD PRESSURE: 64 MMHG | WEIGHT: 212 LBS | RESPIRATION RATE: 16 BRPM | HEART RATE: 76 BPM | OXYGEN SATURATION: 99 % | TEMPERATURE: 96.3 F

## 2019-06-20 DIAGNOSIS — M72.2 PLANTAR FASCIITIS: Primary | ICD-10-CM

## 2019-06-20 DIAGNOSIS — R10.11 ABDOMINAL PAIN, RIGHT UPPER QUADRANT: ICD-10-CM

## 2019-06-20 DIAGNOSIS — M25.511 ACUTE PAIN OF RIGHT SHOULDER: ICD-10-CM

## 2019-06-20 DIAGNOSIS — M77.01 MEDIAL EPICONDYLITIS OF ELBOW, RIGHT: ICD-10-CM

## 2019-06-20 DIAGNOSIS — R60.0 BILATERAL LEG EDEMA: ICD-10-CM

## 2019-06-20 DIAGNOSIS — Z92.21 STATUS POST CHEMOTHERAPY: ICD-10-CM

## 2019-06-20 LAB — NT-PROBNP SERPL-MCNC: 15 PG/ML (ref 0–125)

## 2019-06-20 PROCEDURE — 99214 OFFICE O/P EST MOD 30 MIN: CPT | Performed by: FAMILY MEDICINE

## 2019-06-20 PROCEDURE — 83880 ASSAY OF NATRIURETIC PEPTIDE: CPT | Performed by: FAMILY MEDICINE

## 2019-06-20 PROCEDURE — 80076 HEPATIC FUNCTION PANEL: CPT | Performed by: FAMILY MEDICINE

## 2019-06-20 PROCEDURE — 36415 COLL VENOUS BLD VENIPUNCTURE: CPT | Performed by: FAMILY MEDICINE

## 2019-06-20 PROCEDURE — 80048 BASIC METABOLIC PNL TOTAL CA: CPT | Performed by: FAMILY MEDICINE

## 2019-06-20 PROCEDURE — 97110 THERAPEUTIC EXERCISES: CPT | Mod: GP | Performed by: PHYSICAL THERAPIST

## 2019-06-20 NOTE — LETTER
[facility]  [address]  [address]  [place] [address] [phone]                                                                                                           Luci Londono  [address]  [facility] [place] [facility] [address]    June 25, 2019      Dear Luci,     MAY RETURN TO WORK WITHOUT RESTRICTIONS 06/25/19   RECOVERING FROM BREAST CANCER SURGERY AND TREATMENT     Thank you for choosing [facility].  We appreciate the opportunity to serve you and look forward to supporting your healthcare needs in the future.    If you have any questions or concerns, please call me or my staff at [phone].      Sincerely,    Stuart Wills Jr MD

## 2019-06-20 NOTE — PROGRESS NOTES
Subjective     Luci Londono is a 48 year old female who presents to clinic today for the following health issues:        HPI     Feet    Plantar fasciitis bilateral    Side effects benefits and risk of treatment plan discussed        Duration: 1 week    Description (location/character/radiation): both feet    Intensity:  moderate    Accompanying signs and symptoms: swelling end of the day and night time    History (similar episodes/previous evaluation): None    Precipitating or alleviating factors: has had chemo in the past    Therapies tried and outcome: elevation     Objective findings    Bilateral heel pain    Slightly short Achilles tendon    Plan    HEEL NIGHT SPLINTS     PLANTAR FASCIA STRETCHES     ACHILLES STRETCHES AND STRENGTHENING     I spent 5 to 10 minutes sure how to do heel stretches in the Achilles strengthening    Right arm pain    Patient has tenderness over the medial epicondyle OF THE RIGHT ARM    No tenderness to lateral epicondyle    No subacromial bursal tenderness    Negative painful arc on the right    No pain in the atrial and external rotation of the right shoulder    Patient's pain radiates from the medial epicondyles of        Duration: 1 week    Description (location/character/radiation): right arm    Intensity:  moderate    Accompanying signs and symptoms: sharp tingling inside of arm    History (similar episodes/previous evaluation): None    Precipitating or alleviating factors: had surgery for breast cancer    Therapies tried and outcome: None     Assessment right medial epicondylitis    Plan    Home exercise program outlined  Which includes weight free exercise follow-up, and palm down of the right arm 30 each twice daily  Consider physical therapy of unresponsive to treatment        Problem #3    Bilateral leg edema    Patient is on the end of the long-term chemotherapy treatment program    Which could affect her nerves    And also could cause leg edema renal system and  heart system    Assessment bilateral leg edema not obvious today on exam    Plan BNP and BM P     Problem #4    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    1 node in the right axilla which is positive    Plans to undergo radiation therapy to that area and to the right breast in the near future    Problem #5    Right upper quadrant abdominal pain    This is not related to meals    The oncologist did a right upper quadrant abdominal ultrasound    Which is negative for cholelithiasis and cholecystitis    It does show some fatty infiltration    But there is actually no good evidence of any metastatic disease    Or gallbladder disease    Assessment right upper quadrant abdominal pain cause uncertain    Plan if symptoms persist may need to get either CT or an [facility]    Will add hepatic function panel for the diagnosis          There are no preventive care reminders to display for this patient.          .  Current Outpatient Medications   Medication Sig Dispense Refill     ascorbic acid (VITAMIN C) 1000 MG TABS Take 1,000 mg by mouth daily       gabapentin (NEURONTIN) 100 MG capsule Take 2 capsules (200 mg) by mouth 3 times daily 180 capsule 1     HYDROcodone-acetaminophen (NORCO) 5-325 MG tablet Take 1-2 tablets by mouth every 4 hours as needed for moderate to severe pain 20 tablet 0     levothyroxine (SYNTHROID/LEVOTHROID) 125 MCG tablet TAKE 1 TABLET BY MOUTH DAILY 30 tablet 6     lidocaine-prilocaine (EMLA) cream Apply quarter-size amount of Emla cream topically over port site one hour prior to port access for comfort. 30 g 3     loratadine (CLARITIN) 10 MG tablet Take 1 tablet (10 mg) by mouth daily 90 tablet 1     LORazepam (ATIVAN) 0.5 MG tablet Take 1 tablet (0.5 mg) by mouth every 4 hours as needed (Anxiety, Nausea/Vomiting or Sleep) 30 tablet 5     metFORMIN (GLUCOPHAGE-XR) 500 MG 24 hr tablet TAKE 1 TABLET(500 MG) BY MOUTH DAILY WITH  DINNER 90 tablet 1     multivitamin, therapeutic with minerals (THERA-VIT-M) TABS tablet Take 1 tablet by mouth daily       omeprazole (PRILOSEC) 20 MG DR capsule Take 1 capsule (20 mg) by mouth 2 times daily 60 capsule 11     order for DME Night splints for plantar fasciitis   Longitudinal arch supports  [facility]  Address: [address] [place] [id number]  [phone]  Hours:  Open today   8:00 AM - 5:00 PM 2 each 0     order for DME Equipment being ordered:  Compression stocking below knee black 20-25mm 3 each 0     senna-docusate (SENOKOT-S/PERICOLACE) 8.6-50 MG tablet Take 1-2 tablets by mouth 2 times daily as needed for constipation (While on oral opioids.) 30 tablet 0              No Known Allergies      Immunization History   Administered Date(s) Administered     TD (ADULT, 7+) 2005     TDAP Vaccine (Adacel) 2017     Tdap (Adult) Unspecified Formulation 2017               reports that she does not drink alcohol.          reports that she does not use drugs.        family history includes Diabetes in her mother; Unknown/Adopted in her father.        indicated that the status of her no family hx of is unknown. She indicated that her mother is alive. She indicated that her father is . She indicated that her sister is alive. She indicated that her brother is alive. She indicated that her daughter is alive. She indicated that both of her sons are alive.             has a past surgical history that includes Hand surgery (); tubal ligation; Insert port vascular access (N/A, 10/18/2018); Lumpectomy Breast with Seed Localization (Right, 5/15/2019); Biopsy node sentinel (Right, 5/15/2019); Remove port vascular access (N/A, 5/15/2019); and Dissect lymph node axilla (Right, 5/15/2019).         reports that she currently engages in sexual activity and has had partners who are Male. She reports using the following method of birth control/protection:  Pill.    .  Pediatric History   Patient Guardian Status     Not on file     Other Topics Concern     Parent/sibling w/ CABG, MI or angioplasty before 65F 55M? No   Social History Narrative     Not on file               reports that she has never smoked. She has never used smokeless tobacco.        Medical, social, surgical, and family histories reviewed.        Labs reviewed in EPIC  Patient Active Problem List   Diagnosis     Myalgia and myositis     Intervertebral lumbar disc disorder with myelopathy, lumbar region     Gastroesophageal reflux disease without esophagitis     Helicobacter pylori infection     Acquired hypothyroidism     Disorder of metabolism     Polycystic ovaries     Vitamin D insufficiency     BMI 35     Rosacea     Absence of menstruation     Chronic left-sided low back pain with left-sided sciatica     Pinguecula of both eyes     Presbyopia     Impaired fasting glucose     Migraine without aura and without status migrainosus, not intractable     Class 1 obesity due to excess calories with serious comorbidity and body mass index (BMI) of 33.0 to 33.9 in adult     Morbid obesity (H)     Myalgia w hx of recurrence since 2012     Mixed hyperlipidemia     Malignant neoplasm of upper-outer quadrant of right breast in female, estrogen receptor negative (H)     Drug or medicinal substance causing adverse effect in therapeutic use, initial encounter     Abnormal electrocardiogram     Port-A-Cath in place     Breast cancer (H)     Acute pain of right shoulder       Past Surgical History:   Procedure Laterality Date     BIOPSY NODE SENTINEL Right 5/15/2019    Procedure: BIOPSY, LYMPH NODE, SENTINEL;  Surgeon: Stacey Ashford MD;  Location:  OR     DISSECT LYMPH NODE AXILLA Right 5/15/2019    Procedure: LYMPHADENECTOMY, AXILLARY;  Surgeon: Stacey Ashford MD;  Location:  OR     HAND SURGERY  2002    left     INSERT PORT VASCULAR ACCESS N/A 10/18/2018    Procedure: INSERTION OF VASCULAR PORT;   Surgeon: Stacey Ashford MD;  Location: [facility]     LUMPECTOMY BREAST WITH SEED LOCALIZATION Right 5/15/2019    Procedure: SEED LOCALIZATION BREAST LUMPECTOMY, SEED LOCALIZATION AXILLARY BREAST BIOPSY, SENTINEL NODE , REMOVAL OF VASCULAR PORT ACCESS AND RIGHT  AXILLARY NODE DISSECTION;  Surgeon: Stacey Ashford MD;  Location: [facility] OR     REMOVE PORT VASCULAR ACCESS N/A 5/15/2019    Procedure: REMOVAL, VASCULAR ACCESS PORT;  Surgeon: Stacey Ashford MD;  Location: [facility] OR     TUBAL LIGATION           Social History     Tobacco Use     Smoking status: Never Smoker     Smokeless tobacco: Never Used   Substance Use Topics     Alcohol use: No       Family History   Problem Relation Age of Onset     Diabetes Mother      Unknown/Adopted Father      Coronary Artery Disease No family hx of      Hypertension No family hx of      Hyperlipidemia No family hx of      Cerebrovascular Disease No family hx of      Breast Cancer No family hx of      Colon Cancer No family hx of      Prostate Cancer No family hx of      Other Cancer No family hx of      Depression No family hx of      Anxiety Disorder No family hx of      Mental Illness No family hx of      Substance Abuse No family hx of      Anesthesia Reaction No family hx of      Asthma No family hx of      Osteoporosis No family hx of      Genetic Disorder No family hx of      Thyroid Disease No family hx of      Obesity No family hx of              Current Outpatient Medications   Medication Sig Dispense Refill     ascorbic acid (VITAMIN C) 1000 MG TABS Take 1,000 mg by mouth daily       gabapentin (NEURONTIN) 100 MG capsule Take 2 capsules (200 mg) by mouth 3 times daily 180 capsule 1     HYDROcodone-acetaminophen (NORCO) 5-325 MG tablet Take 1-2 tablets by mouth every 4 hours as needed for moderate to severe pain 20 tablet 0     levothyroxine (SYNTHROID/LEVOTHROID) 125 MCG tablet TAKE 1 TABLET BY MOUTH DAILY 30 tablet 6     lidocaine-prilocaine (EMLA) cream Apply quarter-size  amount of Emla cream topically over port site one hour prior to port access for comfort. 30 g 3     loratadine (CLARITIN) 10 MG tablet Take 1 tablet (10 mg) by mouth daily 90 tablet 1     LORazepam (ATIVAN) 0.5 MG tablet Take 1 tablet (0.5 mg) by mouth every 4 hours as needed (Anxiety, Nausea/Vomiting or Sleep) 30 tablet 5     metFORMIN (GLUCOPHAGE-XR) 500 MG 24 hr tablet TAKE 1 TABLET(500 MG) BY MOUTH DAILY WITH DINNER 90 tablet 1     multivitamin, therapeutic with minerals (THERA-VIT-M) TABS tablet Take 1 tablet by mouth daily       omeprazole (PRILOSEC) 20 MG DR capsule Take 1 capsule (20 mg) by mouth 2 times daily 60 capsule 11     order for DME Night splints for plantar fasciitis   Longitudinal arch supports  [facility]  Address: [address] [address]  [phone]  Hours:  Open today   8:00 AM - 5:00 PM 2 each 0     order for DME Equipment being ordered:  Compression stocking below knee black 20-25mm 3 each 0     senna-docusate (SENOKOT-S/PERICOLACE) 8.6-50 MG tablet Take 1-2 tablets by mouth 2 times daily as needed for constipation (While on oral opioids.) 30 tablet 0           Recent Labs   Lab Test 05/09/19  1415 05/09/19  1202 04/03/19  0830 03/27/19  0905 03/20/19  0904  10/17/18  1347  10/12/18  2206 08/21/18  1201  07/25/17  1026  06/30/16  1214   A1C  --  5.8*  --   --   --   --  5.8*  --   --  5.6  --  5.5   < > 6.1*   LDL  --   --   --   --   --   --   --   --   --  123*  --  138*  --  123*   HDL  --   --   --   --   --   --   --   --   --  57  --  69  --  64   TRIG  --   --   --   --   --   --   --   --   --  188*  --  67  --  58   ALT  --   --  66* 71* 82*   < >  --   --  25 37   < > 27  --  31   CR  --   --  0.71 0.66 0.64   < >  --    < > 0.72  --    < >  --   --   --    GFRESTIMATED  --   --  >90 >90 >90   < >  --    < > 86  --    < >  --   --   --    GFRESTBLACK  --   --  >90 >90 >90   < >  --    < > >90  --    < >  --   --   --    POTASSIUM  --   --  3.7 3.7  3.6   < >  --    < > 4.0  --    < >  --   --   --    TSH 8.17*  --   --   --   --   --   --   --  3.04 56.21*   < > 13.95*  --  3.09    < > = values in this interval not displayed.            BP Readings from Last 6 Encounters:   06/20/19 112/64   05/29/19 121/83   05/16/19 120/58   05/09/19 128/76   04/12/19 122/82   04/08/19 148/83           Wt Readings from Last 3 Encounters:   06/20/19 96.2 kg (212 lb)   05/29/19 97.9 kg (215 lb 12.8 oz)   05/15/19 103.4 kg (227 lb 14.4 oz)                 Positive symptoms or findings indicated by bold designation:         ROS: 10 point ROS neg other than the symptoms noted above in the HPI.except  has Myalgia and myositis; Intervertebral lumbar disc disorder with myelopathy, lumbar region; Gastroesophageal reflux disease without esophagitis; Helicobacter pylori infection; Acquired hypothyroidism; Disorder of metabolism; Polycystic ovaries; Vitamin D insufficiency; BMI 35; Rosacea; Absence of menstruation; Chronic left-sided low back pain with left-sided sciatica; Pinguecula of both eyes; Presbyopia; Impaired fasting glucose; Migraine without aura and without status migrainosus, not intractable; Class 1 obesity due to excess calories with serious comorbidity and body mass index (BMI) of 33.0 to 33.9 in adult; Morbid obesity (H); Myalgia w hx of recurrence since 2012; Mixed hyperlipidemia; Malignant neoplasm of upper-outer quadrant of right breast in female, estrogen receptor negative (H); Drug or medicinal substance causing adverse effect in therapeutic use, initial encounter; Abnormal electrocardiogram; Port-A-Cath in place; Breast cancer (H); and Acute pain of right shoulder on their problem list.  Review Of Systems    Skin: negative    Eyes: negative    Ears/Nose/Throat: negative    Respiratory: No shortness of breath, dyspnea on exertion, cough, or hemoptysis    Cardiovascular: negative    Breast cancer with positive lymph node right axilla estrogen receptor  negative    Gastrointestinal:  RIGHT UPPER QUADRANT ABDOMINAL PAIN     Genitourinary: negative    Musculoskeletal: Right medial epicondylitis and bilateral plantar fasciitis    Neurologic: negative    Psychiatric: negative    Hematologic/Lymphatic/Immunologic: negative    Endocrine: Prediabetes and obesity and hypothyroidism being replaced                PE:  /64   Pulse 76   Temp 96.3  F (35.7  C) (Tympanic)   Resp 16   Wt 96.2 kg (212 lb)   SpO2 99%   BMI 37.55 kg/m   Body mass index is 37.55 kg/m .        Constitutional: general appearance, well nourished, well developed, in no acute distress, well developed, appears stated age, normal body habitus, moderate obesity BMI 37        Eyes:; The patient has normal eyelids sclerae and conjunctivae :          Ears/Nose/Throat: external ear, overall: normal appearance; external nose, overall: benign appearance, normal moujth gums and lips           Neck: thyroid, overall: normal size, normal consistency, nontender,          Respiratory:  palpation of chest, overall: normal excursion,    Clear to percussion and auscultation     NO Tachypnea    NORMAL  Color          Cardiovascular:  Good color with no peripheral edema    Regular sinus rhythm without murmur.  Physiologic heart sounds   Heart is unelarged    .     Chest/Breast: normal shape           Abdominal exam,  Liver and spleen are  unenlarged        Tenderness    Scars              Urogenital; no renal, flank or bladder  tenderness;          Lymphatic: neck nodes,     Other nodes         Musculoskeletal:  Brief ortho exam normal except:   Tenderness medial epicondyle and bilateral plantar fascial    Decreased range of motion of Achilles tendon        Integument: inspection of skin, no rash, lesions; and, palpation, no induration, no tenderness.          Neurologic mental status, overall: alert and oriented; gait, no ataxia, no unsteadiness; coordination, no tremors; cranial nerves, overall: normal motor,  overall: normal bulk, tone.          Psychiatric: orientation/consciousness, overall: oriented to person, place and time; behavior/psychomotor activity, no tics, normal psychomotor activity; mood and affect, overall: normal mood and affect; appearance, overall: well-groomed, good eye contact; speech, overall: normal quality, no aphasia and normal quality, quantity, intact.        Diagnostic Test Results:  Results for orders placed or performed during the hospital encounter of 06/13/19   US Abdomen Complete    Narrative    ULTRASOUND ABDOMEN COMPLETE 6/13/2019 2:21 PM     HISTORY: Right upper abdominal pain, evaluate gallbladder. Surgery  follow-up examination.    COMPARISON: November 27, 2012    FINDINGS:  Liver is moderately fatty infiltrated as evidenced by a  diffuse increase in echogenicity without focal solid lesions.  Gallbladder demonstrates no cholelithiasis or cholecystitis.   Extrahepatic bile duct is normal in diameter. Pancreas is normal where  visualized. Spleen is normal. Kidneys are normal in size. There is no  hydronephrosis. Visualized abdominal aorta and IVC are nonaneurysmal.      Impression    IMPRESSION:  At least moderate fatty infiltration of liver, no  cholelithiasis or cholecystitis.    BRINA ORTEGA MD           ICD-10-CM    1. Plantar fasciitis M72.2 order for DME   2. Bilateral leg edema R60.0 order for DME   3. Status post chemotherapy Z92.21 N terminal pro BNP outpatient     Basic metabolic panel   4. Medial epicondylitis of elbow, right M77.01    5. Abdominal pain, right upper quadrant R10.11 Hepatic panel (Albumin, ALT, AST, Bili, Alk Phos, TP)              .    Side effects benefits and risks thoroughly discussed. .she may come in early if unimproved or getting worse          Please drink 2 glasses of water prior to meals and walk 15-30 minutes after meals        I spent  25 MINUTES SPENT  with patient discussing the following issues   The primary encounter diagnosis was Plantar  fasciitis. Diagnoses of Bilateral leg edema, Status post chemotherapy, Medial epicondylitis of elbow, right, and Abdominal pain, right upper quadrant were also pertinent to this visit. over half of which involved counseling and coordination of care.      Patient Instructions   (M72.2) Plantar fasciitis  (primary encounter diagnosis)  Comment:    Plan: order for DME             (R60.0) Bilateral leg edema  Comment:    Plan: order for DME             (Z92.21) Status post chemotherapy  Comment:    Plan: N terminal pro BNP outpatient, Basic metabolic         panel                             ALL THE ABOVE PROBLEMS ARE STABLE AND MED CHANGES AS NOTED        Diet:  MEDITERRANEAN DIET         Exercise:  AS TOLERATED   Exercises Range of motion, balance, isometric, and strengthening exercises 30 repetitions twice daily of involved joints            .ALEENA TYLER MD 6/20/2019 9:11 AM  June 20, 2019

## 2019-06-20 NOTE — LETTER
June 21, 2019      Luci Londono  [address] [facility] [address]        Dear ,    We are writing to inform you of your test results.    NORMAL GLUCOSE, RENAL AND BLOOD SALTS     NORMAL BRAIN NATURETIC PEPTIDE IE CONGESTIVE HEART FAILURE TEST   NORMAL LIVER PANEL   EXCEPT ONE BORDERLINE  LIVER FUNCTION TEST  THE ALT SLIGHTLY HIGH   MOST LIKELY DUE TO CHEMO TREATMENT    Resulted Orders   N terminal pro BNP outpatient   Result Value Ref Range    N-Terminal Pro Bnp 15 0 - 125 pg/mL      Comment:         Reference range shown and results flagged as abnormal are for the outpatient,   non acute settings. Establishing a baseline value for each individual patient   is useful for follow-up.  Suggested inpatient cut points for confirming diagnosis of CHF in an acute   setting are:   >450 pg/mL (age 18 to less than 50)   >900 pg/mL (age 50 to less than 75)   >1800 pg/mL (75 yrs and older)  An inpatient or emergency department NT-proPBNP <300 pg/mL effectively rules   out acute CHF, with 99% negative predictive value.      Basic metabolic panel   Result Value Ref Range    Sodium 140 133 - 144 mmol/L    Potassium 3.9 3.4 - 5.3 mmol/L    Chloride 107 94 - 109 mmol/L    Carbon Dioxide 25 20 - 32 mmol/L    Anion Gap 8 3 - 14 mmol/L    Glucose 85 70 - 99 mg/dL    Urea Nitrogen 11 7 - 30 mg/dL    Creatinine 0.68 0.52 - 1.04 mg/dL    GFR Estimate >90 >60 mL/min/[1.73_m2]      Comment:      Non  GFR Calc  Starting 12/18/2018, serum creatinine based estimated GFR (eGFR) will be   calculated using the Chronic Kidney Disease Epidemiology Collaboration   (CKD-EPI) equation.      GFR Estimate If Black >90 >60 mL/min/[1.73_m2]      Comment:       GFR Calc  Starting 12/18/2018, serum creatinine based estimated GFR (eGFR) will be   calculated using the Chronic Kidney Disease Epidemiology Collaboration   (CKD-EPI) equation.      Calcium 8.8 8.5 - 10.1 mg/dL   Hepatic panel (Albumin,  ALT, AST, Bili, Alk Phos, TP)   Result Value Ref Range    Bilirubin Direct <0.1 0.0 - 0.2 mg/dL    Bilirubin Total 0.3 0.2 - 1.3 mg/dL    Albumin 3.5 3.4 - 5.0 g/dL    Protein Total 6.9 6.8 - 8.8 g/dL    Alkaline Phosphatase 81 40 - 150 U/L    ALT 74 (H) 0 - 50 U/L    AST 43 0 - 45 U/L       If you have any questions or concerns, please call the clinic at the number listed above.       Sincerely,        ALEENA TYLER MD

## 2019-06-20 NOTE — PROGRESS NOTES
Called Luci, she will start radiation treatment next Thursday 6/27/19. She will be scheduled for follow up 8/21/19 at 1120 with Dr. Murillo. Marialuisa Casper RN,BSN,OCN

## 2019-06-20 NOTE — PATIENT INSTRUCTIONS
(M72.2) Plantar fasciitis  (primary encounter diagnosis)  Comment:    Plan: order for DME             (R60.0) Bilateral leg edema  Comment: Compression stockings for the legs  Plan: order for DME             (Z92.21) Status post chemotherapy  Comment:    Plan: N terminal pro BNP outpatient, Basic metabolic         Panel  Hepatic function panel    Medial epicondylitis    ICE MASSAGE 5 MINUTES TWICE DAILY AS NEEDED FOR PAIN CONTROL    ISOMETRIC EXERCISES AT 90 DEGREES 30 SECONDS EACH TWICE DAILY    LATERAL FRICTION MASSAGE OR ELBOW GENTLY 30 SECONDS TWICE DAILY    RANGE OF MOTION OF ELBOW FLEXION AND EXTENSION AS TOLERATED    WITH AND WITHOUT WEIGHTS    PALM DOWN ISOMETRIC FOLLOWED BY PALM DOWN RANGE OF MOTION with AND  WITHOUT RESISTANCE 30 EACH TWICE DAILY    THUMB UP ISOMETRICS FOLLOWED BY THUMB UP RANGE OF MOTION WITH AND WITHOUT RESISTANCE TWICE DAILY 30 EACH    PALM DOWN ISOMETRICS FOLLOWED BY PALM DOWN RANGE OF MOTION WITH AND WITHOUT RESISTANCE TWICE DAILY 30 EACH    INTERNAL  AND EXTERNAL ROTATION OF ELBOW AT 90 DEGREES AS TOLERATED 30 EACH TWICE DAILY    WEIGHT ON A STRING WITH BOTH ARMS ON HAND WIND UP ;AND  WIND DOWN SLOWLY 30 SECONDS EACH TWICE DAILY    MASSAGE LIDOCAINE OINTMENT OR DICLOFENAC GEL 1% TWICE DAILY  AS TOLERATED    TENNIS ELBOW BAND AS TOLERATED WHEN WORKING OR IF HAVING SIGNIFICANT PAIN

## 2019-06-21 LAB
ALBUMIN SERPL-MCNC: 3.5 G/DL (ref 3.4–5)
ALP SERPL-CCNC: 81 U/L (ref 40–150)
ALT SERPL W P-5'-P-CCNC: 74 U/L (ref 0–50)
ANION GAP SERPL CALCULATED.3IONS-SCNC: 8 MMOL/L (ref 3–14)
AST SERPL W P-5'-P-CCNC: 43 U/L (ref 0–45)
BILIRUB DIRECT SERPL-MCNC: <0.1 MG/DL (ref 0–0.2)
BILIRUB SERPL-MCNC: 0.3 MG/DL (ref 0.2–1.3)
BUN SERPL-MCNC: 11 MG/DL (ref 7–30)
CALCIUM SERPL-MCNC: 8.8 MG/DL (ref 8.5–10.1)
CHLORIDE SERPL-SCNC: 107 MMOL/L (ref 94–109)
CO2 SERPL-SCNC: 25 MMOL/L (ref 20–32)
CREAT SERPL-MCNC: 0.68 MG/DL (ref 0.52–1.04)
GFR SERPL CREATININE-BSD FRML MDRD: >90 ML/MIN/{1.73_M2}
GLUCOSE SERPL-MCNC: 85 MG/DL (ref 70–99)
POTASSIUM SERPL-SCNC: 3.9 MMOL/L (ref 3.4–5.3)
PROT SERPL-MCNC: 6.9 G/DL (ref 6.8–8.8)
SODIUM SERPL-SCNC: 140 MMOL/L (ref 133–144)

## 2019-06-25 ENCOUNTER — PATIENT OUTREACH (OUTPATIENT)
Dept: ONCOLOGY | Facility: CLINIC | Age: 49
End: 2019-06-25

## 2019-06-25 NOTE — LETTER
June 25, 2019      TO: Whom it may concern:          My patient Luci Londono can return back to work 6/25/19. She has no restrictions with her work. If you have any questions please contact [phone], option [phone].           Sincerely,      Gadiel Crisostoom   NP

## 2019-06-25 NOTE — PROGRESS NOTES
Luci called clinic stating that she needs a letter written that she can return back to work today without any restrictions. Letter will be written and signed by GINGER Ramesh. luci will  letter this morning. Marialuisa Casper RN,BSN,OCN

## 2019-07-01 ENCOUNTER — PRE VISIT (OUTPATIENT)
Dept: ONCOLOGY | Facility: CLINIC | Age: 49
End: 2019-07-01

## 2019-07-01 ENCOUNTER — ONCOLOGY VISIT (OUTPATIENT)
Dept: ONCOLOGY | Facility: CLINIC | Age: 49
End: 2019-07-01
Attending: INTERNAL MEDICINE
Payer: COMMERCIAL

## 2019-07-01 ENCOUNTER — THERAPY VISIT (OUTPATIENT)
Dept: PHYSICAL THERAPY | Facility: CLINIC | Age: 49
End: 2019-07-01
Payer: COMMERCIAL

## 2019-07-01 DIAGNOSIS — Z17.1 MALIGNANT NEOPLASM OF UPPER-OUTER QUADRANT OF RIGHT BREAST IN FEMALE, ESTROGEN RECEPTOR NEGATIVE (H): ICD-10-CM

## 2019-07-01 DIAGNOSIS — C50.411 MALIGNANT NEOPLASM OF UPPER-OUTER QUADRANT OF RIGHT BREAST IN FEMALE, ESTROGEN RECEPTOR NEGATIVE (H): ICD-10-CM

## 2019-07-01 DIAGNOSIS — M25.511 ACUTE PAIN OF RIGHT SHOULDER: ICD-10-CM

## 2019-07-01 DIAGNOSIS — Z15.09 MONOALLELIC MUTATION OF PALB2 GENE: Primary | ICD-10-CM

## 2019-07-01 DIAGNOSIS — Z15.89 MONOALLELIC MUTATION OF PALB2 GENE: Primary | ICD-10-CM

## 2019-07-01 DIAGNOSIS — Z15.01 MONOALLELIC MUTATION OF PALB2 GENE: Primary | ICD-10-CM

## 2019-07-01 PROCEDURE — 40000072 ZZH STATISTIC GENETIC COUNSELING, < 16 MIN: Performed by: GENETIC COUNSELOR, MS

## 2019-07-01 PROCEDURE — 97110 THERAPEUTIC EXERCISES: CPT | Mod: GP | Performed by: PHYSICAL THERAPIST

## 2019-07-01 PROCEDURE — 97140 MANUAL THERAPY 1/> REGIONS: CPT | Mod: GP | Performed by: PHYSICAL THERAPIST

## 2019-07-01 NOTE — LETTER
Cancer Risk Management  Program Locations    [facility]  Mailing Address  Cancer Risk Management Program  [facility]  [address]  [place] [address]    New patient appointments  [phone]  July 2, 2019    Luci Londono  [address]  [facility] [address]      Dear Luci,    It was a pleasure meeting with you at the [facility] on 7/1/2019.  Here is a copy of the progress note from your recent genetic counseling visit to the Cancer Risk Management Program.  If you have any additional questions, please feel free to call.    Presenting Information:   I met with Luci Alert today for genetic counseling as part of the Cancer Risk Management Program. Her blood was drawn on 4/25/2019 , and OvaNext testing was ordered from [facility]. This testing was done because of Luci's breast cancer diagnosis.  The results of this testing were discussed over phone on 5/8/2019 prior to her surgery (treatment has included right breast lumpectomy on 5/15/2019, and neoadjuvant chemotherapy).  She returns to clinic to review these results, including recommendations for screening and management due to the identified PALB2 gene mutation.      Genetic Testing Results: POSITIVE  Luci is POSITIVE for a PALB2 mutation. Specifically her mutation is called [id number].[id number]*. We reviewed that this mutation is associated with an increased risk for several cancers, including breast, pancreatic and ovarian cancer. We discussed the impact of this testing on Luci in detail.      Luci was also found to have variants of uncertain significance (VUS) in the BRCA2 and MLH1 genes. It is currently not clear if these two variants are associated with increased cancer risk (such as Hereditary Breast and Ovarian  Cancer syndrome due to harmful mutations in the BRCA2 gene, or Bernard syndrome due to harmful mutation in the MLH1 gene). Given the uncertain significance of this result, medical management decisions should NOT be made based on these two variants alone.  ? BRCA2 p.T77A (c.229A>G); Variant, Unknown Significance  ? MLH1 p.I25V (c.73A>G); Variant, Unknown Significance     Of note, Luci tested negative for mutations in the KENN, BARD1, BRCA1, BRCA2, BRIP1, CDH1, CHEK2, DICER1, EPCAM, MLH1, MRE11A, MSH2, MSH6, MUTYH, NBN, NF1, PMS2, PTEN, RAD50, RAD51C, RAD51D, SMARCA4, STK11, and TP53 genes. This test involved sequencing and deletion/duplication analysis of all genes with the exceptions of EPCAM (deletions/duplications only).     Cancer Risks:    Individuals with a PALB2 mutation are at increased risk of certain cancers.  These risks may be influenced by family history:     The lifetime breast cancer risk for women who carry one PALB2 mutation is approximately 33-58%, compared to the lifetime population risk of breast cancer of 12%. These risks may be influenced by the number of family members with breast cancer, ages of onset, and relationship to these individuals.      We also discussed the association of PALB2 mutations with increased risk for pancreatic and ovarian cancer; however, these risks are currently not well defined.     Though no exact lifetime risks are available at this time, there may be increased risks for other cancers, as well.     We reviewed that the gene PALB2 is currently considered a moderate-risk gene. This means that mutations in this gene increase the risk for certain cancers, but are unlikely to be the single cause for an individual's cancer. There are likely other genetic and/or environmental risk factors that, in combination with a PALB2 gene mutation, may be associated with Luci's breast cancer diagnosis.      Recommendations for Screening and Management:   We discussed the following  screening recommendations for individuals who carry one PALB2 mutation:    Women with PALB2 mutations qualify for high risk breast screening, per the National Comprehensive Cancer Network (NCCN).  ? High risk breast cancer screening recommendations include annual mammograms and annual breast MRI's, alternating every 6 months.  It is typically recommended that this screening begin at age 30, though this can be discussed in more detail with a woman's medical providers.  ? There is currently insufficient data regarding the benefit of risk-reducing mastectomy for women who carry a PALB2 mutation; however this may be considered based on family history.      Screening for pancreatic cancer can be considered based on family history; Luci reports no family history of pancreatic cancer.  She is encouraged to contact me should there be changes to her family history, as this may change recommendations for screening for Luci and her relatives.     At this time, no management recommendations have been made for PALB2 carriers regarding ovarian or other cancers at this time, as there is currently insufficient evidence regarding these risks.     We discussed that Luci could participate in our Cancer Risk Management Program in which our nursing specialist provides an individual screening plan and assists with medical management. A referral  was placed  for Luci to see IAN Paez for high risk breast screening.     Of note, the above information is based on our current understanding of Luci's genetic findings. Luci is encouraged to reach out to me regularly regarding any pertinent updates to her personal and/or family history of cancer, as our understanding of the genetic findings in her family may change over time.      Implications for Family Members:  We reviewed that mutations in the PALB2 gene are inherited in an autosomal dominant pattern. This means that each of Luci's children have a 50%  "chance of inheriting the same mutation. We discussed that genetic testing for a single PALB2 gene mutation is generally deferred until adulthood when they can make an independent decision regarding this information.    Likewise, each of her siblings has a 50% risk of having the same mutation. Extended relatives may also carry this mutation, and she is encouraged to share this information with her family members on both sides of the family. I am happy to help her relatives connect with a genetic counselor in their area if they would like to discuss testing.     In rare situations in which both parents have a mutation in the PALB2 gene, their children each have a 25% risk for Fanconi anemia. Fanconi anemia is a rare condition with onset in childhood that often results in physical abnormalities, growth delays, bone marrow failure, and increased risk for cancer. For individuals of childbearing age with PALB2 mutations, genetic counseling and genetic testing may be advised for their partners.     Support Resources:  National and local support resources for hereditary breast cancer, such as Facing Our Risk Of Cancer Empowered (FORCE), are available and several are included with her test results.  A \"Dear Relative\" letter was provided for Luci to share with her family. She is encouraged to contact me should she need additional information/resources in the future regarding her test result.      Plan:  1.  I  provided Luci with a copy of her test results and support resources  today.  2.  A referral to IAN Paez, was placed for high risk breast screening.        All of Luci's questions were answered to her satisfaction.  If Luci has additional questions, I encouraged her to contact me directly at [phone].      Neida Fortune MS, [facility]  Licensed Genetic Counselor  [phone]                              "

## 2019-07-01 NOTE — PROGRESS NOTES
July 1, 2019    Presenting Information:   I met with Luci Alert today for genetic counseling as part of the Cancer Risk Management Program. Her blood was drawn on 4/25/2019, and OvaNext testing was ordered from [employer]. This testing was done because of Luci's breast cancer diagnosis.  The results of this testing were discussed over phone on 5/8/2019 prior to her surgery (treatment has included right breast lumpectomy on 5/15/2019, and neoadjuvant chemotherapy).  She returns to clinic to review these results, including recommendations for screening and management due to the identified PALB2 gene mutation.      Genetic Testing Results: POSITIVE  Luci is POSITIVE for a PALB2 mutation. Specifically her mutation is called p.[id number]*. We reviewed that this mutation is associated with an increased risk for several cancers, including breast, pancreatic and ovarian cancer. We discussed the impact of this testing on Luci in detail.      Luci was also found to have variants of uncertain significance (VUS) in the BRCA2 and MLH1 genes. It is currently not clear if these two variants are associated with increased cancer risk (such as Hereditary Breast and Ovarian Cancer syndrome due to harmful mutations in the BRCA2 gene, or Bernard syndrome due to harmful mutation in the MLH1 gene). Given the uncertain significance of this result, medical management decisions should NOT be made based on these two variants alone.  ? BRCA2 p.T77A (c.229A>G); Variant, Unknown Significance  ? MLH1 p.I25V (c.73A>G); Variant, Unknown Significance     Of note, Luci tested negative for mutations in the KENN, BARD1, BRCA1, BRCA2, BRIP1, CDH1, CHEK2, DICER1, EPCAM, MLH1, MRE11A, MSH2, MSH6, MUTYH, NBN, NF1, PMS2, PTEN, RAD50, RAD51C, RAD51D, SMARCA4, STK11, and TP53 genes. This test involved sequencing and deletion/duplication analysis of all genes with the exceptions of EPCAM (deletions/duplications only).     A copy of the test report  "can be found in the Laboratory tab, dated 4/25/2019, and named \"SEND OUTS MISC TEST\". The report is scanned in as a linked document.     Cancer Risks:    Individuals with a PALB2 mutation are at increased risk of certain cancers.  These risks may be influenced by family history:     The lifetime breast cancer risk for women who carry one PALB2 mutation is approximately 33-58%, compared to the lifetime population risk of breast cancer of 12%. These risks may be influenced by the number of family members with breast cancer, ages of onset, and relationship to these individuals.      We also discussed the association of PALB2 mutations with increased risk for pancreatic and ovarian cancer; however, these risks are currently not well defined.     Though no exact lifetime risks are available at this time, there may be increased risks for other cancers, as well.     We reviewed that the gene PALB2 is currently considered a moderate-risk gene. This means that mutations in this gene increase the risk for certain cancers, but are unlikely to be the single cause for an individual's cancer. There are likely other genetic and/or environmental risk factors that, in combination with a PALB2 gene mutation, may be associated with Luci's breast cancer diagnosis.      Recommendations for Screening and Management:   We discussed the following screening recommendations for individuals who carry one PALB2 mutation:    Women with PALB2 mutations qualify for high risk breast screening, per the National Comprehensive Cancer Network (NCCN).  ? High risk breast cancer screening recommendations include annual mammograms and annual breast MRI's, alternating every 6 months.  It is typically recommended that this screening begin at age 30, though this can be discussed in more detail with a woman's medical providers.  ? There is currently insufficient data regarding the benefit of risk-reducing mastectomy for women who carry a PALB2 mutation; " however this may be considered based on family history.      Screening for pancreatic cancer can be considered based on family history; Luci reports no family history of pancreatic cancer.  She is encouraged to contact me should there be changes to her family history, as this may change recommendations for screening for Luci and her relatives.     At this time, no management recommendations have been made for PALB2 carriers regarding ovarian or other cancers at this time, as there is currently insufficient evidence regarding these risks.     We discussed that Luci could participate in our Cancer Risk Management Program in which our nursing specialist provides an individual screening plan and assists with medical management. A referral was placed for Luci to see IAN Paez for high risk breast screening.     Of note, the above information is based on our current understanding of Luci's genetic findings. Luci is encouraged to reach out to me regularly regarding any pertinent updates to her personal and/or family history of cancer, as our understanding of the genetic findings in her family may change over time.      Implications for Family Members:  We reviewed that mutations in the PALB2 gene are inherited in an autosomal dominant pattern. This means that each of Luci's children have a 50% chance of inheriting the same mutation. We discussed that genetic testing for a single PALB2 gene mutation is generally deferred until adulthood when they can make an independent decision regarding this information.    Likewise, each of her siblings has a 50% risk of having the same mutation. Extended relatives may also carry this mutation, and she is encouraged to share this information with her family members on both sides of the family. I am happy to help her relatives connect with a genetic counselor in their area if they would like to discuss testing.     In rare situations in which both  "parents have a mutation in the PALB2 gene, their children each have a 25% risk for Fanconi anemia. Fanconi anemia is a rare condition with onset in childhood that often results in physical abnormalities, growth delays, bone marrow failure, and increased risk for cancer. For individuals of childbearing age with PALB2 mutations, genetic counseling and genetic testing may be advised for their partners.     Support Resources:  National and local support resources for hereditary breast cancer, such as Facing Our Risk Of Cancer Empowered (FORCE), are available and several are included with her test results.  A \"Dear Relative\" letter was provided for Luci to share with her family. She is encouraged to contact me should she need additional information/resources in the future regarding her test result.      Plan:  1.  I provided Luci with a copy of her test results and support resources today.  2.  A referral to IAN Paez, was placed for high risk breast screening.       All of Luci's questions were answered to her satisfaction.  If Luci has additional questions, I encouraged her to contact me directly at [phone].     Time spent face to face: 15 minutes    Neida Fortune MS, [facility]  Licensed Genetic Counselor  [phone]            "

## 2019-07-01 NOTE — TELEPHONE ENCOUNTER
ONCOLOGY INTAKE: Records Information      APPT INFORMATION:  Referring provider:  Neida Fortune GC  Referring provider s clinic:   [facility]  Reason for visit/diagnosis:  Family history of breast cancer   Has patient been notified of appointment date and time?: YES    RECORDS INFORMATION:  Were the records received with the referral (via [employer])? No, internal referral    Has patient been seen for any external appt for this diagnosis? na    If yes, where? na    Has patient had any imaging or procedures outside of [place] for this condition? na      If Yes, where? na    ADDITIONAL INFORMATION:  na

## 2019-07-01 NOTE — LETTER
7/1/2019         RE: Luci Londono  [address] [facility] [address]        Dear Colleague,    Thank you for referring your patient, Luci Londono, to the CANCER RISK MANAGEMENT PROGRAM. Please see a copy of my visit note below.    July 1, 2019    Presenting Information:   I met with Luci Alert today for genetic counseling as part of the Cancer Risk Management Program. Her blood was drawn on 4/25/2019 , and OvaNext testing was ordered from [facility]. This testing was done because of Luci's breast cancer diagnosis.  The results of this testing were discussed over phone on 5/8/2019 prior to her surgery (treatment has included right breast lumpectomy on 5/15/2019, and neoadjuvant chemotherapy).  She returns to clinic to review these results, including recommendations for screening and management due to the identified PALB2 gene mutation.      Genetic Testing Results: POSITIVE  Luci is POSITIVE for a PALB2 mutation. Specifically her mutation is called p.[id number]*. We reviewed that this mutation is associated with an increased risk for several cancers, including breast, pancreatic and ovarian cancer. We discussed the impact of this testing on Luci in detail.      Luci was also found to have variants of uncertain significance (VUS) in the BRCA2 and MLH1 genes. It is currently not clear if these two variants are associated with increased cancer risk (such as Hereditary Breast and Ovarian Cancer syndrome due to harmful mutations in the BRCA2 gene, or Bernard syndrome due to harmful mutation in the MLH1 gene). Given the uncertain significance of this result, medical management decisions should NOT be made based on these two variants alone.  ? BRCA2 p.T77A (c.229A>G); Variant, Unknown Significance  ? MLH1 p.I25V (c.73A>G); Variant, Unknown Significance     Of note, Luci tested negative for mutations in the KENN, BARD1, BRCA1, BRCA2, BRIP1, CDH1, CHEK2, DICER1, EPCAM, MLH1,  "MRE11A, MSH2, MSH6, MUTYH, NBN, NF1, PMS2, PTEN, RAD50, RAD51C, RAD51D, SMARCA4, STK11, and TP53 genes. This test involved sequencing and deletion/duplication analysis of all genes with the exceptions of EPCAM (deletions/duplications only).     A copy of the test report can be found in the Laboratory tab, dated 4/25/2019, and named \"SEND OUTS MISC TEST\". The report is scanned in as a linked document.     Cancer Risks:    Individuals with a PALB2 mutation are at increased risk of certain cancers.  These risks may be influenced by family history:     The lifetime breast cancer risk for women who carry one PALB2 mutation is approximately 33-58%, compared to the lifetime population risk of breast cancer of 12%. These risks may be influenced by the number of family members with breast cancer, ages of onset, and relationship to these individuals.      We also discussed the association of PALB2 mutations with increased risk for pancreatic and ovarian cancer; however, these risks are currently not well defined.     Though no exact lifetime risks are available at this time, there may be increased risks for other cancers, as well.     We reviewed that the gene PALB2 is currently considered a moderate-risk gene. This means that mutations in this gene increase the risk for certain cancers, but are unlikely to be the single cause for an individual's cancer. There are likely other genetic and/or environmental risk factors that, in combination with a PALB2 gene mutation, may be associated with Luci's breast cancer diagnosis.      Recommendations for Screening and Management:   We discussed the following screening recommendations for individuals who carry one PALB2 mutation:    Women with PALB2 mutations qualify for high risk breast screening, per the National Comprehensive Cancer Network (NCCN).  ? High risk breast cancer screening recommendations include annual mammograms and annual breast MRI's, alternating every 6 months. "  It is typically recommended that this screening begin at age 30, though this can be discussed in more detail with a woman's medical providers.  ? There is currently insufficient data regarding the benefit of risk-reducing mastectomy for women who carry a PALB2 mutation; however this may be considered based on family history.      Screening for pancreatic cancer can be considered based on family history; Luci reports no family history of pancreatic cancer.  She is encouraged to contact me should there be changes to her family history, as this may change recommendations for screening for Luci and her relatives.     At this time, no management recommendations have been made for PALB2 carriers regarding ovarian or other cancers at this time, as there is currently insufficient evidence regarding these risks.     We discussed that Luci could participate in our Cancer Risk Management Program in which our nursing specialist provides an individual screening plan and assists with medical management. A referral  was placed  for Luci to see IAN Paez for high risk breast screening.     Of note, the above information is based on our current understanding of Luci's genetic findings. Luci is encouraged to reach out to me regularly regarding any pertinent updates to her personal and/or family history of cancer, as our understanding of the genetic findings in her family may change over time.      Implications for Family Members:  We reviewed that mutations in the PALB2 gene are inherited in an autosomal dominant pattern. This means that each of Luci's children have a 50% chance of inheriting the same mutation. We discussed that genetic testing for a single PALB2 gene mutation is generally deferred until adulthood when they can make an independent decision regarding this information.    Likewise, each of her siblings has a 50% risk of having the same mutation. Extended relatives may also carry this  "mutation, and she is encouraged to share this information with her family members on both sides of the family. I am happy to help her relatives connect with a genetic counselor in their area if they would like to discuss testing.     In rare situations in which both parents have a mutation in the PALB2 gene, their children each have a 25% risk for Fanconi anemia. Fanconi anemia is a rare condition with onset in childhood that often results in physical abnormalities, growth delays, bone marrow failure, and increased risk for cancer. For individuals of childbearing age with PALB2 mutations, genetic counseling and genetic testing may be advised for their partners.     Support Resources:  National and local support resources for hereditary breast cancer, such as Facing Our Risk Of Cancer Empowered (FORCE), are available and several are included with her test results.  A \"Dear Relative\" letter was provided for Luci to share with her family. She is encouraged to contact me should she need additional information/resources in the future regarding her test result.      Plan:  1.  I  provided Luci with a copy of her test results and support resources  today.  2.  A referral to IAN Paez, was placed for high risk breast screening.        All of Luci's questions were answered to her satisfaction.  If Luci has additional questions, I encouraged her to contact me directly at [phone].      Time spent face to face: 15 minutes    Neida Fortune MS, [facility]  Licensed Genetic Counselor  [phone]              Again, thank you for allowing me to participate in the care of your patient.        Sincerely,        Neida Fortune GC    "

## 2019-07-02 NOTE — TELEPHONE ENCOUNTER
RECORDS STATUS - ALL OTHER DIAGNOSIS      RECORDS RECEIVED FROM: [facility]   DATE RECEIVED: 7/11/19   NOTES STATUS DETAILS   OFFICE NOTE from referring provider Eric Fortune   OFFICE NOTE from medical oncologist N/A    OPERATIVE REPORT N/A    LABS     PATHOLOGY REPORTS N/A Her 2 Jessica Fish    ANYTHING RELATED TO DIAGNOSIS N/A    GENONOMIC TESTING     TYPE: N/A    IMAGING (NEED IMAGES & REPORT)     CT SCANS     MRI     MAMMO     ULTRASOUND Complete  PACS   PET Complete  PACS

## 2019-07-09 NOTE — PROGRESS NOTES
Oncology Risk Management Consultation:  Date on this visit: 2019    Luci Londono  is referred by Neida Fortune for an oncology risk management consultation. She requires evaluation for her risk of cancer secondary to having a deleterious PALB2 mutation and is considered to be at high risk for hereditary breast and pancreatic cancer.  She has a history of triple negative right breast cancer stage IIb (T2, and 1, M0) with lymph involvement.  She is status post neoadjuvant systemic chemotherapy with Adriamycin and Cytoxan followed by Taxol.  Also present at this visit is Evelia Gomes, DNP student at the [facility].    Primary Physician: Stuart Wills MD    History Of Present Illness:  Ms. Londono is a 48 year old female who presents with family history of breast and ovarian cancer and a personal diagnosis of a PALB2 mutation.     Genetic Testin2019 - POSITIVE for a PALB2 mutation. Specifically her mutation is called [id number]*.      Of note, Luci was also found to have variants of uncertain significance (VUS) in the BRCA2 and MLH1 genes. It is currently not clear if these two variants are associated with increased cancer risk (such as Hereditary Breast and Ovarian Cancer syndrome due to harmful mutations in the BRCA2 gene, or Bernard syndrome due to harmful mutation in the MLH1 gene). Given the uncertain significance of this result, medical management decisions should NOT be made based on these two variants alone.  ? BRCA2 p.T77A (c.229A>G); Variant, Unknown Significance  ? MLH1 p.I25V (c.73A>G); Variant, Unknown Significance    Pertinent medical history:  10/9/2018: Ultrasound-guided right breast needle biopsy, grade 3 invasive ductal carcinoma, triple negative.  10/17/2018: Breast MRI: 2.2 cm right breast mass at 12 o'clock position in addition to multiple enlarged right axillary lymph nodes.  10/12/2018: Right breast lumpectomy, invasive carcinoma of special type, grade 3,  DCIS, solid pattern, nuclear grade 2, adenocarcinoma consistent with metastatic ductal carcinoma in the right axilla, 2.3 cm size ultrasound core biopsy  10/23/2018: PET/CT study: Increased tracer uptake in the right breast upper outer quadrant representing primary tumor with multiple axillary lymph nodes on the right with tracer uptake.  S/P/neoadjuvant Adriamycin/Cytoxan/Taxol chemotherapy.  5/15/2019: Right lumpectomy and axillary lymph node dissection: Pathology, complete response within the breast tissue, 1 out of 6 left axillary lymph nodes involved with metastatic residual tumor.    12/13/2006: Bilateral fallopian tube removal.    At this visit, she states she feels fine, and denies fatigue, new breast pain, lumps, and skin issues including darkening, reddening of the skin or dimpled, puckered skin.    Past Medical/Surgical History:  Past Medical History:   Diagnosis Date     Hypertension goal BP (blood pressure) < 140/80 2/18/2014     Lateral epicondylitis, right dominant elbow since late 10-17 11/1/2017     Port-A-Cath in place 10/26/2018     Thyroid disease      Past Surgical History:   Procedure Laterality Date     BIOPSY NODE SENTINEL Right 5/15/2019    Procedure: BIOPSY, LYMPH NODE, SENTINEL;  Surgeon: Stacey Ashford MD;  Location:  OR     DISSECT LYMPH NODE AXILLA Right 5/15/2019    Procedure: LYMPHADENECTOMY, AXILLARY;  Surgeon: Stacey Ashford MD;  Location:  OR     HAND SURGERY  2002    left     INSERT PORT VASCULAR ACCESS N/A 10/18/2018    Procedure: INSERTION OF VASCULAR PORT;  Surgeon: Stacey Ashford MD;  Location: [facility] OR     LUMPECTOMY BREAST WITH SEED LOCALIZATION Right 5/15/2019    Procedure: SEED LOCALIZATION BREAST LUMPECTOMY, SEED LOCALIZATION AXILLARY BREAST BIOPSY, SENTINEL NODE , REMOVAL OF VASCULAR PORT ACCESS AND RIGHT  AXILLARY NODE DISSECTION;  Surgeon: Stacey Ashford MD;  Location:  OR     REMOVE PORT VASCULAR ACCESS N/A 5/15/2019    Procedure: REMOVAL, VASCULAR  ACCESS PORT;  Surgeon: Stacey Ashford MD;  Location: [facility] OR     TUBAL LIGATION         Allergies:  Allergies as of 07/11/2019     (No Known Allergies)       Current Medications:  Current Outpatient Medications   Medication Sig Dispense Refill     ascorbic acid (VITAMIN C) 1000 MG TABS Take 1,000 mg by mouth daily       gabapentin (NEURONTIN) 100 MG capsule Take 2 capsules (200 mg) by mouth 3 times daily (Patient not taking: Reported on 7/11/2019) 180 capsule 1     HYDROcodone-acetaminophen (NORCO) 5-325 MG tablet Take 1-2 tablets by mouth every 4 hours as needed for moderate to severe pain (Patient not taking: Reported on 7/11/2019) 20 tablet 0     levothyroxine (SYNTHROID/LEVOTHROID) 125 MCG tablet TAKE 1 TABLET BY MOUTH DAILY 30 tablet 6     lidocaine-prilocaine (EMLA) cream Apply quarter-size amount of Emla cream topically over port site one hour prior to port access for comfort. 30 g 3     loratadine (CLARITIN) 10 MG tablet Take 1 tablet (10 mg) by mouth daily (Patient not taking: Reported on 7/11/2019) 90 tablet 1     LORazepam (ATIVAN) 0.5 MG tablet Take 1 tablet (0.5 mg) by mouth every 4 hours as needed (Anxiety, Nausea/Vomiting or Sleep) (Patient not taking: Reported on 7/11/2019) 30 tablet 5     metFORMIN (GLUCOPHAGE-XR) 500 MG 24 hr tablet TAKE 1 TABLET(500 MG) BY MOUTH DAILY WITH DINNER 90 tablet 1     multivitamin, therapeutic with minerals (THERA-VIT-M) TABS tablet Take 1 tablet by mouth daily       omeprazole (PRILOSEC) 20 MG DR capsule Take 1 capsule (20 mg) by mouth 2 times daily 60 capsule 11     order for DME Night splints for plantar fasciitis   Longitudinal arch supports  [facility]  Address: [address] [place] [address], [place] [address]  [phone]  Hours:  Open today   8:00 AM - 5:00 PM 2 each 0     order for DME Equipment being ordered:  Compression stocking below knee black 20-25mm 3 each 0     senna-docusate (SENOKOT-S/PERICOLACE) 8.6-50 MG tablet Take 1-2 tablets by mouth 2  times daily as needed for constipation (While on oral opioids.) (Patient not taking: Reported on 7/11/2019) 30 tablet 0        Family History:  Family History   Problem Relation Age of Onset     Diabetes Mother      Unknown/Adopted Father      Coronary Artery Disease No family hx of      Hypertension No family hx of      Hyperlipidemia No family hx of      Cerebrovascular Disease No family hx of      Breast Cancer No family hx of      Colon Cancer No family hx of      Prostate Cancer No family hx of      Other Cancer No family hx of      Depression No family hx of      Anxiety Disorder No family hx of      Mental Illness No family hx of      Substance Abuse No family hx of      Anesthesia Reaction No family hx of      Asthma No family hx of      Osteoporosis No family hx of      Genetic Disorder No family hx of      Thyroid Disease No family hx of      Obesity No family hx of        Social History:  Social History     Socioeconomic History     Marital status:      Spouse name: Not on file     Number of children: Not on file     Years of education: Not on file     Highest education level: Not on file   Occupational History     Not on file   Social Needs     Financial resource strain: Not on file     Food insecurity:     Worry: Not on file     Inability: Not on file     Transportation needs:     Medical: Not on file     Non-medical: Not on file   Tobacco Use     Smoking status: Never Smoker     Smokeless tobacco: Never Used   Substance and Sexual Activity     Alcohol use: No     Drug use: No     Sexual activity: Yes     Partners: Male     Birth control/protection: Pill   Lifestyle     Physical activity:     Days per week: Not on file     Minutes per session: Not on file     Stress: Not on file   Relationships     Social connections:     Talks on phone: Not on file     Gets together: Not on file     Attends [employer] service: Not on file     Active member of club or organization: Not on file     Attends meetings  "of clubs or [employer]: Not on file     Relationship status: Not on file     Intimate partner violence:     Fear of current or ex partner: Not on file     Emotionally abused: Not on file     Physically abused: Not on file     Forced sexual activity: Not on file   Other Topics Concern     Parent/sibling w/ CABG, MI or angioplasty before 65F 55M? No   Social History Narrative     Not on file       Physical Exam:  /83 (BP Location: Left arm, Patient Position: Sitting, Cuff Size: Adult Large)   Pulse 71   Temp 97.8  F (36.6  C) (Oral)   Resp 18   Ht 1.6 m (5' 3\")   Wt 94 kg (207 lb 3.2 oz)   SpO2 100%   BMI 36.70 kg/m    Physical exam deferred.    Laboratory/Imaging Studies  Results for orders placed or performed in visit on 06/20/19   N terminal pro BNP outpatient   Result Value Ref Range    N-Terminal Pro Bnp 15 0 - 125 pg/mL   Basic metabolic panel   Result Value Ref Range    Sodium 140 133 - 144 mmol/L    Potassium 3.9 3.4 - 5.3 mmol/L    Chloride 107 94 - 109 mmol/L    Carbon Dioxide 25 20 - 32 mmol/L    Anion Gap 8 3 - 14 mmol/L    Glucose 85 70 - 99 mg/dL    Urea Nitrogen 11 7 - 30 mg/dL    Creatinine 0.68 0.52 - 1.04 mg/dL    GFR Estimate >90 >60 mL/min/[1.73_m2]    GFR Estimate If Black >90 >60 mL/min/[1.73_m2]    Calcium 8.8 8.5 - 10.1 mg/dL   Hepatic panel (Albumin, ALT, AST, Bili, Alk Phos, TP)   Result Value Ref Range    Bilirubin Direct <0.1 0.0 - 0.2 mg/dL    Bilirubin Total 0.3 0.2 - 1.3 mg/dL    Albumin 3.5 3.4 - 5.0 g/dL    Protein Total 6.9 6.8 - 8.8 g/dL    Alkaline Phosphatase 81 40 - 150 U/L    ALT 74 (H) 0 - 50 U/L    AST 43 0 - 45 U/L       ASSESSMENT  We discussed the differences between cancer risk for the general population and those with PALB2 genetic mutation. PALB2 is a breast cancer susceptibility gene that encodes a BRCA2-interacting protein.  The interaction between BRCA2-PALB2 helps to repair DNA damage as well as suppress the growth of tumors.  Monoallelic deleterious " PALB2 mutations are present in a small portion of patients with breast cancer including about 1% of all patients with triple-negative breast cancer.  Women who carry a PALB2 genetic mutation have abreast cancer risk equivalent to those of women with BRCA2 genetic mutations and is dependent on family history.     Current literature shows that women with PALB2 mutations with no family history of breast cancer have about a 33% risk of developing breast cancer.  If they have 2 or more family members with breast cancer, the lifetime risk is estimated to be approximately 58%.    There is an increased risk of pancreatic cancer in patients with PALB2 genetic mutations, but the absolute risk is unclear.  The genetic mutation may also carry with it increased risk for breast cancer in men, prostate cancer and ovarian cancer although these risks are not confirmed at this time and no recommendations are made for screening.    We discussed signs and symptoms to be watchful for and she practiced palpating with the clinic's breast simulation model. She verbalized understanding of signs and symptoms to address with her health care providers including lumps, thickening, swelling, tenderness, nipple discharge or changes in skin of breasts.    We discussed that in her case, because of her history, I would not be the provider managing her screening plan, but that she should continue to follow with Dr. Murillo for follow up and checking for recurrence.  We discussed that when her children are adults, they could be tested for the same genetic mutation, which could be of value to them for managing their care.  For example, if her daughter also carried the PALB2+ mutation, her breast screening would begin at least by age 30, per below:    INDIVIDUALIZED CANCER RISK MANAGEMENT PLAN:  Individualized Surveillance Plan for women with PALB2 genetic mutations   Per NCCN Guidelines Version 2.2019   Recommended screening Test or procedure Last done Next  Scheduled    Clinical encounter Ongoing risk assessment, risk reduction counseling and clinical breast exam, every 6-12 months.     Beginning at age 30 Annual mammogram beginning at age 30.    Consider annual breast MRI at age 30.     Breast MRIs are preferably done on day 7-15 of the menstrual cycle in premenopausal women.        Evidence insufficient to recommend risk reducing mastectomy; manage based on family history       In PALB2+ carriers, who have pancreatic cancer in the family, we may refer to a gastroenterologist for screening with serial EUS and serial MRCP tests.  This is not the case for Luci and no recommendation would be make for screening at this point in time.    We also discussed following  a healthy lifestyle plan recommended by both NCCN and the American Cancer Society that can reduce the risk of breast cancer:  1 Limit alcohol consumption to less than 1 drink per day (1 drink=5 oz.wine, 12 oz. Beer or 1.5 oz. 80-proof liquor). She does not drink.  2. Exercise per American Cancer Society guidelines of at least 150 minutes of moderate-intensity activity or 75 minutes of vigorous activity each week. (Or a combination of both.) Exercise should be spread  out over the week.  3. Maintain a healthy weight with a Body Mass Index between 19-24.9. We discussed weight loss, to achieve a lower BMI.  4. Do not use tobacco products and limit exposure to passive smoke. She does not smoke.    She will continue to follow with Dr. Murillo for breast cancer surveillance. We discussed that if her variants of unknown significance were upgraded to be of concern, Neida Fortune would contact her.  All questions were answered.    I spent 25 minutes with the patient with greater that 50% of it in counseling and coordinating care as documented above.    Katie León, APRN-CNS, OCN, AGN-BC  Clinical Nurse Specialist  Cancer Risk Management Program  [address]  [place] [address]    phone:  [phone]  Pager: [phone]  fax: [phone]    CC: MD Shonda Baker MD Alyssa Kne, [facility]

## 2019-07-11 ENCOUNTER — ONCOLOGY VISIT (OUTPATIENT)
Dept: ONCOLOGY | Facility: CLINIC | Age: 49
End: 2019-07-11
Attending: GENETIC COUNSELOR, MS
Payer: COMMERCIAL

## 2019-07-11 ENCOUNTER — PRE VISIT (OUTPATIENT)
Dept: ONCOLOGY | Facility: CLINIC | Age: 49
End: 2019-07-11

## 2019-07-11 VITALS
TEMPERATURE: 97.8 F | OXYGEN SATURATION: 100 % | SYSTOLIC BLOOD PRESSURE: 124 MMHG | WEIGHT: 207.2 LBS | HEART RATE: 71 BPM | HEIGHT: 63 IN | BODY MASS INDEX: 36.71 KG/M2 | RESPIRATION RATE: 18 BRPM | DIASTOLIC BLOOD PRESSURE: 83 MMHG

## 2019-07-11 DIAGNOSIS — Z15.09 MONOALLELIC MUTATION OF PALB2 GENE: Primary | ICD-10-CM

## 2019-07-11 DIAGNOSIS — Z15.89 MONOALLELIC MUTATION OF PALB2 GENE: Primary | ICD-10-CM

## 2019-07-11 DIAGNOSIS — Z15.01 MONOALLELIC MUTATION OF PALB2 GENE: Primary | ICD-10-CM

## 2019-07-11 PROCEDURE — G0463 HOSPITAL OUTPT CLINIC VISIT: HCPCS

## 2019-07-11 PROCEDURE — 99214 OFFICE O/P EST MOD 30 MIN: CPT | Performed by: CLINICAL NURSE SPECIALIST

## 2019-07-11 ASSESSMENT — PAIN SCALES - GENERAL: PAINLEVEL: MILD PAIN (2)

## 2019-07-11 ASSESSMENT — MIFFLIN-ST. JEOR: SCORE: 1543.98

## 2019-07-11 NOTE — LETTER
Cancer Risk Management  Program Locations    [facility]  [facility]  [facility]  Mailing Address  Cancer Risk Management Program  [facility]  [address]  [place] [address]    New patient appointments  [phone]  2019    Luci Londono  [address] [facility] [address]      Dear Luci,    It was a pleasure meeting with you today.  Below is a copy of my note from our visit, outlining your surveillance plan.      I look forward to seeing you in the future to coordinate your care and reduce your health risks. Please feel free to contact me if you have any questions or concerns.      Oncology Risk Management Consultation:  Date on this visit: 2019    Luci Londono  is referred by Neida Fortune for an oncology risk management consultation. She requires evaluation for her risk of cancer secondary to having a deleterious PALB2 mutation and is considered to be at high risk for hereditary breast and pancreatic cancer.  She has a history of triple negative right breast cancer stage IIb (T2, and 1, M0) with lymph involvement.  She is status post neoadjuvant systemic chemotherapy with Adriamycin and Cytoxan followed by Taxol.  Also present at this visit is Evelia Gomes, DNP student at the [employer] [facility].    Primary Physician: Stuart Wills MD    History Of Present Illness:  Ms. Londono is a 48 year old female who presents with family history of breast and ovarian cancer and a personal diagnosis of a PALB2 mutation.     Genetic Testin2019 - POSITIVE for a PALB2 mutation. Specifically her mutation is called [id number]*.      Of note, Luci was also found to have variants of uncertain significance (VUS) in the BRCA2 and MLH1 genes. It is currently not clear if these two  variants are associated with increased cancer risk (such as Hereditary Breast and Ovarian Cancer syndrome due to harmful mutations in the BRCA2 gene, or Bernard syndrome due to harmful mutation in the MLH1 gene). Given the uncertain significance of this result, medical management decisions should NOT be made based on these two variants alone.  ? BRCA2 p.T77A (c.229A>G); Variant, Unknown Significance  ? MLH1 p.I25V (c.73A>G); Variant, Unknown Significance    Pertinent medical history:  10/9/2018: Ultrasound-guided right breast needle biopsy, grade 3 invasive ductal carcinoma, triple negative.  10/17/2018: Breast MRI: 2.2 cm right breast mass at 12 o'clock position in addition to multiple enlarged right axillary lymph nodes.  10/12/2018: Right breast lumpectomy, invasive carcinoma of special type, grade 3, DCIS, solid pattern, nuclear grade 2, adenocarcinoma consistent with metastatic ductal carcinoma in the right axilla, 2.3 cm size ultrasound core biopsy  10/23/2018: PET/CT study: Increased tracer uptake in the right breast upper outer quadrant representing primary tumor with multiple axillary lymph nodes on the right with tracer uptake.  S/P/neoadjuvant Adriamycin/Cytoxan/Taxol chemotherapy.  5/15/2019: Right lumpectomy and axillary lymph node dissection: Pathology, complete response within the breast tissue, 1 out of 6 left axillary lymph nodes involved with metastatic residual tumor.    12/13/2006: Bilateral fallopian tube removal.    At this visit, she states she feels fine, and denies fatigue, new breast pain, lumps, and skin issues including darkening, reddening of the skin or dimpled, puckered skin.    Past Medical/Surgical History:  Past Medical History:   Diagnosis Date     Hypertension goal BP (blood pressure) < 140/80 2/18/2014     Lateral epicondylitis, right dominant elbow since late 10-17 11/1/2017     Port-A-Cath in place 10/26/2018     Thyroid disease      Past Surgical History:   Procedure Laterality  Date     BIOPSY NODE SENTINEL Right 5/15/2019    Procedure: BIOPSY, LYMPH NODE, SENTINEL;  Surgeon: Stacey Ashford MD;  Location: [facility] OR     DISSECT LYMPH NODE AXILLA Right 5/15/2019    Procedure: LYMPHADENECTOMY, AXILLARY;  Surgeon: Stacey Ashford MD;  Location: [facility] OR     HAND SURGERY  2002    left     INSERT PORT VASCULAR ACCESS N/A 10/18/2018    Procedure: INSERTION OF VASCULAR PORT;  Surgeon: Stacey Ashford MD;  Location: [facility] OR     LUMPECTOMY BREAST WITH SEED LOCALIZATION Right 5/15/2019    Procedure: SEED LOCALIZATION BREAST LUMPECTOMY, SEED LOCALIZATION AXILLARY BREAST BIOPSY, SENTINEL NODE , REMOVAL OF VASCULAR PORT ACCESS AND RIGHT  AXILLARY NODE DISSECTION;  Surgeon: Stacey Ashford MD;  Location: [facility] OR     REMOVE PORT VASCULAR ACCESS N/A 5/15/2019    Procedure: REMOVAL, VASCULAR ACCESS PORT;  Surgeon: Stacey Ashford MD;  Location: [facility] OR     TUBAL LIGATION         Allergies:  Allergies as of 07/11/2019     (No Known Allergies)       Current Medications:  Current Outpatient Medications   Medication Sig Dispense Refill     ascorbic acid (VITAMIN C) 1000 MG TABS Take 1,000 mg by mouth daily       gabapentin (NEURONTIN) 100 MG capsule Take 2 capsules (200 mg) by mouth 3 times daily (Patient not taking: Reported on 7/11/2019) 180 capsule 1     HYDROcodone-acetaminophen (NORCO) 5-325 MG tablet Take 1-2 tablets by mouth every 4 hours as needed for moderate to severe pain (Patient not taking: Reported on 7/11/2019) 20 tablet 0     levothyroxine (SYNTHROID/LEVOTHROID) 125 MCG tablet TAKE 1 TABLET BY MOUTH DAILY 30 tablet 6     lidocaine-prilocaine (EMLA) cream Apply quarter-size amount of Emla cream topically over port site one hour prior to port access for comfort. 30 g 3     loratadine (CLARITIN) 10 MG tablet Take 1 tablet (10 mg) by mouth daily (Patient not taking: Reported on 7/11/2019) 90 tablet 1     LORazepam (ATIVAN) 0.5 MG tablet Take 1 tablet (0.5 mg) by mouth every 4 hours as needed (Anxiety,  Nausea/Vomiting or Sleep) (Patient not taking: Reported on 7/11/2019) 30 tablet 5     metFORMIN (GLUCOPHAGE-XR) 500 MG 24 hr tablet TAKE 1 TABLET(500 MG) BY MOUTH DAILY WITH DINNER 90 tablet 1     multivitamin, therapeutic with minerals (THERA-VIT-M) TABS tablet Take 1 tablet by mouth daily       omeprazole (PRILOSEC) 20 MG DR capsule Take 1 capsule (20 mg) by mouth 2 times daily 60 capsule 11     order for DME Night splints for plantar fasciitis   Longitudinal arch supports  [facility]  Address: [address] [place] [address]  [phone]  Hours:  Open today   8:00 AM - 5:00 PM 2 each 0     order for DME Equipment being ordered:  Compression stocking below knee black 20-25mm 3 each 0     senna-docusate (SENOKOT-S/PERICOLACE) 8.6-50 MG tablet Take 1-2 tablets by mouth 2 times daily as needed for constipation (While on oral opioids.) (Patient not taking: Reported on 7/11/2019) 30 tablet 0        Family History:  Family History   Problem Relation Age of Onset     Diabetes Mother      Unknown/Adopted Father      Coronary Artery Disease No family hx of      Hypertension No family hx of      Hyperlipidemia No family hx of      Cerebrovascular Disease No family hx of      Breast Cancer No family hx of      Colon Cancer No family hx of      Prostate Cancer No family hx of      Other Cancer No family hx of      Depression No family hx of      Anxiety Disorder No family hx of      Mental Illness No family hx of      Substance Abuse No family hx of      Anesthesia Reaction No family hx of      Asthma No family hx of      Osteoporosis No family hx of      Genetic Disorder No family hx of      Thyroid Disease No family hx of      Obesity No family hx of        Social History:  Social History     Socioeconomic History     Marital status:      Spouse name: Not on file     Number of children: Not on file     Years of education: Not on file     Highest education level: Not on file   Occupational  "History     Not on file   Social Needs     Financial resource strain: Not on file     Food insecurity:     Worry: Not on file     Inability: Not on file     Transportation needs:     Medical: Not on file     Non-medical: Not on file   Tobacco Use     Smoking status: Never Smoker     Smokeless tobacco: Never Used   Substance and Sexual Activity     Alcohol use: No     Drug use: No     Sexual activity: Yes     Partners: Male     Birth control/protection: Pill   Lifestyle     Physical activity:     Days per week: Not on file     Minutes per session: Not on file     Stress: Not on file   Relationships     Social connections:     Talks on phone: Not on file     Gets together: Not on file     Attends Spiritism service: Not on file     Active member of club or organization: Not on file     Attends meetings of clubs or organizations: Not on file     Relationship status: Not on file     Intimate partner violence:     Fear of current or ex partner: Not on file     Emotionally abused: Not on file     Physically abused: Not on file     Forced sexual activity: Not on file   Other Topics Concern     Parent/sibling w/ CABG, MI or angioplasty before 65F 55M? No   Social History Narrative     Not on file       Physical Exam:  /83 (BP Location: Left arm, Patient Position: Sitting, Cuff Size: Adult Large)   Pulse 71   Temp 97.8  F (36.6  C) (Oral)   Resp 18   Ht 1.6 m (5' 3\")   Wt 94 kg (207 lb 3.2 oz)   SpO2 100%   BMI 36.70 kg/m     Physical exam deferred.    Laboratory/Imaging Studies  Results for orders placed or performed in visit on 06/20/19   N terminal pro BNP outpatient   Result Value Ref Range    N-Terminal Pro Bnp 15 0 - 125 pg/mL   Basic metabolic panel   Result Value Ref Range    Sodium 140 133 - 144 mmol/L    Potassium 3.9 3.4 - 5.3 mmol/L    Chloride 107 94 - 109 mmol/L    Carbon Dioxide 25 20 - 32 mmol/L    Anion Gap 8 3 - 14 mmol/L    Glucose 85 70 - 99 mg/dL    Urea Nitrogen 11 7 - 30 mg/dL    Creatinine " 0.68 0.52 - 1.04 mg/dL    GFR Estimate >90 >60 mL/min/[1.73_m2]    GFR Estimate If Black >90 >60 mL/min/[1.73_m2]    Calcium 8.8 8.5 - 10.1 mg/dL   Hepatic panel (Albumin, ALT, AST, Bili, Alk Phos, TP)   Result Value Ref Range    Bilirubin Direct <0.1 0.0 - 0.2 mg/dL    Bilirubin Total 0.3 0.2 - 1.3 mg/dL    Albumin 3.5 3.4 - 5.0 g/dL    Protein Total 6.9 6.8 - 8.8 g/dL    Alkaline Phosphatase 81 40 - 150 U/L    ALT 74 (H) 0 - 50 U/L    AST 43 0 - 45 U/L       ASSESSMENT  We discussed the differences between cancer risk for the general population and those with PALB2 genetic mutation. PALB2 is a breast cancer susceptibility gene that encodes a BRCA2-interacting protein.  The interaction between BRCA2-PALB2 helps to repair DNA damage as well as suppress the growth of tumors.  Monoallelic deleterious PALB2 mutations are present in a small portion of patients with breast cancer including about 1% of all patients with triple-negative breast cancer.  Women who carry a PALB2 genetic mutation have abreast cancer risk equivalent to those of women with BRCA2 genetic mutations and is dependent on family history.     Current literature shows that women with PALB2 mutations with no family history of breast cancer have about a 33% risk of developing breast cancer.  If they have 2 or more family members with breast cancer, the lifetime risk is estimated to be approximately 58%.    There is an increased risk of pancreatic cancer in patients with PALB2 genetic mutations, but the absolute risk is unclear.  The genetic mutation may also carry with it increased risk for breast cancer in men, prostate cancer and ovarian cancer although these risks are not confirmed at this time and no recommendations are made for screening.    We discussed signs and symptoms to be watchful for and she practiced palpating with the clinic's breast simulation model. She verbalized understanding of signs and symptoms to address with her health care  providers including lumps, thickening, swelling, tenderness, nipple discharge or changes in skin of breasts.    We discussed that in her case, because of her history, I would not be the provider managing her screening plan, but that she should continue to follow with Dr. Murillo for follow up and checking for recurrence.  We discussed that when her children are adults, they could be tested for the same genetic mutation, which could be of value to them for managing their care.  For example, if her daughter also carried the PALB2+ mutation, her breast screening would begin at least by age 30, per below:    INDIVIDUALIZED CANCER RISK MANAGEMENT PLAN:  Individualized Surveillance Plan for women with PALB2 genetic mutations   Per NCCN Guidelines Version 2.2019   Recommended screening Test or procedure Last done Next Scheduled    Clinical encounter Ongoing risk assessment, risk reduction counseling and clinical breast exam, every 6-12 months.     Beginning at age 30 Annual mammogram beginning at age 30.    Consider annual breast MRI at age 30.     Breast MRIs are preferably done on day 7-15 of the menstrual cycle in premenopausal women.        Evidence insufficient to recommend risk reducing mastectomy; manage based on family history       In PALB2+ carriers, who have pancreatic cancer in the family, we may refer to a gastroenterologist for screening with serial EUS and serial MRCP tests.  This is not the case for Luci and no recommendation would be make for screening at this point in time.    We also discussed following  a healthy lifestyle plan recommended by both NCCN and the American Cancer Society that can reduce the risk of breast cancer:  1 Limit alcohol consumption to less than 1 drink per day (1 drink=5 oz.wine, 12 oz. Beer or 1.5 oz. 80-proof liquor). She does not drink.  2. Exercise per American Cancer Society guidelines of at least 150 minutes of moderate-intensity activity or 75 minutes of vigorous activity  each week. (Or a combination of both.) Exercise should be spread  out over the week.  3. Maintain a healthy weight with a Body Mass Index between 19-24.9. We discussed weight loss, to achieve a lower BMI.  4. Do not use tobacco products and limit exposure to passive smoke. She does not smoke.    She will continue to follow with Dr. Murillo for breast cancer surveillance. We discussed that if her variants of unknown significance were upgraded to be of concern, Neida Fortune would contact her.  All questions were answered.    I spent 25 minutes with the patient with greater that 50% of it in counseling and coordinating care as documented above.    Katie León, APRN-CNS, OCN, AGN-BC  Clinical Nurse Specialist  Cancer Risk Management Program  [address]  [place] [address]    phone:  [phone]  Pager: [phone]  fax: [phone]    CC: MD Shonda Baker MD Alyssa Kne, [facility]

## 2019-07-11 NOTE — PATIENT INSTRUCTIONS
Individualized Surveillance Plan for women with PALB2 genetic mutations   Per NCCN Guidelines Version 2.2019   Recommended screening Test or procedure Last done Next Scheduled    Clinical encounter Ongoing risk assessment, risk reduction counseling and clinical breast exam, every 6-12 months.     Beginning at age 30 Annual mammogram beginning at age 30.    Consider annual breast MRI at age 30.     Breast MRIs are preferably done on day 7-15 of the menstrual cycle in premenopausal women.        Evidence insufficient to recommend risk reducing mastectomy; manage based on family history

## 2019-07-17 ENCOUNTER — THERAPY VISIT (OUTPATIENT)
Dept: PHYSICAL THERAPY | Facility: CLINIC | Age: 49
End: 2019-07-17
Payer: COMMERCIAL

## 2019-07-17 DIAGNOSIS — M25.511 ACUTE PAIN OF RIGHT SHOULDER: ICD-10-CM

## 2019-07-17 PROCEDURE — 97110 THERAPEUTIC EXERCISES: CPT | Mod: GP | Performed by: PHYSICAL THERAPIST

## 2019-07-17 NOTE — PROGRESS NOTES
Subjective:  HPI                    Objective:  System    Physical Exam    General     ROS    Assessment/Plan:    DISCHARGE REPORT    Progress reporting period is from 6/5/19 to 7/17/19.     SUBJECTIVE  Subjective: pt feeling like she has made significant progress; feel like she has improved 90% functionally in her shoulder; independant with HEP    Current Pain level: 0/10   Initial Pain level: 7/10        ;   ,     The subjective and objective information are from the last SOAP note on this patient.    OBJECTIVE  Objective: shoulder AROM flexion 150, abd 180, IR T6, horizontal add mod rest; MMT shoulder 4+/5 overall no pain   SPADI score improved from 79 to 10      ASSESSMENT/PLAN  Updated problem list and treatment plan: Diagnosis 1:  Acute shoulder pain    STG/LTGs have been met or progress has been made towards goals:  Yes (See Goal flow sheet completed today.)  Assessment of Progress: The patient has met all of their long term goals.  Self Management Plans:  Patient has been instructed in a home treatment program.  Patient is independent in a home treatment program.  I have re-evaluated this patient and find that the nature, scope, duration and intensity of the therapy is appropriate for the medical condition of the patient.  Luci continues to require the following intervention to meet STG and LTG's: PT intervention is no longer required to meet STG/LTG.  The patient is returning to your office for a recheck appointment.  We will discharge this patient from PT.    Recommendations:  This patient is ready to be discharged from therapy and continue their home treatment program.    Please refer to the daily flowsheet for treatment today, total treatment time and time spent performing 1:1 timed codes.

## 2019-07-18 PROBLEM — C50.411 MALIGNANT NEOPLASM OF UPPER-OUTER QUADRANT OF RIGHT BREAST IN FEMALE, ESTROGEN RECEPTOR NEGATIVE (H): Status: ACTIVE | Noted: 2018-10-17

## 2019-07-18 PROBLEM — Z17.1 MALIGNANT NEOPLASM OF UPPER-OUTER QUADRANT OF RIGHT BREAST IN FEMALE, ESTROGEN RECEPTOR NEGATIVE (H): Status: ACTIVE | Noted: 2018-10-17

## 2019-07-30 ENCOUNTER — OFFICE VISIT (OUTPATIENT)
Dept: FAMILY MEDICINE | Facility: CLINIC | Age: 49
End: 2019-07-30
Payer: COMMERCIAL

## 2019-07-30 VITALS
RESPIRATION RATE: 16 BRPM | DIASTOLIC BLOOD PRESSURE: 66 MMHG | BODY MASS INDEX: 35.61 KG/M2 | SYSTOLIC BLOOD PRESSURE: 110 MMHG | TEMPERATURE: 96.4 F | OXYGEN SATURATION: 100 % | HEART RATE: 69 BPM | WEIGHT: 201 LBS

## 2019-07-30 DIAGNOSIS — M54.42 CHRONIC LEFT-SIDED LOW BACK PAIN WITH LEFT-SIDED SCIATICA: ICD-10-CM

## 2019-07-30 DIAGNOSIS — G89.29 CHRONIC LEFT-SIDED LOW BACK PAIN WITH LEFT-SIDED SCIATICA: ICD-10-CM

## 2019-07-30 DIAGNOSIS — Z13.220 SCREENING FOR HYPERLIPIDEMIA: Primary | ICD-10-CM

## 2019-07-30 DIAGNOSIS — M72.2 PLANTAR FASCIITIS: ICD-10-CM

## 2019-07-30 PROCEDURE — 99214 OFFICE O/P EST MOD 30 MIN: CPT | Performed by: FAMILY MEDICINE

## 2019-07-30 NOTE — PROGRESS NOTES
Subjective     Luci Londono is a 48 year old female who presents to clinic today for the following health issues:    HPI   Plantar fascitis        Duration: 6 weeks    Description (location/character/radiation): both heels    Intensity:  Better than before    Accompanying signs and symptoms: na    History (similar episodes/previous evaluation): yes, follow up    Precipitating or alleviating factors: None    Therapies tried and outcome: exercises, PT     EARLINEin a alert 4 48-year-old with breast cancer    Vital signs are good today     history of lumbar disc disease    Myalgia    History of left leg sciatica    History of recurrent migraine headaches    History gastroesophageal reflux disease without esophagitis on PPI    History of vitamin D deficiency with replacement    History of acquired hypothyroidism    Patient has significant obesity    He has a impaired fasting glucose    Mixed hyperlipidemia    Rosacea    Postmenopausal    personal history of Helicobacter pylori infection    History of polycystic ovary syndrome    Malignant neoplasm right upper quadrant breast    Patient is undergone chemotherapy for that    Personal history of abnormal EKG    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      Topic Date Due     FIT  08/23/2019         .  Current Outpatient Medications   Medication Sig Dispense Refill     ascorbic acid (VITAMIN C) 1000 MG TABS Take 1,000 mg by mouth daily       levothyroxine (SYNTHROID/LEVOTHROID) 125 MCG tablet  TAKE 1 TABLET BY MOUTH DAILY 30 tablet 0     metFORMIN (GLUCOPHAGE-XR) 500 MG 24 hr tablet TAKE 1 TABLET(500 MG) BY MOUTH DAILY WITH DINNER 90 tablet 1     multivitamin, therapeutic with minerals (THERA-VIT-M) TABS tablet Take 1 tablet by mouth daily       omeprazole (PRILOSEC) 20 MG DR capsule Take 1 capsule (20 mg) by mouth 2 times daily 60 capsule 11     order for DME Night splints for plantar fasciitis   Longitudinal arch supports  [facility]  Address: [address] [place] [id number]  [phone]  Hours:  Open today   8:00 AM - 5:00 PM 2 each 0     order for DME Equipment being ordered:  Compression stocking below knee black 20-25mm 3 each 0     gabapentin (NEURONTIN) 100 MG capsule Take 2 capsules (200 mg) by mouth 3 times daily (Patient not taking: Reported on 7/11/2019) 180 capsule 1     HYDROcodone-acetaminophen (NORCO) 5-325 MG tablet Take 1-2 tablets by mouth every 4 hours as needed for moderate to severe pain (Patient not taking: Reported on 7/11/2019) 20 tablet 0     lidocaine-prilocaine (EMLA) cream Apply quarter-size amount of Emla cream topically over port site one hour prior to port access for comfort. (Patient not taking: Reported on 7/30/2019) 30 g 3     loratadine (CLARITIN) 10 MG tablet Take 1 tablet (10 mg) by mouth daily (Patient not taking: Reported on 7/11/2019) 90 tablet 1     LORazepam (ATIVAN) 0.5 MG tablet Take 1 tablet (0.5 mg) by mouth every 4 hours as needed (Anxiety, Nausea/Vomiting or Sleep) (Patient not taking: Reported on 7/11/2019) 30 tablet 5     senna-docusate (SENOKOT-S/PERICOLACE) 8.6-50 MG tablet Take 1-2 tablets by mouth 2 times daily as needed for constipation (While on oral opioids.) (Patient not taking: Reported on 7/11/2019) 30 tablet 0        No Known Allergies    Immunization History   Administered Date(s) Administered     TD (ADULT, 7+) 01/01/2005     TDAP Vaccine (Adacel) 07/25/2017     Tdap (Adult) Unspecified Formulation 07/25/2017          reports that she does not drink alcohol.      reports that she does not use drugs.    family history includes Diabetes in her mother; Unknown/Adopted in her father.    indicated that the status of her no family hx of is unknown. She indicated that her mother is alive. She indicated that her father is . She indicated that her sister is alive. She indicated that her brother is alive. She indicated that her daughter is alive. She indicated that both of her sons are alive.       has a past surgical history that includes Hand surgery (); tubal ligation; Insert port vascular access (N/A, 10/18/2018); Lumpectomy Breast with Seed Localization (Right, 5/15/2019); Biopsy node sentinel (Right, 5/15/2019); Remove port vascular access (N/A, 5/15/2019); and Dissect lymph node axilla (Right, 5/15/2019).     reports that she currently engages in sexual activity and has had partners who are Male. She reports using the following method of birth control/protection: Pill.  .  Pediatric History   Patient Guardian Status     Not on file     Other Topics Concern     Parent/sibling w/ CABG, MI or angioplasty before 65F 55M? No   Social History Narrative     Not on file         reports that she has never smoked. She has never used smokeless tobacco.    Medical, social, surgical, and family histories reviewed.    Labs reviewed in EPIC  Patient Active Problem List   Diagnosis     Myalgia and myositis     Intervertebral lumbar disc disorder with myelopathy, lumbar region     Gastroesophageal reflux disease without esophagitis     Helicobacter pylori infection     Acquired hypothyroidism     Disorder of metabolism     Polycystic ovaries     Vitamin D insufficiency     BMI 35     Rosacea     Absence of menstruation     Chronic left-sided low back pain with left-sided sciatica     Pinguecula of both eyes     Presbyopia     Impaired fasting glucose     Migraine without aura and without status migrainosus, not intractable     Class 1  obesity due to excess calories with serious comorbidity and body mass index (BMI) of 33.0 to 33.9 in adult     Morbid obesity (H)     Myalgia w hx of recurrence since 2012     Mixed hyperlipidemia     Malignant neoplasm of upper-outer quadrant of right breast in female, estrogen receptor negative (H)     Drug or medicinal substance causing adverse effect in therapeutic use, initial encounter     Abnormal electrocardiogram     Port-A-Cath in place     Breast cancer (H)     Monoallelic mutation of PALB2 gene     Past Surgical History:   Procedure Laterality Date     BIOPSY NODE SENTINEL Right 5/15/2019    Procedure: BIOPSY, LYMPH NODE, SENTINEL;  Surgeon: Stacey Ashford MD;  Location:  OR     DISSECT LYMPH NODE AXILLA Right 5/15/2019    Procedure: LYMPHADENECTOMY, AXILLARY;  Surgeon: Stacey Ashford MD;  Location:  OR     HAND SURGERY  2002    left     INSERT PORT VASCULAR ACCESS N/A 10/18/2018    Procedure: INSERTION OF VASCULAR PORT;  Surgeon: Stacey Ashford MD;  Location: [facility] OR     LUMPECTOMY BREAST WITH SEED LOCALIZATION Right 5/15/2019    Procedure: SEED LOCALIZATION BREAST LUMPECTOMY, SEED LOCALIZATION AXILLARY BREAST BIOPSY, SENTINEL NODE , REMOVAL OF VASCULAR PORT ACCESS AND RIGHT  AXILLARY NODE DISSECTION;  Surgeon: Stacey Ashford MD;  Location:  OR     REMOVE PORT VASCULAR ACCESS N/A 5/15/2019    Procedure: REMOVAL, VASCULAR ACCESS PORT;  Surgeon: Stacey Ashford MD;  Location: [facility] OR     TUBAL LIGATION         Social History     Tobacco Use     Smoking status: Never Smoker     Smokeless tobacco: Never Used   Substance Use Topics     Alcohol use: No     Family History   Problem Relation Age of Onset     Diabetes Mother      Unknown/Adopted Father      Coronary Artery Disease No family hx of      Hypertension No family hx of      Hyperlipidemia No family hx of      Cerebrovascular Disease No family hx of      Breast Cancer No family hx of      Colon Cancer No family hx of      Prostate  Cancer No family hx of      Other Cancer No family hx of      Depression No family hx of      Anxiety Disorder No family hx of      Mental Illness No family hx of      Substance Abuse No family hx of      Anesthesia Reaction No family hx of      Asthma No family hx of      Osteoporosis No family hx of      Genetic Disorder No family hx of      Thyroid Disease No family hx of      Obesity No family hx of          Current Outpatient Medications   Medication Sig Dispense Refill     ascorbic acid (VITAMIN C) 1000 MG TABS Take 1,000 mg by mouth daily       levothyroxine (SYNTHROID/LEVOTHROID) 125 MCG tablet TAKE 1 TABLET BY MOUTH DAILY 30 tablet 0     metFORMIN (GLUCOPHAGE-XR) 500 MG 24 hr tablet TAKE 1 TABLET(500 MG) BY MOUTH DAILY WITH DINNER 90 tablet 1     multivitamin, therapeutic with minerals (THERA-VIT-M) TABS tablet Take 1 tablet by mouth daily       omeprazole (PRILOSEC) 20 MG DR capsule Take 1 capsule (20 mg) by mouth 2 times daily 60 capsule 11     order for DME Night splints for plantar fasciitis   Longitudinal arch supports  [facility]  Address: [address] [place] [address]  [phone]  Hours:  Open today   8:00 AM - 5:00 PM 2 each 0     order for DME Equipment being ordered:  Compression stocking below knee black 20-25mm 3 each 0     gabapentin (NEURONTIN) 100 MG capsule Take 2 capsules (200 mg) by mouth 3 times daily (Patient not taking: Reported on 7/11/2019) 180 capsule 1     HYDROcodone-acetaminophen (NORCO) 5-325 MG tablet Take 1-2 tablets by mouth every 4 hours as needed for moderate to severe pain (Patient not taking: Reported on 7/11/2019) 20 tablet 0     lidocaine-prilocaine (EMLA) cream Apply quarter-size amount of Emla cream topically over port site one hour prior to port access for comfort. (Patient not taking: Reported on 7/30/2019) 30 g 3     loratadine (CLARITIN) 10 MG tablet Take 1 tablet (10 mg) by mouth daily (Patient not taking: Reported on 7/11/2019) 90  tablet 1     LORazepam (ATIVAN) 0.5 MG tablet Take 1 tablet (0.5 mg) by mouth every 4 hours as needed (Anxiety, Nausea/Vomiting or Sleep) (Patient not taking: Reported on 7/11/2019) 30 tablet 5     senna-docusate (SENOKOT-S/PERICOLACE) 8.6-50 MG tablet Take 1-2 tablets by mouth 2 times daily as needed for constipation (While on oral opioids.) (Patient not taking: Reported on 7/11/2019) 30 tablet 0       Recent Labs   Lab Test 06/20/19  1056 06/20/19  0831 05/09/19  1415 05/09/19  1202 04/03/19  0830 03/27/19  0905  10/17/18  1347  10/12/18  2206 08/21/18  1201  07/25/17  1026  06/30/16  1214   A1C  --   --   --  5.8*  --   --   --  5.8*  --   --  5.6  --  5.5   < > 6.1*   LDL  --   --   --   --   --   --   --   --   --   --  123*  --  138*  --  123*   HDL  --   --   --   --   --   --   --   --   --   --  57  --  69  --  64   TRIG  --   --   --   --   --   --   --   --   --   --  188*  --  67  --  58   ALT 74*  --   --   --  66* 71*   < >  --   --  25 37   < > 27  --  31   CR  --  0.68  --   --  0.71 0.66   < >  --    < > 0.72  --    < >  --   --   --    GFRESTIMATED  --  >90  --   --  >90 >90   < >  --    < > 86  --    < >  --   --   --    GFRESTBLACK  --  >90  --   --  >90 >90   < >  --    < > >90  --    < >  --   --   --    POTASSIUM  --  3.9  --   --  3.7 3.7   < >  --    < > 4.0  --    < >  --   --   --    TSH  --   --  8.17*  --   --   --   --   --   --  3.04 56.21*   < > 13.95*  --  3.09    < > = values in this interval not displayed.        BP Readings from Last 6 Encounters:   07/30/19 110/66   07/11/19 124/83   06/20/19 112/64   05/29/19 121/83   05/16/19 120/58   05/09/19 128/76       Wt Readings from Last 3 Encounters:   07/30/19 91.2 kg (201 lb)   07/11/19 94 kg (207 lb 3.2 oz)   06/20/19 96.2 kg (212 lb)         Positive symptoms or findings indicated by bold designation:     ROS: 10 point ROS neg other than the symptoms noted above in the HPI.except  has Myalgia and myositis; Intervertebral lumbar disc  disorder with myelopathy, lumbar region; Gastroesophageal reflux disease without esophagitis; Helicobacter pylori infection; Acquired hypothyroidism; Disorder of metabolism; Polycystic ovaries; Vitamin D insufficiency; BMI 35; Rosacea; Absence of menstruation; Chronic left-sided low back pain with left-sided sciatica; Pinguecula of both eyes; Presbyopia; Impaired fasting glucose; Migraine without aura and without status migrainosus, not intractable; Class 1 obesity due to excess calories with serious comorbidity and body mass index (BMI) of 33.0 to 33.9 in adult; Morbid obesity (H); Myalgia w hx of recurrence since 2012; Mixed hyperlipidemia; Malignant neoplasm of upper-outer quadrant of right breast in female, estrogen receptor negative (H); Drug or medicinal substance causing adverse effect in therapeutic use, initial encounter; Abnormal electrocardiogram; Port-A-Cath in place; Breast cancer (H); and Monoallelic mutation of PALB2 gene on their problem list.  Review Of Systems  Skin: negative  Eyes: negative  Ears/Nose/Throat: negative  Respiratory: No shortness of breath, dyspnea on exertion, cough, or hemoptysis  Breast cancer radiation treatments  Cardiovascular: negative  Gastrointestinal: negative  Genitourinary: negative  Musculoskeletal: Improving plantar fasciitis  Neurologic: negative  Psychiatric: negative  Hematologic/Lymphatic/Immunologic: negative  Endocrine: diabetes   Hypothyroid under replacement        PE:  /66   Pulse 69   Temp 96.4  F (35.8  C) (Tympanic)   Resp 16   Wt 91.2 kg (201 lb)   SpO2 100%   BMI 35.61 kg/m   Body mass index is 35.61 kg/m .    Constitutional: general appearance, well nourished, well developed, in no acute distress, well developed, appears stated age, normal body habitus, moderate obesity    Eyes:; The patient has normal eyelids sclerae and conjunctivae :      Ears/Nose/Throat: external ear, overall: normal appearance; external nose, overall: benign appearance,  normal moujth gums and lips       Neck: thyroid, overall: normal size, normal consistency, nontender,      Respiratory:  palpation of chest, overall: normal excursion,    Clear to percussion and auscultation     NO Tachypnea    NORMAL  Color      Cardiovascular:  Good color with no peripheral edema    Regular sinus rhythm without murmur.  Physiologic heart sounds   Heart is unelarged  .   Chest/Breast: normal shape       Abdominal exam,  Liver and spleen are  unenlarged        Tenderness    Scars        Urogenital; no renal, flank or bladder  tenderness;      Lymphatic: neck nodes,     Other nodes     Musculoskeletal:  Brief ortho exam normal except:   Tenderness of the feet improved    Integument: inspection of skin, no rash, lesions; and, palpation, no induration, no tenderness.      Neurologic mental status, overall: alert and oriented; gait, no ataxia, no unsteadiness; coordination, no tremors; cranial nerves, overall: normal motor, overall: normal bulk, tone.      Psychiatric: orientation/consciousness, overall: oriented to person, place and time; behavior/psychomotor activity, no tics, normal psychomotor activity; mood and affect, overall: normal mood and affect; appearance, overall: well-groomed, good eye contact; speech, overall: normal quality, no aphasia and normal quality, quantity, intact.      Diagnostic Test Results:  Results for orders placed or performed in visit on 06/20/19   N terminal pro BNP outpatient   Result Value Ref Range    N-Terminal Pro Bnp 15 0 - 125 pg/mL   Basic metabolic panel   Result Value Ref Range    Sodium 140 133 - 144 mmol/L    Potassium 3.9 3.4 - 5.3 mmol/L    Chloride 107 94 - 109 mmol/L    Carbon Dioxide 25 20 - 32 mmol/L    Anion Gap 8 3 - 14 mmol/L    Glucose 85 70 - 99 mg/dL    Urea Nitrogen 11 7 - 30 mg/dL    Creatinine 0.68 0.52 - 1.04 mg/dL    GFR Estimate >90 >60 mL/min/[1.73_m2]    GFR Estimate If Black >90 >60 mL/min/[1.73_m2]    Calcium 8.8 8.5 - 10.1 mg/dL    Hepatic panel (Albumin, ALT, AST, Bili, Alk Phos, TP)   Result Value Ref Range    Bilirubin Direct <0.1 0.0 - 0.2 mg/dL    Bilirubin Total 0.3 0.2 - 1.3 mg/dL    Albumin 3.5 3.4 - 5.0 g/dL    Protein Total 6.9 6.8 - 8.8 g/dL    Alkaline Phosphatase 81 40 - 150 U/L    ALT 74 (H) 0 - 50 U/L    AST 43 0 - 45 U/L         ICD-10-CM    1. Screening for hyperlipidemia Z13.220 CANCELED: Lipid panel reflex to direct LDL Fasting   2. Plantar fasciitis M72.2 [facility] PT, HAND, AND CHIROPRACTIC REFERRAL   3. Chronic left-sided low back pain with left-sided sciatica M54.42 [facility] PT[facility] HAND, AND CHIROPRACTIC REFERRAL    G89.29         .  Side effects benefits and risks thoroughly discussed. .she may come in early if unimproved or getting worse      Please drink 2 glasses of water prior to meals and walk 15-30 minutes after meals    I spent 25 MINUTES SPENT  with patient discussing the following issues   The primary encounter diagnosis was Screening for hyperlipidemia. Diagnoses of Plantar fasciitis and Chronic left-sided low back pain with left-sided sciatica were also pertinent to this visit. over half of which involved counseling and coordination of care.    Patient Instructions   HEAT OR ICE WHICHEVER WORKS X 5 MINUTES  FOOT STRETCHES AGAINST WALL  HEEL RAISES X 30 SECONDS   LONGITUDINAL SUPPORTS IN SHOES  THICKER THE BETTER  TENNIS BALL CAN OR SOUP CAN RUB BACK AND FORTH   PHYSICAL THERAPY   NIGHT SPLINTS  NSAIDS IBUPROFEN OR ALEVE 2 TABS TWICE DAILY   STEROIDS ORALLY  INJECTION   SURGERY LAST RESORT         ALL THE ABOVE PROBLEMS ARE STABLE AND MED CHANGES AS NOTED    Diet:  MEDITERRANEAN DIET AND WEIGHT LOSS     Exercise:  AS TOLERATED PLANTAR FASCIA AND LOWER BACK PAIN   Exercises Range of motion, balance, isometric, and strengthening exercises 30 repetitions twice daily of involved joints      .ALEENA TYLER MD 7/30/2019 9:35 AM  July 31, 2019

## 2019-07-30 NOTE — PATIENT INSTRUCTIONS
HEAT OR ICE WHICHEVER WORKS X 5 MINUTES    FOOT STRETCHES AGAINST WALL    HEEL RAISES X 30 SECONDS     LONGITUDINAL SUPPORTS IN SHOES    THICKER THE BETTER    TENNIS BALL CAN OR SOUP CAN RUB BACK AND FORTH     PHYSICAL THERAPY     NIGHT SPLINTS    NSAIDS IBUPROFEN OR ALEVE 2 TABS TWICE DAILY     STEROIDS ORALLY    INJECTION     SURGERY LAST RESORT

## 2019-08-06 ENCOUNTER — THERAPY VISIT (OUTPATIENT)
Dept: PHYSICAL THERAPY | Facility: CLINIC | Age: 49
End: 2019-08-06
Payer: COMMERCIAL

## 2019-08-06 DIAGNOSIS — M72.2 PLANTAR FASCIITIS, BILATERAL: ICD-10-CM

## 2019-08-06 DIAGNOSIS — M54.42 CHRONIC LEFT-SIDED LOW BACK PAIN WITH LEFT-SIDED SCIATICA: ICD-10-CM

## 2019-08-06 DIAGNOSIS — G89.29 CHRONIC BILATERAL LOW BACK PAIN WITH LEFT-SIDED SCIATICA: ICD-10-CM

## 2019-08-06 DIAGNOSIS — G89.29 CHRONIC LEFT-SIDED LOW BACK PAIN WITH LEFT-SIDED SCIATICA: ICD-10-CM

## 2019-08-06 DIAGNOSIS — M72.2 PLANTAR FASCIITIS: ICD-10-CM

## 2019-08-06 DIAGNOSIS — M54.42 CHRONIC BILATERAL LOW BACK PAIN WITH LEFT-SIDED SCIATICA: ICD-10-CM

## 2019-08-06 PROCEDURE — 97161 PT EVAL LOW COMPLEX 20 MIN: CPT | Mod: GP | Performed by: PHYSICAL THERAPIST

## 2019-08-06 PROCEDURE — 97110 THERAPEUTIC EXERCISES: CPT | Mod: GP | Performed by: PHYSICAL THERAPIST

## 2019-08-06 NOTE — PROGRESS NOTES
[facility] Initial Evaluation  Subjective:  Pt is a pleasant 49 y/o female presenting to PT with orders for LBP and omar plantar fasciitis. Pt reports that she's had back pain for a while now that can go into the left leg at times. She also has omar heel pain and achilles pain that started over the last few months after chemotherapy. She currently is undergoing treatment for breast cancer. She denies any vague symptoms or known traumatic incident.       The history is provided by the patient. No  was used.   Lucimelissa Corbin Alert being seen for Back and foot pain .   Date of Onset: about a month ago  Where condition occurred: for unknown reasons.Problem occurred: after chemo   and reported as 8/10 (both the foot and the back) on pain scale. General health as reported by patient is good. Pertinent medical history includes:  Diabetes, thyroid problems and cancer. Other medical history details: currently under treatment under her cancer.   Other medical allergies details: none.  Surgeries include:  Cancer surgery.  Current medications:  Pain medication. Other medications details: just came off of chemo.   Primary job tasks include:  Lifting/carrying and prolonged standing.  Pain is described as sharp and stabbing and is intermittent. Pain timing: changes day to day. Since onset symptoms are unchanged.      Patient is dietary . Restrictions include:  Working in normal job with restrictions (started back on 5 hours shifts on 7/2).    Barriers include:  None as reported by patient.  Red flags:  None as reported by patient.  Type of problem:  Lumbar        Patient reports pain:  Mid lumbar spine and lower lumbar spine. Radiates to:  Lower leg left. Associated symptoms:  Loss of motion/stiffness. Symptoms are exacerbated by standing, lying down, bending, certain positions, carrying, twisting and walking and relieved by heat and ice.    Type of problem:  Bilateral feet   Condition  occurred with:  Insidious onset. This is a new condition    Patient reports pain:  Longitudinal arch (achilles tendon omar). Radiates to:  No radiation. Associated symptoms:  Loss of motion/stiffness. Symptoms are exacerbated by walking, descending stairs, ascending stairs, standing, certain positions, bending/squatting and running and relieved by heat, ice, rest, activity/movement and bracing/immobilizing (Strausberg sock).                      Objective:    Gait:  Slightly antalgic omar  Gait Type:  Antalgic         Flexibility/Screens:   Negative screens: Hip (slightly limited hip IR omar pain free, FADDIR and KRZYSZTOF - omar)     Lower Extremity:  Decreased left lower extremity flexibility:Hip IR's; Piriformis; Gastroc and Soleus    Decreased right lower extremity flexibility:  Hip IR's; Gastroc and Soleus          Ankle/Foot Evaluation  ROM:  AROM is normal.      Strength:    Dorsiflexion:  Left: 5/5   Strong/pain free    Pain:   Right: 5/5   Strong/pain free  Pain:  Plantarflexion: Left: 5-/5   Strong/pain free  Pain:   Right: 5-/5  Strong/pain free  Pain:  Inversion:Left: 5-/5  Strong/pain free  Pain:     Right: 5-/5  Strong/pain free  Pain:  Eversion:Left: 5-/5  Strong/pain free  Pain:  Right: 5-/5  Strong/pain free  Pain:    Extension Great Toe:Left: 5/5  Strong/pain free  Pain:  Right: 5/5  Strong/pain free  Pain:              LIGAMENT TESTING: normal  Anterior Drawer (ATF) Left: neg   Anterior Drawer (ATF) Right: neg  Posterior Drawer (PTF) Left: neg   Posterior Drawer (PTF) Right: neg  Varus Stress (Calc Fib) Left: neg    Varus Stress (Calc Fib) Right: neg  Valgus Stress (Deltoid) Left: neg    Valgus Stress (Deltoid) Right: neg  Rotation (Deltoid) Left: neg    Rotation (Deltoid) Right: neg  External Rotation (High Ankle) Left: neg     External Rotation (High Ankle) Right: neg  SPECIAL TESTS: normal    Left ankle negative for the following special tests:  Jarrett sign    Right ankle negative for the following  special tests:  Jarrett sign    EDEMA: normal          MOBILITY TESTING:   Tib-Fib Proximal Left: normal    Tib-Fib Proximal Right: normal  Tib-Fib Distal Left: normal    Tib-Fib Distal Right: normal  Talocrural Left: hypomobile    Talocrural Right: hypomobile  Subtalar Left: normal    Subtalar Right: normal  Midtarsal Left: normal    Midtarsal Right: normal  First Ray Left: normal    First Ray Right: normal       Lumbar/SI Evaluation  ROM:    AROM Lumbar:   Flexion:            To lower ankle pain, no change with repeateed  Ext:                    25% limited no pain, gets better with repeated    Side Bend:        Left:  Full pain    Right:  Full no pain  Rotation:           Left:  Full no pain    Right:  Full no pain   Side Glide:        Left:     Right:           Lumbar Myotomes:    T12-L3 (Hip Flex):  Left: 5    Right: 5  L2-4 (Quads):  Left:  5    Right:  5  L4 (Ankle DF):  Left:  5    Right:  5  L5 (Great Toe Ext): Left: 5    Right: 5   S1 (Toe Raise):  Left: 5    Right: 5  Lumbar DTR's:    L4 (Quad):  Left:  1   Right:  1  S1 (Achilles):  Left:  1   Right:  1  Cord Signs:  normal    Cord sign negative:  Babinksi's left or Babinski's right  Lumbar Dermtomes:  normal                Neural Tension/Mobility:    Left side:  SLR positive.  Left side:Slump  negative.     Right side:   Slump or SLR  negative.   Lumbar Palpation:    Tenderness present at Left:    Erector Spinae  Tenderness not present at Left:    Quadratus Lumborum; Piriformis; PSIS; ASIS; Iliac Crest; Gluteus Medius; Greater Trochanter; Ischial Tuberosity; Hamstrings; Hip Flexors or Vertebral    Tenderness not present at Right:  Quadratus Lumborum; Erector Spinae; Piriformis; PSIS; ASIS; Iliac Crest; Gluteus Medius; Greater Trochanter; Ischial Tuberosity; Hamstrings; Hip flexors or Vertebral  Functional Tests:  Core strength and proprioception lumbar: fair SLS omar.        Lumbar Provocation:  Lumbar provocation: MIKAYLA slight midline pain, Prone  Press-ups midline LBP gets better with repeated motion.      Spinal Segmental Conclusions: CPA/UPA L1-L5 generally painful and tender and did not improve with repeated middle lumbar area most tender                                                        General     ROS    Assessment/Plan:    Patient is a 48 year old female with lumbar and both sides ankle complaints.    Patient has the following significant findings with corresponding treatment plan.                Diagnosis 1:  LBP with left sided radiculopathy, omar foot pain  Pain -  manual therapy, education and home program  Decreased ROM/flexibility - manual therapy, therapeutic exercise and home program  Decreased joint mobility - manual therapy, therapeutic exercise and home program  Decreased strength - therapeutic exercise, therapeutic activities and home program  Impaired balance - neuro re-education, therapeutic activities and home program  Impaired gait - gait training and home program  Impaired muscle performance - neuro re-education and home program  Decreased function - therapeutic activities and home program  Impaired posture - neuro re-education and home program    Therapy Evaluation Codes:   1) History comprised of:   Personal factors that impact the plan of care:      Past/current experiences and Time since onset of symptoms.    Comorbidity factors that impact the plan of care are:      Cancer and Diabetes.     Medications impacting care: Cancer treatment, thyroid.  2) Examination of Body Systems comprised of:   Body structures and functions that impact the plan of care:      Ankle and Lumbar spine.   Activity limitations that impact the plan of care are:      Bending, Jumping, Lifting, Running, Squatting/kneeling, Standing, Walking, Working and Laying down.  3) Clinical presentation characteristics are:   Evolving/Changing.  4) Decision-Making    Low complexity using standardized patient assessment instrument and/or measureable assessment of  functional outcome.  Cumulative Therapy Evaluation is: Low complexity.    Previous and current functional limitations:  (See Goal Flow Sheet for this information)    Short term and Long term goals: (See Goal Flow Sheet for this information)     Communication ability:  Patient appears to be able to clearly communicate and understand verbal and written communication and follow directions correctly.  Treatment Explanation - The following has been discussed with the patient:   RX ordered/plan of care  Anticipated outcomes  Possible risks and side effects  This patient would benefit from PT intervention to resume normal activities.   Rehab potential is excellent.    Frequency:  1 X week, once daily  Duration:  for 8 weeks  Discharge Plan:  Achieve all LTG.  Independent in home treatment program.  Reach maximal therapeutic benefit.    Please refer to the daily flowsheet for treatment today, total treatment time and time spent performing 1:1 timed codes.

## 2019-08-08 DIAGNOSIS — G89.29 CHRONIC LEFT-SIDED LOW BACK PAIN WITH LEFT-SIDED SCIATICA: Primary | ICD-10-CM

## 2019-08-08 DIAGNOSIS — M54.42 CHRONIC LEFT-SIDED LOW BACK PAIN WITH LEFT-SIDED SCIATICA: Primary | ICD-10-CM

## 2019-08-08 RX ORDER — IBUPROFEN 600 MG/1
600 TABLET, FILM COATED ORAL EVERY 6 HOURS PRN
Qty: 30 TABLET | Refills: 0 | Status: SHIPPED | OUTPATIENT
Start: 2019-08-08 | End: 2020-10-22

## 2019-08-13 ENCOUNTER — TELEPHONE (OUTPATIENT)
Dept: ONCOLOGY | Facility: CLINIC | Age: 49
End: 2019-08-13

## 2019-08-13 ENCOUNTER — TRANSFERRED RECORDS (OUTPATIENT)
Dept: HEALTH INFORMATION MANAGEMENT | Facility: CLINIC | Age: 49
End: 2019-08-13

## 2019-08-13 NOTE — TELEPHONE ENCOUNTER
Social Work Progress Note      Data/Intervention:  Patient Name:  Luci Londono  /Age:  1970 (48 year old)    Reason for Follow-Up:  Luci is a 48-year-old woman with a diagnosis of breast cancer who is s/p DD AC, lumpectomy, and radiation. This clinician received referral from [facility] for financial support.    Intervention:   This clinician has submitted application for [employer] historically, for which Luci did not receive gissel assistance. Luci did receive funding from [employer] (2018), and this clinician was informed that Luci can apply again for funding assistance 2019. This clinician had also assisted Luci in application for [employer] (18) and Check-Cap (2019). This clinician called and LVM for [employer] requesting if there could be exception to typical 12 month financial wait for re-application for [employer]. This clinician informed Luci that [employer] [employer] will be open 2019, and she has not received funding from this service in past.  also contacted [employer] to explore if exception could be made for eligibility of accessing funding stream, was informed that exception will not be granted.     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 Luci continues to receive support from [employer] for meal assistance.     Plan:  1) SW to await call back from [employer] for exception to application  2) SW to support Luci's application for [employer] [employer] 2019  3) Ongoing collaboration with multidisciplinary treatment team.   4) SW will plan to see 19 for psychosocial check-in and will plan to call her if able to apply for [employer].     Please call or page if needs or concerns arise.     MIRA Julien, [facility] Hospital[facility]  Direct Phone: [phone]  Pager:  [phone]

## 2019-08-21 ENCOUNTER — ALLIED HEALTH/NURSE VISIT (OUTPATIENT)
Dept: ONCOLOGY | Facility: CLINIC | Age: 49
End: 2019-08-21

## 2019-08-21 ENCOUNTER — ONCOLOGY VISIT (OUTPATIENT)
Dept: ONCOLOGY | Facility: CLINIC | Age: 49
End: 2019-08-21
Attending: INTERNAL MEDICINE
Payer: COMMERCIAL

## 2019-08-21 VITALS
BODY MASS INDEX: 35.26 KG/M2 | RESPIRATION RATE: 16 BRPM | DIASTOLIC BLOOD PRESSURE: 86 MMHG | SYSTOLIC BLOOD PRESSURE: 128 MMHG | TEMPERATURE: 96.7 F | WEIGHT: 199 LBS | HEIGHT: 63 IN | OXYGEN SATURATION: 100 % | HEART RATE: 67 BPM

## 2019-08-21 DIAGNOSIS — I89.0 LYMPHEDEMA: ICD-10-CM

## 2019-08-21 DIAGNOSIS — R79.89 ELEVATED LFTS: ICD-10-CM

## 2019-08-21 DIAGNOSIS — R60.9 EDEMA, UNSPECIFIED TYPE: ICD-10-CM

## 2019-08-21 DIAGNOSIS — C50.411 MALIGNANT NEOPLASM OF UPPER-OUTER QUADRANT OF RIGHT BREAST IN FEMALE, ESTROGEN RECEPTOR NEGATIVE (H): Primary | ICD-10-CM

## 2019-08-21 DIAGNOSIS — K76.0 FATTY LIVER: ICD-10-CM

## 2019-08-21 DIAGNOSIS — Z15.01 MONOALLELIC MUTATION OF PALB2 GENE: ICD-10-CM

## 2019-08-21 DIAGNOSIS — Z15.09 MONOALLELIC MUTATION OF PALB2 GENE: ICD-10-CM

## 2019-08-21 DIAGNOSIS — Z15.89 MONOALLELIC MUTATION OF PALB2 GENE: ICD-10-CM

## 2019-08-21 DIAGNOSIS — Z17.1 MALIGNANT NEOPLASM OF UPPER-OUTER QUADRANT OF RIGHT BREAST IN FEMALE, ESTROGEN RECEPTOR NEGATIVE (H): Primary | ICD-10-CM

## 2019-08-21 DIAGNOSIS — Z71.9 COUNSELING, UNSPECIFIED: Primary | ICD-10-CM

## 2019-08-21 PROCEDURE — 99215 OFFICE O/P EST HI 40 MIN: CPT | Performed by: INTERNAL MEDICINE

## 2019-08-21 PROCEDURE — G0463 HOSPITAL OUTPT CLINIC VISIT: HCPCS

## 2019-08-21 ASSESSMENT — MIFFLIN-ST. JEOR: SCORE: 1506.79

## 2019-08-21 ASSESSMENT — PAIN SCALES - GENERAL: PAINLEVEL: MODERATE PAIN (4)

## 2019-08-21 NOTE — PROGRESS NOTES
Social Work Progress Note      Data/Intervention:  Patient Name:  Luci Londono  /Age:  1970 (48 year old)    Reason for Follow-Up:  Luci is a 48-year-old woman with a previous history of right breast cancer, high grade IDC. This clinician met with Luci for planned psychosocial check-in and emotional support.    Intervention:   Luci reports that she is coping well with completion of radiation treatment, but has ongoing concerns about steps that can be taken regarding rawness on chest. Luci reports that she has returned back to work and has been feeling well there, that B12 was helpful for fatigue management. Luci acknowledges ongoing financial concern. Luci continues to receive SNAP benefits from [facility], and receives monthly financial aid. Provided resources to Luci regarding possible supports for mortgage assistance locally, as well as timing for eligibility for sending in additional foundations.     Resources Provided:  [employer]  [facility] housing support    Plan:  1) This clinician to submit Part 1 PRR 19 on Luci's behalf  2) Ongoing collaboration with multidisciplinary treatment team.     Please call or page if needs or concerns arise.     MIRA Julien, [facility]  Direct Phone: [phone]  Pager: [phone]
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
## 2019-08-22 ENCOUNTER — THERAPY VISIT (OUTPATIENT)
Dept: PHYSICAL THERAPY | Facility: CLINIC | Age: 49
End: 2019-08-22
Payer: COMMERCIAL

## 2019-08-22 DIAGNOSIS — G89.29 CHRONIC BILATERAL LOW BACK PAIN WITH LEFT-SIDED SCIATICA: ICD-10-CM

## 2019-08-22 DIAGNOSIS — M54.42 CHRONIC BILATERAL LOW BACK PAIN WITH LEFT-SIDED SCIATICA: ICD-10-CM

## 2019-08-22 DIAGNOSIS — M72.2 PLANTAR FASCIITIS, BILATERAL: ICD-10-CM

## 2019-08-22 PROCEDURE — 97110 THERAPEUTIC EXERCISES: CPT | Mod: GP | Performed by: PHYSICAL THERAPIST

## 2019-08-28 ENCOUNTER — TELEPHONE (OUTPATIENT)
Dept: FAMILY MEDICINE | Facility: CLINIC | Age: 49
End: 2019-08-28

## 2019-08-28 DIAGNOSIS — E66.09 NON MORBID OBESITY DUE TO EXCESS CALORIES: ICD-10-CM

## 2019-08-28 DIAGNOSIS — R73.9 HYPERGLYCEMIA: ICD-10-CM

## 2019-08-28 DIAGNOSIS — E03.9 ACQUIRED HYPOTHYROIDISM: ICD-10-CM

## 2019-08-28 NOTE — TELEPHONE ENCOUNTER
"Requested Prescriptions   Pending Prescriptions Disp Refills     levothyroxine (SYNTHROID/LEVOTHROID) 125 MCG tablet [Pharmacy Med Name: LEVOTHYROXINE 0.125MG (125MCG) TAB]    Last Written Prescription Date:  07/17/2019  Last Fill Quantity: 30 tablets,  # refills: 0   Last office visit: 7/30/2019 with prescribing provider:  ASHOK Wills   Future Office Visit:     30 tablet 0     Sig: TAKE 1 TABLET BY MOUTH DAILY       Thyroid Protocol Failed - 8/28/2019 11:06 AM        Failed - Normal TSH on file in past 12 months     Recent Labs   Lab Test 05/09/19  1415   TSH 8.17*              Passed - Patient is 12 years or older        Passed - Recent (12 mo) or future (30 days) visit within the authorizing provider's specialty     Patient had office visit in the last 12 months or has a visit in the next 30 days with authorizing provider or within the authorizing provider's specialty.  See \"Patient Info\" tab in inbasket, or \"Choose Columns\" in Meds & Orders section of the refill encounter.              Passed - Medication is active on med list        Passed - No active pregnancy on record     If patient is pregnant or has had a positive pregnancy test, please check TSH.          Passed - No positive pregnancy test in past 12 months     If patient is pregnant or has had a positive pregnancy test, please check TSH.             "

## 2019-08-29 ENCOUNTER — TELEPHONE (OUTPATIENT)
Dept: ONCOLOGY | Facility: CLINIC | Age: 49
End: 2019-08-29

## 2019-08-29 RX ORDER — LEVOTHYROXINE SODIUM 125 UG/1
TABLET ORAL
Qty: 30 TABLET | Refills: 0 | Status: SHIPPED | OUTPATIENT
Start: 2019-08-29 | End: 2019-09-27

## 2019-08-29 RX ORDER — METFORMIN HCL 500 MG
TABLET, EXTENDED RELEASE 24 HR ORAL
Qty: 90 TABLET | Refills: 1 | Status: SHIPPED | OUTPATIENT
Start: 2019-08-29 | End: 2021-10-27

## 2019-08-29 NOTE — TELEPHONE ENCOUNTER
Prior Authorization Not Needed per Insurance    Medication: Capecitabine-No PA needed  Insurance Company: [employer] - Phone [phone] Fax [phone]  Expected CoPay:    $0  Pharmacy Filling the Rx: [facility] - [place], [place] - [address]  Pharmacy Notified:  Not yet released  Patient Notified:  Not yet, not released

## 2019-08-29 NOTE — TELEPHONE ENCOUNTER
"Requested Prescriptions   Pending Prescriptions Disp Refills     metFORMIN (GLUCOPHAGE-XR) 500 MG 24 hr tablet  Last Written Prescription Date:  2/19/2019  Last Fill Quantity: 90 tablet,  # refills: 1   Last office visit: 7/30/2019 with prescribing provider:  MARA Wills   Future Office Visit:   Next 5 appointments (look out 90 days)    Sep 04, 2019  8:40 AM CDT  Return Visit with Addie Murillo MD  [facility] ([facility]  [address]  [facility] [address]  [phone]          90 tablet 1     Sig: TAKE 1 TABLET(500 MG) BY MOUTH DAILY WITH DINNER       Biguanide Agents Passed - 8/28/2019  3:26 PM        Passed - Blood pressure less than 140/90 in past 6 months     BP Readings from Last 3 Encounters:   08/21/19 128/86   07/30/19 110/66   07/11/19 124/83                 Passed - Patient is age 10 or older        Passed - Recent (12 mo) or future (30 days) visit within the authorizing provider's specialty      Patient had office visit in the last 12 months or has a visit in the next 30 days with authorizing provider or within the authorizing provider's specialty.  See \"Patient Info\" tab in inbasket, or \"Choose Columns\" in Meds & Orders section of the refill encounter.              Passed - Patient does NOT have a diagnosis of CHF.        Passed - Medication is active on med list        Passed - Patient is not pregnant        Passed - Patient has not had a positive pregnancy test within the past 12 mos.            "

## 2019-09-04 ENCOUNTER — ONCOLOGY VISIT (OUTPATIENT)
Dept: ONCOLOGY | Facility: CLINIC | Age: 49
End: 2019-09-04
Attending: INTERNAL MEDICINE
Payer: COMMERCIAL

## 2019-09-04 ENCOUNTER — DOCUMENTATION ONLY (OUTPATIENT)
Dept: PHARMACY | Facility: CLINIC | Age: 49
End: 2019-09-04

## 2019-09-04 ENCOUNTER — ALLIED HEALTH/NURSE VISIT (OUTPATIENT)
Dept: ONCOLOGY | Facility: CLINIC | Age: 49
End: 2019-09-04

## 2019-09-04 ENCOUNTER — HOSPITAL ENCOUNTER (OUTPATIENT)
Facility: CLINIC | Age: 49
Setting detail: SPECIMEN
Discharge: HOME | End: 2019-09-04
Attending: INTERNAL MEDICINE | Admitting: INTERNAL MEDICINE
Payer: COMMERCIAL

## 2019-09-04 VITALS
DIASTOLIC BLOOD PRESSURE: 84 MMHG | RESPIRATION RATE: 18 BRPM | HEIGHT: 63 IN | OXYGEN SATURATION: 100 % | SYSTOLIC BLOOD PRESSURE: 134 MMHG | TEMPERATURE: 98.2 F | WEIGHT: 198.4 LBS | HEART RATE: 63 BPM | BODY MASS INDEX: 35.15 KG/M2

## 2019-09-04 DIAGNOSIS — K76.0 FATTY LIVER: ICD-10-CM

## 2019-09-04 DIAGNOSIS — Z15.01 MONOALLELIC MUTATION OF PALB2 GENE: ICD-10-CM

## 2019-09-04 DIAGNOSIS — R79.89 ELEVATED LFTS: ICD-10-CM

## 2019-09-04 DIAGNOSIS — Z17.1 MALIGNANT NEOPLASM OF UPPER-OUTER QUADRANT OF RIGHT BREAST IN FEMALE, ESTROGEN RECEPTOR NEGATIVE (H): Primary | ICD-10-CM

## 2019-09-04 DIAGNOSIS — G62.9 NEUROPATHY: ICD-10-CM

## 2019-09-04 DIAGNOSIS — C50.411 MALIGNANT NEOPLASM OF UPPER-OUTER QUADRANT OF RIGHT BREAST IN FEMALE, ESTROGEN RECEPTOR NEGATIVE (H): Primary | ICD-10-CM

## 2019-09-04 DIAGNOSIS — Z71.9 COUNSELING, UNSPECIFIED: Primary | ICD-10-CM

## 2019-09-04 DIAGNOSIS — Z15.09 MONOALLELIC MUTATION OF PALB2 GENE: ICD-10-CM

## 2019-09-04 DIAGNOSIS — Z15.89 MONOALLELIC MUTATION OF PALB2 GENE: ICD-10-CM

## 2019-09-04 LAB
ALBUMIN SERPL-MCNC: 3.3 G/DL (ref 3.4–5)
ALP SERPL-CCNC: 81 U/L (ref 40–150)
ALT SERPL W P-5'-P-CCNC: 48 U/L (ref 0–50)
ANION GAP SERPL CALCULATED.3IONS-SCNC: 4 MMOL/L (ref 3–14)
AST SERPL W P-5'-P-CCNC: 32 U/L (ref 0–45)
BASOPHILS # BLD AUTO: 0 10E9/L (ref 0–0.2)
BASOPHILS NFR BLD AUTO: 0.2 %
BILIRUB SERPL-MCNC: 0.3 MG/DL (ref 0.2–1.3)
BUN SERPL-MCNC: 7 MG/DL (ref 7–30)
CALCIUM SERPL-MCNC: 9 MG/DL (ref 8.5–10.1)
CHLORIDE SERPL-SCNC: 106 MMOL/L (ref 94–109)
CO2 SERPL-SCNC: 28 MMOL/L (ref 20–32)
CREAT SERPL-MCNC: 0.77 MG/DL (ref 0.52–1.04)
DIFFERENTIAL METHOD BLD: ABNORMAL
EOSINOPHIL # BLD AUTO: 0.1 10E9/L (ref 0–0.7)
EOSINOPHIL NFR BLD AUTO: 2.5 %
ERYTHROCYTE [DISTWIDTH] IN BLOOD BY AUTOMATED COUNT: 16.4 % (ref 10–15)
GFR SERPL CREATININE-BSD FRML MDRD: >90 ML/MIN/{1.73_M2}
GLUCOSE SERPL-MCNC: 88 MG/DL (ref 70–99)
HCT VFR BLD AUTO: 35.2 % (ref 35–47)
HGB BLD-MCNC: 11.8 G/DL (ref 11.7–15.7)
IMM GRANULOCYTES # BLD: 0 10E9/L (ref 0–0.4)
IMM GRANULOCYTES NFR BLD: 0 %
LYMPHOCYTES # BLD AUTO: 1.4 10E9/L (ref 0.8–5.3)
LYMPHOCYTES NFR BLD AUTO: 32.1 %
MCH RBC QN AUTO: 25.3 PG (ref 26.5–33)
MCHC RBC AUTO-ENTMCNC: 33.5 G/DL (ref 31.5–36.5)
MCV RBC AUTO: 76 FL (ref 78–100)
MONOCYTES # BLD AUTO: 0.3 10E9/L (ref 0–1.3)
MONOCYTES NFR BLD AUTO: 7 %
NEUTROPHILS # BLD AUTO: 2.6 10E9/L (ref 1.6–8.3)
NEUTROPHILS NFR BLD AUTO: 58.2 %
NRBC # BLD AUTO: 0 10*3/UL
NRBC BLD AUTO-RTO: 0 /100
PLATELET # BLD AUTO: 227 10E9/L (ref 150–450)
POTASSIUM SERPL-SCNC: 3.8 MMOL/L (ref 3.4–5.3)
PROT SERPL-MCNC: 7.1 G/DL (ref 6.8–8.8)
RBC # BLD AUTO: 4.66 10E12/L (ref 3.8–5.2)
SODIUM SERPL-SCNC: 138 MMOL/L (ref 133–144)
WBC # BLD AUTO: 4.4 10E9/L (ref 4–11)

## 2019-09-04 PROCEDURE — 99215 OFFICE O/P EST HI 40 MIN: CPT | Performed by: INTERNAL MEDICINE

## 2019-09-04 PROCEDURE — 80053 COMPREHEN METABOLIC PANEL: CPT | Performed by: INTERNAL MEDICINE

## 2019-09-04 PROCEDURE — 85025 COMPLETE CBC W/AUTO DIFF WBC: CPT | Performed by: INTERNAL MEDICINE

## 2019-09-04 PROCEDURE — G0463 HOSPITAL OUTPT CLINIC VISIT: HCPCS

## 2019-09-04 RX ORDER — PROCHLORPERAZINE MALEATE 10 MG
10 TABLET ORAL EVERY 6 HOURS PRN
Qty: 30 TABLET | Refills: 5 | Status: SHIPPED | OUTPATIENT
Start: 2019-09-04 | End: 2020-09-03

## 2019-09-04 RX ORDER — CAPECITABINE 500 MG/1
TABLET, FILM COATED ORAL
Qty: 84 TABLET | Refills: 0 | Status: SHIPPED | OUTPATIENT
Start: 2019-09-04 | End: 2020-05-13

## 2019-09-04 ASSESSMENT — PAIN SCALES - GENERAL: PAINLEVEL: SEVERE PAIN (6)

## 2019-09-04 ASSESSMENT — MIFFLIN-ST. JEOR: SCORE: 1504.07
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
## 2019-09-04 NOTE — PROGRESS NOTES
"Oncology Rooming Note    September 4, 2019 8:45 AM   Luci Londono is a 48 year old female who presents for:    Chief Complaint   Patient presents with     Oncology Clinic Visit     Breast cancer (H)     Initial Vitals: There were no vitals taken for this visit. Estimated body mass index is 35.25 kg/m  as calculated from the following:    Height as of 8/21/19: 1.6 m (5' 3\").    Weight as of 8/21/19: 90.3 kg (199 lb). There is no height or weight on file to calculate BSA.  Data Unavailable Comment: Data Unavailable   No LMP recorded.  Allergies reviewed: Yes  Medications reviewed: Yes    Medications: Medication refills not needed today.  Pharmacy name entered into [facility]:    [id number] - [place], [place] - [address] [id number] - [place], [place] - [address] AT [address]    Clinical concerns: no         Emerald Pascual MA            "

## 2019-09-04 NOTE — PROGRESS NOTES
"Oral Chemotherapy Monitoring Program    Primary Oncologist: Dr. Murillo  Primary Oncology Clinic: [facility]  Cancer Diagnosis: breast cancer    Drug: capecitabine 1500mg BID for 7 days then off for 7 days  Start Date: 9/9/19  Dose is appropriate for patients:  BSA,  Renal Function and  Hepatic Function   Expected duration of therapy: Until disease progression or unacceptable toxicity    Drug Interaction Assessment: drug interaction with omeprazole. Patient only uses prn. Cautioned her to avoid use due to interaction or only use on off weeks from xeloda. She understands.     Lab Monitoring Plan  CBC/CMP monthly      Subjective/Objective:  Luci Londono is a 48 year old female seen in clinic for an initial visit for oral chemotherapy education.  Met with Luci today to discuss the start of xeloda. She will start on Monday 9/9/19. Reviewed how to take medication and potential side effects. Answered her questions.    ORAL CHEMOTHERAPY 9/4/2019   Drug Name Xeloda (Capecitabine)   Current Dosage 1500mg   Current Schedule BID   Cycle Details 1 week on 1 week off   Planned next cycle start date 9/9/2019   Any new drug interactions? Yes   Pharmacist Intervention? Yes   Intervention(s) Drug changed (non-chemo)   Is the dose as ordered appropriate for the patient? Yes       Last PHQ-2 Score on record:   PHQ-2 ( [facility]) 8/21/2018 10/31/2017   Q1: Little interest or pleasure in doing things 0 0   Q2: Feeling down, depressed or hopeless 0 0   PHQ-2 Score 0 0       Patient does not report depression symptoms.      Vitals:  BP:   BP Readings from Last 1 Encounters:   09/04/19 134/84     Wt Readings from Last 1 Encounters:   09/04/19 90 kg (198 lb 6.4 oz)     Estimated body surface area is 2 meters squared as calculated from the following:    Height as of an earlier encounter on 9/4/19: 1.6 m (5' 3\").    Weight as of an earlier encounter on 9/4/19: 90 kg (198 lb 6.4 oz).      Labs:  _  Result Component Current Result Ref " Range   Sodium 138 (9/4/2019) 133 - 144 mmol/L     _  Result Component Current Result Ref Range   Potassium 3.8 (9/4/2019) 3.4 - 5.3 mmol/L     _  Result Component Current Result Ref Range   Calcium 9.0 (9/4/2019) 8.5 - 10.1 mg/dL     No results found for Mag within last 30 days.     No results found for Phos within last 30 days.     _  Result Component Current Result Ref Range   Albumin 3.3 (L) (9/4/2019) 3.4 - 5.0 g/dL     _  Result Component Current Result Ref Range   Urea Nitrogen 7 (9/4/2019) 7 - 30 mg/dL     _  Result Component Current Result Ref Range   Creatinine 0.77 (9/4/2019) 0.52 - 1.04 mg/dL       _  Result Component Current Result Ref Range   AST 32 (9/4/2019) 0 - 45 U/L     _  Result Component Current Result Ref Range   ALT 48 (9/4/2019) 0 - 50 U/L     _  Result Component Current Result Ref Range   Bilirubin Total 0.3 (9/4/2019) 0.2 - 1.3 mg/dL       _  Result Component Current Result Ref Range   WBC 4.4 (9/4/2019) 4.0 - 11.0 10e9/L     _  Result Component Current Result Ref Range   Hemoglobin 11.8 (9/4/2019) 11.7 - 15.7 g/dL     _  Result Component Current Result Ref Range   Platelet Count 227 (9/4/2019) 150 - 450 10e9/L     _  Result Component Current Result Ref Range   Absolute Neutrophil 2.6 (9/4/2019) 1.6 - 8.3 10e9/L       Labs WNL.    Assessment:  Patient is appropriate to start therapy.    Plan:  Basic chemotherapy teaching was reviewed with the patient including indication, start date of therapy, dose, administration, adverse effects, missed doses, food and drug interactions, monitoring, side effect management, office contact information, and safe handling. Written materials were provided and all questions answered.    Follow-Up:  2 weeks with check in.      Briana Larios PharmD  September 4, 2019

## 2019-09-04 NOTE — PROGRESS NOTES
Social Work Progress Note      Data/Intervention:  Patient Name:  Luci Londono  /Age:  1970 (48 year old)    Reason for Follow-Up:  Luci is a 48-year-old woman with a diagnosis of right breast, high grade IDC. Luci is s/p chemotherapy, lumpectomy, radiation, and is planning to start adjuvant Xeloda 19. This clinician met briefly with Luci today for psychosocial check-in and emotional support per Luci's request.     Intervention:   Luci reported that she continues to do well with the support of her sister in her transition back to work. Luci reports that she has been keeping busy with her children's transition back to school. From an emotional perspective, she appears to be coping well, acknowledging some understandable anxiety today surrounding starting new oral medication and worry that this will impact her hands. Provided safe place for her to express what it feels like sitting with uncertainty.     Luci received re-certification paperwork from [employer] and elected to discontinue this service after 19. Aware that we can send in additional paperwork if needed to access meal support.     Plan:  1) This clinician to submit application for PRR 19 as per prior plan  2) Ongoing collaboration with multidisciplinary treatment team.     Please call or page if needs or concerns arise.     MIRA Julien, [facility]  Direct Phone: [phone]  Pager: [phone]

## 2019-09-04 NOTE — LETTER
"    9/4/2019         RE: Luci Londono  [address]  [facility] [address]        Dear Colleague,    Thank you for referring your patient, Luci Londono, to the [facility]. Please see a copy of my visit note below.    Oncology Rooming Note    September 4, 2019 8:45 AM   Luci Londono is a 48 year old female who presents for:    Chief Complaint   Patient presents with     Oncology Clinic Visit     Breast cancer (H)     Initial Vitals: There were no vitals taken for this visit. Estimated body mass index is 35.25 kg/m  as calculated from the following:    Height as of 8/21/19: 1.6 m (5' 3\").    Weight as of 8/21/19: 90.3 kg (199 lb). There is no height or weight on file to calculate BSA.  Data Unavailable Comment: Data Unavailable   No LMP recorded.  Allergies reviewed: Yes  Medications reviewed: Yes    Medications: Medication refills not needed today.  Pharmacy name entered into [facility]:    [facility] #[id number] - [place], [place] - [address] [facility][id number] - [place], [place] - [address] AT [address]    Clinical concerns: no         Emerald Pascual MA              ONCOLOGY FOLLOW [facility] VISIT    breast surgeon:  Dr. Stacey Ashford,     REASON FOR visit:  10/2018 dx right breast TN IDC    HISTORY OF ONCOLOGY ILLNESS:    At age 47 amenorrhea with polycystic ovaries,  She felt a lump in her right breast in early October, 2018.   Her mammogram revealed a small mass in the right upper outer breast,   US revealed an 8mm hypoechoic mass at 12:00, 6cm FN and axillary US revealed a concerning lymph node which was also biopsied.   Her pathology from both breast and LN revealed a grade 3, invasive ductal carcinoma, ER/NM/her 2-.   PET found no distal disease.   Breast MRI found entire span 3.8 cm.There are multiple enlarged right axillary lymph nodes.        She made informed decision to proceed with " "neoadjuvant DD AC  She had drastic clinical response by PE and MRI.   She continued on wkly taxol. Carbo was added in W2. Carbo then was discontinued 3/6/2019 due to persistent neutropenia.     She had lumpectomy 5/2019 found to have [id number] (1/6) disease.     She is tested 4/2019 heterozygous positive for PALB2 p.E837 and BRCA2 variant unknown significance p.T77A, MLH1 variant, unknow significance p.I25V    She finished RT till mid Aug 2019. She had severe skin burn from it.     INTERVAL HISTORY:    She is not qualified for  trail.   She consented for ASA and weight loss trial.   She made informed decision to proceed with oral Xeloda 9/2019.       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     MEDICATIONS/ALLERY:  Reviewed in Epic system.      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 Deny ETOH/smoking       FAMILY HISTORY:    Negative for any type of cancers      REVIEW OF SYSTEMS:   She has lots of post op right shoulder pain with decreased ROM.   Reports right arm edema and pain and saw PT.      PHYSICAL EXAMINATION:   VITAL SIGNS: Blood pressure 134/84, pulse 63, temperature 98.2  F (36.8  C), temperature source Oral, resp. rate 18, height 1.6 m (5' 3\"), weight 90 kg (198 lb 6.4 oz), SpO2 100 %, not currently breastfeeding.    ECOG 0    GENERAL APPEARANCE:  looks like her stated age, very pleasant, not in acute distress.   HEENT: The patient is normocephalic, atraumatic. Pupils are equally reactive to light.  Sclerae are anicteric.  Moist oral mucosa.  Negative pharynx.  No oral " thrush. Stomatitis on left angular of mouth.    NECK:  Supple.  No jugular venous distention.  Thyroid is not palpable.   LYMPH NODES:  Superficial lymphadenopathy is not appreciable in the bilateral cervical, supraclavicular, axillary or inguinal areas.   CARDIOVASCULAR:  S1, S2 regular with no murmurs or gallops.  No carotid or abdominal bruits.   PULMONARY:  Lungs are clear to auscultation and percussion bilaterally.  There is no wheezing or rhonchi.   GASTROINTESTINAL:  Abdomen is soft, nontender.  No hepatosplenomegaly.  No signs of ascites.  No mass appreciable.   MUSCULOSKELETAL/EXTREMITIES:  No edema.  No cyanotic changes.  No signs of joint deformity.  + lymphedema right arm.   NEUROLOGIC:  Cranial nerves II-XII are grossly intact.  Sensation intact.  Muscle strength and muscle tone symmetrical, 5/5 throughout.   BACK:  No spinal or paraspinal tenderness.  No CVA tenderness.   SKIN:  No petechiae.  No rash.  No signs of cellulitis.   BREASTS: Right breast has hyperpigmentation changesupper outer quadrant.  Left breast is symmetrical with no skin or nipple changes. No masses on the left. Tissue is very dense throughout.          CURRENT LAB DATA REVIEWED  ALT at 74.    lumpectomy 5/2019 found to have [id number] (1/6) disease.     She is tested 4/2019 heterozygous positive for PALB2 p.E837 and BRCA2 variant unknown significance p.T77A, MLH1 variant, unknow significance p.I25V        CURRENT IMAGING REVIEWED  US abd 6/2019 - fatty liver.       OLD DATA REVIEWED TODAY WITH SUMMARY:   Breast MRI post AC 12/2018  1. Enhancing mass superiorly in the RIGHT breast is significantly decreased in size since 10/17/2018, measuring approximately 1.1 x 0.3 x 0.5 cm in today's study (series 5 image 52 and series 13 image 30), decreased from 1.1 x 1.6 x 1.0 cm.  2. Enlarged RIGHT axillary lymph node is slightly decreased since that time as well, now measuring 0.9 x 1.2 x 1.0 cm (series 5 image 68 and series 13 image 19),  decreased from 1.3 x 1.2 x 1.4 cm.     10/2018  Right breast 8mm hypoechoic mass at 12:00, 6cm FN and right axillary LN biopsy both found grade 3, invasive ductal carcinoma, ER/WV/her 2-.       Baseline pre tx breast MRI 10/2018 -  In the right breast at 12:00 7 cm from the nipple, there is irregular heterogeneously enhancing mass, corresponding with the known biopsy-proven malignancy, which spans approximately 2.2 cm in maximal diameter, best appreciated on sagittal view, with surrounding nonmasslike enhancement likely related to postbiopsy change or DCIS.  Taken with the primary tumor, the entire span extends up to 3.8 cm.   There are multiple enlarged right axillary lymph nodes.      PET 10/2018  1. No evidence of distant metastasis.  2. Hypermetabolic focus in the upper outer quadrant right breast representing primary tumor. Hypermetabolic right axillary lymph nodes, consistent with metastatic node.  3. Indeterminate sub-4 mm pulmonary nodules, likely fissural lymph node in the right lung.  4. Extensive FDG uptake by the brown fat in the neck and paraspinal region.    10/2018 dx MA -  revealed a small mass in the right upper outer breast, US revealed an 8mm hypoechoic mass at 12:00, 6cm FN and axillary US revealed a concerning lymph node                ASSESSMENT AND PLAN:    1.  dx cT1cN1 right breast high grade IDC, TN at age 47 in 10/2018.   S/P neoadjuvant  DD AC, C1D1 10/24/2018.   She had good MRI response post C4 AC.   She did ok with W1 taxol. Added wkly carbo today in W2. Carbo was discontinued 3/6/2019 due to persistent neutropenia.     She had lumpectomy 5/2019 found to have pL9A3oc (1/6) disease.     Post lumpectomy RT is recommended.     We discussed the option of adjuvant systemic tx post RT in none pCR TN post neoadjuvant chemo.     She is not qualified for  trail.   She consented for ASA and weight loss trial.     Oral xeloda data and side effects profile are discussed.  We discussed the  side effect in detail she made informed decision to try adjuvant oral Xeloda September 2019.      2. Young age dx with TN breast cancer, she saw genetic counseling.   She is tested 4/2019 heterozygous positive for PALB2 p.E837 and BRCA2 variant unknown significance p.T77A, MLH1 variant, unknow significance p.I25V    We discussed the 33-58% life time risk of breast cancer, increased (% unknown) risk of ovarian cancer and pancreatic cancer.     She will be a good candidate for future breast MRI screening.   Body image screening for pancreatic cancer protocol is undefined.   We will do annual body imaging as needed.      3. Post op severe right shoulder pain, right arm pain with edema. Advice PT evaluation.     4. New elevated LFT in 3/6/2019 and 4/2019, improved in 6/2019. advcie no ETOH or tylenol.   US found fatty liver. Dietary fat reduction is advised.     5. Neuropathy developing in 3/2019. Advice vit B6 oral.           Again, thank you for allowing me to participate in the care of your patient.        Sincerely,        Addie Murillo MD, MD

## 2019-09-10 ENCOUNTER — ALLIED HEALTH/NURSE VISIT (OUTPATIENT)
Dept: ONCOLOGY | Facility: CLINIC | Age: 49
End: 2019-09-10

## 2019-09-10 ENCOUNTER — HOSPITAL ENCOUNTER (OUTPATIENT)
Dept: OCCUPATIONAL THERAPY | Facility: CLINIC | Age: 49
Setting detail: THERAPIES SERIES
End: 2019-09-10
Attending: INTERNAL MEDICINE
Payer: COMMERCIAL

## 2019-09-10 DIAGNOSIS — I89.0 LYMPHEDEMA: ICD-10-CM

## 2019-09-10 DIAGNOSIS — Z71.9 COUNSELING, UNSPECIFIED: Primary | ICD-10-CM

## 2019-09-10 PROCEDURE — 97110 THERAPEUTIC EXERCISES: CPT | Mod: GO

## 2019-09-10 PROCEDURE — 97165 OT EVAL LOW COMPLEX 30 MIN: CPT | Mod: GO

## 2019-09-10 PROCEDURE — 97140 MANUAL THERAPY 1/> REGIONS: CPT | Mod: GO

## 2019-09-10 NOTE — PROGRESS NOTES
[facility]        OUTPATIENT OCCUPATIONAL THERAPY EDEMA EVALUATION  PLAN OF TREATMENT FOR OUTPATIENT REHABILITATION  (COMPLETE FOR INITIAL CLAIMS ONLY)  Patient's Last Name, First Name, Luci Early                           Provider s Name:   [facility] Medical Record No.  [id number]     Start of Care Date:  09/10/19   Onset Date:  08/29/19(since surgery per pt report)   Type:  OT   Medical Diagnosis:  lymphedema   Therapy Diagnosis:  lymphedema Visits from SOC:  1                                     __________________________________________________________________________________   Plan of Treatment/Functional Goals:    Manual lymph drainage, Gradient compression bandaging, Fit for compression garment, Exercises, Precautions to prevent infection / exacerbation, Education, Manual therapy, Skin care / precautions, Scar mobilization, Myofascial release, Home management program development        GOALS  1. Goal description: Pt will report understanding s/s lymphedema, s/s infections, and tretament options for lymphedema to build Ind with home management RUE/trunkal lymphedema       Target date: 09/10/19  2. Goal description: Tolerate gradient compression bandaging/wearing compression garments 23 hrs/day to prevent re-acccumulation of extracellular fluid for reducitons needed to aide imporvements in weight in RUE for improved comfort and functional use RUE       Target date: 11/29/19  3. Goal description: Demonstrate independence in applying gradient compression bandages to build Ind with home management of RUE lymphedema needed for improveemnts in weight of limb and reduce risk skin infections       Target date: 11/29/19  4. Goal description: Demonstrate independence in performing prescribed exercises to facilate the lymph system and muscle pumping  systems for max reducitons needed for improvements in weight of limb for reaching tasks and reduce chances skin infection       Target date: 11/29/19  5. Goal description: Be independent in donning/doffing, wearing schedule, and care of compression garments to build Ind with home management of RUE lymphedema needed for improveemnts in weight of limb and reduce risk skin infections        Target date: 11/29/19  6. Goal description: Pt will report a reduciton in subjective feeling of heaviness and weight in RUE post GCB phase, for improved Ind and ease with reaching tasks       Target date: 11/29/19     7.             8.              Treatment Frequency: 1x/week, 3x/week   Treatment duration: 3x/wk x 4 weeks, then 1x/wk x 1 week, then 0x/wk x 3 weeks, then 1x/wk x 1 week    Laci Steele                                    I CERTIFY THE NEED FOR THESE SERVICES FURNISHED UNDER        THIS PLAN OF TREATMENT AND WHILE UNDER MY CARE     (Physician co-signature of this document indicates review and certification of the therapy plan).                   Certification date from: 09/10/19       Certification date to: 11/29/19           Referring physician: KAJAL Murillo   Initial Assessment  See Epic Evaluation- Start of care: 09/10/19

## 2019-09-10 NOTE — PROGRESS NOTES
Social Work Progress Note      Data/Intervention:  Patient Name:  Luci Londono  /Age:  1970 (48 year old)    Reason for Follow-Up:  Luci is a 48-year-old woman with a diagnosis of right breast, high grade IDC. Luci is s/p chemotherapy, lumpectomy, radiation, and started adjuvant Xeloda 19. This clinician met briefly with Luci today for planned psychosocial check-in.     Intervention:   Supported Luci in completion of part 1 of PRR application. This clinician will facilitate completion of part 2 once Luci submits what she receives from PRR to this clinician. No other psychosocial concerns reported or observed today.     Plan:  1) This clinician to submit pat 2 application for PRR   2) Ongoing collaboration with multidisciplinary treatment team.     Please call or page if needs or concerns arise.     MIRA Julien, [facility]  Direct Phone: [phone]  Pager: [phone]

## 2019-09-11 NOTE — PROGRESS NOTES
09/10/19 1000   Quick Adds   Quick Adds Certification   Rehab Discipline   Discipline OT   General Information   Start of care 09/10/19   Referring physician KAJAL Murillo   Orders Evaluate and treat as indicated   Order date 08/29/19   Medical diagnosis lymphedema   Onset of illness / date of surgery 08/29/19   Edema onset 08/29/19  (since surgery per pt report)   Affected body parts RUE;Trunk   Edema etiology Cancer with lymph node dissection;Radiation;Chemo;Surgery   Location - Cancer with lymph node dissection Pt rpeorts 6 axillary/sentinel LN removed with 1/6 positive   Location - Radiation Pt started after surgery may 2019 and finished Aug 2019. radiation burn reported from pt and noted in chart review   Chemotherapy comments Chemo after diagnosis Oct 2018. Now taking medication to reduce reoccurence.   Edema etiology comments Oct 2018 diagnosis R sided breast cancer (invasive ductal carcinoma); chemo before surgery; Surgery lumpectomy R side May 2019; radiation after surgery ending Aug 2019.    Pertinent history of current problem (PT: include personal factors and/or comorbidities that impact the POC; OT: include additional occupational profile info) PMH significant for breats cancer, prediabetes, headaches, and thyroid issues.   Surgical / medical history reviewed Yes   Prior level of functional mobility I   Prior treatment Exercise   Community support Family / friend caregiver   Patient role / employment history Employed   Living environment [facility]   Fall Risk Screen   Fall screen completed by OT   Have you fallen 2 or more times in the past year? No   Have you fallen and had an injury in the past year? No   Is patient a fall risk? No   Abuse Screen (yes response referral indicated)   Feels Unsafe at Home or Work/School no   Feels Threatened by Someone no   Does Anyone Try to Keep You From Having Contact with Others or Doing Things Outside Your Home? no   Physical Signs of Abuse Present no    System Outcome Measures   Lymphedema Life Impact Scale (score range 0-72). A higher score indicates greater impairment. 43   Subjective Report   Patient report of symptoms heaviness; neuropathy R hadn from chemo; difficulty reaching   Patient / Family Goals   Patient / family goals statement to reduce swelling in R arm   Pain   Pain comments Pt has neuropathy symptoms in R hand. She attributes to chemo as she had bilaterally and L hand remediated now chemo done. R hand improved since chemo stopped but not resolved. Some discomfort with neuropathy   Cognitive Status   Orientation Orientation to person, place and time   Level of consciousness Alert   Follows commands and answers questions 100% of the time   Edema Exam / Assessment   Skin condition Non-pitting;Dryness   Skin condition comments non pitting lymphedema elbow-axilla RUE and some thickened fibrotic tissue R breast noted at eval   Scar   (axillary LN; R breast lumpectomy; L chest port)   Capillary refill Symmetrical   Radial pulse Symmetrical   Stemmer sign Negative   Girth Measurements   Girth Measurements   (will obtain upon return for services)   Range of Motion   ROM comments WNL   Strength   Strength comments NT; B hand grasp good; 3/5 B samson displayed all planes motion   Activities of Daily Living   Activities of Daily Living I   Bed Mobility   Bed mobility I   Transfers   Transfers I   Gait / Locomotion   Gait / Locomotion I   Sensory   Sensory perception comments RUE neuropathy from chemo reported; L side has remediated per pt   Coordination   Coordination Gross motor coordination appropriate   Muscle Tone   Muscle tone No deficits were identified   Planned Edema Interventions   Planned edema interventions Manual lymph drainage;Gradient compression bandaging;Fit for compression garment;Exercises;Precautions to prevent infection / exacerbation;Education;Manual therapy;Skin care / precautions;Scar mobilization;Myofascial release;Home management  program development   Clinical Impression   Criteria for skilled therapeutic intervention met Yes   Therapy diagnosis lymphedema   Influenced by the following impairments / conditions Stage 1   Assessment of Occupational Performance 1-3 Performance Deficits   Identified Performance Deficits decreased Ind self cares; haevines sin limb affecting reaching tasks; infection risk   Clinical Decision Making (Complexity) Low complexity   Treatment Frequency 1x/week;3x/week   Treatment duration 3x/wk x 4 weeks, then 1x/wk x 1 week, then 0x/wk x 3 weeks, then 1x/wk x 1 week   Patient / family and/or staff in agreement with plan of care Yes   Risks and benefits of therapy have been explained Yes   Clinical impression comments Pt will benefit from skilled lymphedema services to reduce RUE lymphedema and fibrosis of R breats tssue for improved reaching task engagement, reduce chances skin infection, and reduce weight in limb for optimal functional use RUE with daily activity.   Goals   Edema Eval Goals 1;2;3;4;5;6   Goal 1   Goal identifier Education   Goal description Pt will report understanding s/s lymphedema, s/s infections, and tretament options for lymphedema to build Ind with home management RUE/trunkal lymphedema   Target date 09/10/19   Date met 09/10/19   Goal 2   Goal identifier GCB Wearing   Goal description Tolerate gradient compression bandaging/wearing compression garments 23 hrs/day to prevent re-acccumulation of extracellular fluid for reducitons needed to aide imporvements in weight in RUE for improved comfort and functional use RUE   Target date 11/29/19   Goal 3   Goal identifier GCB Donning   Goal description Demonstrate independence in applying gradient compression bandages to build Ind with home management of RUE lymphedema needed for improveemnts in weight of limb and reduce risk skin infections   Target date 11/29/19   Goal 4   Goal identifier HEP/MLD   Goal description Demonstrate independence in  performing prescribed exercises to facilate the lymph system and muscle pumping systems for max reducitons needed for improvements in weight of limb for reaching tasks and reduce chances skin infection   Target date 11/29/19   Goal 5   Goal identifier Garments   Goal description Be independent in donning/doffing, wearing schedule, and care of compression garments to build Ind with home management of RUE lymphedema needed for improveemnts in weight of limb and reduce risk skin infections    Target date 11/29/19   Goal 6   Goal identifier Limb weight   Goal description Pt will report a reduciton in subjective feeling of heaviness and weight in RUE post GCB phase, for improved Ind and ease with reaching tasks   Target date 11/29/19   Total Evaluation Time   OT Eval, Low Complexity Minutes (18654) 20   Certification   Certification date from 09/10/19   Certification date to 11/29/19   Medical Diagnosis lymphedema   Certification I certify the need for these services furnished under this plan of treatment and while under my care.  (Physician co-signature of this document indicates review and certification of the therapy plan).

## 2019-09-16 ENCOUNTER — TELEPHONE (OUTPATIENT)
Dept: PHARMACY | Facility: CLINIC | Age: 49
End: 2019-09-16

## 2019-09-16 NOTE — ORAL ONC MGMT
Oral Chemotherapy Monitoring Program.    Patient currently on capecitabine therapy. 1500mg BID x 7 days then off for 7 days.    Luci started therapy on 9/9/19. Today is the first day of her off week. She has been doing pretty good. She notes some weakness or tiredness the last couple of days but is feeling better. She denies diarrhea. She denies vomiting, but states that every now and then she feels like she could. I explained that when she feels that way it is ok to take an antinausea medication. Patient also denies any peeling or redness of her hands and feet at this time.    In general she is doing well. Reminded her to call if she experienced any side effects that were concerning in the next couple of weeks prior to her appointment with Dr. Murillo.     Questions answered to patient's satisfaction.    Will follow up in 2 weeks with check in and labs.     Briana Larios PharmD  September 16, 2019

## 2019-09-17 ENCOUNTER — HOSPITAL ENCOUNTER (OUTPATIENT)
Dept: OCCUPATIONAL THERAPY | Facility: CLINIC | Age: 49
Setting detail: THERAPIES SERIES
End: 2019-09-17
Attending: INTERNAL MEDICINE
Payer: COMMERCIAL

## 2019-09-17 DIAGNOSIS — I89.0 LYMPHEDEMA: Primary | ICD-10-CM

## 2019-09-17 PROCEDURE — 97140 MANUAL THERAPY 1/> REGIONS: CPT | Mod: GO

## 2019-09-18 ENCOUNTER — ALLIED HEALTH/NURSE VISIT (OUTPATIENT)
Dept: ONCOLOGY | Facility: CLINIC | Age: 49
End: 2019-09-18

## 2019-09-18 ENCOUNTER — HOSPITAL ENCOUNTER (OUTPATIENT)
Dept: OCCUPATIONAL THERAPY | Facility: CLINIC | Age: 49
Setting detail: THERAPIES SERIES
End: 2019-09-18
Attending: INTERNAL MEDICINE
Payer: COMMERCIAL

## 2019-09-18 DIAGNOSIS — Z71.9 COUNSELING, UNSPECIFIED: Primary | ICD-10-CM

## 2019-09-18 PROCEDURE — 97140 MANUAL THERAPY 1/> REGIONS: CPT | Mod: GO

## 2019-09-18 NOTE — PROGRESS NOTES
Social Work Progress Note      Data/Intervention:  Patient Name:  Luci Londono  /Age:  1970 (48 year old)    Reason for Follow-Up:  Luci is a 48-year-old woman with a diagnosis of right breast, high grade IDC. This clinician met with Luci today per her request for assistance in completion of PRR application.     Intervention:   This clinician facilitated completion of Part 2 application and collecting of needed documents. This clinician to send all paperwork 19 to facilitate completion of application. This clinician started to support Luci in completion of paperwork that she had received regarding SSDI application, and made plan to meet again 19 to help her to finish this paperwork as writing is difficult for Luci given amount of swelling in right arm.     Plan:  Previously provided patient/family with writer's contact information and availability. This clinician will plan to next see Luci 19 for continued support surrounding SSDI paperwork and to submit PRR application.     Please call or page if needs or concerns arise.     MIRA Julien, [facility]  Direct Phone: [phone]  Pager: [phone]

## 2019-09-19 ENCOUNTER — HOSPITAL ENCOUNTER (OUTPATIENT)
Dept: OCCUPATIONAL THERAPY | Facility: CLINIC | Age: 49
Setting detail: THERAPIES SERIES
End: 2019-09-19
Attending: INTERNAL MEDICINE
Payer: COMMERCIAL

## 2019-09-19 PROCEDURE — 97140 MANUAL THERAPY 1/> REGIONS: CPT | Mod: GO | Performed by: OCCUPATIONAL THERAPIST

## 2019-09-24 ENCOUNTER — HOSPITAL ENCOUNTER (OUTPATIENT)
Dept: OCCUPATIONAL THERAPY | Facility: CLINIC | Age: 49
Setting detail: THERAPIES SERIES
End: 2019-09-24
Attending: INTERNAL MEDICINE
Payer: COMMERCIAL

## 2019-09-24 PROCEDURE — 97140 MANUAL THERAPY 1/> REGIONS: CPT | Mod: GO

## 2019-09-25 ENCOUNTER — HOSPITAL ENCOUNTER (OUTPATIENT)
Dept: OCCUPATIONAL THERAPY | Facility: CLINIC | Age: 49
Setting detail: THERAPIES SERIES
End: 2019-09-25
Attending: INTERNAL MEDICINE
Payer: COMMERCIAL

## 2019-09-25 ENCOUNTER — ALLIED HEALTH/NURSE VISIT (OUTPATIENT)
Dept: ONCOLOGY | Facility: CLINIC | Age: 49
End: 2019-09-25

## 2019-09-25 DIAGNOSIS — Z71.9 COUNSELING, UNSPECIFIED: Primary | ICD-10-CM

## 2019-09-25 PROCEDURE — 97140 MANUAL THERAPY 1/> REGIONS: CPT | Mod: GO

## 2019-09-25 NOTE — PROGRESS NOTES
Social Work Progress Note      Data/Intervention:  Patient Name:  Luci Londono  /Age:  1970 (48 year old)    Reason for Follow-Up:  Luci is a 48-year-old woman with a diagnosis of right breast, high grade IDC. Luci currently is currently on capecitabine therapy. This clinician met with Luci today at her request for assistance in completing SSDI documentation.     Intervention:   Luci reports that she needs to wear compression sleeve on arm and hand throughout  to assist with swelling. Luci endorses that stretching, washing hair, and reaching for some items has been more painful since surgery. Luci also reports today that she has a more difficulty with her concentration and increased mental fogginess since starting new oral treatment.     Due to concentration changes and physical discomfort filling out forms, this clinician assisted Luci in completing documentation to support SSDI application.     Plan:  Previously provided patient/family with writer's contact information and availability. Will continue to be available as needed for ongoing psychosocial support.     Please call or page if needs or concerns arise.     MIRA Julien, [facility]  Direct Phone: [phone]  Pager: [phone]

## 2019-09-26 ENCOUNTER — HOSPITAL ENCOUNTER (OUTPATIENT)
Dept: OCCUPATIONAL THERAPY | Facility: CLINIC | Age: 49
Setting detail: THERAPIES SERIES
End: 2019-09-26
Attending: INTERNAL MEDICINE
Payer: COMMERCIAL

## 2019-09-26 PROCEDURE — 97140 MANUAL THERAPY 1/> REGIONS: CPT | Mod: GO | Performed by: OCCUPATIONAL THERAPIST

## 2019-09-27 DIAGNOSIS — E03.9 ACQUIRED HYPOTHYROIDISM: ICD-10-CM

## 2019-09-27 RX ORDER — LEVOTHYROXINE SODIUM 125 UG/1
TABLET ORAL
Qty: 30 TABLET | Refills: 0 | Status: SHIPPED | OUTPATIENT
Start: 2019-09-27 | End: 2019-10-27

## 2019-09-27 NOTE — TELEPHONE ENCOUNTER
"Requested Prescriptions   Pending Prescriptions Disp Refills     levothyroxine (SYNTHROID/LEVOTHROID) 125 MCG tablet [Pharmacy Med Name: LEVOTHYROXINE 0.125MG (125MCG) TAB] 30 tablet 0     Sig: TAKE 1 TABLET BY MOUTH DAILY   Last Written Prescription Date:  8/29/2019  Last Fill Quantity: 30,  # refills: 0   Last office visit: 7/30/2019 with prescribing provider:  MARA Wills  Future Office Visit:   Next 5 appointments (look out 90 days)    Oct 02, 2019  9:45 AM CDT  Return Visit with SH LAB DRAW 1  [facility] ([facility]) [facility]  [address]  [place] [address]  [phone]   Oct 02, 2019 10:20 AM CDT  Return Visit with Addie Murillo MD  [facility] ([facility]) [facility]  [address]  [place] [address]  [phone]   Dec 04, 2019  8:40 AM CST  Return Visit with Addie Murillo MD  [facility] ([facility]) [facility]  [address]  [place] [address]  [phone]             Thyroid Protocol Failed - 9/27/2019  5:12 AM        Failed - Normal TSH on file in past 12 months     Recent Labs   Lab Test 05/09/19  1415   TSH 8.17*              Passed - Patient is 12 years or older        Passed - Recent (12 mo) or future (30 days) visit within the authorizing provider's specialty     Patient has had an office visit with the authorizing provider or a provider within the authorizing providers department within the previous 12 mos or has a future within next 30 days. See \"Patient Info\" tab in inbasket, or \"Choose Columns\" in Meds & Orders section of the refill encounter.              Passed - Medication is active on med list        Passed - No active pregnancy on record     If patient is pregnant or has had a positive pregnancy test, please check TSH.          Passed - No positive pregnancy test in past 12 months "     If patient is pregnant or has had a positive pregnancy test, please check TSH.          Routing refill request to provider for review/approval because:  Labs out of range:  TSH    RIKI WoodN, RN  [employer]

## 2019-10-01 ENCOUNTER — HOSPITAL ENCOUNTER (OUTPATIENT)
Dept: OCCUPATIONAL THERAPY | Facility: CLINIC | Age: 49
Setting detail: THERAPIES SERIES
End: 2019-10-01
Attending: INTERNAL MEDICINE
Payer: COMMERCIAL

## 2019-10-01 PROCEDURE — 97140 MANUAL THERAPY 1/> REGIONS: CPT | Mod: GO

## 2019-10-02 ENCOUNTER — ONCOLOGY VISIT (OUTPATIENT)
Dept: ONCOLOGY | Facility: CLINIC | Age: 49
End: 2019-10-02
Attending: INTERNAL MEDICINE
Payer: COMMERCIAL

## 2019-10-02 ENCOUNTER — HOSPITAL ENCOUNTER (OUTPATIENT)
Dept: OCCUPATIONAL THERAPY | Facility: CLINIC | Age: 49
Setting detail: THERAPIES SERIES
End: 2019-10-02
Attending: INTERNAL MEDICINE
Payer: COMMERCIAL

## 2019-10-02 ENCOUNTER — INFUSION THERAPY VISIT (OUTPATIENT)
Dept: INFUSION THERAPY | Facility: CLINIC | Age: 49
End: 2019-10-02
Attending: INTERNAL MEDICINE
Payer: COMMERCIAL

## 2019-10-02 ENCOUNTER — HOSPITAL ENCOUNTER (OUTPATIENT)
Facility: CLINIC | Age: 49
Setting detail: SPECIMEN
Discharge: HOME | End: 2019-10-02
Attending: INTERNAL MEDICINE | Admitting: INTERNAL MEDICINE
Payer: COMMERCIAL

## 2019-10-02 ENCOUNTER — TELEPHONE (OUTPATIENT)
Dept: INFUSION THERAPY | Facility: CLINIC | Age: 49
End: 2019-10-02

## 2019-10-02 VITALS
BODY MASS INDEX: 34.73 KG/M2 | DIASTOLIC BLOOD PRESSURE: 75 MMHG | WEIGHT: 196 LBS | OXYGEN SATURATION: 100 % | HEART RATE: 70 BPM | RESPIRATION RATE: 18 BRPM | TEMPERATURE: 98.5 F | SYSTOLIC BLOOD PRESSURE: 115 MMHG | HEIGHT: 63 IN

## 2019-10-02 DIAGNOSIS — G62.9 NEUROPATHY: ICD-10-CM

## 2019-10-02 DIAGNOSIS — E87.6 HYPOKALEMIA: ICD-10-CM

## 2019-10-02 DIAGNOSIS — R71.8 MICROCYTOSIS: ICD-10-CM

## 2019-10-02 DIAGNOSIS — R07.89 RIGHT-SIDED CHEST WALL PAIN: Primary | ICD-10-CM

## 2019-10-02 DIAGNOSIS — Z17.1 MALIGNANT NEOPLASM OF UPPER-OUTER QUADRANT OF RIGHT BREAST IN FEMALE, ESTROGEN RECEPTOR NEGATIVE (H): ICD-10-CM

## 2019-10-02 DIAGNOSIS — Z12.31 SCREENING MAMMOGRAM, ENCOUNTER FOR: ICD-10-CM

## 2019-10-02 DIAGNOSIS — C50.411 MALIGNANT NEOPLASM OF UPPER-OUTER QUADRANT OF RIGHT BREAST IN FEMALE, ESTROGEN RECEPTOR NEGATIVE (H): ICD-10-CM

## 2019-10-02 LAB
ALBUMIN SERPL-MCNC: 3.4 G/DL (ref 3.4–5)
ALP SERPL-CCNC: 89 U/L (ref 40–150)
ALT SERPL W P-5'-P-CCNC: 31 U/L (ref 0–50)
ANION GAP SERPL CALCULATED.3IONS-SCNC: 1 MMOL/L (ref 3–14)
AST SERPL W P-5'-P-CCNC: 22 U/L (ref 0–45)
BASOPHILS # BLD AUTO: 0 10E9/L (ref 0–0.2)
BASOPHILS NFR BLD AUTO: 0.2 %
BILIRUB SERPL-MCNC: 0.5 MG/DL (ref 0.2–1.3)
BUN SERPL-MCNC: 8 MG/DL (ref 7–30)
CALCIUM SERPL-MCNC: 9 MG/DL (ref 8.5–10.1)
CHLORIDE SERPL-SCNC: 105 MMOL/L (ref 94–109)
CO2 SERPL-SCNC: 31 MMOL/L (ref 20–32)
CREAT SERPL-MCNC: 0.62 MG/DL (ref 0.52–1.04)
DIFFERENTIAL METHOD BLD: ABNORMAL
EOSINOPHIL # BLD AUTO: 0.1 10E9/L (ref 0–0.7)
EOSINOPHIL NFR BLD AUTO: 1.2 %
ERYTHROCYTE [DISTWIDTH] IN BLOOD BY AUTOMATED COUNT: 17.7 % (ref 10–15)
GFR SERPL CREATININE-BSD FRML MDRD: >90 ML/MIN/{1.73_M2}
GLUCOSE SERPL-MCNC: 83 MG/DL (ref 70–99)
HCT VFR BLD AUTO: 36.2 % (ref 35–47)
HGB BLD-MCNC: 12 G/DL (ref 11.7–15.7)
IMM GRANULOCYTES # BLD: 0 10E9/L (ref 0–0.4)
IMM GRANULOCYTES NFR BLD: 0.2 %
LYMPHOCYTES # BLD AUTO: 2.2 10E9/L (ref 0.8–5.3)
LYMPHOCYTES NFR BLD AUTO: 37.8 %
MCH RBC QN AUTO: 25.8 PG (ref 26.5–33)
MCHC RBC AUTO-ENTMCNC: 33.1 G/DL (ref 31.5–36.5)
MCV RBC AUTO: 78 FL (ref 78–100)
MONOCYTES # BLD AUTO: 0.4 10E9/L (ref 0–1.3)
MONOCYTES NFR BLD AUTO: 6.1 %
NEUTROPHILS # BLD AUTO: 3.2 10E9/L (ref 1.6–8.3)
NEUTROPHILS NFR BLD AUTO: 54.5 %
NRBC # BLD AUTO: 0 10*3/UL
NRBC BLD AUTO-RTO: 0 /100
PLATELET # BLD AUTO: 220 10E9/L (ref 150–450)
POTASSIUM SERPL-SCNC: 3.8 MMOL/L (ref 3.4–5.3)
POTASSIUM SERPL-SCNC: 3.9 MMOL/L (ref 3.4–5.3)
PROT SERPL-MCNC: 7.2 G/DL (ref 6.8–8.8)
RBC # BLD AUTO: 4.66 10E12/L (ref 3.8–5.2)
SODIUM SERPL-SCNC: 137 MMOL/L (ref 133–144)
WBC # BLD AUTO: 5.9 10E9/L (ref 4–11)

## 2019-10-02 PROCEDURE — 84132 ASSAY OF SERUM POTASSIUM: CPT | Performed by: INTERNAL MEDICINE

## 2019-10-02 PROCEDURE — 99215 OFFICE O/P EST HI 40 MIN: CPT | Performed by: INTERNAL MEDICINE

## 2019-10-02 PROCEDURE — 36415 COLL VENOUS BLD VENIPUNCTURE: CPT

## 2019-10-02 PROCEDURE — 80053 COMPREHEN METABOLIC PANEL: CPT | Performed by: INTERNAL MEDICINE

## 2019-10-02 PROCEDURE — G0463 HOSPITAL OUTPT CLINIC VISIT: HCPCS

## 2019-10-02 PROCEDURE — 97140 MANUAL THERAPY 1/> REGIONS: CPT | Mod: GO

## 2019-10-02 PROCEDURE — 85025 COMPLETE CBC W/AUTO DIFF WBC: CPT | Performed by: INTERNAL MEDICINE

## 2019-10-02 ASSESSMENT — PAIN SCALES - GENERAL: PAINLEVEL: SEVERE PAIN (7)

## 2019-10-02 ASSESSMENT — MIFFLIN-ST. JEOR: SCORE: 1493.18

## 2019-10-02 NOTE — TELEPHONE ENCOUNTER
Oral Chemotherapy Monitoring Program.     Patient currently on capecitabine therapy. 1500mg BID x 7 days then off for 7 days.     Luci started therapy on 9/9/19.   She has been doing well. She has had some slight diarrhea that resolves on its own.  She did note that she does have headaches on her off week.  She uses tylenol if needed.  Her hands and feet are doing well.  She uses hand lotions multiple times during the day.  She has no nausea.  Labs from 10/2/19 are acceptable.     Questions answered to patient's satisfaction.     Will follow up 10/30/19 while in clinic.    Fransico Newsome, PharmD, BCOP  October 2, 2019
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
## 2019-10-02 NOTE — PROGRESS NOTES
Medical Assistant Note:  Luci Londono presents today for Lab draw.    Patient seen by provider today: Yes: Ge.   present during visit today: Not Applicable.    Concerns: No Concerns.    Procedure:  Lab draw site: LAC, Needle type: Butterfly, Gauge: 23.    Post Assessment:  Labs drawn without difficulty: Yes.    Discharge Plan:  Departure Mode: Ambulatory.    Face to Face Time: 4 min.    Shari Schoenberger, CMA
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
## 2019-10-02 NOTE — PROGRESS NOTES
"Oncology Rooming Note    October 2, 2019 10:09 AM   Luci Londono is a 48 year old female who presents for:    Chief Complaint   Patient presents with     Oncology Clinic Visit     Malignant neoplasm of upper-outer quadrant of right breast in female, estrogen receptor negative (H)     Initial Vitals: /75 (BP Location: Left arm, Patient Position: Sitting, Cuff Size: Adult Large)   Pulse 70   Temp 98.5  F (36.9  C) (Oral)   Resp 18   Ht 1.6 m (5' 3\")   Wt 88.9 kg (196 lb)   SpO2 100%   BMI 34.72 kg/m   Estimated body mass index is 34.72 kg/m  as calculated from the following:    Height as of this encounter: 1.6 m (5' 3\").    Weight as of this encounter: 88.9 kg (196 lb). Body surface area is 1.99 meters squared.  Severe Pain (7) Comment: Data Unavailable   No LMP recorded.  Allergies reviewed: Yes  Medications reviewed: Yes    Medications: MEDICATION REFILLS NEEDED TODAY. Provider was notified. REFILL XELODA  Pharmacy name entered into [facility]:    [id number] - [place], [place] - [address] [facility] #[id number] - [place], [place] - [address] AT [facility] & [address] [place] [address] MAIL/SPECIALTY PHARMACY - [place], [place] - [facility] [address]    Clinical concerns: NO          Emerald Pascual CMA        "

## 2019-10-02 NOTE — PROGRESS NOTES
Medical Assistant Note:  Luci Londono presents today for lab draw.    Patient seen by provider today: Yes: Dr Murillo.   present during visit today: Not Applicable.    Concerns: No Concerns.    Procedure:  Lab draw site: LAC, Needle type: BF, Gauge: 21. Gauze and coban applied    Post Assessment:  Labs drawn without difficulty: Yes.    Discharge Plan:  Departure Mode: Ambulatory.    Face to Face Time: 5.    Emerald Pascual CMA

## 2019-10-03 ENCOUNTER — HOSPITAL ENCOUNTER (OUTPATIENT)
Dept: OCCUPATIONAL THERAPY | Facility: CLINIC | Age: 49
Setting detail: THERAPIES SERIES
End: 2019-10-03
Attending: INTERNAL MEDICINE
Payer: COMMERCIAL

## 2019-10-03 ENCOUNTER — HOSPITAL ENCOUNTER (OUTPATIENT)
Dept: MAMMOGRAPHY | Facility: CLINIC | Age: 49
End: 2019-10-03
Attending: INTERNAL MEDICINE
Payer: COMMERCIAL

## 2019-10-03 ENCOUNTER — HOSPITAL ENCOUNTER (OUTPATIENT)
Dept: GENERAL RADIOLOGY | Facility: CLINIC | Age: 49
Discharge: HOME | End: 2019-10-03
Attending: INTERNAL MEDICINE | Admitting: INTERNAL MEDICINE
Payer: COMMERCIAL

## 2019-10-03 DIAGNOSIS — R07.89 RIGHT-SIDED CHEST WALL PAIN: ICD-10-CM

## 2019-10-03 DIAGNOSIS — Z12.31 SCREENING MAMMOGRAM, ENCOUNTER FOR: ICD-10-CM

## 2019-10-03 PROCEDURE — 77063 BREAST TOMOSYNTHESIS BI: CPT

## 2019-10-03 PROCEDURE — 97140 MANUAL THERAPY 1/> REGIONS: CPT | Mod: GO | Performed by: OCCUPATIONAL THERAPIST

## 2019-10-03 PROCEDURE — 71101 X-RAY EXAM UNILAT RIBS/CHEST: CPT | Mod: RT

## 2019-10-03 RX ORDER — CAPECITABINE 500 MG/1
TABLET, FILM COATED ORAL
Qty: 98 TABLET | Refills: 0 | Status: SHIPPED | OUTPATIENT
Start: 2019-10-03 | End: 2020-05-13

## 2019-10-03 NOTE — PROGRESS NOTES
10/03/19 1408   Signing Clinician's Name / Credentials   Signing clinician's name / credentials Kirsty Ron, OTR/L CLT-PRITESH   Session Number   Session Number 10/20  (10th VISIT PROGRESS NOTE)   Progress/Recertification   Progress Note Due 10/03/19   Progress Note Completed 10/03/19   Recertification Due 11/29/19   Goal 1   Goal identifier Education   Goal description Pt will report understanding s/s lymphedema, s/s infections, and tretament options for lymphedema to build Ind with home management RUE/trunkal lymphedema   Target date 09/10/19   Date met 09/10/19   Goal 2   Goal identifier GCB Wearing   Goal description Tolerate gradient compression bandaging/wearing compression garments 23 hrs/day to prevent re-acccumulation of extracellular fluid for reducitons needed to aide imporvements in weight in RUE for improved comfort and functional use RUE   Target date 11/29/19   Date met 09/25/19  (goal modified to 12/24hrs GCB use/12/24hrs sleeve/hand piece)   Goal 3   Goal identifier GCB Donning   Goal description Demonstrate independence in applying gradient compression bandages to build Ind with home management of RUE lymphedema needed for improveemnts in weight of limb and reduce risk skin infections   Target date 11/29/19   Date met 10/03/19  (Goal met)   Goal 4   Goal identifier HEP/MLD   Goal description Demonstrate independence in performing prescribed exercises to facilate the lymph system and muscle pumping systems for max reducitons needed for improvements in weight of limb for reaching tasks and reduce chances skin infection   Target date 11/29/19   Goal 5   Goal identifier Garments   Goal description Be independent in donning/doffing, wearing schedule, and care of compression garments to build Ind with home management of RUE lymphedema needed for improveemnts in weight of limb and reduce risk skin infections    Target date 11/29/19   Date met 09/24/19   Goal 6   Goal identifier Limb weight   Goal  "description Pt will report a reduciton in subjective feeling of heaviness and weight in RUE post GCB phase, for improved Ind and ease with reaching tasks   Target date 11/29/19   Date met 10/03/19  (Goal met)   Subjective Report   Subjective Report Pt arrived wearing RUE compress sleeve as she has been wearing daily, and has been self-applying GCB at night. Pt cites reduced volume RUE, \"It feel a little lighter compared to before I came in.\" \"What really bothers me most is the numbness <indicates over R triceps region>; then when I touch it I get pain.\"   Objective Measure 1   Objective Measure BUE msmts   Details Per msmts taken 9.17.19 RUE > LUE = 15%; per RUE msmts 10.2.19 vs baseline LUE msmts 9.17.19 RUE > LUE = 6% (see \"Rehab Edema Girth Measurement\" daily flowsheet for details.)   Manual Therapy   Manual Therapy Minutes (57520) 60   Skilled Intervention MLD, MFR   Patient Response Pt cites + follow-thru HP, decr volume RUE allowing incr AROM for ADLs   Treatment Detail MLD B terminus, B CERV/clav LN, B shoulder collectors, B ANT IC LN/Fleming's reflexes, B parasternal LN, TDL.   Skin drained RUQ/RUE from supine & L sidelying to LUQ & R CLAV/cerv LN. MFR long & short lever techniques to scar tissue R breast/R ax from supine & L sidelying.   Progress Met goals #3 & 6   Plan   Home program UE HEP, night wear RUE GCB, day wear RUE/truncal compress garments   Plan for next session MLD, reassess ongoing POC.   Total Session Time   Timed Code Treatment Minutes 60   Total Treatment Time (sum of timed and untimed services) 60     "

## 2019-10-04 ENCOUNTER — TELEPHONE (OUTPATIENT)
Dept: ONCOLOGY | Facility: CLINIC | Age: 49
End: 2019-10-04

## 2019-10-04 DIAGNOSIS — R07.89 RIGHT-SIDED CHEST WALL PAIN: Primary | ICD-10-CM

## 2019-10-04 RX ORDER — LIDOCAINE 4 G/G
1 PATCH TOPICAL EVERY 24 HOURS
Qty: 12 PATCH | Refills: 1 | Status: SHIPPED | OUTPATIENT
Start: 2019-10-04 | End: 2019-10-07

## 2019-10-04 NOTE — TELEPHONE ENCOUNTER
Called pt to let her know the results of her chest and rib xray reports. For discomfort orders sent for lidocaine gel and lido patches to pharmacy of pt choice.   Pt agreeable with plan.   Sekou Galeano RN

## 2019-10-07 DIAGNOSIS — R07.89 RIGHT-SIDED CHEST WALL PAIN: ICD-10-CM

## 2019-10-07 RX ORDER — LIDOCAINE 4 G/G
1 PATCH TOPICAL EVERY 24 HOURS
Qty: 12 PATCH | Refills: 1 | Status: SHIPPED | OUTPATIENT
Start: 2019-10-07 | End: 2020-01-15

## 2019-10-10 ENCOUNTER — TELEPHONE (OUTPATIENT)
Dept: PHARMACY | Facility: CLINIC | Age: 49
End: 2019-10-10

## 2019-10-10 DIAGNOSIS — G89.29 CHRONIC LEFT-SIDED LOW BACK PAIN WITH LEFT-SIDED SCIATICA: Primary | ICD-10-CM

## 2019-10-10 DIAGNOSIS — M54.42 CHRONIC LEFT-SIDED LOW BACK PAIN WITH LEFT-SIDED SCIATICA: Primary | ICD-10-CM

## 2019-10-10 PROBLEM — M72.2 PLANTAR FASCIITIS, BILATERAL: Status: RESOLVED | Noted: 2019-08-06 | Resolved: 2019-10-10

## 2019-10-10 RX ORDER — NABUMETONE 500 MG/1
500 TABLET, FILM COATED ORAL 2 TIMES DAILY
Qty: 60 TABLET | Refills: 3 | Status: SHIPPED | OUTPATIENT
Start: 2019-10-10 | End: 2020-04-21

## 2019-10-10 NOTE — TELEPHONE ENCOUNTER
Diclofenac 1% gel is non-formulary with this patients insurance policy.    Insurance prefers Indomethacin, ketoprofen, meloxicam or nabumetone. Please write a new prescription for one of these drugs.    Lidocaine 4% patches are not covered either, these are OTC so the patient purchase these out of pocket. If these do not work we can submit for a prior authorization for the lidocaine 5% patches, which are also Non-Formulary.    Agnes Agrawal [facility] Liaison  [phone]

## 2019-10-10 NOTE — PROGRESS NOTES
DISCHARGE SUMMARY    Luci Londono was seen 2 times for evaluation and treatment.  Patient did not return for further treatment and current status is unknown.  Due to short treatment duration, no objective or functional changes were made.  Please see goal flow sheet from episode noted date below and initial evaluation for further information.  Patient is discharged from therapy and therapy episode is resolved as of 10/10/19.      Linked Episodes   Type: Episode: Status: Noted: Resolved: Last update: Updated by:   PHYSICAL THERAPY LBP, Jarad Plantar fascitis 8/6/2019 Active 8/6/2019 8/22/2019 12:01 PM Grant Marinelli, PT      Comments:     Grant Mary, BARBARA, DPT

## 2019-10-23 ENCOUNTER — HOSPITAL ENCOUNTER (OUTPATIENT)
Dept: OCCUPATIONAL THERAPY | Facility: CLINIC | Age: 49
Setting detail: THERAPIES SERIES
End: 2019-10-23
Attending: INTERNAL MEDICINE
Payer: COMMERCIAL

## 2019-10-23 DIAGNOSIS — I89.0 LYMPHEDEMA: Primary | ICD-10-CM

## 2019-10-23 PROCEDURE — 97140 MANUAL THERAPY 1/> REGIONS: CPT | Mod: GO

## 2019-10-23 NOTE — PROGRESS NOTES
"Outpatient Occupational Therapy Discharge Note     Patient: Luci Londono  : 1970    Beginning/End Dates of Reporting Period:  9/10/19 to 10/23/2019    Referring Provider: KAJAL Murillo    Therapy Diagnosis: lymphedema    Client Self Report: I don't have any stiffness anymore\"     Objective Measurements: see flowsheet                                                        Outcome Measures (most recent score):  Lymphedema Life Impact Scale (score range 0-72). A higher score indicates greater impairment.: 5 post scrore from 43 pre score indicates benefit and satidfaction with services    Goals:   Goal Identifier Education   Goal Description Pt will report understanding s/s lymphedema, s/s infections, and tretament options for lymphedema to build Ind with home management RUE/trunkal lymphedema   Target Date 09/10/19   Date Met  09/10/19   Progress:     Goal Identifier GCB Wearing   Goal Description Tolerate gradient compression bandaging/wearing compression garments 23 hrs/day to prevent re-acccumulation of extracellular fluid for reducitons needed to aide imporvements in weight in RUE for improved comfort and functional use RUE   Target Date 19   Date Met  19(goal modified to 12/24hrs GCB use/12/24hrs sleeve/hand piece)   Progress:     Goal Identifier GCB Donning   Goal Description Demonstrate independence in applying gradient compression bandages to build Ind with home management of RUE lymphedema needed for improveemnts in weight of limb and reduce risk skin infections   Target Date 19   Date Met  10/03/19(Goal met)   Progress:     Goal Identifier HEP/MLD   Goal Description Demonstrate independence in performing prescribed exercises to facilate the lymph system and muscle pumping systems for max reducitons needed for improvements in weight of limb for reaching tasks and reduce chances skin infection   Target Date 19   Date Met  10/23/19   Progress:     Goal Identifier Garments "   Goal Description Be independent in donning/doffing, wearing schedule, and care of compression garments to build Ind with home management of RUE lymphedema needed for improveemnts in weight of limb and reduce risk skin infections    Target Date 11/29/19   Date Met  09/24/19   Progress:     Goal Identifier Limb weight   Goal Description Pt will report a reduciton in subjective feeling of heaviness and weight in RUE post GCB phase, for improved Ind and ease with reaching tasks   Target Date 11/29/19   Date Met  10/03/19(Goal met)   Progress:     Goal Identifier     Goal Description     Target Date     Date Met      Progress:     Goal Identifier     Goal Description     Target Date     Date Met      Progress:     Progress Toward Goals:   Progress this reporting period: Pt has built I with use GCB's and self MLD and reduced RUE and trunkal lymphedema. She has used daily compression sleeve and reports less discomfort and pain and stiffness she initially had is gone. Pt I with home management and appropriate to discharge. Pt has HEP for home management as well.      Plan:  Discharge from therapy.    Discharge:    Reason for Discharge: Patient has met all goals.  No further expectation of progress.    Equipment Issued: RUE 20-30 mm Hg compression sleeve and guantlet obtained    Discharge Plan: Patient to continue home program.

## 2019-10-27 DIAGNOSIS — E03.9 ACQUIRED HYPOTHYROIDISM: ICD-10-CM

## 2019-10-28 RX ORDER — LEVOTHYROXINE SODIUM 125 UG/1
TABLET ORAL
Qty: 90 TABLET | Refills: 0 | Status: SHIPPED | OUTPATIENT
Start: 2019-10-28 | End: 2020-01-08

## 2019-10-28 NOTE — TELEPHONE ENCOUNTER
"Requested Prescriptions   Pending Prescriptions Disp Refills     levothyroxine (SYNTHROID/LEVOTHROID) 125 MCG tablet [Pharmacy Med Name: LEVOTHYROXINE 0.125MG (125MCG) TAB]  Last Written Prescription Date:  9/27/2019  Last Fill Quantity: 30 tablet,  # refills: 0   Last office visit: 7/30/2019 with prescribing provider:  MARA Wills   Future Office Visit:   Next 5 appointments (look out 90 days)    Oct 30, 2019  8:10 AM CDT  Return Visit with SH LAB DRAW 1  [facility] ([facility]) [facility] [place] [id number]  [place] [address]  [phone]   Oct 30, 2019  9:00 AM CDT  Return Visit with Addie Murillo MD  [facility] ([facility]) [facility]  [address]  [place] [address]  [phone]   Dec 04, 2019  8:40 AM CST  Return Visit with Addie Murillo MD  [facility] ([facility]) [facility]  [address]  [place] [address]  [phone]          30 tablet 0     Sig: TAKE 1 TABLET BY MOUTH DAILY       Thyroid Protocol Failed - 10/27/2019  5:22 AM        Failed - Normal TSH on file in past 12 months     Recent Labs   Lab Test 05/09/19  1415   TSH 8.17*              Passed - Patient is 12 years or older        Passed - Recent (12 mo) or future (30 days) visit within the authorizing provider's specialty     Patient has had an office visit with the authorizing provider or a provider within the authorizing providers department within the previous 12 mos or has a future within next 30 days. See \"Patient Info\" tab in inbasket, or \"Choose Columns\" in Meds & Orders section of the refill encounter.              Passed - Medication is active on med list        Passed - No active pregnancy on record     If patient is pregnant or has had a positive pregnancy test, please check TSH.          Passed - No positive pregnancy test in past " 12 months     If patient is pregnant or has had a positive pregnancy test, please check TSH.

## 2019-10-30 ENCOUNTER — ONCOLOGY VISIT (OUTPATIENT)
Dept: ONCOLOGY | Facility: CLINIC | Age: 49
End: 2019-10-30
Attending: INTERNAL MEDICINE
Payer: COMMERCIAL

## 2019-10-30 ENCOUNTER — HOSPITAL ENCOUNTER (OUTPATIENT)
Facility: CLINIC | Age: 49
Setting detail: SPECIMEN
Discharge: HOME | End: 2019-10-30
Attending: INTERNAL MEDICINE | Admitting: INTERNAL MEDICINE
Payer: COMMERCIAL

## 2019-10-30 ENCOUNTER — INFUSION THERAPY VISIT (OUTPATIENT)
Dept: INFUSION THERAPY | Facility: CLINIC | Age: 49
End: 2019-10-30
Attending: INTERNAL MEDICINE
Payer: COMMERCIAL

## 2019-10-30 ENCOUNTER — DOCUMENTATION ONLY (OUTPATIENT)
Dept: PHARMACY | Facility: CLINIC | Age: 49
End: 2019-10-30

## 2019-10-30 VITALS
DIASTOLIC BLOOD PRESSURE: 82 MMHG | RESPIRATION RATE: 18 BRPM | HEIGHT: 63 IN | HEART RATE: 58 BPM | OXYGEN SATURATION: 100 % | SYSTOLIC BLOOD PRESSURE: 142 MMHG | WEIGHT: 195.4 LBS | BODY MASS INDEX: 34.62 KG/M2 | TEMPERATURE: 98.1 F

## 2019-10-30 DIAGNOSIS — Z15.89 MONOALLELIC MUTATION OF PALB2 GENE: ICD-10-CM

## 2019-10-30 DIAGNOSIS — R71.8 MICROCYTOSIS: ICD-10-CM

## 2019-10-30 DIAGNOSIS — Z17.1 MALIGNANT NEOPLASM OF UPPER-OUTER QUADRANT OF RIGHT BREAST IN FEMALE, ESTROGEN RECEPTOR NEGATIVE (H): Primary | ICD-10-CM

## 2019-10-30 DIAGNOSIS — C50.919 BREAST CANCER (H): Primary | ICD-10-CM

## 2019-10-30 DIAGNOSIS — Z15.09 MONOALLELIC MUTATION OF PALB2 GENE: ICD-10-CM

## 2019-10-30 DIAGNOSIS — Z23 NEED FOR PROPHYLACTIC VACCINATION AND INOCULATION AGAINST INFLUENZA: ICD-10-CM

## 2019-10-30 DIAGNOSIS — Z15.01 MONOALLELIC MUTATION OF PALB2 GENE: ICD-10-CM

## 2019-10-30 DIAGNOSIS — C50.411 MALIGNANT NEOPLASM OF UPPER-OUTER QUADRANT OF RIGHT BREAST IN FEMALE, ESTROGEN RECEPTOR NEGATIVE (H): Primary | ICD-10-CM

## 2019-10-30 DIAGNOSIS — N64.4 BREAST PAIN, RIGHT: ICD-10-CM

## 2019-10-30 DIAGNOSIS — E03.9 ACQUIRED HYPOTHYROIDISM: ICD-10-CM

## 2019-10-30 LAB
ALBUMIN SERPL-MCNC: 3.6 G/DL (ref 3.4–5)
ALP SERPL-CCNC: 113 U/L (ref 40–150)
ALT SERPL W P-5'-P-CCNC: 33 U/L (ref 0–50)
ANION GAP SERPL CALCULATED.3IONS-SCNC: <1 MMOL/L (ref 3–14)
AST SERPL W P-5'-P-CCNC: 29 U/L (ref 0–45)
BASOPHILS # BLD AUTO: 0 10E9/L (ref 0–0.2)
BASOPHILS NFR BLD AUTO: 0.2 %
BILIRUB SERPL-MCNC: 0.5 MG/DL (ref 0.2–1.3)
BUN SERPL-MCNC: 8 MG/DL (ref 7–30)
CALCIUM SERPL-MCNC: 9.4 MG/DL (ref 8.5–10.1)
CHLORIDE SERPL-SCNC: 105 MMOL/L (ref 94–109)
CO2 SERPL-SCNC: 31 MMOL/L (ref 20–32)
CREAT SERPL-MCNC: 0.73 MG/DL (ref 0.52–1.04)
DIFFERENTIAL METHOD BLD: ABNORMAL
EOSINOPHIL # BLD AUTO: 0.1 10E9/L (ref 0–0.7)
EOSINOPHIL NFR BLD AUTO: 1.5 %
ERYTHROCYTE [DISTWIDTH] IN BLOOD BY AUTOMATED COUNT: 20.1 % (ref 10–15)
FERRITIN SERPL-MCNC: 23 NG/ML (ref 8–252)
GFR SERPL CREATININE-BSD FRML MDRD: >90 ML/MIN/{1.73_M2}
GLUCOSE SERPL-MCNC: 92 MG/DL (ref 70–99)
HCT VFR BLD AUTO: 36.7 % (ref 35–47)
HGB BLD-MCNC: 12.3 G/DL (ref 11.7–15.7)
IMM GRANULOCYTES # BLD: 0 10E9/L (ref 0–0.4)
IMM GRANULOCYTES NFR BLD: 0.2 %
IRON SATN MFR SERPL: 18 % (ref 15–46)
IRON SERPL-MCNC: 72 UG/DL (ref 35–180)
LYMPHOCYTES # BLD AUTO: 1.6 10E9/L (ref 0.8–5.3)
LYMPHOCYTES NFR BLD AUTO: 33.7 %
MCH RBC QN AUTO: 26.9 PG (ref 26.5–33)
MCHC RBC AUTO-ENTMCNC: 33.5 G/DL (ref 31.5–36.5)
MCV RBC AUTO: 80 FL (ref 78–100)
MONOCYTES # BLD AUTO: 0.3 10E9/L (ref 0–1.3)
MONOCYTES NFR BLD AUTO: 6.1 %
NEUTROPHILS # BLD AUTO: 2.8 10E9/L (ref 1.6–8.3)
NEUTROPHILS NFR BLD AUTO: 58.3 %
NRBC # BLD AUTO: 0 10*3/UL
NRBC BLD AUTO-RTO: 0 /100
PLATELET # BLD AUTO: 222 10E9/L (ref 150–450)
POTASSIUM SERPL-SCNC: 4.2 MMOL/L (ref 3.4–5.3)
PROT SERPL-MCNC: 7.3 G/DL (ref 6.8–8.8)
RBC # BLD AUTO: 4.58 10E12/L (ref 3.8–5.2)
SODIUM SERPL-SCNC: 136 MMOL/L (ref 133–144)
TIBC SERPL-MCNC: 410 UG/DL (ref 240–430)
TSH SERPL DL<=0.005 MIU/L-ACNC: 0.62 MU/L (ref 0.4–4)
WBC # BLD AUTO: 4.7 10E9/L (ref 4–11)

## 2019-10-30 PROCEDURE — 83540 ASSAY OF IRON: CPT | Performed by: FAMILY MEDICINE

## 2019-10-30 PROCEDURE — 84443 ASSAY THYROID STIM HORMONE: CPT | Performed by: FAMILY MEDICINE

## 2019-10-30 PROCEDURE — 99215 OFFICE O/P EST HI 40 MIN: CPT | Performed by: INTERNAL MEDICINE

## 2019-10-30 PROCEDURE — G0463 HOSPITAL OUTPT CLINIC VISIT: HCPCS | Mod: 25

## 2019-10-30 PROCEDURE — 83550 IRON BINDING TEST: CPT | Performed by: FAMILY MEDICINE

## 2019-10-30 PROCEDURE — 82728 ASSAY OF FERRITIN: CPT | Performed by: FAMILY MEDICINE

## 2019-10-30 PROCEDURE — 90686 IIV4 VACC NO PRSV 0.5 ML IM: CPT | Performed by: INTERNAL MEDICINE

## 2019-10-30 PROCEDURE — 85025 COMPLETE CBC W/AUTO DIFF WBC: CPT | Performed by: INTERNAL MEDICINE

## 2019-10-30 PROCEDURE — G0008 ADMIN INFLUENZA VIRUS VAC: HCPCS

## 2019-10-30 PROCEDURE — 25000128 H RX IP 250 OP 636: Performed by: INTERNAL MEDICINE

## 2019-10-30 PROCEDURE — 80053 COMPREHEN METABOLIC PANEL: CPT | Performed by: INTERNAL MEDICINE

## 2019-10-30 PROCEDURE — 36415 COLL VENOUS BLD VENIPUNCTURE: CPT

## 2019-10-30 RX ORDER — LIDOCAINE HYDROCHLORIDE 20 MG/ML
JELLY TOPICAL PRN
Qty: 30 ML | Refills: 0 | Status: SHIPPED | OUTPATIENT
Start: 2019-10-30 | End: 2020-12-03

## 2019-10-30 RX ORDER — CAPECITABINE 500 MG/1
TABLET, FILM COATED ORAL
Qty: 112 TABLET | Refills: 0 | Status: SHIPPED | OUTPATIENT
Start: 2019-10-30 | End: 2020-05-13

## 2019-10-30 RX ADMIN — INFLUENZA A VIRUS A/BRISBANE/02/2018 IVR-190 (H1N1) ANTIGEN (FORMALDEHYDE INACTIVATED), INFLUENZA A VIRUS A/KANSAS/14/2017 X-327 (H3N2) ANTIGEN (FORMALDEHYDE INACTIVATED), INFLUENZA B VIRUS B/PHUKET/3073/2013 ANTIGEN (FORMALDEHYDE INACTIVATED), AND INFLUENZA B VIRUS B/MARYLAND/15/2016 BX-69A ANTIGEN (FORMALDEHYDE INACTIVATED) 0.5 ML: 15; 15; 15; 15 INJECTION, SUSPENSION INTRAMUSCULAR at 10:00

## 2019-10-30 ASSESSMENT — MIFFLIN-ST. JEOR: SCORE: 1490.46

## 2019-10-30 ASSESSMENT — PAIN SCALES - GENERAL: PAINLEVEL: MODERATE PAIN (5)

## 2019-10-30 NOTE — PROGRESS NOTES
ONCOLOGY FOLLOW [facility] VISIT    breast surgeon:  Dr. Stacey Ashford,     REASON FOR visit:  10/2018 dx right breast TN IDC    HISTORY OF ONCOLOGY ILLNESS:    At age 47 amenorrhea with polycystic ovaries,  She felt a lump in her right breast in early October, 2018.   Her mammogram revealed a small mass in the right upper outer breast,   US revealed an 8mm hypoechoic mass at 12:00, 6cm FN and axillary US revealed a concerning lymph node which was also biopsied.   Her pathology from both breast and LN revealed a grade 3, invasive ductal carcinoma, ER/AK/her 2-.   PET found no distal disease.   Breast MRI found entire span 3.8 cm.There are multiple enlarged right axillary lymph nodes.        She made informed decision to proceed with neoadjuvant DD AC  She had drastic clinical response by PE and MRI.   She continued on wkly taxol. Carbo was added in W2. Carbo then was discontinued 3/6/2019 due to persistent neutropenia.     She had lumpectomy 5/2019 found to have [id number] (1/6) disease.     She is tested 4/2019 heterozygous positive for PALB2 p.E837 and BRCA2 variant unknown significance p.T77A, MLH1 variant, unknow significance p.I25V    She finished RT till mid Aug 2019.   LMP 10/2018.     She is not qualified for  trail.   She consented for ASA and weight loss trial.   She made informed decision to try adjuvant oral Xeloda September 2019.       INTERVAL HISTORY:  She tolerates xeloda ok so far with dosing up.       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     MEDICATIONS/ALLERY:  Reviewed in Epic system.      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 She is devoiced with 3 kids, 12, 14, 16 years old. Deny ETOH/smoking       FAMILY HISTORY:    Negative for any type of cancers      REVIEW OF SYSTEMS:   PT is helping right shoulder pain with decreased ROM.   Reports new right breast pain now moved to right axilla.    She still has numbness on right hand.       PHYSICAL EXAMINATION:   VITAL SIGNS: Blood pressure (!) 142/82, pulse 58, temperature 98.1  F (36.7  C), temperature source Oral, resp. rate 18, height 1.6 m (5' 3\"), weight 88.6 kg (195 lb 6.4 oz), SpO2 100 %, not currently breastfeeding.    ECOG 0    GENERAL APPEARANCE:  looks like her stated age, very pleasant, not in acute distress.   HEENT: The patient is normocephalic, atraumatic. Pupils are equally reactive to light.  Sclerae are anicteric.  Moist oral mucosa.  Negative pharynx.  No oral thrush. Stomatitis on left angular of mouth.    NECK:  Supple.  No jugular venous distention.  Thyroid is not palpable.   LYMPH NODES:  Superficial lymphadenopathy is not appreciable in the bilateral cervical, supraclavicular, axillary or inguinal areas.   CARDIOVASCULAR:  S1, S2 regular with no murmurs or gallops.  No carotid or abdominal bruits.   PULMONARY:  Lungs are clear to auscultation and percussion bilaterally.  There is no wheezing or rhonchi.   GASTROINTESTINAL:  Abdomen is soft, nontender.  No hepatosplenomegaly.  No signs of ascites.  No mass appreciable.   MUSCULOSKELETAL/EXTREMITIES:  + right arm edema with sleeve on.  No cyanotic changes.  No signs of joint deformity.  + lymphedema right arm.   NEUROLOGIC:  Cranial nerves II-XII are grossly intact.  Sensation intact.  Muscle strength and muscle tone symmetrical, 5/5 throughout.   BACK:  No spinal or paraspinal tenderness.  No CVA tenderness.   SKIN:  No petechiae.  No rash.  No signs of cellulitis.   BREASTS: Right breast has hyperpigmentation changesupper outer quadrant. No palpable masses or skin changes. No discomfort on palpation in " lateral portion.   Left breast is symmetrical with no skin or nipple changes. No masses on the left. Tissue is very dense throughout.          CURRENT LAB DATA REVIEWED  Cbc /cmp are fine, MCV 78 from 90    lumpectomy 5/2019 found to have [id number] (1/6) disease.     She is tested 4/2019 heterozygous positive for PALB2 p.E837 and BRCA2 variant unknown significance p.T77A, MLH1 variant, unknow significance p.I25V        CURRENT IMAGING REVIEWED  MA 10/2019 - negative  US abd 6/2019 - fatty liver.       OLD DATA REVIEWED TODAY WITH SUMMARY:   Breast MRI post AC 12/2018  1. Enhancing mass superiorly in the RIGHT breast is significantly decreased in size since 10/17/2018, measuring approximately 1.1 x 0.3 x 0.5 cm in today's study (series 5 image 52 and series 13 image 30), decreased from 1.1 x 1.6 x 1.0 cm.  2. Enlarged RIGHT axillary lymph node is slightly decreased since that time as well, now measuring 0.9 x 1.2 x 1.0 cm (series 5 image 68 and series 13 image 19), decreased from 1.3 x 1.2 x 1.4 cm.     10/2018  Right breast 8mm hypoechoic mass at 12:00, 6cm FN and right axillary LN biopsy both found grade 3, invasive ductal carcinoma, ER/CO/her 2-.       Baseline pre tx breast MRI 10/2018 -  In the right breast at 12:00 7 cm from the nipple, there is irregular heterogeneously enhancing mass, corresponding with the known biopsy-proven malignancy, which spans approximately 2.2 cm in maximal diameter, best appreciated on sagittal view, with surrounding nonmasslike enhancement likely related to postbiopsy change or DCIS.  Taken with the primary tumor, the entire span extends up to 3.8 cm.   There are multiple enlarged right axillary lymph nodes.      PET 10/2018  1. No evidence of distant metastasis.  2. Hypermetabolic focus in the upper outer quadrant right breast representing primary tumor. Hypermetabolic right axillary lymph nodes, consistent with metastatic node.  3. Indeterminate sub-4 mm pulmonary nodules, likely  fissural lymph node in the right lung.  4. Extensive FDG uptake by the brown fat in the neck and paraspinal region.    10/2018 dx MA -  revealed a small mass in the right upper outer breast, US revealed an 8mm hypoechoic mass at 12:00, 6cm FN and axillary US revealed a concerning lymph node                ASSESSMENT AND PLAN:    1.  dx cT1cN1 right breast high grade IDC, TN at age 47 in 10/2018.   S/P neoadjuvant  DD AC -T.  She had lumpectomy 5/2019 found to have [id number] (1/6) disease.     S/p post lumpectomy RT is recommended.     She is not qualified for  trail.   She consented for ASA and weight loss trial.     Oral xeloda data and side effects profile are discussed.  We discussed the side effect in detail.  She made informed decision to try adjuvant oral Xeloda September 2019.    We are increasing the xeloda to 2 g bid.     2. Young age dx with TN breast cancer, she saw genetic counseling.   She is tested 4/2019 heterozygous positive for PALB2 p.E837 and BRCA2 variant unknown significance p.T77A, MLH1 variant, unknow significance p.I25V    We discussed the 33-58% life time risk of breast cancer, increased (% unknown) risk of ovarian cancer and pancreatic cancer.     She will be a good candidate for future breast MRI screening.   Body image screening for pancreatic cancer protocol is undefined.   We will do annual body imaging as needed.    She is due MA in fall and MRI in spring.     3. microcytosis is new. We discussed the iron rich diet.     Will check her iron today.     4. New right breast pain is migratory, now is moved to right axilla.   She has likely post op neuropathy.   Advice topical lidocaine gel since voltaren is not covered by her insurance.     5. Neuropathy developing in 3/2019. Advice vit B6 oral.   She sail PT and vit B6 is helpful.

## 2019-10-30 NOTE — PROGRESS NOTES
Medical Assistant Note:  Luci Londono presents today for Lab draw.    Patient seen by provider today: Yes: Ge.   present during visit today: Not Applicable.    Concerns: No Concerns.    Procedure:  Lab draw site: RAC, Needle type: Butterflly, Gauge: 23.    Post Assessment:  Labs drawn without difficulty: Yes.    Discharge Plan:  Departure Mode: Ambulatory.    Face to Face Time: 4 min.    Shari Schoenberger, CMA

## 2019-10-30 NOTE — LETTER
October 30, 2019      [place]i [place]  [address] [facility] [address]        Dear ,    We are writing to inform you of your test results.    - Stuart Wills Jr MD is currently out of the office.  - Your TSH was normal, indicating that you are on the correct dose of thyroid medication.    Results for orders placed or performed in visit on 10/30/19   **TSH with free T4 reflex FUTURE 2mo   Result Value Ref Range    TSH 0.62 0.40 - 4.00 mU/L     Thank you for choosing [facility].  We appreciate the opportunity to serve you and look forward to supporting your healthcare needs in the future.    If you have any questions or concerns, please call me or my staff at [phone].      Sincerely,        Ivy Thomas PA-C

## 2019-10-30 NOTE — PROGRESS NOTES
"Oncology Rooming Note    October 30, 2019 9:06 AM   Luci Londono is a 48 year old female who presents for:    Chief Complaint   Patient presents with     Oncology Clinic Visit     Breast cancer (H)     Initial Vitals: BP (!) 155/78 (BP Location: Right arm, Patient Position: Sitting, Cuff Size: Adult Large)   Pulse 58   Temp 98.1  F (36.7  C) (Oral)   Resp 18   Ht 1.6 m (5' 3\")   Wt 88.6 kg (195 lb 6.4 oz)   SpO2 100%   BMI 34.61 kg/m   Estimated body mass index is 34.61 kg/m  as calculated from the following:    Height as of this encounter: 1.6 m (5' 3\").    Weight as of this encounter: 88.6 kg (195 lb 6.4 oz). Body surface area is 1.98 meters squared.  Moderate Pain (5) Comment: Data Unavailable   No LMP recorded.  Allergies reviewed: Yes  Medications reviewed: Yes    Medications: MEDICATION REFILLS NEEDED TODAY. Provider was notified. Refill XELODA  Pharmacy name entered into [facility]:    [id number] - [place], [place] - [address] [id number] - [facility] [address] [place] [address] AT [facility] & [address] [place] [address] [facility] - [place], [place] - [address] [place] [address] AVE     Clinical concerns: no      Emerald Pascual CMA        Medical Assistant Note:  Luci Londono presents today for lab draw.    Patient seen by provider today: Yes: dr Murillo.   present during visit today: Not Applicable.    Concerns: No Concerns.    Procedure:  Lab draw site: LAC, Needle type: BF, Gauge: 21. Gauze and coban applied    Post Assessment:  Labs drawn without difficulty: Yes.    Discharge Plan:  Departure Mode: Ambulatory.    Face to Face Time: 5.    Emerald Pascual CMA                "

## 2019-10-30 NOTE — LETTER
"    10/30/2019         RE: Luci Londono  [address]  [facility] [address]        Dear Colleague,    Thank you for referring your patient, Luci Londono, to the [facility]. Please see a copy of my visit note below.    Oncology Rooming Note    October 30, 2019 9:06 AM   Luci Londono is a 48 year old female who presents for:    Chief Complaint   Patient presents with     Oncology Clinic Visit     Breast cancer (H)     Initial Vitals: BP (!) 155/78 (BP Location: Right arm, Patient Position: Sitting, Cuff Size: Adult Large)   Pulse 58   Temp 98.1  F (36.7  C) (Oral)   Resp 18   Ht 1.6 m (5' 3\")   Wt 88.6 kg (195 lb 6.4 oz)   SpO2 100%   BMI 34.61 kg/m    Estimated body mass index is 34.61 kg/m  as calculated from the following:    Height as of this encounter: 1.6 m (5' 3\").    Weight as of this encounter: 88.6 kg (195 lb 6.4 oz). Body surface area is 1.98 meters squared.  Moderate Pain (5) Comment: Data Unavailable   No LMP recorded.  Allergies reviewed: Yes  Medications reviewed: Yes    Medications: MEDICATION REFILLS NEEDED TODAY. Provider was notified. Refill XELODA  Pharmacy name entered into [facility]:    [id number] - [place], [place] - [address] [id number] - [address] AT [address] [facility] [address]     Clinical concerns: no      Emerald Pascual CMA        Medical Assistant Note:  Luci Londono presents today for lab draw.    Patient seen by provider today: Yes: dr Murillo.   present during visit today: Not Applicable.    Concerns: No Concerns.    Procedure:  Lab draw site: LAC, Needle type: BF, Gauge: 21. Gauze and coban applied    Post Assessment:  Labs drawn without difficulty: Yes.    Discharge Plan:  Departure Mode: Ambulatory.    Face to Face Time: 5.    Emerald Pascual" [facility]                  ONCOLOGY FOLLOW P VISIT    breast surgeon:  Dr. Stacey Ashford,     REASON FOR visit:  10/2018 dx right breast TN IDC    HISTORY OF ONCOLOGY ILLNESS:    At age 47 amenorrhea with polycystic ovaries,  She felt a lump in her right breast in early October, 2018.   Her mammogram revealed a small mass in the right upper outer breast,   US revealed an 8mm hypoechoic mass at 12:00, 6cm FN and axillary US revealed a concerning lymph node which was also biopsied.   Her pathology from both breast and LN revealed a grade 3, invasive ductal carcinoma, ER/AZ/her 2-.   PET found no distal disease.   Breast MRI found entire span 3.8 cm.There are multiple enlarged right axillary lymph nodes.        She made informed decision to proceed with neoadjuvant DD AC  She had drastic clinical response by PE and MRI.   She continued on wkly taxol. Carbo was added in W2. Carbo then was discontinued 3/6/2019 due to persistent neutropenia.     She had lumpectomy 5/2019 found to have [id number] (1/6) disease.     She is tested 4/2019 heterozygous positive for PALB2 p.E837 and BRCA2 variant unknown significance p.T77A, MLH1 variant, unknow significance p.I25V    She finished RT till mid Aug 2019.   LMP 10/2018.     She is not qualified for  trail.   She consented for ASA and weight loss trial.   She made informed decision to try adjuvant oral Xeloda September 2019.       INTERVAL HISTORY:  She tolerates xeloda ok so far with dosing up.       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     MEDICATIONS/ALLERY:  Reviewed in Epic system.      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 She is devoiced with 3 kids, 12, 14, 16 years old. Deny ETOH/smoking       FAMILY HISTORY:    Negative for any type of cancers      REVIEW OF SYSTEMS:   PT is helping right shoulder pain with decreased ROM.   Reports new right breast pain now moved to right axilla.    She still has numbness on right hand.       PHYSICAL EXAMINATION:   VITAL SIGNS: Blood pressure (!) 142/82, pulse 58, temperature 98.1  F (36.7  C), temperature source Oral, resp. rate 18, height 1.6 m (5' 3\"), weight 88.6 kg (195 lb 6.4 oz), SpO2 100 %, not currently breastfeeding.    ECOG 0    GENERAL APPEARANCE:  looks like her stated age, very pleasant, not in acute distress.   HEENT: The patient is normocephalic, atraumatic. Pupils are equally reactive to light.  Sclerae are anicteric.  Moist oral mucosa.  Negative pharynx.  No oral thrush. Stomatitis on left angular of mouth.    NECK:  Supple.  No jugular venous distention.  Thyroid is not palpable.   LYMPH NODES:  Superficial lymphadenopathy is not appreciable in the bilateral cervical, supraclavicular, axillary or inguinal areas.   CARDIOVASCULAR:  S1, S2 regular with no murmurs or gallops.  No carotid or abdominal bruits.   PULMONARY:  Lungs are clear to auscultation and percussion bilaterally.  There is no wheezing or rhonchi.   GASTROINTESTINAL:  Abdomen is soft, nontender.  No hepatosplenomegaly.  No signs of ascites.  No mass appreciable.   MUSCULOSKELETAL/EXTREMITIES:  + right arm edema with sleeve on.  No cyanotic changes.  No signs of joint deformity.  + lymphedema right arm.   NEUROLOGIC:  Cranial nerves II-XII are grossly intact.  Sensation intact.  Muscle strength and muscle tone symmetrical, 5/5 throughout.   BACK:  No spinal or paraspinal tenderness.  No CVA tenderness.   SKIN:  No petechiae.  No rash.  No signs of cellulitis.   BREASTS: Right breast has hyperpigmentation changesupper outer quadrant. No palpable masses or skin changes. No " discomfort on palpation in lateral portion.   Left breast is symmetrical with no skin or nipple changes. No masses on the left. Tissue is very dense throughout.          CURRENT LAB DATA REVIEWED  Cbc /cmp are fine, MCV 78 from 90    lumpectomy 5/2019 found to have [id number] (1/6) disease.     She is tested 4/2019 heterozygous positive for PALB2 p.E837 and BRCA2 variant unknown significance p.T77A, MLH1 variant, unknow significance p.I25V        CURRENT IMAGING REVIEWED  MA 10/2019 - negative  US abd 6/2019 - fatty liver.       OLD DATA REVIEWED TODAY WITH SUMMARY:   Breast MRI post AC 12/2018  1. Enhancing mass superiorly in the RIGHT breast is significantly decreased in size since 10/17/2018, measuring approximately 1.1 x 0.3 x 0.5 cm in today's study (series 5 image 52 and series 13 image 30), decreased from 1.1 x 1.6 x 1.0 cm.  2. Enlarged RIGHT axillary lymph node is slightly decreased since that time as well, now measuring 0.9 x 1.2 x 1.0 cm (series 5 image 68 and series 13 image 19), decreased from 1.3 x 1.2 x 1.4 cm.     10/2018  Right breast 8mm hypoechoic mass at 12:00, 6cm FN and right axillary LN biopsy both found grade 3, invasive ductal carcinoma, ER/DE/her 2-.       Baseline pre tx breast MRI 10/2018 -  In the right breast at 12:00 7 cm from the nipple, there is irregular heterogeneously enhancing mass, corresponding with the known biopsy-proven malignancy, which spans approximately 2.2 cm in maximal diameter, best appreciated on sagittal view, with surrounding nonmasslike enhancement likely related to postbiopsy change or DCIS.  Taken with the primary tumor, the entire span extends up to 3.8 cm.   There are multiple enlarged right axillary lymph nodes.      PET 10/2018  1. No evidence of distant metastasis.  2. Hypermetabolic focus in the upper outer quadrant right breast representing primary tumor. Hypermetabolic right axillary lymph nodes, consistent with metastatic node.  3. Indeterminate sub-4 mm  pulmonary nodules, likely fissural lymph node in the right lung.  4. Extensive FDG uptake by the brown fat in the neck and paraspinal region.    10/2018 dx MA -  revealed a small mass in the right upper outer breast, US revealed an 8mm hypoechoic mass at 12:00, 6cm FN and axillary US revealed a concerning lymph node                ASSESSMENT AND PLAN:    1.  dx cT1cN1 right breast high grade IDC, TN at age 47 in 10/2018.   S/P neoadjuvant  DD AC -T.  She had lumpectomy 5/2019 found to have [id number] (1/6) disease.     S/p post lumpectomy RT is recommended.     She is not qualified for  trail.   She consented for ASA and weight loss trial.     Oral xeloda data and side effects profile are discussed.  We discussed the side effect in detail.  She made informed decision to try adjuvant oral Xeloda September 2019.    We are increasing the xeloda to 2 g bid.     2. Young age dx with TN breast cancer, she saw genetic counseling.   She is tested 4/2019 heterozygous positive for PALB2 p.E837 and BRCA2 variant unknown significance p.T77A, MLH1 variant, unknow significance p.I25V    We discussed the 33-58% life time risk of breast cancer, increased (% unknown) risk of ovarian cancer and pancreatic cancer.     She will be a good candidate for future breast MRI screening.   Body image screening for pancreatic cancer protocol is undefined.   We will do annual body imaging as needed.    She is due MA in fall and MRI in spring.     3. microcytosis is new. We discussed the iron rich diet.     Will check her iron today.     4. New right breast pain is migratory, now is moved to right axilla.   She has likely post op neuropathy.   Advice topical lidocaine gel since voltaren is not covered by her insurance.     5. Neuropathy developing in 3/2019. Advice vit B6 oral.   She sail PT and vit B6 is helpful.           Again, thank you for allowing me to participate in the care of your patient.        Sincerely,        Addie Murillo MD,  MD

## 2019-10-30 NOTE — PROGRESS NOTES
Oral Chemotherapy Monitoring Program    Primary Oncologist: Dr. Murillo  Primary Oncology Clinic: [facility]  Cancer Diagnosis: Breast    Therapy History:  9/9/19: 1500mg BID x 7 days then off for 7 days  10/7/19: 2000mg PO QAM and 1500mg PO QPM x 7 days then off for 7 days  11/4/19: 2000mg PO BID x 7 days then off for 7 days        Lab Monitoring Plan  CBC/CMP monthly  Subjective/Objective:  Luci Londono is a 48 year old female seen in clinic for a follow-up visit for oral chemotherapy.  Met with Luci in clinic today. She is doing well. She confirms that her dose is increasing again to 2g BID x 7 days off for 7 days. She is tolerating well at this time. She has some dry hands but continues to moisturize them. Her start date for this cycle will be 11/4/19    ORAL CHEMOTHERAPY 9/4/2019 10/30/2019   Drug Name Xeloda (Capecitabine) Xeloda (Capecitabine)   Current Dosage 1500mg 2000mg   Current Schedule BID BID   Cycle Details 1 week on 1 week off 1 week on 1 week off   Start Date of Last Cycle - 10/7/2019   Planned next cycle start date 9/9/2019 11/4/2019   Doses missed in last 2 weeks - 0   Adherence Assessment - Adherent   Adverse Effects - No AE identified during assessment   Any new drug interactions? Yes No   Pharmacist Intervention? Yes -   Intervention(s) Drug changed (non-chemo) -   Is the dose as ordered appropriate for the patient? Yes Yes   Has the patient missed any days of school, work, or other routine activity? - No       Last PHQ-2 Score on record:   PHQ-2 ( 1999 [employer]) 8/21/2018 10/31/2017   Q1: Little interest or pleasure in doing things 0 0   Q2: Feeling down, depressed or hopeless 0 0   PHQ-2 Score 0 0       Patient does not report depression symptoms.      Vitals:  BP:   BP Readings from Last 1 Encounters:   10/30/19 (!) 142/82     Wt Readings from Last 1 Encounters:   10/30/19 88.6 kg (195 lb 6.4 oz)     Estimated body surface area is 1.98 meters squared as calculated from the  "following:    Height as of an earlier encounter on 10/30/19: 1.6 m (5' 3\").    Weight as of an earlier encounter on 10/30/19: 88.6 kg (195 lb 6.4 oz).    Labs:  _  Result Component Current Result Ref Range   Sodium 136 (10/30/2019) 133 - 144 mmol/L     _  Result Component Current Result Ref Range   Potassium 4.2 (10/30/2019) 3.4 - 5.3 mmol/L     _  Result Component Current Result Ref Range   Calcium 9.4 (10/30/2019) 8.5 - 10.1 mg/dL     No results found for Mag within last 30 days.     No results found for Phos within last 30 days.     _  Result Component Current Result Ref Range   Albumin 3.6 (10/30/2019) 3.4 - 5.0 g/dL     _  Result Component Current Result Ref Range   Urea Nitrogen 8 (10/30/2019) 7 - 30 mg/dL     _  Result Component Current Result Ref Range   Creatinine 0.73 (10/30/2019) 0.52 - 1.04 mg/dL       _  Result Component Current Result Ref Range   AST 29 (10/30/2019) 0 - 45 U/L     _  Result Component Current Result Ref Range   ALT 33 (10/30/2019) 0 - 50 U/L     _  Result Component Current Result Ref Range   Bilirubin Total 0.5 (10/30/2019) 0.2 - 1.3 mg/dL       _  Result Component Current Result Ref Range   WBC 4.7 (10/30/2019) 4.0 - 11.0 10e9/L     _  Result Component Current Result Ref Range   Hemoglobin 12.3 (10/30/2019) 11.7 - 15.7 g/dL     _  Result Component Current Result Ref Range   Platelet Count 222 (10/30/2019) 150 - 450 10e9/L     _  Result Component Current Result Ref Range   Absolute Neutrophil 2.8 (10/30/2019) 1.6 - 8.3 10e9/L           Labs WNL to proceed with new cycle    Assessment:  Tolerating Xeloda at this time.     Plan:  Continue Xeloda. Increase to 2g BID x 7 days off for 7 days.     Follow-Up:  4 weeks with repeat labs     Refill Due:  11/4 and then 12/2    Briana AlvesD  October 30, 2019      "

## 2019-11-26 ENCOUNTER — TELEPHONE (OUTPATIENT)
Dept: ONCOLOGY | Facility: CLINIC | Age: 49
End: 2019-11-26

## 2019-11-26 NOTE — TELEPHONE ENCOUNTER
"Patient called, thought she had an appointment with Dr. Murillo tomorrow.   She states that she is on xeloda and this is her \"off\" week, but she usually sees Dr. Murillo first.  Spoke with pharmacy, patient should have labs this week, she is on their list to follow up with.  Pharmacist states that if she does not have labs this week her xeloda will be delayed.  Patient states she will schedule appointment for lab.  # given.  "

## 2019-11-29 ENCOUNTER — HOSPITAL ENCOUNTER (OUTPATIENT)
Facility: CLINIC | Age: 49
Setting detail: SPECIMEN
Discharge: HOME | End: 2019-11-29
Attending: INTERNAL MEDICINE | Admitting: INTERNAL MEDICINE
Payer: COMMERCIAL

## 2019-11-29 ENCOUNTER — INFUSION THERAPY VISIT (OUTPATIENT)
Dept: INFUSION THERAPY | Facility: CLINIC | Age: 49
End: 2019-11-29
Attending: INTERNAL MEDICINE
Payer: COMMERCIAL

## 2019-11-29 DIAGNOSIS — Z17.1 MALIGNANT NEOPLASM OF UPPER-OUTER QUADRANT OF RIGHT BREAST IN FEMALE, ESTROGEN RECEPTOR NEGATIVE (H): Primary | ICD-10-CM

## 2019-11-29 DIAGNOSIS — C50.411 MALIGNANT NEOPLASM OF UPPER-OUTER QUADRANT OF RIGHT BREAST IN FEMALE, ESTROGEN RECEPTOR NEGATIVE (H): Primary | ICD-10-CM

## 2019-11-29 DIAGNOSIS — C50.919 BREAST CANCER (H): Primary | ICD-10-CM

## 2019-11-29 LAB
ALBUMIN SERPL-MCNC: 3.6 G/DL (ref 3.4–5)
ALP SERPL-CCNC: 148 U/L (ref 40–150)
ALT SERPL W P-5'-P-CCNC: 35 U/L (ref 0–50)
ANION GAP SERPL CALCULATED.3IONS-SCNC: 2 MMOL/L (ref 3–14)
AST SERPL W P-5'-P-CCNC: 29 U/L (ref 0–45)
BASOPHILS # BLD AUTO: 0 10E9/L (ref 0–0.2)
BASOPHILS NFR BLD AUTO: 0.2 %
BILIRUB SERPL-MCNC: 0.4 MG/DL (ref 0.2–1.3)
BUN SERPL-MCNC: 11 MG/DL (ref 7–30)
CALCIUM SERPL-MCNC: 8.7 MG/DL (ref 8.5–10.1)
CHLORIDE SERPL-SCNC: 107 MMOL/L (ref 94–109)
CO2 SERPL-SCNC: 30 MMOL/L (ref 20–32)
CREAT SERPL-MCNC: 0.72 MG/DL (ref 0.52–1.04)
DIFFERENTIAL METHOD BLD: ABNORMAL
EOSINOPHIL # BLD AUTO: 0.1 10E9/L (ref 0–0.7)
EOSINOPHIL NFR BLD AUTO: 2 %
ERYTHROCYTE [DISTWIDTH] IN BLOOD BY AUTOMATED COUNT: 22.2 % (ref 10–15)
GFR SERPL CREATININE-BSD FRML MDRD: >90 ML/MIN/{1.73_M2}
GLUCOSE SERPL-MCNC: 80 MG/DL (ref 70–99)
HCT VFR BLD AUTO: 31.6 % (ref 35–47)
HGB BLD-MCNC: 10.8 G/DL (ref 11.7–15.7)
IMM GRANULOCYTES # BLD: 0 10E9/L (ref 0–0.4)
IMM GRANULOCYTES NFR BLD: 0.2 %
LYMPHOCYTES # BLD AUTO: 2 10E9/L (ref 0.8–5.3)
LYMPHOCYTES NFR BLD AUTO: 38.9 %
MCH RBC QN AUTO: 28.5 PG (ref 26.5–33)
MCHC RBC AUTO-ENTMCNC: 34.2 G/DL (ref 31.5–36.5)
MCV RBC AUTO: 83 FL (ref 78–100)
MONOCYTES # BLD AUTO: 0.3 10E9/L (ref 0–1.3)
MONOCYTES NFR BLD AUTO: 6.7 %
NEUTROPHILS # BLD AUTO: 2.6 10E9/L (ref 1.6–8.3)
NEUTROPHILS NFR BLD AUTO: 52 %
NRBC # BLD AUTO: 0 10*3/UL
NRBC BLD AUTO-RTO: 0 /100
PLATELET # BLD AUTO: 169 10E9/L (ref 150–450)
POTASSIUM SERPL-SCNC: 4 MMOL/L (ref 3.4–5.3)
PROT SERPL-MCNC: 7.2 G/DL (ref 6.8–8.8)
RBC # BLD AUTO: 3.79 10E12/L (ref 3.8–5.2)
SODIUM SERPL-SCNC: 139 MMOL/L (ref 133–144)
WBC # BLD AUTO: 5.1 10E9/L (ref 4–11)

## 2019-11-29 PROCEDURE — 80053 COMPREHEN METABOLIC PANEL: CPT | Performed by: INTERNAL MEDICINE

## 2019-11-29 PROCEDURE — 85025 COMPLETE CBC W/AUTO DIFF WBC: CPT | Performed by: INTERNAL MEDICINE

## 2019-11-29 PROCEDURE — 36415 COLL VENOUS BLD VENIPUNCTURE: CPT

## 2019-11-29 RX ORDER — CAPECITABINE 500 MG/1
TABLET, FILM COATED ORAL
Qty: 112 TABLET | Refills: 0 | Status: SHIPPED | OUTPATIENT
Start: 2019-11-29 | End: 2020-05-13

## 2019-12-04 ENCOUNTER — ALLIED HEALTH/NURSE VISIT (OUTPATIENT)
Dept: ONCOLOGY | Facility: CLINIC | Age: 49
End: 2019-12-04

## 2019-12-04 ENCOUNTER — HOSPITAL ENCOUNTER (OUTPATIENT)
Facility: CLINIC | Age: 49
Setting detail: SPECIMEN
Discharge: HOME | End: 2019-12-04
Attending: INTERNAL MEDICINE | Admitting: INTERNAL MEDICINE
Payer: COMMERCIAL

## 2019-12-04 ENCOUNTER — INFUSION THERAPY VISIT (OUTPATIENT)
Dept: INFUSION THERAPY | Facility: CLINIC | Age: 49
End: 2019-12-04
Attending: INTERNAL MEDICINE
Payer: COMMERCIAL

## 2019-12-04 ENCOUNTER — ONCOLOGY VISIT (OUTPATIENT)
Dept: ONCOLOGY | Facility: CLINIC | Age: 49
End: 2019-12-04
Attending: INTERNAL MEDICINE
Payer: COMMERCIAL

## 2019-12-04 ENCOUNTER — HOSPITAL ENCOUNTER (OUTPATIENT)
Facility: CLINIC | Age: 49
Setting detail: SPECIMEN
End: 2019-12-04
Attending: INTERNAL MEDICINE
Payer: COMMERCIAL

## 2019-12-04 VITALS
HEART RATE: 75 BPM | OXYGEN SATURATION: 100 % | DIASTOLIC BLOOD PRESSURE: 85 MMHG | SYSTOLIC BLOOD PRESSURE: 124 MMHG | TEMPERATURE: 97.7 F | HEIGHT: 63 IN | WEIGHT: 192.2 LBS | BODY MASS INDEX: 34.05 KG/M2 | RESPIRATION RATE: 18 BRPM

## 2019-12-04 DIAGNOSIS — C50.411 MALIGNANT NEOPLASM OF UPPER-OUTER QUADRANT OF RIGHT BREAST IN FEMALE, ESTROGEN RECEPTOR NEGATIVE (H): ICD-10-CM

## 2019-12-04 DIAGNOSIS — C50.411 MALIGNANT NEOPLASM OF UPPER-OUTER QUADRANT OF RIGHT BREAST IN FEMALE, ESTROGEN RECEPTOR NEGATIVE (H): Primary | ICD-10-CM

## 2019-12-04 DIAGNOSIS — Z17.1 MALIGNANT NEOPLASM OF UPPER-OUTER QUADRANT OF RIGHT BREAST IN FEMALE, ESTROGEN RECEPTOR NEGATIVE (H): ICD-10-CM

## 2019-12-04 DIAGNOSIS — R07.89 RIGHT-SIDED CHEST WALL PAIN: ICD-10-CM

## 2019-12-04 DIAGNOSIS — G62.9 NEUROPATHY: ICD-10-CM

## 2019-12-04 DIAGNOSIS — Z17.1 MALIGNANT NEOPLASM OF UPPER-OUTER QUADRANT OF RIGHT BREAST IN FEMALE, ESTROGEN RECEPTOR NEGATIVE (H): Primary | ICD-10-CM

## 2019-12-04 DIAGNOSIS — N64.4 BREAST PAIN, RIGHT: ICD-10-CM

## 2019-12-04 DIAGNOSIS — Z71.9 COUNSELING, UNSPECIFIED: Primary | ICD-10-CM

## 2019-12-04 LAB
ALBUMIN SERPL-MCNC: 3.6 G/DL (ref 3.4–5)
ALP SERPL-CCNC: 150 U/L (ref 40–150)
ALT SERPL W P-5'-P-CCNC: 41 U/L (ref 0–50)
ANION GAP SERPL CALCULATED.3IONS-SCNC: 4 MMOL/L (ref 3–14)
AST SERPL W P-5'-P-CCNC: 33 U/L (ref 0–45)
BASOPHILS # BLD AUTO: 0 10E9/L (ref 0–0.2)
BASOPHILS NFR BLD AUTO: 0.5 %
BILIRUB SERPL-MCNC: 0.6 MG/DL (ref 0.2–1.3)
BUN SERPL-MCNC: 8 MG/DL (ref 7–30)
CALCIUM SERPL-MCNC: 9.3 MG/DL (ref 8.5–10.1)
CHLORIDE SERPL-SCNC: 108 MMOL/L (ref 94–109)
CO2 SERPL-SCNC: 27 MMOL/L (ref 20–32)
CREAT SERPL-MCNC: 0.74 MG/DL (ref 0.52–1.04)
DIFFERENTIAL METHOD BLD: ABNORMAL
EOSINOPHIL # BLD AUTO: 0.1 10E9/L (ref 0–0.7)
EOSINOPHIL NFR BLD AUTO: 2.6 %
ERYTHROCYTE [DISTWIDTH] IN BLOOD BY AUTOMATED COUNT: 22.8 % (ref 10–15)
GFR SERPL CREATININE-BSD FRML MDRD: >90 ML/MIN/{1.73_M2}
GLUCOSE SERPL-MCNC: 112 MG/DL (ref 70–99)
HCT VFR BLD AUTO: 36.2 % (ref 35–47)
HGB BLD-MCNC: 12 G/DL (ref 11.7–15.7)
IMM GRANULOCYTES # BLD: 0 10E9/L (ref 0–0.4)
IMM GRANULOCYTES NFR BLD: 0 %
LYMPHOCYTES # BLD AUTO: 1.4 10E9/L (ref 0.8–5.3)
LYMPHOCYTES NFR BLD AUTO: 35.2 %
MCH RBC QN AUTO: 28 PG (ref 26.5–33)
MCHC RBC AUTO-ENTMCNC: 33.1 G/DL (ref 31.5–36.5)
MCV RBC AUTO: 84 FL (ref 78–100)
MONOCYTES # BLD AUTO: 0.3 10E9/L (ref 0–1.3)
MONOCYTES NFR BLD AUTO: 7.5 %
NEUTROPHILS # BLD AUTO: 2.1 10E9/L (ref 1.6–8.3)
NEUTROPHILS NFR BLD AUTO: 54.2 %
NRBC # BLD AUTO: 0 10*3/UL
NRBC BLD AUTO-RTO: 0 /100
PLATELET # BLD AUTO: 172 10E9/L (ref 150–450)
POTASSIUM SERPL-SCNC: 3.7 MMOL/L (ref 3.4–5.3)
PROT SERPL-MCNC: 7.3 G/DL (ref 6.8–8.8)
RBC # BLD AUTO: 4.29 10E12/L (ref 3.8–5.2)
SODIUM SERPL-SCNC: 139 MMOL/L (ref 133–144)
WBC # BLD AUTO: 3.9 10E9/L (ref 4–11)

## 2019-12-04 PROCEDURE — 36415 COLL VENOUS BLD VENIPUNCTURE: CPT

## 2019-12-04 PROCEDURE — 85025 COMPLETE CBC W/AUTO DIFF WBC: CPT | Performed by: INTERNAL MEDICINE

## 2019-12-04 PROCEDURE — 80053 COMPREHEN METABOLIC PANEL: CPT | Performed by: INTERNAL MEDICINE

## 2019-12-04 PROCEDURE — G0463 HOSPITAL OUTPT CLINIC VISIT: HCPCS

## 2019-12-04 PROCEDURE — 99214 OFFICE O/P EST MOD 30 MIN: CPT | Performed by: INTERNAL MEDICINE

## 2019-12-04 ASSESSMENT — PAIN SCALES - GENERAL: PAINLEVEL: NO PAIN (0)

## 2019-12-04 ASSESSMENT — MIFFLIN-ST. JEOR: SCORE: 1470.94

## 2019-12-04 NOTE — PROGRESS NOTES
Social Work Progress Note      Data/Intervention:  Patient Name:  Luci Londono  /Age:  1970 (49 year old)    Reason for Follow-Up: Luci is a 49-year-old woman with a diagnosis of IDC, s/p eliezer adjuvant chemotherapy, radiation and surgery, now on oral chemotherapy. Luci comes to clinic for planned visit with Dr. Murillo and this clinician.    Intervention:   Luci reports that she has been coping well from an emotional and side effects standpoint. Luci reports that she is working, although at a modified schedule, continuing to support 3 children and mother in home. This clinician reviewed that she is eligible again for [employer] application, which this clinician facilitated completion of today. No other psychosocial concerns or needs reported at present time. Luci aware of how to reach out to this clinician in the case of distress or need.     Plan:  1) [employer] application- submitted 19  2) SW will continue to be available as Luci continues to adjust to treatment and realize its impacts  3) Ongoing collaboration with multidisciplinary care team.     Please call or page if needs or concerns arise.     MIRA Julien, [facility] HospitalSW  Direct Phone: [phone]  Pager: [phone]

## 2019-12-04 NOTE — LETTER
"    12/4/2019         RE: Luci Londono  [address]  [facility] [id number]        Dear Colleague,    Thank you for referring your patient, Luci Londono, to the [facility]. Please see a copy of my visit note below.    Oncology Rooming Note    December 4, 2019 9:02 AM   Luci Londono is a 49 year old female who presents for:    Chief Complaint   Patient presents with     Oncology Clinic Visit     Breast cancer (H)     Initial Vitals: /85 (BP Location: Right arm, Patient Position: Left side, Cuff Size: Adult Large)   Pulse 75   Temp 97.7  F (36.5  C) (Oral)   Resp 18   Ht 1.6 m (5' 3\")   Wt 87.2 kg (192 lb 3.2 oz)   SpO2 100%   BMI 34.05 kg/m    Estimated body mass index is 34.05 kg/m  as calculated from the following:    Height as of this encounter: 1.6 m (5' 3\").    Weight as of this encounter: 87.2 kg (192 lb 3.2 oz). Body surface area is 1.97 meters squared.  No Pain (0) Comment: Data Unavailable   No LMP recorded.  Allergies reviewed: Yes  Medications reviewed: Yes    Medications: Medication refills not needed today.  Pharmacy name entered into [facility]:    [facility] #[id number] - [facility] [place] [address] AT [address] [place] [address] & [address] [facility][id number] - [place], [place] - [address] AT [facility] & [address] [place] [address] MA[facility]TY [facility] - [place], [place] - [address] [place] [address] AVE     Clinical concerns: no           Emerald Pascual, GIANCARLO        ONCOLOGY FOLLOW P VISIT      breast surgeon:  Dr. Stacey Ashford,       REASON FOR visit:  10/2018 dx right breast TN IDC      HISTORY OF ONCOLOGY ILLNESS:    At age 47 amenorrhea with polycystic ovaries,  She felt a lump in her right breast in early October, 2018.   Her mammogram revealed a small mass in the right upper outer breast,   US revealed an 8mm hypoechoic mass at 12:00, 6cm FN and axillary US revealed a concerning lymph node which was also biopsied.   Her pathology from " "both breast and LN revealed a grade 3, invasive ductal carcinoma, ER/[place]/her 2-.   PET found no distal disease.   Breast MRI found entire span 3.8 cm.There are multiple enlarged right axillary lymph nodes.      She made informed decision to proceed with neoadjuvant DD AC  She had drastic clinical response by PE and MRI.   She continued on wkly taxol. Carbo was added in W2. Carbo then was discontinued 3/6/2019 due to persistent neutropenia.     She had lumpectomy 5/2019 found to have [id number] (1/6) disease.     She is tested 4/2019 heterozygous positive for PALB2 p.E837 and BRCA2 variant unknown significance p.T77A, MLH1 variant, unknow significance p.I25V    She finished RT till mid Aug 2019.   LMP 10/2018.     She is not qualified for  trail.   She consented for ASA and weight loss trial.   She made informed decision to try adjuvant oral Xeloda September 2019.       INTERVAL HISTORY:  She tolerates xeloda ok so far with dosing up to full dose.       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     MEDICATIONS/ALLERY:  Reviewed in Epic system.      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 Deny ETOH/smoking       FAMILY HISTORY:    Negative for any type of cancers      REVIEW OF SYSTEMS:   Right shoulder pain and numbness is better.  Reports new right breast pain now moved to right axilla.        PHYSICAL EXAMINATION:   VITAL SIGNS: Blood pressure 124/85, pulse 75, temperature 97.7  F (36.5  C), temperature source Oral, resp. rate 18, height 1.6 m (5' 3\"), weight 87.2 kg (192 lb 3.2 oz), " SpO2 100 %, not currently breastfeeding.    ECOG 0    GENERAL APPEARANCE:  looks like her stated age, very pleasant, not in acute distress.   HEENT: The patient is normocephalic, atraumatic. Pupils are equally reactive to light.  Sclerae are anicteric.  Moist oral mucosa.  Negative pharynx.  No oral thrush. Stomatitis on left angular of mouth.    NECK:  Supple.  No jugular venous distention.  Thyroid is not palpable.   LYMPH NODES:  Superficial lymphadenopathy is not appreciable in the bilateral cervical, supraclavicular, axillary or inguinal areas.   CARDIOVASCULAR:  S1, S2 regular with no murmurs or gallops.  No carotid or abdominal bruits.   PULMONARY:  Lungs are clear to auscultation and percussion bilaterally.  There is no wheezing or rhonchi.   GASTROINTESTINAL:  Abdomen is soft, nontender.  No hepatosplenomegaly.  No signs of ascites.  No mass appreciable.   MUSCULOSKELETAL/EXTREMITIES:  + right arm edema with sleeve on.  No cyanotic changes.  No signs of joint deformity.  + lymphedema right arm.   NEUROLOGIC:  Cranial nerves II-XII are grossly intact.  Sensation intact.  Muscle strength and muscle tone symmetrical, 5/5 throughout.   BACK:  No spinal or paraspinal tenderness.  No CVA tenderness.   SKIN:  No petechiae.  No rash.  No signs of cellulitis.   BREASTS: Right breast has hyperpigmentation changesupper outer quadrant. No palpable masses or skin changes. No discomfort on palpation in lateral portion.   Left breast is symmetrical with no skin or nipple changes. No masses on the left. Tissue is very dense throughout.          CURRENT LAB DATA REVIEWED  Cbc /cmp are fine    lumpectomy 5/2019 found to have [id number] (1/6) disease.     She is tested 4/2019 heterozygous positive for PALB2 p.E837 and BRCA2 variant unknown significance p.T77A, MLH1 variant, unknow significance p.I25V        CURRENT IMAGING REVIEWED  MA 10/2019 - negative  US abd 6/2019 - fatty liver.       OLD DATA REVIEWED TODAY WITH SUMMARY:    Breast MRI post AC 12/2018  1. Enhancing mass superiorly in the RIGHT breast is significantly decreased in size since 10/17/2018, measuring approximately 1.1 x 0.3 x 0.5 cm in today's study (series 5 image 52 and series 13 image 30), decreased from 1.1 x 1.6 x 1.0 cm.  2. Enlarged RIGHT axillary lymph node is slightly decreased since that time as well, now measuring 0.9 x 1.2 x 1.0 cm (series 5 image 68 and series 13 image 19), decreased from 1.3 x 1.2 x 1.4 cm.     10/2018  Right breast 8mm hypoechoic mass at 12:00, 6cm FN and right axillary LN biopsy both found grade 3, invasive ductal carcinoma, ER/[place]/her 2-.       Baseline pre tx breast MRI 10/2018 -  In the right breast at 12:00 7 cm from the nipple, there is irregular heterogeneously enhancing mass, corresponding with the known biopsy-proven malignancy, which spans approximately 2.2 cm in maximal diameter, best appreciated on sagittal view, with surrounding nonmasslike enhancement likely related to postbiopsy change or DCIS.  Taken with the primary tumor, the entire span extends up to 3.8 cm.   There are multiple enlarged right axillary lymph nodes.      PET 10/2018  1. No evidence of distant metastasis.  2. Hypermetabolic focus in the upper outer quadrant right breast representing primary tumor. Hypermetabolic right axillary lymph nodes, consistent with metastatic node.  3. Indeterminate sub-4 mm pulmonary nodules, likely fissural lymph node in the right lung.  4. Extensive FDG uptake by the brown fat in the neck and paraspinal region.    10/2018 dx MA -  revealed a small mass in the right upper outer breast, US revealed an 8mm hypoechoic mass at 12:00, 6cm FN and axillary US revealed a concerning lymph node                ASSESSMENT AND PLAN:    1.  dx cT1cN1 right breast high grade IDC, TN at age 47 in 10/2018.   S/P neoadjuvant  DD AC -T.  She had lumpectomy 5/2019 found to have [id number] (1/6) disease.     S/p post lumpectomy RT is recommended.     She is  not qualified for  trail.   She consented for ASA and weight loss trial.     Oral xeloda data and side effects profile are discussed.  We discussed the side effect in detail.  She made informed decision to try adjuvant oral Xeloda September 2019.    She is able to tolerate full dose at 2 g bid.     2. Young age dx with TN breast cancer, she saw genetic counseling.   She is tested 4/2019 heterozygous positive for PALB2 p.E837 and BRCA2 variant unknown significance p.T77A, MLH1 variant, unknow significance p.I25V    We discussed the 33-58% life time risk of breast cancer, increased (% unknown) risk of ovarian cancer and pancreatic cancer.     She will be a good candidate for future breast MRI screening.     Body image screening for pancreatic cancer protocol is undefined.   We will do annual body imaging as needed.    She is due MA in fall and MRI in spring.     3. New right breast pain is migratory, now is moved to right axilla.   She has likely post op neuropathy.   Advice massage and PT.     4. Neuropathy developing in 3/2019. Advice vit B6 oral.   She said PT and vit B6 is helpful.           Again, thank you for allowing me to participate in the care of your patient.        Sincerely,        Addie Murillo MD, MD

## 2019-12-04 NOTE — PROGRESS NOTES
"Oncology Rooming Note    December 4, 2019 9:02 AM   Luci Londono is a 49 year old female who presents for:    Chief Complaint   Patient presents with     Oncology Clinic Visit     Breast cancer (H)     Initial Vitals: /85 (BP Location: Right arm, Patient Position: Left side, Cuff Size: Adult Large)   Pulse 75   Temp 97.7  F (36.5  C) (Oral)   Resp 18   Ht 1.6 m (5' 3\")   Wt 87.2 kg (192 lb 3.2 oz)   SpO2 100%   BMI 34.05 kg/m   Estimated body mass index is 34.05 kg/m  as calculated from the following:    Height as of this encounter: 1.6 m (5' 3\").    Weight as of this encounter: 87.2 kg (192 lb 3.2 oz). Body surface area is 1.97 meters squared.  No Pain (0) Comment: Data Unavailable   No LMP recorded.  Allergies reviewed: Yes  Medications reviewed: Yes    Medications: Medication refills not needed today.  Pharmacy name entered into [facility]:    [id number] - [place], [place] - [address] AT [address] & [address] [facility] #[id number] - [facility] [address] AT [facility] & [address] [place] [address] MAIL/SPECIALTY PHARMACY - [place], [place] - [facility] [address]    Clinical concerns: no           Emerald Pascual CMA      "

## 2019-12-04 NOTE — PROGRESS NOTES
ONCOLOGY FOLLOW [facility] VISIT      breast surgeon:  Dr. Stacey Ashford,       REASON FOR visit:  10/2018 dx right breast TN IDC      HISTORY OF ONCOLOGY ILLNESS:    At age 47 amenorrhea with polycystic ovaries,  She felt a lump in her right breast in early October, 2018.   Her mammogram revealed a small mass in the right upper outer breast,   US revealed an 8mm hypoechoic mass at 12:00, 6cm FN and axillary US revealed a concerning lymph node which was also biopsied.   Her pathology from both breast and LN revealed a grade 3, invasive ductal carcinoma, ER/[place]/her 2-.   PET found no distal disease.   Breast MRI found entire span 3.8 cm.There are multiple enlarged right axillary lymph nodes.      She made informed decision to proceed with neoadjuvant DD AC  She had drastic clinical response by PE and MRI.   She continued on wkly taxol. Carbo was added in W2. Carbo then was discontinued 3/6/2019 due to persistent neutropenia.     She had lumpectomy 5/2019 found to have [id number] (1/6) disease.     She is tested 4/2019 heterozygous positive for PALB2 p.E837 and BRCA2 variant unknown significance p.T77A, MLH1 variant, unknow significance p.I25V    She finished RT till mid Aug 2019.   LMP 10/2018.     She is not qualified for  trail.   She consented for ASA and weight loss trial.   She made informed decision to try adjuvant oral Xeloda September 2019.       INTERVAL HISTORY:  She tolerates xeloda ok so far with dosing up to full dose.       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     MEDICATIONS/ALLERY:  Reviewed in Epic system.      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 She is devoiced with 3 kids, 12, 14, 16 years old. Deny ETOH/smoking       FAMILY HISTORY:    Negative for any type of cancers      REVIEW OF SYSTEMS:   Right shoulder pain and numbness is better.  Reports new right breast pain now moved to right axilla.        PHYSICAL EXAMINATION:   VITAL SIGNS: Blood pressure 124/85, pulse 75, temperature 97.7  F (36.5  C), temperature source Oral, resp. rate 18, height 1.6 m (5' 3\"), weight 87.2 kg (192 lb 3.2 oz), SpO2 100 %, not currently breastfeeding.    ECOG 0    GENERAL APPEARANCE:  looks like her stated age, very pleasant, not in acute distress.   HEENT: The patient is normocephalic, atraumatic. Pupils are equally reactive to light.  Sclerae are anicteric.  Moist oral mucosa.  Negative pharynx.  No oral thrush. Stomatitis on left angular of mouth.    NECK:  Supple.  No jugular venous distention.  Thyroid is not palpable.   LYMPH NODES:  Superficial lymphadenopathy is not appreciable in the bilateral cervical, supraclavicular, axillary or inguinal areas.   CARDIOVASCULAR:  S1, S2 regular with no murmurs or gallops.  No carotid or abdominal bruits.   PULMONARY:  Lungs are clear to auscultation and percussion bilaterally.  There is no wheezing or rhonchi.   GASTROINTESTINAL:  Abdomen is soft, nontender.  No hepatosplenomegaly.  No signs of ascites.  No mass appreciable.   MUSCULOSKELETAL/EXTREMITIES:  + right arm edema with sleeve on.  No cyanotic changes.  No signs of joint deformity.  + lymphedema right arm.   NEUROLOGIC:  Cranial nerves II-XII are grossly intact.  Sensation intact.  Muscle strength and muscle tone symmetrical, 5/5 throughout.   BACK:  No spinal or paraspinal tenderness.  No CVA tenderness.   SKIN:  No petechiae.  No rash.  No signs of cellulitis.   BREASTS: Right breast has hyperpigmentation changesupper outer quadrant. No palpable masses or skin changes. No discomfort on palpation in lateral portion.   Left breast is " symmetrical with no skin or nipple changes. No masses on the left. Tissue is very dense throughout.          CURRENT LAB DATA REVIEWED  Cbc /cmp are fine    lumpectomy 5/2019 found to have [id number] (1/6) disease.     She is tested 4/2019 heterozygous positive for PALB2 p.E837 and BRCA2 variant unknown significance p.T77A, MLH1 variant, unknow significance p.I25V        CURRENT IMAGING REVIEWED  MA 10/2019 - negative  US abd 6/2019 - fatty liver.       OLD DATA REVIEWED TODAY WITH SUMMARY:   Breast MRI post AC 12/2018  1. Enhancing mass superiorly in the RIGHT breast is significantly decreased in size since 10/17/2018, measuring approximately 1.1 x 0.3 x 0.5 cm in today's study (series 5 image 52 and series 13 image 30), decreased from 1.1 x 1.6 x 1.0 cm.  2. Enlarged RIGHT axillary lymph node is slightly decreased since that time as well, now measuring 0.9 x 1.2 x 1.0 cm (series 5 image 68 and series 13 image 19), decreased from 1.3 x 1.2 x 1.4 cm.     10/2018  Right breast 8mm hypoechoic mass at 12:00, 6cm FN and right axillary LN biopsy both found grade 3, invasive ductal carcinoma, ER/[place]/her 2-.       Baseline pre tx breast MRI 10/2018 -  In the right breast at 12:00 7 cm from the nipple, there is irregular heterogeneously enhancing mass, corresponding with the known biopsy-proven malignancy, which spans approximately 2.2 cm in maximal diameter, best appreciated on sagittal view, with surrounding nonmasslike enhancement likely related to postbiopsy change or DCIS.  Taken with the primary tumor, the entire span extends up to 3.8 cm.   There are multiple enlarged right axillary lymph nodes.      PET 10/2018  1. No evidence of distant metastasis.  2. Hypermetabolic focus in the upper outer quadrant right breast representing primary tumor. Hypermetabolic right axillary lymph nodes, consistent with metastatic node.  3. Indeterminate sub-4 mm pulmonary nodules, likely fissural lymph node in the right lung.  4. Extensive  FDG uptake by the brown fat in the neck and paraspinal region.    10/2018 dx MA -  revealed a small mass in the right upper outer breast, US revealed an 8mm hypoechoic mass at 12:00, 6cm FN and axillary US revealed a concerning lymph node                ASSESSMENT AND PLAN:    1.  dx cT1cN1 right breast high grade IDC, TN at age 47 in 10/2018.   S/P neoadjuvant  DD AC -T.  She had lumpectomy 5/2019 found to have [id number] (1/6) disease.     S/p post lumpectomy RT is recommended.     She is not qualified for  trail.   She consented for ASA and weight loss trial.     Oral xeloda data and side effects profile are discussed.  We discussed the side effect in detail.  She made informed decision to try adjuvant oral Xeloda September 2019.    She is able to tolerate full dose at 2 g bid.     2. Young age dx with TN breast cancer, she saw genetic counseling.   She is tested 4/2019 heterozygous positive for PALB2 p.E837 and BRCA2 variant unknown significance p.T77A, MLH1 variant, unknow significance p.I25V    We discussed the 33-58% life time risk of breast cancer, increased (% unknown) risk of ovarian cancer and pancreatic cancer.     She will be a good candidate for future breast MRI screening.     Body image screening for pancreatic cancer protocol is undefined.   We will do annual body imaging as needed.    She is due MA in fall and MRI in spring.     3. New right breast pain is migratory, now is moved to right axilla.   She has likely post op neuropathy.   Advice massage and PT.     4. Neuropathy developing in 3/2019. Advice vit B6 oral.   She said PT and vit B6 is helpful.

## 2019-12-04 NOTE — PROGRESS NOTES
Medical Assistant Note:  Luci Londono presents today for blood draw.    Patient seen by provider today: No.   present during visit today: Not Applicable.    Concerns: No Concerns.    Procedure:  Lab draw site: LAC, Needle type: BF, Gauge: 23g with gauze and coban.    Post Assessment:  Labs drawn without difficulty: Yes.    Discharge Plan:  Departure Mode: Ambulatory.    Face to Face Time: 5.    Sophie Rodriguez, CMA

## 2019-12-17 ENCOUNTER — HOSPITAL ENCOUNTER (OUTPATIENT)
Dept: CARDIOLOGY | Facility: CLINIC | Age: 49
Discharge: HOME | End: 2019-12-17
Attending: PHYSICIAN ASSISTANT | Admitting: PHYSICIAN ASSISTANT
Payer: COMMERCIAL

## 2019-12-17 DIAGNOSIS — Z17.1 MALIGNANT NEOPLASM OF UPPER-OUTER QUADRANT OF RIGHT BREAST IN FEMALE, ESTROGEN RECEPTOR NEGATIVE (H): ICD-10-CM

## 2019-12-17 DIAGNOSIS — C50.411 MALIGNANT NEOPLASM OF UPPER-OUTER QUADRANT OF RIGHT BREAST IN FEMALE, ESTROGEN RECEPTOR NEGATIVE (H): ICD-10-CM

## 2019-12-17 PROCEDURE — 93306 TTE W/DOPPLER COMPLETE: CPT

## 2019-12-17 PROCEDURE — 93306 TTE W/DOPPLER COMPLETE: CPT | Mod: 26 | Performed by: INTERNAL MEDICINE

## 2019-12-23 ENCOUNTER — OFFICE VISIT (OUTPATIENT)
Dept: CARDIOLOGY | Facility: CLINIC | Age: 49
End: 2019-12-23
Payer: COMMERCIAL

## 2019-12-23 VITALS
SYSTOLIC BLOOD PRESSURE: 133 MMHG | DIASTOLIC BLOOD PRESSURE: 81 MMHG | HEIGHT: 63 IN | WEIGHT: 191.8 LBS | BODY MASS INDEX: 33.98 KG/M2 | HEART RATE: 80 BPM

## 2019-12-23 DIAGNOSIS — R94.31 ABNORMAL ELECTROCARDIOGRAM: ICD-10-CM

## 2019-12-23 DIAGNOSIS — C50.411 MALIGNANT NEOPLASM OF UPPER-OUTER QUADRANT OF RIGHT BREAST IN FEMALE, ESTROGEN RECEPTOR NEGATIVE (H): Primary | ICD-10-CM

## 2019-12-23 DIAGNOSIS — Z17.1 MALIGNANT NEOPLASM OF UPPER-OUTER QUADRANT OF RIGHT BREAST IN FEMALE, ESTROGEN RECEPTOR NEGATIVE (H): Primary | ICD-10-CM

## 2019-12-23 DIAGNOSIS — T50.905A DRUG OR MEDICINAL SUBSTANCE CAUSING ADVERSE EFFECT IN THERAPEUTIC USE, INITIAL ENCOUNTER: ICD-10-CM

## 2019-12-23 PROCEDURE — 99214 OFFICE O/P EST MOD 30 MIN: CPT | Performed by: INTERNAL MEDICINE

## 2019-12-23 ASSESSMENT — MIFFLIN-ST. JEOR: SCORE: 1469.13

## 2019-12-23 NOTE — LETTER
12/23/2019    ALEENA TYLER MD  [address]  [facility] [address]    RE: Luci Emerald Londono       Dear Colleague,    I had the pleasure of seeing Luci Londono in the [facility].    HPI and Plan:   I had the pleasure  of seeing Luci Alert in  in the cardiooncology clinic. She is been referred to the [facility] oncology clinic to discuss potential initiation of adjuvant chemotherapy for her recently diagnosed right-sided breast cancer which is ER MD and HER-2 receptor negative.     She is a pleasant 47-year-old female originally from [facility].  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   She also has polycystic ovarian disease.  She has no history of hypertension.  There is no history of heart disease     She received neoadjuvant chemotherapy with dense dose Adriamycin and Cytoxan followed by Taxol and carboplatin.  She then had a lumpectomy followed by right breast radiation in May.  Now she is on Xeloda.    Her baseline echocardiogram last year revealed normal ejection fraction visually at 55-60% by biplane EF Fraction was 53%.     Given low normal EF, slightly decreased train, borderline EKG, we did a stress MRI which actually turned out to be negative for ischemia.  The EF was 64% on cardiac MRI.    She is here to assess for any late cardiotoxicity.  Denies any chest pain suggestive angina.  No shortness of breath.  She has some right breast numbness and occasional right arm pain where surgery was performed.  She also has some burning in her feet and hands may be related to Xeloda.    Repeat echocardiogram last week reveals normal Bystolic function with normal global longitudinal strain.  Results were reviewed with her.     Physician Exam:   See below.     Impression  1.   Infiltrating ductal carcinoma of the right breast with lymph node involvement with a ER MD and HER-2 receptor negative    Status post neoadjuvant chemotherapy with Adriamycin  and Cytoxan followed by Taxol and carboplatin.  This was followed by lumpectomy and radiation in May.  Now, on Xeloda.  Recent echocardiogram with no evidence of cardiotoxicity.    We discussed that the Xeloda can occasionally cause chest pains and coronary spasm.  This is rare.  She is not having any symptoms suggestive of that.        2.  Hyperlipidemia  Mild with .  Discussed with her importance of low-fat diet and gradual weight loss.     3.  Prediabetes and polycystic ovarian disease  On metformin     Thank you for allowing us to participate in the care of this nice patient.     Overall, there is no evidence of cardiotoxicity.  I would recommend 1 more echocardiogram in a year to assess for any late cardiotoxicity.  We will call her with the results.  If that is normal, she can be discharged from the cardio oncology clinic.  She will still needs aggressive risk factor modification including management of prediabetes and hyperlipidemia.  She should continue to follow with her primary care physician.    Sincerely,     Casey Sanchez MD    Orders Placed This Encounter   Procedures     Echocardiogram Complete       No orders of the defined types were placed in this encounter.      There are no discontinued medications.      Encounter Diagnoses   Name Primary?     Malignant neoplasm of upper-outer quadrant of right breast in female, estrogen receptor negative (H) Yes     Abnormal electrocardiogram      Drug or medicinal substance causing adverse effect in therapeutic use, initial encounter        CURRENT MEDICATIONS:  Current Outpatient Medications   Medication Sig Dispense Refill     ascorbic acid (VITAMIN C) 1000 MG TABS Take 1,000 mg by mouth daily       capecitabine (XELODA) 500 MG tablet CHEMO Start out 2000 mg po am, 1500 mg po pm, 7 days on, 7 days off 98 tablet 0     capecitabine (XELODA) 500 MG tablet CHEMO Start out 1500 mg po bid, 7 days on, 7 days off 84 tablet 0     capecitabine (XELODA) 500 MG  tablet Start out 2000 mg po bid 112 tablet 0     capecitabine (XELODA) 500 MG tablet Start out 2000 mg po bid 112 tablet 0     diclofenac (VOLTAREN) 1 % topical gel Place 2 g onto the skin 4 times daily 1 Tube 1     ibuprofen (ADVIL/MOTRIN) 600 MG tablet Take 1 tablet (600 mg) by mouth every 6 hours as needed for moderate pain 30 tablet 0     levothyroxine (SYNTHROID/LEVOTHROID) 125 MCG tablet TAKE 1 TABLET BY MOUTH DAILY 90 tablet 0     lidocaine (XYLOCAINE) 2 % external gel Apply topically as needed for moderate pain 30 mL 0     loratadine (CLARITIN) 10 MG tablet Take 1 tablet (10 mg) by mouth daily 90 tablet 1     metFORMIN (GLUCOPHAGE-XR) 500 MG 24 hr tablet TAKE 1 TABLET(500 MG) BY MOUTH DAILY WITH DINNER 90 tablet 1     multivitamin, therapeutic with minerals (THERA-VIT-M) TABS tablet Take 1 tablet by mouth daily       omeprazole (PRILOSEC) 20 MG DR capsule Take 1 capsule (20 mg) by mouth 2 times daily (Patient taking differently: Take 20 mg by mouth 2 times daily PRN) 60 capsule 11     order for DME 1: RUE compression sleeve with handpiece/guantlet 1 each 0     order for DME 1: Compression bra; 2: Compression tank top/shirt/camisole 1 each 0     order for DME 1: Gradient Compression Wraps; 2: RUE compression sleeve 15-20 or 20-30 mm Hg with hand piece or gauntlet; 3; Custom RUE compression sleeve with hand piece and/or gauntlet 1 each 0     order for DME Night splints for plantar fasciitis   Longitudinal arch supports  [facility]  Address: [address] [place] [address]  [phone]  Hours:  Open today   8:00 AM - 5:00 PM 2 each 0     order for DME Equipment being ordered:  Compression stocking below knee black 20-25mm 3 each 0     gabapentin (NEURONTIN) 100 MG capsule Take 2 capsules (200 mg) by mouth 3 times daily (Patient not taking: Reported on 12/23/2019) 180 capsule 1     HYDROcodone-acetaminophen (NORCO) 5-325 MG tablet Take 1-2 tablets by mouth every 4 hours as needed for  moderate to severe pain (Patient not taking: Reported on 12/23/2019) 20 tablet 0     Lidocaine (LIDOCARE) 4 % Patch Place 1 patch onto the skin every 24 hours To prevent lidocaine toxicity, patient should be patch free for 12 hrs daily. (Patient not taking: Reported on 12/23/2019) 12 patch 1     lidocaine-prilocaine (EMLA) cream Apply quarter-size amount of Emla cream topically over port site one hour prior to port access for comfort. (Patient not taking: Reported on 12/23/2019) 30 g 3     nabumetone (RELAFEN) 500 MG tablet Take 1 tablet (500 mg) by mouth 2 times daily (Patient not taking: Reported on 12/23/2019) 60 tablet 3     prochlorperazine (COMPAZINE) 10 MG tablet Take 1 tablet (10 mg) by mouth every 6 hours as needed (Nausea/Vomiting) (Patient not taking: Reported on 12/23/2019) 30 tablet 5     senna-docusate (SENOKOT-S/PERICOLACE) 8.6-50 MG tablet Take 1-2 tablets by mouth 2 times daily as needed for constipation (While on oral opioids.) (Patient not taking: Reported on 12/23/2019) 30 tablet 0       ALLERGIES   No Known Allergies    PAST MEDICAL HISTORY:  Past Medical History:   Diagnosis Date     Hypertension goal BP (blood pressure) < 140/80 2/18/2014     Lateral epicondylitis, right dominant elbow since late 10-17 11/1/2017     Port-A-Cath in place 10/26/2018     Thyroid disease        PAST SURGICAL HISTORY:  Past Surgical History:   Procedure Laterality Date     BIOPSY NODE SENTINEL Right 5/15/2019    Procedure: BIOPSY, LYMPH NODE, SENTINEL;  Surgeon: Stacey Ashford MD;  Location:  OR     DISSECT LYMPH NODE AXILLA Right 5/15/2019    Procedure: LYMPHADENECTOMY, AXILLARY;  Surgeon: Stacey Ashford MD;  Location:  OR     HAND SURGERY  2002    left     INSERT PORT VASCULAR ACCESS N/A 10/18/2018    Procedure: INSERTION OF VASCULAR PORT;  Surgeon: Stacey Ashford MD;  Location: [facility]     LUMPECTOMY BREAST WITH SEED LOCALIZATION Right 5/15/2019    Procedure: SEED LOCALIZATION BREAST LUMPECTOMY, SEED  LOCALIZATION AXILLARY BREAST BIOPSY, SENTINEL NODE , REMOVAL OF VASCULAR PORT ACCESS AND RIGHT  AXILLARY NODE DISSECTION;  Surgeon: tSacey Ashford MD;  Location: [facility] OR     REMOVE PORT VASCULAR ACCESS N/A 5/15/2019    Procedure: REMOVAL, VASCULAR ACCESS PORT;  Surgeon: Stacey Ashford MD;  Location: [facility] OR     TUBAL LIGATION         FAMILY HISTORY:  Family History   Problem Relation Age of Onset     Diabetes Mother      Unknown/Adopted Father      Coronary Artery Disease No family hx of      Hypertension No family hx of      Hyperlipidemia No family hx of      Cerebrovascular Disease No family hx of      Breast Cancer No family hx of      Colon Cancer No family hx of      Prostate Cancer No family hx of      Other Cancer No family hx of      Depression No family hx of      Anxiety Disorder No family hx of      Mental Illness No family hx of      Substance Abuse No family hx of      Anesthesia Reaction No family hx of      Asthma No family hx of      Osteoporosis No family hx of      Genetic Disorder No family hx of      Thyroid Disease No family hx of      Obesity No family hx of        SOCIAL HISTORY:  Social History     Socioeconomic History     Marital status:      Spouse name: None     Number of children: None     Years of education: None     Highest education level: None   Occupational History     None   Social Needs     Financial resource strain: None     Food insecurity:     Worry: None     Inability: None     Transportation needs:     Medical: None     Non-medical: None   Tobacco Use     Smoking status: Never Smoker     Smokeless tobacco: Never Used   Substance and Sexual Activity     Alcohol use: No     Drug use: No     Sexual activity: Yes     Partners: Male     Birth control/protection: Pill   Lifestyle     Physical activity:     Days per week: None     Minutes per session: None     Stress: None   Relationships     Social connections:     Talks on phone: None     Gets together: None     Attends  "Mu-ism service: None     Active member of club or organization: None     Attends meetings of clubs or organizations: None     Relationship status: None     Intimate partner violence:     Fear of current or ex partner: None     Emotionally abused: None     Physically abused: None     Forced sexual activity: None   Other Topics Concern     Parent/sibling w/ CABG, MI or angioplasty before 65F 55M? No   Social History Narrative     None       Review of Systems:  Skin:  Negative       Eyes:  Negative      ENT:  Negative      Respiratory:  Negative       Cardiovascular:  Negative;palpitations;chest pain;dizziness;syncope or near-syncope;cyanosis;fatigue;exercise intolerance Positive for states has numbness R sided breast, chest heaviness occ.  Gastroenterology: Negative      Genitourinary:  not assessed      Musculoskeletal:  Positive for foot pain    Neurologic:  Negative      Psychiatric:  Negative      Heme/Lymph/Imm:  Negative      Endocrine:  Positive for thyroid disorder;diabetes      Physical Exam:  Vitals: /81 (BP Location: Left arm, Cuff Size: Adult Large)   Pulse 80   Ht 1.6 m (5' 3\")   Wt 87 kg (191 lb 12.8 oz)   BMI 33.98 kg/m       Constitutional:  cooperative        Skin:  warm and dry to the touch          Head:  normocephalic        Eyes:  pupils equal and round        Lymph:No Cervical lymphadenopathy present     ENT:  no pallor or cyanosis        Neck:  carotid pulses are full and equal bilaterally;JVP normal        Respiratory:  clear to auscultation         Cardiac: regular rhythm;normal S1 and S2     no presence of murmur          not assessed this visit                                        GI:  not assessed this visit        Extremities and Muscular Skeletal:  no edema              Neurological:  no gross motor deficits        Psych:  Alert and Oriented x 3            Thank you for allowing me to participate in the care of your patient.    Sincerely,     Casey Sanchez MD "     [facility]

## 2019-12-23 NOTE — LETTER
12/23/2019    ALEENA TYLER MD  [address]  [facility] [address]    RE: Luci Emerald Londono       Dear Colleague,    I had the pleasure of seeing Luci Londono in the [facility].    HPI and Plan:   I had the pleasure  of seeing Luci Alert in  in the cardiooncology clinic. She is been referred to the [facility] oncology clinic to discuss potential initiation of adjuvant chemotherapy for her recently diagnosed right-sided breast cancer which is ER WV and HER-2 receptor negative.     She is a pleasant 47-year-old female originally from [facility].  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   She also has polycystic ovarian disease.  She has no history of hypertension.  There is no history of heart disease     She received neoadjuvant chemotherapy with dense dose Adriamycin and Cytoxan followed by Taxol and carboplatin.  She then had a lumpectomy followed by right breast radiation in May.  Now she is on Xeloda.    Her baseline echocardiogram last year revealed normal ejection fraction visually at 55-60% by biplane EF Fraction was 53%.     Given low normal EF, slightly decreased train, borderline EKG, we did a stress MRI which actually turned out to be negative for ischemia.  The EF was 64% on cardiac MRI.    She is here to assess for any late cardiotoxicity.  Denies any chest pain suggestive angina.  No shortness of breath.  She has some right breast numbness and occasional right arm pain where surgery was performed.  She also has some burning in her feet and hands may be related to Xeloda.    Repeat echocardiogram last week reveals normal Bystolic function with normal global longitudinal strain.  Results were reviewed with her.     Physician Exam:   See below.     Impression  1.   Infiltrating ductal carcinoma of the right breast with lymph node involvement with a ER WV and HER-2 receptor negative    Status post neoadjuvant chemotherapy with Adriamycin  and Cytoxan followed by Taxol and carboplatin.  This was followed by lumpectomy and radiation in May.  Now, on Xeloda.  Recent echocardiogram with no evidence of cardiotoxicity.    We discussed that the Xeloda can occasionally cause chest pains and coronary spasm.  This is rare.  She is not having any symptoms suggestive of that.        2.  Hyperlipidemia  Mild with .  Discussed with her importance of low-fat diet and gradual weight loss.     3.  Prediabetes and polycystic ovarian disease  On metformin     Thank you for allowing us to participate in the care of this nice patient.     Overall, there is no evidence of cardiotoxicity.  I would recommend 1 more echocardiogram in a year to assess for any late cardiotoxicity.  We will call her with the results.  If that is normal, she can be discharged from the cardio oncology clinic.  She will still needs aggressive risk factor modification including management of prediabetes and hyperlipidemia.  She should continue to follow with her primary care physician.    Sincerely,     Casey Sanchez MD    Orders Placed This Encounter   Procedures     Echocardiogram Complete       No orders of the defined types were placed in this encounter.      There are no discontinued medications.      Encounter Diagnoses   Name Primary?     Malignant neoplasm of upper-outer quadrant of right breast in female, estrogen receptor negative (H) Yes     Abnormal electrocardiogram      Drug or medicinal substance causing adverse effect in therapeutic use, initial encounter        CURRENT MEDICATIONS:  Current Outpatient Medications   Medication Sig Dispense Refill     ascorbic acid (VITAMIN C) 1000 MG TABS Take 1,000 mg by mouth daily       capecitabine (XELODA) 500 MG tablet CHEMO Start out 2000 mg po am, 1500 mg po pm, 7 days on, 7 days off 98 tablet 0     capecitabine (XELODA) 500 MG tablet CHEMO Start out 1500 mg po bid, 7 days on, 7 days off 84 tablet 0     capecitabine (XELODA) 500 MG  tablet Start out 2000 mg po bid 112 tablet 0     capecitabine (XELODA) 500 MG tablet Start out 2000 mg po bid 112 tablet 0     diclofenac (VOLTAREN) 1 % topical gel Place 2 g onto the skin 4 times daily 1 Tube 1     ibuprofen (ADVIL/MOTRIN) 600 MG tablet Take 1 tablet (600 mg) by mouth every 6 hours as needed for moderate pain 30 tablet 0     levothyroxine (SYNTHROID/LEVOTHROID) 125 MCG tablet TAKE 1 TABLET BY MOUTH DAILY 90 tablet 0     lidocaine (XYLOCAINE) 2 % external gel Apply topically as needed for moderate pain 30 mL 0     loratadine (CLARITIN) 10 MG tablet Take 1 tablet (10 mg) by mouth daily 90 tablet 1     metFORMIN (GLUCOPHAGE-XR) 500 MG 24 hr tablet TAKE 1 TABLET(500 MG) BY MOUTH DAILY WITH DINNER 90 tablet 1     multivitamin, therapeutic with minerals (THERA-VIT-M) TABS tablet Take 1 tablet by mouth daily       omeprazole (PRILOSEC) 20 MG DR capsule Take 1 capsule (20 mg) by mouth 2 times daily (Patient taking differently: Take 20 mg by mouth 2 times daily PRN) 60 capsule 11     order for DME 1: RUE compression sleeve with handpiece/guantlet 1 each 0     order for DME 1: Compression bra; 2: Compression tank top/shirt/camisole 1 each 0     order for DME 1: Gradient Compression Wraps; 2: RUE compression sleeve 15-20 or 20-30 mm Hg with hand piece or gauntlet; 3; Custom RUE compression sleeve with hand piece and/or gauntlet 1 each 0     order for DME Night splints for plantar fasciitis   Longitudinal arch supports  [facility]  Address: [address] [place] [address]  [phone]  Hours:  Open today   8:00 AM - 5:00 PM 2 each 0     order for DME Equipment being ordered:  Compression stocking below knee black 20-25mm 3 each 0     gabapentin (NEURONTIN) 100 MG capsule Take 2 capsules (200 mg) by mouth 3 times daily (Patient not taking: Reported on 12/23/2019) 180 capsule 1     HYDROcodone-acetaminophen (NORCO) 5-325 MG tablet Take 1-2 tablets by mouth every 4 hours as needed for  moderate to severe pain (Patient not taking: Reported on 12/23/2019) 20 tablet 0     Lidocaine (LIDOCARE) 4 % Patch Place 1 patch onto the skin every 24 hours To prevent lidocaine toxicity, patient should be patch free for 12 hrs daily. (Patient not taking: Reported on 12/23/2019) 12 patch 1     lidocaine-prilocaine (EMLA) cream Apply quarter-size amount of Emla cream topically over port site one hour prior to port access for comfort. (Patient not taking: Reported on 12/23/2019) 30 g 3     nabumetone (RELAFEN) 500 MG tablet Take 1 tablet (500 mg) by mouth 2 times daily (Patient not taking: Reported on 12/23/2019) 60 tablet 3     prochlorperazine (COMPAZINE) 10 MG tablet Take 1 tablet (10 mg) by mouth every 6 hours as needed (Nausea/Vomiting) (Patient not taking: Reported on 12/23/2019) 30 tablet 5     senna-docusate (SENOKOT-S/PERICOLACE) 8.6-50 MG tablet Take 1-2 tablets by mouth 2 times daily as needed for constipation (While on oral opioids.) (Patient not taking: Reported on 12/23/2019) 30 tablet 0       ALLERGIES   No Known Allergies    PAST MEDICAL HISTORY:  Past Medical History:   Diagnosis Date     Hypertension goal BP (blood pressure) < 140/80 2/18/2014     Lateral epicondylitis, right dominant elbow since late 10-17 11/1/2017     Port-A-Cath in place 10/26/2018     Thyroid disease        PAST SURGICAL HISTORY:  Past Surgical History:   Procedure Laterality Date     BIOPSY NODE SENTINEL Right 5/15/2019    Procedure: BIOPSY, LYMPH NODE, SENTINEL;  Surgeon: Stacey Ashford MD;  Location:  OR     DISSECT LYMPH NODE AXILLA Right 5/15/2019    Procedure: LYMPHADENECTOMY, AXILLARY;  Surgeon: Stacey Ashford MD;  Location:  OR     HAND SURGERY  2002    left     INSERT PORT VASCULAR ACCESS N/A 10/18/2018    Procedure: INSERTION OF VASCULAR PORT;  Surgeon: Stacey Ashford MD;  Location: [facility] OR     LUMPECTOMY BREAST WITH SEED LOCALIZATION Right 5/15/2019    Procedure: SEED LOCALIZATION BREAST LUMPECTOMY, SEED  LOCALIZATION AXILLARY BREAST BIOPSY, SENTINEL NODE , REMOVAL OF VASCULAR PORT ACCESS AND RIGHT  AXILLARY NODE DISSECTION;  Surgeon: Stacey Ashford MD;  Location: [facility] OR     REMOVE PORT VASCULAR ACCESS N/A 5/15/2019    Procedure: REMOVAL, VASCULAR ACCESS PORT;  Surgeon: Stacey Ashford MD;  Location: [facility] OR     TUBAL LIGATION         FAMILY HISTORY:  Family History   Problem Relation Age of Onset     Diabetes Mother      Unknown/Adopted Father      Coronary Artery Disease No family hx of      Hypertension No family hx of      Hyperlipidemia No family hx of      Cerebrovascular Disease No family hx of      Breast Cancer No family hx of      Colon Cancer No family hx of      Prostate Cancer No family hx of      Other Cancer No family hx of      Depression No family hx of      Anxiety Disorder No family hx of      Mental Illness No family hx of      Substance Abuse No family hx of      Anesthesia Reaction No family hx of      Asthma No family hx of      Osteoporosis No family hx of      Genetic Disorder No family hx of      Thyroid Disease No family hx of      Obesity No family hx of        SOCIAL HISTORY:  Social History     Socioeconomic History     Marital status:      Spouse name: None     Number of children: None     Years of education: None     Highest education level: None   Occupational History     None   Social Needs     Financial resource strain: None     Food insecurity:     Worry: None     Inability: None     Transportation needs:     Medical: None     Non-medical: None   Tobacco Use     Smoking status: Never Smoker     Smokeless tobacco: Never Used   Substance and Sexual Activity     Alcohol use: No     Drug use: No     Sexual activity: Yes     Partners: Male     Birth control/protection: Pill   Lifestyle     Physical activity:     Days per week: None     Minutes per session: None     Stress: None   Relationships     Social connections:     Talks on phone: None     Gets together: None     Attends  "[employer] service: None     Active member of club or organization: None     Attends meetings of clubs or organizations: None     Relationship status: None     Intimate partner violence:     Fear of current or ex partner: None     Emotionally abused: None     Physically abused: None     Forced sexual activity: None   Other Topics Concern     Parent/sibling w/ CABG, MI or angioplasty before 65F 55M? No   Social History Narrative     None       Review of Systems:  Skin:  Negative       Eyes:  Negative      ENT:  Negative      Respiratory:  Negative       Cardiovascular:  Negative;palpitations;chest pain;dizziness;syncope or near-syncope;cyanosis;fatigue;exercise intolerance Positive for states has numbness R sided breast, chest heaviness occ.  Gastroenterology: Negative      Genitourinary:  not assessed      Musculoskeletal:  Positive for foot pain    Neurologic:  Negative      Psychiatric:  Negative      Heme/Lymph/Imm:  Negative      Endocrine:  Positive for thyroid disorder;diabetes      Physical Exam:  Vitals: /81 (BP Location: Left arm, Cuff Size: Adult Large)   Pulse 80   Ht 1.6 m (5' 3\")   Wt 87 kg (191 lb 12.8 oz)   BMI 33.98 kg/m       Constitutional:  cooperative        Skin:  warm and dry to the touch          Head:  normocephalic        Eyes:  pupils equal and round        Lymph:No Cervical lymphadenopathy present     ENT:  no pallor or cyanosis        Neck:  carotid pulses are full and equal bilaterally;JVP normal        Respiratory:  clear to auscultation         Cardiac: regular rhythm;normal S1 and S2     no presence of murmur          not assessed this visit                                        GI:  not assessed this visit        Extremities and Muscular Skeletal:  no edema              Neurological:  no gross motor deficits        Psych:  Alert and Oriented x 3        CC  No referring provider defined for this encounter.                Thank you for allowing me to participate in the care " of your patient.      Sincerely,     Casey Sanchez MD     [facility]    cc:   No referring provider defined for this encounter.

## 2019-12-23 NOTE — PROGRESS NOTES
HPI and Plan:   I had the pleasure  of seeing Luci Alert in in the cardiooncology clinic. She is been referred to the [facility] oncology clinic to discuss potential initiation of adjuvant chemotherapy for her recently diagnosed right-sided breast cancer which is ER KS and HER-2 receptor negative.     She is a pleasant 47-year-old female originally from [place].  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   She also has polycystic ovarian disease.  She has no history of hypertension.  There is no history of heart disease     She received neoadjuvant chemotherapy with dense dose Adriamycin and Cytoxan followed by Taxol and carboplatin.  She then had a lumpectomy followed by right breast radiation in May.  Now she is on Xeloda.    Her baseline echocardiogram last year revealed normal ejection fraction visually at 55-60% by biplane EF Fraction was 53%.    Given low normal EF, slightly decreased train, borderline EKG, we did a stress MRI which actually turned out to be negative for ischemia.  The EF was 64% on cardiac MRI.    She is here to assess for any late cardiotoxicity.  Denies any chest pain suggestive angina.  No shortness of breath.  She has some right breast numbness and occasional right arm pain where surgery was performed.  She also has some burning in her feet and hands may be related to Xeloda.    Repeat echocardiogram last week reveals normal Bystolic function with normal global longitudinal strain.  Results were reviewed with her.     Physician Exam:   See below.     Impression  1.  Infiltrating ductal carcinoma of the right breast with lymph node involvement with a ER KS and HER-2 receptor negative    Status post neoadjuvant chemotherapy with Adriamycin and Cytoxan followed by Taxol and carboplatin.  This was followed by lumpectomy and radiation in May.  Now, on Xeloda.  Recent echocardiogram with no evidence of cardiotoxicity.    We discussed that the Xeloda can occasionally cause chest  pains and coronary spasm.  This is rare.  She is not having any symptoms suggestive of that.        2.  Hyperlipidemia  Mild with .  Discussed with her importance of low-fat diet and gradual weight loss.     3.  Prediabetes and polycystic ovarian disease  On metformin     Thank you for allowing us to participate in the care of this nice patient.     Overall, there is no evidence of cardiotoxicity.  I would recommend 1 more echocardiogram in a year to assess for any late cardiotoxicity.  We will call her with the results.  If that is normal, she can be discharged from the cardio oncology clinic.  She will still needs aggressive risk factor modification including management of prediabetes and hyperlipidemia.  She should continue to follow with her primary care physician.    Sincerely,     Casey Sanchez MD    Orders Placed This Encounter   Procedures     Echocardiogram Complete       No orders of the defined types were placed in this encounter.      There are no discontinued medications.      Encounter Diagnoses   Name Primary?     Malignant neoplasm of upper-outer quadrant of right breast in female, estrogen receptor negative (H) Yes     Abnormal electrocardiogram      Drug or medicinal substance causing adverse effect in therapeutic use, initial encounter        CURRENT MEDICATIONS:  Current Outpatient Medications   Medication Sig Dispense Refill     ascorbic acid (VITAMIN C) 1000 MG TABS Take 1,000 mg by mouth daily       capecitabine (XELODA) 500 MG tablet CHEMO Start out 2000 mg po am, 1500 mg po pm, 7 days on, 7 days off 98 tablet 0     capecitabine (XELODA) 500 MG tablet CHEMO Start out 1500 mg po bid, 7 days on, 7 days off 84 tablet 0     capecitabine (XELODA) 500 MG tablet Start out 2000 mg po bid 112 tablet 0     capecitabine (XELODA) 500 MG tablet Start out 2000 mg po bid 112 tablet 0     diclofenac (VOLTAREN) 1 % topical gel Place 2 g onto the skin 4 times daily 1 Tube 1     ibuprofen (ADVIL/MOTRIN)  600 MG tablet Take 1 tablet (600 mg) by mouth every 6 hours as needed for moderate pain 30 tablet 0     levothyroxine (SYNTHROID/LEVOTHROID) 125 MCG tablet TAKE 1 TABLET BY MOUTH DAILY 90 tablet 0     lidocaine (XYLOCAINE) 2 % external gel Apply topically as needed for moderate pain 30 mL 0     loratadine (CLARITIN) 10 MG tablet Take 1 tablet (10 mg) by mouth daily 90 tablet 1     metFORMIN (GLUCOPHAGE-XR) 500 MG 24 hr tablet TAKE 1 TABLET(500 MG) BY MOUTH DAILY WITH DINNER 90 tablet 1     multivitamin, therapeutic with minerals (THERA-VIT-M) TABS tablet Take 1 tablet by mouth daily       omeprazole (PRILOSEC) 20 MG DR capsule Take 1 capsule (20 mg) by mouth 2 times daily (Patient taking differently: Take 20 mg by mouth 2 times daily PRN) 60 capsule 11     order for DME 1: RUE compression sleeve with handpiece/guantlet 1 each 0     order for DME 1: Compression bra; 2: Compression tank top/shirt/camisole 1 each 0     order for DME 1: Gradient Compression Wraps; 2: RUE compression sleeve 15-20 or 20-30 mm Hg with hand piece or gauntlet; 3; Custom RUE compression sleeve with hand piece and/or gauntlet 1 each 0     order for DME Night splints for plantar fasciitis   Longitudinal arch supports  [facility]  Address: [address] [place] [address]  [phone]  Hours:  Open today   8:00 AM - 5:00 PM 2 each 0     order for DME Equipment being ordered:  Compression stocking below knee black 20-25mm 3 each 0     gabapentin (NEURONTIN) 100 MG capsule Take 2 capsules (200 mg) by mouth 3 times daily (Patient not taking: Reported on 12/23/2019) 180 capsule 1     HYDROcodone-acetaminophen (NORCO) 5-325 MG tablet Take 1-2 tablets by mouth every 4 hours as needed for moderate to severe pain (Patient not taking: Reported on 12/23/2019) 20 tablet 0     Lidocaine (LIDOCARE) 4 % Patch Place 1 patch onto the skin every 24 hours To prevent lidocaine toxicity, patient should be patch free for 12 hrs daily.  (Patient not taking: Reported on 12/23/2019) 12 patch 1     lidocaine-prilocaine (EMLA) cream Apply quarter-size amount of Emla cream topically over port site one hour prior to port access for comfort. (Patient not taking: Reported on 12/23/2019) 30 g 3     nabumetone (RELAFEN) 500 MG tablet Take 1 tablet (500 mg) by mouth 2 times daily (Patient not taking: Reported on 12/23/2019) 60 tablet 3     prochlorperazine (COMPAZINE) 10 MG tablet Take 1 tablet (10 mg) by mouth every 6 hours as needed (Nausea/Vomiting) (Patient not taking: Reported on 12/23/2019) 30 tablet 5     senna-docusate (SENOKOT-S/PERICOLACE) 8.6-50 MG tablet Take 1-2 tablets by mouth 2 times daily as needed for constipation (While on oral opioids.) (Patient not taking: Reported on 12/23/2019) 30 tablet 0       ALLERGIES   No Known Allergies    PAST MEDICAL HISTORY:  Past Medical History:   Diagnosis Date     Hypertension goal BP (blood pressure) < 140/80 2/18/2014     Lateral epicondylitis, right dominant elbow since late 10-17 11/1/2017     Port-A-Cath in place 10/26/2018     Thyroid disease        PAST SURGICAL HISTORY:  Past Surgical History:   Procedure Laterality Date     BIOPSY NODE SENTINEL Right 5/15/2019    Procedure: BIOPSY, LYMPH NODE, SENTINEL;  Surgeon: Stacey Ashford MD;  Location:  OR     DISSECT LYMPH NODE AXILLA Right 5/15/2019    Procedure: LYMPHADENECTOMY, AXILLARY;  Surgeon: Stacey Ashford MD;  Location:  OR     HAND SURGERY  2002    left     INSERT PORT VASCULAR ACCESS N/A 10/18/2018    Procedure: INSERTION OF VASCULAR PORT;  Surgeon: Stacey Ashford MD;  Location:  OR     LUMPECTOMY BREAST WITH SEED LOCALIZATION Right 5/15/2019    Procedure: SEED LOCALIZATION BREAST LUMPECTOMY, SEED LOCALIZATION AXILLARY BREAST BIOPSY, SENTINEL NODE , REMOVAL OF VASCULAR PORT ACCESS AND RIGHT  AXILLARY NODE DISSECTION;  Surgeon: Stacey Ashford MD;  Location:  OR     REMOVE PORT VASCULAR ACCESS N/A 5/15/2019    Procedure:  REMOVAL, VASCULAR ACCESS PORT;  Surgeon: Stacey Ashford MD;  Location: [facility] OR     TUBAL LIGATION         FAMILY HISTORY:  Family History   Problem Relation Age of Onset     Diabetes Mother      Unknown/Adopted Father      Coronary Artery Disease No family hx of      Hypertension No family hx of      Hyperlipidemia No family hx of      Cerebrovascular Disease No family hx of      Breast Cancer No family hx of      Colon Cancer No family hx of      Prostate Cancer No family hx of      Other Cancer No family hx of      Depression No family hx of      Anxiety Disorder No family hx of      Mental Illness No family hx of      Substance Abuse No family hx of      Anesthesia Reaction No family hx of      Asthma No family hx of      Osteoporosis No family hx of      Genetic Disorder No family hx of      Thyroid Disease No family hx of      Obesity No family hx of        SOCIAL HISTORY:  Social History     Socioeconomic History     Marital status:      Spouse name: None     Number of children: None     Years of education: None     Highest education level: None   Occupational History     None   Social Needs     Financial resource strain: None     Food insecurity:     Worry: None     Inability: None     Transportation needs:     Medical: None     Non-medical: None   Tobacco Use     Smoking status: Never Smoker     Smokeless tobacco: Never Used   Substance and Sexual Activity     Alcohol use: No     Drug use: No     Sexual activity: Yes     Partners: Male     Birth control/protection: Pill   Lifestyle     Physical activity:     Days per week: None     Minutes per session: None     Stress: None   Relationships     Social connections:     Talks on phone: None     Gets together: None     Attends Sikh service: None     Active member of club or organization: None     Attends meetings of clubs or organizations: None     Relationship status: None     Intimate partner violence:     Fear of current or ex partner: None      "Emotionally abused: None     Physically abused: None     Forced sexual activity: None   Other Topics Concern     Parent/sibling w/ CABG, MI or angioplasty before 65F 55M? No   Social History Narrative     None       Review of Systems:  Skin:  Negative       Eyes:  Negative      ENT:  Negative      Respiratory:  Negative       Cardiovascular:  Negative;palpitations;chest pain;dizziness;syncope or near-syncope;cyanosis;fatigue;exercise intolerance Positive for states has numbness R sided breast, chest heaviness occ.  Gastroenterology: Negative      Genitourinary:  not assessed      Musculoskeletal:  Positive for foot pain    Neurologic:  Negative      Psychiatric:  Negative      Heme/Lymph/Imm:  Negative      Endocrine:  Positive for thyroid disorder;diabetes      Physical Exam:  Vitals: /81 (BP Location: Left arm, Cuff Size: Adult Large)   Pulse 80   Ht 1.6 m (5' 3\")   Wt 87 kg (191 lb 12.8 oz)   BMI 33.98 kg/m      Constitutional:  cooperative        Skin:  warm and dry to the touch          Head:  normocephalic        Eyes:  pupils equal and round        Lymph:No Cervical lymphadenopathy present     ENT:  no pallor or cyanosis        Neck:  carotid pulses are full and equal bilaterally;JVP normal        Respiratory:  clear to auscultation         Cardiac: regular rhythm;normal S1 and S2     no presence of murmur          not assessed this visit                                        GI:  not assessed this visit        Extremities and Muscular Skeletal:  no edema              Neurological:  no gross motor deficits        Psych:  Alert and Oriented x 3        CC  No referring provider defined for this encounter.              "

## 2019-12-24 DIAGNOSIS — C50.411 MALIGNANT NEOPLASM OF UPPER-OUTER QUADRANT OF RIGHT BREAST IN FEMALE, ESTROGEN RECEPTOR NEGATIVE (H): Primary | ICD-10-CM

## 2019-12-24 DIAGNOSIS — Z17.1 MALIGNANT NEOPLASM OF UPPER-OUTER QUADRANT OF RIGHT BREAST IN FEMALE, ESTROGEN RECEPTOR NEGATIVE (H): Primary | ICD-10-CM

## 2019-12-24 RX ORDER — LIDOCAINE/PRILOCAINE 2.5 %-2.5%
CREAM (GRAM) TOPICAL
Qty: 30 G | Refills: 3 | Status: SHIPPED | OUTPATIENT
Start: 2019-12-24 | End: 2020-05-13

## 2019-12-26 ENCOUNTER — HOSPITAL ENCOUNTER (OUTPATIENT)
Facility: CLINIC | Age: 49
Setting detail: SPECIMEN
Discharge: HOME | End: 2019-12-26
Attending: INTERNAL MEDICINE | Admitting: INTERNAL MEDICINE
Payer: COMMERCIAL

## 2019-12-26 ENCOUNTER — INFUSION THERAPY VISIT (OUTPATIENT)
Dept: INFUSION THERAPY | Facility: CLINIC | Age: 49
End: 2019-12-26
Attending: INTERNAL MEDICINE
Payer: COMMERCIAL

## 2019-12-26 DIAGNOSIS — C50.411 MALIGNANT NEOPLASM OF UPPER-OUTER QUADRANT OF RIGHT BREAST IN FEMALE, ESTROGEN RECEPTOR NEGATIVE (H): ICD-10-CM

## 2019-12-26 DIAGNOSIS — C50.919 BREAST CANCER (H): ICD-10-CM

## 2019-12-26 DIAGNOSIS — Z17.1 MALIGNANT NEOPLASM OF UPPER-OUTER QUADRANT OF RIGHT BREAST IN FEMALE, ESTROGEN RECEPTOR NEGATIVE (H): ICD-10-CM

## 2019-12-26 LAB
ALBUMIN SERPL-MCNC: 3.6 G/DL (ref 3.4–5)
ALP SERPL-CCNC: 174 U/L (ref 40–150)
ALT SERPL W P-5'-P-CCNC: 35 U/L (ref 0–50)
ANION GAP SERPL CALCULATED.3IONS-SCNC: <1 MMOL/L (ref 3–14)
AST SERPL W P-5'-P-CCNC: 31 U/L (ref 0–45)
BASOPHILS # BLD AUTO: 0 10E9/L (ref 0–0.2)
BASOPHILS NFR BLD AUTO: 0.2 %
BILIRUB SERPL-MCNC: 0.3 MG/DL (ref 0.2–1.3)
BUN SERPL-MCNC: 10 MG/DL (ref 7–30)
CALCIUM SERPL-MCNC: 8.7 MG/DL (ref 8.5–10.1)
CHLORIDE SERPL-SCNC: 109 MMOL/L (ref 94–109)
CO2 SERPL-SCNC: 33 MMOL/L (ref 20–32)
CREAT SERPL-MCNC: 0.61 MG/DL (ref 0.52–1.04)
DIFFERENTIAL METHOD BLD: ABNORMAL
EOSINOPHIL # BLD AUTO: 0.1 10E9/L (ref 0–0.7)
EOSINOPHIL NFR BLD AUTO: 2.8 %
ERYTHROCYTE [DISTWIDTH] IN BLOOD BY AUTOMATED COUNT: 21.5 % (ref 10–15)
GFR SERPL CREATININE-BSD FRML MDRD: >90 ML/MIN/{1.73_M2}
GLUCOSE SERPL-MCNC: 123 MG/DL (ref 70–99)
HCT VFR BLD AUTO: 33.6 % (ref 35–47)
HGB BLD-MCNC: 11.3 G/DL (ref 11.7–15.7)
IMM GRANULOCYTES # BLD: 0 10E9/L (ref 0–0.4)
IMM GRANULOCYTES NFR BLD: 0 %
LYMPHOCYTES # BLD AUTO: 1.7 10E9/L (ref 0.8–5.3)
LYMPHOCYTES NFR BLD AUTO: 36.9 %
MCH RBC QN AUTO: 28.9 PG (ref 26.5–33)
MCHC RBC AUTO-ENTMCNC: 33.6 G/DL (ref 31.5–36.5)
MCV RBC AUTO: 86 FL (ref 78–100)
MONOCYTES # BLD AUTO: 0.2 10E9/L (ref 0–1.3)
MONOCYTES NFR BLD AUTO: 5.1 %
NEUTROPHILS # BLD AUTO: 2.6 10E9/L (ref 1.6–8.3)
NEUTROPHILS NFR BLD AUTO: 55 %
NRBC # BLD AUTO: 0 10*3/UL
NRBC BLD AUTO-RTO: 0 /100
PLATELET # BLD AUTO: 189 10E9/L (ref 150–450)
POTASSIUM SERPL-SCNC: 3.7 MMOL/L (ref 3.4–5.3)
PROT SERPL-MCNC: 7 G/DL (ref 6.8–8.8)
RBC # BLD AUTO: 3.91 10E12/L (ref 3.8–5.2)
SODIUM SERPL-SCNC: 141 MMOL/L (ref 133–144)
WBC # BLD AUTO: 4.7 10E9/L (ref 4–11)

## 2019-12-26 PROCEDURE — 85025 COMPLETE CBC W/AUTO DIFF WBC: CPT | Performed by: INTERNAL MEDICINE

## 2019-12-26 PROCEDURE — 36415 COLL VENOUS BLD VENIPUNCTURE: CPT

## 2019-12-26 PROCEDURE — 80053 COMPREHEN METABOLIC PANEL: CPT | Performed by: INTERNAL MEDICINE

## 2019-12-26 NOTE — PROGRESS NOTES
Medical Assistant Note:  Luci Londono presents today for lab draw.    Patient seen by provider today: No.   present during visit today: Not Applicable.    Concerns: No Concerns.    Procedure:  Lab draw site: LAC, Needle type: BF, Gauge: 21. Gauze and coban applied    Post Assessment:  Labs drawn without difficulty: Yes.    Discharge Plan:  Departure Mode: Ambulatory.    Face to Face Time: 5.    Emerald Pascual CMA

## 2019-12-27 ENCOUNTER — DOCUMENTATION ONLY (OUTPATIENT)
Dept: PHARMACY | Facility: CLINIC | Age: 49
End: 2019-12-27

## 2019-12-27 RX ORDER — CAPECITABINE 500 MG/1
TABLET, FILM COATED ORAL
Qty: 112 TABLET | Refills: 0 | Status: SHIPPED | OUTPATIENT
Start: 2019-12-27 | End: 2020-05-13

## 2020-01-08 DIAGNOSIS — E03.9 ACQUIRED HYPOTHYROIDISM: ICD-10-CM

## 2020-01-08 RX ORDER — LEVOTHYROXINE SODIUM 125 UG/1
TABLET ORAL
Qty: 90 TABLET | Refills: 2 | Status: SHIPPED | OUTPATIENT
Start: 2020-01-08 | End: 2020-04-16

## 2020-01-08 NOTE — TELEPHONE ENCOUNTER
"Requested Prescriptions   Pending Prescriptions Disp Refills     levothyroxine (SYNTHROID/LEVOTHROID) 125 MCG tablet [Pharmacy Med Name: LEVOTHYROXINE 0.125MG (125MCG) TAB] 90 tablet 0     Sig: TAKE 1 TABLET BY MOUTH DAILY   Last Written Prescription Date:  10/28/2019  Last Fill Quantity: 90,  # refills: 0   Last office visit: 7/30/2019 with prescribing provider:  MARA Wills   Future Office Visit:   Next 5 appointments (look out 90 days)    Cortes 15, 2020 11:10 AM CST  Return Visit with Addie Murillo MD  [facility]  [address] [facility] [address]  [facility] [address]  [phone]             Thyroid Protocol Passed - 1/8/2020  3:00 PM        Passed - Patient is 12 years or older        Passed - Recent (12 mo) or future (30 days) visit within the authorizing provider's specialty     Patient has had an office visit with the authorizing provider or a provider within the authorizing providers department within the previous 12 mos or has a future within next 30 days. See \"Patient Info\" tab in inbasket, or \"Choose Columns\" in Meds & Orders section of the refill encounter.              Passed - Medication is active on med list        Passed - Normal TSH on file in past 12 months     Recent Labs   Lab Test 10/30/19  0848   TSH 0.62              Passed - No active pregnancy on record     If patient is pregnant or has had a positive pregnancy test, please check TSH.          Passed - No positive pregnancy test in past 12 months     If patient is pregnant or has had a positive pregnancy test, please check TSH.          Filled per [facility] protocol.     RIKI GrahamN, RN  [employer] [facility]    "

## 2020-01-15 ENCOUNTER — TELEPHONE (OUTPATIENT)
Dept: FAMILY MEDICINE | Facility: CLINIC | Age: 50
End: 2020-01-15

## 2020-01-15 ENCOUNTER — HOSPITAL ENCOUNTER (OUTPATIENT)
Facility: CLINIC | Age: 50
Setting detail: SPECIMEN
Discharge: HOME | End: 2020-01-15
Attending: INTERNAL MEDICINE | Admitting: INTERNAL MEDICINE
Payer: COMMERCIAL

## 2020-01-15 ENCOUNTER — ONCOLOGY VISIT (OUTPATIENT)
Dept: ONCOLOGY | Facility: CLINIC | Age: 50
End: 2020-01-15
Attending: INTERNAL MEDICINE
Payer: COMMERCIAL

## 2020-01-15 VITALS
BODY MASS INDEX: 33.88 KG/M2 | TEMPERATURE: 97.7 F | OXYGEN SATURATION: 98 % | DIASTOLIC BLOOD PRESSURE: 82 MMHG | WEIGHT: 191.2 LBS | HEART RATE: 63 BPM | HEIGHT: 63 IN | RESPIRATION RATE: 12 BRPM | SYSTOLIC BLOOD PRESSURE: 124 MMHG

## 2020-01-15 DIAGNOSIS — Z17.1 MALIGNANT NEOPLASM OF UPPER-OUTER QUADRANT OF RIGHT BREAST IN FEMALE, ESTROGEN RECEPTOR NEGATIVE (H): ICD-10-CM

## 2020-01-15 DIAGNOSIS — C50.411 MALIGNANT NEOPLASM OF UPPER-OUTER QUADRANT OF RIGHT BREAST IN FEMALE, ESTROGEN RECEPTOR NEGATIVE (H): Primary | ICD-10-CM

## 2020-01-15 DIAGNOSIS — G62.9 NEUROPATHY: ICD-10-CM

## 2020-01-15 DIAGNOSIS — C50.411 MALIGNANT NEOPLASM OF UPPER-OUTER QUADRANT OF RIGHT BREAST IN FEMALE, ESTROGEN RECEPTOR NEGATIVE (H): ICD-10-CM

## 2020-01-15 DIAGNOSIS — R63.5 WEIGHT GAIN: Primary | ICD-10-CM

## 2020-01-15 DIAGNOSIS — Z17.1 MALIGNANT NEOPLASM OF UPPER-OUTER QUADRANT OF RIGHT BREAST IN FEMALE, ESTROGEN RECEPTOR NEGATIVE (H): Primary | ICD-10-CM

## 2020-01-15 DIAGNOSIS — N64.4 BREAST PAIN, RIGHT: ICD-10-CM

## 2020-01-15 LAB
ALBUMIN SERPL-MCNC: 3.6 G/DL (ref 3.4–5)
ALP SERPL-CCNC: 187 U/L (ref 40–150)
ALT SERPL W P-5'-P-CCNC: 42 U/L (ref 0–50)
ANION GAP SERPL CALCULATED.3IONS-SCNC: 3 MMOL/L (ref 3–14)
AST SERPL W P-5'-P-CCNC: 35 U/L (ref 0–45)
BASOPHILS # BLD AUTO: 0 10E9/L (ref 0–0.2)
BASOPHILS NFR BLD AUTO: 0.2 %
BILIRUB SERPL-MCNC: 0.4 MG/DL (ref 0.2–1.3)
BUN SERPL-MCNC: 9 MG/DL (ref 7–30)
CALCIUM SERPL-MCNC: 8.9 MG/DL (ref 8.5–10.1)
CHLORIDE SERPL-SCNC: 108 MMOL/L (ref 94–109)
CO2 SERPL-SCNC: 30 MMOL/L (ref 20–32)
CREAT SERPL-MCNC: 0.63 MG/DL (ref 0.52–1.04)
DIFFERENTIAL METHOD BLD: ABNORMAL
EOSINOPHIL # BLD AUTO: 0.1 10E9/L (ref 0–0.7)
EOSINOPHIL NFR BLD AUTO: 2 %
ERYTHROCYTE [DISTWIDTH] IN BLOOD BY AUTOMATED COUNT: 21.6 % (ref 10–15)
GFR SERPL CREATININE-BSD FRML MDRD: >90 ML/MIN/{1.73_M2}
GLUCOSE SERPL-MCNC: 81 MG/DL (ref 70–99)
HCT VFR BLD AUTO: 35.2 % (ref 35–47)
HGB BLD-MCNC: 11.8 G/DL (ref 11.7–15.7)
IMM GRANULOCYTES # BLD: 0 10E9/L (ref 0–0.4)
IMM GRANULOCYTES NFR BLD: 0 %
LYMPHOCYTES # BLD AUTO: 1.4 10E9/L (ref 0.8–5.3)
LYMPHOCYTES NFR BLD AUTO: 34.7 %
MCH RBC QN AUTO: 29.8 PG (ref 26.5–33)
MCHC RBC AUTO-ENTMCNC: 33.5 G/DL (ref 31.5–36.5)
MCV RBC AUTO: 89 FL (ref 78–100)
MONOCYTES # BLD AUTO: 0.3 10E9/L (ref 0–1.3)
MONOCYTES NFR BLD AUTO: 8.5 %
NEUTROPHILS # BLD AUTO: 2.2 10E9/L (ref 1.6–8.3)
NEUTROPHILS NFR BLD AUTO: 54.6 %
NRBC # BLD AUTO: 0 10*3/UL
NRBC BLD AUTO-RTO: 0 /100
PLATELET # BLD AUTO: 194 10E9/L (ref 150–450)
POTASSIUM SERPL-SCNC: 4.1 MMOL/L (ref 3.4–5.3)
PROT SERPL-MCNC: 7.1 G/DL (ref 6.8–8.8)
RBC # BLD AUTO: 3.96 10E12/L (ref 3.8–5.2)
SODIUM SERPL-SCNC: 141 MMOL/L (ref 133–144)
WBC # BLD AUTO: 4 10E9/L (ref 4–11)

## 2020-01-15 PROCEDURE — 85025 COMPLETE CBC W/AUTO DIFF WBC: CPT | Performed by: INTERNAL MEDICINE

## 2020-01-15 PROCEDURE — 99214 OFFICE O/P EST MOD 30 MIN: CPT | Performed by: INTERNAL MEDICINE

## 2020-01-15 PROCEDURE — 80053 COMPREHEN METABOLIC PANEL: CPT | Performed by: INTERNAL MEDICINE

## 2020-01-15 PROCEDURE — G0463 HOSPITAL OUTPT CLINIC VISIT: HCPCS

## 2020-01-15 RX ORDER — GABAPENTIN 100 MG/1
200 CAPSULE ORAL 3 TIMES DAILY
Qty: 180 CAPSULE | Refills: 1 | Status: SHIPPED | OUTPATIENT
Start: 2020-01-15 | End: 2020-05-13

## 2020-01-15 ASSESSMENT — MIFFLIN-ST. JEOR: SCORE: 1466.41

## 2020-01-15 ASSESSMENT — PAIN SCALES - GENERAL: PAINLEVEL: EXTREME PAIN (8)

## 2020-01-15 NOTE — TELEPHONE ENCOUNTER
Pt called requesting thyroid labs, [facility] would be able to add order since she has blood drawn with them today. Pt reports she feels heavier. Notes possible weight gain of 10 lbs in the last 3 months. Not other symptoms. Please advice on request. Pt and Infusion center [phone] can be called with providers response.

## 2020-01-15 NOTE — PROGRESS NOTES
ONCOLOGY FOLLOW [facility] VISIT      breast surgeon:  Dr. Stacey Ashford,       REASON FOR visit:  10/2018 dx right breast TN IDC      HISTORY OF ONCOLOGY ILLNESS:    At age 47 amenorrhea with polycystic ovaries,  She felt a lump in her right breast in early October, 2018.   Her mammogram revealed a small mass in the right upper outer breast,   US revealed an 8mm hypoechoic mass at 12:00, 6cm FN and axillary US revealed a concerning lymph node which was also biopsied.   Her pathology from both breast and LN revealed a grade 3, invasive ductal carcinoma, ER/ID/her 2-.   PET found no distal disease.   Breast MRI found entire span 3.8 cm.There are multiple enlarged right axillary lymph nodes.      She made informed decision to proceed with neoadjuvant DD AC  She had drastic clinical response by PE and MRI.   She continued on wkly taxol. Carbo was added in W2. Carbo then was discontinued 3/6/2019 due to persistent neutropenia.     She had lumpectomy 5/2019 found to have dL3H4ff (1/6) disease.     She is tested 4/2019 heterozygous positive for PALB2 p.E837 and BRCA2 variant unknown significance p.T77A, MLH1 variant, unknow significance p.I25V    She finished RT till mid Aug 2019.   LMP 10/2018.     She is not qualified for  trail.   She made informed decision to try adjuvant oral Xeloda September 2019.       INTERVAL HISTORY:  She tolerates xeloda ok so far with dosing up to full dose.   Since the patient's last visit with us, there is no hospital stay/surgery/new diagnosis made.      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     MEDICATIONS/ALLERY:  Reviewed in Epic system.     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 Deny ETOH/smoking       FAMILY HISTORY:    Negative for any type of cancers      REVIEW OF SYSTEMS:   Right shoulder pain, right breast pain on and off.      PHYSICAL EXAMINATION:   VITAL SIGNS: Blood pressure 124/82, pulse 63, temperature 97.7  F (36.5  C), temperature source Oral, resp. rate 12, height 1.6 m (5' 3\"), weight 86.7 kg (191 lb 3.2 oz), SpO2 98 %, not currently breastfeeding.    ECOG 0    GENERAL APPEARANCE:  looks like her stated age, very pleasant, not in acute distress.   HEENT: The patient is normocephalic, atraumatic. Pupils are equally reactive to light.  Sclerae are anicteric.  Moist oral mucosa.  Negative pharynx.  No oral thrush. Stomatitis on left angular of mouth.    NECK:  Supple.  No jugular venous distention.  Thyroid is not palpable.   LYMPH NODES:  Superficial lymphadenopathy is not appreciable in the bilateral cervical, supraclavicular, axillary or inguinal areas.   CARDIOVASCULAR:  S1, S2 regular with no murmurs or gallops.  No carotid or abdominal bruits.   PULMONARY:  Lungs are clear to auscultation and percussion bilaterally.  There is no wheezing or rhonchi.   GASTROINTESTINAL:  Abdomen is soft, nontender.  No hepatosplenomegaly.  No signs of ascites.  No mass appreciable.   MUSCULOSKELETAL/EXTREMITIES:  + right arm edema with sleeve on.  No cyanotic changes.  No signs of joint deformity.  + lymphedema right arm.   NEUROLOGIC:  Cranial nerves II-XII are grossly intact.  Sensation intact.  Muscle strength and muscle tone symmetrical, 5/5 throughout.   BACK:  No spinal or paraspinal tenderness.  No CVA tenderness.   SKIN:  No petechiae.  No rash.  No signs of cellulitis.   BREASTS: Right breast has hyperpigmentation changesupper outer quadrant. No palpable masses or skin changes. No discomfort on palpation in lateral portion.   Left breast is symmetrical " with no skin or nipple changes. No masses on the left. Tissue is very dense throughout.          CURRENT LAB DATA REVIEWED TODAY  Cbc /cmp are fine      CURRENT IMAGING REVIEWED  MA 10/2019 - negative  US abd 6/2019 - fatty liver.       OLD DATA REVIEWED TODAY WITH SUMMARY:   She is tested 4/2019 heterozygous positive for PALB2 p.E837 and BRCA2 variant unknown significance p.T77A, MLH1 variant, unknow significance p.I25V    Breast MRI post AC 12/2018  1. Enhancing mass superiorly in the RIGHT breast is significantly decreased in size since 10/17/2018, measuring approximately 1.1 x 0.3 x 0.5 cm in today's study (series 5 image 52 and series 13 image 30), decreased from 1.1 x 1.6 x 1.0 cm.  2. Enlarged RIGHT axillary lymph node is slightly decreased since that time as well, now measuring 0.9 x 1.2 x 1.0 cm (series 5 image 68 and series 13 image 19), decreased from 1.3 x 1.2 x 1.4 cm.     10/2018  Right breast 8mm hypoechoic mass at 12:00, 6cm FN and right axillary LN biopsy both found grade 3, invasive ductal carcinoma, ER/OH/her 2-.       PET 10/2018  1. No evidence of distant metastasis.  2. Hypermetabolic focus in the upper outer quadrant right breast representing primary tumor. Hypermetabolic right axillary lymph nodes, consistent with metastatic node.  3. Indeterminate sub-4 mm pulmonary nodules, likely fissural lymph node in the right lung.  4. Extensive FDG uptake by the brown fat in the neck and paraspinal region.    10/2018 dx MA -  revealed a small mass in the right upper outer breast, US revealed an 8mm hypoechoic mass at 12:00, 6cm FN and axillary US revealed a concerning lymph node                ASSESSMENT AND PLAN:    1.  dx cT1cN1 right breast high grade IDC, TN at age 47 in 10/2018.   S/P neoadjuvant  DD AC -T.  She had lumpectomy 5/2019 found to have tN1L3zk (1/6) disease.     S/p post lumpectomy RT is recommended.     She is not qualified for  trail.     Oral xeloda data and side effects  profile are discussed.  We discussed the side effect in detail.  She made informed decision to try adjuvant oral Xeloda September 2019.    She is able to tolerate full dose at 2 g bid.     She is informed about 6 to 8 months of adjuvant therapy on this oral chemotherapy medication.      2. Young age dx with TN breast cancer, she saw genetic counseling.   She is tested 4/2019 heterozygous positive for PALB2 p.E837 and BRCA2 variant unknown significance p.T77A, MLH1 variant, unknow significance p.I25V    We discussed the 33-58% life time risk of breast cancer, increased (% unknown) risk of ovarian cancer and pancreatic cancer.     She will be a good candidate for future breast MRI screening.     Body image screening for pancreatic cancer protocol is undefined.   We will do annual body imaging as needed.    She is due MA in fall and MRI in spring.     3. On and off right breast pain is migratory.  Advice EMLA cream prn.     4. Neuropathy developing in 3/2019.   Advice vit B6 oral. To add gabapentin.

## 2020-01-15 NOTE — LETTER
"    1/15/2020         RE: Luci Londono  [address]  [facility] [address]        Dear Colleague,    Thank you for referring your patient, Luci Londono, to the [facility]. Please see a copy of my visit note below.    Oncology Rooming Note    January 15, 2020 11:23 AM   Luci Londono is a 49 year old female who presents for:    Chief Complaint   Patient presents with     Oncology Clinic Visit     Breast cancer      Initial Vitals: /82   Pulse 63   Temp 97.7  F (36.5  C) (Oral)   Resp 12   Ht 1.6 m (5' 3\")   Wt 86.7 kg (191 lb 3.2 oz)   SpO2 98%   BMI 33.87 kg/m    Estimated body mass index is 33.87 kg/m  as calculated from the following:    Height as of this encounter: 1.6 m (5' 3\").    Weight as of this encounter: 86.7 kg (191 lb 3.2 oz). Body surface area is 1.96 meters squared.  Extreme Pain (8) Comment: right breast and right side of back   No LMP recorded.  Allergies reviewed: Yes  Medications reviewed: Yes    Medications: MEDICATION REFILLS NEEDED TODAY. Provider was notified.  [employer]  Pharmacy name entered into [employer]:    [facility], [place] - [address] [facility] [id number] [place], [place] - [address] AT [address]    Clinical concerns: right breast pain Dr. Murillo was notified.      Shari J. Schoenberger, CMA              Medical Assistant Note:  Luci Londono presents today for lab draw.    Patient seen by provider today: Yes: Dr Murillo.   present during visit today: Not Applicable.    Concerns: No Concerns.    Procedure:  Lab draw site: RAC, Needle type: BF, Gauge: 21. Guaze and coban applied    Post Assessment:  Labs drawn without difficulty: Yes.    Discharge Plan:  Departure Mode: Ambulatory.    Face to Face Time: 5.    Emerald Pascual CMA              ONCOLOGY FOLLOW [facility] VISIT      breast surgeon:  Dr. Stacey Ashford,       REASON FOR visit:  10/2018 dx right " breast TN IDC      HISTORY OF ONCOLOGY ILLNESS:    At age 47 amenorrhea with polycystic ovaries,  She felt a lump in her right breast in early October, 2018.   Her mammogram revealed a small mass in the right upper outer breast,   US revealed an 8mm hypoechoic mass at 12:00, 6cm FN and axillary US revealed a concerning lymph node which was also biopsied.   Her pathology from both breast and LN revealed a grade 3, invasive ductal carcinoma, ER/VA/her 2-.   PET found no distal disease.   Breast MRI found entire span 3.8 cm.There are multiple enlarged right axillary lymph nodes.      She made informed decision to proceed with neoadjuvant DD AC  She had drastic clinical response by PE and MRI.   She continued on wkly taxol. Carbo was added in W2. Carbo then was discontinued 3/6/2019 due to persistent neutropenia.     She had lumpectomy 5/2019 found to have [id number] (1/6) disease.     She is tested 4/2019 heterozygous positive for PALB2 p.E837 and BRCA2 variant unknown significance p.T77A, MLH1 variant, unknow significance p.I25V    She finished RT till mid Aug 2019.   LMP 10/2018.     She is not qualified for  trail.   She made informed decision to try adjuvant oral Xeloda September 2019.       INTERVAL HISTORY:  She tolerates xeloda ok so far with dosing up to full dose.   Since the patient's last visit with us, there is no hospital stay/surgery/new diagnosis made.      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     MEDICATIONS/ALLERY:  Reviewed in Epic system.      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 She is devoiced with 3 kids, 12,  "14, 16 years old. Deny ETOH/smoking       FAMILY HISTORY:    Negative for any type of cancers      REVIEW OF SYSTEMS:   Right shoulder pain, right breast pain on and off.      PHYSICAL EXAMINATION:   VITAL SIGNS: Blood pressure 124/82, pulse 63, temperature 97.7  F (36.5  C), temperature source Oral, resp. rate 12, height 1.6 m (5' 3\"), weight 86.7 kg (191 lb 3.2 oz), SpO2 98 %, not currently breastfeeding.    ECOG 0    GENERAL APPEARANCE:  looks like her stated age, very pleasant, not in acute distress.   HEENT: The patient is normocephalic, atraumatic. Pupils are equally reactive to light.  Sclerae are anicteric.  Moist oral mucosa.  Negative pharynx.  No oral thrush. Stomatitis on left angular of mouth.    NECK:  Supple.  No jugular venous distention.  Thyroid is not palpable.   LYMPH NODES:  Superficial lymphadenopathy is not appreciable in the bilateral cervical, supraclavicular, axillary or inguinal areas.   CARDIOVASCULAR:  S1, S2 regular with no murmurs or gallops.  No carotid or abdominal bruits.   PULMONARY:  Lungs are clear to auscultation and percussion bilaterally.  There is no wheezing or rhonchi.   GASTROINTESTINAL:  Abdomen is soft, nontender.  No hepatosplenomegaly.  No signs of ascites.  No mass appreciable.   MUSCULOSKELETAL/EXTREMITIES:  + right arm edema with sleeve on.  No cyanotic changes.  No signs of joint deformity.  + lymphedema right arm.   NEUROLOGIC:  Cranial nerves II-XII are grossly intact.  Sensation intact.  Muscle strength and muscle tone symmetrical, 5/5 throughout.   BACK:  No spinal or paraspinal tenderness.  No CVA tenderness.   SKIN:  No petechiae.  No rash.  No signs of cellulitis.   BREASTS: Right breast has hyperpigmentation changesupper outer quadrant. No palpable masses or skin changes. No discomfort on palpation in lateral portion.   Left breast is symmetrical with no skin or nipple changes. No masses on the left. Tissue is very dense throughout.          CURRENT LAB " DATA REVIEWED TODAY  Cbc /cmp are fine      CURRENT IMAGING REVIEWED  MA 10/2019 - negative  US abd 6/2019 - fatty liver.       OLD DATA REVIEWED TODAY WITH SUMMARY:   She is tested 4/2019 heterozygous positive for PALB2 p.E837 and BRCA2 variant unknown significance p.T77A, MLH1 variant, unknow significance p.I25V    Breast MRI post AC 12/2018  1. Enhancing mass superiorly in the RIGHT breast is significantly decreased in size since 10/17/2018, measuring approximately 1.1 x 0.3 x 0.5 cm in today's study (series 5 image 52 and series 13 image 30), decreased from 1.1 x 1.6 x 1.0 cm.  2. Enlarged RIGHT axillary lymph node is slightly decreased since that time as well, now measuring 0.9 x 1.2 x 1.0 cm (series 5 image 68 and series 13 image 19), decreased from 1.3 x 1.2 x 1.4 cm.     10/2018  Right breast 8mm hypoechoic mass at 12:00, 6cm FN and right axillary LN biopsy both found grade 3, invasive ductal carcinoma, ER/SD/her 2-.       PET 10/2018  1. No evidence of distant metastasis.  2. Hypermetabolic focus in the upper outer quadrant right breast representing primary tumor. Hypermetabolic right axillary lymph nodes, consistent with metastatic node.  3. Indeterminate sub-4 mm pulmonary nodules, likely fissural lymph node in the right lung.  4. Extensive FDG uptake by the brown fat in the neck and paraspinal region.    10/2018 dx MA -  revealed a small mass in the right upper outer breast, US revealed an 8mm hypoechoic mass at 12:00, 6cm FN and axillary US revealed a concerning lymph node                ASSESSMENT AND PLAN:    1.  dx cT1cN1 right breast high grade IDC, TN at age 47 in 10/2018.   S/P neoadjuvant  DD AC -T.  She had lumpectomy 5/2019 found to have lT6S2ml (1/6) disease.     S/p post lumpectomy RT is recommended.     She is not qualified for  trail.     Oral xeloda data and side effects profile are discussed.  We discussed the side effect in detail.  She made informed decision to try adjuvant oral  Xeloda September 2019.    She is able to tolerate full dose at 2 g bid.     She is informed about 6 to 8 months of adjuvant therapy on this oral chemotherapy medication.      2. Young age dx with TN breast cancer, she saw genetic counseling.   She is tested 4/2019 heterozygous positive for PALB2 p.E837 and BRCA2 variant unknown significance p.T77A, MLH1 variant, unknow significance p.I25V    We discussed the 33-58% life time risk of breast cancer, increased (% unknown) risk of ovarian cancer and pancreatic cancer.     She will be a good candidate for future breast MRI screening.     Body image screening for pancreatic cancer protocol is undefined.   We will do annual body imaging as needed.    She is due MA in fall and MRI in spring.     3. On and off right breast pain is migratory.  Advice EMLA cream prn.     4. Neuropathy developing in 3/2019.   Advice vit B6 oral. To add gabapentin.           Again, thank you for allowing me to participate in the care of your patient.        Sincerely,        Addie Murillo MD, MD

## 2020-01-15 NOTE — TELEPHONE ENCOUNTER
Reason for Call:  Other call back    Detailed comments: Luci has blood drawn today at the [facility], [address].    Luci had blood drawn today, and states that she regularly has her Thyroid evaluated.   The Infusion lab asked that Luci call her primary provider and have thyroid orders send to their lab for this blood drawn already done.    Phone Number Patient can be reached at: Cell number on file:    Telephone Information:   Mobile [phone]       Best Time: No need to call Luci, unless there are any questions.    Can we leave a detailed message on this number? YES    Call taken on 1/15/2020 at 11:32 AM by Susanne Shepard

## 2020-01-15 NOTE — PROGRESS NOTES
Medical Assistant Note:  Luci Londono presents today for lab draw.    Patient seen by provider today: Yes: Dr Murillo.   present during visit today: Not Applicable.    Concerns: No Concerns.    Procedure:  Lab draw site: RAC, Needle type: BF, Gauge: 21. Guaze and coban applied    Post Assessment:  Labs drawn without difficulty: Yes.    Discharge Plan:  Departure Mode: Ambulatory.    Face to Face Time: 5.    Emerald Pascual CMA

## 2020-01-15 NOTE — PROGRESS NOTES
"Oncology Rooming Note    January 15, 2020 11:23 AM   Luci Londono is a 49 year old female who presents for:    Chief Complaint   Patient presents with     Oncology Clinic Visit     Breast cancer      Initial Vitals: /82   Pulse 63   Temp 97.7  F (36.5  C) (Oral)   Resp 12   Ht 1.6 m (5' 3\")   Wt 86.7 kg (191 lb 3.2 oz)   SpO2 98%   BMI 33.87 kg/m   Estimated body mass index is 33.87 kg/m  as calculated from the following:    Height as of this encounter: 1.6 m (5' 3\").    Weight as of this encounter: 86.7 kg (191 lb 3.2 oz). Body surface area is 1.96 meters squared.  Extreme Pain (8) Comment: right breast and right side of back   No LMP recorded.  Allergies reviewed: Yes  Medications reviewed: Yes    Medications: MEDICATION REFILLS NEEDED TODAY. Provider was notified.  [employer]  Pharmacy name entered into [facility]:    [facility] [place], [place] - [address] DR[facility] ST[facility] [id number] [place], [place] - [address] AT [address]    Clinical concerns: right breast pain Dr. Murillo was notified.      Shari J. Schoenberger, [facility]            "

## 2020-01-15 NOTE — TELEPHONE ENCOUNTER
Left voice message asking pt to call triage back.     Triage, please find out why pt is requesting thyroid lab orders Per chart review, TSH was last checked 10/30/2019 and this was normal.       TSH 0.62   0.40 - 4.00 mU/L Final

## 2020-01-23 ENCOUNTER — HOSPITAL ENCOUNTER (OUTPATIENT)
Dept: MAMMOGRAPHY | Facility: CLINIC | Age: 50
Discharge: HOME | End: 2020-01-23
Attending: INTERNAL MEDICINE | Admitting: INTERNAL MEDICINE
Payer: COMMERCIAL

## 2020-01-23 DIAGNOSIS — N64.4 BREAST PAIN, RIGHT: ICD-10-CM

## 2020-01-23 PROCEDURE — 77065 DX MAMMO INCL CAD UNI: CPT

## 2020-01-24 ENCOUNTER — HOSPITAL ENCOUNTER (OUTPATIENT)
Facility: CLINIC | Age: 50
Setting detail: SPECIMEN
Discharge: HOME | End: 2020-01-24
Attending: INTERNAL MEDICINE | Admitting: INTERNAL MEDICINE
Payer: COMMERCIAL

## 2020-01-24 ENCOUNTER — INFUSION THERAPY VISIT (OUTPATIENT)
Dept: INFUSION THERAPY | Facility: CLINIC | Age: 50
End: 2020-01-24
Attending: INTERNAL MEDICINE
Payer: COMMERCIAL

## 2020-01-24 ENCOUNTER — DOCUMENTATION ONLY (OUTPATIENT)
Dept: ONCOLOGY | Facility: CLINIC | Age: 50
End: 2020-01-24

## 2020-01-24 DIAGNOSIS — C50.411 MALIGNANT NEOPLASM OF UPPER-OUTER QUADRANT OF RIGHT BREAST IN FEMALE, ESTROGEN RECEPTOR NEGATIVE (H): Primary | ICD-10-CM

## 2020-01-24 DIAGNOSIS — Z17.1 MALIGNANT NEOPLASM OF UPPER-OUTER QUADRANT OF RIGHT BREAST IN FEMALE, ESTROGEN RECEPTOR NEGATIVE (H): Primary | ICD-10-CM

## 2020-01-24 DIAGNOSIS — C50.919 BREAST CANCER (H): ICD-10-CM

## 2020-01-24 LAB
ALBUMIN SERPL-MCNC: 3.7 G/DL (ref 3.4–5)
ALP SERPL-CCNC: 171 U/L (ref 40–150)
ALT SERPL W P-5'-P-CCNC: 37 U/L (ref 0–50)
ANION GAP SERPL CALCULATED.3IONS-SCNC: 2 MMOL/L (ref 3–14)
AST SERPL W P-5'-P-CCNC: 24 U/L (ref 0–45)
BASOPHILS # BLD AUTO: 0 10E9/L (ref 0–0.2)
BASOPHILS NFR BLD AUTO: 0.3 %
BILIRUB SERPL-MCNC: 0.4 MG/DL (ref 0.2–1.3)
BUN SERPL-MCNC: 11 MG/DL (ref 7–30)
CALCIUM SERPL-MCNC: 9 MG/DL (ref 8.5–10.1)
CHLORIDE SERPL-SCNC: 110 MMOL/L (ref 94–109)
CO2 SERPL-SCNC: 30 MMOL/L (ref 20–32)
CREAT SERPL-MCNC: 0.69 MG/DL (ref 0.52–1.04)
DIFFERENTIAL METHOD BLD: ABNORMAL
EOSINOPHIL # BLD AUTO: 0.1 10E9/L (ref 0–0.7)
EOSINOPHIL NFR BLD AUTO: 1.5 %
ERYTHROCYTE [DISTWIDTH] IN BLOOD BY AUTOMATED COUNT: 21 % (ref 10–15)
GFR SERPL CREATININE-BSD FRML MDRD: >90 ML/MIN/{1.73_M2}
GLUCOSE SERPL-MCNC: 98 MG/DL (ref 70–99)
HCT VFR BLD AUTO: 32.1 % (ref 35–47)
HGB BLD-MCNC: 10.7 G/DL (ref 11.7–15.7)
IMM GRANULOCYTES # BLD: 0 10E9/L (ref 0–0.4)
IMM GRANULOCYTES NFR BLD: 0.2 %
LYMPHOCYTES # BLD AUTO: 2.1 10E9/L (ref 0.8–5.3)
LYMPHOCYTES NFR BLD AUTO: 34.4 %
MCH RBC QN AUTO: 29.8 PG (ref 26.5–33)
MCHC RBC AUTO-ENTMCNC: 33.3 G/DL (ref 31.5–36.5)
MCV RBC AUTO: 89 FL (ref 78–100)
MONOCYTES # BLD AUTO: 0.4 10E9/L (ref 0–1.3)
MONOCYTES NFR BLD AUTO: 6.4 %
NEUTROPHILS # BLD AUTO: 3.4 10E9/L (ref 1.6–8.3)
NEUTROPHILS NFR BLD AUTO: 57.2 %
NRBC # BLD AUTO: 0 10*3/UL
NRBC BLD AUTO-RTO: 0 /100
PLATELET # BLD AUTO: 180 10E9/L (ref 150–450)
POTASSIUM SERPL-SCNC: 4.1 MMOL/L (ref 3.4–5.3)
PROT SERPL-MCNC: 7 G/DL (ref 6.8–8.8)
RBC # BLD AUTO: 3.59 10E12/L (ref 3.8–5.2)
SODIUM SERPL-SCNC: 142 MMOL/L (ref 133–144)
WBC # BLD AUTO: 6 10E9/L (ref 4–11)

## 2020-01-24 PROCEDURE — 85025 COMPLETE CBC W/AUTO DIFF WBC: CPT | Performed by: INTERNAL MEDICINE

## 2020-01-24 PROCEDURE — 36415 COLL VENOUS BLD VENIPUNCTURE: CPT

## 2020-01-24 PROCEDURE — 80053 COMPREHEN METABOLIC PANEL: CPT | Performed by: INTERNAL MEDICINE

## 2020-01-24 RX ORDER — CAPECITABINE 500 MG/1
TABLET, FILM COATED ORAL
Qty: 112 TABLET | Refills: 0 | Status: SHIPPED | OUTPATIENT
Start: 2020-01-24 | End: 2020-05-13

## 2020-01-24 NOTE — PROGRESS NOTES
Medical Assistant Note:  Luci Londono presents today for blood draw.    Patient seen by provider today: No.   present during visit today: Not Applicable.    Concerns: No Concerns.    Procedure:  Lab draw site: LAC, Needle type: BF, Gauge: 23.    Post Assessment:  Labs drawn without difficulty: Yes.    Discharge Plan:  Departure Mode: Ambulatory.    Face to Face Time: 5 MIN  .    Jesusita Alvarado, CMA

## 2020-01-24 NOTE — PROGRESS NOTES
Oral Chemotherapy Monitoring Program.    Patient currently on Xeloda therapy.    Reviewed lab results from 1/24/19.    There were no concerning abnormalities. The patient is due to start the next cycle on 1/27/20    Will follow up in 4 weeks.    Roderick Hale PharmD.

## 2020-01-27 DIAGNOSIS — C50.411 MALIGNANT NEOPLASM OF UPPER-OUTER QUADRANT OF RIGHT BREAST IN FEMALE, ESTROGEN RECEPTOR NEGATIVE (H): Primary | ICD-10-CM

## 2020-01-27 DIAGNOSIS — Z17.1 MALIGNANT NEOPLASM OF UPPER-OUTER QUADRANT OF RIGHT BREAST IN FEMALE, ESTROGEN RECEPTOR NEGATIVE (H): Primary | ICD-10-CM

## 2020-01-27 RX ORDER — LIDOCAINE HYDROCHLORIDE 20 MG/ML
JELLY TOPICAL PRN
Qty: 100 ML | Refills: 0 | Status: SHIPPED | OUTPATIENT
Start: 2020-01-27 | End: 2020-05-13

## 2020-02-14 RX ORDER — CAPECITABINE 500 MG/1
TABLET, FILM COATED ORAL
Qty: 112 TABLET | Refills: 0 | Status: SHIPPED | OUTPATIENT
Start: 2020-02-14 | End: 2020-05-13

## 2020-02-20 ENCOUNTER — DOCUMENTATION ONLY (OUTPATIENT)
Dept: PHARMACY | Facility: CLINIC | Age: 50
End: 2020-02-20

## 2020-02-20 ENCOUNTER — HOSPITAL ENCOUNTER (OUTPATIENT)
Facility: CLINIC | Age: 50
Setting detail: SPECIMEN
Discharge: HOME | End: 2020-02-20
Attending: INTERNAL MEDICINE | Admitting: INTERNAL MEDICINE
Payer: COMMERCIAL

## 2020-02-20 ENCOUNTER — INFUSION THERAPY VISIT (OUTPATIENT)
Dept: INFUSION THERAPY | Facility: CLINIC | Age: 50
End: 2020-02-20
Attending: INTERNAL MEDICINE
Payer: COMMERCIAL

## 2020-02-20 DIAGNOSIS — C50.411 MALIGNANT NEOPLASM OF UPPER-OUTER QUADRANT OF RIGHT BREAST IN FEMALE, ESTROGEN RECEPTOR NEGATIVE (H): ICD-10-CM

## 2020-02-20 DIAGNOSIS — Z17.1 MALIGNANT NEOPLASM OF UPPER-OUTER QUADRANT OF RIGHT BREAST IN FEMALE, ESTROGEN RECEPTOR NEGATIVE (H): ICD-10-CM

## 2020-02-20 LAB
ALBUMIN SERPL-MCNC: 3.7 G/DL (ref 3.4–5)
ALP SERPL-CCNC: 166 U/L (ref 40–150)
ALT SERPL W P-5'-P-CCNC: 41 U/L (ref 0–50)
ANION GAP SERPL CALCULATED.3IONS-SCNC: 2 MMOL/L (ref 3–14)
AST SERPL W P-5'-P-CCNC: 29 U/L (ref 0–45)
BASOPHILS # BLD AUTO: 0 10E9/L (ref 0–0.2)
BASOPHILS NFR BLD AUTO: 0.5 %
BILIRUB SERPL-MCNC: 0.5 MG/DL (ref 0.2–1.3)
BUN SERPL-MCNC: 10 MG/DL (ref 7–30)
CALCIUM SERPL-MCNC: 9.1 MG/DL (ref 8.5–10.1)
CHLORIDE SERPL-SCNC: 107 MMOL/L (ref 94–109)
CO2 SERPL-SCNC: 31 MMOL/L (ref 20–32)
CREAT SERPL-MCNC: 0.68 MG/DL (ref 0.52–1.04)
DIFFERENTIAL METHOD BLD: ABNORMAL
EOSINOPHIL # BLD AUTO: 0.1 10E9/L (ref 0–0.7)
EOSINOPHIL NFR BLD AUTO: 1.8 %
ERYTHROCYTE [DISTWIDTH] IN BLOOD BY AUTOMATED COUNT: 19.3 % (ref 10–15)
GFR SERPL CREATININE-BSD FRML MDRD: >90 ML/MIN/{1.73_M2}
GLUCOSE SERPL-MCNC: 84 MG/DL (ref 70–99)
HCT VFR BLD AUTO: 32.7 % (ref 35–47)
HGB BLD-MCNC: 11.1 G/DL (ref 11.7–15.7)
IMM GRANULOCYTES # BLD: 0 10E9/L (ref 0–0.4)
IMM GRANULOCYTES NFR BLD: 0.2 %
LYMPHOCYTES # BLD AUTO: 1.8 10E9/L (ref 0.8–5.3)
LYMPHOCYTES NFR BLD AUTO: 40.6 %
MCH RBC QN AUTO: 31.1 PG (ref 26.5–33)
MCHC RBC AUTO-ENTMCNC: 33.9 G/DL (ref 31.5–36.5)
MCV RBC AUTO: 92 FL (ref 78–100)
MONOCYTES # BLD AUTO: 0.3 10E9/L (ref 0–1.3)
MONOCYTES NFR BLD AUTO: 5.8 %
NEUTROPHILS # BLD AUTO: 2.2 10E9/L (ref 1.6–8.3)
NEUTROPHILS NFR BLD AUTO: 51.1 %
NRBC # BLD AUTO: 0 10*3/UL
NRBC BLD AUTO-RTO: 0 /100
PLATELET # BLD AUTO: 185 10E9/L (ref 150–450)
POTASSIUM SERPL-SCNC: 4 MMOL/L (ref 3.4–5.3)
PROT SERPL-MCNC: 7.3 G/DL (ref 6.8–8.8)
RBC # BLD AUTO: 3.57 10E12/L (ref 3.8–5.2)
SODIUM SERPL-SCNC: 140 MMOL/L (ref 133–144)
WBC # BLD AUTO: 4.3 10E9/L (ref 4–11)

## 2020-02-20 PROCEDURE — 80053 COMPREHEN METABOLIC PANEL: CPT | Performed by: INTERNAL MEDICINE

## 2020-02-20 PROCEDURE — 85025 COMPLETE CBC W/AUTO DIFF WBC: CPT | Performed by: INTERNAL MEDICINE

## 2020-02-20 PROCEDURE — 36415 COLL VENOUS BLD VENIPUNCTURE: CPT

## 2020-02-20 NOTE — PROGRESS NOTES
Oral Chemotherapy Monitoring Program.    Patient currently on Xeloda therapy.    Reviewed lab results from 2/20/20. There were no concerning abnormalities. I called the patient and confirmed that she will be starting the next cycle on 2/24/20.    Will follow up in 4 weeks.    Roderick Hale PharmD.

## 2020-02-20 NOTE — PROGRESS NOTES
Medical Assistant Note:  Luci Londono presents today for Blood draw.    Patient seen by provider today: No.   present during visit today: Not Applicable.    Concerns: No Concerns.    Procedure:  Lab draw site: LAC, Needle type: BF, Gauge: 23.    Post Assessment:  Labs drawn without difficulty: Yes.    Discharge Plan:  Departure Mode: Ambulatory.    Face to Face Time: 5min.    Gina Pina MA

## 2020-03-16 ENCOUNTER — TELEPHONE (OUTPATIENT)
Dept: ONCOLOGY | Facility: CLINIC | Age: 50
End: 2020-03-16

## 2020-03-16 NOTE — TELEPHONE ENCOUNTER
Message left for Luci to call back for screening questions prior to her Wed 3/18/20 appt with Dr. Murillo

## 2020-03-17 NOTE — TELEPHONE ENCOUNTER
Patient is currently scheduled for an appointment at [facility] in [place].  Called patient to review current visitor restrictions and complete COVID-19 Patient Infection Screening Tool.     Due to the recent public health concerns and in an effort to keep our patients and staff safe and healthy, we are implementing a screening process for the patients and visitors that come to our clinic.      At this time, we are limiting visitors to only one visitor.  This visitor must also pass our screening process, or they will be required to leave and wait elsewhere.      I am going to ask you a few questions, please answer yes or no.     In the last month, have you been in contact with someone who was confirmed or suspected to have Coronavirus/COVID-19? no     Do you have a:  Fever (or reported chills)?  no  Cough?  no  Shortness of breath? no  Have you recently traveled internationally in the last month?  No  If so, where?  n/a    Patient passed the screening assessment.  Patient instructed to come to the clinic as planned for appointment and to call the clinic right away if anything changes in their health status.

## 2020-03-18 ENCOUNTER — ONCOLOGY VISIT (OUTPATIENT)
Dept: ONCOLOGY | Facility: CLINIC | Age: 50
End: 2020-03-18
Attending: INTERNAL MEDICINE
Payer: COMMERCIAL

## 2020-03-18 ENCOUNTER — INFUSION THERAPY VISIT (OUTPATIENT)
Dept: INFUSION THERAPY | Facility: CLINIC | Age: 50
End: 2020-03-18
Attending: INTERNAL MEDICINE
Payer: COMMERCIAL

## 2020-03-18 ENCOUNTER — DOCUMENTATION ONLY (OUTPATIENT)
Dept: PHARMACY | Facility: CLINIC | Age: 50
End: 2020-03-18

## 2020-03-18 ENCOUNTER — HOSPITAL ENCOUNTER (OUTPATIENT)
Facility: CLINIC | Age: 50
Setting detail: SPECIMEN
Discharge: HOME | End: 2020-03-18
Attending: INTERNAL MEDICINE | Admitting: INTERNAL MEDICINE
Payer: COMMERCIAL

## 2020-03-18 VITALS
BODY MASS INDEX: 33.73 KG/M2 | RESPIRATION RATE: 18 BRPM | WEIGHT: 190.4 LBS | HEART RATE: 59 BPM | TEMPERATURE: 97.8 F | SYSTOLIC BLOOD PRESSURE: 122 MMHG | HEIGHT: 63 IN | OXYGEN SATURATION: 100 % | DIASTOLIC BLOOD PRESSURE: 82 MMHG

## 2020-03-18 DIAGNOSIS — C50.411 MALIGNANT NEOPLASM OF UPPER-OUTER QUADRANT OF RIGHT BREAST IN FEMALE, ESTROGEN RECEPTOR NEGATIVE (H): Primary | ICD-10-CM

## 2020-03-18 DIAGNOSIS — Z15.01 MONOALLELIC MUTATION OF PALB2 GENE: ICD-10-CM

## 2020-03-18 DIAGNOSIS — N64.4 BREAST PAIN, RIGHT: ICD-10-CM

## 2020-03-18 DIAGNOSIS — G62.9 NEUROPATHY: ICD-10-CM

## 2020-03-18 DIAGNOSIS — Z15.09 MONOALLELIC MUTATION OF PALB2 GENE: ICD-10-CM

## 2020-03-18 DIAGNOSIS — Z17.1 MALIGNANT NEOPLASM OF UPPER-OUTER QUADRANT OF RIGHT BREAST IN FEMALE, ESTROGEN RECEPTOR NEGATIVE (H): Primary | ICD-10-CM

## 2020-03-18 DIAGNOSIS — Z15.89 MONOALLELIC MUTATION OF PALB2 GENE: ICD-10-CM

## 2020-03-18 DIAGNOSIS — C50.919 BREAST CANCER (H): ICD-10-CM

## 2020-03-18 DIAGNOSIS — Z91.89 AT HIGH RISK FOR BREAST CANCER: ICD-10-CM

## 2020-03-18 LAB
ALBUMIN SERPL-MCNC: 3.4 G/DL (ref 3.4–5)
ALP SERPL-CCNC: 172 U/L (ref 40–150)
ALT SERPL W P-5'-P-CCNC: 40 U/L (ref 0–50)
ANION GAP SERPL CALCULATED.3IONS-SCNC: 1 MMOL/L (ref 3–14)
AST SERPL W P-5'-P-CCNC: 35 U/L (ref 0–45)
BASOPHILS # BLD AUTO: 0 10E9/L (ref 0–0.2)
BASOPHILS NFR BLD AUTO: 0.3 %
BILIRUB SERPL-MCNC: 0.8 MG/DL (ref 0.2–1.3)
BUN SERPL-MCNC: 11 MG/DL (ref 7–30)
CALCIUM SERPL-MCNC: 8.8 MG/DL (ref 8.5–10.1)
CHLORIDE SERPL-SCNC: 108 MMOL/L (ref 94–109)
CO2 SERPL-SCNC: 29 MMOL/L (ref 20–32)
CREAT SERPL-MCNC: 0.68 MG/DL (ref 0.52–1.04)
DIFFERENTIAL METHOD BLD: ABNORMAL
EOSINOPHIL # BLD AUTO: 0.1 10E9/L (ref 0–0.7)
EOSINOPHIL NFR BLD AUTO: 3.2 %
ERYTHROCYTE [DISTWIDTH] IN BLOOD BY AUTOMATED COUNT: 18.6 % (ref 10–15)
GFR SERPL CREATININE-BSD FRML MDRD: >90 ML/MIN/{1.73_M2}
GLUCOSE SERPL-MCNC: 85 MG/DL (ref 70–99)
HCT VFR BLD AUTO: 34.1 % (ref 35–47)
HGB BLD-MCNC: 11.2 G/DL (ref 11.7–15.7)
IMM GRANULOCYTES # BLD: 0 10E9/L (ref 0–0.4)
IMM GRANULOCYTES NFR BLD: 0 %
LYMPHOCYTES # BLD AUTO: 1.3 10E9/L (ref 0.8–5.3)
LYMPHOCYTES NFR BLD AUTO: 37 %
MCH RBC QN AUTO: 30.2 PG (ref 26.5–33)
MCHC RBC AUTO-ENTMCNC: 32.8 G/DL (ref 31.5–36.5)
MCV RBC AUTO: 92 FL (ref 78–100)
MONOCYTES # BLD AUTO: 0.2 10E9/L (ref 0–1.3)
MONOCYTES NFR BLD AUTO: 5.5 %
NEUTROPHILS # BLD AUTO: 1.9 10E9/L (ref 1.6–8.3)
NEUTROPHILS NFR BLD AUTO: 54 %
PLATELET # BLD AUTO: 181 10E9/L (ref 150–450)
POTASSIUM SERPL-SCNC: 4.5 MMOL/L (ref 3.4–5.3)
PROT SERPL-MCNC: 6.9 G/DL (ref 6.8–8.8)
RBC # BLD AUTO: 3.71 10E12/L (ref 3.8–5.2)
SODIUM SERPL-SCNC: 138 MMOL/L (ref 133–144)
WBC # BLD AUTO: 3.4 10E9/L (ref 4–11)

## 2020-03-18 PROCEDURE — 36415 COLL VENOUS BLD VENIPUNCTURE: CPT

## 2020-03-18 PROCEDURE — 85025 COMPLETE CBC W/AUTO DIFF WBC: CPT | Performed by: INTERNAL MEDICINE

## 2020-03-18 PROCEDURE — 80053 COMPREHEN METABOLIC PANEL: CPT | Performed by: INTERNAL MEDICINE

## 2020-03-18 PROCEDURE — G0463 HOSPITAL OUTPT CLINIC VISIT: HCPCS

## 2020-03-18 PROCEDURE — 99214 OFFICE O/P EST MOD 30 MIN: CPT | Performed by: INTERNAL MEDICINE

## 2020-03-18 RX ORDER — CAPECITABINE 500 MG/1
TABLET, FILM COATED ORAL
Qty: 112 TABLET | Refills: 0 | Status: SHIPPED | OUTPATIENT
Start: 2020-03-18 | End: 2020-05-13

## 2020-03-18 ASSESSMENT — PAIN SCALES - GENERAL: PAINLEVEL: NO PAIN (0)

## 2020-03-18 ASSESSMENT — MIFFLIN-ST. JEOR: SCORE: 1462.78

## 2020-03-18 NOTE — PROGRESS NOTES
"Oncology Rooming Note    March 18, 2020 10:29 AM   Luci Londono is a 49 year old female who presents for:    Chief Complaint   Patient presents with     Oncology Clinic Visit     Breast cancer (H)     Initial Vitals: /82 (BP Location: Left arm, Patient Position: Sitting, Cuff Size: Adult Large)   Pulse 59   Temp 97.8  F (36.6  C) (Oral)   Resp 18   Ht 1.6 m (5' 3\")   Wt 86.4 kg (190 lb 6.4 oz)   SpO2 100%   BMI 33.73 kg/m   Estimated body mass index is 33.73 kg/m  as calculated from the following:    Height as of this encounter: 1.6 m (5' 3\").    Weight as of this encounter: 86.4 kg (190 lb 6.4 oz). Body surface area is 1.96 meters squared.  No Pain (0) Comment: Data Unavailable   No LMP recorded.  Allergies reviewed: Yes  Medications reviewed: Yes    Medications: MEDICATION REFILLS NEEDED TODAY. Provider was notified.  Refill XELODA, IBUPROFEN  Pharmacy name entered into [facility]:    [facility] [place]  [place], [place] - [address] [facility] ST[facility] [id number] - [place], [place] - [address] AT [facility]    Clinical concerns: no          Emerald Pascual CMA              "

## 2020-03-18 NOTE — PROGRESS NOTES
Medical Assistant Note:  Luci Londono presents today for BLOOD DRAW.    Patient seen by provider today: Yes: GE.   present during visit today: Not Applicable.    Concerns: No Concerns.    Procedure:  Lab draw site: RAC, Needle type: BF, Gauge: 21.    Post Assessment:  Labs drawn without difficulty: Yes.    Discharge Plan:  Departure Mode: Ambulatory.    Face to Face Time: 5 MIN  .    Jesusita Alvarado, CMA

## 2020-03-18 NOTE — LETTER
"    3/18/2020         RE: Luci Londono  [address] [place] [id number]        Dear Colleague,    Thank you for referring your patient, Luci Londono, to the [facility]. Please see a copy of my visit note below.    Oncology Rooming Note    March 18, 2020 10:29 AM   Luci Londono is a 49 year old female who presents for:    Chief Complaint   Patient presents with     Oncology Clinic Visit     Breast cancer (H)     Initial Vitals: /82 (BP Location: Left arm, Patient Position: Sitting, Cuff Size: Adult Large)   Pulse 59   Temp 97.8  F (36.6  C) (Oral)   Resp 18   Ht 1.6 m (5' 3\")   Wt 86.4 kg (190 lb 6.4 oz)   SpO2 100%   BMI 33.73 kg/m   Estimated body mass index is 33.73 kg/m  as calculated from the following:    Height as of this encounter: 1.6 m (5' 3\").    Weight as of this encounter: 86.4 kg (190 lb 6.4 oz). Body surface area is 1.96 meters squared.  No Pain (0) Comment: Data Unavailable   No LMP recorded.  Allergies reviewed: Yes  Medications reviewed: Yes    Medications: MEDICATION REFILLS NEEDED TODAY. Provider was notified.  Refill XELODA, IBUPROFEN  Pharmacy name entered into [facility]:    [facility] [place] - [address] [id number] [address] AT [address]    Clinical concerns: no          Emerald Pascual CMA                ONCOLOGY FOLLOW UIP VISIT      breast surgeon:  Dr. Stacey Ashford,       REASON FOR visit:  10/2018 dx right breast TN IDC      HISTORY OF ONCOLOGY ILLNESS:    At age 47 amenorrhea with polycystic ovaries,  She felt a lump in her right breast in early October, 2018.   Her mammogram revealed a small mass in the right upper outer breast,   US revealed an 8mm hypoechoic mass at 12:00, 6cm FN and axillary US revealed a concerning lymph node which was also biopsied.   Her pathology from both breast and LN revealed a grade 3, invasive " "ductal carcinoma, ER/[place]/her 2-.   PET found no distal disease.   Breast MRI found entire span 3.8 cm.There are multiple enlarged right axillary lymph nodes.      She made informed decision to proceed with neoadjuvant DD AC  She had drastic clinical response by PE and MRI.   She continued on wkly taxol. Carbo was added in W2. Carbo then was discontinued 3/6/2019 due to persistent neutropenia.     She had lumpectomy 5/2019 found to have [id number] (1/6) disease.     She is tested 4/2019 heterozygous positive for PALB2 p.E837 and BRCA2 variant unknown significance p.T77A, MLH1 variant, unknow significance p.I25V    She finished RT till mid Aug 2019.   LMP 10/2018.     She is not qualified for  trail.   She made informed decision to try adjuvant oral Xeloda September 2019.     She tolerates xeloda ok so far with dosing up to full dose.       INTERVAL HISTORY:  Since the patient's last visit with us, there is no hospital stay/surgery/new diagnosis made.      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     MEDICATIONS/ALLERY:  Reviewed in Epic system.      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 Deny ETOH/smoking       FAMILY HISTORY:    Negative for any type of cancers      REVIEW OF SYSTEMS:   Right shoulder pain, right breast pain on and off.       PHYSICAL EXAMINATION:   VITAL SIGNS: Blood pressure 122/82, pulse 59, temperature 97.8  F (36.6  C), temperature source Oral, resp. rate 18, height 1.6 m (5' 3\"), weight 86.4 kg (190 lb 6.4 oz), SpO2 100 %, not currently breastfeeding.    ECOG " 0    GENERAL APPEARANCE:  looks like her stated age, very pleasant, not in acute distress.   HEENT: The patient is normocephalic, atraumatic. Pupils are equally reactive to light.  Sclerae are anicteric.  Moist oral mucosa.  Negative pharynx.  No oral thrush. Stomatitis on left angular of mouth.    NECK:  Supple.  No jugular venous distention.  Thyroid is not palpable.   LYMPH NODES:  Superficial lymphadenopathy is not appreciable in the bilateral cervical, supraclavicular, axillary or inguinal areas.   CARDIOVASCULAR:  S1, S2 regular with no murmurs or gallops.  No carotid or abdominal bruits.   PULMONARY:  Lungs are clear to auscultation and percussion bilaterally.  There is no wheezing or rhonchi.   GASTROINTESTINAL:  Abdomen is soft, nontender.  No hepatosplenomegaly.  No signs of ascites.  No mass appreciable.   MUSCULOSKELETAL/EXTREMITIES:  + right arm edema with sleeve on.  No cyanotic changes.  No signs of joint deformity.  + lymphedema right arm.   NEUROLOGIC:  Cranial nerves II-XII are grossly intact.  Sensation intact.  Muscle strength and muscle tone symmetrical, 5/5 throughout.   BACK:  No spinal or paraspinal tenderness.  No CVA tenderness.   SKIN:  No petechiae.  No rash.  No signs of cellulitis.   BREASTS: Right breast has hyperpigmentation changesupper outer quadrant. No palpable masses or skin changes. No discomfort on palpation in lateral portion.   Left breast is symmetrical with no skin or nipple changes. No masses on the left. Tissue is very dense throughout.          CURRENT LAB DATA REVIEWED TODAY  Cbc /cmp are fine, touch of leukopenia      CURRENT IMAGING REVIEWED  MA 10/2019 - negative  US abd 6/2019 - fatty liver.       OLD DATA REVIEWED TODAY WITH SUMMARY:   She is tested 4/2019 heterozygous positive for PALB2 p.E837 and BRCA2 variant unknown significance p.T77A, MLH1 variant, unknow significance p.I25V    Breast MRI post AC 12/2018  1. Enhancing mass superiorly in the RIGHT breast is  significantly decreased in size since 10/17/2018, measuring approximately 1.1 x 0.3 x 0.5 cm in today's study (series 5 image 52 and series 13 image 30), decreased from 1.1 x 1.6 x 1.0 cm.  2. Enlarged RIGHT axillary lymph node is slightly decreased since that time as well, now measuring 0.9 x 1.2 x 1.0 cm (series 5 image 68 and series 13 image 19), decreased from 1.3 x 1.2 x 1.4 cm.     10/2018  Right breast 8mm hypoechoic mass at 12:00, 6cm FN and right axillary LN biopsy both found grade 3, invasive ductal carcinoma, ER/[place]/her 2-.       PET 10/2018  1. No evidence of distant metastasis.  2. Hypermetabolic focus in the upper outer quadrant right breast representing primary tumor. Hypermetabolic right axillary lymph nodes, consistent with metastatic node.  3. Indeterminate sub-4 mm pulmonary nodules, likely fissural lymph node in the right lung.  4. Extensive FDG uptake by the brown fat in the neck and paraspinal region.    10/2018 dx MA -  revealed a small mass in the right upper outer breast, US revealed an 8mm hypoechoic mass at 12:00, 6cm FN and axillary US revealed a concerning lymph node                ASSESSMENT AND PLAN:    1.  dx cT1cN1 right breast high grade IDC, TN at age 47 in 10/2018.   S/P neoadjuvant  DD AC -T.  She had lumpectomy 5/2019 found to have [id number] (1/6) disease.     S/p post lumpectomy RT is recommended.     She was screened not qualified for  trail.     She made informed decision to try adjuvant oral Xeloda September 2019.    She is able to tolerate full dose at 2 g bid.     She is informed about 6 to 8 months of adjuvant therapy on this oral chemotherapy medication.      2. Young age dx with TN breast cancer, she saw genetic counseling.   She is tested 4/2019 heterozygous positive for PALB2 p.E837 and BRCA2 variant unknown significance p.T77A, MLH1 variant, unknow significance p.I25V    We discussed the 33-58% life time risk of breast cancer, increased (% unknown) risk of ovarian  cancer and pancreatic cancer.     She will be a good candidate for future breast MRI screening.     Body image screening for pancreatic cancer protocol is undefined.   We will do annual body imaging as needed.    She is due MA in fall and MRI in spring.   She is in agreement with the plan.     3. On and off right breast pain is migratory.  Advice EMLA cream prn.   She can also try primrose oil.     4. Neuropathy developing in 3/2019.   Advice vit B6 oral. can add gabapentin.           Again, thank you for allowing me to participate in the care of your patient.        Sincerely,        Addie Murillo MD, MD

## 2020-03-18 NOTE — PROGRESS NOTES
Oral Chemotherapy Monitoring Program.    Patient currently on capecitabine therapy.    Reviewed labs from 3/18/20. No concerning abnormalities.  Patient confirms next cycle is due to start on 3/23. She would prefer her capecitabine to be sent to [facility]. Patient denies side effects at this time and is feeling well on the 2g BID dosing.     Questions answered to patient's satisfaction.    Will follow up in 4 weeks with repeat labs.     Briana Larios PharmD  March 18, 2020

## 2020-03-18 NOTE — PROGRESS NOTES
ONCOLOGY FOLLOW [facility] VISIT      breast surgeon:  Dr. Stacey Ashford,       REASON FOR visit:  10/2018 dx right breast TN IDC      HISTORY OF ONCOLOGY ILLNESS:    At age 47 amenorrhea with polycystic ovaries,  She felt a lump in her right breast in early October, 2018.   Her mammogram revealed a small mass in the right upper outer breast,   US revealed an 8mm hypoechoic mass at 12:00, 6cm FN and axillary US revealed a concerning lymph node which was also biopsied.   Her pathology from both breast and LN revealed a grade 3, invasive ductal carcinoma, ER/[place]/her 2-.   PET found no distal disease.   Breast MRI found entire span 3.8 cm.There are multiple enlarged right axillary lymph nodes.      She made informed decision to proceed with neoadjuvant DD AC  She had drastic clinical response by PE and MRI.   She continued on wkly taxol. Carbo was added in W2. Carbo then was discontinued 3/6/2019 due to persistent neutropenia.     She had lumpectomy 5/2019 found to have [id number] (1/6) disease.     She is tested 4/2019 heterozygous positive for PALB2 p.E837 and BRCA2 variant unknown significance p.T77A, MLH1 variant, unknow significance p.I25V    She finished RT till mid Aug 2019.   LMP 10/2018.     She was screened and told not qualified for  trail.   She made informed decision to try adjuvant oral Xeloda September 2019.     She tolerates xeloda ok so far with dosing up to full dose.       INTERVAL HISTORY:  Since the patient's last visit with us, there is no hospital stay/surgery/new diagnosis made.      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     MEDICATIONS/ALLERY:  Reviewed  "in Epic system.      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 Deny ETOH/smoking       FAMILY HISTORY:    Negative for any type of cancers      REVIEW OF SYSTEMS:   Right shoulder pain, right breast pain on and off.       PHYSICAL EXAMINATION:   VITAL SIGNS: Blood pressure 122/82, pulse 59, temperature 97.8  F (36.6  C), temperature source Oral, resp. rate 18, height 1.6 m (5' 3\"), weight 86.4 kg (190 lb 6.4 oz), SpO2 100 %, not currently breastfeeding.    ECOG 0    GENERAL APPEARANCE:  looks like her stated age, very pleasant, not in acute distress.   HEENT: The patient is normocephalic, atraumatic. Pupils are equally reactive to light.  Sclerae are anicteric.  Moist oral mucosa.  Negative pharynx.  No oral thrush. Stomatitis on left angular of mouth.    NECK:  Supple.  No jugular venous distention.  Thyroid is not palpable.   LYMPH NODES:  Superficial lymphadenopathy is not appreciable in the bilateral cervical, supraclavicular, axillary or inguinal areas.   CARDIOVASCULAR:  S1, S2 regular with no murmurs or gallops.  No carotid or abdominal bruits.   PULMONARY:  Lungs are clear to auscultation and percussion bilaterally.  There is no wheezing or rhonchi.   GASTROINTESTINAL:  Abdomen is soft, nontender.  No hepatosplenomegaly.  No signs of ascites.  No mass appreciable.   MUSCULOSKELETAL/EXTREMITIES:  + right arm edema with sleeve on.  No cyanotic changes.  No signs of joint deformity.  + lymphedema right arm.   NEUROLOGIC:  Cranial nerves II-XII are grossly intact.  Sensation intact.  Muscle strength and muscle tone symmetrical, 5/5 throughout.   BACK:  No spinal or paraspinal tenderness.  No CVA tenderness.   SKIN:  No petechiae.  No rash.  No signs of cellulitis.   BREASTS: Right breast has hyperpigmentation changesupper outer quadrant. No palpable masses or skin changes. No discomfort on palpation in lateral portion.   Left " breast is symmetrical with no skin or nipple changes. No masses on the left. Tissue is very dense throughout.          CURRENT LAB DATA REVIEWED TODAY  Cbc /cmp are fine, touch of leukopenia      CURRENT IMAGING REVIEWED  MA 10/2019 - negative  US abd 6/2019 - fatty liver.       OLD DATA REVIEWED TODAY WITH SUMMARY:   She is tested 4/2019 heterozygous positive for PALB2 p.E837 and BRCA2 variant unknown significance p.T77A, MLH1 variant, unknow significance p.I25V    Breast MRI post AC 12/2018  1. Enhancing mass superiorly in the RIGHT breast is significantly decreased in size since 10/17/2018, measuring approximately 1.1 x 0.3 x 0.5 cm in today's study (series 5 image 52 and series 13 image 30), decreased from 1.1 x 1.6 x 1.0 cm.  2. Enlarged RIGHT axillary lymph node is slightly decreased since that time as well, now measuring 0.9 x 1.2 x 1.0 cm (series 5 image 68 and series 13 image 19), decreased from 1.3 x 1.2 x 1.4 cm.     10/2018  Right breast 8mm hypoechoic mass at 12:00, 6cm FN and right axillary LN biopsy both found grade 3, invasive ductal carcinoma, ER/[place]/her 2-.       PET 10/2018  1. No evidence of distant metastasis.  2. Hypermetabolic focus in the upper outer quadrant right breast representing primary tumor. Hypermetabolic right axillary lymph nodes, consistent with metastatic node.  3. Indeterminate sub-4 mm pulmonary nodules, likely fissural lymph node in the right lung.  4. Extensive FDG uptake by the brown fat in the neck and paraspinal region.    10/2018 dx MA -  revealed a small mass in the right upper outer breast, US revealed an 8mm hypoechoic mass at 12:00, 6cm FN and axillary US revealed a concerning lymph node                ASSESSMENT AND PLAN:    1.  dx cT1cN1 right breast high grade IDC, TN at age 47 in 10/2018.   S/P neoadjuvant  DD AC -T.  She had lumpectomy 5/2019 found to have aC3Y3dt (1/6) disease.     S/p post lumpectomy RT is recommended.     She was screened not qualified for   trail.     She made informed decision to try adjuvant oral Xeloda September 2019.    She is able to tolerate full dose at 2 g bid.     She is informed about 6 to 8 months of adjuvant therapy on this oral chemotherapy medication.      2. Young age dx with TN breast cancer, she saw genetic counseling.   She is tested 4/2019 heterozygous positive for PALB2 p.E837 and BRCA2 variant unknown significance p.T77A, MLH1 variant, unknow significance p.I25V    We discussed the 33-58% life time risk of breast cancer, increased (% unknown) risk of ovarian cancer and pancreatic cancer.     She will be a good candidate for future breast MRI screening.     Body image screening for pancreatic cancer protocol is undefined.   We will do annual body imaging as needed.    She is due MA in fall and MRI in spring.   She is in agreement with the plan.     3. On and off right breast pain is migratory.  Advice EMLA cream prn.   She can also try primrose oil.     4. Neuropathy developing in 3/2019.   Advice vit B6 oral. can add gabapentin.

## 2020-03-30 ENCOUNTER — TELEPHONE (OUTPATIENT)
Dept: ONCOLOGY | Facility: CLINIC | Age: 50
End: 2020-03-30

## 2020-03-30 ENCOUNTER — PATIENT OUTREACH (OUTPATIENT)
Dept: ONCOLOGY | Facility: CLINIC | Age: 50
End: 2020-03-30

## 2020-03-30 NOTE — TELEPHONE ENCOUNTER
Patient requesting a letter from Dr. Humphrey to stay home from work for 2 weeks due to concerns for Covid-19 exposure at work. Encounter routed to RNCC to discuss with Dr. Humphrey and advise.     Sandra Howard, BSN, RN, PHN

## 2020-03-30 NOTE — PROGRESS NOTES
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 She is requesting a letter that she be off work secondary to the COVID 19 pandemic due  to her risk with a history  of breast cancer and the treatment that she has received. She is requesting to be off work for 2 weeks.  Explained to Luci that Dr. Murillo will be in clinic on Wednesday 4/1/20 and message will be routed to her RNCC.  Marialuisa Casper RN,BSN,OCN

## 2020-03-30 NOTE — TELEPHONE ENCOUNTER
Patient is requesting a letter by Dr. Murillo. Will route to Erika BROWNING to address on Wednesday when Dr. Murillo is in the office.    Nhung Tejada RN

## 2020-04-02 ENCOUNTER — HOSPITAL ENCOUNTER (OUTPATIENT)
Facility: CLINIC | Age: 50
Discharge: HOME | End: 2020-04-02
Admitting: INTERNAL MEDICINE
Payer: COMMERCIAL

## 2020-04-02 ENCOUNTER — TELEPHONE (OUTPATIENT)
Dept: ONCOLOGY | Facility: CLINIC | Age: 50
End: 2020-04-02

## 2020-04-02 ENCOUNTER — HOSPITAL ENCOUNTER (OUTPATIENT)
Dept: MRI IMAGING | Facility: CLINIC | Age: 50
End: 2020-04-02
Attending: INTERNAL MEDICINE
Payer: COMMERCIAL

## 2020-04-02 DIAGNOSIS — Z17.1 MALIGNANT NEOPLASM OF UPPER-OUTER QUADRANT OF RIGHT BREAST IN FEMALE, ESTROGEN RECEPTOR NEGATIVE (H): ICD-10-CM

## 2020-04-02 DIAGNOSIS — Z91.89 AT HIGH RISK FOR BREAST CANCER: ICD-10-CM

## 2020-04-02 DIAGNOSIS — Z95.828 PORT-A-CATH IN PLACE: Primary | ICD-10-CM

## 2020-04-02 DIAGNOSIS — C50.411 MALIGNANT NEOPLASM OF UPPER-OUTER QUADRANT OF RIGHT BREAST IN FEMALE, ESTROGEN RECEPTOR NEGATIVE (H): ICD-10-CM

## 2020-04-02 DIAGNOSIS — Z15.01 MONOALLELIC MUTATION OF PALB2 GENE: ICD-10-CM

## 2020-04-02 DIAGNOSIS — Z15.09 MONOALLELIC MUTATION OF PALB2 GENE: ICD-10-CM

## 2020-04-02 DIAGNOSIS — Z15.89 MONOALLELIC MUTATION OF PALB2 GENE: ICD-10-CM

## 2020-04-02 PROCEDURE — 77049 MRI BREAST C-+ W/CAD BI: CPT

## 2020-04-02 PROCEDURE — 25500064 ZZH RX 255 OP 636: Performed by: INTERNAL MEDICINE

## 2020-04-02 PROCEDURE — A9585 GADOBUTROL INJECTION: HCPCS | Performed by: INTERNAL MEDICINE

## 2020-04-02 PROCEDURE — 40000863 ZZH STATISTIC RADIOLOGY XRAY, US, CT, MAR, NM

## 2020-04-02 RX ORDER — HEPARIN SODIUM (PORCINE) LOCK FLUSH IV SOLN 100 UNIT/ML 100 UNIT/ML
5 SOLUTION INTRAVENOUS ONCE
Status: DISCONTINUED | OUTPATIENT
Start: 2020-04-02 | End: 2020-04-02

## 2020-04-02 RX ORDER — GADOBUTROL 604.72 MG/ML
8 INJECTION INTRAVENOUS ONCE
Status: COMPLETED | OUTPATIENT
Start: 2020-04-02 | End: 2020-04-02

## 2020-04-02 RX ADMIN — GADOBUTROL 8 ML: 604.72 INJECTION INTRAVENOUS at 10:35

## 2020-04-02 NOTE — TELEPHONE ENCOUNTER
Letter for work sent to Amie at [employer] [employer] [facility] ([phone]) and Emailed to [facility].

## 2020-04-03 ENCOUNTER — TELEPHONE (OUTPATIENT)
Dept: MAMMOGRAPHY | Facility: CLINIC | Age: 50
End: 2020-04-03

## 2020-04-03 NOTE — TELEPHONE ENCOUNTER
Ms. Londono was called and given her 4/2/2020 Breast MRI Results:    FINDINGS:      Right Breast: Minimal background parenchymal enhancement. No  suspicious enhancement to suggest malignancy. Postsurgical change from  breast conserving therapy. No adenopathy.     Left Breast: Minimal background parenchymal enhancement. No suspicious  enhancement to suggest malignancy. No adenopathy.                                                                      IMPRESSION: BI-RADS CATEGORY: 2 - Benign Finding(s).  1. No MRI evidence of malignancy.  2. Expected right breast postsurgical change.     RECOMMENDED FOLLOW-UP: Annual Mammography.     YULIA GILLIS MD      She will continue with her usual Breast imaging routine per Dr. REYES.    Mckenzie LYNN, RN  Procedure Nurse  [facility] [place]  [phone]

## 2020-04-13 DIAGNOSIS — C50.411 MALIGNANT NEOPLASM OF UPPER-OUTER QUADRANT OF RIGHT BREAST IN FEMALE, ESTROGEN RECEPTOR NEGATIVE (H): Primary | ICD-10-CM

## 2020-04-13 DIAGNOSIS — Z17.1 MALIGNANT NEOPLASM OF UPPER-OUTER QUADRANT OF RIGHT BREAST IN FEMALE, ESTROGEN RECEPTOR NEGATIVE (H): Primary | ICD-10-CM

## 2020-04-13 RX ORDER — CAPECITABINE 500 MG/1
TABLET, FILM COATED ORAL
Qty: 112 TABLET | Refills: 0 | Status: SHIPPED | OUTPATIENT
Start: 2020-04-13 | End: 2020-05-13

## 2020-04-15 ENCOUNTER — TELEPHONE (OUTPATIENT)
Dept: ONCOLOGY | Facility: CLINIC | Age: 50
End: 2020-04-15

## 2020-04-15 NOTE — TELEPHONE ENCOUNTER
"Patient is currently scheduled for an appointment at [facility] in [place].  Called patient to review current visitor restrictions and complete COVID-19 Patient Infection/Travel Screening Tool.     Due to the recent public health concerns around COVID-19 and in an effort to keep our patients and staff safe and healthy, we are implementing a screening process for the patients that come to our clinic.      I am going to ask you a few questions, please answer yes or no.  Your honesty about any symptoms is critical, as it keeps patients and staff healthy.      Do you have a:  Fever (or reported chills)?  No  Cough?  No  Shortness of breath?  No  Rash?  No    In the last month, have you been in contact with someone who was confirmed or suspected to have Coronavirus/COVID-19?  No    Have you traveled internationally in the last month?  No  If so, where?  N/A     I also wanted to let you know that to protect our patients from the flu and other common illnesses, [facility] enforce visitor restrictions year round, but due to the community spread of COVID-19 in [place], we are taking additional precautionary steps to ensure the health of our patients.  At this time, NO visitors are allowed on our hospital and clinic campuses.     Patient PASSED the screening assessment.    Patient instructed to come to the clinic as planned for their scheduled appointment and to call the clinic if any symptoms develop prior to their appointment.    \"COVID-19 is contagious and can be dangerous for our patients and staff.  Please send us a MyChart message or call our clinic before coming in if you feel any of the following symptoms: fever, cough, congestion, runny nose, sore throat, muscle aches and pains, or shortness of breath.  If you are already at our clinic, it is very important that you be honest about any symptoms you are experiencing to ensure your safety and that of other patients and staff who " "treat you.  If you do have symptoms, we will have a nurse and/or provider asses you to determine next steps.\"    Shari J. Schoenberger, CMA on 4/15/2020 at 10:50 AM      "

## 2020-04-16 ENCOUNTER — HOSPITAL ENCOUNTER (OUTPATIENT)
Facility: CLINIC | Age: 50
Setting detail: SPECIMEN
Discharge: HOME | End: 2020-04-16
Attending: INTERNAL MEDICINE | Admitting: INTERNAL MEDICINE
Payer: COMMERCIAL

## 2020-04-16 ENCOUNTER — DOCUMENTATION ONLY (OUTPATIENT)
Dept: PHARMACY | Facility: CLINIC | Age: 50
End: 2020-04-16

## 2020-04-16 ENCOUNTER — TELEPHONE (OUTPATIENT)
Dept: FAMILY MEDICINE | Facility: CLINIC | Age: 50
End: 2020-04-16

## 2020-04-16 ENCOUNTER — INFUSION THERAPY VISIT (OUTPATIENT)
Dept: INFUSION THERAPY | Facility: CLINIC | Age: 50
End: 2020-04-16
Attending: INTERNAL MEDICINE
Payer: COMMERCIAL

## 2020-04-16 DIAGNOSIS — Z17.1 MALIGNANT NEOPLASM OF UPPER-OUTER QUADRANT OF RIGHT BREAST IN FEMALE, ESTROGEN RECEPTOR NEGATIVE (H): ICD-10-CM

## 2020-04-16 DIAGNOSIS — E03.9 ACQUIRED HYPOTHYROIDISM: ICD-10-CM

## 2020-04-16 DIAGNOSIS — C50.411 MALIGNANT NEOPLASM OF UPPER-OUTER QUADRANT OF RIGHT BREAST IN FEMALE, ESTROGEN RECEPTOR NEGATIVE (H): ICD-10-CM

## 2020-04-16 DIAGNOSIS — R63.5 WEIGHT GAIN: ICD-10-CM

## 2020-04-16 LAB
ALBUMIN SERPL-MCNC: 3.4 G/DL (ref 3.4–5)
ALP SERPL-CCNC: 202 U/L (ref 40–150)
ALT SERPL W P-5'-P-CCNC: 39 U/L (ref 0–50)
ANION GAP SERPL CALCULATED.3IONS-SCNC: 7 MMOL/L (ref 3–14)
AST SERPL W P-5'-P-CCNC: 30 U/L (ref 0–45)
BASOPHILS # BLD AUTO: 0 10E9/L (ref 0–0.2)
BASOPHILS NFR BLD AUTO: 0.4 %
BILIRUB SERPL-MCNC: 0.4 MG/DL (ref 0.2–1.3)
BUN SERPL-MCNC: 12 MG/DL (ref 7–30)
CALCIUM SERPL-MCNC: 8.7 MG/DL (ref 8.5–10.1)
CHLORIDE SERPL-SCNC: 105 MMOL/L (ref 94–109)
CO2 SERPL-SCNC: 25 MMOL/L (ref 20–32)
CREAT SERPL-MCNC: 0.74 MG/DL (ref 0.52–1.04)
DIFFERENTIAL METHOD BLD: ABNORMAL
EOSINOPHIL # BLD AUTO: 0.1 10E9/L (ref 0–0.7)
EOSINOPHIL NFR BLD AUTO: 2.3 %
ERYTHROCYTE [DISTWIDTH] IN BLOOD BY AUTOMATED COUNT: 18 % (ref 10–15)
GFR SERPL CREATININE-BSD FRML MDRD: >90 ML/MIN/{1.73_M2}
GLUCOSE SERPL-MCNC: 87 MG/DL (ref 70–99)
HCT VFR BLD AUTO: 33.8 % (ref 35–47)
HGB BLD-MCNC: 11.3 G/DL (ref 11.7–15.7)
IMM GRANULOCYTES # BLD: 0 10E9/L (ref 0–0.4)
IMM GRANULOCYTES NFR BLD: 0.2 %
LYMPHOCYTES # BLD AUTO: 1.7 10E9/L (ref 0.8–5.3)
LYMPHOCYTES NFR BLD AUTO: 35.5 %
MCH RBC QN AUTO: 30.9 PG (ref 26.5–33)
MCHC RBC AUTO-ENTMCNC: 33.4 G/DL (ref 31.5–36.5)
MCV RBC AUTO: 92 FL (ref 78–100)
MONOCYTES # BLD AUTO: 0.2 10E9/L (ref 0–1.3)
MONOCYTES NFR BLD AUTO: 3.3 %
NEUTROPHILS # BLD AUTO: 2.8 10E9/L (ref 1.6–8.3)
NEUTROPHILS NFR BLD AUTO: 58.3 %
NRBC # BLD AUTO: 0 10*3/UL
NRBC BLD AUTO-RTO: 0 /100
PLATELET # BLD AUTO: 198 10E9/L (ref 150–450)
POTASSIUM SERPL-SCNC: 3.8 MMOL/L (ref 3.4–5.3)
PROT SERPL-MCNC: 7.1 G/DL (ref 6.8–8.8)
RBC # BLD AUTO: 3.66 10E12/L (ref 3.8–5.2)
SODIUM SERPL-SCNC: 137 MMOL/L (ref 133–144)
T4 FREE SERPL-MCNC: 0.78 NG/DL (ref 0.76–1.46)
TSH SERPL DL<=0.005 MIU/L-ACNC: 30.45 MU/L (ref 0.4–4)
WBC # BLD AUTO: 4.8 10E9/L (ref 4–11)

## 2020-04-16 PROCEDURE — 84439 ASSAY OF FREE THYROXINE: CPT | Performed by: INTERNAL MEDICINE

## 2020-04-16 PROCEDURE — 36415 COLL VENOUS BLD VENIPUNCTURE: CPT

## 2020-04-16 PROCEDURE — 84443 ASSAY THYROID STIM HORMONE: CPT | Performed by: INTERNAL MEDICINE

## 2020-04-16 PROCEDURE — 80053 COMPREHEN METABOLIC PANEL: CPT | Performed by: INTERNAL MEDICINE

## 2020-04-16 PROCEDURE — 85025 COMPLETE CBC W/AUTO DIFF WBC: CPT | Performed by: INTERNAL MEDICINE

## 2020-04-16 RX ORDER — LEVOTHYROXINE SODIUM 137 UG/1
125 TABLET ORAL DAILY
Qty: 90 TABLET | Refills: 3 | Status: SHIPPED | OUTPATIENT
Start: 2020-04-16 | End: 2020-10-15

## 2020-04-16 NOTE — TELEPHONE ENCOUNTER
Pt is at [facility] infusion now and wanting to have a thyroid level ordered to be drawn there.  Do you want to add order?  Please advised asap.

## 2020-04-16 NOTE — PROGRESS NOTES
Oral Chemotherapy Monitoring Program.    Patient currently on capecitabine therapy. Cycle started on 3/23/20. Due to start again on 4/20/20.    Reviewed labs from 4/16/20. No concerning abnormalities that are pertinent to capecitabine therapy. (TSH elevated and managed by a Dr. Stark).      Will follow up in 4 weeks with repeat labs.     Briana Larios PharmD  April 16, 2020

## 2020-04-16 NOTE — TELEPHONE ENCOUNTER
Already OK   WILLIE DAVIDSON MD   ABNORMAL   THYROID STIMULATING HORMONE OVER 30  NEEDS TO INCREASE THYROID MEDICATIONS  LEVOTHYROXINE 135MCG DAILY  ALEENA TYLER JR., MD

## 2020-04-20 DIAGNOSIS — M54.42 CHRONIC LEFT-SIDED LOW BACK PAIN WITH LEFT-SIDED SCIATICA: ICD-10-CM

## 2020-04-20 DIAGNOSIS — G89.29 CHRONIC LEFT-SIDED LOW BACK PAIN WITH LEFT-SIDED SCIATICA: ICD-10-CM

## 2020-04-20 NOTE — TELEPHONE ENCOUNTER
"[facility]  Called patient to verify that she does need a refill of this medication, and she stated \"yes, for pain.\"    Refill Auth routed to Dr. Murillo to sign.    Danielito Castañeda, RIKIN, RN, OCN  Oncology Care Coordinator  [facility]    "

## 2020-04-21 RX ORDER — NABUMETONE 500 MG/1
500 TABLET, FILM COATED ORAL 2 TIMES DAILY
Qty: 60 TABLET | Refills: 3 | Status: SHIPPED | OUTPATIENT
Start: 2020-04-21 | End: 2020-05-13

## 2020-05-06 ENCOUNTER — TELEPHONE (OUTPATIENT)
Dept: ONCOLOGY | Facility: CLINIC | Age: 50
End: 2020-05-06

## 2020-05-06 NOTE — TELEPHONE ENCOUNTER
Left message, Dr. Murillo would like to convert visit on 5/13 to video.  Only video or in person visits per Dr. Murillo.  No telephone visits.

## 2020-05-08 ENCOUNTER — TELEPHONE (OUTPATIENT)
Dept: ONCOLOGY | Facility: CLINIC | Age: 50
End: 2020-05-08

## 2020-05-08 NOTE — TELEPHONE ENCOUNTER
[facility] Telephone Triage Note    Assessment: Patient called in to triage reporting the following: Pt found out her boss tested positive for COVID-19. She had exposure and is concerned she should be testing. Not having any symptoms or fever at this time.     Recommendations: Writer provided patient with triage number to call [phone]. They will set up a virtual visit with physician to have her drive up tested if appropriate.      Follow-Up: Instructed patient to seek care immediately for worsening symptoms, including: fever, chest pain, shortness of breath, dizziness. Patient voiced understanding of advice and/or instructions given. Encounter routed to care team as AJITH.    Sandra Howard, RIKIN, RN, PHN

## 2020-05-12 ENCOUNTER — TELEPHONE (OUTPATIENT)
Dept: ONCOLOGY | Facility: CLINIC | Age: 50
End: 2020-05-12

## 2020-05-12 NOTE — TELEPHONE ENCOUNTER
"Patient is currently scheduled for an appointment at [facility] in [place].  Called patient to review current visitor restrictions and complete COVID-19 Patient Infection/Travel Screening Tool.     Due to the recent public health concerns around COVID-19 and in an effort to keep our patients and staff safe and healthy, we are implementing a screening process for the patients that come to our clinic.      I am going to ask you a few questions, please answer yes or no.  Your honesty about any symptoms is critical, as it keeps patients and staff healthy.      Do you have a:  Fever (or reported chills)?  No  New cough (started within the past 14 days)?  No  New shortness of breath (started within the past 14 days)?  No  Rash?  No    In the last month, have you been in contact with someone who was confirmed or suspected to have Coronavirus/COVID-19?  Yes- Patient reports that Manager was COVID positive. Had minimal to no exposure to her and was wearing appropriate PPE. Manager mostly works from home as well.  Stated work told her to get tested if she wanted and she has not done this yet.      Have you traveled internationally in the last month?  No  If so, where?  N/A     I also wanted to let you know that to protect our patients from the flu and other common illnesses, [facility] locations enforce visitor restrictions year round, but due to the community spread of COVID-19 in [place], we are taking additional precautionary steps to ensure the health of our patients.  At this time, NO visitors are allowed on our hospital and clinic campuses.     Patient PASSED the screening assessment.    Patient instructed to come to the clinic as planned for their scheduled appointment and to call the clinic if any symptoms develop prior to their appointment.    \"Per CDC Guidelines, we are asking all patients that are coming into the building to wear a cloth covering that covers your mouth and nose.  You will " "be screened again at the entrance to the clinic for any Covid 19 symptoms. If you screen positive to any Covid 19 symptoms during our screening process you will be provided a surgical mask to wear during your time in the building.\"    \"COVID-19 is contagious and can be dangerous for our patients and staff.  Please send us a [employer] message or call our clinic before coming in if you feel any of the following symptoms: fever, cough, congestion, runny nose, sore throat, muscle aches and pains, or shortness of breath.  If you are already at our clinic, it is very important that you be honest about any symptoms you are experiencing to ensure your safety and that of other patients and staff who treat you.  If you do have symptoms, we will have a nurse and/or provider asses you to determine next steps.\"    Erika Gagnon RN on 5/12/2020 at 4:16 PM      "

## 2020-05-13 ENCOUNTER — DOCUMENTATION ONLY (OUTPATIENT)
Dept: PHARMACY | Facility: CLINIC | Age: 50
End: 2020-05-13

## 2020-05-13 ENCOUNTER — VIRTUAL VISIT (OUTPATIENT)
Dept: ONCOLOGY | Facility: CLINIC | Age: 50
End: 2020-05-13
Attending: INTERNAL MEDICINE
Payer: COMMERCIAL

## 2020-05-13 ENCOUNTER — VIRTUAL VISIT (OUTPATIENT)
Dept: URGENT CARE | Facility: CLINIC | Age: 50
End: 2020-05-13
Payer: COMMERCIAL

## 2020-05-13 ENCOUNTER — OFFICE VISIT (OUTPATIENT)
Dept: URGENT CARE | Facility: URGENT CARE | Age: 50
End: 2020-05-13
Payer: COMMERCIAL

## 2020-05-13 DIAGNOSIS — Z20.822 EXPOSURE TO COVID-19 VIRUS: ICD-10-CM

## 2020-05-13 DIAGNOSIS — C50.919 MALIGNANT NEOPLASM OF FEMALE BREAST, UNSPECIFIED ESTROGEN RECEPTOR STATUS, UNSPECIFIED LATERALITY, UNSPECIFIED SITE OF BREAST (H): Primary | ICD-10-CM

## 2020-05-13 DIAGNOSIS — N64.4 BREAST PAIN, RIGHT: ICD-10-CM

## 2020-05-13 DIAGNOSIS — Z85.3 HX: BREAST CANCER: Primary | ICD-10-CM

## 2020-05-13 DIAGNOSIS — Z15.89 MONOALLELIC MUTATION OF PALB2 GENE: ICD-10-CM

## 2020-05-13 DIAGNOSIS — Z15.01 MONOALLELIC MUTATION OF PALB2 GENE: ICD-10-CM

## 2020-05-13 DIAGNOSIS — Z17.1 MALIGNANT NEOPLASM OF UPPER-OUTER QUADRANT OF RIGHT BREAST IN FEMALE, ESTROGEN RECEPTOR NEGATIVE (H): Primary | ICD-10-CM

## 2020-05-13 DIAGNOSIS — C50.411 MALIGNANT NEOPLASM OF UPPER-OUTER QUADRANT OF RIGHT BREAST IN FEMALE, ESTROGEN RECEPTOR NEGATIVE (H): Primary | ICD-10-CM

## 2020-05-13 DIAGNOSIS — Z15.09 MONOALLELIC MUTATION OF PALB2 GENE: ICD-10-CM

## 2020-05-13 DIAGNOSIS — C50.919 MALIGNANT NEOPLASM OF FEMALE BREAST, UNSPECIFIED ESTROGEN RECEPTOR STATUS, UNSPECIFIED LATERALITY, UNSPECIFIED SITE OF BREAST (H): ICD-10-CM

## 2020-05-13 PROCEDURE — 99214 OFFICE O/P EST MOD 30 MIN: CPT | Mod: 95 | Performed by: INTERNAL MEDICINE

## 2020-05-13 PROCEDURE — 99214 OFFICE O/P EST MOD 30 MIN: CPT | Mod: 95 | Performed by: NURSE PRACTITIONER

## 2020-05-13 PROCEDURE — 87635 SARS-COV-2 COVID-19 AMP PRB: CPT | Mod: 90 | Performed by: NURSE PRACTITIONER

## 2020-05-13 PROCEDURE — 99207 ZZC NO BILLABLE SERVICE THIS VISIT: CPT

## 2020-05-13 PROCEDURE — 99000 SPECIMEN HANDLING OFFICE-LAB: CPT | Performed by: NURSE PRACTITIONER

## 2020-05-13 NOTE — PROGRESS NOTES
ORAL CHEMOTHERAPY DISCONTINUATION       Primary Oncologist:  Dr. Murillo  Primary Oncology Clinic: [place]  Cancer Diagnosis:  Breast cancer  Therapy History:  Adjuvant capecitabine for 8 months  Reason For Discontinuation: therapy completed    Additional Notes:  Per Dr. Murillo, completed 8 months of therapy. No further treatment at this time. Just surveillance.    Briana Larios PharmD  May 13, 2020

## 2020-05-13 NOTE — LETTER
May 13, 2020      Luci Londono  [address] [place] [id number]        To Whom It May Concern:    Luci Londono  was seen on on virtual urgent care on 5/13/20.  She will be tested for COVID-19 due to a an exposure and her medical history. The patient should follow the following guidelines.    Even if you does not have symptoms, they may still appear. For safety, it s very important to follow these rules.  She should stay home until her test is back.      Keep yourself away from others (self-isolation):      Stay home. Don t go to work, school or anywhere else.     Stay in your own room (and use your own bathroom), if you can.    Stay away from others in your home. No hugging, kissing or shaking hands. No visitors.    Clean  high touch  surfaces often (doorknobs, counters, handles, etc.). Use a household cleaning spray or wipes.    Cover your mouth and nose with a mask, tissue or washcloth to avoid spreading germs.    Wash your hands and face often with soap and water.    Stay in self-isolation until you meet ALL of the guidelines below:    1. You have had no fever for at least 72 hours (that is 3 full days of no fever without the use of medicine that reduces fevers), AND  2. other symptoms (such as cough, shortness of breath) have gotten better, AND  3. at least 10 days have passed since your symptoms first appeared.    Going back to work  Check with your employer for any guidelines to follow for going back to work.        Sincerely,             AMIE Brizuela CNP

## 2020-05-13 NOTE — TELEPHONE ENCOUNTER
Reviewed with Dr. Murillo who recommended that patient be tested, she has the [facility] number and stated her work wants her to be tested as well.   Dr Murillo advised to postpone her appt 1 week to 5/20/20.  Spoke with Luci and Messaged the schedulers.

## 2020-05-13 NOTE — PROGRESS NOTES
"Luci Londono is a 49 year old female who is being evaluated via a billable video visit.      The patient has been notified of following:     \"This video visit will be conducted via a call between you and your physician/provider. If a prescription is necessary we can send it directly to your pharmacy.  If lab work is needed we can place an order for that and you can then stop by our lab to have the test done at a later time.    Video visits are billed as normal face-to-face office visit and reimbursed as the same rate during this public health emergency due to Covid 19 pandemic.  Please reach out to your insurance provider with any questions.    If during the course of the call the physician/provider feels a video visit is not appropriate, you will not be charged for this service.\"    Patient has given verbal consent for Video visit? Yes    How would you like to obtain your AVS? Kavin    Patient would like the video invitation sent by: Send to e-mail at: [email or web address] # [phone]    Will anyone else be joining your video visit? Eduarda Pascual CMA          "

## 2020-05-13 NOTE — LETTER
May 13, 2020      Luci Londono  [address] [facility] [id number]        To Whom It May Concern:    Luci Londono was seen in the [facility] clinic.  She is asymptomatic but was exposed to a patient with known COVID.  Due to hr medical history she will be testrf.  She should remain home from work until her test results return.    If she does not have symptoms, they may still appear. For safety, it s very important to follow these rules.    Keep yourself away from others (self-isolation):      Stay home. Don t go to work, school or anywhere else.     Stay in your own room (and use your own bathroom), if you can.    Stay away from others in your home. No hugging, kissing or shaking hands. No visitors.    Clean  high touch  surfaces often (doorknobs, counters, handles, etc.). Use a household cleaning spray or wipes.    Cover your mouth and nose with a mask, tissue or washcloth to avoid spreading germs.    Wash your hands and face often with soap and water.    Stay in self-isolation until you meet ALL of the guidelines below:    1. You have had no fever for at least 72 hours (that is 3 full days of no fever without the use of medicine that reduces fevers), AND  2. other symptoms (such as cough, shortness of breath) have gotten better, AND  3. at least 10 days have passed since your symptoms first appeared.    Going back to work  Check with your employer for any guidelines to follow for going back to work.      Sincerely,       AMIE Brizuela, CNP      AMIE Brizuela CNP

## 2020-05-13 NOTE — PROGRESS NOTES
Type of Visit: Video Visit  Patient has given verbal consent for Video visit.     Video Start Time: 11:30 am  Video End Time: 12 noon    Originating Location (pt. Location): Home     Distant Location (provider location):  [facility]      Platform used for Video Visit: [facility]         ONCOLOGY FOLLOW UIP VISIT    Breast surgeon:  Dr. Stacey Ashford,     REASON FOR visit:  10/2018 dx right breast TN IDC      HISTORY OF ONCOLOGY ILLNESS:    At age 47 amenorrhea with polycystic ovaries,  She felt a lump in her right breast in early October, 2018.   Her mammogram revealed a small mass in the right upper outer breast,   US revealed an 8mm hypoechoic mass at 12:00, 6cm FN and axillary US revealed a concerning lymph node which was also biopsied.   Her pathology from both breast and LN revealed a grade 3, invasive ductal carcinoma, ER/GA/her 2-.   PET found no distal disease.   Breast MRI found entire span 3.8 cm.There are multiple enlarged right axillary lymph nodes.      She made informed decision to proceed with neoadjuvant DD AC  She had drastic clinical response by PE and MRI.   She continued on wkly taxol. Carbo was added in W2. Carbo then was discontinued 3/6/2019 due to persistent neutropenia.     She had lumpectomy 5/2019 found to have [id number] (1/6) disease.     She is tested 4/2019 heterozygous positive for PALB2 p.E837 and BRCA2 variant unknown significance p.T77A, MLH1 variant, unknow significance p.I25V    She finished RT till mid Aug 2019.   LMP 10/2018.     She was screened and told not qualified for  trail.       INTERVAL HISTORY:  She made informed decision to try adjuvant oral Xeloda September 2019 til 5/2020.       PAST MEDICAL HISTORY  Hypothyroidism  Pre diabetic  Morbid obesity  Mixed hyper lipidemia  Pinguecula of both eyes, prebyopia  Chronic left side LBP with left side sciatica  amenorrhea with polycystic ovaries  Hx of H Pylori infection  Vit D deficiency      Ob/Gyn Hx:menarche at  age 13yo, 3 children, 1st at age 32, Pre-menopausal, a few months of OCP use, no HRT, no fertility treatment.     MEDICATIONS/ALLERY:  Reviewed in Epic system.      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 Deny ETOH/smoking       FAMILY HISTORY:    Negative for any type of cancers      REVIEW OF SYSTEMS:   Right shoulder pain, right breast pain on and off.     PHYSICAL EXAMINATION ATTAINABLE DURING VIDEO VISIT:  CONSTITUTIONAL - Pt looks like stated age, pleasant, not in acute distress. Not obese.  NEURO: Oriented to time, person, and places. No tremor. Normal gait.   ENT, MOUTH: Pupils are equal.  Sclerae are anicteric.  Moist oral mucosa. No oral thrush.   NECK:  No jugular venous distention.  No thyroid enlargement.   RESPIRATORY: talk nl, no sob during conversation, no cough.   MUSCULOSKELETAL/EXTREMITIES:  No edema.  No joint deformity. Normal range of motion.  SKIN:  No petechiae.  No rash.  No signs of cellulitis.   PSYCHIATRIC: Normal mood and affect. Good memory. Proper insight and judgement.   THE REST OF A COMPREHENSIVE PHYSICIAL EXAM IS DEFERRED DUE TO COVID-19 PUBLIC HEALTH EMERGENCY RELATED VIDEO VISIT RESCTRICTION.       CURRENT LAB DATA REVIEWED TODAY WITH PATIENT  Cbc /cmp are fine, touch of leukopenia      CURRENT IMAGING REVIEWED TODAY WITH PATIENT  Breast MRI 4/2020 - negative  MA 10/2019 - negative  US abd 6/2019 - fatty liver.       OLD DATA REVIEWED TODAY WITH SUMMARY:   She is tested 4/2019 heterozygous positive for PALB2 p.E837 and BRCA2 variant unknown significance p.T77A, MLH1 variant, unknow significance p.I25V    Breast MRI post AC 12/2018  1. Enhancing mass superiorly in the RIGHT breast is significantly decreased in size since 10/17/2018, measuring approximately 1.1 x 0.3 x 0.5 cm in today's study (series 5 image 52 and series 13 image 30), decreased from 1.1 x 1.6 x 1.0 cm.  2. Enlarged RIGHT  axillary lymph node is slightly decreased since that time as well, now measuring 0.9 x 1.2 x 1.0 cm (series 5 image 68 and series 13 image 19), decreased from 1.3 x 1.2 x 1.4 cm.     10/2018  Right breast 8mm hypoechoic mass at 12:00, 6cm FN and right axillary LN biopsy both found grade 3, invasive ductal carcinoma, ER/MA/her 2-.       PET 10/2018  1. No evidence of distant metastasis.  2. Hypermetabolic focus in the upper outer quadrant right breast representing primary tumor. Hypermetabolic right axillary lymph nodes, consistent with metastatic node.  3. Indeterminate sub-4 mm pulmonary nodules, likely fissural lymph node in the right lung.  4. Extensive FDG uptake by the brown fat in the neck and paraspinal region.    10/2018 dx MA -  revealed a small mass in the right upper outer breast, US revealed an 8mm hypoechoic mass at 12:00, 6cm FN and axillary US revealed a concerning lymph node         ASSESSMENT AND PLAN DISCUSSED WITH PATIENT TODAY:    1.  dx cT1cN1 right breast high grade IDC, TN at age 47 in 10/2018.   S/P neoadjuvant  DD AC -T.  She had lumpectomy 5/2019 found to have [id number] (1/6) disease.     S/p post lumpectomy RT is recommended.     She was screened not qualified for  trail.     She made informed decision to try adjuvant oral Xeloda September 2019. She is able to tolerate full dose at 2 g bid.   She is finishing it 5/2020 with 8 months of tx.     We discussed the follow up plan in detail.     2. Young age dx with TN breast cancer, she saw genetic counseling.   She is tested 4/2019 heterozygous positive for PALB2 p.E837 and BRCA2 variant unknown significance p.T77A, MLH1 variant, unknow significance p.I25V    We discussed the 33-58% life time risk of breast cancer, increased (% unknown) risk of ovarian cancer and pancreatic cancer.     She will be a good candidate forbreast MRI screening.     Body image screening for pancreatic cancer protocol is undefined.   We will do annual body imaging  as needed.    She is due MA in fall and MRI in spring.   She is in agreement with the plan.       3. On and off right breast pain is migratory.  Advice EMLA cream prn.   She can also try primrose oil.       4. Neuropathy developing in 3/2019.   Advice vit B6 oral. Also can try alpha Lipoic Acid.

## 2020-05-13 NOTE — PROGRESS NOTES
"The patient has been notified of following:     \"This telephone visit will be conducted via a call between you and your physician/provider. We have found that certain health care needs can be provided without the need for a physical exam.  This service lets us provide the care you need with a short phone conversation.  If a prescription is necessary we can send it directly to your pharmacy.      Telephone visits are billed at different rates depending on your insurance coverage. During this emergency period, for some insurers they may be billed the same as an in-person visit.  Please reach out to your insurance provider with any questions.    If during the course of the call the physician/provider feels a telephone visit is not appropriate, you will not be charged for this service.\"    Patient has given verbal consent for Telephone visit?  Yes      Subjective     CC: Luci Londono  is a 49 year old female who presents to clinic today for the following health issues: known COVID-19 exposure currently undergoing treatment for breast cancer. Her oncologist requested that she get tested due to exposure and elevated risk.        History:  Patient reports being exposed last Friday (5-8-20) to a coworker who was found to have COVID-19. The patient was wearing a mask during the encounter but the coworker was not. The patient currently does not report any symptoms such as fever, shortness of breath, body aches or cough.   She declines any other concerns.     Patient Active Problem List   Diagnosis     Myalgia and myositis     Intervertebral lumbar disc disorder with myelopathy, lumbar region     Gastroesophageal reflux disease without esophagitis     Helicobacter pylori infection     Acquired hypothyroidism     Disorder of metabolism     Polycystic ovaries     Vitamin D insufficiency     BMI 35     Rosacea     Absence of menstruation     Chronic left-sided low back pain with left-sided sciatica     Pinguecula of " both eyes     Presbyopia     Impaired fasting glucose     Migraine without aura and without status migrainosus, not intractable     Class 1 obesity due to excess calories with serious comorbidity and body mass index (BMI) of 33.0 to 33.9 in adult     Morbid obesity (H)     Myalgia w hx of recurrence since 2012     Mixed hyperlipidemia     Malignant neoplasm of upper-outer quadrant of right breast in female, estrogen receptor negative (H)     Drug or medicinal substance causing adverse effect in therapeutic use, initial encounter     Abnormal electrocardiogram     Port-A-Cath in place     Breast cancer (H)     Monoallelic mutation of PALB2 gene     Current Outpatient Medications   Medication     ascorbic acid (VITAMIN C) 1000 MG TABS     capecitabine (XELODA) 500 MG tablet CHEMO     capecitabine (XELODA) 500 MG tablet CHEMO     capecitabine (XELODA) 500 MG tablet     capecitabine (XELODA) 500 MG tablet     capecitabine (XELODA) 500 MG tablet     capecitabine (XELODA) 500 MG tablet     capecitabine (XELODA) 500 MG tablet     capecitabine (XELODA) 500 MG tablet     capecitabine (XELODA) 500 MG tablet     diclofenac (VOLTAREN) 1 % topical gel     gabapentin (NEURONTIN) 100 MG capsule     ibuprofen (ADVIL/MOTRIN) 600 MG tablet     levothyroxine (SYNTHROID/LEVOTHROID) 137 MCG tablet     lidocaine (XYLOCAINE) 2 % external gel     lidocaine (XYLOCAINE) 2 % external gel     lidocaine-prilocaine (EMLA) 2.5-2.5 % external cream     loratadine (CLARITIN) 10 MG tablet     metFORMIN (GLUCOPHAGE-XR) 500 MG 24 hr tablet     multivitamin, therapeutic with minerals (THERA-VIT-M) TABS tablet     nabumetone (RELAFEN) 500 MG tablet     omeprazole (PRILOSEC) 20 MG DR capsule     order for DME     order for DME     order for DME     order for DME     order for DME     prochlorperazine (COMPAZINE) 10 MG tablet     senna-docusate (SENOKOT-S/PERICOLACE) 8.6-50 MG tablet     No current facility-administered medications for this visit.        No Known Allergies    Reviewed and updated as needed this visit by Provider      Review of Systems   General: No fever or chills, chills, fatigue, myalgias  ENT:  Negative for sore throat  Resp: No coughing, wheezing or shortness of breath      Objective    Gen: Patient is alert, oriented  Resp: Speaking full sentence, no audible shortness of breath.  No cough of wheeze.        Assessment/Plan:  1. HX: breast cancer  -Managed by oncology    2. Exposure to Covid-19 Virus  -Test for COVID PCR, asymptomatic due to known exposure and elevated risk due to undergoing treatment for breast cancer.  -Patient given contact information to schedule test. Informed patient that she should self-isolate and refrain from working until PCR status is known or after 10 days from onset of symptoms once/if symptoms develop, or, if fever, 72 hours after resolution of fever. Red flag symptoms that would warrant immediate attention were discussed including development of shortness of breath at rest or with minimal activity.   -Patient provided a note through [employer] to excuse from work.     Phone call duration:  10 minutes    Amber Scheierl, RN  DNP Student    AMIE Brizuela CNP   05/13/2010:57 AM    I was present during the key/critical portion of the telephone visit. The patient acknowledged that I participated in the telephone conversation. I discussed the case with the nurse practitioner student and agree with the note as documented by the nurse practitioner student    I personally spent a total of 10 minutes speaking with Luci Londono during today s visit.     AMIE Brizuela CNP  5/13/2020 at 12:02 PM

## 2020-05-14 LAB
SARS-COV-2 RNA SPEC QL NAA+PROBE: NOT DETECTED
SPECIMEN SOURCE: NORMAL

## 2020-06-19 DIAGNOSIS — K21.9 GASTROESOPHAGEAL REFLUX DISEASE WITHOUT ESOPHAGITIS: Chronic | ICD-10-CM

## 2020-07-28 DIAGNOSIS — K21.9 GASTROESOPHAGEAL REFLUX DISEASE WITHOUT ESOPHAGITIS: Chronic | ICD-10-CM

## 2020-08-18 ENCOUNTER — INFUSION THERAPY VISIT (OUTPATIENT)
Dept: INFUSION THERAPY | Facility: CLINIC | Age: 50
End: 2020-08-18
Attending: INTERNAL MEDICINE
Payer: COMMERCIAL

## 2020-08-18 ENCOUNTER — HOSPITAL ENCOUNTER (OUTPATIENT)
Facility: CLINIC | Age: 50
Setting detail: SPECIMEN
Discharge: HOME | End: 2020-08-18
Attending: INTERNAL MEDICINE | Admitting: INTERNAL MEDICINE
Payer: COMMERCIAL

## 2020-08-18 DIAGNOSIS — Z17.1 MALIGNANT NEOPLASM OF UPPER-OUTER QUADRANT OF RIGHT BREAST IN FEMALE, ESTROGEN RECEPTOR NEGATIVE (H): ICD-10-CM

## 2020-08-18 DIAGNOSIS — C50.919 BREAST CANCER (H): ICD-10-CM

## 2020-08-18 DIAGNOSIS — C50.411 MALIGNANT NEOPLASM OF UPPER-OUTER QUADRANT OF RIGHT BREAST IN FEMALE, ESTROGEN RECEPTOR NEGATIVE (H): ICD-10-CM

## 2020-08-18 LAB
ALBUMIN SERPL-MCNC: 3.3 G/DL (ref 3.4–5)
ALP SERPL-CCNC: 124 U/L (ref 40–150)
ALT SERPL W P-5'-P-CCNC: 30 U/L (ref 0–50)
ANION GAP SERPL CALCULATED.3IONS-SCNC: 2 MMOL/L (ref 3–14)
AST SERPL W P-5'-P-CCNC: 24 U/L (ref 0–45)
BASOPHILS # BLD AUTO: 0 10E9/L (ref 0–0.2)
BASOPHILS NFR BLD AUTO: 0.2 %
BILIRUB SERPL-MCNC: 0.3 MG/DL (ref 0.2–1.3)
BUN SERPL-MCNC: 7 MG/DL (ref 7–30)
CALCIUM SERPL-MCNC: 8.5 MG/DL (ref 8.5–10.1)
CANCER AG27-29 SERPL-ACNC: 23 U/ML (ref 0–39)
CHLORIDE SERPL-SCNC: 105 MMOL/L (ref 94–109)
CO2 SERPL-SCNC: 30 MMOL/L (ref 20–32)
CREAT SERPL-MCNC: 0.61 MG/DL (ref 0.52–1.04)
DIFFERENTIAL METHOD BLD: NORMAL
EOSINOPHIL # BLD AUTO: 0.1 10E9/L (ref 0–0.7)
EOSINOPHIL NFR BLD AUTO: 1.4 %
ERYTHROCYTE [DISTWIDTH] IN BLOOD BY AUTOMATED COUNT: 13.9 % (ref 10–15)
GFR SERPL CREATININE-BSD FRML MDRD: >90 ML/MIN/{1.73_M2}
GLUCOSE SERPL-MCNC: 79 MG/DL (ref 70–99)
HCT VFR BLD AUTO: 38.1 % (ref 35–47)
HGB BLD-MCNC: 12.6 G/DL (ref 11.7–15.7)
IMM GRANULOCYTES # BLD: 0 10E9/L (ref 0–0.4)
IMM GRANULOCYTES NFR BLD: 0.2 %
LYMPHOCYTES # BLD AUTO: 2.1 10E9/L (ref 0.8–5.3)
LYMPHOCYTES NFR BLD AUTO: 39.9 %
MCH RBC QN AUTO: 26.8 PG (ref 26.5–33)
MCHC RBC AUTO-ENTMCNC: 33.1 G/DL (ref 31.5–36.5)
MCV RBC AUTO: 81 FL (ref 78–100)
MONOCYTES # BLD AUTO: 0.3 10E9/L (ref 0–1.3)
MONOCYTES NFR BLD AUTO: 5 %
NEUTROPHILS # BLD AUTO: 2.8 10E9/L (ref 1.6–8.3)
NEUTROPHILS NFR BLD AUTO: 53.3 %
NRBC # BLD AUTO: 0 10*3/UL
NRBC BLD AUTO-RTO: 0 /100
PLATELET # BLD AUTO: 294 10E9/L (ref 150–450)
POTASSIUM SERPL-SCNC: 3.8 MMOL/L (ref 3.4–5.3)
PROT SERPL-MCNC: 7.8 G/DL (ref 6.8–8.8)
RBC # BLD AUTO: 4.71 10E12/L (ref 3.8–5.2)
SODIUM SERPL-SCNC: 137 MMOL/L (ref 133–144)
WBC # BLD AUTO: 5.2 10E9/L (ref 4–11)

## 2020-08-18 PROCEDURE — 36415 COLL VENOUS BLD VENIPUNCTURE: CPT

## 2020-08-18 PROCEDURE — 80053 COMPREHEN METABOLIC PANEL: CPT | Performed by: INTERNAL MEDICINE

## 2020-08-18 PROCEDURE — 86300 IMMUNOASSAY TUMOR CA 15-3: CPT | Performed by: INTERNAL MEDICINE

## 2020-08-18 PROCEDURE — 85025 COMPLETE CBC W/AUTO DIFF WBC: CPT | Performed by: INTERNAL MEDICINE

## 2020-08-19 ENCOUNTER — VIRTUAL VISIT (OUTPATIENT)
Dept: ONCOLOGY | Facility: CLINIC | Age: 50
End: 2020-08-19
Attending: INTERNAL MEDICINE
Payer: COMMERCIAL

## 2020-08-19 ENCOUNTER — TELEPHONE (OUTPATIENT)
Dept: INFUSION THERAPY | Facility: CLINIC | Age: 50
End: 2020-08-19

## 2020-08-19 VITALS — WEIGHT: 179 LBS | BODY MASS INDEX: 31.71 KG/M2

## 2020-08-19 DIAGNOSIS — Z12.31 SCREENING MAMMOGRAM, ENCOUNTER FOR: ICD-10-CM

## 2020-08-19 DIAGNOSIS — G62.9 NEUROPATHY: ICD-10-CM

## 2020-08-19 DIAGNOSIS — M54.6 ACUTE MIDLINE THORACIC BACK PAIN: ICD-10-CM

## 2020-08-19 DIAGNOSIS — Z17.1 MALIGNANT NEOPLASM OF UPPER-OUTER QUADRANT OF RIGHT BREAST IN FEMALE, ESTROGEN RECEPTOR NEGATIVE (H): Primary | ICD-10-CM

## 2020-08-19 DIAGNOSIS — N64.4 BREAST PAIN, RIGHT: ICD-10-CM

## 2020-08-19 DIAGNOSIS — C50.411 MALIGNANT NEOPLASM OF UPPER-OUTER QUADRANT OF RIGHT BREAST IN FEMALE, ESTROGEN RECEPTOR NEGATIVE (H): Primary | ICD-10-CM

## 2020-08-19 PROCEDURE — 99215 OFFICE O/P EST HI 40 MIN: CPT | Mod: 95 | Performed by: INTERNAL MEDICINE

## 2020-08-19 PROCEDURE — 40001009 ZZH VIDEO/TELEPHONE VISIT; NO CHARGE

## 2020-08-19 ASSESSMENT — PAIN SCALES - GENERAL: PAINLEVEL: MILD PAIN (2)

## 2020-08-19 NOTE — PROGRESS NOTES
Type of Visit: Video Visit  Patient has given verbal consent for Video visit.     Video Start Time: 12:05 pm  Video End Time:  12:25 pm    Originating Location (pt. Location): Home     Distant Location (provider location):  [facility]      Platform used for Video Visit: [facility]      ONCOLOGY FOLLOW UIP VISIT    Breast surgeon:  Dr. Stacey Ashfodr,     REASON FOR visit:  10/2018 dx right breast TN IDC      HISTORY OF ONCOLOGY ILLNESS:    At age 47 amenorrhea with polycystic ovaries,  She felt a lump in her right breast in early October, 2018.   Her mammogram revealed a small mass in the right upper outer breast,   US revealed an 8mm hypoechoic mass at 12:00, 6cm FN and axillary US revealed a concerning lymph node which was also biopsied.   Her pathology from both breast and LN revealed a grade 3, invasive ductal carcinoma, ER/ID/her 2-.   PET found no distal disease.   Breast MRI found entire span 3.8 cm.There are multiple enlarged right axillary lymph nodes.      She made informed decision to proceed with neoadjuvant DD AC  She had drastic clinical response by PE and MRI.   She continued on wkly taxol. Carbo was added in W2. Carbo then was discontinued 3/6/2019 due to persistent neutropenia.     She had lumpectomy 5/2019 found to have [id number] (1/6) disease.     She was tested 4/2019 heterozygous positive for PALB2 p.E837 and BRCA2 variant unknown significance p.T77A, MLH1 variant, unknow significance p.I25V    She finished RT till mid Aug 2019.   LMP 10/2018.     She was screened and told not qualified for  trail.   She made informed decision to try adjuvant oral Xeloda September 2019 till 5/2020.       INTERVAL HISTORY:  She is on baby ASA, so declined ASA trial.       PAST MEDICAL HISTORY  Hypothyroidism  Pre diabetic  Morbid obesity  Mixed hyper lipidemia  Pinguecula of both eyes, prebyopia  Chronic left side LBP with left side sciatica  amenorrhea with polycystic ovaries  Hx of H Pylori  infection  Vit D deficiency      Ob/Gyn Hx:menarche at age 13yo, 3 children, 1st at age 32, Pre-menopausal, a few months of OCP use, no HRT, no fertility treatment.     MEDICATIONS/ALLERY:  Reviewed in Epic system.      SOCIAL HISTORY:    She works at [employer].  She is devoiced with 3 kids, 12, 14, 16 years old. Deny ETOH/smoking       FAMILY HISTORY:    Negative for any type of cancers      REVIEW OF SYSTEMS:   Right shoulder pain, right breast pain on and off.   Reports new mid back pain.       PHYSICAL EXAMINATION ATTAINABLE DURING VIDEO VISIT:  CONSTITUTIONAL - Pt looks like stated age, pleasant, not in acute distress. Not obese.  NEURO: Oriented to time, person, and places. No tremor. Normal gait.   ENT, MOUTH: Pupils are equal.  Sclerae are anicteric.  Moist oral mucosa. No oral thrush.   NECK:  No jugular venous distention.  No thyroid enlargement.   RESPIRATORY: talk nl, no sob during conversation, no cough.   MUSCULOSKELETAL/EXTREMITIES:  No edema.  No joint deformity. Normal range of motion.  SKIN:  No petechiae.  No rash.  No signs of cellulitis.   PSYCHIATRIC: Normal mood and affect. Good memory. Proper insight and judgement.   THE REST OF A COMPREHENSIVE PHYSICIAL EXAM IS DEFERRED DUE TO COVID-19 PUBLIC HEALTH EMERGENCY RELATED VIDEO VISIT RESCTRICTION.       CURRENT LAB DATA REVIEWED TODAY WITH PATIENT DURING VISIT:  Cbc /cmp/marker are fine      CURRENT IMAGING REVIEWED TODAY WITH PATIENT DURING VISIT:  Breast MRI 4/2020 - negative  MA 10/2019 - negative  US abd 6/2019 - fatty liver.       OLD DATA REVIEWED TODAY WITH SUMMARY:   She is tested 4/2019 heterozygous positive for PALB2 p.E837 and BRCA2 variant unknown significance p.T77A, MLH1 variant, unknow significance p.I25V    Breast MRI post AC 12/2018  1. Enhancing mass superiorly in the RIGHT breast is significantly decreased in size since 10/17/2018, measuring approximately 1.1 x 0.3 x 0.5 cm in today's study (series 5 image 52 and series 13 image 30),  decreased from 1.1 x 1.6 x 1.0 cm.  2. Enlarged RIGHT axillary lymph node is slightly decreased since that time as well, now measuring 0.9 x 1.2 x 1.0 cm (series 5 image 68 and series 13 image 19), decreased from 1.3 x 1.2 x 1.4 cm.     10/2018  Right breast 8mm hypoechoic mass at 12:00, 6cm FN and right axillary LN biopsy both found grade 3, invasive ductal carcinoma, ER/[place]/her 2-.       PET 10/2018  1. No evidence of distant metastasis.  2. Hypermetabolic focus in the upper outer quadrant right breast representing primary tumor. Hypermetabolic right axillary lymph nodes, consistent with metastatic node.  3. Indeterminate sub-4 mm pulmonary nodules, likely fissural lymph node in the right lung.  4. Extensive FDG uptake by the brown fat in the neck and paraspinal region.    10/2018 dx MA -  revealed a small mass in the right upper outer breast, US revealed an 8mm hypoechoic mass at 12:00, 6cm FN and axillary US revealed a concerning lymph node         ASSESSMENT AND PLAN DISCUSSED WITH PATIENT TODAY DURING VISIT:    1.  dx cT1cN1 right breast high grade IDC, TN at age 47 in 10/2018.   S/P neoadjuvant  DD AC -T.  She had lumpectomy 5/2019 found to have [id number] (1/6) disease.     S/p post lumpectomy RT is recommended.     She was screened not qualified for  trail.     She made informed decision to try adjuvant oral Xeloda September 2019. She is able to tolerate full dose at 2 g bid.   She is finishing it 5/2020 with 8 months of tx.     We discussed the follow up plan in detail.     She will benefit from screening MRI, MA. She is on close q 3 months follow-up.       2. Young age dx with TN breast cancer, she saw genetic counseling.   She is tested 4/2019 heterozygous positive for PALB2 p.E837 and BRCA2 variant unknown significance p.T77A, MLH1 variant, unknow significance p.I25V    We discussed the 33-58% life time risk of breast cancer, increased (% unknown) risk of ovarian cancer and pancreatic cancer.     She  will be a good candidate forbreast MRI screening.     Body image screening for pancreatic cancer protocol is undefined.   We will do annual body imaging as needed.    She is due MA in fall and MRI in spring.   She is in agreement with the plan.       3. On and off right breast pain is migratory.  Advice EMLA cream prn.   She can also try primrose oil.       4. Neuropathy developing in 3/2019.   Advice vit B6 oral. Also can try alpha Lipoic Acid.       5. Reports new mid back pain for a few days.   Tylenol has not help.   Advice X ray as work up.

## 2020-08-19 NOTE — LETTER
"    8/19/2020         RE: Luci Londono  [address]  [facility] [address]        Dear Colleague,    Thank you for referring your patient, Luci Londono, to the [facility]. Please see a copy of my visit note below.    Luci Londono is a 49 year old female who is being evaluated via a billable video visit.      The patient has been notified of following:     \"This video visit will be conducted via a call between you and your physician/provider. We have found that certain health care needs can be provided without the need for an in-person physical exam.  This service lets us provide the care you need with a video conversation.  If a prescription is necessary we can send it directly to your pharmacy.  If lab work is needed we can place an order for that and you can then stop by our lab to have the test done at a later time.    Video visits are billed at different rates depending on your insurance coverage.  Please reach out to your insurance provider with any questions.    If during the course of the call the physician/provider feels a video visit is not appropriate, you will not be charged for this service.\"    Patient has given verbal consent for Video visit? Yes  How would you like to obtain your AVS? Mail a copy  If you are dropped from the video visit, the video invite should be resent to:  # [phone]   Will anyone else be joining your video visit? No        Video-Visit Details    Type of service:  Video Visit    Video Start Time:    Video End Time:   Originating Location (pt. Location): Home    Distant Location (provider location):  [facility]     Platform used for Video Visit:        Emerald Pascual CMA          Type of Visit: Video Visit  Patient has given verbal consent for Video visit.     Video Start Time: 12:05 pm  Video End Time:  12:25 pm    Originating Location (pt. Location): Home     Distant Location (provider location):  [facility]      Platform used for Video Visit: [facility]      ONCOLOGY FOLLOW [facility] VISIT    Breast surgeon:  Dr. Stacey Ashford,     REASON FOR visit:  10/2018 dx right breast TN IDC      HISTORY OF ONCOLOGY ILLNESS:    At age 47 amenorrhea with polycystic ovaries,  She felt a lump in her right breast in early October, 2018.   Her mammogram revealed a small mass in the right upper outer breast,   US revealed an 8mm hypoechoic mass at 12:00, 6cm FN and axillary US revealed a concerning lymph node which was also biopsied.   Her pathology from both breast and LN revealed a grade 3, invasive ductal carcinoma, ER/[place]/her 2-.   PET found no distal disease.   Breast MRI found entire span 3.8 cm.There are multiple enlarged right axillary lymph nodes.      She made informed decision to proceed with neoadjuvant DD AC  She had drastic clinical response by PE and MRI.   She continued on wkly taxol. Carbo was added in W2. Carbo then was discontinued 3/6/2019 due to persistent neutropenia.     She had lumpectomy 5/2019 found to have [id number] (1/6) disease.     She was tested 4/2019 heterozygous positive for PALB2 p.E837 and BRCA2 variant unknown significance p.T77A, MLH1 variant, unknow significance p.I25V    She finished RT till mid Aug 2019.   LMP 10/2018.     She was screened and told not qualified for  trail.   She made informed decision to try adjuvant oral Xeloda September 2019 till 5/2020.       INTERVAL HISTORY:  She is on baby ASA, so declined ASA trial.       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     MEDICATIONS/ALLERY:  Reviewed in Epic system.      SOCIAL HISTORY:    She  works at [employer].  She is devoiced with 3 kids, 12, 14, 16 years old. Deny ETOH/smoking       FAMILY HISTORY:    Negative for any type of cancers      REVIEW OF SYSTEMS:   Right shoulder pain, right breast pain on and off.   Reports new mid back pain.       PHYSICAL EXAMINATION ATTAINABLE DURING VIDEO VISIT:  CONSTITUTIONAL - Pt looks like stated age, pleasant, not in acute distress. Not obese.  NEURO: Oriented to time, person, and places. No tremor. Normal gait.   ENT, MOUTH: Pupils are equal.  Sclerae are anicteric.  Moist oral mucosa. No oral thrush.   NECK:  No jugular venous distention.  No thyroid enlargement.   RESPIRATORY: talk nl, no sob during conversation, no cough.   MUSCULOSKELETAL/EXTREMITIES:  No edema.  No joint deformity. Normal range of motion.  SKIN:  No petechiae.  No rash.  No signs of cellulitis.   PSYCHIATRIC: Normal mood and affect. Good memory. Proper insight and judgement.   THE REST OF A COMPREHENSIVE PHYSICIAL EXAM IS DEFERRED DUE TO COVID-19 PUBLIC HEALTH EMERGENCY RELATED VIDEO VISIT RESCTRICTION.       CURRENT LAB DATA REVIEWED TODAY WITH PATIENT DURING VISIT:  Cbc /cmp/marker are fine      CURRENT IMAGING REVIEWED TODAY WITH PATIENT DURING VISIT:  Breast MRI 4/2020 - negative  MA 10/2019 - negative  US abd 6/2019 - fatty liver.       OLD DATA REVIEWED TODAY WITH SUMMARY:   She is tested 4/2019 heterozygous positive for PALB2 p.E837 and BRCA2 variant unknown significance p.T77A, MLH1 variant, unknow significance p.I25V    Breast MRI post AC 12/2018  1. Enhancing mass superiorly in the RIGHT breast is significantly decreased in size since 10/17/2018, measuring approximately 1.1 x 0.3 x 0.5 cm in today's study (series 5 image 52 and series 13 image 30), decreased from 1.1 x 1.6 x 1.0 cm.  2. Enlarged RIGHT axillary lymph node is slightly decreased since that time as well, now measuring 0.9 x 1.2 x 1.0 cm (series 5 image 68 and series 13 image 19), decreased from 1.3 x 1.2 x 1.4 cm.      10/2018  Right breast 8mm hypoechoic mass at 12:00, 6cm FN and right axillary LN biopsy both found grade 3, invasive ductal carcinoma, ER/[place]/her 2-.       PET 10/2018  1. No evidence of distant metastasis.  2. Hypermetabolic focus in the upper outer quadrant right breast representing primary tumor. Hypermetabolic right axillary lymph nodes, consistent with metastatic node.  3. Indeterminate sub-4 mm pulmonary nodules, likely fissural lymph node in the right lung.  4. Extensive FDG uptake by the brown fat in the neck and paraspinal region.    10/2018 dx MA -  revealed a small mass in the right upper outer breast, US revealed an 8mm hypoechoic mass at 12:00, 6cm FN and axillary US revealed a concerning lymph node         ASSESSMENT AND PLAN DISCUSSED WITH PATIENT TODAY DURING VISIT:    1.  dx cT1cN1 right breast high grade IDC, TN at age 47 in 10/2018.   S/P neoadjuvant  DD AC -T.  She had lumpectomy 5/2019 found to have [id number] (1/6) disease.     S/p post lumpectomy RT is recommended.     She was screened not qualified for  trail.     She made informed decision to try adjuvant oral Xeloda September 2019. She is able to tolerate full dose at 2 g bid.   She is finishing it 5/2020 with 8 months of tx.     We discussed the follow up plan in detail.     She will benefit from screening MRI, MA. She is on close q 3 months follow-up.       2. Young age dx with TN breast cancer, she saw genetic counseling.   She is tested 4/2019 heterozygous positive for PALB2 p.E837 and BRCA2 variant unknown significance p.T77A, MLH1 variant, unknow significance p.I25V    We discussed the 33-58% life time risk of breast cancer, increased (% unknown) risk of ovarian cancer and pancreatic cancer.     She will be a good candidate forbreast MRI screening.     Body image screening for pancreatic cancer protocol is undefined.   We will do annual body imaging as needed.    She is due MA in fall and MRI in spring.   She is in agreement with  the plan.       3. On and off right breast pain is migratory.  Advice EMLA cream prn.   She can also try primrose oil.       4. Neuropathy developing in 3/2019.   Advice vit B6 oral. Also can try alpha Lipoic Acid.       5. Reports new mid back pain for a few days.   Tylenol has not help.   Advice X ray as work up.     Again, thank you for allowing me to participate in the care of your patient.        Sincerely,        Addie Murillo MD, MD

## 2020-08-19 NOTE — PROGRESS NOTES
"Luci Londono is a 49 year old female who is being evaluated via a billable video visit.      The patient has been notified of following:     \"This video visit will be conducted via a call between you and your physician/provider. We have found that certain health care needs can be provided without the need for an in-person physical exam.  This service lets us provide the care you need with a video conversation.  If a prescription is necessary we can send it directly to your pharmacy.  If lab work is needed we can place an order for that and you can then stop by our lab to have the test done at a later time.    Video visits are billed at different rates depending on your insurance coverage.  Please reach out to your insurance provider with any questions.    If during the course of the call the physician/provider feels a video visit is not appropriate, you will not be charged for this service.\"    Patient has given verbal consent for Video visit? Yes  How would you like to obtain your AVS? Mail a copy  If you are dropped from the video visit, the video invite should be resent to:  # [phone]   Will anyone else be joining your video visit? No        Video-Visit Details    Type of service:  Video Visit    Video Start Time:    Video End Time:   Originating Location (pt. Location): Home    Distant Location (provider location):  [facility]     Platform used for Video Visit:        Emerald Pascual CMA        "

## 2020-08-19 NOTE — TELEPHONE ENCOUNTER
Left voicemail message for patient requesting a return call regarding scheduling xray, mammogram and return appointment with Dr Murillo.

## 2020-08-20 ENCOUNTER — HOSPITAL ENCOUNTER (OUTPATIENT)
Dept: GENERAL RADIOLOGY | Facility: CLINIC | Age: 50
Discharge: HOME | End: 2020-08-20
Attending: INTERNAL MEDICINE | Admitting: INTERNAL MEDICINE
Payer: COMMERCIAL

## 2020-08-20 PROCEDURE — 72072 X-RAY EXAM THORAC SPINE 3VWS: CPT

## 2020-09-02 ENCOUNTER — VIRTUAL VISIT (OUTPATIENT)
Dept: FAMILY MEDICINE | Facility: CLINIC | Age: 50
End: 2020-09-02
Payer: COMMERCIAL

## 2020-09-02 ENCOUNTER — TELEPHONE (OUTPATIENT)
Dept: ONCOLOGY | Facility: CLINIC | Age: 50
End: 2020-09-02

## 2020-09-02 DIAGNOSIS — Z20.822 EXPOSURE TO COVID-19 VIRUS: Primary | ICD-10-CM

## 2020-09-02 PROCEDURE — 99213 OFFICE O/P EST LOW 20 MIN: CPT | Mod: 95 | Performed by: FAMILY MEDICINE

## 2020-09-02 NOTE — TELEPHONE ENCOUNTER
Luci called back. States she is not symptomatic just exposure at work.   Thinks she was given the wrong # for Covid screening/ testing.   Gave her the screening Covid # of [phone]

## 2020-09-02 NOTE — LETTER
[facility]  [address]  [place] [address]  [phone] September 2, 2020    RE:  Luci Londono                                                                                                                                                       [address] [facility] [address]            To whom it may concern:    Luci Londono is under my professional care.    Luci needs to be off work today, and potentially for the rest of the week until her COVID testing comes back and she is confirmed to be negative.      Sincerely,        Lakia Peter DO

## 2020-09-02 NOTE — TELEPHONE ENCOUNTER
Luci called clinic stating that she was exposed to someone at her workplace who has tested postive for Covid-19. Informed uLci to go to [facility] to be assessed and make appointment to get tested. She was instructed not to go back to work until she gets her results. She was also given phone number [phone] to call for Covid testing.

## 2020-09-02 NOTE — PROGRESS NOTES
"Luci Londono is a 49 year old female who is being evaluated via a billable telephone visit.      The patient has been notified of following:     \"This telephone visit will be conducted via a call between you and your physician/provider. We have found that certain health care needs can be provided without the need for a physical exam.  This service lets us provide the care you need with a short phone conversation.  If a prescription is necessary we can send it directly to your pharmacy.  If lab work is needed we can place an order for that and you can then stop by our lab to have the test done at a later time.    Telephone visits are billed at different rates depending on your insurance coverage. During this emergency period, for some insurers they may be billed the same as an in-person visit.  Please reach out to your insurance provider with any questions.    If during the course of the call the physician/provider feels a telephone visit is not appropriate, you will not be charged for this service.\"    Patient has given verbal consent for Telephone visit?  Yes    What phone number would you like to be contacted at? [phone]    How would you like to obtain your AVS? Kavin Foster     Luci Londono is a 49 year old female who presents via phone visit today for the following health issues:    HPI    Acute Illness  Acute illness concerns: body aches, headache  Onset/Duration: 2-3 days  Symptoms:  Fever: no  Chills/Sweats: no  Headache (location?): YES  Sinus Pressure: no  Conjunctivitis:  no  Ear Pain: no  Rhinorrhea: no  Congestion: no  Sore Throat: no  Cough: no  Wheeze: no  Decreased Appetite: no  Nausea: no  Vomiting: no  Diarrhea: no  Dysuria/Freq.: no  Dysuria or Hematuria: no  Fatigue/Achiness: YES  Sick/Strep Exposure: YES- coworker tested positive for this and did not tell anyone at her work until pt got a notice in the mail. She is concerned for her health as she is a cancer " patient  Therapies tried and outcome: tylenol    Thinks he was exposed either Wednesday or Thursday at work.      Review of Systems   CONSTITUTIONAL: NEGATIVE for fever, chills, change in weight  INTEGUMENTARY/SKIN: NEGATIVE for worrisome rashes, moles or lesions  EYES: NEGATIVE for vision changes or irritation  ENT/MOUTH: NEGATIVE for ear, mouth and throat problems  RESP: NEGATIVE for significant cough or SOB  CV: NEGATIVE for chest pain, palpitations or peripheral edema  GI: NEGATIVE for nausea, abdominal pain, heartburn, or change in bowel habits  : NEGATIVE for frequency, dysuria, or hematuria  NEURO: NEGATIVE for weakness, dizziness or paresthesias  HEME: NEGATIVE for bleeding problems       Objective      Vitals:  No vitals were obtained today due to virtual visit.    GEN: alert and mild distress (due to anxiety)  PSYCH: Alert and oriented times 3; coherent speech, normal   rate and volume, able to articulate logical thoughts, able   to abstract reason, no tangential thoughts, no hallucinations   or delusions  Her affect is anxious  RESP: No cough, no audible wheezing, able to talk in full sentences  Remainder of exam unable to be completed due to telephone visits    No results found for this or any previous visit (from the past 24 hour(s)).        Assessment/Plan:    Assessment & Plan   Problem List Items Addressed This Visit     None      Visit Diagnoses     Exposure to COVID-19 virus    -  Primary    Relevant Orders    Symptomatic COVID-19 Virus (Coronavirus) by PCR         COVID test ordered  Letter alsop written to excuse pt from work until negative test result comes back      A physical exam with vitals was not possible today due to the COVID19 pandemic crisis for which we are trying to keep all patients safe and at home.  Clinical decision making was based on history as presented by the patient and answers to follow up questions as noted above.  Any lab orders that are needed will be put in as future  orders so that the patient can come in for a lab only visit to minimize the amount of time spent in the clinic.      See Patient Instructions  Return in about 1 week (around 9/9/2020) for re-check / follow-up.    Lakia Peter, [facility]    Phone call duration:  8 minutes

## 2020-09-03 ENCOUNTER — OFFICE VISIT (OUTPATIENT)
Dept: FAMILY MEDICINE | Facility: CLINIC | Age: 50
End: 2020-09-03
Payer: COMMERCIAL

## 2020-09-03 VITALS
BODY MASS INDEX: 34.1 KG/M2 | WEIGHT: 192.5 LBS | HEART RATE: 67 BPM | TEMPERATURE: 97.7 F | SYSTOLIC BLOOD PRESSURE: 138 MMHG | DIASTOLIC BLOOD PRESSURE: 84 MMHG | OXYGEN SATURATION: 100 % | RESPIRATION RATE: 14 BRPM

## 2020-09-03 DIAGNOSIS — L24.9 IRRITANT CONTACT DERMATITIS, UNSPECIFIED TRIGGER: Primary | ICD-10-CM

## 2020-09-03 DIAGNOSIS — Z20.822 EXPOSURE TO COVID-19 VIRUS: ICD-10-CM

## 2020-09-03 PROCEDURE — U0003 INFECTIOUS AGENT DETECTION BY NUCLEIC ACID (DNA OR RNA); SEVERE ACUTE RESPIRATORY SYNDROME CORONAVIRUS 2 (SARS-COV-2) (CORONAVIRUS DISEASE [COVID-19]), AMPLIFIED PROBE TECHNIQUE, MAKING USE OF HIGH THROUGHPUT TECHNOLOGIES AS DESCRIBED BY CMS-2020-01-R: HCPCS | Performed by: FAMILY MEDICINE

## 2020-09-03 PROCEDURE — 99213 OFFICE O/P EST LOW 20 MIN: CPT | Performed by: INTERNAL MEDICINE

## 2020-09-03 RX ORDER — HYDROXYZINE HYDROCHLORIDE 25 MG/1
25 TABLET, FILM COATED ORAL 3 TIMES DAILY PRN
Qty: 20 TABLET | Refills: 0 | Status: SHIPPED | OUTPATIENT
Start: 2020-09-03 | End: 2020-10-22

## 2020-09-03 RX ORDER — METHYLPREDNISOLONE 4 MG
TABLET, DOSE PACK ORAL
Qty: 21 TABLET | Refills: 0 | Status: SHIPPED | OUTPATIENT
Start: 2020-09-03 | End: 2020-10-22

## 2020-09-03 RX ORDER — CLOBETASOL PROPIONATE 0.5 MG/G
CREAM TOPICAL 2 TIMES DAILY
Qty: 30 G | Refills: 0 | Status: SHIPPED | OUTPATIENT
Start: 2020-09-03 | End: 2020-12-03

## 2020-09-03 NOTE — PROGRESS NOTES
Subjective     Luci Londono is a 49 year old female who presents to clinic today for the following health issues:    HPI       Rash  Onset/Duration: Sudden onset yesterday  Description  Location: Both arms, hands, face  Character: blotchy, raised  Itching: moderate  Intensity:  moderate  Progression of Symptoms:  worsening  Accompanying signs and symptoms:   Fever: no  Body aches or joint pain: no  Sore throat symptoms: no  Recent cold symptoms: no  History:           Previous episodes of similar rash: None  New exposures:  None  Recent travel: no  Exposure to similar rash: no  Precipitating or alleviating factors: NA  Therapies tried and outcome: Benadryl/diphenhydramine -  not effective     No Known Allergies     Past Medical History:   Diagnosis Date     Hypertension goal BP (blood pressure) < 140/80 2/18/2014     Lateral epicondylitis, right dominant elbow since late 10-17 11/1/2017     Port-A-Cath in place 10/26/2018     Thyroid disease        Past Surgical History:   Procedure Laterality Date     BIOPSY NODE SENTINEL Right 5/15/2019    Procedure: BIOPSY, LYMPH NODE, SENTINEL;  Surgeon: Stacey Ashford MD;  Location:  OR     DISSECT LYMPH NODE AXILLA Right 5/15/2019    Procedure: LYMPHADENECTOMY, AXILLARY;  Surgeon: Stacey Ashford MD;  Location:  OR     HAND SURGERY  2002    left     INSERT PORT VASCULAR ACCESS N/A 10/18/2018    Procedure: INSERTION OF VASCULAR PORT;  Surgeon: Stacey Ashford MD;  Location:  OR     LUMPECTOMY BREAST WITH SEED LOCALIZATION Right 5/15/2019    Procedure: SEED LOCALIZATION BREAST LUMPECTOMY, SEED LOCALIZATION AXILLARY BREAST BIOPSY, SENTINEL NODE , REMOVAL OF VASCULAR PORT ACCESS AND RIGHT  AXILLARY NODE DISSECTION;  Surgeon: Stacey Ashford MD;  Location:  OR     REMOVE PORT VASCULAR ACCESS N/A 5/15/2019    Procedure: REMOVAL, VASCULAR ACCESS PORT;  Surgeon: Stacey Ashford MD;  Location:  OR     TUBAL LIGATION         Family History   Problem  Relation Age of Onset     Diabetes Mother      Unknown/Adopted Father      Coronary Artery Disease No family hx of      Hypertension No family hx of      Hyperlipidemia No family hx of      Cerebrovascular Disease No family hx of      Breast Cancer No family hx of      Colon Cancer No family hx of      Prostate Cancer No family hx of      Other Cancer No family hx of      Depression No family hx of      Anxiety Disorder No family hx of      Mental Illness No family hx of      Substance Abuse No family hx of      Anesthesia Reaction No family hx of      Asthma No family hx of      Osteoporosis No family hx of      Genetic Disorder No family hx of      Thyroid Disease No family hx of      Obesity No family hx of        Social History     Tobacco Use     Smoking status: Never Smoker     Smokeless tobacco: Never Used   Substance Use Topics     Alcohol use: No        Current Outpatient Medications   Medication     ascorbic acid (VITAMIN C) 1000 MG TABS     clobetasol (TEMOVATE) 0.05 % external cream     hydrOXYzine (ATARAX) 25 MG tablet     ibuprofen (ADVIL/MOTRIN) 600 MG tablet     levothyroxine (SYNTHROID/LEVOTHROID) 137 MCG tablet     lidocaine (XYLOCAINE) 2 % external gel     loratadine (CLARITIN) 10 MG tablet     metFORMIN (GLUCOPHAGE-XR) 500 MG 24 hr tablet     methylPREDNISolone (MEDROL DOSEPAK) 4 MG tablet therapy pack     multivitamin, therapeutic with minerals (THERA-VIT-M) TABS tablet     omeprazole (PRILOSEC) 20 MG DR capsule     order for DME     order for DME     order for DME     order for DME     order for DME     No current facility-administered medications for this visit.         Review of Systems   Constitutional, HEENT, cardiovascular, pulmonary, GI, , musculoskeletal, neuro, skin, endocrine and psych systems are negative, except as otherwise noted.      Objective    /84 (BP Location: Left arm, Patient Position: Sitting, Cuff Size: Adult Regular)   Pulse 67   Temp 97.7  F (36.5  C)  (Tympanic)   Resp 14   Wt 87.3 kg (192 lb 8 oz)   SpO2 100%   BMI 34.10 kg/m    Body mass index is 34.1 kg/m .  Physical Exam   GENERAL: healthy, alert and no distress  NECK: no adenopathy, no asymmetry, masses, or scars and thyroid normal to palpation  RESP: lungs clear to auscultation - no rales, rhonchi or wheezes  CV: regular rate and rhythm, normal S1 S2, no S3 or S4, no murmur, click or rub, no peripheral edema and peripheral pulses strong  ABDOMEN: soft, nontender, no hepatosplenomegaly, no masses and bowel sounds normal  MS: no gross musculoskeletal defects noted, no edema  SKIN Exam : POSITIVE for erythematous swollen rash with excoriations on the upper arm bilaterally and dorsal aspect of both the arms.  Labs and imaging reviewed and discussed with the patient.        Assessment and Plan  1. Irritant contact dermatitis, unspecified trigger  New problem, as mentioned my Physical exam findings and HPI differential diagnosis of contact dermatitis, insect bite. DOesnt look like a drug rash. Pt does have risk factors of Breast cancer S/P completion of chemotherapy.  - Will give her Medrol dosepak, topical clobetasol and severe itching for which as needed hydroxyzine prescribed.    - methylPREDNISolone (MEDROL DOSEPAK) 4 MG tablet therapy pack; Follow Package Directions  Dispense: 21 tablet; Refill: 0  - clobetasol (TEMOVATE) 0.05 % external cream; Apply topically 2 times daily  Dispense: 30 g; Refill: 0  - hydrOXYzine (ATARAX) 25 MG tablet; Take 1 tablet (25 mg) by mouth 3 times daily as needed for itching  Dispense: 20 tablet; Refill: 0     Patient Instructions   Medications sent to your pharmacy.     -========================    Patient Education     Contact Dermatitis  Contact dermatitis is a skin rash caused by something that touches the skin and makes it irritated and inflamed. Your skin may be red, swollen, dry, and may be cracked. Blisters may form and ooze. The rash will itch.  Contact dermatitis  "can form on the face and neck, backs of hands, forearms, genitals, and lower legs.  People can get contact dermatitis from lots of sources. These include:    Plants such as poison ivy, oak, or sumac    Chemicals in hair dyes and rinses, soaps, solvents, waxes, fingernail polish, and deodorants     Jewelry or watchbands made of nickel  Contact dermatitis is not passed from person to person.  Talk with your healthcare provider about what may have caused the rash. A type of allergy testing called \"patch testing\" may be used to discover what you are allergic to. You will need to avoid the source of your rash in the future to prevent it from coming back.  Treatment is done to relieve itching and prevent the rash from coming back. The rash should go away in a few days to a few weeks.  Home care  Your healthcare provider may prescribe medicine to relieve swelling and itching. Follow all instructions when using these medicines.  General care:    Avoid anything that heats up your skin, such as hot showers or baths, or direct sunlight. This can make itching worse.    Apply cold compresses to soothe your sores to help relieve your symptoms. Do this for 30 minutes 3 to 4 times a day. You can make a cold compress by soaking a cloth in cold water. Squeeze out excess water. You can add colloidal oatmeal to the water to help reduce itching. For severe itching in a small area, apply an ice pack wrapped in a thin towel. Do this for 20 minutes 3 to 4 times a day.    You can also try wet dressings. One way to do this is to wear a wet piece of clothing under a dry one. Wear a damp shirt under a dry shirt if your upper body is affected. This can relieve itching and prevent you from scratching the affected area.    You can also help relieve large areas of itching by taking a lukewarm bath with colloidal oatmeal added to the water.    Use hydrocortisone cream for redness and irritation, unless another medicine was prescribed. You can also " use benzocaine anesthetic cream or spray. Calamine lotion can also relieve mild symptoms.    Use oral diphenhydramine to help reduce itching. You can buy this antihistamine at drug and grocery stores. It can make you sleepy, so use lower doses during the daytime. Or you can use loratadine. This is an antihistamine that will not make you sleepy. Do not use diphenhydramine if you have glaucoma or have trouble urinating due to an enlarged prostate.    If a plant causes your rash, make sure to wash your skin and the clothes you were wearing when you came into contact with the plant. This is to wash away the plant oils that gave you the rash and prevent more or worse symptoms.    Stay away from the substance or object that causes your symptoms. If you can t avoid it, wear gloves or some other type of protection.  Follow-up care  Follow up with your healthcare provider, or as advised.  When to seek medical advice  Call your healthcare provider right away if any of these occur:    Spreading of the rash to other parts of your body    Severe swelling of your face, eyelids, mouth, throat or tongue    Trouble urinating due to swelling in the genital area    Fever of 100.4 F (38 C) or higher    Redness or swelling that gets worse    Pain that gets worse    Foul-smelling fluid leaking from the skin    Yellow-brown crusts on the open blisters  Date Last Reviewed: 9/1/2016 2000-2019 [employer]. [address] [place] [address], [place] [id number]. All rights reserved. This information is not intended as a substitute for professional medical care. Always follow your healthcare professional's instructions.             Return in about 4 weeks (around 10/1/2020) for Preventative Visit.    Melida Chandra MD  [facility]

## 2020-09-03 NOTE — PATIENT INSTRUCTIONS
"Medications sent to your pharmacy.     -========================    Patient Education     Contact Dermatitis  Contact dermatitis is a skin rash caused by something that touches the skin and makes it irritated and inflamed. Your skin may be red, swollen, dry, and may be cracked. Blisters may form and ooze. The rash will itch.  Contact dermatitis can form on the face and neck, backs of hands, forearms, genitals, and lower legs.  People can get contact dermatitis from lots of sources. These include:    Plants such as poison ivy, oak, or sumac    Chemicals in hair dyes and rinses, soaps, solvents, waxes, fingernail polish, and deodorants     Jewelry or watchbands made of nickel  Contact dermatitis is not passed from person to person.  Talk with your healthcare provider about what may have caused the rash. A type of allergy testing called \"patch testing\" may be used to discover what you are allergic to. You will need to avoid the source of your rash in the future to prevent it from coming back.  Treatment is done to relieve itching and prevent the rash from coming back. The rash should go away in a few days to a few weeks.  Home care  Your healthcare provider may prescribe medicine to relieve swelling and itching. Follow all instructions when using these medicines.  General care:    Avoid anything that heats up your skin, such as hot showers or baths, or direct sunlight. This can make itching worse.    Apply cold compresses to soothe your sores to help relieve your symptoms. Do this for 30 minutes 3 to 4 times a day. You can make a cold compress by soaking a cloth in cold water. Squeeze out excess water. You can add colloidal oatmeal to the water to help reduce itching. For severe itching in a small area, apply an ice pack wrapped in a thin towel. Do this for 20 minutes 3 to 4 times a day.    You can also try wet dressings. One way to do this is to wear a wet piece of clothing under a dry one. Wear a damp shirt under a " dry shirt if your upper body is affected. This can relieve itching and prevent you from scratching the affected area.    You can also help relieve large areas of itching by taking a lukewarm bath with colloidal oatmeal added to the water.    Use hydrocortisone cream for redness and irritation, unless another medicine was prescribed. You can also use benzocaine anesthetic cream or spray. Calamine lotion can also relieve mild symptoms.    Use oral diphenhydramine to help reduce itching. You can buy this antihistamine at drug and grocery stores. It can make you sleepy, so use lower doses during the daytime. Or you can use loratadine. This is an antihistamine that will not make you sleepy. Do not use diphenhydramine if you have glaucoma or have trouble urinating due to an enlarged prostate.    If a plant causes your rash, make sure to wash your skin and the clothes you were wearing when you came into contact with the plant. This is to wash away the plant oils that gave you the rash and prevent more or worse symptoms.    Stay away from the substance or object that causes your symptoms. If you can t avoid it, wear gloves or some other type of protection.  Follow-up care  Follow up with your healthcare provider, or as advised.  When to seek medical advice  Call your healthcare provider right away if any of these occur:    Spreading of the rash to other parts of your body    Severe swelling of your face, eyelids, mouth, throat or tongue    Trouble urinating due to swelling in the genital area    Fever of 100.4 F (38 C) or higher    Redness or swelling that gets worse    Pain that gets worse    Foul-smelling fluid leaking from the skin    Yellow-brown crusts on the open blisters  Date Last Reviewed: 9/1/2016 2000-2019 [employer]. [address] [place] [address], [place] [address]. All rights reserved. This information is not intended as a substitute for professional medical care. Always follow your healthcare  professional's instructions.

## 2020-09-04 LAB
SARS-COV-2 RNA SPEC QL NAA+PROBE: NOT DETECTED
SPECIMEN SOURCE: NORMAL

## 2020-09-28 DIAGNOSIS — K21.9 GASTROESOPHAGEAL REFLUX DISEASE WITHOUT ESOPHAGITIS: Chronic | ICD-10-CM

## 2020-10-09 ENCOUNTER — TELEPHONE (OUTPATIENT)
Dept: ONCOLOGY | Facility: CLINIC | Age: 50
End: 2020-10-09

## 2020-10-09 NOTE — TELEPHONE ENCOUNTER
Called Luci to see if she needed a refill on her Nabumetone 500 mg BID.  This was discontinued in May 2020.    She stated she did not request the refill, notified [facility] pharmacy

## 2020-10-15 ENCOUNTER — HOSPITAL ENCOUNTER (OUTPATIENT)
Facility: CLINIC | Age: 50
Setting detail: SPECIMEN
Discharge: HOME | End: 2020-10-15
Attending: INTERNAL MEDICINE | Admitting: INTERNAL MEDICINE
Payer: COMMERCIAL

## 2020-10-15 ENCOUNTER — INFUSION THERAPY VISIT (OUTPATIENT)
Dept: INFUSION THERAPY | Facility: CLINIC | Age: 50
End: 2020-10-15
Attending: INTERNAL MEDICINE
Payer: COMMERCIAL

## 2020-10-15 ENCOUNTER — TELEPHONE (OUTPATIENT)
Dept: FAMILY MEDICINE | Facility: CLINIC | Age: 50
End: 2020-10-15

## 2020-10-15 ENCOUNTER — HOSPITAL ENCOUNTER (OUTPATIENT)
Dept: MAMMOGRAPHY | Facility: CLINIC | Age: 50
Discharge: HOME | End: 2020-10-15
Attending: INTERNAL MEDICINE | Admitting: INTERNAL MEDICINE
Payer: COMMERCIAL

## 2020-10-15 DIAGNOSIS — E03.9 ACQUIRED HYPOTHYROIDISM: ICD-10-CM

## 2020-10-15 DIAGNOSIS — E03.9 ACQUIRED HYPOTHYROIDISM: Primary | ICD-10-CM

## 2020-10-15 DIAGNOSIS — Z17.1 MALIGNANT NEOPLASM OF UPPER-OUTER QUADRANT OF RIGHT BREAST IN FEMALE, ESTROGEN RECEPTOR NEGATIVE (H): ICD-10-CM

## 2020-10-15 DIAGNOSIS — C50.411 MALIGNANT NEOPLASM OF UPPER-OUTER QUADRANT OF RIGHT BREAST IN FEMALE, ESTROGEN RECEPTOR NEGATIVE (H): ICD-10-CM

## 2020-10-15 DIAGNOSIS — Z12.31 SCREENING MAMMOGRAM, ENCOUNTER FOR: ICD-10-CM

## 2020-10-15 LAB
ALBUMIN SERPL-MCNC: 3.4 G/DL (ref 3.4–5)
ALP SERPL-CCNC: 136 U/L (ref 40–150)
ALT SERPL W P-5'-P-CCNC: 35 U/L (ref 0–50)
ANION GAP SERPL CALCULATED.3IONS-SCNC: 2 MMOL/L (ref 3–14)
AST SERPL W P-5'-P-CCNC: 26 U/L (ref 0–45)
BASOPHILS # BLD AUTO: 0 10E9/L (ref 0–0.2)
BASOPHILS NFR BLD AUTO: 0.2 %
BILIRUB SERPL-MCNC: 0.3 MG/DL (ref 0.2–1.3)
BUN SERPL-MCNC: 10 MG/DL (ref 7–30)
CALCIUM SERPL-MCNC: 9.1 MG/DL (ref 8.5–10.1)
CANCER AG27-29 SERPL-ACNC: 23 U/ML (ref 0–39)
CHLORIDE SERPL-SCNC: 105 MMOL/L (ref 94–109)
CO2 SERPL-SCNC: 31 MMOL/L (ref 20–32)
CREAT SERPL-MCNC: 0.68 MG/DL (ref 0.52–1.04)
DIFFERENTIAL METHOD BLD: ABNORMAL
EOSINOPHIL # BLD AUTO: 0.1 10E9/L (ref 0–0.7)
EOSINOPHIL NFR BLD AUTO: 1.3 %
ERYTHROCYTE [DISTWIDTH] IN BLOOD BY AUTOMATED COUNT: 16 % (ref 10–15)
GFR SERPL CREATININE-BSD FRML MDRD: >90 ML/MIN/{1.73_M2}
GLUCOSE SERPL-MCNC: 87 MG/DL (ref 70–99)
HCT VFR BLD AUTO: 39.2 % (ref 35–47)
HGB BLD-MCNC: 13.1 G/DL (ref 11.7–15.7)
IMM GRANULOCYTES # BLD: 0 10E9/L (ref 0–0.4)
IMM GRANULOCYTES NFR BLD: 0 %
LYMPHOCYTES # BLD AUTO: 2.3 10E9/L (ref 0.8–5.3)
LYMPHOCYTES NFR BLD AUTO: 36.7 %
MCH RBC QN AUTO: 26.1 PG (ref 26.5–33)
MCHC RBC AUTO-ENTMCNC: 33.4 G/DL (ref 31.5–36.5)
MCV RBC AUTO: 78 FL (ref 78–100)
MONOCYTES # BLD AUTO: 0.4 10E9/L (ref 0–1.3)
MONOCYTES NFR BLD AUTO: 6.2 %
NEUTROPHILS # BLD AUTO: 3.4 10E9/L (ref 1.6–8.3)
NEUTROPHILS NFR BLD AUTO: 55.6 %
NRBC # BLD AUTO: 0 10*3/UL
NRBC BLD AUTO-RTO: 0 /100
PLATELET # BLD AUTO: 265 10E9/L (ref 150–450)
POTASSIUM SERPL-SCNC: 4.4 MMOL/L (ref 3.4–5.3)
PROT SERPL-MCNC: 7.6 G/DL (ref 6.8–8.8)
RBC # BLD AUTO: 5.02 10E12/L (ref 3.8–5.2)
SODIUM SERPL-SCNC: 138 MMOL/L (ref 133–144)
T4 FREE SERPL-MCNC: 1.7 NG/DL (ref 0.76–1.46)
TSH SERPL DL<=0.005 MIU/L-ACNC: <0.01 MU/L (ref 0.4–4)
WBC # BLD AUTO: 6.2 10E9/L (ref 4–11)

## 2020-10-15 PROCEDURE — 80053 COMPREHEN METABOLIC PANEL: CPT | Performed by: INTERNAL MEDICINE

## 2020-10-15 PROCEDURE — 84443 ASSAY THYROID STIM HORMONE: CPT | Performed by: INTERNAL MEDICINE

## 2020-10-15 PROCEDURE — 86300 IMMUNOASSAY TUMOR CA 15-3: CPT | Performed by: INTERNAL MEDICINE

## 2020-10-15 PROCEDURE — 85025 COMPLETE CBC W/AUTO DIFF WBC: CPT | Performed by: INTERNAL MEDICINE

## 2020-10-15 PROCEDURE — 36415 COLL VENOUS BLD VENIPUNCTURE: CPT

## 2020-10-15 PROCEDURE — 99207 PR NO CHARGE LOS: CPT

## 2020-10-15 PROCEDURE — 84439 ASSAY OF FREE THYROXINE: CPT | Performed by: INTERNAL MEDICINE

## 2020-10-15 PROCEDURE — 77063 BREAST TOMOSYNTHESIS BI: CPT

## 2020-10-15 RX ORDER — LEVOTHYROXINE SODIUM 125 UG/1
125 TABLET ORAL DAILY
Qty: 30 TABLET | Refills: 0 | Status: SHIPPED | OUTPATIENT
Start: 2020-10-15 | End: 2020-11-25

## 2020-10-15 NOTE — LETTER
October 16, 2020      Luci [facility]  [address] [place] [id number]        Dear ,    We are writing to inform you of your test results.    Your TSH levels are abnormally low depicting that you are taking higher than needed dose of Levothyroxine. I have decreased the dose of your medication and sent to your pharmacy. You will need repeat TSH check in 5 weeks of starting the new dosage to make sure it normalizes versus further titration of the dose if necessary. Future orders for TSH placed around 11/19/2020 which is a nonfasting lab work.     Please let me know if you have any questions.     Resulted Orders   TSH with free T4 reflex   Result Value Ref Range    TSH <0.01 (L) 0.40 - 4.00 mU/L       If you have any questions or concerns, please call the clinic at the number listed above.       Sincerely,      Dr. Melida Chandra

## 2020-10-15 NOTE — TELEPHONE ENCOUNTER
Patient Contact    Attempt # 1    Was call answered?  No.  Left message on voicemail with information to call me back.    On call back:    -Please make sure clinic draws this.  -Relay to pt provider message

## 2020-10-15 NOTE — TELEPHONE ENCOUNTER
Ok, I think I saw her once. I see the standing order of  already and not allowing me to place another order. So I have asked to CC me the results.     Thank you,  Melida Chandra MD on 10/15/2020 at 11:48 AM

## 2020-10-15 NOTE — TELEPHONE ENCOUNTER
Call from patient today.     Did have a standing order from Dr. Wills to get TSH checked.   Can this order be updated ASAP because she is one her way to the cancer clinic to have labs done and would like this added so she does not need to be pocked twice.     Sent to PCP ASAP for order

## 2020-10-21 NOTE — PROGRESS NOTES
Subjective     Luci Londono is a 49 year old female who presents to clinic today for the following health issues:    HPI         Back Pain  Onset/Duration: 5 months  Description:   Location of pain: low back Right and hip Right  Character of pain: stabbing, cramping and intermittent  Pain radiation: starts on left, pain moves to right rib cage  New numbness or weakness in legs, not attributed to pain: no   Intensity: Currently 8-9/10  Progression of Symptoms: same and intermittent  History:   Specific cause: none  Pain interferes with job: YES  History of back problems: recurrent self limited episodes of low back pain in the past  Any previous MRI or X-rays: None  Sees a specialist for back pain: No  Alleviating factors:   Improved by: na  Precipitating factors:  Worsened by: Bending, sitting  Therapies tried and outcome: acetaminophen (Tylenol)    Accompanying Signs & Symptoms:  Risk of Fracture: Corticosteroid use and Breast cancer history  Risk of Cauda Equina: None  Risk of Infection: None  Risk of Cancer: History of cancer  Risk of Ankylosing Spondylitis: Onset at age <35, male, AND morning back stiffness no    Musculoskeletal problem/pain  Onset/Duration: couple weeks  Description  Location: foot - right  Joint Swelling: no  Redness: no  Pain: YES  Warmth: no  Intensity:  7-8/10  Progression of Symptoms:  same  Accompanying signs and symptoms:   Fevers: no  Numbness/tingling/weakness: no  History  Trauma to the area: no  Recent illness:  no  Previous similar problem: no  Previous evaluation:  no  Precipitating or alleviating factors:  Aggravating factors include: standing  Therapies tried and outcome: rest/inactivity     No Known Allergies     Past Medical History:   Diagnosis Date     Hypertension goal BP (blood pressure) < 140/80 2/18/2014     Lateral epicondylitis, right dominant elbow since late 10-17 11/1/2017     Port-A-Cath in place 10/26/2018     Thyroid disease        Past Surgical History:    Procedure Laterality Date     BIOPSY NODE SENTINEL Right 5/15/2019    Procedure: BIOPSY, LYMPH NODE, SENTINEL;  Surgeon: Stacey Ashford MD;  Location: [facility]     DISSECT LYMPH NODE AXILLA Right 5/15/2019    Procedure: LYMPHADENECTOMY, AXILLARY;  Surgeon: Stacey Ashford MD;  Location:  OR     HAND SURGERY  2002    left     INSERT PORT VASCULAR ACCESS N/A 10/18/2018    Procedure: INSERTION OF VASCULAR PORT;  Surgeon: Stacey Ashford MD;  Location: [facility] OR     LUMPECTOMY BREAST WITH SEED LOCALIZATION Right 5/15/2019    Procedure: SEED LOCALIZATION BREAST LUMPECTOMY, SEED LOCALIZATION AXILLARY BREAST BIOPSY, SENTINEL NODE , REMOVAL OF VASCULAR PORT ACCESS AND RIGHT  AXILLARY NODE DISSECTION;  Surgeon: Stacey Ashford MD;  Location:  OR     REMOVE PORT VASCULAR ACCESS N/A 5/15/2019    Procedure: REMOVAL, VASCULAR ACCESS PORT;  Surgeon: Stacey Ashford MD;  Location: [facility] OR     TUBAL LIGATION         Family History   Problem Relation Age of Onset     Diabetes Mother      Unknown/Adopted Father      Coronary Artery Disease No family hx of      Hypertension No family hx of      Hyperlipidemia No family hx of      Cerebrovascular Disease No family hx of      Breast Cancer No family hx of      Colon Cancer No family hx of      Prostate Cancer No family hx of      Other Cancer No family hx of      Depression No family hx of      Anxiety Disorder No family hx of      Mental Illness No family hx of      Substance Abuse No family hx of      Anesthesia Reaction No family hx of      Asthma No family hx of      Osteoporosis No family hx of      Genetic Disorder No family hx of      Thyroid Disease No family hx of      Obesity No family hx of        Social History     Tobacco Use     Smoking status: Never Smoker     Smokeless tobacco: Never Used   Substance Use Topics     Alcohol use: No        Current Outpatient Medications   Medication     ascorbic acid (VITAMIN C) 1000 MG TABS     levothyroxine (SYNTHROID/LEVOTHROID)  125 MCG tablet     metFORMIN (GLUCOPHAGE-XR) 500 MG 24 hr tablet     omeprazole (PRILOSEC) 20 MG DR capsule     tiZANidine (ZANAFLEX) 6 MG capsule     clobetasol (TEMOVATE) 0.05 % external cream     hydrOXYzine (ATARAX) 25 MG tablet     lidocaine (XYLOCAINE) 2 % external gel     loratadine (CLARITIN) 10 MG tablet     multivitamin, therapeutic with minerals (THERA-VIT-M) TABS tablet     order for DME     order for DME     order for DME     order for DME     order for DME     No current facility-administered medications for this visit.         Review of Systems   Constitutional, HEENT, cardiovascular, pulmonary, GI, , musculoskeletal, neuro, skin, endocrine and psych systems are negative, except as otherwise noted.      Objective    /76   Pulse 74   Temp 97.4  F (36.3  C) (Tympanic)   Resp 16   Wt 88 kg (194 lb)   SpO2 100%   BMI 34.37 kg/m    Body mass index is 34.37 kg/m .  Physical Exam   GENERAL: healthy, alert and no distress  NECK: no adenopathy, no asymmetry, masses, or scars and thyroid normal to palpation  RESP: lungs clear to auscultation - no rales, rhonchi or wheezes  CV: regular rate and rhythm, normal S1 S2, no S3 or S4, no murmur, click or rub, no peripheral edema and peripheral pulses strong  ABDOMEN: soft, nontender, no hepatosplenomegaly, no masses and bowel sounds normal  MS: no gross musculoskeletal defects noted, no edema  BACK : POSITIVE for tenderness on palpation of the bilateral paraspinal muscles on Thoracic region, Tenderness on Lumbosacral spine, Bilateral Sacroiliac joints and paraspinal muscle spasm.  SLR test positive at 70 degrees on Rt side.    Labs and imaging reviewed and discussed with the patient.  Pt preferring to get all the labs done at her Annual physical on 11/19/20.           Assessment and Plan  1. Acute bilateral low back pain with right-sided sciatica  New problem, will get the appropriate X rays before planning for Physical therapy. Symptomatic treatment  with muscle relaxor, explained the side effects. Pt understood and agreed with the plan.   Update - X rays are normal without any bony problems. Placed referral for improvement.   - XR Lumbar Spine 2/3 Views; Future  - tiZANidine (ZANAFLEX) 6 MG capsule; Take 1 capsule (6 mg) by mouth 3 times daily as needed (Back pain)  Dispense: 30 capsule; Refill: 1  - XR Pelvis and Hip Bilateral 1 View; Future  - PHYSICAL THERAPY REFERRAL; Future    2. Encounter for completion of form with patient  Filled in the paperwork for patient which is requesting physical exam and patient current condition. Pending referral to PT as I am awaiting for X ray reports for Lumbar spine and Pelvis.        Patient Instructions   Medications sent to your pharmacy.     Please do the X rays ordered.     Will plan for Physical therapy once I see the X ray report.     =======================    Patient Education     Muscle Spasm  A muscle spasm is a sudden tightening of the muscle you can t control. This may be caused by strain, overworking the muscle, or injury. It can also be caused by dehydration, electrolyte imbalance, diabetes, alcohol use, and certain medicines. If it goes on long enough the muscle spasm causes pain. Common areas for muscle spasm are the legs, neck, and back.  Home care    Heat, massage, and stretching will help relax muscle spasm.    When the spasm is in your arm or leg, stretch the muscle passively. To do this, have someone bend or straighten the joint above or below the muscle until you feel the stretch on the sore muscle. You can stretch the muscle actively by moving the affected body part. This will stretch the muscle that is in spasm. For example, if the spasm is in your calf, bend the ankle so your toes point upward toward your knee. This will stretch your calf muscle.    You may use over-the-counter pain medicine to control pain, unless another medicine was prescribed. If you have chronic liver or kidney disease or ever  had a stomach ulcer or gastrointestinal bleeding, talk with your healthcare provider before using these medicines.    Follow-up care  Follow up with your healthcare provider, or as advised.    When to seek medical advice  Call your healthcare provider right away if any of the following occur:    Fingers or toes become swollen, cold, blue, numb, or tingly    You develop weakness in the affected arm or leg    Pain increases and is not controlled by the above measures  Date Last Reviewed: 5/1/2018 2000-2019 [employer] [address]. [address] [place] [address]. All rights reserved. This information is not intended as a substitute for professional medical care. Always follow your healthcare professional's instructions.               Return in about 4 weeks (around 11/19/2020), or if symptoms worsen or fail to improve, for Preventative Visit.    Melida Chandra MD  [facility]

## 2020-10-22 ENCOUNTER — ANCILLARY PROCEDURE (OUTPATIENT)
Dept: GENERAL RADIOLOGY | Facility: CLINIC | Age: 50
End: 2020-10-22
Attending: INTERNAL MEDICINE
Payer: COMMERCIAL

## 2020-10-22 ENCOUNTER — OFFICE VISIT (OUTPATIENT)
Dept: FAMILY MEDICINE | Facility: CLINIC | Age: 50
End: 2020-10-22
Payer: COMMERCIAL

## 2020-10-22 VITALS
OXYGEN SATURATION: 100 % | TEMPERATURE: 97.4 F | HEART RATE: 74 BPM | BODY MASS INDEX: 34.37 KG/M2 | RESPIRATION RATE: 16 BRPM | WEIGHT: 194 LBS | DIASTOLIC BLOOD PRESSURE: 76 MMHG | SYSTOLIC BLOOD PRESSURE: 120 MMHG

## 2020-10-22 DIAGNOSIS — M54.41 ACUTE BILATERAL LOW BACK PAIN WITH RIGHT-SIDED SCIATICA: ICD-10-CM

## 2020-10-22 DIAGNOSIS — Z02.89 ENCOUNTER FOR COMPLETION OF FORM WITH PATIENT: ICD-10-CM

## 2020-10-22 DIAGNOSIS — M54.41 ACUTE BILATERAL LOW BACK PAIN WITH RIGHT-SIDED SCIATICA: Primary | ICD-10-CM

## 2020-10-22 PROCEDURE — 99214 OFFICE O/P EST MOD 30 MIN: CPT | Performed by: INTERNAL MEDICINE

## 2020-10-22 PROCEDURE — 73522 X-RAY EXAM HIPS BI 3-4 VIEWS: CPT | Mod: FY | Performed by: RADIOLOGY

## 2020-10-22 PROCEDURE — 72100 X-RAY EXAM L-S SPINE 2/3 VWS: CPT | Mod: FY | Performed by: RADIOLOGY

## 2020-10-22 RX ORDER — HYDROXYZINE HYDROCHLORIDE 25 MG/1
TABLET, FILM COATED ORAL
COMMUNITY
Start: 2020-09-03 | End: 2023-05-10

## 2020-10-22 RX ORDER — TIZANIDINE HYDROCHLORIDE 6 MG/1
6 CAPSULE, GELATIN COATED ORAL 3 TIMES DAILY PRN
Qty: 30 CAPSULE | Refills: 1 | Status: SHIPPED | OUTPATIENT
Start: 2020-10-22 | End: 2020-12-03

## 2020-10-22 NOTE — LETTER
October 23, 2020      Luci [place]  [address] [id number]        Dear ,    We are writing to inform you of your test results.    Your X ray is normal, no concerns per radiology review.     Please let me know if you have any questions.     Resulted Orders   XR Lumbar Spine 2/3 Views    Narrative    XR LUMBAR SPINE TWO-THREE VIEWS  10/22/2020 5:00 PM     HISTORY: Acute bilateral low back pain with right-sided sciatica.    COMPARISON: None.      Impression    IMPRESSION: There are 5 nonrib-bearing lumbar vertebral bodies. No  gross vertebral body height loss. Alignment appears within normal  limits. The intervertebral disc spaces appear grossly maintained.    BRINA BRAVO MD       If you have any questions or concerns, please call the clinic at the number listed above.       Sincerely,        Dr. Chandra

## 2020-10-22 NOTE — PATIENT INSTRUCTIONS
Medications sent to your pharmacy.     Please do the X rays ordered.     Will plan for Physical therapy once I see the X ray report.     =======================    Patient Education     Muscle Spasm  A muscle spasm is a sudden tightening of the muscle you can t control. This may be caused by strain, overworking the muscle, or injury. It can also be caused by dehydration, electrolyte imbalance, diabetes, alcohol use, and certain medicines. If it goes on long enough the muscle spasm causes pain. Common areas for muscle spasm are the legs, neck, and back.  Home care    Heat, massage, and stretching will help relax muscle spasm.    When the spasm is in your arm or leg, stretch the muscle passively. To do this, have someone bend or straighten the joint above or below the muscle until you feel the stretch on the sore muscle. You can stretch the muscle actively by moving the affected body part. This will stretch the muscle that is in spasm. For example, if the spasm is in your calf, bend the ankle so your toes point upward toward your knee. This will stretch your calf muscle.    You may use over-the-counter pain medicine to control pain, unless another medicine was prescribed. If you have chronic liver or kidney disease or ever had a stomach ulcer or gastrointestinal bleeding, talk with your healthcare provider before using these medicines.    Follow-up care  Follow up with your healthcare provider, or as advised.    When to seek medical advice  Call your healthcare provider right away if any of the following occur:    Fingers or toes become swollen, cold, blue, numb, or tingly    You develop weakness in the affected arm or leg    Pain increases and is not controlled by the above measures  Date Last Reviewed: 5/1/2018 2000-2019 [employer]. [address] [place] [id number]. All rights reserved. This information is not intended as a substitute for professional medical care. Always follow your  healthcare professional's instructions.

## 2020-10-23 ENCOUNTER — TELEPHONE (OUTPATIENT)
Dept: FAMILY MEDICINE | Facility: CLINIC | Age: 50
End: 2020-10-23

## 2020-10-23 NOTE — TELEPHONE ENCOUNTER
Please call the patient and let her know that her X rays are normal without any bony problems. Placed PT referral for improvement which she will need to complete atleast 4-6 weeks so that we can plan for MRI if she fails.     Thank you  Melida Chandra MD on 10/23/2020 at 8:33 AM

## 2020-10-23 NOTE — RESULT ENCOUNTER NOTE
Your X ray is normal, no concerns per radiology review.    Please let me know if you have any questions.  Melida Chandra MD on 10/23/2020 at 8:30 AM

## 2020-11-04 ENCOUNTER — ONCOLOGY VISIT (OUTPATIENT)
Dept: ONCOLOGY | Facility: CLINIC | Age: 50
End: 2020-11-04
Attending: INTERNAL MEDICINE
Payer: COMMERCIAL

## 2020-11-04 VITALS
RESPIRATION RATE: 18 BRPM | DIASTOLIC BLOOD PRESSURE: 77 MMHG | BODY MASS INDEX: 34.65 KG/M2 | TEMPERATURE: 98.2 F | WEIGHT: 195.6 LBS | HEART RATE: 85 BPM | SYSTOLIC BLOOD PRESSURE: 124 MMHG | OXYGEN SATURATION: 100 %

## 2020-11-04 DIAGNOSIS — C50.411 MALIGNANT NEOPLASM OF UPPER-OUTER QUADRANT OF RIGHT BREAST IN FEMALE, ESTROGEN RECEPTOR NEGATIVE (H): Primary | ICD-10-CM

## 2020-11-04 DIAGNOSIS — Z17.1 MALIGNANT NEOPLASM OF UPPER-OUTER QUADRANT OF RIGHT BREAST IN FEMALE, ESTROGEN RECEPTOR NEGATIVE (H): Primary | ICD-10-CM

## 2020-11-04 DIAGNOSIS — Z15.09 MONOALLELIC MUTATION OF PALB2 GENE: ICD-10-CM

## 2020-11-04 DIAGNOSIS — N64.4 BREAST PAIN, RIGHT: ICD-10-CM

## 2020-11-04 DIAGNOSIS — Z15.89 MONOALLELIC MUTATION OF PALB2 GENE: ICD-10-CM

## 2020-11-04 DIAGNOSIS — Z15.01 MONOALLELIC MUTATION OF PALB2 GENE: ICD-10-CM

## 2020-11-04 DIAGNOSIS — G62.9 NEUROPATHY: ICD-10-CM

## 2020-11-04 DIAGNOSIS — Z91.89 AT RISK FOR BREAST CANCER: ICD-10-CM

## 2020-11-04 PROCEDURE — 90686 IIV4 VACC NO PRSV 0.5 ML IM: CPT | Performed by: INTERNAL MEDICINE

## 2020-11-04 PROCEDURE — 99215 OFFICE O/P EST HI 40 MIN: CPT | Performed by: INTERNAL MEDICINE

## 2020-11-04 PROCEDURE — G0008 ADMIN INFLUENZA VIRUS VAC: HCPCS | Performed by: INTERNAL MEDICINE

## 2020-11-04 PROCEDURE — 250N000011 HC RX IP 250 OP 636: Performed by: INTERNAL MEDICINE

## 2020-11-04 PROCEDURE — G0463 HOSPITAL OUTPT CLINIC VISIT: HCPCS | Mod: 25

## 2020-11-04 RX ADMIN — INFLUENZA A VIRUS A/GUANGDONG-MAONAN/SWL1536/2019 CNIC-1909 (H1N1) ANTIGEN (FORMALDEHYDE INACTIVATED), INFLUENZA A VIRUS A/HONG KONG/2671/2019 (H3N2) ANTIGEN (FORMALDEHYDE INACTIVATED), INFLUENZA B VIRUS B/PHUKET/3073/2013 ANTIGEN (FORMALDEHYDE INACTIVATED), AND INFLUENZA B VIRUS B/WASHINGTON/02/2019 ANTIGEN (FORMALDEHYDE INACTIVATED) 0.5 ML: 15; 15; 15; 15 INJECTION, SUSPENSION INTRAMUSCULAR at 03:30

## 2020-11-04 ASSESSMENT — PAIN SCALES - GENERAL: PAINLEVEL: NO PAIN (0)

## 2020-11-04 NOTE — PROGRESS NOTES
"Oncology Rooming Note    November 4, 2020 3:24 PM   Luci Londono is a 49 year old female who presents for:    Chief Complaint   Patient presents with     Oncology Clinic Visit     Breast cancer (H     Initial Vitals: /77 (BP Location: Right arm, Patient Position: Sitting, Cuff Size: Adult Large)   Pulse 85   Temp 98.2  F (36.8  C) (Oral)   Resp 18   Wt 88.7 kg (195 lb 9.6 oz)   SpO2 100%   BMI 34.65 kg/m   Estimated body mass index is 34.65 kg/m  as calculated from the following:    Height as of 3/18/20: 1.6 m (5' 3\").    Weight as of this encounter: 88.7 kg (195 lb 9.6 oz). Body surface area is 1.99 meters squared.  No Pain (0) Comment: Data Unavailable   No LMP recorded.  Allergies reviewed: Yes  Medications reviewed: Yes    Medications: Medication refills not needed today.  Pharmacy name entered into [facility]:    [facility] - [address] [id number] - [place], [place] - [address] AT [address]    Clinical concerns: NO      Emerald Pascual CMA              "

## 2020-11-04 NOTE — PROGRESS NOTES
ONCOLOGY FOLLOW [facility] VISIT    Breast surgeon:  Dr. Stacey Ashford,     REASON FOR visit:  10/2018 dx right breast TN IDC      HISTORY OF ONCOLOGY ILLNESS:    At age 47 amenorrhea with polycystic ovaries,  She felt a lump in her right breast in early October, 2018.   Her mammogram revealed a small mass in the right upper outer breast,   US revealed an 8mm hypoechoic mass at 12:00, 6cm FN and axillary US revealed a concerning lymph node which was also biopsied.   Her pathology from both breast and LN revealed a grade 3, invasive ductal carcinoma, ER/IL/her 2-.   PET found no distal disease.   Breast MRI found entire span 3.8 cm.There are multiple enlarged right axillary lymph nodes.      She made informed decision to proceed with neoadjuvant DD AC  She had drastic clinical response by PE and MRI.   She continued on wkly taxol. Carbo was added in W2. Carbo then was discontinued 3/6/2019 due to persistent neutropenia.     She had lumpectomy 5/2019 found to have [id number] (1/6) disease.     She was tested 4/2019 heterozygous positive for PALB2 p.E837 and BRCA2 variant unknown significance p.T77A, MLH1 variant, unknow significance p.I25V    She finished RT till mid Aug 2019.   LMP 10/2018.     She was screened and told not qualified for  trail.   She made informed decision to try adjuvant oral Xeloda September 2019 till 5/2020.       INTERVAL HISTORY:  Since the patient's last visit with us, there is no hospital stay/surgery/new diagnosis made.      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     MEDICATIONS/ALLERY:  Reviewed in Epic system.      SOCIAL HISTORY:    She works at  [employer].  She is devoiced with 3 kids. Deny ETOH/smoking       FAMILY HISTORY:    Negative for any type of cancers      REVIEW OF SYSTEMS:   Right shoulder pain, right breast pain on and off.   Back pain is on and off.   Still has neuropathy on right arm, not every day slowly subsiding.       PHYSICAL EXAMINATION:   VITAL SIGNS: Blood pressure 124/77, pulse 85, temperature 98.2  F (36.8  C), temperature source Oral, resp. rate 18, weight 88.7 kg (195 lb 9.6 oz), SpO2 100 %, not currently breastfeeding.      ECOG 0     GENERAL APPEARANCE:  looks like her stated age, very pleasant, not in acute distress.   HEENT: The patient is normocephalic, atraumatic. Pupils are equally reactive to light.  Sclerae are anicteric.  Moist oral mucosa.  Negative pharynx.  No oral thrush. Stomatitis on left angular of mouth.    NECK:  Supple.  No jugular venous distention.  Thyroid is not palpable.   LYMPH NODES:  Superficial lymphadenopathy is not appreciable in the bilateral cervical, supraclavicular, axillary or inguinal areas.   CARDIOVASCULAR:  S1, S2 regular with no murmurs or gallops.  No carotid or abdominal bruits.   PULMONARY:  Lungs are clear to auscultation and percussion bilaterally.  There is no wheezing or rhonchi.   GASTROINTESTINAL:  Abdomen is soft, nontender.  No hepatosplenomegaly.  No signs of ascites.  No mass appreciable.   MUSCULOSKELETAL/EXTREMITIES:  + right upper arm numbnes.  No cyanotic changes.  No signs of joint deformity.   NEUROLOGIC:  Cranial nerves II-XII are grossly intact. paresthesia right upper arm.   Muscle strength and muscle tone symmetrical, 5/5 throughout.   BACK:  No spinal or paraspinal tenderness.  No CVA tenderness.   SKIN:  No petechiae.  No rash.  No signs of cellulitis.   BREASTS: Right breast has hyperpigmentation changesupper outer quadrant. No palpable masses or skin changes. No discomfort on palpation in lateral portion.   Left breast is symmetrical with no skin or nipple changes. No  masses on the left. Tissue is very dense throughout.            CURRENT LAB DATA REVIEWED TODAY:  Cbc /cmp/marker are fine      CURRENT IMAGING REVIEWED TODAY:  MA 10/2020 - negative  Breast MRI 4/2020 - negative  MA 10/2019 - negative  US abd 6/2019 - fatty liver.       OLD DATA REVIEWED TODAY WITH SUMMARY:   She is tested 4/2019 heterozygous positive for PALB2 p.E837 and BRCA2 variant unknown significance p.T77A, MLH1 variant, unknow significance p.I25V    Breast MRI post AC 12/2018  1. Enhancing mass superiorly in the RIGHT breast is significantly decreased in size since 10/17/2018, measuring approximately 1.1 x 0.3 x 0.5 cm in today's study (series 5 image 52 and series 13 image 30), decreased from 1.1 x 1.6 x 1.0 cm.  2. Enlarged RIGHT axillary lymph node is slightly decreased since that time as well, now measuring 0.9 x 1.2 x 1.0 cm (series 5 image 68 and series 13 image 19), decreased from 1.3 x 1.2 x 1.4 cm.     10/2018  Right breast 8mm hypoechoic mass at 12:00, 6cm FN and right axillary LN biopsy both found grade 3, invasive ductal carcinoma, ER/ID/her 2-.       PET 10/2018  1. No evidence of distant metastasis.  2. Hypermetabolic focus in the upper outer quadrant right breast representing primary tumor. Hypermetabolic right axillary lymph nodes, consistent with metastatic node.  3. Indeterminate sub-4 mm pulmonary nodules, likely fissural lymph node in the right lung.  4. Extensive FDG uptake by the brown fat in the neck and paraspinal region.    10/2018 dx MA -  revealed a small mass in the right upper outer breast, US revealed an 8mm hypoechoic mass at 12:00, 6cm FN and axillary US revealed a concerning lymph node         ASSESSMENT AND PLAN:    1.  dx [id number] right breast high grade IDC, TN at age 47 in 10/2018.   S/P neoadjuvant  DD AC -T.  She had lumpectomy 5/2019 found to have [id number] (1/6) disease.     S/p post lumpectomy RT is recommended.     She was screened not qualified for  trail.      She made informed decision to try adjuvant oral Xeloda September 2019. She is able to tolerate full dose at 2 g bid.   She is finishing it 5/2020 with 8 months of tx.     We discussed the follow up plan in detail.     She will benefit from screening MRI, MA. She is on close q 3   months follow-up.       2. Young age dx with TN breast cancer, she saw genetic counseling.   She is tested 4/2019 heterozygous positive for PALB2 p.E837 and BRCA2 variant unknown significance p.T77A, MLH1 variant, unknow significance p.I25V    She will be a good candidate forbreast MRI screening.     Body image screening for pancreatic cancer protocol is undefined.   We will do annual body imaging as needed.    She is due MA in fall and MRI in spring.       3. On and off right breast pain is migratory.  Advice EMLA cream prn.   She can also try primrose oil.       4. Neuropathy developing in 3/2019.   Advice vit B6 oral. This is improving.

## 2020-11-04 NOTE — LETTER
"    11/4/2020         RE: Luci Londono  [address]  [place] [address]        Dear Colleague,    Thank you for referring your patient, Luci Londono, to the [facility]. Please see a copy of my visit note below.    Oncology Rooming Note    November 4, 2020 3:24 PM   Luci Londono is a 49 year old female who presents for:    Chief Complaint   Patient presents with     Oncology Clinic Visit     Breast cancer (H     Initial Vitals: /77 (BP Location: Right arm, Patient Position: Sitting, Cuff Size: Adult Large)   Pulse 85   Temp 98.2  F (36.8  C) (Oral)   Resp 18   Wt 88.7 kg (195 lb 9.6 oz)   SpO2 100%   BMI 34.65 kg/m   Estimated body mass index is 34.65 kg/m  as calculated from the following:    Height as of 3/18/20: 1.6 m (5' 3\").    Weight as of this encounter: 88.7 kg (195 lb 9.6 oz). Body surface area is 1.99 meters squared.  No Pain (0) Comment: Data Unavailable   No LMP recorded.  Allergies reviewed: Yes  Medications reviewed: Yes    Medications: Medication refills not needed today.  Pharmacy name entered into [facility]:    [facility], [place] - [address] [id number] [address] AT [facility]    Clinical concerns: NO      Emerald Pascual, GIANCARLO                ONCOLOGY FOLLOW UIP VISIT    Breast surgeon:  Dr. Stacey Ashford,     REASON FOR visit:  10/2018 dx right breast TN IDC      HISTORY OF ONCOLOGY ILLNESS:    At age 47 amenorrhea with polycystic ovaries,  She felt a lump in her right breast in early October, 2018.   Her mammogram revealed a small mass in the right upper outer breast,   US revealed an 8mm hypoechoic mass at 12:00, 6cm FN and axillary US revealed a concerning lymph node which was also biopsied.   Her pathology from both breast and LN revealed a grade 3, invasive ductal carcinoma, ER/VA/her 2-.   PET found no distal disease. "   Breast MRI found entire span 3.8 cm.There are multiple enlarged right axillary lymph nodes.      She made informed decision to proceed with neoadjuvant DD AC  She had drastic clinical response by PE and MRI.   She continued on wkly taxol. Carbo was added in W2. Carbo then was discontinued 3/6/2019 due to persistent neutropenia.     She had lumpectomy 5/2019 found to have [id number] (1/6) disease.     She was tested 4/2019 heterozygous positive for PALB2 p.E837 and BRCA2 variant unknown significance p.T77A, MLH1 variant, unknow significance p.I25V    She finished RT till mid Aug 2019.   LMP 10/2018.     She was screened and told not qualified for  trail.   She made informed decision to try adjuvant oral Xeloda September 2019 till 5/2020.       INTERVAL HISTORY:  Since the patient's last visit with us, there is no hospital stay/surgery/new diagnosis made.      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     MEDICATIONS/ALLERY:  Reviewed in Epic system.      SOCIAL HISTORY:    She works at [employer].  She is devoiced with 3 kids. Deny ETOH/smoking       FAMILY HISTORY:    Negative for any type of cancers      REVIEW OF SYSTEMS:   Right shoulder pain, right breast pain on and off.   Back pain is on and off.   Still has neuropathy on right arm, not every day slowly subsiding.       PHYSICAL EXAMINATION:   VITAL SIGNS: Blood pressure 124/77, pulse 85, temperature 98.2  F (36.8  C), temperature source Oral, resp. rate 18, weight 88.7 kg (195 lb 9.6 oz), SpO2 100 %, not currently breastfeeding.      ECOG 0     GENERAL APPEARANCE:  looks like her stated age, very pleasant, not in acute  distress.   HEENT: The patient is normocephalic, atraumatic. Pupils are equally reactive to light.  Sclerae are anicteric.  Moist oral mucosa.  Negative pharynx.  No oral thrush. Stomatitis on left angular of mouth.    NECK:  Supple.  No jugular venous distention.  Thyroid is not palpable.   LYMPH NODES:  Superficial lymphadenopathy is not appreciable in the bilateral cervical, supraclavicular, axillary or inguinal areas.   CARDIOVASCULAR:  S1, S2 regular with no murmurs or gallops.  No carotid or abdominal bruits.   PULMONARY:  Lungs are clear to auscultation and percussion bilaterally.  There is no wheezing or rhonchi.   GASTROINTESTINAL:  Abdomen is soft, nontender.  No hepatosplenomegaly.  No signs of ascites.  No mass appreciable.   MUSCULOSKELETAL/EXTREMITIES:  + right upper arm numbnes.  No cyanotic changes.  No signs of joint deformity.   NEUROLOGIC:  Cranial nerves II-XII are grossly intact. paresthesia right upper arm.   Muscle strength and muscle tone symmetrical, 5/5 throughout.   BACK:  No spinal or paraspinal tenderness.  No CVA tenderness.   SKIN:  No petechiae.  No rash.  No signs of cellulitis.   BREASTS: Right breast has hyperpigmentation changesupper outer quadrant. No palpable masses or skin changes. No discomfort on palpation in lateral portion.   Left breast is symmetrical with no skin or nipple changes. No masses on the left. Tissue is very dense throughout.            CURRENT LAB DATA REVIEWED TODAY:  Cbc /cmp/marker are fine      CURRENT IMAGING REVIEWED TODAY:  MA 10/2020 - negative  Breast MRI 4/2020 - negative  MA 10/2019 - negative  US abd 6/2019 - fatty liver.       OLD DATA REVIEWED TODAY WITH SUMMARY:   She is tested 4/2019 heterozygous positive for PALB2 p.E837 and BRCA2 variant unknown significance p.T77A, MLH1 variant, unknow significance p.I25V    Breast MRI post AC 12/2018  1. Enhancing mass superiorly in the RIGHT breast is significantly decreased in size since 10/17/2018,  measuring approximately 1.1 x 0.3 x 0.5 cm in today's study (series 5 image 52 and series 13 image 30), decreased from 1.1 x 1.6 x 1.0 cm.  2. Enlarged RIGHT axillary lymph node is slightly decreased since that time as well, now measuring 0.9 x 1.2 x 1.0 cm (series 5 image 68 and series 13 image 19), decreased from 1.3 x 1.2 x 1.4 cm.     10/2018  Right breast 8mm hypoechoic mass at 12:00, 6cm FN and right axillary LN biopsy both found grade 3, invasive ductal carcinoma, ER/[place]/her 2-.       PET 10/2018  1. No evidence of distant metastasis.  2. Hypermetabolic focus in the upper outer quadrant right breast representing primary tumor. Hypermetabolic right axillary lymph nodes, consistent with metastatic node.  3. Indeterminate sub-4 mm pulmonary nodules, likely fissural lymph node in the right lung.  4. Extensive FDG uptake by the brown fat in the neck and paraspinal region.    10/2018 dx MA -  revealed a small mass in the right upper outer breast, US revealed an 8mm hypoechoic mass at 12:00, 6cm FN and axillary US revealed a concerning lymph node         ASSESSMENT AND PLAN:    1.  dx cT1cN1 right breast high grade IDC, TN at age 47 in 10/2018.   S/P neoadjuvant  DD AC -T.  She had lumpectomy 5/2019 found to have mQ2E4vs (1/6) disease.     S/p post lumpectomy RT is recommended.     She was screened not qualified for  trail.     She made informed decision to try adjuvant oral Xeloda September 2019. She is able to tolerate full dose at 2 g bid.   She is finishing it 5/2020 with 8 months of tx.     We discussed the follow up plan in detail.     She will benefit from screening MRI, MA. She is on close q 3   months follow-up.       2. Young age dx with TN breast cancer, she saw genetic counseling.   She is tested 4/2019 heterozygous positive for PALB2 p.E837 and BRCA2 variant unknown significance p.T77A, MLH1 variant, unknow significance p.I25V    She will be a good candidate forbreast MRI screening.     Body image  screening for pancreatic cancer protocol is undefined.   We will do annual body imaging as needed.    She is due MA in fall and MRI in spring.       3. On and off right breast pain is migratory.  Advice EMLA cream prn.   She can also try primrose oil.       4. Neuropathy developing in 3/2019.   Advice vit B6 oral. This is improving.           Again, thank you for allowing me to participate in the care of your patient.        Sincerely,        Addie Murillo MD, MD

## 2020-11-19 ENCOUNTER — THERAPY VISIT (OUTPATIENT)
Dept: PHYSICAL THERAPY | Facility: CLINIC | Age: 50
End: 2020-11-19
Payer: COMMERCIAL

## 2020-11-19 DIAGNOSIS — M54.41 ACUTE BILATERAL LOW BACK PAIN WITH RIGHT-SIDED SCIATICA: ICD-10-CM

## 2020-11-19 DIAGNOSIS — M54.42 BILATERAL LOW BACK PAIN WITH LEFT-SIDED SCIATICA: ICD-10-CM

## 2020-11-19 DIAGNOSIS — M54.41 BILATERAL LOW BACK PAIN WITH RIGHT-SIDED SCIATICA: ICD-10-CM

## 2020-11-19 PROCEDURE — 97161 PT EVAL LOW COMPLEX 20 MIN: CPT | Mod: GP | Performed by: PHYSICAL THERAPIST

## 2020-11-19 PROCEDURE — 97110 THERAPEUTIC EXERCISES: CPT | Mod: GP | Performed by: PHYSICAL THERAPIST

## 2020-11-19 NOTE — PROGRESS NOTES
"[facility] Initial Evaluation  Subjective:  The history is provided by the patient. No  was used.   Therapist Generated HPI Evaluation  Problem details: Spring 2019 patient had insidious onset of bilateral LBP and bilateral LEs to the knees - anterior or posterior LEs, and pain posterior left heel.  Pain ranges from 5-7/10, describes as intermittently \"sharp\".  Symptoms increase with sitting >1 hour, moving out of position after lying on her side on the couch/\"locks up\", losing 5+ hours sleep, standing >1 hour, bending forward.  Symptoms decrease slightly with pain medication.  Patient is still doing exercises she learned in PT last year - 1x/day does REIL, bridging, gastroc stretches, pelvic tilts and bridges and several times a day does ANN..                     Pain is the same all the time.         Previous treatment includes physical therapy (2019).   Restrictions due to condition include:  Working in normal job with restrictions (due to cancer).  Barriers include:  None as reported by patient.    Patient Health History           General health as reported by patient is good.  Pertinent medical history includes: cancer and thyroid problems.   Red flags:  None as reported by patient.  Medical allergies: none.   Surgeries include:  Cancer surgery. Other surgery history details: right axilla/breast cancer.    Current medications:  Thyroid medication. Other medications details: metformin.    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                                       Objective:  Standing Alignment:        Lumbar:  Normal            Gait:    Gait Type:  Normal   Assistive Devices:  None                 Lumbar/SI Evaluation  ROM:    AROM Lumbar:   Flexion:          WNL  Ext:                    66% - WNL   Side Bend:        Left:     Right:   Rotation:           Left:     Right:   Side Glide:        Left:  Min limited, R LBP    Right:  WNL      "     Lumbar Myotomes:  normal                Lumbar Dermtomes:  normal                Neural Tension/Mobility:    Left side:  Slump positive.     Right side:   Slump  negative.                                                        General     ROS  Symptoms prior to test movements:  Pain bilateral LEs and LB  Correction of sitting posture:  No effect  Prone:  LBP 6/10 only  REIL:  No effect    Assessment/Plan:    Patient is a 49 year old female with lumbar complaints.    Patient has the following significant findings with corresponding treatment plan.                Diagnosis 1:  LBP with bilateral sciatica  Pain -  self management, education, directional preference exercise and home program  Decreased ROM/flexibility - therapeutic exercise and home program  Decreased function - therapeutic activities and home program  Impaired posture - neuro re-education and home program    Low complexity.    Previous and current functional limitations:  (See Goal Flow Sheet for this information)    Short term and Long term goals: (See Goal Flow Sheet for this information)     Communication ability:  Patient appears to be able to clearly communicate and understand verbal and written communication and follow directions correctly.  Treatment Explanation - The following has been discussed with the patient:   RX ordered/plan of care  Anticipated outcomes  Possible risks and side effects  This patient would benefit from PT intervention to resume normal activities.   Rehab potential is excellent.    Frequency:  1 X week, once daily  Duration:  for 6 weeks  Discharge Plan:  Achieve all LTG.  Independent in home treatment program.  Reach maximal therapeutic benefit.    Please refer to the daily flowsheet for treatment today, total treatment time and time spent performing 1:1 timed codes.

## 2020-11-22 ENCOUNTER — HEALTH MAINTENANCE LETTER (OUTPATIENT)
Age: 50
End: 2020-11-22

## 2020-11-23 ENCOUNTER — THERAPY VISIT (OUTPATIENT)
Dept: PHYSICAL THERAPY | Facility: CLINIC | Age: 50
End: 2020-11-23
Payer: COMMERCIAL

## 2020-11-23 DIAGNOSIS — M54.41 BILATERAL LOW BACK PAIN WITH RIGHT-SIDED SCIATICA: ICD-10-CM

## 2020-11-23 DIAGNOSIS — M54.42 BILATERAL LOW BACK PAIN WITH LEFT-SIDED SCIATICA: ICD-10-CM

## 2020-11-23 PROCEDURE — 97110 THERAPEUTIC EXERCISES: CPT | Mod: GP | Performed by: PHYSICAL THERAPIST

## 2020-11-23 PROCEDURE — 97530 THERAPEUTIC ACTIVITIES: CPT | Mod: GP | Performed by: PHYSICAL THERAPIST

## 2020-11-24 DIAGNOSIS — E03.9 ACQUIRED HYPOTHYROIDISM: ICD-10-CM

## 2020-11-24 NOTE — TELEPHONE ENCOUNTER
Requested Prescriptions   Pending Prescriptions Disp Refills     levothyroxine (SYNTHROID/LEVOTHROID) 125 MCG tablet 30 tablet 0     Sig: Take 1 tablet (125 mcg) by mouth daily       There is no refill protocol information for this order        Last Written Prescription Date:  10/15/20  Last Fill Quantity: 30,  # refills: 0   Last office visit: Visit date not found with prescribing provider:     Future Office Visit:   Next 5 appointments (look out 90 days)    Dec 03, 2020  3:40 PM  PHYSICAL with Melida Chandra MD  [facility] ([facility] [address] [place] [address] [id number] [phone]

## 2020-11-25 RX ORDER — LEVOTHYROXINE SODIUM 125 UG/1
125 TABLET ORAL DAILY
Qty: 30 TABLET | Refills: 0 | Status: SHIPPED | OUTPATIENT
Start: 2020-11-25 | End: 2020-11-27

## 2020-11-27 RX ORDER — LEVOTHYROXINE SODIUM 125 UG/1
125 TABLET ORAL DAILY
Qty: 30 TABLET | Refills: 0 | Status: SHIPPED | OUTPATIENT
Start: 2020-11-27 | End: 2020-12-04

## 2020-12-02 NOTE — PROGRESS NOTES
SUBJECTIVE:   CC: Luci Londono is an 50 year old woman who presents for preventive health visit.       Patient has been advised of split billing requirements and indicates understanding: Yes  Healthy Habits:     Getting at least 3 servings of Calcium per day:  Yes    Bi-annual eye exam:  NO    Dental care twice a year:  NO    Sleep apnea or symptoms of sleep apnea:  None    Diet:  Regular (no restrictions)    Frequency of exercise:  4-5 days/week    Duration of exercise:  15-30 minutes    Taking medications regularly:  Yes    Medication side effects:  None    PHQ-2 Total Score: 1    Additional concerns today:  No      Recent lab work done 10/2020 : Does have abnormal TSH and will need repeat check at this time. Need PAP smear at this time. Does need Lipid panel as last in 2014 seen. vitamin d low in 2018.    Today's PHQ-2 Score:   PHQ-2 ( 1999 [employer]) 12/3/2020   Q1: Little interest or pleasure in doing things 1   Q2: Feeling down, depressed or hopeless 0   PHQ-2 Score 1   Q1: Little interest or pleasure in doing things Several days   Q2: Feeling down, depressed or hopeless Not at all   PHQ-2 Score 1       Abuse: Current or Past (Physical, Sexual or Emotional) - No  Do you feel safe in your environment? Yes      Social History     Tobacco Use     Smoking status: Never Smoker     Smokeless tobacco: Never Used   Substance Use Topics     Alcohol use: No     If you drink alcohol do you typically have >3 drinks per day or >7 drinks per week? No    Alcohol Use 12/3/2020   Prescreen: >3 drinks/day or >7 drinks/week? Not Applicable   Prescreen: >3 drinks/day or >7 drinks/week? -   No flowsheet data found.    Reviewed orders with patient.  Reviewed health maintenance and updated orders accordingly - Yes  Lab work is in process  Labs reviewed in EPIC    Mammogram Screening: Patient under age 50, mutual decision reflected in health maintenance. Breast cancer Hx, see A&P for details.      Pertinent mammograms are  reviewed under the imaging tab.  History of abnormal Pap smear: NO - age 30-65 PAP every 5 years with negative HPV co-testing recommended  PAP / HPV Latest Ref Rng & Units 2017   PAP - NIL NIL   HPV 16 DNA NEG:Negative Negative -   HPV 18 DNA NEG:Negative Negative -   OTHER HR HPV NEG:Negative Negative -     Reviewed and updated as needed this visit by clinical staff  Tobacco  Allergies  Meds   Med Hx  Surg Hx  Fam Hx  Soc Hx        Reviewed and updated as needed this visit by Provider                Past Medical History:   Diagnosis Date     Hypertension goal BP (blood pressure) < 140/80 2014     Lateral epicondylitis, right dominant elbow since late 10-17 2017     Port-A-Cath in place 10/26/2018     Thyroid disease       Past Surgical History:   Procedure Laterality Date     BIOPSY NODE SENTINEL Right 5/15/2019    Procedure: BIOPSY, LYMPH NODE, SENTINEL;  Surgeon: Stacey Ashford MD;  Location:  OR     DISSECT LYMPH NODE AXILLA Right 5/15/2019    Procedure: LYMPHADENECTOMY, AXILLARY;  Surgeon: Stacey Ashford MD;  Location:  OR     HAND SURGERY  2002    left     INSERT PORT VASCULAR ACCESS N/A 10/18/2018    Procedure: INSERTION OF VASCULAR PORT;  Surgeon: Stacey Ashford MD;  Location:  OR     LUMPECTOMY BREAST WITH SEED LOCALIZATION Right 5/15/2019    Procedure: SEED LOCALIZATION BREAST LUMPECTOMY, SEED LOCALIZATION AXILLARY BREAST BIOPSY, SENTINEL NODE , REMOVAL OF VASCULAR PORT ACCESS AND RIGHT  AXILLARY NODE DISSECTION;  Surgeon: Stacey Ashford MD;  Location:  OR     REMOVE PORT VASCULAR ACCESS N/A 5/15/2019    Procedure: REMOVAL, VASCULAR ACCESS PORT;  Surgeon: Stacey Ashford MD;  Location:  OR     TUBAL LIGATION       OB History    Para Term  AB Living   3 3 0 0 0 0   SAB TAB Ectopic Multiple Live Births   0 0 0 0 0      # Outcome Date GA Lbr Murtaza/2nd Weight Sex Delivery Anes PTL Lv   3 Para            2 Para            1 Para           "      Review of Systems  CONSTITUTIONAL: NEGATIVE for fever, chills, change in weight  INTEGUMENTARY/SKIN: NEGATIVE for worrisome rashes, moles or lesions  EYES: NEGATIVE for vision changes or irritation  ENT: NEGATIVE for ear, mouth and throat problems  RESP: NEGATIVE for significant cough or SOB  BREAST: NEGATIVE for masses, tenderness or discharge  CV: NEGATIVE for chest pain, palpitations or peripheral edema  GI: NEGATIVE for nausea, abdominal pain, heartburn, or change in bowel habits  : NEGATIVE for unusual urinary or vaginal symptoms. No vaginal bleeding.  MUSCULOSKELETAL: NEGATIVE for significant arthralgias or myalgia  NEURO: NEGATIVE for weakness, dizziness or paresthesias  PSYCHIATRIC: NEGATIVE for changes in mood or affect      OBJECTIVE:   /74   Pulse 82   Temp 97.6  F (36.4  C) (Tympanic)   Resp 14   Ht 1.6 m (5' 3\")   Wt 88.5 kg (195 lb)   SpO2 100%   BMI 34.54 kg/m    Physical Exam  GENERAL APPEARANCE: healthy, alert and no distress  EYES: Eyes grossly normal to inspection, PERRL and conjunctivae and sclerae normal  HENT: ear canals and TM's normal, nose and mouth without ulcers or lesions, oropharynx clear and oral mucous membranes moist  NECK: no adenopathy, no asymmetry, masses, or scars and thyroid normal to palpation  RESP: lungs clear to auscultation - no rales, rhonchi or wheezes  BREAST: normal without masses, tenderness or nipple discharge and no palpable axillary masses or adenopathy  CV: regular rate and rhythm, normal S1 S2, no S3 or S4, no murmur, click or rub, no peripheral edema and peripheral pulses strong  ABDOMEN: soft, nontender, no hepatosplenomegaly, no masses and bowel sounds normal  MS: no musculoskeletal defects are noted and gait is age appropriate without ataxia  SKIN: no suspicious lesions or rashes  NEURO: Normal strength and tone, sensory exam grossly normal, mentation intact and speech normal  PSYCH: mentation appears normal and affect " normal/bright    Pelvic Exam:  Vulva: No external lesions, normal hair distribution, no adenopathy  Vagina: Moist, pink, no abnormal discharge, well rugated, no lesions  Cervix: Pap smear is taken, parous, smooth, pink, no visible lesions  Uterus: Normal size, anteverted, non-tender, mobile  Ovaries: No mass, non-tender, mobile      Diagnostic Test Results:  Labs reviewed in Epic    ASSESSMENT/PLAN:     Assessment and Plan  1. Routine general medical examination at a health care facility  - Lipid panel reflex to direct LDL Fasting  - TSH with free T4 reflex  - Fecal colorectal cancer screen (FIT); Future  - Pap imaged thin layer screen with HPV - recommended age 30 - 65  - HPV High Risk Types DNA Cervical    2. Malignant neoplasm of upper-outer quadrant of right breast in female, estrogen receptor negative (H)  Stable, Malignant Rt breast cancer ER negative diagnosed 2018 S/P Lumpectomy 5/2019 , S/P Chemoradiotherapy till 5/2020. Reviewed recent Oncology visit in 11/4/2020 and plan of Breast MRI follow up which is normal in the recent MRI 4/2020. Also having Surveillance mammograms as per recommendations of Oncology.     3. Acquired hypothyroidism  - TSH with free T4 reflex    4. Mixed hyperlipidemia  - Lipid panel reflex to direct LDL Fasting    5. Encounter for colonoscopy due to history of colon cancer    6. Encounter for screening for malignant neoplasm of colon  - Fecal colorectal cancer screen (FIT); Future    7. Encounter for hepatitis C screening test for low risk patient  - **Hepatitis C Screen Reflex to RNA FUTURE anytime; Future    8. Need for vaccination  Pt did not take the vaccine ordered. -  Pneumococcal vaccine 23 valent PPSV23  (Pneumovax) [86111]     Patient Instructions   Please do the lab work given.    ========================    Patient Education     Hypothyroidism    You have hypothyroidism. This means your thyroid gland is not making enough thyroid hormone. This hormone is vital to body  growth and metabolism. If you don t make enough, many body processes slow down. This can cause symptoms throughout the body. Hypothyroidism can range from mild to severe. The most severe form is called myxedema.  There are a number of causes of hypothyroidism. A common cause is Hashimoto s disease. This disease causes the body s own immune system to attack the thyroid gland. When you have certain treatments, such as surgery to remove the thyroid gland, this can also cause hypothyroidism. Sometimes the thyroid gland is not functioning because of lack of stimulation from the pituitary gland.  Symptoms of hypothyroidism can include:    Fatigue    Trouble concentrating or thinking clearly; forgetfulness    Dry skin    Hair loss    Weight gain    Low tolerance to cold    Constipation    Depression    Personality changes    Tingling or prickling of the hands or feet    Heavy, absent, or irregular periods (women only)  Older adults may sometimes have other symptoms. These can include:    Muscle aches and weakness    Confusion    Incontinence (unable to control urine or stool)    Trouble moving around    Falling  Treatment for hypothyroidism involves taking thyroid hormone pills daily. These pills replace the hormone your thyroid doesn t make. You will likely need to take a daily pill for the rest of your life. Tips for taking this medicine are given below.  Home care  Tips for taking your medicine    Take your thyroid hormone pills as prescribed by your healthcare provider. This is most often 1 pill a day on an empty stomach. Use a pillbox labeled with the days of the week. This will help you remember to take your pill each day.    Don t take products that contain iron and calcium or antacids within 4 hours of taking your thyroid hormone pills.    Don t take other medicines with your thyroid hormone pill without checking with your provider first.    Tell your provider if you have any side effects from your medicines that  bother you, especially any chest pain or irregular heartbeats.    Never change the dosage or stop taking your thyroid pills without talking to your provider first.  General care    Always talk with your provider before trying other medicines or treatments for your thyroid problem.    If you see other healthcare providers, be sure to let them know about your thyroid problem.    Let your healthcare provider know if you become pregnant because your dose of thyroid hormone will need to be adjusted.  Follow-up care  See your healthcare provider for checkups as advised. You may need regular tests to check the level of thyroid hormone in your blood.  When to seek medical advice  Call your healthcare provider right away if any of these occur:    New symptoms develop    Symptoms return, continue, or worsen even after treatment    Extreme fatigue    Puffy hands, face, or feet    Fast or irregular heartbeat    Confusion  Call 911  Call 911 if any of these occur:    Fainting    Chest pain    Shortness of breath or trouble breathing  [employer] last reviewed this educational content on 4/1/2018 2000-2020 [employer]. [address] [place] [address]. All rights reserved. This information is not intended as a substitute for professional medical care. Always follow your healthcare professional's instructions.             Return in about 6 months (around 6/3/2021), or if symptoms worsen or fail to improve, for Follow up of last visit.      Patient has been advised of split billing requirements and indicates understanding: Yes  COUNSELING:  Reviewed preventive health counseling, as reflected in patient instructions  Special attention given to:        Regular exercise       Healthy diet/nutrition       Vision screening       Hearing screening       Immunizations    Vaccinated for: Pneumococcal             Osteoporosis prevention/bone health       Colon cancer screening       (Kaylin)menopause  "management    Estimated body mass index is 34.54 kg/m  as calculated from the following:    Height as of this encounter: 1.6 m (5' 3\").    Weight as of this encounter: 88.5 kg (195 lb).    Weight management plan: Discussed healthy diet and exercise guidelines    She reports that she has never smoked. She has never used smokeless tobacco.      Counseling Resources:  ATP IV Guidelines  Pooled Cohorts Equation Calculator  Breast Cancer Risk Calculator  BRCA-Related Cancer Risk Assessment: FHS-7 Tool  FRAX Risk Assessment  ICSI Preventive Guidelines  Dietary Guidelines for Americans, 2010  USDA's MyPlate  ASA Prophylaxis  Lung CA Screening    Melida Chandra MD  [facility]  "

## 2020-12-03 ENCOUNTER — OFFICE VISIT (OUTPATIENT)
Dept: FAMILY MEDICINE | Facility: CLINIC | Age: 50
End: 2020-12-03
Payer: COMMERCIAL

## 2020-12-03 VITALS
BODY MASS INDEX: 34.55 KG/M2 | RESPIRATION RATE: 14 BRPM | TEMPERATURE: 97.6 F | HEIGHT: 63 IN | DIASTOLIC BLOOD PRESSURE: 74 MMHG | WEIGHT: 195 LBS | SYSTOLIC BLOOD PRESSURE: 122 MMHG | OXYGEN SATURATION: 100 % | HEART RATE: 82 BPM

## 2020-12-03 DIAGNOSIS — Z12.11 ENCOUNTER FOR COLONOSCOPY DUE TO HISTORY OF COLON CANCER: ICD-10-CM

## 2020-12-03 DIAGNOSIS — Z11.59 ENCOUNTER FOR HEPATITIS C SCREENING TEST FOR LOW RISK PATIENT: ICD-10-CM

## 2020-12-03 DIAGNOSIS — C50.411 MALIGNANT NEOPLASM OF UPPER-OUTER QUADRANT OF RIGHT BREAST IN FEMALE, ESTROGEN RECEPTOR NEGATIVE (H): ICD-10-CM

## 2020-12-03 DIAGNOSIS — E78.2 MIXED HYPERLIPIDEMIA: ICD-10-CM

## 2020-12-03 DIAGNOSIS — E03.9 ACQUIRED HYPOTHYROIDISM: ICD-10-CM

## 2020-12-03 DIAGNOSIS — Z23 NEED FOR VACCINATION: ICD-10-CM

## 2020-12-03 DIAGNOSIS — Z12.11 ENCOUNTER FOR SCREENING FOR MALIGNANT NEOPLASM OF COLON: ICD-10-CM

## 2020-12-03 DIAGNOSIS — Z85.038 ENCOUNTER FOR COLONOSCOPY DUE TO HISTORY OF COLON CANCER: ICD-10-CM

## 2020-12-03 DIAGNOSIS — Z00.00 ROUTINE GENERAL MEDICAL EXAMINATION AT A HEALTH CARE FACILITY: Primary | ICD-10-CM

## 2020-12-03 DIAGNOSIS — Z17.1 MALIGNANT NEOPLASM OF UPPER-OUTER QUADRANT OF RIGHT BREAST IN FEMALE, ESTROGEN RECEPTOR NEGATIVE (H): ICD-10-CM

## 2020-12-03 PROCEDURE — 36415 COLL VENOUS BLD VENIPUNCTURE: CPT | Performed by: INTERNAL MEDICINE

## 2020-12-03 PROCEDURE — 84439 ASSAY OF FREE THYROXINE: CPT | Performed by: INTERNAL MEDICINE

## 2020-12-03 PROCEDURE — 99396 PREV VISIT EST AGE 40-64: CPT | Performed by: INTERNAL MEDICINE

## 2020-12-03 PROCEDURE — 87624 HPV HI-RISK TYP POOLED RSLT: CPT | Performed by: INTERNAL MEDICINE

## 2020-12-03 PROCEDURE — 80061 LIPID PANEL: CPT | Performed by: INTERNAL MEDICINE

## 2020-12-03 PROCEDURE — G0145 SCR C/V CYTO,THINLAYER,RESCR: HCPCS | Performed by: INTERNAL MEDICINE

## 2020-12-03 PROCEDURE — 84443 ASSAY THYROID STIM HORMONE: CPT | Performed by: INTERNAL MEDICINE

## 2020-12-03 RX ORDER — LEVOTHYROXINE SODIUM 137 UG/1
TABLET ORAL
COMMUNITY
Start: 2020-11-22 | End: 2020-12-03

## 2020-12-03 ASSESSMENT — MIFFLIN-ST. JEOR: SCORE: 1478.64

## 2020-12-03 NOTE — LETTER
December 4, 2020      Luci [place]  [address] [place] [address]        Dear ,    We are writing to inform you of your test results.    Your Cholesterol remaining high. Given your age and no cardiac risk factors , recommend dietery management of avoiding high fat foods and red meat . Life style measures on weight reduction recommended.     Your TSH levels are abnormally low depicting that you are taking higher than needed dose of Levothyroxine. I have decreased the dose of your medication and sent to your pharmacy. You will need repeat TSH check in 5 weeks of starting the new dosage to make sure it normalizes versus further titration of the dose if necessary. Future orders for TSH placed around 01/08/2021 which is a nonfasting lab work.     Resulted Orders   Lipid panel reflex to direct LDL Fasting   Result Value Ref Range    Cholesterol 194 <200 mg/dL    Triglycerides 80 <150 mg/dL    HDL Cholesterol 64 >49 mg/dL    LDL Cholesterol Calculated 114 (H) <100 mg/dL      Comment:      Above desirable:  100-129 mg/dl  Borderline High:  130-159 mg/dL  High:             160-189 mg/dL  Very high:       >189 mg/dl      Non HDL Cholesterol 130 (H) <130 mg/dL      Comment:      Above Desirable:  130-159 mg/dl  Borderline high:  160-189 mg/dl  High:             190-219 mg/dl  Very high:       >219 mg/dl     TSH with free T4 reflex   Result Value Ref Range    TSH <0.01 (L) 0.40 - 4.00 mU/L   T4 free   Result Value Ref Range    T4 Free 1.25 0.76 - 1.46 ng/dL       If you have any questions or concerns, please call the clinic at the number listed above.       Sincerely,      Melida Chandra MD

## 2020-12-03 NOTE — NURSING NOTE
Prior to immunization administration, verified patients identity using patient s name and date of birth. Please see Immunization Activity for additional information.     Screening Questionnaire for Adult Immunization    Are you sick today?   No   Do you have allergies to medications, food, a vaccine component or latex?   No   Have you ever had a serious reaction after receiving a vaccination?   No   Do you have a long-term health problem with heart, lung, kidney, or metabolic disease (e.g., diabetes), asthma, a blood disorder, no spleen, complement component deficiency, a cochlear implant, or a spinal fluid leak?  Are you on long-term aspirin therapy?   No   Do you have cancer, leukemia, HIV/AIDS, or any other immune system problem?   No   Do you have a parent, brother, or sister with an immune system problem?   No   In the past 3 months, have you taken medications that affect  your immune system, such as prednisone, other steroids, or anticancer drugs; drugs for the treatment of rheumatoid arthritis, Crohn s disease, or psoriasis; or have you had radiation treatments?   No   Have you had a seizure, or a brain or other nervous system problem?   No   During the past year, have you received a transfusion of blood or blood    products, or been given immune (gamma) globulin or antiviral drug?   No   For women: Are you pregnant or is there a chance you could become       pregnant during the next month?   No   Have you received any vaccinations in the past 4 weeks?   No     Immunization questionnaire answers were all negative.        Per orders of Dr. Chandra, injection of Pneumovax 23 given by Perla Chan. Patient instructed to remain in clinic for 15 minutes afterwards, and to report any adverse reaction to me immediately.       Screening performed by Perla Chan on 12/3/2020 at 4:11 PM.

## 2020-12-03 NOTE — PATIENT INSTRUCTIONS
Please do the lab work given.    ========================    Patient Education     Hypothyroidism    You have hypothyroidism. This means your thyroid gland is not making enough thyroid hormone. This hormone is vital to body growth and metabolism. If you don t make enough, many body processes slow down. This can cause symptoms throughout the body. Hypothyroidism can range from mild to severe. The most severe form is called myxedema.  There are a number of causes of hypothyroidism. A common cause is Hashimoto s disease. This disease causes the body s own immune system to attack the thyroid gland. When you have certain treatments, such as surgery to remove the thyroid gland, this can also cause hypothyroidism. Sometimes the thyroid gland is not functioning because of lack of stimulation from the pituitary gland.  Symptoms of hypothyroidism can include:    Fatigue    Trouble concentrating or thinking clearly; forgetfulness    Dry skin    Hair loss    Weight gain    Low tolerance to cold    Constipation    Depression    Personality changes    Tingling or prickling of the hands or feet    Heavy, absent, or irregular periods (women only)  Older adults may sometimes have other symptoms. These can include:    Muscle aches and weakness    Confusion    Incontinence (unable to control urine or stool)    Trouble moving around    Falling  Treatment for hypothyroidism involves taking thyroid hormone pills daily. These pills replace the hormone your thyroid doesn t make. You will likely need to take a daily pill for the rest of your life. Tips for taking this medicine are given below.  Home care  Tips for taking your medicine    Take your thyroid hormone pills as prescribed by your healthcare provider. This is most often 1 pill a day on an empty stomach. Use a pillbox labeled with the days of the week. This will help you remember to take your pill each day.    Don t take products that contain iron and calcium or antacids within 4  hours of taking your thyroid hormone pills.    Don t take other medicines with your thyroid hormone pill without checking with your provider first.    Tell your provider if you have any side effects from your medicines that bother you, especially any chest pain or irregular heartbeats.    Never change the dosage or stop taking your thyroid pills without talking to your provider first.  General care    Always talk with your provider before trying other medicines or treatments for your thyroid problem.    If you see other healthcare providers, be sure to let them know about your thyroid problem.    Let your healthcare provider know if you become pregnant because your dose of thyroid hormone will need to be adjusted.  Follow-up care  See your healthcare provider for checkups as advised. You may need regular tests to check the level of thyroid hormone in your blood.  When to seek medical advice  Call your healthcare provider right away if any of these occur:    New symptoms develop    Symptoms return, continue, or worsen even after treatment    Extreme fatigue    Puffy hands, face, or feet    Fast or irregular heartbeat    Confusion  Call 911  Call 911 if any of these occur:    Fainting    Chest pain    Shortness of breath or trouble breathing  [employer] last reviewed this educational content on 4/1/2018 2000-2020 [employer]. [address] [place] [address], [place] [address]. All rights reserved. This information is not intended as a substitute for professional medical care. Always follow your healthcare professional's instructions.

## 2020-12-04 DIAGNOSIS — E03.9 ACQUIRED HYPOTHYROIDISM: Primary | ICD-10-CM

## 2020-12-04 LAB
CHOLEST SERPL-MCNC: 194 MG/DL
HDLC SERPL-MCNC: 64 MG/DL
LDLC SERPL CALC-MCNC: 114 MG/DL
NONHDLC SERPL-MCNC: 130 MG/DL
T4 FREE SERPL-MCNC: 1.25 NG/DL (ref 0.76–1.46)
TRIGL SERPL-MCNC: 80 MG/DL
TSH SERPL DL<=0.005 MIU/L-ACNC: <0.01 MU/L (ref 0.4–4)

## 2020-12-04 RX ORDER — LEVOTHYROXINE SODIUM 100 UG/1
100 TABLET ORAL DAILY
Qty: 30 TABLET | Refills: 1 | Status: SHIPPED | OUTPATIENT
Start: 2020-12-04 | End: 2021-02-20

## 2020-12-04 NOTE — RESULT ENCOUNTER NOTE
Your Cholesterol remaining high. Given your age and no cardiac risk factors , recommend dietery management of avoiding high fat foods and red meat . Life style measures on weight reduction recommended.     Your TSH levels are abnormally low depicting that you are taking higher than needed dose of Levothyroxine. I have decreased the dose of your medication and sent to your pharmacy. You will need repeat TSH check in 5 weeks of starting the new dosage to make sure it normalizes versus further titration of the dose if necessary. Future orders for TSH placed around 01/08/2021 which is a nonfasting lab work.     Please let me know if you have any questions.  Melida Chandra MD on 12/4/2020 at 3:54 PM

## 2020-12-07 ENCOUNTER — THERAPY VISIT (OUTPATIENT)
Dept: PHYSICAL THERAPY | Facility: CLINIC | Age: 50
End: 2020-12-07
Payer: COMMERCIAL

## 2020-12-07 DIAGNOSIS — M54.41 BILATERAL LOW BACK PAIN WITH RIGHT-SIDED SCIATICA: ICD-10-CM

## 2020-12-07 DIAGNOSIS — M54.42 BILATERAL LOW BACK PAIN WITH LEFT-SIDED SCIATICA: ICD-10-CM

## 2020-12-07 PROCEDURE — 97530 THERAPEUTIC ACTIVITIES: CPT | Mod: GP | Performed by: PHYSICAL THERAPIST

## 2020-12-07 PROCEDURE — 97110 THERAPEUTIC EXERCISES: CPT | Mod: GP | Performed by: PHYSICAL THERAPIST

## 2020-12-08 LAB
COPATH REPORT: NORMAL
PAP: NORMAL

## 2020-12-09 LAB
FINAL DIAGNOSIS: NORMAL
HPV HR 12 DNA CVX QL NAA+PROBE: NEGATIVE
HPV16 DNA SPEC QL NAA+PROBE: NEGATIVE
HPV18 DNA SPEC QL NAA+PROBE: NEGATIVE
SPECIMEN DESCRIPTION: NORMAL
SPECIMEN SOURCE CVX/VAG CYTO: NORMAL

## 2021-01-15 ENCOUNTER — TELEPHONE (OUTPATIENT)
Dept: ONCOLOGY | Facility: CLINIC | Age: 51
End: 2021-01-15

## 2021-01-15 NOTE — TELEPHONE ENCOUNTER
"Late Entry- call on 1/13/21    Luci called about the covid shot. She can get this at her work, wanted to be advised on this.   Advised her to review [facility] website regarding covid vaccine.     Reviewed with Dr. Mckeon,   \"We don't have enough information on the vaccine yet, but likely the benefit would outweigh any risk.\"     Luci verbalized understanding.       "

## 2021-02-01 ENCOUNTER — TELEPHONE (OUTPATIENT)
Dept: FAMILY MEDICINE | Facility: CLINIC | Age: 51
End: 2021-02-01

## 2021-02-01 NOTE — TELEPHONE ENCOUNTER
S/w pt and gave Dr. Chandra's reply below.    Pt states understanding.    Astrid TEAGUE RN  EP Triage

## 2021-02-01 NOTE — TELEPHONE ENCOUNTER
Well, since the vaccine is pretty new and does not have evidence or studies in breast cancer patients, I would recommend her to also confirm with her Oncologist before she goes ahead and get the Vaccine.     Sincerely,  Melida Chandra MD on 2/1/2021 at 5:30 PM

## 2021-02-01 NOTE — TELEPHONE ENCOUNTER
Pt is a cancer pt not on chemo can she get the COVID vaccine at work tomorrow ?Miya Gillespie RN

## 2021-02-12 ENCOUNTER — HOSPITAL ENCOUNTER (EMERGENCY)
Facility: CLINIC | Age: 51
Discharge: HOME | End: 2021-02-12
Attending: EMERGENCY MEDICINE | Admitting: EMERGENCY MEDICINE
Payer: COMMERCIAL

## 2021-02-12 VITALS
SYSTOLIC BLOOD PRESSURE: 178 MMHG | HEIGHT: 63 IN | HEART RATE: 72 BPM | OXYGEN SATURATION: 100 % | RESPIRATION RATE: 15 BRPM | DIASTOLIC BLOOD PRESSURE: 89 MMHG | TEMPERATURE: 97.5 F | BODY MASS INDEX: 32.78 KG/M2 | WEIGHT: 185 LBS

## 2021-02-12 DIAGNOSIS — M62.838 MUSCLE SPASMS OF NECK: ICD-10-CM

## 2021-02-12 PROCEDURE — 99283 EMERGENCY DEPT VISIT LOW MDM: CPT

## 2021-02-12 RX ORDER — CYCLOBENZAPRINE HCL 10 MG
10 TABLET ORAL 3 TIMES DAILY PRN
Qty: 30 TABLET | Refills: 0 | Status: SHIPPED | OUTPATIENT
Start: 2021-02-12 | End: 2021-02-22

## 2021-02-12 RX ORDER — IBUPROFEN 800 MG/1
800 TABLET, FILM COATED ORAL EVERY 8 HOURS PRN
Qty: 30 TABLET | Refills: 0 | Status: SHIPPED | OUTPATIENT
Start: 2021-02-12 | End: 2021-02-22

## 2021-02-12 ASSESSMENT — ENCOUNTER SYMPTOMS
NECK PAIN: 1
BACK PAIN: 0

## 2021-02-12 ASSESSMENT — MIFFLIN-ST. JEOR: SCORE: 1433.28

## 2021-02-12 NOTE — ED TRIAGE NOTES
Patient reports she woke up this morning and was unable to fully move her neck. Pain to right side of neck. Denies any injury. Pt took tylenol this morning with little to no relief.

## 2021-02-12 NOTE — ED PROVIDER NOTES
"  History   Chief Complaint:  Neck Pain     HPI   Luci Londono is a 50 year old female with history of hypertension, hyperlipidemia, obesity, and breast cancer who presents with neck pain. The patient states that she awoke this morning, approximately one hour ago, and felt an intense tightness in the right side of her neck associated with pain. She states that her pain is worsened when she turns her head to the right but that she is able to turn her head to the left without pain or difficulty. She denies any back pain or any other symptoms.     Review of Systems   Musculoskeletal: Positive for neck pain. Negative for back pain.   All other systems reviewed and are negative.      Allergies:  The patient has no known allergies.     Medications:  Atarax  Synthroid  Metformin  Prilosec    Past Medical History:    Hypertension  Lateral epicondylitis  Thyroid disease  Myositis  GERD  H. 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    Physical Exam     Patient Vitals for the past 24 hrs:   BP Temp Temp src Pulse Resp SpO2 Height Weight   02/12/21 0702 (!) 178/89 -- -- -- -- -- -- --   02/12/21 0701 -- 97.5  F (36.4  C) Oral 72 15 100 % 1.6 m (5' 3\") 83.9 kg (185 lb)       Physical Exam  General/Appearance: appears stated age, well-groomed, appears comfortable but holding head very still and with heat pack over R side of neck  Eyes: EOMI, no scleral injection, no icterus  ENT: MMM  Neck: stiff with reproducible ttp over palpable spasm of R SCM, able to range neck easily to left but significantly limited ROM of " neck to R  Cardiovascular: RRR, nl S1S2, no m/r/g, 2+ pulses in all 4 extremities, cap refill <2sec  Skin: warm and well-perfused, no rash, no edema, no ecchymosis, nl turgor  Neuro: GCS 15, alert and oriented, no gross focal neuro deficits      Emergency Department Course     Emergency Department Course:    Reviewed:  I reviewed nursing notes, vitals, past medical history and care everywhere.    Assessments:  0707 I obtained history and examined the patient as noted above.     Disposition:  The patient was discharged to home.       Impression & Plan     Medical Decision Making:  This patient is a 50-year-old female who presents with a right sided tenderness to palpation over her SCM after awaking this morning.  Pain is reproducible.  Consistent with a muscle spasm she has difficulty ranging her neck towards the side of the pain but no difficulty ranging her neck to the opposite side of the palpable muscle spasm.  There is no tenderness over vessels.  There is no reproducible tenderness to palpation over anything near the midline of the posterior neck.  I suspect this is muscle spasm and can be treated with heat, massage, muscle relaxants, NSAIDs.  All of this was discussed with the patient who feels comfortable with this plan.      Covid-19  Luci Londono was evaluated during a global COVID-19 pandemic, which necessitated consideration that the patient might be at risk for infection with the SARS-CoV-2 virus that causes COVID-19.   Applicable protocols for evaluation were followed during the patient's care.     Diagnosis:    ICD-10-CM    1. Muscle spasms of neck  M62.838        Discharge Medications:  New Prescriptions    CYCLOBENZAPRINE (FLEXERIL) 10 MG TABLET    Take 1 tablet (10 mg) by mouth 3 times daily as needed for muscle spasms    IBUPROFEN (ADVIL/MOTRIN) 800 MG TABLET    Take 1 tablet (800 mg) by mouth every 8 hours as needed for pain       Scribe Disclosure:  APRIL, Jason Rosa, am serving as a  scribe on 2/12/2021 at 7:14 AM to personally document services performed by Cassy Monroe MD based on my observations and the provider's statements to me.                Cassy Monroe MD  02/12/21 0724

## 2021-02-18 DIAGNOSIS — E03.9 ACQUIRED HYPOTHYROIDISM: ICD-10-CM

## 2021-02-18 NOTE — TELEPHONE ENCOUNTER
Failed protocol.  please route to  team if patient needs an appointment     Angie FOREMANRN BSN  [facility]  [phone]

## 2021-02-19 DIAGNOSIS — E03.9 ACQUIRED HYPOTHYROIDISM: ICD-10-CM

## 2021-02-19 PROCEDURE — 84439 ASSAY OF FREE THYROXINE: CPT | Performed by: INTERNAL MEDICINE

## 2021-02-19 PROCEDURE — 84443 ASSAY THYROID STIM HORMONE: CPT | Performed by: INTERNAL MEDICINE

## 2021-02-19 PROCEDURE — 36415 COLL VENOUS BLD VENIPUNCTURE: CPT | Performed by: INTERNAL MEDICINE

## 2021-02-19 RX ORDER — LEVOTHYROXINE SODIUM 100 UG/1
100 TABLET ORAL DAILY
Qty: 30 TABLET | Refills: 1 | OUTPATIENT
Start: 2021-02-19

## 2021-02-19 NOTE — TELEPHONE ENCOUNTER
1st attempt to reach patient.  Left message on answering machine for patient to call back regarding refill request        Angie LEMOSRN Triage  [facility]  [phone]

## 2021-02-19 NOTE — TELEPHONE ENCOUNTER
patient returned call- lab appointment scheduled at  Lab    Angie FOREMANRN BSN  [facility]  [phone]

## 2021-02-19 NOTE — TELEPHONE ENCOUNTER
Pt is supposed to do repeat labs on the adjusted dose of her Levothyroxine, orders were already in for 01/08/2021. Please call the pt and let her know to do this lab ASAP for further adjustment and refill of her medication.     Thank you,  Melida Chandra MD on 2/19/2021 at 7:10 AM

## 2021-02-20 ENCOUNTER — TELEPHONE (OUTPATIENT)
Dept: FAMILY MEDICINE | Facility: CLINIC | Age: 51
End: 2021-02-20

## 2021-02-20 DIAGNOSIS — E03.9 HYPOTHYROIDISM (ACQUIRED): Primary | ICD-10-CM

## 2021-02-20 LAB
T4 FREE SERPL-MCNC: 1.18 NG/DL (ref 0.76–1.46)
TSH SERPL DL<=0.005 MIU/L-ACNC: 0.1 MU/L (ref 0.4–4)

## 2021-02-20 RX ORDER — LEVOTHYROXINE SODIUM 75 UG/1
75 TABLET ORAL DAILY
Qty: 30 TABLET | Refills: 1 | Status: SHIPPED | OUTPATIENT
Start: 2021-02-20 | End: 2021-04-22

## 2021-02-20 NOTE — TELEPHONE ENCOUNTER
Please call the patient and schedule a follow up visit with me to check on her Uncontrolled BP in her last visit with me at [facility].     Thank you,  Melida Chandra MD on 2/20/2021 at 2:08 PM

## 2021-02-22 ENCOUNTER — TELEPHONE (OUTPATIENT)
Dept: FAMILY MEDICINE | Facility: CLINIC | Age: 51
End: 2021-02-22

## 2021-02-22 NOTE — TELEPHONE ENCOUNTER
Patient Contact    Attempt # 1    Was call answered?  No.  Left message on voicemail with information to call triage back.    On call back:     - Relay provider message below.

## 2021-02-22 NOTE — TELEPHONE ENCOUNTER
----- Message from Melida Chandra MD sent at 2/20/2021  2:03 PM CST -----  Your TSH levels are abnormally low depicting that you are taking higher than needed dose of Levothyroxine. I have decreased the dose of your medication FURTHER and sent to your pharmacy. You will need repeat TSH check in 5 weeks of starting the new dosage to make sure it normalizes versus further titration of the dose if necessary. Future orders for TSH placed around 03/29/21 which is a nonfasting lab work.     Please let me know if you have any questions.  Melida Chandra MD on 2/20/2021 at 1:59 PM

## 2021-02-23 NOTE — TELEPHONE ENCOUNTER
Left message on answering machine for patient to call back or check mychart results    [facility]    Angie FOREMANRN BSN  [facility]  [phone]

## 2021-02-23 NOTE — TELEPHONE ENCOUNTER
Patient notified of test results and providers message, patient has no further questions.    Angie FOREMANRN BSN  [facility]  [phone]

## 2021-03-31 DIAGNOSIS — E03.9 HYPOTHYROIDISM (ACQUIRED): ICD-10-CM

## 2021-03-31 LAB — TSH SERPL DL<=0.005 MIU/L-ACNC: 1.09 MU/L (ref 0.4–4)

## 2021-03-31 PROCEDURE — 84443 ASSAY THYROID STIM HORMONE: CPT | Performed by: INTERNAL MEDICINE

## 2021-03-31 PROCEDURE — 36415 COLL VENOUS BLD VENIPUNCTURE: CPT | Performed by: INTERNAL MEDICINE

## 2021-04-07 ENCOUNTER — HOSPITAL ENCOUNTER (OUTPATIENT)
Dept: LAB | Facility: CLINIC | Age: 51
End: 2021-04-07
Attending: INTERNAL MEDICINE
Payer: COMMERCIAL

## 2021-04-07 ENCOUNTER — HOSPITAL ENCOUNTER (OUTPATIENT)
Dept: MRI IMAGING | Facility: CLINIC | Age: 51
End: 2021-04-07
Attending: INTERNAL MEDICINE
Payer: COMMERCIAL

## 2021-04-07 ENCOUNTER — TELEPHONE (OUTPATIENT)
Dept: MAMMOGRAPHY | Facility: CLINIC | Age: 51
End: 2021-04-07

## 2021-04-07 DIAGNOSIS — C50.411 MALIGNANT NEOPLASM OF UPPER-OUTER QUADRANT OF RIGHT BREAST IN FEMALE, ESTROGEN RECEPTOR NEGATIVE (H): ICD-10-CM

## 2021-04-07 DIAGNOSIS — Z17.1 MALIGNANT NEOPLASM OF UPPER-OUTER QUADRANT OF RIGHT BREAST IN FEMALE, ESTROGEN RECEPTOR NEGATIVE (H): ICD-10-CM

## 2021-04-07 DIAGNOSIS — Z15.89 MONOALLELIC MUTATION OF PALB2 GENE: ICD-10-CM

## 2021-04-07 DIAGNOSIS — Z91.89 AT RISK FOR BREAST CANCER: ICD-10-CM

## 2021-04-07 DIAGNOSIS — Z15.01 MONOALLELIC MUTATION OF PALB2 GENE: ICD-10-CM

## 2021-04-07 DIAGNOSIS — Z15.09 MONOALLELIC MUTATION OF PALB2 GENE: ICD-10-CM

## 2021-04-07 LAB
ALBUMIN SERPL-MCNC: 3.7 G/DL (ref 3.4–5)
ALP SERPL-CCNC: 140 U/L (ref 40–150)
ALT SERPL W P-5'-P-CCNC: 28 U/L (ref 0–50)
ANION GAP SERPL CALCULATED.3IONS-SCNC: 3 MMOL/L (ref 3–14)
AST SERPL W P-5'-P-CCNC: 25 U/L (ref 0–45)
BASOPHILS # BLD AUTO: 0 10E9/L (ref 0–0.2)
BASOPHILS NFR BLD AUTO: 0.3 %
BILIRUB SERPL-MCNC: 0.2 MG/DL (ref 0.2–1.3)
BUN SERPL-MCNC: 8 MG/DL (ref 7–30)
CALCIUM SERPL-MCNC: 8.8 MG/DL (ref 8.5–10.1)
CANCER AG27-29 SERPL-ACNC: 26 U/ML (ref 0–39)
CHLORIDE SERPL-SCNC: 104 MMOL/L (ref 94–109)
CO2 SERPL-SCNC: 29 MMOL/L (ref 20–32)
CREAT SERPL-MCNC: 0.75 MG/DL (ref 0.52–1.04)
DIFFERENTIAL METHOD BLD: ABNORMAL
EOSINOPHIL # BLD AUTO: 0.1 10E9/L (ref 0–0.7)
EOSINOPHIL NFR BLD AUTO: 1.2 %
ERYTHROCYTE [DISTWIDTH] IN BLOOD BY AUTOMATED COUNT: 15.5 % (ref 10–15)
GFR SERPL CREATININE-BSD FRML MDRD: >90 ML/MIN/{1.73_M2}
GLUCOSE SERPL-MCNC: 77 MG/DL (ref 70–99)
HCT VFR BLD AUTO: 38.6 % (ref 35–47)
HGB BLD-MCNC: 12.5 G/DL (ref 11.7–15.7)
IMM GRANULOCYTES # BLD: 0 10E9/L (ref 0–0.4)
IMM GRANULOCYTES NFR BLD: 0 %
LYMPHOCYTES # BLD AUTO: 2.6 10E9/L (ref 0.8–5.3)
LYMPHOCYTES NFR BLD AUTO: 43.9 %
MCH RBC QN AUTO: 26.1 PG (ref 26.5–33)
MCHC RBC AUTO-ENTMCNC: 32.4 G/DL (ref 31.5–36.5)
MCV RBC AUTO: 81 FL (ref 78–100)
MONOCYTES # BLD AUTO: 0.4 10E9/L (ref 0–1.3)
MONOCYTES NFR BLD AUTO: 6.8 %
NEUTROPHILS # BLD AUTO: 2.8 10E9/L (ref 1.6–8.3)
NEUTROPHILS NFR BLD AUTO: 47.8 %
NRBC # BLD AUTO: 0 10*3/UL
NRBC BLD AUTO-RTO: 0 /100
PLATELET # BLD AUTO: 291 10E9/L (ref 150–450)
PLATELET # BLD EST: ABNORMAL 10*3/UL
POTASSIUM SERPL-SCNC: 4 MMOL/L (ref 3.4–5.3)
PROT SERPL-MCNC: 8.1 G/DL (ref 6.8–8.8)
RBC # BLD AUTO: 4.79 10E12/L (ref 3.8–5.2)
RBC MORPH BLD: ABNORMAL
SODIUM SERPL-SCNC: 136 MMOL/L (ref 133–144)
WBC # BLD AUTO: 5.9 10E9/L (ref 4–11)

## 2021-04-07 PROCEDURE — 80053 COMPREHEN METABOLIC PANEL: CPT | Performed by: INTERNAL MEDICINE

## 2021-04-07 PROCEDURE — 86300 IMMUNOASSAY TUMOR CA 15-3: CPT | Performed by: INTERNAL MEDICINE

## 2021-04-07 PROCEDURE — 77049 MRI BREAST C-+ W/CAD BI: CPT

## 2021-04-07 PROCEDURE — A9585 GADOBUTROL INJECTION: HCPCS | Performed by: INTERNAL MEDICINE

## 2021-04-07 PROCEDURE — 255N000002 HC RX 255 OP 636: Performed by: INTERNAL MEDICINE

## 2021-04-07 PROCEDURE — 85025 COMPLETE CBC W/AUTO DIFF WBC: CPT | Performed by: INTERNAL MEDICINE

## 2021-04-07 RX ORDER — GADOBUTROL 604.72 MG/ML
8 INJECTION INTRAVENOUS ONCE
Status: COMPLETED | OUTPATIENT
Start: 2021-04-07 | End: 2021-04-07

## 2021-04-07 RX ADMIN — GADOBUTROL 8 ML: 604.72 INJECTION INTRAVENOUS at 12:10

## 2021-04-07 NOTE — TELEPHONE ENCOUNTER
Luci Alert was called and given her 4/7/2021 Breast MRI Results:    FINDINGS:      Right Breast: Minimal background parenchymal enhancement. No  suspicious enhancement to suggest malignancy. Stable postsurgical  change. No adenopathy.      Left Breast: Minimal background parenchymal enhancement. No suspicious  enhancement to suggest malignancy. No adenopathy.                                                                      IMPRESSION: BI-RADS CATEGORY: 2 - Benign Finding(s).  No MRI evidence of malignancy.     RECOMMENDED FOLLOW-UP: Annual mammography and annual breast MRI.     YULIA GILLIS MD           She will continue with her usual High Risk  Breast imaging routine.    Mckenzie Jenkins BSN, RN  Procedure Nurse  [facility]  [phone]

## 2021-04-14 ENCOUNTER — ONCOLOGY VISIT (OUTPATIENT)
Dept: ONCOLOGY | Facility: CLINIC | Age: 51
End: 2021-04-14
Attending: INTERNAL MEDICINE
Payer: COMMERCIAL

## 2021-04-14 VITALS
TEMPERATURE: 97.7 F | WEIGHT: 201.2 LBS | RESPIRATION RATE: 16 BRPM | HEART RATE: 84 BPM | BODY MASS INDEX: 35.64 KG/M2 | OXYGEN SATURATION: 100 % | SYSTOLIC BLOOD PRESSURE: 119 MMHG | DIASTOLIC BLOOD PRESSURE: 79 MMHG

## 2021-04-14 DIAGNOSIS — Z17.1 MALIGNANT NEOPLASM OF UPPER-OUTER QUADRANT OF RIGHT BREAST IN FEMALE, ESTROGEN RECEPTOR NEGATIVE (H): Primary | ICD-10-CM

## 2021-04-14 DIAGNOSIS — C50.411 MALIGNANT NEOPLASM OF UPPER-OUTER QUADRANT OF RIGHT BREAST IN FEMALE, ESTROGEN RECEPTOR NEGATIVE (H): Primary | ICD-10-CM

## 2021-04-14 PROCEDURE — 99214 OFFICE O/P EST MOD 30 MIN: CPT | Performed by: INTERNAL MEDICINE

## 2021-04-14 RX ORDER — ASPIRIN 81 MG/1
81 TABLET ORAL DAILY
COMMUNITY
End: 2021-10-27

## 2021-04-14 ASSESSMENT — PAIN SCALES - GENERAL: PAINLEVEL: NO PAIN (1)

## 2021-04-14 NOTE — PROGRESS NOTES
"Oncology Rooming Note    April 14, 2021 1:18 PM   Luci Londono is a 50 year old female who presents for:    Chief Complaint   Patient presents with     Oncology Clinic Visit     Initial Vitals: There were no vitals taken for this visit. Estimated body mass index is 32.77 kg/m  as calculated from the following:    Height as of 2/12/21: 1.6 m (5' 3\").    Weight as of 2/12/21: 83.9 kg (185 lb). There is no height or weight on file to calculate BSA.  Data Unavailable Comment: Data Unavailable   No LMP recorded. (Menstrual status: Irregular Periods).  Allergies reviewed: Yes  Medications reviewed: Yes    Medications: MEDICATION REFILLS NEEDED TODAY. Provider was notified.  ASA 81mg  Pharmacy name entered into [facility]:    [facility] [place] - [address] [id number] - [place], [place] - [address] AT [facility]    Clinical concerns: no       Shari J. Schoenberger, CMA            "

## 2021-04-14 NOTE — LETTER
"    4/14/2021         RE: Luci Londono  [address] [place] [id number]        Dear Colleague,    Thank you for referring your patient, Luci Londono, to the [facility]. Please see a copy of my visit note below.    Oncology Rooming Note    April 14, 2021 1:18 PM   Luci Londono is a 50 year old female who presents for:    Chief Complaint   Patient presents with     Oncology Clinic Visit     Initial Vitals: There were no vitals taken for this visit. Estimated body mass index is 32.77 kg/m  as calculated from the following:    Height as of 2/12/21: 1.6 m (5' 3\").    Weight as of 2/12/21: 83.9 kg (185 lb). There is no height or weight on file to calculate BSA.  Data Unavailable Comment: Data Unavailable   No LMP recorded. (Menstrual status: Irregular Periods).  Allergies reviewed: Yes  Medications reviewed: Yes    Medications: MEDICATION REFILLS NEEDED TODAY. Provider was notified.  ASA 81mg  Pharmacy name entered into [facility]:    [facility] [place], [place] - [address] [id number] [address]    Clinical concerns: no       Shari J. Schoenberger, CMA              Visit Date:   04/14/2021      ONCOLOGIC HISTORY:  Ms. Londono is a female with triple negative right breast cancer.   -PALB2  mutation.  1.  Bilateral diagnostic mammogram and right breast ultrasound on 10/09/2018 revealed an 8 mm hypoechoic mass at the 12 o'clock position and abnormal right axillary lymph node.  2.  Ultrasound-guided right breast and right axillary lymph node biopsy was done on 10/12/2018.    -Right breast biopsy revealed invasive carcinoma, grade 3.  There was DCIS.    -Right axillary lymph node biopsy revealed metastatic adenocarcinoma.  -ER negative, [place] negative and HER-2/elton negative.   -Clinical T1b N1 M0.  3.  Patient received neoadjuvant chemotherapy with dose-dense Adriamycin and " Cytoxan followed by weekly carboplatin and Taxol between 10/31/2018 and 04/03/2019.   4.  Patient underwent right breast lumpectomy and sentinel lymph node biopsy on 05/15/2019.  There was a 1.6 mm metastatic carcinoma in one of the lymph nodes.  There was no residual disease in the breast.  ypT0 ypN1mi M0 disease.   5.  The patient received adjuvant oral Xeloda.   6.  Genetic testing was done.  She has PALB2  mutation.  The patient followed up in Genetics clinic.  She is getting alternating mammogram and breast MRI.      SUBJECTIVE:  Ms. Londono is a 50-year-old female with triple negative right breast cancer.  She is doing well.  She has no complaints or concerns.  Occasionally, she gets some discomfort in the right breast.      No headache.  No dizziness.  No neck pain.  No chest pain.  No shortness of breath.  No abdominal pain, nausea or vomiting.  Appetite has been good.  No urinary or bowel complaints.  No abnormal bleeding.  No fever, chills or night sweats.      When she got chemotherapy, she stopped having menstrual bleeding.      All other review of systems negative.      PHYSICAL EXAMINATION:   GENERAL:  Alert and oriented x 3.   VITAL SIGNS:  Reviewed.  ECOG PS of 0.     EYES:  No icterus.   THROAT:  No ulcer. No thrush.   NECK:  Supple. No lymphadenopathy. No thyromegaly.   AXILLAE:  No lymphadenopathy.   LUNGS:  Good air entry bilaterally.  No crackles or wheezing.   HEART:  Regular.  No murmur.   ABDOMEN:  Soft.  Nontender. No mass.   EXTREMITIES:  No pedal edema.  No calf swelling or tenderness.   SKIN:  No rash.      LABORATORY DATA:  CBC, CMP and CA 27-29 reviewed.      ASSESSMENT:   1.  A 50-year-old female with triple negative right breast cancer.  No evidence of disease.   2.  PALB2 mutation.      PLAN:   1.  The patient is doing well from breast cancer.  No evidence of disease. MRI done on 04/02/2021 was personally reviewed by me. No evidence of malignancy. We will continue to monitor her  closely.  She will get alternating mammogram and breast MRI every 6 months.     2.  Discussed regarding PALB2 mutation.  That does predispose her to breast cancer, ovarian cancer and pancreatic cancer. The patient [facility].  We will consider sending her back again there for monitoring.     3.  Discussed regarding scans.  We will get CT chest, abdomen and pelvis.  Because of PALB2 mutation, we do want to make sure that she does not have any other malignancies.     4.  She had a few questions, which were all answered.  I will see her after the CT scans.         SAVANNA FUENTES MD             D: 2021   T: 2021   MT: ALPHONSO      Name:     DANO LOERA   MRN:      [id number]        Account:      [id number]   :      1970           Visit Date:   2021      Document: [id number]      This office note has been dictated.          Again, thank you for allowing me to participate in the care of your patient.        Sincerely,        Savanna Fuentes MD

## 2021-04-21 ENCOUNTER — HOSPITAL ENCOUNTER (OUTPATIENT)
Dept: CT IMAGING | Facility: CLINIC | Age: 51
Discharge: HOME | End: 2021-04-21
Attending: INTERNAL MEDICINE | Admitting: INTERNAL MEDICINE
Payer: COMMERCIAL

## 2021-04-21 DIAGNOSIS — C50.411 MALIGNANT NEOPLASM OF UPPER-OUTER QUADRANT OF RIGHT BREAST IN FEMALE, ESTROGEN RECEPTOR NEGATIVE (H): ICD-10-CM

## 2021-04-21 DIAGNOSIS — Z17.1 MALIGNANT NEOPLASM OF UPPER-OUTER QUADRANT OF RIGHT BREAST IN FEMALE, ESTROGEN RECEPTOR NEGATIVE (H): ICD-10-CM

## 2021-04-21 PROCEDURE — 74177 CT ABD & PELVIS W/CONTRAST: CPT

## 2021-04-21 PROCEDURE — 250N000011 HC RX IP 250 OP 636: Performed by: INTERNAL MEDICINE

## 2021-04-21 PROCEDURE — 250N000009 HC RX 250: Performed by: INTERNAL MEDICINE

## 2021-04-21 RX ORDER — IOPAMIDOL 755 MG/ML
98 INJECTION, SOLUTION INTRAVASCULAR ONCE
Status: COMPLETED | OUTPATIENT
Start: 2021-04-21 | End: 2021-04-21

## 2021-04-21 RX ADMIN — IOPAMIDOL 98 ML: 755 INJECTION, SOLUTION INTRAVENOUS at 11:01

## 2021-04-21 RX ADMIN — SODIUM CHLORIDE 68 ML: 9 INJECTION, SOLUTION INTRAVENOUS at 11:01

## 2021-04-22 DIAGNOSIS — E03.9 HYPOTHYROIDISM (ACQUIRED): ICD-10-CM

## 2021-04-22 RX ORDER — LEVOTHYROXINE SODIUM 75 UG/1
75 TABLET ORAL DAILY
Qty: 90 TABLET | Refills: 1 | Status: SHIPPED | OUTPATIENT
Start: 2021-04-22 | End: 2021-10-20

## 2021-04-23 NOTE — RESULT ENCOUNTER NOTE
Dear Ms. Alert,    CT scan is good. No cancer seen. Lung nodules are stable.    Please, call me with any questions.    Ailyn Humphrey MD

## 2021-04-24 NOTE — PROGRESS NOTES
Visit Date:   04/14/2021      ONCOLOGIC HISTORY:  Ms. Londono is a female with triple negative right breast cancer.   -PALB2  mutation.  1.  Bilateral diagnostic mammogram and right breast ultrasound on 10/09/2018 revealed an 8 mm hypoechoic mass at the 12 o'clock position and abnormal right axillary lymph node.  2.  Ultrasound-guided right breast and right axillary lymph node biopsy was done on 10/12/2018.    -Right breast biopsy revealed invasive carcinoma, grade 3.  There was DCIS.    -Right axillary lymph node biopsy revealed metastatic adenocarcinoma.  -ER negative, [place] negative and HER-2/elton negative.   -Clinical T1b N1 M0.  3.  Patient received neoadjuvant chemotherapy with dose-dense Adriamycin and Cytoxan followed by weekly carboplatin and Taxol between 10/31/2018 and 04/03/2019.   4.  Patient underwent right breast lumpectomy and sentinel lymph node biopsy on 05/15/2019.  There was a 1.6 mm metastatic carcinoma in one of the lymph nodes.  There was no residual disease in the breast.  ypT0 ypN1mi M0 disease.   5.  The patient received adjuvant oral Xeloda.   6.  Genetic testing was done.  She has PALB2  mutation.  The patient followed up in Genetics clinic.  She is getting alternating mammogram and breast MRI.      SUBJECTIVE:  Ms. Londono is a 50-year-old female with triple negative right breast cancer.  She is doing well.  She has no complaints or concerns.  Occasionally, she gets some discomfort in the right breast.      No headache.  No dizziness.  No neck pain.  No chest pain.  No shortness of breath.  No abdominal pain, nausea or vomiting.  Appetite has been good.  No urinary or bowel complaints.  No abnormal bleeding.  No fever, chills or night sweats.      When she got chemotherapy, she stopped having menstrual bleeding.      All other review of systems negative.      PHYSICAL EXAMINATION:   GENERAL:  Alert and oriented x 3.   VITAL SIGNS:  Reviewed.  ECOG PS of 0.     EYES:  No icterus.   THROAT:  No  ulcer. No thrush.   NECK:  Supple. No lymphadenopathy. No thyromegaly.   AXILLAE:  No lymphadenopathy.   LUNGS:  Good air entry bilaterally.  No crackles or wheezing.   HEART:  Regular.  No murmur.   ABDOMEN:  Soft.  Nontender. No mass.   EXTREMITIES:  No pedal edema.  No calf swelling or tenderness.   SKIN:  No rash.      LABORATORY DATA:  CBC, CMP and CA 27-29 reviewed.      ASSESSMENT:   1.  A 50-year-old female with triple negative right breast cancer.  No evidence of disease.   2.  PALB2 mutation.      PLAN:   1.  The patient is doing well from breast cancer.  No evidence of disease. MRI done on 2021 was personally reviewed by me. No evidence of malignancy. We will continue to monitor her closely.  She will get alternating mammogram and breast MRI every 6 months.     2.  Discussed regarding PALB2 mutation.  That does predispose her to breast cancer, ovarian cancer and pancreatic cancer. The patient [facility] to [facility].  We will consider sending her back again there for monitoring.     3.  Discussed regarding scans.  We will get CT chest, abdomen and pelvis.  Because of PALB2 mutation, we do want to make sure that she does not have any other malignancies.     4.  She had a few questions, which were all answered.  I will see her after the CT scans.         SAVANNA FUENTES MD             D: 2021   T: 2021   MT: ALPHONSO      Name:     DANO LOERA   MRN:      [id number]        Account:      [id number]   :      1970           Visit Date:   2021      Document: [id number]

## 2021-04-26 ENCOUNTER — VIRTUAL VISIT (OUTPATIENT)
Dept: ONCOLOGY | Facility: CLINIC | Age: 51
End: 2021-04-26
Attending: INTERNAL MEDICINE
Payer: COMMERCIAL

## 2021-04-26 DIAGNOSIS — C50.411 MALIGNANT NEOPLASM OF UPPER-OUTER QUADRANT OF RIGHT BREAST IN FEMALE, ESTROGEN RECEPTOR NEGATIVE (H): Primary | ICD-10-CM

## 2021-04-26 DIAGNOSIS — Z12.31 ENCOUNTER FOR SCREENING MAMMOGRAM FOR BREAST CANCER: ICD-10-CM

## 2021-04-26 DIAGNOSIS — Z17.1 MALIGNANT NEOPLASM OF UPPER-OUTER QUADRANT OF RIGHT BREAST IN FEMALE, ESTROGEN RECEPTOR NEGATIVE (H): Primary | ICD-10-CM

## 2021-04-26 PROCEDURE — 99212 OFFICE O/P EST SF 10 MIN: CPT | Mod: 95 | Performed by: INTERNAL MEDICINE

## 2021-04-26 NOTE — LETTER
4/26/2021         RE: Luci Londono  [address]  [facility] [address]        Dear Colleague,    Thank you for referring your patient, Luci Londono, to the [facility]. Please see a copy of my visit note below.    Luci is a 50 year old who is being evaluated via a billable video visit.      How would you like to obtain your AVS? [employer]    Send text to: [phone]    Will anyone else be joining your video visit? No      Video Start Time:   Video-Visit Details    Type of service:  Video Visit    Video End Time:    Originating Location (pt. Location): Home    Distant Location (provider location):  [facility]     Platform used for Video Visit: Rubio    Visit Date: 04/26/2021    ONCOLOGIC HISTORY:  Ms. Londono is a female with triple negative right breast cancer.   -PALB2  mutation.  1.  Bilateral diagnostic mammogram and right breast ultrasound on 10/09/2018 revealed an 8 mm hypoechoic mass at the 12 o'clock position and abnormal right axillary lymph node.  2.  Ultrasound-guided right breast and right axillary lymph node biopsy was done on 10/12/2018.    -Right breast biopsy revealed invasive carcinoma, grade 3.  There was DCIS.    -Right axillary lymph node biopsy revealed metastatic adenocarcinoma.  -ER negative, [place] negative and HER-2/elton negative.   -Clinical T1b N1 M0.  3.  Patient received neoadjuvant chemotherapy with dose-dense Adriamycin and Cytoxan followed by weekly carboplatin and Taxol between 10/31/2018 and 04/03/2019.   4.  Patient underwent right breast lumpectomy and sentinel lymph node biopsy on 05/15/2019.  There was a 1.6 mm metastatic carcinoma in one of the lymph nodes.  There was no residual disease in the breast.  ypT0 ypN1mi M0 disease.   5.  The patient received adjuvant oral Xeloda.   6.  She has PALB2  mutation.  The patient followed up in Genetics clinic.  She is getting alternating mammogram and breast  MRI.      SUBJECTIVE: Ms. Londono is a 50-year-old female with triple negative right breast cancer. No evidence of disease.  She also has PALB2 mutation.     Scans were ordered.  CT chest, abdomen and pelvis was done on 2021.  No evidence of any metastatic disease.  Diminutive right lung nodules are unchanged since 2018 and considered benign.     Overall, she is doing well.  No new complaints or concerns.     ASSESSMENT:    1.  A 50-year-old female with triple negative right breast cancer.  No evidence of disease.  2.  PALB2 mutation.  3.  Lung nodule, benign.     PLAN:    1.  The patient is doing well from breast cancer.  Scans were reviewed.  I told her there is no evidence of any malignancy.  She was happy to know that.  2.  For breast cancer, we will see her in 6 months' time with mammogram.   3.  I advised her to call us with any questions or concerns.     Ailyn Humphrey MD           D: 2021   T: 2021   MT: JONELLE     Name:     DANO LONDONO  MRN:      [id number]        Account:    [id number]   :      1970           Visit Date: 2021      Document: [id number]    Video visit time of 3 minutes.  Video started at 3:02 PM and completed at 3:05 PM.  Another 5 minutes spent in review of chart and investigations today.    This office note has been dictated.          Again, thank you for allowing me to participate in the care of your patient.        Sincerely,        Ailyn Humphrey MD

## 2021-04-26 NOTE — PROGRESS NOTES
Luci is a 50 year old who is being evaluated via a billable video visit.      How would you like to obtain your AVS? [employer]    Send text to: [phone]    Will anyone else be joining your video visit? No      Video Start Time:   Video-Visit Details    Type of service:  Video Visit    Video End Time:    Originating Location (pt. Location): Home    Distant Location (provider location):  [facility]     Platform used for Video Visit: Rubio

## 2021-04-26 NOTE — LETTER
4/26/2021         RE: Luci Londono  [address]  [facility] [address]        Dear Colleague,    Thank you for referring your patient, Luci Londono, to the [facility]. Please see a copy of my visit note below.    Luci is a 50 year old who is being evaluated via a billable video visit.      How would you like to obtain your AVS? Firebase    Send text to: [phone]    Will anyone else be joining your video visit? No      Video Start Time:   Video-Visit Details    Type of service:  Video Visit    Video End Time:    Originating Location (pt. Location): Home    Distant Location (provider location):  [facility]     Platform used for Video Visit: Rubio    Visit Date: 04/26/2021    ONCOLOGIC HISTORY:  Ms. Londono is a female with triple negative right breast cancer.   -PALB2  mutation.  1.  Bilateral diagnostic mammogram and right breast ultrasound on 10/09/2018 revealed an 8 mm hypoechoic mass at the 12 o'clock position and abnormal right axillary lymph node.  2.  Ultrasound-guided right breast and right axillary lymph node biopsy was done on 10/12/2018.    -Right breast biopsy revealed invasive carcinoma, grade 3.  There was DCIS.    -Right axillary lymph node biopsy revealed metastatic adenocarcinoma.  -ER negative, [place] negative and HER-2/elton negative.   -Clinical T1b N1 M0.  3.  Patient received neoadjuvant chemotherapy with dose-dense Adriamycin and Cytoxan followed by weekly carboplatin and Taxol between 10/31/2018 and 04/03/2019.   4.  Patient underwent right breast lumpectomy and sentinel lymph node biopsy on 05/15/2019.  There was a 1.6 mm metastatic carcinoma in one of the lymph nodes.  There was no residual disease in the breast.  ypT0 ypN1mi M0 disease.   5.  The patient received adjuvant oral Xeloda.   6.  She has PALB2  mutation.  The patient followed up in Genetics clinic.  She is getting alternating mammogram and breast  MRI.      SUBJECTIVE: Ms. Londono is a 50-year-old female with triple negative right breast cancer. No evidence of disease.  She also has PALB2 mutation.     Scans were ordered.  CT chest, abdomen and pelvis was done on 2021.  No evidence of any metastatic disease.  Diminutive right lung nodules are unchanged since 2018 and considered benign.     Overall, she is doing well.  No new complaints or concerns.     ASSESSMENT:    1.  A 50-year-old female with triple negative right breast cancer.  No evidence of disease.  2.  PALB2 mutation.  3.  Lung nodule, benign.     PLAN:    1.  The patient is doing well from breast cancer.  Scans were reviewed.  I told her there is no evidence of any malignancy.  She was happy to know that.  2.  For breast cancer, we will see her in 6 months' time with mammogram.   3.  I advised her to call us with any questions or concerns.     Ailyn Humphrey MD           D: 2021   T: 2021   MT: JONELLE     Name:     DANO LONDONO  MRN:      [id number]        Account:    [id number]   :      1970           Visit Date: 2021      Document: [id number]    Video visit time of 3 minutes.  Video started at 3:02 PM and completed at 3:05 PM.  Another 5 minutes spent in review of chart and investigations today.    This office note has been dictated.          Again, thank you for allowing me to participate in the care of your patient.        Sincerely,        Ailyn Humphrey MD

## 2021-04-26 NOTE — PROGRESS NOTES
Visit Date: 04/26/2021    ONCOLOGIC HISTORY:  Ms. Londono is a female with triple negative right breast cancer.   -PALB2  mutation.  1.  Bilateral diagnostic mammogram and right breast ultrasound on 10/09/2018 revealed an 8 mm hypoechoic mass at the 12 o'clock position and abnormal right axillary lymph node.  2.  Ultrasound-guided right breast and right axillary lymph node biopsy was done on 10/12/2018.    -Right breast biopsy revealed invasive carcinoma, grade 3.  There was DCIS.    -Right axillary lymph node biopsy revealed metastatic adenocarcinoma.  -ER negative, [place] negative and HER-2/elton negative.   -Clinical T1b N1 M0.  3.  Patient received neoadjuvant chemotherapy with dose-dense Adriamycin and Cytoxan followed by weekly carboplatin and Taxol between 10/31/2018 and 04/03/2019.   4.  Patient underwent right breast lumpectomy and sentinel lymph node biopsy on 05/15/2019.  There was a 1.6 mm metastatic carcinoma in one of the lymph nodes.  There was no residual disease in the breast.  ypT0 ypN1mi M0 disease.   5.  The patient received adjuvant oral Xeloda.   6.  She has PALB2  mutation.  The patient followed up in Genetics clinic.  She is getting alternating mammogram and breast MRI.      SUBJECTIVE: Ms. Londono is a 50-year-old female with triple negative right breast cancer. No evidence of disease.  She also has PALB2 mutation.     Scans were ordered.  CT chest, abdomen and pelvis was done on 04/21/2021.  No evidence of any metastatic disease.  Diminutive right lung nodules are unchanged since 2018 and considered benign.     Overall, she is doing well.  No new complaints or concerns.     ASSESSMENT:    1.  A 50-year-old female with triple negative right breast cancer.  No evidence of disease.  2.  PALB2 mutation.  3.  Lung nodule, benign.     PLAN:    1.  The patient is doing well from breast cancer.  Scans were reviewed.  I told her there is no evidence of any malignancy.  She was happy to know that.  2.  For  breast cancer, we will see her in 6 months' time with mammogram.   3.  I advised her to call us with any questions or concerns.     Ailyn Humphrey MD           D: 2021   T: 2021   MT: JONELLE     Name:     DANO LOERA  MRN:      [id number]        Account:    [id number]   :      1970           Visit Date: 2021      Document: [id number]    Video visit time of 3 minutes.  Video started at 3:02 PM and completed at 3:05 PM.  Another 5 minutes spent in review of chart and investigations today.

## 2021-04-27 ENCOUNTER — TELEPHONE (OUTPATIENT)
Dept: ONCOLOGY | Facility: CLINIC | Age: 51
End: 2021-04-27

## 2021-04-27 NOTE — PROGRESS NOTES
Received positive distress screen regarding patient's concern for current weight.  Called and spoke with patient.  Patient states her weight is increasing and her intake has not changed.  Patient states her thyroid medication dose was lowered and questioning of that's why her weight has increased.  Suggest patient follow up with prescribing MD regarding dosing.  Encouraged patient to contact clinic if has further questions for RD.  Patient verbalized understanding of plan.      Reinaldo Meza, RD, LD  Clinical Dietitian  [facility]  [phone] (direct)

## 2021-04-28 ENCOUNTER — IMMUNIZATION (OUTPATIENT)
Dept: NURSING | Facility: CLINIC | Age: 51
End: 2021-04-28
Payer: COMMERCIAL

## 2021-04-28 PROCEDURE — 91300 PR COVID VAC PFIZER DIL RECON 30 MCG/0.3 ML IM: CPT

## 2021-04-28 PROCEDURE — 0001A PR COVID VAC PFIZER DIL RECON 30 MCG/0.3 ML IM: CPT

## 2021-04-29 PROBLEM — M54.41 BILATERAL LOW BACK PAIN WITH RIGHT-SIDED SCIATICA: Status: RESOLVED | Noted: 2020-11-19 | Resolved: 2021-04-29

## 2021-04-29 PROBLEM — M54.42 BILATERAL LOW BACK PAIN WITH LEFT-SIDED SCIATICA: Status: RESOLVED | Noted: 2020-11-19 | Resolved: 2021-04-29

## 2021-04-29 NOTE — PROGRESS NOTES
"Discharge Note    Progress reporting period is from 11-19-20 to Dec 7, 2020.      Luci failed to follow up and current status is unknown.  Please see information below for last relevant information on current status.  Patient seen for 3 visits.    SUBJECTIVE  Patient initially had c/o bilateral LBP and bilateral LEs to the knees - anterior or posterior LEs, and pain posterior left heel.  Pain ranged from 5-7/10, described as intermittently \"sharp\".  Symptoms increased with sitting >1 hour, moving out of position after lying on her side on the couch/\"locks up\", losing 5+ hours sleep, standing >1 hour, bending forward.  At last visit she reported she was no longer having left heel pain, no LE symptoms that week, LBP 0-2/10.  No longer waking due to pain - taking 2 Tylenol at bedtime due to shoulder pain.  Patient did not come for final visit so final status unknown..  Current pain level is 0/10.     Previous pain level was  (5-7/10 ).   Changes in function:  Yes (See Goal flowsheet attached for changes in current functional level)  Adverse reaction to treatment or activity: None    OBJECTIVE  At last visit lumbar extension WNL, no symptoms.    ASSESSMENT/PLAN  Diagnosis: LBP and bilateral sciatica   Updated problem list and treatment plan:   Pain - HEP  STG/LTGs have been met or progress has been made towards goals:  Yes, please see goal flowsheet for most current information  Assessment of Progress: current status is unknown.    Last current status: Pt was progressing well      Recommendations:  Patient did not return for follow-up visits as expected.  Will discharge physical therapy chart at this time.      Please refer to the daily flowsheet for treatment today, total treatment time and time spent performing 1:1 timed codes.        "

## 2021-05-19 ENCOUNTER — IMMUNIZATION (OUTPATIENT)
Dept: NURSING | Facility: CLINIC | Age: 51
End: 2021-05-19
Attending: INTERNAL MEDICINE
Payer: COMMERCIAL

## 2021-05-19 PROCEDURE — 91300 PR COVID VAC PFIZER DIL RECON 30 MCG/0.3 ML IM: CPT

## 2021-05-19 PROCEDURE — 0002A PR COVID VAC PFIZER DIL RECON 30 MCG/0.3 ML IM: CPT

## 2021-06-17 DIAGNOSIS — K21.9 GASTROESOPHAGEAL REFLUX DISEASE WITHOUT ESOPHAGITIS: Chronic | ICD-10-CM

## 2021-08-14 NOTE — TELEPHONE ENCOUNTER
Routing refill request to provider for review/approval because:  Labs out of range:  TSH         None

## 2021-09-01 ENCOUNTER — OFFICE VISIT (OUTPATIENT)
Dept: FAMILY MEDICINE | Facility: CLINIC | Age: 51
End: 2021-09-01
Payer: COMMERCIAL

## 2021-09-01 VITALS
DIASTOLIC BLOOD PRESSURE: 88 MMHG | SYSTOLIC BLOOD PRESSURE: 155 MMHG | OXYGEN SATURATION: 98 % | WEIGHT: 201 LBS | BODY MASS INDEX: 35.61 KG/M2 | RESPIRATION RATE: 24 BRPM | TEMPERATURE: 98.5 F | HEART RATE: 82 BPM

## 2021-09-01 DIAGNOSIS — R07.89 CHEST WALL PAIN: Primary | ICD-10-CM

## 2021-09-01 PROCEDURE — 99213 OFFICE O/P EST LOW 20 MIN: CPT | Performed by: NURSE PRACTITIONER

## 2021-09-01 NOTE — PROGRESS NOTES
Assessment & Plan   Problem List Items Addressed This Visit     None      Visit Diagnoses     Chest wall pain    -  Primary         Pt have had couple days of right side chest wall pain. Unknown etiology. Discussed this pain could is likely muscular as there is a large movement component and not remission of breast cancer based on present symptoms. Discussed this pain could be r/t to shingles and that she should continue to monitor any rash on that area. The lumps she is feeling is likely adipose tissue and ribs. There were no abnormalities on palpation today. Discussed sign of worsing symptoms and recommended follow-up if symptoms persist.         AMIE Person CNP  [facility] SVETA Verma is a 50 year old who presents for the following health issues     HPI     Chief Complaint   Patient presents with     Breast Pain     pt c/o throbbing in right breast along with swelling underneath x2 days; intermittent pain,but swelling has decreased a little; has Hx of breast cancer 2018     Started to notices lump on RUQ/mid axilary line about couple days ago  Sometime it is painful and rates her pain 9/10. The pain is radiating to the back  The area is tender  Initially it was very itchy for a few days   The area feels like it is swollen and pulsating  Feels like something locks up when she turns or rotates, especially at night in bed  No trauma to area    Hx of R breast cancer 2018  Have not try any pain management  Have tried Ice and didn't help  No SOB  No recent illness      Also, started to notice headache lately  With Chemo treatment in the past, had numbness  No numbness or Tingling      Review of Systems   Detailed as above         Objective    BP (!) 155/88 (BP Location: Left arm, Cuff Size: Adult Regular)   Pulse 82   Temp 98.5  F (36.9  C) (Tympanic)   Resp 24   Wt 91.2 kg (201 lb)   SpO2 98%   BMI 35.61 kg/m    Body mass index is 35.61 kg/m .  Physical  Exam  Pulmonary:      Effort: Pulmonary effort is normal.   Musculoskeletal:         General: Tenderness present.      Comments: Right lateral chest wall and flank tenderness   Skin:     Comments: Multiple abrasions right chest wall at midaxillary line   Neurological:      Mental Status: She is alert.   Psychiatric:         Behavior: Behavior normal.                  I saw this patient in collaboration with Olya Cruz NP student       I was present with the APRN/PA student who participated in the service and in the documentation of the services provided. I have verified the history and personally performed the physical exam and medical decision making, as documented by the student and edited by me.     Valentín Au, AMIE, CNP    Olya Cruz NP Student

## 2021-09-19 ENCOUNTER — HEALTH MAINTENANCE LETTER (OUTPATIENT)
Age: 51
End: 2021-09-19

## 2021-10-13 NOTE — TELEPHONE ENCOUNTER
"Requested Prescriptions   Pending Prescriptions Disp Refills     levothyroxine (SYNTHROID/LEVOTHROID) 125 MCG tablet [Pharmacy Med Name: LEVOTHYROXINE 0.125MG (125MCG) TAB]  Last Written Prescription Date:  08/16/2018/  Last Fill Quantity: 30,  # refills: 0   Last office visit: 8/23/2018 with prescribing provider:  CHARLOTTE TYLER    Future Office Visit:     30 tablet 0     Sig: TAKE 1 TABLET BY MOUTH DAILY    Thyroid Protocol Failed    9/15/2018  3:39 AM       Failed - Normal TSH on file in past 12 months    Recent Labs   Lab Test  08/21/18   1201   TSH  56.21*             Passed - Patient is 12 years or older       Passed - Recent (12 mo) or future (30 days) visit within the authorizing provider's specialty    Patient had office visit in the last 12 months or has a visit in the next 30 days with authorizing provider or within the authorizing provider's specialty.  See \"Patient Info\" tab in inbasket, or \"Choose Columns\" in Meds & Orders section of the refill encounter.           Passed - No active pregnancy on record    If patient is pregnant or has had a positive pregnancy test, please check TSH.         Passed - No positive pregnancy test in past 12 months    If patient is pregnant or has had a positive pregnancy test, please check TSH.            " Island Pedicle Flap With Canthal Suspension Text: The defect edges were debeveled with a #15 scalpel blade.  Given the location of the defect, shape of the defect and the proximity to free margins an island pedicle advancement flap was deemed most appropriate.  Using a sterile surgical marker, an appropriate advancement flap was drawn incorporating the defect, outlining the appropriate donor tissue and placing the expected incisions within the relaxed skin tension lines where possible. The area thus outlined was incised deep to adipose tissue with a #15 scalpel blade.  The skin margins were undermined to an appropriate distance in all directions around the primary defect and laterally outward around the island pedicle utilizing iris scissors.  There was minimal undermining beneath the pedicle flap. A suspension suture was placed in the canthal tendon to prevent tension and prevent ectropion.

## 2021-10-19 DIAGNOSIS — E03.9 HYPOTHYROIDISM (ACQUIRED): ICD-10-CM

## 2021-10-20 ENCOUNTER — HOSPITAL ENCOUNTER (OUTPATIENT)
Dept: MAMMOGRAPHY | Facility: CLINIC | Age: 51
Discharge: HOME | End: 2021-10-20
Attending: INTERNAL MEDICINE | Admitting: INTERNAL MEDICINE
Payer: COMMERCIAL

## 2021-10-20 DIAGNOSIS — Z17.1 MALIGNANT NEOPLASM OF UPPER-OUTER QUADRANT OF RIGHT BREAST IN FEMALE, ESTROGEN RECEPTOR NEGATIVE (H): ICD-10-CM

## 2021-10-20 DIAGNOSIS — Z12.31 ENCOUNTER FOR SCREENING MAMMOGRAM FOR BREAST CANCER: ICD-10-CM

## 2021-10-20 DIAGNOSIS — C50.411 MALIGNANT NEOPLASM OF UPPER-OUTER QUADRANT OF RIGHT BREAST IN FEMALE, ESTROGEN RECEPTOR NEGATIVE (H): ICD-10-CM

## 2021-10-20 PROCEDURE — 77063 BREAST TOMOSYNTHESIS BI: CPT

## 2021-10-20 RX ORDER — LEVOTHYROXINE SODIUM 75 UG/1
75 TABLET ORAL DAILY
Qty: 90 TABLET | Refills: 1 | Status: SHIPPED | OUTPATIENT
Start: 2021-10-20 | End: 2022-05-04

## 2021-10-27 ENCOUNTER — ONCOLOGY VISIT (OUTPATIENT)
Dept: ONCOLOGY | Facility: CLINIC | Age: 51
End: 2021-10-27
Attending: INTERNAL MEDICINE
Payer: COMMERCIAL

## 2021-10-27 VITALS
BODY MASS INDEX: 34.93 KG/M2 | RESPIRATION RATE: 16 BRPM | SYSTOLIC BLOOD PRESSURE: 151 MMHG | OXYGEN SATURATION: 100 % | HEART RATE: 65 BPM | DIASTOLIC BLOOD PRESSURE: 80 MMHG | WEIGHT: 197.2 LBS | TEMPERATURE: 98 F

## 2021-10-27 DIAGNOSIS — Z15.89 MONOALLELIC MUTATION OF PALB2 GENE: Primary | ICD-10-CM

## 2021-10-27 DIAGNOSIS — Z15.01 MONOALLELIC MUTATION OF PALB2 GENE: Primary | ICD-10-CM

## 2021-10-27 DIAGNOSIS — Z15.09 MONOALLELIC MUTATION OF PALB2 GENE: Primary | ICD-10-CM

## 2021-10-27 DIAGNOSIS — R73.9 HYPERGLYCEMIA: ICD-10-CM

## 2021-10-27 DIAGNOSIS — Z17.1 MALIGNANT NEOPLASM OF UPPER-OUTER QUADRANT OF RIGHT BREAST IN FEMALE, ESTROGEN RECEPTOR NEGATIVE (H): ICD-10-CM

## 2021-10-27 DIAGNOSIS — E66.09 NON MORBID OBESITY DUE TO EXCESS CALORIES: ICD-10-CM

## 2021-10-27 DIAGNOSIS — Z91.89 AT RISK FOR BREAST CANCER: ICD-10-CM

## 2021-10-27 DIAGNOSIS — C50.411 MALIGNANT NEOPLASM OF UPPER-OUTER QUADRANT OF RIGHT BREAST IN FEMALE, ESTROGEN RECEPTOR NEGATIVE (H): ICD-10-CM

## 2021-10-27 PROCEDURE — 99214 OFFICE O/P EST MOD 30 MIN: CPT | Performed by: INTERNAL MEDICINE

## 2021-10-27 RX ORDER — ASPIRIN 81 MG/1
81 TABLET ORAL DAILY
Qty: 90 TABLET | Refills: 3 | Status: SHIPPED | OUTPATIENT
Start: 2021-10-27 | End: 2023-03-01

## 2021-10-27 RX ORDER — METFORMIN HCL 500 MG
TABLET, EXTENDED RELEASE 24 HR ORAL
Qty: 90 TABLET | Refills: 0 | Status: SHIPPED | OUTPATIENT
Start: 2021-10-27 | End: 2022-09-07

## 2021-10-27 ASSESSMENT — PAIN SCALES - GENERAL: PAINLEVEL: MODERATE PAIN (5)

## 2021-10-27 NOTE — LETTER
"    10/27/2021         RE: Luci Londono  [address] [place] [id number]        Dear Colleague,    Thank you for referring your patient, Luci Londono, to the [facility]. Please see a copy of my visit note below.    Oncology Rooming Note    October 27, 2021 1:39 PM   Luci Londono is a 50 year old female who presents for:    Chief Complaint   Patient presents with     Oncology Clinic Visit     Initial Vitals: There were no vitals taken for this visit. Estimated body mass index is 35.61 kg/m  as calculated from the following:    Height as of 2/12/21: 1.6 m (5' 3\").    Weight as of 9/1/21: 91.2 kg (201 lb). There is no height or weight on file to calculate BSA.  Data Unavailable Comment: Data Unavailable   No LMP recorded. (Menstrual status: Irregular Periods).  Allergies reviewed: Yes  Medications reviewed: Yes    Medications: Medication refills not needed today.  Pharmacy name entered into [facility]:    [facility] [place], [place] - [address] [facility] [id number] [place], [place] - [address] AT [address]    Clinical concerns: go over mammo results BK was notified.      Shari J. Schoenberger, [facility]              ONCOLOGIC HISTORY:  Ms. Londono is a female with triple negative right breast cancer.   -PALB2  mutation.  1.  Bilateral diagnostic mammogram and right breast ultrasound on 10/09/2018 revealed an 8 mm hypoechoic mass at the 12 o'clock position and abnormal right axillary lymph node.  2.  Ultrasound-guided right breast and right axillary lymph node biopsy was done on 10/12/2018.    -Right breast biopsy revealed invasive carcinoma, grade 3.  There was DCIS.    -Right axillary lymph node biopsy revealed metastatic adenocarcinoma.  -ER negative, [place] negative and HER-2/elton negative.   -Clinical T1b N1 M0.  3.  Patient received neoadjuvant chemotherapy with dose-dense Adriamycin and Cytoxan followed by " weekly carboplatin and Taxol between 10/31/2018 and 04/03/2019.   4.  Patient underwent right breast lumpectomy and sentinel lymph node biopsy on 05/15/2019.  There was a 1.6 mm metastatic carcinoma in one of the lymph nodes.  There was no residual disease in the breast.  ypT0 ypN1mi M0 disease.   -The patient received adjuvant oral Xeloda.   -The patient received radiation to right breast and right supraclavicular region between 06/27/2019 and 08/13/2019. 5940 cGy in 33 fractions.  5.  She has PALB2  mutation.  The patient followed up in Genetics clinic.  She is getting alternating mammogram and breast MRI.     SUBJECTIVE:  Ms. Londono is a 50-year-old female with triple negative right breast cancer status post chemotherapy, surgery and radiation.  She is here for followup.     The patient overall is doing good.  No headache.  No dizziness.  No neck pain.  No chest pain.  No shortness of breath.  No abdominal pain, nausea or vomiting.  No urinary or bowel complaints.  No abnormal bleeding.  No fever, chills, or night sweats.     Ever since her breast surgery, she gets some pain intermittently in her right shoulder/right scapula.     All other review of systems negative.     PHYSICAL EXAMINATION:   GENERAL:  Alert and oriented x 3.   VITAL SIGNS:  Reviewed.  ECOG PS of 0.     EYES:  No icterus.   THROAT:  No ulcer. No thrush.   NECK:  Supple. No lymphadenopathy.    AXILLAE:  No lymphadenopathy.   LUNGS:  Good air entry bilaterally.  No crackles or wheezing.   HEART:  Regular.  No murmur.   ABDOMEN:  Soft.  Nontender. No mass.   EXTREMITIES:  No pedal edema.  No calf swelling or tenderness.   SKIN:  No rash.   BREASTS:  Examined in the presence of the nurse.  No suspicious mass or skin lesions.     ASSESSMENT:    1.  A 50-year-old female with triple negative right breast cancer diagnosed in 2018.  No evidence of recurrence.  2.  Right shoulder pain.  3.  PALB2 mutation.     PLAN:   1.  The patient is doing well from  breast cancer.  No evidence of recurrence.  The patient is at high risk of recurrence.  She has triple-negative disease.  She also has PALB2 mutation.  We will continue alternating mammogram and MRI breast every 6 months.  In 6 months' time, we will get a breast MRI.  She is agreeable for it.  Recent mammogram is normal.  2.  She intermittently gets pain in the right shoulder.  It started ever since her breast surgery.  I told her this is either related to osteoarthritis or rotator cuff problem.  She will take Tylenol as needed.  If pain gets worse, we will plan on getting an MRI.  Right now pain is intermittent and she can do all her activities without much problem.  3.  She had a few questions, which were all answered.  I will see her in 6 months' time with labs.  I advised her to call us with any questions or concerns.  4.  Her blood pressure is elevated.  The patient said that her brother recently .  She has been stressed because of that.  Because of that, her blood pressure is elevated.  The patient is going to have her blood pressure rechecked with her PCP.         This office note has been dictated.          Again, thank you for allowing me to participate in the care of your patient.        Sincerely,        Ailyn Humphrey MD

## 2021-10-27 NOTE — PROGRESS NOTES
"Oncology Rooming Note    October 27, 2021 1:39 PM   Luci Londono is a 50 year old female who presents for:    Chief Complaint   Patient presents with     Oncology Clinic Visit     Initial Vitals: There were no vitals taken for this visit. Estimated body mass index is 35.61 kg/m  as calculated from the following:    Height as of 2/12/21: 1.6 m (5' 3\").    Weight as of 9/1/21: 91.2 kg (201 lb). There is no height or weight on file to calculate BSA.  Data Unavailable Comment: Data Unavailable   No LMP recorded. (Menstrual status: Irregular Periods).  Allergies reviewed: Yes  Medications reviewed: Yes    Medications: Medication refills not needed today.  Pharmacy name entered into [facility]:    [facility], [place] - [address] [facility] [address] [facility] [id number] [place], [place] - [address] AT [facility]    Clinical concerns: go over mammo results BK was notified.      Shari J. Schoenberger, CMA            "

## 2021-11-01 NOTE — PROGRESS NOTES
ONCOLOGIC HISTORY:  Ms. Londono is a female with triple negative right breast cancer.   -PALB2  mutation.  1.  Bilateral diagnostic mammogram and right breast ultrasound on 10/09/2018 revealed an 8 mm hypoechoic mass at the 12 o'clock position and abnormal right axillary lymph node.  2.  Ultrasound-guided right breast and right axillary lymph node biopsy was done on 10/12/2018.    -Right breast biopsy revealed invasive carcinoma, grade 3.  There was DCIS.    -Right axillary lymph node biopsy revealed metastatic adenocarcinoma.  -ER negative, [place] negative and HER-2/elton negative.   -Clinical T1b N1 M0.  3.  Patient received neoadjuvant chemotherapy with dose-dense Adriamycin and Cytoxan followed by weekly carboplatin and Taxol between 10/31/2018 and 04/03/2019.   4.  Patient underwent right breast lumpectomy and sentinel lymph node biopsy on 05/15/2019.  There was a 1.6 mm metastatic carcinoma in one of the lymph nodes.  There was no residual disease in the breast.  ypT0 ypN1mi M0 disease.   -The patient received adjuvant oral Xeloda.   -The patient received radiation to right breast and right supraclavicular region between 06/27/2019 and 08/13/2019. 5940 cGy in 33 fractions.  5.  She has PALB2  mutation.  The patient followed up in Genetics clinic.  She is getting alternating mammogram and breast MRI.     SUBJECTIVE:  Ms. Londono is a 50-year-old female with triple negative right breast cancer status post chemotherapy, surgery and radiation.  She is here for followup.     The patient overall is doing good.  No headache.  No dizziness.  No neck pain.  No chest pain.  No shortness of breath.  No abdominal pain, nausea or vomiting.  No urinary or bowel complaints.  No abnormal bleeding.  No fever, chills, or night sweats.     Ever since her breast surgery, she gets some pain intermittently in her right shoulder/right scapula.     All other review of systems negative.     PHYSICAL EXAMINATION:   GENERAL:  Alert and oriented x  3.   VITAL SIGNS:  Reviewed.  ECOG PS of 0.     EYES:  No icterus.   THROAT:  No ulcer. No thrush.   NECK:  Supple. No lymphadenopathy.    AXILLAE:  No lymphadenopathy.   LUNGS:  Good air entry bilaterally.  No crackles or wheezing.   HEART:  Regular.  No murmur.   ABDOMEN:  Soft.  Nontender. No mass.   EXTREMITIES:  No pedal edema.  No calf swelling or tenderness.   SKIN:  No rash.   BREASTS:  Examined in the presence of the nurse.  No suspicious mass or skin lesions.     ASSESSMENT:    1.  A 50-year-old female with triple negative right breast cancer diagnosed in 2018.  No evidence of recurrence.  2.  Right shoulder pain.  3.  PALB2 mutation.     PLAN:   1.  The patient is doing well from breast cancer.  No evidence of recurrence.  The patient is at high risk of recurrence.  She has triple-negative disease.  She also has PALB2 mutation.  We will continue alternating mammogram and MRI breast every 6 months.  In 6 months' time, we will get a breast MRI.  She is agreeable for it.  Recent mammogram is normal.  2.  She intermittently gets pain in the right shoulder.  It started ever since her breast surgery.  I told her this is either related to osteoarthritis or rotator cuff problem.  She will take Tylenol as needed.  If pain gets worse, we will plan on getting an MRI.  Right now pain is intermittent and she can do all her activities without much problem.  3.  She had a few questions, which were all answered.  I will see her in 6 months' time with labs.  I advised her to call us with any questions or concerns.  4.  Her blood pressure is elevated.  The patient said that her brother recently .  She has been stressed because of that.  Because of that, her blood pressure is elevated.  The patient is going to have her blood pressure rechecked with her PCP.

## 2021-11-16 ENCOUNTER — ANCILLARY PROCEDURE (OUTPATIENT)
Dept: GENERAL RADIOLOGY | Facility: CLINIC | Age: 51
End: 2021-11-16
Attending: PHYSICIAN ASSISTANT
Payer: COMMERCIAL

## 2021-11-16 ENCOUNTER — OFFICE VISIT (OUTPATIENT)
Dept: FAMILY MEDICINE | Facility: CLINIC | Age: 51
End: 2021-11-16
Payer: COMMERCIAL

## 2021-11-16 VITALS
BODY MASS INDEX: 34.58 KG/M2 | HEART RATE: 76 BPM | SYSTOLIC BLOOD PRESSURE: 146 MMHG | OXYGEN SATURATION: 100 % | WEIGHT: 195.2 LBS | TEMPERATURE: 96.2 F | DIASTOLIC BLOOD PRESSURE: 78 MMHG

## 2021-11-16 DIAGNOSIS — G44.209 TENSION HEADACHE: ICD-10-CM

## 2021-11-16 DIAGNOSIS — R07.89 ATYPICAL CHEST PAIN: ICD-10-CM

## 2021-11-16 DIAGNOSIS — E66.01 MORBID OBESITY (H): Primary | ICD-10-CM

## 2021-11-16 LAB
BASOPHILS # BLD AUTO: 0 10E3/UL (ref 0–0.2)
BASOPHILS NFR BLD AUTO: 0 %
D DIMER PPP FEU-MCNC: <0.27 UG/ML FEU (ref 0–0.5)
EOSINOPHIL # BLD AUTO: 0.1 10E3/UL (ref 0–0.7)
EOSINOPHIL NFR BLD AUTO: 1 %
ERYTHROCYTE [DISTWIDTH] IN BLOOD BY AUTOMATED COUNT: 16.2 % (ref 10–15)
HCT VFR BLD AUTO: 39.7 % (ref 35–47)
HGB BLD-MCNC: 12.9 G/DL (ref 11.7–15.7)
LYMPHOCYTES # BLD AUTO: 3 10E3/UL (ref 0.8–5.3)
LYMPHOCYTES NFR BLD AUTO: 41 %
MCH RBC QN AUTO: 26.5 PG (ref 26.5–33)
MCHC RBC AUTO-ENTMCNC: 32.5 G/DL (ref 31.5–36.5)
MCV RBC AUTO: 82 FL (ref 78–100)
MONOCYTES # BLD AUTO: 0.4 10E3/UL (ref 0–1.3)
MONOCYTES NFR BLD AUTO: 6 %
NEUTROPHILS # BLD AUTO: 3.9 10E3/UL (ref 1.6–8.3)
NEUTROPHILS NFR BLD AUTO: 53 %
PLATELET # BLD AUTO: 284 10E3/UL (ref 150–450)
RBC # BLD AUTO: 4.87 10E6/UL (ref 3.8–5.2)
WBC # BLD AUTO: 7.5 10E3/UL (ref 4–11)

## 2021-11-16 PROCEDURE — 71046 X-RAY EXAM CHEST 2 VIEWS: CPT | Performed by: RADIOLOGY

## 2021-11-16 PROCEDURE — 93000 ELECTROCARDIOGRAM COMPLETE: CPT | Performed by: PHYSICIAN ASSISTANT

## 2021-11-16 PROCEDURE — 96372 THER/PROPH/DIAG INJ SC/IM: CPT | Performed by: PHYSICIAN ASSISTANT

## 2021-11-16 PROCEDURE — 80048 BASIC METABOLIC PNL TOTAL CA: CPT | Performed by: PHYSICIAN ASSISTANT

## 2021-11-16 PROCEDURE — 85025 COMPLETE CBC W/AUTO DIFF WBC: CPT | Performed by: PHYSICIAN ASSISTANT

## 2021-11-16 PROCEDURE — 85379 FIBRIN DEGRADATION QUANT: CPT | Performed by: PHYSICIAN ASSISTANT

## 2021-11-16 PROCEDURE — 99214 OFFICE O/P EST MOD 30 MIN: CPT | Mod: 25 | Performed by: PHYSICIAN ASSISTANT

## 2021-11-16 PROCEDURE — 36415 COLL VENOUS BLD VENIPUNCTURE: CPT | Performed by: PHYSICIAN ASSISTANT

## 2021-11-16 RX ORDER — KETOROLAC TROMETHAMINE 30 MG/ML
30 INJECTION, SOLUTION INTRAMUSCULAR; INTRAVENOUS ONCE
Status: COMPLETED | OUTPATIENT
Start: 2021-11-16 | End: 2021-11-16

## 2021-11-16 RX ORDER — CYCLOBENZAPRINE HCL 5 MG
5 TABLET ORAL 3 TIMES DAILY PRN
Qty: 30 TABLET | Refills: 0 | Status: SHIPPED | OUTPATIENT
Start: 2021-11-16 | End: 2023-02-22

## 2021-11-16 RX ADMIN — KETOROLAC TROMETHAMINE 30 MG: 30 INJECTION, SOLUTION INTRAMUSCULAR; INTRAVENOUS at 15:20

## 2021-11-16 ASSESSMENT — PAIN SCALES - GENERAL: PAINLEVEL: EXTREME PAIN (8)

## 2021-11-16 NOTE — PROGRESS NOTES
"  Assessment & Plan     Atypical chest pain  Chest x ray and EKG are normal per my read today. Will check labs to further assess her chest pain. Pain does not sound cardiac.  May be MSK related.   - XR Chest 2 Views; Future  - EKG 12-lead complete w/read - Clinics  - D dimer, quantitative; Future  - Basic metabolic panel  (Ca, Cl, CO2, Creat, Gluc, K, Na, BUN); Future  - CBC with platelets and differential; Future  - D dimer, quantitative  - Basic metabolic panel  (Ca, Cl, CO2, Creat, Gluc, K, Na, BUN)  - CBC with platelets and differential    Tension headache  Headaches that are severe and constant, no breaking with regular medications.  Neuro exam normal today.   Toradol given and flexeril prescribed.  MRI ordered to assess new onset headaches.   - MR Brain w/o Contrast; Future  - cyclobenzaprine (FLEXERIL) 5 MG tablet; Take 1 tablet (5 mg) by mouth 3 times daily as needed for muscle spasms  - ketorolac (TORADOL) injection 30 mg    Morbid obesity (H)  Per PCP         BMI:   Estimated body mass index is 34.58 kg/m  as calculated from the following:    Height as of 2/12/21: 1.6 m (5' 3\").    Weight as of this encounter: 88.5 kg (195 lb 3.2 oz).       Return in about 1 year (around 11/16/2022) for Physical Exam.    [facility] ADRIANA Verma is a 50 year old who presents for the following health issues     HPI     Headache  Onset/Duration: 1 week  Description  Location: frontal down into shoulders   Character: sharp pain  Frequency:  daily  Duration:  constant  Wake with headaches: YES  Able to do daily activities when headache present: YES  Intensity:  moderate, severe  Progression of Symptoms:  worsening  Accompanying signs and symptoms:  Stiff neck: no  Neck or upper back pain: YES  Sinus or URI symptoms no   Fever: no  Nausea or vomiting: no  Dizziness: NO  Numbness/tingling: YES- face  Weakness: no  Visual changes: none  History  Head trauma: " no  Family history of migraines: no  Daily pain medication use: tylenol, ibuprofen  Previous tests for headaches: no  Neurologist evaluation: no  Precipitating or Alleviating factors (light/sound/sleep/caffeine): none  Therapies tried and outcome: Ibuprofen (Advil, Motrin) and Tylenol              Outcome - not effective  Frequent/daily pain medication use: YES      Luci is a 49 y/o female with 1 week hx of diffuse headache that came on suddenly and has  Not improved with tylenol and motrin.  The headache is located in the temporal region and radiates to the occipital region and into her neck.  She is not having dizziness, changes in vision, n/v, weakness or changes in gait.    Additionally, she has noticed some chest pressure that has been constant for 1 week.  The discomfort is located centrally in her chest.  It is constant and does not change with exertion. The chest symptoms are not associated with dyspnea, n.v or dizziness.       Review of Systems   Constitutional, HEENT, cardiovascular, pulmonary, GI, , musculoskeletal, neuro, skin, endocrine and psych systems are negative, except as otherwise noted.      Objective    BP (!) 146/78 (Cuff Size: Adult Large)   Pulse 76   Temp (!) 96.2  F (35.7  C) (Tympanic)   Wt 88.5 kg (195 lb 3.2 oz)   SpO2 100%   BMI 34.58 kg/m    Body mass index is 34.58 kg/m .  Physical Exam   GENERAL: healthy, alert and no distress  EYES: Eyes grossly normal to inspection, PERRL and conjunctivae and sclerae normal, EOMI  HENT: ear canals and TM's normal, nose and mouth without ulcers or lesions  NECK: no adenopathy, no asymmetry, masses, or scars and thyroid normal to palpation  RESP: lungs clear to auscultation - no rales, rhonchi or wheezes  CV: regular rate and rhythm, normal S1 S2, no S3 or S4, no murmur, click or rub, no peripheral edema  ABDOMEN: soft, nontender, no hepatosplenomegaly, no masses and bowel sounds normal  MS: no gross musculoskeletal defects noted, no  edema  NEURO: Normal strength and tone, mentation intact and speech normal, CN II-XII intact  PSYCH: mentation appears normal, affect normal/bright

## 2021-11-17 LAB
ANION GAP SERPL CALCULATED.3IONS-SCNC: 5 MMOL/L (ref 3–14)
BUN SERPL-MCNC: 10 MG/DL (ref 7–30)
CALCIUM SERPL-MCNC: 9.1 MG/DL (ref 8.5–10.1)
CHLORIDE BLD-SCNC: 103 MMOL/L (ref 94–109)
CO2 SERPL-SCNC: 29 MMOL/L (ref 20–32)
CREAT SERPL-MCNC: 0.78 MG/DL (ref 0.52–1.04)
GFR SERPL CREATININE-BSD FRML MDRD: 90 ML/MIN/1.73M2
GLUCOSE BLD-MCNC: 79 MG/DL (ref 70–99)
POTASSIUM BLD-SCNC: 3.8 MMOL/L (ref 3.4–5.3)
SODIUM SERPL-SCNC: 137 MMOL/L (ref 133–144)

## 2021-11-17 NOTE — RESULT ENCOUNTER NOTE
Luci-  Here are your recent results.     Your labs look great and do not give evidence to an underlying blood clot or electrolyte issue. I hope you are feeling better after starting medication yesterday.  If your headache has persisted, please move forward with the MRI.  I will be in touch when I have this result.    Rober Esparza PA-C

## 2021-11-28 ENCOUNTER — HOSPITAL ENCOUNTER (EMERGENCY)
Facility: CLINIC | Age: 51
Discharge: HOME | End: 2021-11-28
Attending: EMERGENCY MEDICINE | Admitting: EMERGENCY MEDICINE
Payer: COMMERCIAL

## 2021-11-28 ENCOUNTER — APPOINTMENT (OUTPATIENT)
Dept: CT IMAGING | Facility: CLINIC | Age: 51
End: 2021-11-28
Attending: EMERGENCY MEDICINE
Payer: COMMERCIAL

## 2021-11-28 VITALS
DIASTOLIC BLOOD PRESSURE: 72 MMHG | RESPIRATION RATE: 16 BRPM | OXYGEN SATURATION: 99 % | TEMPERATURE: 98.6 F | HEART RATE: 101 BPM | SYSTOLIC BLOOD PRESSURE: 122 MMHG | BODY MASS INDEX: 32.78 KG/M2 | WEIGHT: 185 LBS | HEIGHT: 63 IN

## 2021-11-28 DIAGNOSIS — J03.90 TONSILLITIS: ICD-10-CM

## 2021-11-28 LAB
BASOPHILS # BLD AUTO: 0.1 10E3/UL (ref 0–0.2)
BASOPHILS NFR BLD AUTO: 0 %
DEPRECATED S PYO AG THROAT QL EIA: NEGATIVE
EOSINOPHIL # BLD AUTO: 0.1 10E3/UL (ref 0–0.7)
EOSINOPHIL NFR BLD AUTO: 1 %
ERYTHROCYTE [DISTWIDTH] IN BLOOD BY AUTOMATED COUNT: 15.9 % (ref 10–15)
GROUP A STREP BY PCR: NOT DETECTED
HCT VFR BLD AUTO: 39.1 % (ref 35–47)
HGB BLD-MCNC: 12.8 G/DL (ref 11.7–15.7)
IMM GRANULOCYTES # BLD: 0.1 10E3/UL
IMM GRANULOCYTES NFR BLD: 1 %
LYMPHOCYTES # BLD AUTO: 2 10E3/UL (ref 0.8–5.3)
LYMPHOCYTES NFR BLD AUTO: 16 %
MCH RBC QN AUTO: 27 PG (ref 26.5–33)
MCHC RBC AUTO-ENTMCNC: 32.7 G/DL (ref 31.5–36.5)
MCV RBC AUTO: 83 FL (ref 78–100)
MONOCYTES # BLD AUTO: 0.6 10E3/UL (ref 0–1.3)
MONOCYTES NFR BLD AUTO: 5 %
NEUTROPHILS # BLD AUTO: 10.1 10E3/UL (ref 1.6–8.3)
NEUTROPHILS NFR BLD AUTO: 77 %
NRBC # BLD AUTO: 0 10E3/UL
NRBC BLD AUTO-RTO: 0 /100
PLATELET # BLD AUTO: 290 10E3/UL (ref 150–450)
RBC # BLD AUTO: 4.74 10E6/UL (ref 3.8–5.2)
WBC # BLD AUTO: 12.9 10E3/UL (ref 4–11)

## 2021-11-28 PROCEDURE — 250N000011 HC RX IP 250 OP 636: Performed by: EMERGENCY MEDICINE

## 2021-11-28 PROCEDURE — 96365 THER/PROPH/DIAG IV INF INIT: CPT | Mod: 59

## 2021-11-28 PROCEDURE — 96361 HYDRATE IV INFUSION ADD-ON: CPT

## 2021-11-28 PROCEDURE — 96376 TX/PRO/DX INJ SAME DRUG ADON: CPT | Mod: 59

## 2021-11-28 PROCEDURE — 250N000009 HC RX 250: Performed by: EMERGENCY MEDICINE

## 2021-11-28 PROCEDURE — 70491 CT SOFT TISSUE NECK W/DYE: CPT

## 2021-11-28 PROCEDURE — 96375 TX/PRO/DX INJ NEW DRUG ADDON: CPT | Mod: 59

## 2021-11-28 PROCEDURE — 85041 AUTOMATED RBC COUNT: CPT | Performed by: EMERGENCY MEDICINE

## 2021-11-28 PROCEDURE — 99285 EMERGENCY DEPT VISIT HI MDM: CPT | Mod: 25

## 2021-11-28 PROCEDURE — 258N000003 HC RX IP 258 OP 636: Performed by: EMERGENCY MEDICINE

## 2021-11-28 PROCEDURE — 87651 STREP A DNA AMP PROBE: CPT | Performed by: EMERGENCY MEDICINE

## 2021-11-28 PROCEDURE — 36415 COLL VENOUS BLD VENIPUNCTURE: CPT | Performed by: EMERGENCY MEDICINE

## 2021-11-28 RX ORDER — DEXAMETHASONE SODIUM PHOSPHATE 4 MG/ML
10 INJECTION, SOLUTION INTRA-ARTICULAR; INTRALESIONAL; INTRAMUSCULAR; INTRAVENOUS; SOFT TISSUE ONCE
Status: COMPLETED | OUTPATIENT
Start: 2021-11-28 | End: 2021-11-28

## 2021-11-28 RX ORDER — IOPAMIDOL 755 MG/ML
80 INJECTION, SOLUTION INTRAVASCULAR ONCE
Status: COMPLETED | OUTPATIENT
Start: 2021-11-28 | End: 2021-11-28

## 2021-11-28 RX ORDER — DEXAMETHASONE 4 MG/1
6 TABLET ORAL 2 TIMES DAILY WITH MEALS
Qty: 12 TABLET | Refills: 0 | Status: ON HOLD | OUTPATIENT
Start: 2021-11-28 | End: 2022-01-26

## 2021-11-28 RX ORDER — HYDROMORPHONE HYDROCHLORIDE 1 MG/ML
0.5 INJECTION, SOLUTION INTRAMUSCULAR; INTRAVENOUS; SUBCUTANEOUS ONCE
Status: COMPLETED | OUTPATIENT
Start: 2021-11-28 | End: 2021-11-28

## 2021-11-28 RX ORDER — HYDROCODONE BITARTRATE AND ACETAMINOPHEN 5; 325 MG/1; MG/1
1-2 TABLET ORAL EVERY 6 HOURS PRN
Qty: 10 TABLET | Refills: 0 | Status: SHIPPED | OUTPATIENT
Start: 2021-11-28 | End: 2021-12-01

## 2021-11-28 RX ORDER — CLINDAMYCIN HCL 300 MG
300 CAPSULE ORAL 4 TIMES DAILY
Qty: 40 CAPSULE | Refills: 0 | Status: SHIPPED | OUTPATIENT
Start: 2021-11-28 | End: 2021-12-08

## 2021-11-28 RX ORDER — CLINDAMYCIN PHOSPHATE 600 MG/50ML
600 INJECTION, SOLUTION INTRAVENOUS ONCE
Status: COMPLETED | OUTPATIENT
Start: 2021-11-28 | End: 2021-11-28

## 2021-11-28 RX ORDER — HYDROMORPHONE HYDROCHLORIDE 1 MG/ML
0.5 INJECTION, SOLUTION INTRAMUSCULAR; INTRAVENOUS; SUBCUTANEOUS
Status: COMPLETED | OUTPATIENT
Start: 2021-11-28 | End: 2021-11-28

## 2021-11-28 RX ADMIN — CLINDAMYCIN PHOSPHATE 600 MG: 600 INJECTION, SOLUTION INTRAVENOUS at 19:50

## 2021-11-28 RX ADMIN — HYDROMORPHONE HYDROCHLORIDE 0.5 MG: 1 INJECTION, SOLUTION INTRAMUSCULAR; INTRAVENOUS; SUBCUTANEOUS at 18:26

## 2021-11-28 RX ADMIN — IOPAMIDOL 80 ML: 755 INJECTION, SOLUTION INTRAVENOUS at 19:31

## 2021-11-28 RX ADMIN — SODIUM CHLORIDE 60 ML: 900 INJECTION INTRAVENOUS at 19:31

## 2021-11-28 RX ADMIN — SODIUM CHLORIDE 1000 ML: 9 INJECTION, SOLUTION INTRAVENOUS at 18:24

## 2021-11-28 RX ADMIN — HYDROMORPHONE HYDROCHLORIDE 0.5 MG: 1 INJECTION, SOLUTION INTRAMUSCULAR; INTRAVENOUS; SUBCUTANEOUS at 19:55

## 2021-11-28 RX ADMIN — DEXAMETHASONE SODIUM PHOSPHATE 10 MG: 4 INJECTION, SOLUTION INTRAMUSCULAR; INTRAVENOUS at 18:24

## 2021-11-28 ASSESSMENT — ENCOUNTER SYMPTOMS
SORE THROAT: 1
CHEST TIGHTNESS: 0
NECK PAIN: 1
FEVER: 0
COUGH: 0
RHINORRHEA: 0
CHILLS: 0

## 2021-11-28 ASSESSMENT — MIFFLIN-ST. JEOR: SCORE: 1433.28

## 2021-11-28 NOTE — LETTER
November 28, 2021      To Whom It May Concern:      Luci Londono was seen in our Emergency Department today, 11/28/21.  I expect her condition to improve over the next 1- 2 days.  She may return to work on Tuesday.    Sincerely,        Kasie Sal MD

## 2021-11-28 NOTE — ED TRIAGE NOTES
Sore throat since yesterday and getting worse, took some Ibuprofen around 5 pm. Now throat pain is worse with neck stiffness, pain radiating to left ear, also noted left neck gland is enlarged, only can swallow small sips of water since.

## 2021-11-29 NOTE — ED PROVIDER NOTES
"  History     Chief Complaint:  Throat Pain and Otalgia      HPI   Luci Londono is a 50 year old female who presents with sore throat.  It started yesterday and has progressed today with pain throughout the left side of her throat and neck and even into the back of her neck.  It is hard for her to swallow.  It has not changed her voice, she does not feel like there is any problems with her airway.  She has not had a fever but does not feel well.  She came in because of the pain.  It does radiate into her left ear as well.    Review of Systems   Constitutional: Negative for chills and fever.   HENT: Positive for sore throat. Negative for rhinorrhea.    Respiratory: Negative for cough and chest tightness.    Musculoskeletal: Positive for neck pain.   All other systems reviewed and are negative.        Allergies:  No Known Allergies      Medications:    Aspirin 81 mg  Flexeril   Atarax  Synthroid  Glucophage XR  Prilosec    Past Medical History:    Hypertension  Thyroid disease  Breast cancer  Hyperlipidemia  Obese   GERD  H.pylori    Past Surgical History:    Weiner node biopsy  Dissect axilla lymph node  Insert port   Breast lumpectomy  Remove port   Tubal ligation    Family History:    Mother - diabetes    Social History:      Physical Exam     Patient Vitals for the past 24 hrs:   BP Temp Temp src Pulse Resp SpO2 Height Weight   11/28/21 2030 122/72 -- -- 101 -- 99 % -- --   11/28/21 1754 (!) 178/89 98.6  F (37  C) Oral 106 16 100 % 1.6 m (5' 3\") 83.9 kg (185 lb)       Physical Exam  Nursing note and vitals reviewed.  Constitutional:  Alert.  Appears uncomfortable.   HENT:    She has left tonsillar swelling with exudate and redness.  There is no evidence of an abscess at this point.  Voice is normal.  She has no nasal discharge.  I do not see any swelling in her face or the neck.  Does have tenderness on the left side of the neck and the anterior cervical lymph no change but no real significant swelling.  " Trachea is midline.  Her neck is supple.  Eyes:    Conjunctivae are normal.      Right eye exhibits no discharge. Left eye exhibits no discharge.   Cardiovascular:  Normal rate, regular rhythm.      Normal heart sounds.     No murmur heard.  Pulmonary/Chest:  Breath sounds clear. No respiratory distress.     No stridor.   Lymph:   She has very tender left-sided anterior cervical and submandibular lymph nodes but they are minimally swollen.  Neurological:   Alert and appropriate. No focal weakness.  Skin:    Skin is warm and dry. No rash noted. No diaphoresis.      No erythema. No pallor.   Psychiatric:   Behavior is normal. Judgment and thought content normal.         Emergency Department Course     Imaging:  Soft tissue neck CT w contrast   Final Result   IMPRESSION:    1.  Enlarged, edematous nasopharyngeal, pharyngeal, and lingual tonsillar tissues bilaterally.   2.  Associated edema/fluid in fossa of Rosenmuller bilaterally.   3.  Findings most consistent with an inflammatory/infectious process.   4.  No definite evidence for a drainable fluid collection.   5.  Prominent and some mildly to moderately enlarged lymph nodes upper neck bilaterally, likely reactive.          Laboratory:  Labs Ordered and Resulted from Time of ED Arrival to Time of ED Departure   CBC WITH PLATELETS AND DIFFERENTIAL - Abnormal       Result Value    WBC Count 12.9 (*)     RBC Count 4.74      Hemoglobin 12.8      Hematocrit 39.1      MCV 83      MCH 27.0      MCHC 32.7      RDW 15.9 (*)     Platelet Count 290      % Neutrophils 77      % Lymphocytes 16      % Monocytes 5      % Eosinophils 1      % Basophils 0      % Immature Granulocytes 1      NRBCs per 100 WBC 0      Absolute Neutrophils 10.1 (*)     Absolute Lymphocytes 2.0      Absolute Monocytes 0.6      Absolute Eosinophils 0.1      Absolute Basophils 0.1      Absolute Immature Granulocytes 0.1 (*)     Absolute NRBCs 0.0     STREPTOCOCCUS A RAPID SCREEN W REFELX TO PCR - Normal     Group A Strep antigen Negative     GROUP A STREPTOCOCCUS PCR THROAT SWAB           Emergency Department Course:    Reviewed:  I reviewed nursing notes, vitals, past history and care everywhere    Assessments:  1815 I obtained history and examined the patient as noted above.   2035 I rechecked the patient and explained findings.         Interventions:    Medications   0.9% sodium chloride BOLUS (0 mLs Intravenous Stopped 11/28/21 1950)   HYDROmorphone (PF) (DILAUDID) injection 0.5 mg (0.5 mg Intravenous Given 11/28/21 1826)   dexamethasone (DECADRON) injection 10 mg (10 mg Intravenous Given 11/28/21 1824)   clindamycin (CLEOCIN) infusion 600 mg (0 mg Intravenous Stopped 11/28/21 2041)   Saline (60 mLs As instructed Given 11/28/21 1931)   iopamidol (ISOVUE-370) solution 80 mL (80 mLs Intravenous Given 11/28/21 1931)   HYDROmorphone (PF) (DILAUDID) injection 0.5 mg (0.5 mg Intravenous Given 11/28/21 1955)       Disposition:  The patient was discharged to home.    Impression & Plan      Medical Decision Making:  Patient comes in with a significant sore throat over the last 24 hours.  She has an exudative tonsil on the left but no evidence of a peritonsillar abscess though.  It has gotten painful even in the back of her throat and back of her neck so I did get a CT scan to make sure she did not have a retropharyngeal abscess.  There was just evidence of a soft tissue infection around the tonsils.  No abscess evident.  Her white count was mildly elevated.  She was given 600 mg of IV clindamycin and 10 mg of IV Decadron to help reduce the swelling.  She also received 2 doses of Dilaudid which helped her pain.  Even though there is not an abscess at this time, I feel we need to be aggressive with antibiotics and steroid to reduce the swelling.  She can be discharged home but she was encouraged to return if she develops significant worsening of pain and inability to even control her saliva or she feels like she is having  swelling of her airway.  She will contact her doctor tomorrow with an update.  I am going to prescribe some pain medicine for her as well.    Gargle with warm salt water and or Listerine, push fluids, Vitamin C, rest.  Motrin and or Norco as needed for pain.  Decadron twice a day for 4 more days as directed.  Clindamycin 4 times a day for 10 days.  Recheck in the clinic in 2-3 days if not improving, return to the ER if you get significantly worse and cannot swallow even your saliva or you have trouble with breathing in your throat.      Covid-19  Luci C Alert was evaluated during a global COVID-19 pandemic, which necessitated consideration that the patient might be at risk for infection with the SARS-CoV-2 virus that causes COVID-19.   Applicable protocols for evaluation were followed during the patient's care.   COVID-19 was considered as part of the patient's evaluation. The plan for testing is:  a test was obtained at a previous visit and reviewed & considered today.  2 negative tests today.    Diagnosis:    ICD-10-CM    1. Tonsillitis  J03.90        Discharge Medications:  New Prescriptions    CLINDAMYCIN (CLEOCIN) 300 MG CAPSULE    Take 1 capsule (300 mg) by mouth 4 times daily for 10 days    DEXAMETHASONE (DECADRON) 4 MG TABLET    Take 1.5 tablets (6 mg) by mouth 2 times daily (with meals) for 4 days    HYDROCODONE-ACETAMINOPHEN (NORCO) 5-325 MG TABLET    Take 1-2 tablets by mouth every 6 hours as needed for severe pain         Scribe Disclosure:  I, Johana Mccormick, am serving as a scribe at 6:06 PM on 11/28/2021 to document services personally performed by Kasie Sal MD based on my observations and the provider's statements to me.      Kasie Sal MD  11/28/21 2050

## 2021-11-29 NOTE — DISCHARGE INSTRUCTIONS
Gargle with warm salt water and or Listerine, push fluids, Vitamin C, rest.  Motrin and or Norco as needed for pain.  Decadron twice a day for 4 more days as directed.  Clindamycin 4 times a day for 10 days.  Recheck in the clinic in 2-3 days if not improving, return to the ER if you get significantly worse and cannot swallow even your saliva or you have trouble with breathing in your throat.

## 2021-12-01 ENCOUNTER — HOSPITAL ENCOUNTER (EMERGENCY)
Facility: CLINIC | Age: 51
Discharge: HOME | End: 2021-12-01
Attending: NURSE PRACTITIONER | Admitting: NURSE PRACTITIONER
Payer: COMMERCIAL

## 2021-12-01 ENCOUNTER — NURSE TRIAGE (OUTPATIENT)
Dept: NURSING | Facility: CLINIC | Age: 51
End: 2021-12-01
Payer: COMMERCIAL

## 2021-12-01 ENCOUNTER — APPOINTMENT (OUTPATIENT)
Dept: CT IMAGING | Facility: CLINIC | Age: 51
End: 2021-12-01
Attending: PHYSICIAN ASSISTANT
Payer: COMMERCIAL

## 2021-12-01 VITALS
RESPIRATION RATE: 22 BRPM | OXYGEN SATURATION: 100 % | HEIGHT: 65 IN | BODY MASS INDEX: 30.82 KG/M2 | SYSTOLIC BLOOD PRESSURE: 129 MMHG | HEART RATE: 53 BPM | TEMPERATURE: 97.8 F | WEIGHT: 185 LBS | DIASTOLIC BLOOD PRESSURE: 71 MMHG

## 2021-12-01 DIAGNOSIS — R10.84 ABDOMINAL PAIN, GENERALIZED: ICD-10-CM

## 2021-12-01 LAB
ALBUMIN SERPL-MCNC: 3.2 G/DL (ref 3.4–5)
ALBUMIN UR-MCNC: NEGATIVE MG/DL
ALP SERPL-CCNC: 102 U/L (ref 40–150)
ALT SERPL W P-5'-P-CCNC: 25 U/L (ref 0–50)
ANION GAP SERPL CALCULATED.3IONS-SCNC: 5 MMOL/L (ref 3–14)
APPEARANCE UR: CLEAR
AST SERPL W P-5'-P-CCNC: 13 U/L (ref 0–45)
BASOPHILS # BLD AUTO: 0 10E3/UL (ref 0–0.2)
BASOPHILS NFR BLD AUTO: 0 %
BILIRUB SERPL-MCNC: 0.2 MG/DL (ref 0.2–1.3)
BILIRUB UR QL STRIP: NEGATIVE
BUN SERPL-MCNC: 12 MG/DL (ref 7–30)
CALCIUM SERPL-MCNC: 8.5 MG/DL (ref 8.5–10.1)
CHLORIDE BLD-SCNC: 109 MMOL/L (ref 94–109)
CO2 SERPL-SCNC: 26 MMOL/L (ref 20–32)
COLOR UR AUTO: NORMAL
CREAT SERPL-MCNC: 0.69 MG/DL (ref 0.52–1.04)
EOSINOPHIL # BLD AUTO: 0 10E3/UL (ref 0–0.7)
EOSINOPHIL NFR BLD AUTO: 0 %
ERYTHROCYTE [DISTWIDTH] IN BLOOD BY AUTOMATED COUNT: 16.2 % (ref 10–15)
GFR SERPL CREATININE-BSD FRML MDRD: >90 ML/MIN/1.73M2
GLUCOSE BLD-MCNC: 88 MG/DL (ref 70–99)
GLUCOSE UR STRIP-MCNC: NEGATIVE MG/DL
HCT VFR BLD AUTO: 38.6 % (ref 35–47)
HGB BLD-MCNC: 12.6 G/DL (ref 11.7–15.7)
HGB UR QL STRIP: NEGATIVE
HOLD SPECIMEN: NORMAL
HOLD SPECIMEN: NORMAL
IMM GRANULOCYTES # BLD: 0.3 10E3/UL
IMM GRANULOCYTES NFR BLD: 2 %
KETONES UR STRIP-MCNC: NEGATIVE MG/DL
LEUKOCYTE ESTERASE UR QL STRIP: NEGATIVE
LIPASE SERPL-CCNC: 58 U/L (ref 73–393)
LYMPHOCYTES # BLD AUTO: 3.3 10E3/UL (ref 0.8–5.3)
LYMPHOCYTES NFR BLD AUTO: 22 %
MCH RBC QN AUTO: 26.3 PG (ref 26.5–33)
MCHC RBC AUTO-ENTMCNC: 32.6 G/DL (ref 31.5–36.5)
MCV RBC AUTO: 81 FL (ref 78–100)
MONOCYTES # BLD AUTO: 0.8 10E3/UL (ref 0–1.3)
MONOCYTES NFR BLD AUTO: 6 %
NEUTROPHILS # BLD AUTO: 10.5 10E3/UL (ref 1.6–8.3)
NEUTROPHILS NFR BLD AUTO: 70 %
NITRATE UR QL: NEGATIVE
NRBC # BLD AUTO: 0 10E3/UL
NRBC BLD AUTO-RTO: 0 /100
PH UR STRIP: 6.5 [PH] (ref 5–7)
PLATELET # BLD AUTO: 383 10E3/UL (ref 150–450)
POTASSIUM BLD-SCNC: 3.5 MMOL/L (ref 3.4–5.3)
PROT SERPL-MCNC: 7.8 G/DL (ref 6.8–8.8)
RBC # BLD AUTO: 4.79 10E6/UL (ref 3.8–5.2)
RBC URINE: 1 /HPF
SODIUM SERPL-SCNC: 140 MMOL/L (ref 133–144)
SP GR UR STRIP: 1 (ref 1–1.03)
SQUAMOUS EPITHELIAL: <1 /HPF
UROBILINOGEN UR STRIP-MCNC: NORMAL MG/DL
WBC # BLD AUTO: 14.9 10E3/UL (ref 4–11)
WBC URINE: 0 /HPF

## 2021-12-01 PROCEDURE — 99285 EMERGENCY DEPT VISIT HI MDM: CPT | Mod: 25

## 2021-12-01 PROCEDURE — 250N000011 HC RX IP 250 OP 636: Performed by: PHYSICIAN ASSISTANT

## 2021-12-01 PROCEDURE — 250N000013 HC RX MED GY IP 250 OP 250 PS 637: Performed by: PHYSICIAN ASSISTANT

## 2021-12-01 PROCEDURE — 81001 URINALYSIS AUTO W/SCOPE: CPT | Performed by: PHYSICIAN ASSISTANT

## 2021-12-01 PROCEDURE — 82040 ASSAY OF SERUM ALBUMIN: CPT | Performed by: PHYSICIAN ASSISTANT

## 2021-12-01 PROCEDURE — 85025 COMPLETE CBC W/AUTO DIFF WBC: CPT | Performed by: PHYSICIAN ASSISTANT

## 2021-12-01 PROCEDURE — 96374 THER/PROPH/DIAG INJ IV PUSH: CPT | Mod: 59

## 2021-12-01 PROCEDURE — 36415 COLL VENOUS BLD VENIPUNCTURE: CPT | Performed by: PHYSICIAN ASSISTANT

## 2021-12-01 PROCEDURE — 74177 CT ABD & PELVIS W/CONTRAST: CPT

## 2021-12-01 PROCEDURE — 96375 TX/PRO/DX INJ NEW DRUG ADDON: CPT

## 2021-12-01 PROCEDURE — 250N000009 HC RX 250: Performed by: PHYSICIAN ASSISTANT

## 2021-12-01 PROCEDURE — 83690 ASSAY OF LIPASE: CPT | Performed by: PHYSICIAN ASSISTANT

## 2021-12-01 RX ORDER — DIPHENHYDRAMINE HYDROCHLORIDE 50 MG/ML
50 INJECTION INTRAMUSCULAR; INTRAVENOUS ONCE
Status: COMPLETED | OUTPATIENT
Start: 2021-12-01 | End: 2021-12-01

## 2021-12-01 RX ORDER — IOPAMIDOL 755 MG/ML
93 INJECTION, SOLUTION INTRAVASCULAR ONCE
Status: COMPLETED | OUTPATIENT
Start: 2021-12-01 | End: 2021-12-01

## 2021-12-01 RX ORDER — HYDROMORPHONE HYDROCHLORIDE 1 MG/ML
0.5 INJECTION, SOLUTION INTRAMUSCULAR; INTRAVENOUS; SUBCUTANEOUS
Status: COMPLETED | OUTPATIENT
Start: 2021-12-01 | End: 2021-12-01

## 2021-12-01 RX ADMIN — SODIUM CHLORIDE 76 ML: 9 INJECTION, SOLUTION INTRAVENOUS at 13:41

## 2021-12-01 RX ADMIN — EPINEPHRINE 0.3 MG: 1 INJECTION INTRAMUSCULAR; INTRAVENOUS; SUBCUTANEOUS at 11:52

## 2021-12-01 RX ADMIN — FAMOTIDINE 20 MG: 10 INJECTION, SOLUTION INTRAVENOUS at 10:54

## 2021-12-01 RX ADMIN — IOPAMIDOL 93 ML: 755 INJECTION, SOLUTION INTRAVENOUS at 13:40

## 2021-12-01 RX ADMIN — DIPHENHYDRAMINE HYDROCHLORIDE 50 MG: 50 INJECTION, SOLUTION INTRAMUSCULAR; INTRAVENOUS at 10:45

## 2021-12-01 RX ADMIN — LIDOCAINE HYDROCHLORIDE 30 ML: 20 SOLUTION ORAL; TOPICAL at 10:44

## 2021-12-01 RX ADMIN — HYDROMORPHONE HYDROCHLORIDE 0.5 MG: 1 INJECTION, SOLUTION INTRAMUSCULAR; INTRAVENOUS; SUBCUTANEOUS at 12:42

## 2021-12-01 ASSESSMENT — ENCOUNTER SYMPTOMS
DIARRHEA: 0
DIZZINESS: 0
SPEECH DIFFICULTY: 0
BLOOD IN STOOL: 0
NAUSEA: 0
SHORTNESS OF BREATH: 0
ABDOMINAL PAIN: 1
VOMITING: 0
TROUBLE SWALLOWING: 0

## 2021-12-01 ASSESSMENT — MIFFLIN-ST. JEOR: SCORE: 1460.03

## 2021-12-01 NOTE — TELEPHONE ENCOUNTER
Was in the ED on 11/28/2021    She is having abdominal pain    This started yesterday    Is not feeling short of breath    Is able to swallow    No swelling    No rash    No diarrhea    Also having leg pain    The pain is constant    The pain radiates to the low back and to the legs    Rates her an 8/10    Nothing makes it better    Nothing is making it worse    No nausea    No vomiting    No blood in the urine    Advised ED evaluation    Bertha Lopez RN  [facility] Nurse Advisor  8:52 AM  12/1/2021    COVID 19 Nurse Triage Plan/Patient Instructions    Please be aware that novel coronavirus (COVID-19) may be circulating in the community. If you develop symptoms such as fever, cough, or SOB or if you have concerns about the presence of another infection including coronavirus (COVID-19), please contact your health care provider or visit [email or web address].     Disposition/Instructions    ED Visit recommended. Follow protocol based instructions.     Bring Your Own Device:  Please also bring your smart device(s) (smart phones, tablets, laptops) and their charging cables for your personal use and to communicate with your care team during your visit.    Thank you for taking steps to prevent the spread of this virus.  o Limit your contact with others.  o Wear a simple mask to cover your cough.  o Wash your hands well and often.    Resources    [facility]: About COVID-19: [email or web address]    CDC: What to Do If You're Sick: www.cdc.gov/coronavirus/2019-ncov/about/steps-when-sick.html    CDC: Ending Home Isolation: www.cdc.gov/coronavirus/2019-ncov/hcp/disposition-in-home-patients.html     CDC: Caring for Someone: www.cdc.gov/coronavirus/2019-ncov/if-you-are-sick/care-for-someone.html     [facility]: Interim Guidance for Hospital Discharge to Home: www.health.[facility].[place]/diseases/coronavirus/hcp/hospdischarge.pdf    [facility] clinical trials (COVID-19 research studies):  clinicalaffairs.[facility]-clinical-trials     Below are the COVID-19 hotlines at the [place] Department of [facility] ([facility]). Interpreters are available.   o For health questions: Call 396-354-6602 or 1-184.734.7087 (7 a.m. to 7 p.m.)  o For questions about schools and childcare: Call 326-412-6503 or 1-865.866.3702 (7 a.m. to 7 p.m.)                         Reason for Disposition    SEVERE abdominal pain (e.g., excruciating)    Additional Information    Negative: Passed out (i.e., fainted, collapsed and was not responding)    Negative: Shock suspected (e.g., cold/pale/clammy skin, too weak to stand, low BP, rapid pulse)    Negative: Sounds like a life-threatening emergency to the triager    Protocols used: ABDOMINAL PAIN - FEMALE-A-[place]

## 2021-12-01 NOTE — DISCHARGE INSTRUCTIONS
-The provider has evaluated your abdominal pain. No clear cause today but the provider suspects you may be experiencing stomach inflammation.  Possible this is from the recent medications (antibiotic and oral steroid).    -Stop the antibiotic (clindamycin) and steroid (decadron)

## 2021-12-01 NOTE — LETTER
December 1, 2021      To Whom It May Concern:      Luci Londono was seen in our Emergency Department today, 12/01/21.  I expect her condition to improve over the next 1-2 days.  She may return to work/school when improved.    Sincerely,        Carmen Polanco, RN, BSN, PHN

## 2021-12-01 NOTE — ED PROVIDER NOTES
History   Chief Complaint:  Abdominal Pain    The history is provided by the patient and medical records.      Luci Londono is a 51 year old female with history of breast cancer, hypertension, hypothyroidism, and polycystic ovaries who presents with chest pain and abdominal pain. The patient states she was here in the emergency department for a throat infection on Sunday 3 days ago, and was discharged with clindamycin, decadron, and Norco. She started taking the clindamycin on Monday 2 days ago and began to have a gradual onset of pain from her ribs down to her upper lower extremities along with constant abdominal pain that radiates up to her chest. She states pain has worsened. The patient states she has numbness, however it has been clarified that this is more of a stabbing pain. She states she has not taken Norco yet. She denies any shortness of breath, nausea, vomting, diarrhea, black or bloody stools, dizziness, speech difficulty, or trouble swallowing. Notes she has been urinating normally. She is not currently on hormones. Denies tobacco or alcohol use.     She called clinic today due to symptoms and was directed to the emergency department. She was seen on the 28t for throat and ear pain. She states pain started the day before that was worsening. She had left sided tonsillar swelling. CT imaging was done that did not show any abscess, however did show findings of tonsillitis. Strep test was negative. She was given Dilaudid, dexamethasone, and clindamycin during her emergency department visit. She was prescribed a dose of oral clindamycin and 4 doses of dexamethasone.     Review of Systems   HENT: Negative for trouble swallowing.    Respiratory: Negative for shortness of breath.    Cardiovascular: Positive for chest pain.   Gastrointestinal: Positive for abdominal pain. Negative for blood in stool, diarrhea, nausea and vomiting.   Genitourinary: Negative.    Neurological: Negative for dizziness and  "speech difficulty.   All other systems reviewed and are negative.    Allergies:  The patient has no known allergies.     Medications:  ASA  Cleocin  Flexeril  Decadron  [place]  Atarax  Synthroid  Glucophage  Prilosec    Past Medical History:     Hypertension  Lateral epicondylitis  Port-A-Cath in place  Thyroid disease  Hypothyroidism  Polycystic ovaries  Vitamin D insufficiency  Absence of menstruation  Pinguecula of both eyes  Morbid obesity  Malignant neoplasm of upper-outer quadrant of right breast in female, estrogen receptor negative   Breast cancer  Monoallelic mutation of PALB2 gene      Past Surgical History:    Lymphadenectomy, right axillary  Hand surgery, left  Lumpectomy, right  Tubal ligation     Family History:    Mother - Diabetes    Social History:  The patient was unaccompanied to the emergency department.    Physical Exam     Patient Vitals for the past 24 hrs:   BP Temp Temp src Pulse Resp SpO2 Height Weight   12/01/21 1330 136/74 -- -- 59 -- 100 % -- --   12/01/21 1300 130/74 -- -- 67 -- 100 % -- --   12/01/21 1230 (!) 141/78 -- -- 69 -- 100 % -- --   12/01/21 1200 135/76 -- -- 55 -- 100 % -- --   12/01/21 1130 (!) 147/94 -- -- 55 -- 100 % -- --   12/01/21 1100 (!) 156/89 -- -- 74 -- 94 % -- --   12/01/21 1021 (!) 153/92 97.8  F (36.6  C) Oral 55 22 100 % 1.651 m (5' 5\") 83.9 kg (185 lb)     Physical Exam  Vitals and nursing note reviewed.   Constitutional:       Appearance: She is not toxic-appearing or diaphoretic.      Comments: Uncomfortable appearing   HENT:      Head: Normocephalic.      Right Ear: External ear normal.      Left Ear: External ear normal.      Mouth/Throat:      Mouth: Mucous membranes are moist.      Pharynx: Oropharynx is clear. No oropharyngeal exudate or posterior oropharyngeal erythema.      Comments: No mouth lesions. No oropharyngeal edema.  Clear speech.  No trismus.   Eyes:      Conjunctiva/sclera: Conjunctivae normal.   Cardiovascular:      Rate and Rhythm: Normal " rate and regular rhythm.      Pulses: Normal pulses.      Heart sounds: Normal heart sounds.   Pulmonary:      Effort: Pulmonary effort is normal. No respiratory distress.      Breath sounds: Normal breath sounds.   Abdominal:      General: There is no distension.      Palpations: Abdomen is soft.      Tenderness: There is abdominal tenderness. There is no right CVA tenderness, left CVA tenderness, guarding or rebound.      Comments: TTP to abd, primarily in epigastrium    Musculoskeletal:         General: No swelling or tenderness. Normal range of motion.      Cervical back: Normal range of motion and neck supple. No rigidity.      Right lower leg: No edema.      Left lower leg: No edema.   Skin:     General: Skin is warm.      Capillary Refill: Capillary refill takes less than 2 seconds.      Findings: No rash.   Neurological:      General: No focal deficit present.      Mental Status: She is alert.      Sensory: No sensory deficit.      Motor: No weakness.      Comments: Face symmetric  Clear speech  No drift  Normal heel to shin  Neg rhomberg  Equal DF/PF and   Sensation grossly intact to light touch to extremities  Ambulatory    Psychiatric:         Mood and Affect: Mood normal.         Behavior: Behavior normal.         Thought Content: Thought content normal.         Judgment: Judgment normal.       Emergency Department Course   Imaging:  CT Abdomen Pelvis w Contrast   Preliminary Result   IMPRESSION:    No CT finding to explain patient's symptoms of abdominal pain. The   appendix is visualized and appears normal.      Report per radiology    Laboratory:  Labs Ordered and Resulted from Time of ED Arrival to Time of ED Departure   COMPREHENSIVE METABOLIC PANEL - Abnormal       Result Value    Sodium 140      Potassium 3.5      Chloride 109      Carbon Dioxide (CO2) 26      Anion Gap 5      Urea Nitrogen 12      Creatinine 0.69      Calcium 8.5      Glucose 88      Alkaline Phosphatase 102      AST 13       ALT 25      Protein Total 7.8      Albumin 3.2 (*)     Bilirubin Total 0.2      GFR Estimate >90     LIPASE - Abnormal    Lipase 58 (*)    CBC WITH PLATELETS AND DIFFERENTIAL - Abnormal    WBC Count 14.9 (*)     RBC Count 4.79      Hemoglobin 12.6      Hematocrit 38.6      MCV 81      MCH 26.3 (*)     MCHC 32.6      RDW 16.2 (*)     Platelet Count 383      % Neutrophils 70      % Lymphocytes 22      % Monocytes 6      % Eosinophils 0      % Basophils 0      % Immature Granulocytes 2      NRBCs per 100 WBC 0      Absolute Neutrophils 10.5 (*)     Absolute Lymphocytes 3.3      Absolute Monocytes 0.8      Absolute Eosinophils 0.0      Absolute Basophils 0.0      Absolute Immature Granulocytes 0.3      Absolute NRBCs 0.0     ROUTINE UA WITH MICROSCOPIC REFLEX TO CULTURE - Normal    Color Urine Straw      Appearance Urine Clear      Glucose Urine Negative      Bilirubin Urine Negative      Ketones Urine Negative      Specific Gravity Urine 1.004      Blood Urine Negative      pH Urine 6.5      Protein Albumin Urine Negative      Urobilinogen Urine Normal      Nitrite Urine Negative      Leukocyte Esterase Urine Negative      RBC Urine 1      WBC Urine 0      Squamous Epithelials Urine <1       Emergency Department Course:  Reviewed:  I reviewed nursing notes, vitals, past medical history and Care Everywhere    Assessments:  1023 I obtained history and examined the patient as noted above.   1137 I rechecked the patient and explained findings. The patient states she does not feel improved. Discussed trial with epinephrine. She is in agreement with plan.   1231 I rechecked the patient. Labs and urinalysis were reviewed and discussed with the patient.   1344 The patient was sent for CT imaging.   1406 I rechecked the patient. She is having improvement of symptoms. We are awaiting CT results.  1416 I rechecked the patient. I discussed CT imaging results.     Interventions:  1044 Xylocaine & Maalox 30 mL PO  1045 Benadryl 50  "mg IV  1054 Pepcid 20 mg IV  1152 Epinephrine 0.3 mg IM  1242 Omnipaque 25 mL PO  1242 Dilaudid 0.5 mg IV    Disposition:  The patient was discharged to home.     Impression & Plan   Medical Decision Makin y/o female presents for abdominal cramping that radiates to her legs in the setting of new medications (clinda and dex) for tonsillitis.  Seen here 2 days ago for tonsillitis.  CT reassuring against abscess.  Strep neg.  On exam, uncomfortable appearing but not distressed.  Pain is primarily upper abd.  ABD is soft and not distended.  Pt initially reported \"numbness\" sensation but on further discussion it is a cramping/sharp pains.  She denies change to sensation to her arms or legs and is grossly neuro intact.      At this time consider GI intolerance to medications.  Gastritis.  Felt less likely gut anaphylaxis (no diarrhea, no Hx of allergic reactions, no rash etc), but will reconsider if symptoms not improving with initial meds here. Felt less likely acute appy or janae, or SBO, etc so will hold on imaging for now unless symptoms not improving or labs worrisome etc.  Will medicate with IV benadryl and pepcid and GI cocktail and re-eval.  She is comfortable with plan.     Update: Reassuring evaluation here.  Labs and CT without worrisome cause for symptoms.  Gave dose of epi for consideration of gut anaphylaxis without much improvement.  Pain improved with dilaudid.  Discussed she is felt stable for discharge but ideal to recheck with established PCP before end of the week.  Discussed clinically suspect gastritis or GI intolerance to clinda.  Felt reasonable to STOP prior Rx clinda and steroid as throat pain markedly improved and on exam oropharynx unremarkable and she was strep neg, etc.  Pt educated on S/S that should prompt ED re-eval (See D/C sheet).  Questions answered. Verbalized understanding. Comfortable with plan and appreciative.     Diagnosis:    ICD-10-CM    1. Abdominal pain, generalized  " R10.84      Discharge Medications:  New Prescriptions    OMEPRAZOLE (PRILOSEC) 20 MG DR CAPSULE    Take 1 capsule (20 mg) by mouth daily for 15 days     Scribe Disclosure:  I, Katrin Hinton, am serving as a scribe at 10:19 AM on 12/1/2021 to document services personally performed by Anne-Marie Redding, LORETA based on my observations and the provider's statements to me.

## 2021-12-01 NOTE — ED TRIAGE NOTES
Patient was given a new medication and today she is having severe numbness and pain from the ribs down.  She is on clynda/dex/norco.  She started them due to a throat infection.

## 2021-12-08 ENCOUNTER — HOSPITAL ENCOUNTER (OUTPATIENT)
Dept: MRI IMAGING | Facility: CLINIC | Age: 51
Discharge: HOME | End: 2021-12-08
Attending: PHYSICIAN ASSISTANT | Admitting: PHYSICIAN ASSISTANT
Payer: COMMERCIAL

## 2021-12-08 DIAGNOSIS — G44.209 TENSION HEADACHE: ICD-10-CM

## 2021-12-08 PROCEDURE — 70551 MRI BRAIN STEM W/O DYE: CPT

## 2021-12-15 ENCOUNTER — OFFICE VISIT (OUTPATIENT)
Dept: FAMILY MEDICINE | Facility: CLINIC | Age: 51
End: 2021-12-15
Payer: COMMERCIAL

## 2021-12-15 VITALS
TEMPERATURE: 97.5 F | OXYGEN SATURATION: 100 % | RESPIRATION RATE: 16 BRPM | DIASTOLIC BLOOD PRESSURE: 84 MMHG | BODY MASS INDEX: 32.46 KG/M2 | SYSTOLIC BLOOD PRESSURE: 130 MMHG | HEART RATE: 73 BPM | WEIGHT: 194.8 LBS | HEIGHT: 65 IN

## 2021-12-15 DIAGNOSIS — C50.919 MALIGNANT NEOPLASM OF FEMALE BREAST, UNSPECIFIED ESTROGEN RECEPTOR STATUS, UNSPECIFIED LATERALITY, UNSPECIFIED SITE OF BREAST (H): ICD-10-CM

## 2021-12-15 DIAGNOSIS — K21.9 GASTROESOPHAGEAL REFLUX DISEASE, UNSPECIFIED WHETHER ESOPHAGITIS PRESENT: Primary | ICD-10-CM

## 2021-12-15 DIAGNOSIS — R73.03 PREDIABETES: ICD-10-CM

## 2021-12-15 DIAGNOSIS — Z13.220 SCREENING FOR HYPERLIPIDEMIA: ICD-10-CM

## 2021-12-15 DIAGNOSIS — Z23 NEED FOR PROPHYLACTIC VACCINATION AND INOCULATION AGAINST INFLUENZA: ICD-10-CM

## 2021-12-15 DIAGNOSIS — G43.909 MIGRAINE WITHOUT STATUS MIGRAINOSUS, NOT INTRACTABLE, UNSPECIFIED MIGRAINE TYPE: ICD-10-CM

## 2021-12-15 DIAGNOSIS — Z12.11 ENCOUNTER FOR SCREENING FOR MALIGNANT NEOPLASM OF COLON: ICD-10-CM

## 2021-12-15 DIAGNOSIS — Z11.59 NEED FOR HEPATITIS C SCREENING TEST: ICD-10-CM

## 2021-12-15 LAB — HBA1C MFR BLD: 5.7 % (ref 0–5.6)

## 2021-12-15 PROCEDURE — 82274 ASSAY TEST FOR BLOOD FECAL: CPT | Performed by: INTERNAL MEDICINE

## 2021-12-15 PROCEDURE — 90471 IMMUNIZATION ADMIN: CPT | Performed by: INTERNAL MEDICINE

## 2021-12-15 PROCEDURE — 90682 RIV4 VACC RECOMBINANT DNA IM: CPT | Performed by: INTERNAL MEDICINE

## 2021-12-15 PROCEDURE — 87338 HPYLORI STOOL AG IA: CPT | Performed by: INTERNAL MEDICINE

## 2021-12-15 PROCEDURE — 86803 HEPATITIS C AB TEST: CPT | Performed by: INTERNAL MEDICINE

## 2021-12-15 PROCEDURE — 80061 LIPID PANEL: CPT | Performed by: INTERNAL MEDICINE

## 2021-12-15 PROCEDURE — 36415 COLL VENOUS BLD VENIPUNCTURE: CPT | Performed by: INTERNAL MEDICINE

## 2021-12-15 PROCEDURE — 99215 OFFICE O/P EST HI 40 MIN: CPT | Mod: 25 | Performed by: INTERNAL MEDICINE

## 2021-12-15 PROCEDURE — 83036 HEMOGLOBIN GLYCOSYLATED A1C: CPT | Performed by: INTERNAL MEDICINE

## 2021-12-15 RX ORDER — PANTOPRAZOLE SODIUM 20 MG/1
40 TABLET, DELAYED RELEASE ORAL 2 TIMES DAILY
Qty: 60 TABLET | Refills: 1 | Status: SHIPPED | OUTPATIENT
Start: 2021-12-15 | End: 2022-02-25

## 2021-12-15 RX ORDER — SUMATRIPTAN 25 MG/1
50 TABLET, FILM COATED ORAL
Qty: 30 TABLET | Refills: 1 | Status: SHIPPED | OUTPATIENT
Start: 2021-12-15 | End: 2023-02-22

## 2021-12-15 ASSESSMENT — MIFFLIN-ST. JEOR: SCORE: 1504.49

## 2021-12-15 ASSESSMENT — PAIN SCALES - GENERAL: PAINLEVEL: SEVERE PAIN (7)

## 2021-12-15 NOTE — PROGRESS NOTES
Assessment and Plan  1. Gastroesophageal reflux disease, unspecified whether esophagitis present  Ongoing problem, Uncontrolled inspite of current Omeprazole.. Does have Hx of H.Pylori in 2015. Recurrent ER visit on 11/28  for Tonsillitis use of Clindamycin which triggered her abdominal symptoms.. ER visit again abdominal pain and 12/1 All work up negative and diagnosed as Gastritis. Pt was on Clinda & Prednisone for tonsillitis which were held due to gastritis.  - Helicobacter pylori Antigen Stool; Future  - pantoprazole (PROTONIX) 20 MG EC tablet; Take 2 tablets (40 mg) by mouth 2 times daily  Dispense: 60 tablet; Refill: 1  - Helicobacter pylori Antigen Stool    2. Migraine without status migrainosus, not intractable, unspecified migraine type  Ongoing problem, Uncontrolled. Recently evaluated by our group for headache , MRI W/O contrast was done which was unremarkable and radiology recommends with contrast if no improvement. Will start on Triptans given pt ongoing symptoms. Will consider MRI with contrast if no improvement.   - SUMAtriptan (IMITREX) 25 MG tablet; Take 2 tablets (50 mg) by mouth at onset of headache for migraine May repeat in 2 hours. Max 8 tablets/24 hours.  Dispense: 30 tablet; Refill: 1    3. Prediabetes  - Hemoglobin A1c; Future  - Hemoglobin A1c    4. Screening for hyperlipidemia  - Lipid panel reflex to direct LDL Fasting; Future  - Lipid panel reflex to direct LDL Fasting    5. Need for hepatitis C screening test  - Hepatitis C Screen Reflex to HCV RNA Quant and Genotype; Future  - Hepatitis C Screen Reflex to HCV RNA Quant and Genotype    6. Encounter for screening for malignant neoplasm of colon  - Fecal colorectal cancer screen (FIT); Future  - Fecal colorectal cancer screen (FIT)    7. Need for prophylactic vaccination and inoculation against influenza  - INFLUENZA QUAD, RECOMBINANT, P-FREE (RIV4) (FLUBLOK)    8. Malignant neoplasm of female breast, unspecified estrogen receptor  status, unspecified laterality, unspecified site of breast (H)  Stable, triple negative right breast cancer diagnosed on screening mammogram in 2018, Biopsy positive for invasive carcinoma, grade 3 DCIS.  Right axillary lymph node biopsy revealed metastatic adenocarcinoma. ER negative, W[place] negative and HER-2/elton negative T1b N1 M0. S/P Chemotherapy , Lumpectomy and and sentinel lymph node biopsy on 05/15/2019.  Recent Mammo in 10/2021 normal without any recurrence. To continue annual surveillance.       Over 40 minutes spent on reviewing patient chart,  face to face encounter, greater than 50% time spent with plan/cordination of care and documentation as above in my A/P.           Patient Instructions   As discussed, sent in medication for your GERD which is causing your symptoms.    Please do the lab work as ordered, will await for results for next steps.     ============================      Patient Education     Lifestyle Changes for Controlling GERD  When you have GERD, stomach acid feels as if it s backing up toward your mouth. Making lifestyle changes can often improve your symptoms. This is true if you take medicine to control your GERD or not. Talk with your healthcare provider about the following suggestions. They may help you get relief from your symptoms.  Raise your head    Reflux is more likely to happen when you re lying down flat. That's because stomach fluid can flow backward more easily. Raising the head of your bed 4 to 6 inches can help. To do this:    Slide blocks or books under the legs at the head of your bed. Or put a wedge under the mattress. Many [employer] stores can make a wedge for you. The wedge should go from your waist to the top of your head.    Don t just prop your head up on a few pillows. This increases pressure on your stomach. It can make GERD worse.  Watch your eating habits  Certain foods may increase the acid in your stomach. Or they may relax the lower esophageal sphincter. This  makes GERD more likely. It s best to avoid the following if they cause you symptoms:    Coffee, tea, and carbonated drinks (with and without caffeine)    Fatty, fried, or spicy food    Mint, chocolate, onions, tomatoes, and citrus    Peppermint    Any other foods that seem to irritate your stomach or cause you pain  Relieve the pressure  Tips include the following:    Eat smaller meals, even if you have to eat more often.    Don t lie down right after you eat. Wait a few hours for your stomach to empty.    Don't wear tight belts or tight-fitting clothes.    Lose any extra weight.  Tobacco and alcohol  Don't smoke tobacco or drink alcohol. They can make GERD symptoms worse.  [employer] last reviewed this educational content on 6/1/2019 2000-2021 [employer]. All rights reserved. This information is not intended as a substitute for professional medical care. Always follow your healthcare professional's instructions.               Return in about 2 months (around 2/15/2022), or if symptoms worsen or fail to improve, for Preventative Visit.    Melida Chandra MD  [facility] ADRIANA Verma is a 51 year old who presents for the following health issues         HPI     ED/UC Followup:    Facility:  [facility] ED  Date of visit: 11/28/21, 12/01/21  Reason for visit: abdominal pain, tonsilitis  Current Status: still having sore throat, had abdominal pain when she took the antibiotic so they took her off of the medication. The sore throat went away with the antibiotic but returned once she stopped.        Last seen pt in 12/2020 for ANnual physical. Does have Breast cancer Hx and follows Oncology. Reviewed all the hospital and Office visits for the whole year , discussed with the patient. Oncology visits reviewed with surveillance of breast cancer ongoing. Screening MRI , Mammograms and Q 3 months F/U with oncology.       Allergies   Allergen Reactions      Clindamycin      Pt gets GI symptoms, gastritis .and lower extremity numbness        Past Medical History:   Diagnosis Date     Hypertension goal BP (blood pressure) < 140/80 2/18/2014     Lateral epicondylitis, right dominant elbow since late 10-17 11/1/2017     Port-A-Cath in place 10/26/2018     Thyroid disease        Past Surgical History:   Procedure Laterality Date     BIOPSY NODE SENTINEL Right 5/15/2019    Procedure: BIOPSY, LYMPH NODE, SENTINEL;  Surgeon: Stacey Ashford MD;  Location:  OR     DISSECT LYMPH NODE AXILLA Right 5/15/2019    Procedure: LYMPHADENECTOMY, AXILLARY;  Surgeon: Stacey Ashford MD;  Location:  OR     HAND SURGERY  2002    left     INSERT PORT VASCULAR ACCESS N/A 10/18/2018    Procedure: INSERTION OF VASCULAR PORT;  Surgeon: Stacey Ashford MD;  Location:  OR     LUMPECTOMY BREAST WITH SEED LOCALIZATION Right 5/15/2019    Procedure: SEED LOCALIZATION BREAST LUMPECTOMY, SEED LOCALIZATION AXILLARY BREAST BIOPSY, SENTINEL NODE , REMOVAL OF VASCULAR PORT ACCESS AND RIGHT  AXILLARY NODE DISSECTION;  Surgeon: Stacey Ashford MD;  Location:  OR     REMOVE PORT VASCULAR ACCESS N/A 5/15/2019    Procedure: REMOVAL, VASCULAR ACCESS PORT;  Surgeon: Stacey Ashford MD;  Location: [facility] OR     TUBAL LIGATION         Family History   Problem Relation Age of Onset     Diabetes Mother      Unknown/Adopted Father      Coronary Artery Disease No family hx of      Hypertension No family hx of      Hyperlipidemia No family hx of      Cerebrovascular Disease No family hx of      Breast Cancer No family hx of      Colon Cancer No family hx of      Prostate Cancer No family hx of      Other Cancer No family hx of      Depression No family hx of      Anxiety Disorder No family hx of      Mental Illness No family hx of      Substance Abuse No family hx of      Anesthesia Reaction No family hx of      Asthma No family hx of      Osteoporosis No family hx of      Genetic Disorder No family hx of   "    Thyroid Disease No family hx of      Obesity No family hx of        Social History     Tobacco Use     Smoking status: Never Smoker     Smokeless tobacco: Never Used   Substance Use Topics     Alcohol use: No        Current Outpatient Medications   Medication     ascorbic acid (VITAMIN C) 1000 MG TABS     aspirin 81 MG EC tablet     cyclobenzaprine (FLEXERIL) 5 MG tablet     levothyroxine (SYNTHROID/LEVOTHROID) 75 MCG tablet     metFORMIN (GLUCOPHAGE-XR) 500 MG 24 hr tablet     multivitamin, therapeutic with minerals (THERA-VIT-M) TABS tablet     pantoprazole (PROTONIX) 20 MG EC tablet     SUMAtriptan (IMITREX) 25 MG tablet     dexamethasone (DECADRON) 4 MG tablet     hydrOXYzine (ATARAX) 25 MG tablet     No current facility-administered medications for this visit.        Review of Systems   Constitutional, HEENT, cardiovascular, pulmonary, GI, , musculoskeletal, neuro, skin, endocrine and psych systems are negative, except as otherwise noted.      Objective    /84   Pulse 73   Temp 97.5  F (36.4  C) (Tympanic)   Resp 16   Ht 1.651 m (5' 5\")   Wt 88.4 kg (194 lb 12.8 oz)   SpO2 100%   BMI 32.42 kg/m    Body mass index is 32.42 kg/m .  Physical Exam   GENERAL: healthy, alert and no distress  NECK: no adenopathy, no asymmetry, masses, or scars and thyroid normal to palpation  RESP: lungs clear to auscultation - no rales, rhonchi or wheezes  CV: regular rate and rhythm, normal S1 S2, no S3 or S4, no murmur, click or rub, no peripheral edema and peripheral pulses strong  ABDOMEN: soft, POSITIVE for Epigastric and LUQ tenderness , no hepatosplenomegaly, no masses and bowel sounds normal  MS: no gross musculoskeletal defects noted, no edema      "

## 2021-12-15 NOTE — PATIENT INSTRUCTIONS
As discussed, sent in medication for your GERD which is causing your symptoms.    Please do the lab work as ordered, will await for results for next steps.     ============================      Patient Education     Lifestyle Changes for Controlling GERD  When you have GERD, stomach acid feels as if it s backing up toward your mouth. Making lifestyle changes can often improve your symptoms. This is true if you take medicine to control your GERD or not. Talk with your healthcare provider about the following suggestions. They may help you get relief from your symptoms.  Raise your head    Reflux is more likely to happen when you re lying down flat. That's because stomach fluid can flow backward more easily. Raising the head of your bed 4 to 6 inches can help. To do this:    Slide blocks or books under the legs at the head of your bed. Or put a wedge under the mattress. Many [employer] can make a wedge for you. The wedge should go from your waist to the top of your head.    Don t just prop your head up on a few pillows. This increases pressure on your stomach. It can make GERD worse.  Watch your eating habits  Certain foods may increase the acid in your stomach. Or they may relax the lower esophageal sphincter. This makes GERD more likely. It s best to avoid the following if they cause you symptoms:    Coffee, tea, and carbonated drinks (with and without caffeine)    Fatty, fried, or spicy food    Mint, chocolate, onions, tomatoes, and citrus    Peppermint    Any other foods that seem to irritate your stomach or cause you pain  Relieve the pressure  Tips include the following:    Eat smaller meals, even if you have to eat more often.    Don t lie down right after you eat. Wait a few hours for your stomach to empty.    Don't wear tight belts or tight-fitting clothes.    Lose any extra weight.  Tobacco and alcohol  Don't smoke tobacco or drink alcohol. They can make GERD symptoms worse.  [place] last reviewed this  educational content on 6/1/2019 2000-2021 [employer]. All rights reserved. This information is not intended as a substitute for professional medical care. Always follow your healthcare professional's instructions.

## 2021-12-16 ENCOUNTER — TELEPHONE (OUTPATIENT)
Dept: FAMILY MEDICINE | Facility: CLINIC | Age: 51
End: 2021-12-16
Payer: COMMERCIAL

## 2021-12-16 LAB — HCV AB SERPL QL IA: NONREACTIVE

## 2021-12-16 NOTE — TELEPHONE ENCOUNTER
Central Prior Authorization Team   Phone: [phone]      PA Initiation    Medication: pantoprazole (PROTONIX) 20 MG EC tablet-Initiated  Insurance Company: [employer] - Phone [phone] Fax [phone]  Pharmacy Filling the Rx: [id number] [place], [place] - [address] AT [facility]  Filling Pharmacy Phone: [phone]  Filling Pharmacy Fax:    Start Date: 12/16/2021

## 2021-12-16 NOTE — TELEPHONE ENCOUNTER
Prior Authorization Retail Medication Request    Medication/Dose: pantoprazole (PROTONIX) 20 MG EC tablet  ICD code (if different than what is on RX):  unknown  Previously Tried and Failed:  unknown  Rationale:  unknown    Insurance Name:  unknown  Insurance ID:  [id number]      Pharmacy Information (if different than what is on RX)  Name:  [facility]  Phone:  [phone]

## 2021-12-16 NOTE — TELEPHONE ENCOUNTER
Prior Authorization Approval    Authorization Effective Date: 11/16/2021  Authorization Expiration Date: 12/15/2024  Medication: pantoprazole (PROTONIX) 20 MG EC tablet-APPROVED  Approved Dose/Quantity:   Reference #:     Insurance Company: [employer] - Phone [phone] Fax [phone]  Expected CoPay:       CoPay Card Available:      Foundation Assistance Needed:    Which Pharmacy is filling the prescription (Not needed for infusion/clinic administered): [id number] - [facility] [address] AT [address]  Pharmacy Notified: Yes  Patient Notified: No    Pharmacy will notify patient when medication is ready.

## 2021-12-18 DIAGNOSIS — E78.2 MIXED HYPERLIPIDEMIA: Primary | ICD-10-CM

## 2021-12-18 LAB
CHOLEST SERPL-MCNC: 214 MG/DL
FASTING STATUS PATIENT QL REPORTED: NO
HDLC SERPL-MCNC: 65 MG/DL
LDLC SERPL CALC-MCNC: 132 MG/DL
NONHDLC SERPL-MCNC: 149 MG/DL
TRIGL SERPL-MCNC: 87 MG/DL

## 2021-12-18 RX ORDER — SIMVASTATIN 10 MG
10 TABLET ORAL AT BEDTIME
Qty: 90 TABLET | Refills: 1 | Status: SHIPPED | OUTPATIENT
Start: 2021-12-18 | End: 2022-06-20

## 2021-12-19 NOTE — RESULT ENCOUNTER NOTE
Your Cholesterol levels are abnormal and have worsened from the past. Recommend to take low dose Simvastatin sent to your pharmacy. We will recheck your labs in 3 months and adjust the dose of medication if needed.   Your A1C at goal , continue the current medications.    Please let me know if you have any questions.    Thank you,  Melida Chandra MD on 12/18/2021 at 6:08 PM

## 2021-12-20 ENCOUNTER — TELEPHONE (OUTPATIENT)
Dept: FAMILY MEDICINE | Facility: CLINIC | Age: 51
End: 2021-12-20
Payer: COMMERCIAL

## 2021-12-20 NOTE — TELEPHONE ENCOUNTER
----- Message from Melida Chandra MD sent at 12/18/2021  6:11 PM CST -----  Your Cholesterol levels are abnormal and have worsened from the past. Recommend to take low dose Simvastatin sent to your pharmacy. We will recheck your labs in 3 months and adjust the dose of medication if needed.   Your A1C at goal , continue the current medications.    Please let me know if you have any questions.    Thank you,  Melida Chandra MD on 12/18/2021 at 6:08 PM

## 2021-12-21 ENCOUNTER — APPOINTMENT (OUTPATIENT)
Dept: LAB | Facility: CLINIC | Age: 51
End: 2021-12-21
Payer: COMMERCIAL

## 2021-12-22 LAB — H PYLORI AG STL QL IA: NEGATIVE

## 2021-12-23 NOTE — ADDENDUM NOTE
Addended by: SRINIVAS HAYWARD on: 1/14/2019 04:32 PM     Modules accepted: Orders    
Patient requests all Lab, Cardiology, and Radiology Results on their Discharge Instructions

## 2021-12-25 DIAGNOSIS — R19.5 POSITIVE FIT (FECAL IMMUNOCHEMICAL TEST): Primary | ICD-10-CM

## 2021-12-25 LAB — HEMOCCULT STL QL IA: POSITIVE

## 2021-12-26 NOTE — RESULT ENCOUNTER NOTE
Your FIT test is positive, recommend follow up with gastroenterology for need of colonoscopy.   Referral to gastroenterology placed. Please let me know if you have any questions.  Melida Chandra MD on 12/25/2021 at 10:52 PM

## 2021-12-27 ENCOUNTER — TELEPHONE (OUTPATIENT)
Dept: GASTROENTEROLOGY | Facility: CLINIC | Age: 51
End: 2021-12-27
Payer: COMMERCIAL

## 2021-12-27 ENCOUNTER — HOSPITAL ENCOUNTER (OUTPATIENT)
Facility: CLINIC | Age: 51
End: 2021-12-27
Attending: INTERNAL MEDICINE | Admitting: INTERNAL MEDICINE

## 2021-12-27 ENCOUNTER — TELEPHONE (OUTPATIENT)
Dept: FAMILY MEDICINE | Facility: CLINIC | Age: 51
End: 2021-12-27
Payer: COMMERCIAL

## 2021-12-27 DIAGNOSIS — Z11.59 ENCOUNTER FOR SCREENING FOR OTHER VIRAL DISEASES: ICD-10-CM

## 2021-12-27 NOTE — TELEPHONE ENCOUNTER
Screening Questions  1. Are you active on mychart? YES    2. What insurance is in the chart? [employer]     2.  Ordering/Referring Provider: Melida Chandra MD    3. BMI 34.5, If greater than 40 review exclusion criteria also will need EXTENDED PREP    4.  Respiratory Screening (If yes to any of the following Hospital setting only):     Do you use daily home oxygen? NO  Do you have mod to severe Obstructive Sleep Apnea? NO   Do you have Pulmonary Hypertension? NO   Do you have UNCONTROLLED asthma? NO    5. Have you had a heart or lung transplant (If yes, please review exclusion criteria) ? NO    6. Are you currently on dialysis or have chronic kidney disease? NO    7. Have you had a stroke or Transient ischemic attack (TIA) within 6 months? NO    8. In the past 6 months, have you had any heart related issues including cardiomyopathy or heart attack? NO                 If yes, did it require cardiac stenting or other implantable device?NO      9. Do you have any implantable devices in your body (pacemaker, defib, LVAD)? NO    10. Do you take nitroglycerin? If yes, how often? NO    11. Are you currently taking any blood thinners?ASPIRIN    12. Are you a diabetic? PRE DIABETIC    13. (Females) Are you currently pregnant? NO  If yes, how many weeks?      15. Are you taking any prescription pain medications on a routine schedule? NO If yes, MAC sedation and patient will need EXTENDED PREP.    16. Do you have any chemical dependencies such as alcohol, street drugs, or methadone? NOIf yes, MAC sedation.    17. Do you have any history of post-traumatic stress syndrome, severe anxiety or history of psychosis? NO    18. Do you transfer independently? YES    19.  Do you have any issues with constipation? NO   If yes, pt will need EXTENDED PREP     20. Preferred Pharmacy for Pre Prescription [facility] [address]    Scheduling Details    Which Colonoscopy Prep was Sent?: SPLIT M   Type of Procedure Scheduled: COLONOSCOPY    Surgeon: SUSANNAH TOWNSEND  Date of Procedure: 1/0  Location:    Caller (Please ask for phone number if not scheduled by patient): DANO      Sedation Type: CS  Conscious Sedation- Needs  for 6 hours after the procedure  MAC/General-Needs  for 24 hours after procedure    Pre-op Required at [facility] [place], [place] and OR for MAC sedation: NO  (if yes advise patient they will need a pre-op prior to procedure)      Is patient on blood thinners? -ASPIRIN (If yes- inform patient to follow up with PCP or provider for follow up instructions)     Informed patient they will need an adult  YES  Cannot take any type of public or medical transportation alone    Pre-Procedure Covid test to be completed at [facility] or Externally: [facility]     Confirmed Nurse will call to complete assessment YES    Additional comments:

## 2021-12-27 NOTE — TELEPHONE ENCOUNTER
----- Message from Melida Chandra MD sent at 12/25/2021 10:53 PM CST -----  Your FIT test is positive, recommend follow up with gastroenterology for need of colonoscopy.   Referral to gastroenterology placed. Please let me know if you have any questions.  Melida Chandra MD on 12/25/2021 at 10:52 PM

## 2021-12-27 NOTE — TELEPHONE ENCOUNTER
Detailed message left with info from provider and return number to call with any questions or concerns.    Referral information:   Adult Gastro Ref - Procedure Only    [facility]   [address] [place] [address]   Phone: [phone]   Fax: [phone]       Comment: Please be aware that coverage of these services is subject to the terms and limitations of your health insurance plan.  Call member services at your health plan with any benefit or coverage questions.           If you have not heard from the scheduling office within 2 business days, please call [phone].     Astrid TEAGUE RN  EP Triage

## 2022-01-09 ENCOUNTER — HEALTH MAINTENANCE LETTER (OUTPATIENT)
Age: 52
End: 2022-01-09

## 2022-01-17 DIAGNOSIS — Z11.59 ENCOUNTER FOR SCREENING FOR OTHER VIRAL DISEASES: Primary | ICD-10-CM

## 2022-01-22 ENCOUNTER — LAB (OUTPATIENT)
Dept: URGENT CARE | Facility: URGENT CARE | Age: 52
End: 2022-01-22
Payer: COMMERCIAL

## 2022-01-22 DIAGNOSIS — Z11.59 ENCOUNTER FOR SCREENING FOR OTHER VIRAL DISEASES: ICD-10-CM

## 2022-01-22 PROCEDURE — U0003 INFECTIOUS AGENT DETECTION BY NUCLEIC ACID (DNA OR RNA); SEVERE ACUTE RESPIRATORY SYNDROME CORONAVIRUS 2 (SARS-COV-2) (CORONAVIRUS DISEASE [COVID-19]), AMPLIFIED PROBE TECHNIQUE, MAKING USE OF HIGH THROUGHPUT TECHNOLOGIES AS DESCRIBED BY CMS-2020-01-R: HCPCS

## 2022-01-22 PROCEDURE — U0005 INFEC AGEN DETEC AMPLI PROBE: HCPCS

## 2022-01-23 LAB — SARS-COV-2 RNA RESP QL NAA+PROBE: NEGATIVE

## 2022-01-26 ENCOUNTER — HOSPITAL ENCOUNTER (OUTPATIENT)
Facility: CLINIC | Age: 52
Discharge: HOME | End: 2022-01-26
Attending: INTERNAL MEDICINE | Admitting: INTERNAL MEDICINE
Payer: COMMERCIAL

## 2022-01-26 VITALS
OXYGEN SATURATION: 100 % | HEIGHT: 65 IN | RESPIRATION RATE: 11 BRPM | HEART RATE: 60 BPM | SYSTOLIC BLOOD PRESSURE: 114 MMHG | BODY MASS INDEX: 32.32 KG/M2 | WEIGHT: 194 LBS | DIASTOLIC BLOOD PRESSURE: 79 MMHG

## 2022-01-26 LAB — COLONOSCOPY: NORMAL

## 2022-01-26 PROCEDURE — 45378 DIAGNOSTIC COLONOSCOPY: CPT | Performed by: INTERNAL MEDICINE

## 2022-01-26 PROCEDURE — 250N000011 HC RX IP 250 OP 636: Performed by: INTERNAL MEDICINE

## 2022-01-26 PROCEDURE — G0500 MOD SEDAT ENDO SERVICE >5YRS: HCPCS | Performed by: INTERNAL MEDICINE

## 2022-01-26 RX ORDER — FENTANYL CITRATE 50 UG/ML
INJECTION, SOLUTION INTRAMUSCULAR; INTRAVENOUS PRN
Status: COMPLETED | OUTPATIENT
Start: 2022-01-26 | End: 2022-01-26

## 2022-01-26 RX ADMIN — FENTANYL CITRATE 50 MCG: 50 INJECTION, SOLUTION INTRAMUSCULAR; INTRAVENOUS at 12:16

## 2022-01-26 RX ADMIN — MIDAZOLAM 1 MG: 1 INJECTION INTRAMUSCULAR; INTRAVENOUS at 12:16

## 2022-01-26 RX ADMIN — MIDAZOLAM 1 MG: 1 INJECTION INTRAMUSCULAR; INTRAVENOUS at 12:21

## 2022-01-26 RX ADMIN — FENTANYL CITRATE 50 MCG: 50 INJECTION, SOLUTION INTRAMUSCULAR; INTRAVENOUS at 12:21

## 2022-01-26 RX ADMIN — MIDAZOLAM 2 MG: 1 INJECTION INTRAMUSCULAR; INTRAVENOUS at 12:13

## 2022-01-26 RX ADMIN — FENTANYL CITRATE 100 MCG: 50 INJECTION, SOLUTION INTRAMUSCULAR; INTRAVENOUS at 12:12

## 2022-01-26 ASSESSMENT — MIFFLIN-ST. JEOR: SCORE: 1500.86

## 2022-01-26 NOTE — H&P
ENDOSCOPY PRE-SEDATION H&P FOR OUTPATIENT PROCEDURES    Luci JORDAN Alert  [id number]  1970    Procedure: Colonoscopy     Pre-procedure diagnosis: FIT positive    Past medical history:   Past Medical History:   Diagnosis Date     Cancer (H)     breast     Diabetes (H)     pre-dm     High cholesterol      Hypertension goal BP (blood pressure) < 140/80 2/18/2014     Lateral epicondylitis, right dominant elbow since late 10-17 11/1/2017     Migraine      Port-A-Cath in place 10/26/2018     Thyroid disease      Patient Active Problem List   Diagnosis     Myalgia and myositis     Intervertebral lumbar disc disorder with myelopathy, lumbar region     Gastroesophageal reflux disease without esophagitis     Helicobacter pylori infection     Acquired hypothyroidism     Disorder of metabolism     Polycystic ovaries     Vitamin D insufficiency     BMI 35     Rosacea     Absence of menstruation     Chronic left-sided low back pain with left-sided sciatica     Pinguecula of both eyes     Presbyopia     Impaired fasting glucose     Migraine without aura and without status migrainosus, not intractable     Class 1 obesity due to excess calories with serious comorbidity and body mass index (BMI) of 33.0 to 33.9 in adult     Morbid obesity (H)     Myalgia w hx of recurrence since 2012     Mixed hyperlipidemia     Malignant neoplasm of upper-outer quadrant of right breast in female, estrogen receptor negative (H)     Drug or medicinal substance causing adverse effect in therapeutic use, initial encounter     Abnormal electrocardiogram     Port-A-Cath in place     Breast cancer (H)     Monoallelic mutation of PALB2 gene     Acute bilateral low back pain with right-sided sciatica       Past surgical history:   Past Surgical History:   Procedure Laterality Date     BIOPSY NODE SENTINEL Right 5/15/2019    Procedure: BIOPSY, LYMPH NODE, SENTINEL;  Surgeon: Stacey Ashford MD;  Location: [facility] OR     DISSECT LYMPH NODE AXILLA Right  5/15/2019    Procedure: LYMPHADENECTOMY, AXILLARY;  Surgeon: Stacey Ashford MD;  Location:  OR     HAND SURGERY  2002    left     INSERT PORT VASCULAR ACCESS N/A 10/18/2018    Procedure: INSERTION OF VASCULAR PORT;  Surgeon: Stacey Ashford MD;  Location:  OR     LUMPECTOMY BREAST WITH SEED LOCALIZATION Right 5/15/2019    Procedure: SEED LOCALIZATION BREAST LUMPECTOMY, SEED LOCALIZATION AXILLARY BREAST BIOPSY, SENTINEL NODE , REMOVAL OF VASCULAR PORT ACCESS AND RIGHT  AXILLARY NODE DISSECTION;  Surgeon: Stacey Ashford MD;  Location:  OR     REMOVE PORT VASCULAR ACCESS N/A 5/15/2019    Procedure: REMOVAL, VASCULAR ACCESS PORT;  Surgeon: Stacey Ashford MD;  Location:  OR     TUBAL LIGATION         No current facility-administered medications for this encounter.       Allergies   Allergen Reactions     Clindamycin      Pt gets GI symptoms, gastritis .and lower extremity numbness           Physical Exam:    Mental status: alert  Heart: Normal  Lung: Normal  Assessment of patient's airway: Normal  Other as pertinent for procedure: None     Lab Results   Component Value Date    WBC 14.9 12/01/2021    WBC 5.9 04/07/2021     Lab Results   Component Value Date    RBC 4.79 12/01/2021    RBC 4.79 04/07/2021     Lab Results   Component Value Date    HGB 12.6 12/01/2021    HGB 12.5 04/07/2021     Lab Results   Component Value Date    HCT 38.6 12/01/2021    HCT 38.6 04/07/2021     Lab Results   Component Value Date    MCV 81 12/01/2021    MCV 81 04/07/2021     Lab Results   Component Value Date    MCH 26.3 12/01/2021    MCH 26.1 04/07/2021     Lab Results   Component Value Date    MCHC 32.6 12/01/2021    MCHC 32.4 04/07/2021     Lab Results   Component Value Date    RDW 16.2 12/01/2021    RDW 15.5 04/07/2021     Lab Results   Component Value Date     12/01/2021     04/07/2021     No results found for: INR     ASA Score: See Provation note    Mallampati score:  II - Faucial pillars and soft palate  may be seen, but uvula is masked by the base of the tongue    Assessment/Plan:     The patient is an appropriate candidate to receive sedation.    Informed consent was discussed with the patient/family, including the risks, benefits, potential complications and any alternative options associated with sedation.    Patient assessment completed just prior to sedation and while under constant observation by the provider. Condition determined to be adequate for proceeding with sedation.    The specific risks for the procedure were discussed with the patient at the time of informed consent and include but are not limited to perforation which could require surgery, missing significant neoplasm or lesion, hemorrhage and adverse sedative complication.      Zac Arriola MD

## 2022-02-16 ENCOUNTER — OFFICE VISIT (OUTPATIENT)
Dept: FAMILY MEDICINE | Facility: CLINIC | Age: 52
End: 2022-02-16
Payer: COMMERCIAL

## 2022-02-16 VITALS
DIASTOLIC BLOOD PRESSURE: 80 MMHG | OXYGEN SATURATION: 99 % | RESPIRATION RATE: 14 BRPM | SYSTOLIC BLOOD PRESSURE: 112 MMHG | HEIGHT: 65 IN | TEMPERATURE: 96.8 F | BODY MASS INDEX: 31.16 KG/M2 | WEIGHT: 187 LBS | HEART RATE: 75 BPM

## 2022-02-16 DIAGNOSIS — Z17.1 MALIGNANT NEOPLASM OF UPPER-OUTER QUADRANT OF RIGHT BREAST IN FEMALE, ESTROGEN RECEPTOR NEGATIVE (H): ICD-10-CM

## 2022-02-16 DIAGNOSIS — E55.9 VITAMIN D INSUFFICIENCY: ICD-10-CM

## 2022-02-16 DIAGNOSIS — E03.9 ACQUIRED HYPOTHYROIDISM: ICD-10-CM

## 2022-02-16 DIAGNOSIS — D72.829 LEUKOCYTOSIS, UNSPECIFIED TYPE: ICD-10-CM

## 2022-02-16 DIAGNOSIS — Z00.00 ROUTINE GENERAL MEDICAL EXAMINATION AT A HEALTH CARE FACILITY: Primary | ICD-10-CM

## 2022-02-16 DIAGNOSIS — E66.01 MORBID OBESITY (H): ICD-10-CM

## 2022-02-16 DIAGNOSIS — Z23 HIGH PRIORITY FOR 2019-NCOV VACCINE: ICD-10-CM

## 2022-02-16 DIAGNOSIS — C50.919 MALIGNANT NEOPLASM OF FEMALE BREAST, UNSPECIFIED ESTROGEN RECEPTOR STATUS, UNSPECIFIED LATERALITY, UNSPECIFIED SITE OF BREAST (H): ICD-10-CM

## 2022-02-16 DIAGNOSIS — C50.411 MALIGNANT NEOPLASM OF UPPER-OUTER QUADRANT OF RIGHT BREAST IN FEMALE, ESTROGEN RECEPTOR NEGATIVE (H): ICD-10-CM

## 2022-02-16 LAB
BASOPHILS # BLD AUTO: 0 10E3/UL (ref 0–0.2)
BASOPHILS NFR BLD AUTO: 0 %
EOSINOPHIL # BLD AUTO: 0 10E3/UL (ref 0–0.7)
EOSINOPHIL NFR BLD AUTO: 1 %
ERYTHROCYTE [DISTWIDTH] IN BLOOD BY AUTOMATED COUNT: 15.2 % (ref 10–15)
HCT VFR BLD AUTO: 39.2 % (ref 35–47)
HGB BLD-MCNC: 13 G/DL (ref 11.7–15.7)
LYMPHOCYTES # BLD AUTO: 2.6 10E3/UL (ref 0.8–5.3)
LYMPHOCYTES NFR BLD AUTO: 48 %
MCH RBC QN AUTO: 27.3 PG (ref 26.5–33)
MCHC RBC AUTO-ENTMCNC: 33.2 G/DL (ref 31.5–36.5)
MCV RBC AUTO: 82 FL (ref 78–100)
MONOCYTES # BLD AUTO: 0.3 10E3/UL (ref 0–1.3)
MONOCYTES NFR BLD AUTO: 6 %
NEUTROPHILS # BLD AUTO: 2.5 10E3/UL (ref 1.6–8.3)
NEUTROPHILS NFR BLD AUTO: 45 %
PLATELET # BLD AUTO: 276 10E3/UL (ref 150–450)
RBC # BLD AUTO: 4.77 10E6/UL (ref 3.8–5.2)
WBC # BLD AUTO: 5.5 10E3/UL (ref 4–11)

## 2022-02-16 PROCEDURE — 0054A COVID-19,PF,PFIZER (12+ YRS): CPT | Performed by: INTERNAL MEDICINE

## 2022-02-16 PROCEDURE — 99396 PREV VISIT EST AGE 40-64: CPT | Performed by: INTERNAL MEDICINE

## 2022-02-16 PROCEDURE — 91305 COVID-19,PF,PFIZER (12+ YRS): CPT | Performed by: INTERNAL MEDICINE

## 2022-02-16 PROCEDURE — 82306 VITAMIN D 25 HYDROXY: CPT | Performed by: INTERNAL MEDICINE

## 2022-02-16 PROCEDURE — 36415 COLL VENOUS BLD VENIPUNCTURE: CPT | Performed by: INTERNAL MEDICINE

## 2022-02-16 PROCEDURE — 84443 ASSAY THYROID STIM HORMONE: CPT | Performed by: INTERNAL MEDICINE

## 2022-02-16 PROCEDURE — 85025 COMPLETE CBC W/AUTO DIFF WBC: CPT | Performed by: INTERNAL MEDICINE

## 2022-02-16 ASSESSMENT — ENCOUNTER SYMPTOMS
FREQUENCY: 0
DYSURIA: 0
WEAKNESS: 0
DIZZINESS: 0
COUGH: 0
CHILLS: 0
ARTHRALGIAS: 0
NAUSEA: 0
FEVER: 0
PARESTHESIAS: 0
HEMATOCHEZIA: 0
SHORTNESS OF BREATH: 0
ABDOMINAL PAIN: 0
HEMATURIA: 0
JOINT SWELLING: 0
BREAST MASS: 0
SORE THROAT: 0
MYALGIAS: 0
HEARTBURN: 0
HEADACHES: 0
PALPITATIONS: 0
NERVOUS/ANXIOUS: 0
DIARRHEA: 0
EYE PAIN: 0
CONSTIPATION: 0

## 2022-02-16 ASSESSMENT — PAIN SCALES - GENERAL: PAINLEVEL: NO PAIN (0)

## 2022-02-16 NOTE — PATIENT INSTRUCTIONS
As discussed, we will do the labs which were not done recently on 12/2021.     =========================          Preventive Health Recommendations  Female Ages 50 - 64    Yearly exam: See your health care provider every year in order to  o Review health changes.   o Discuss preventive care.    o Review your medicines if your doctor has prescribed any.      Get a Pap test every three years (unless you have an abnormal result and your provider advises testing more often).    If you get Pap tests with HPV test, you only need to test every 5 years, unless you have an abnormal result.     You do not need a Pap test if your uterus was removed (hysterectomy) and you have not had cancer.    You should be tested each year for STDs (sexually transmitted diseases) if you're at risk.     Have a mammogram every 1 to 2 years.    Have a colonoscopy at age 50, or have a yearly FIT test (stool test). These exams screen for colon cancer.      Have a cholesterol test every 5 years, or more often if advised.    Have a diabetes test (fasting glucose) every three years. If you are at risk for diabetes, you should have this test more often.     If you are at risk for osteoporosis (brittle bone disease), think about having a bone density scan (DEXA).    Shots: Get a flu shot each year. Get a tetanus shot every 10 years.    Nutrition:     Eat at least 5 servings of fruits and vegetables each day.    Eat whole-grain bread, whole-wheat pasta and brown rice instead of white grains and rice.    Get adequate Calcium and Vitamin D.     Lifestyle    Exercise at least 150 minutes a week (30 minutes a day, 5 days a week). This will help you control your weight and prevent disease.    Limit alcohol to one drink per day.    No smoking.     Wear sunscreen to prevent skin cancer.     See your dentist every six months for an exam and cleaning.    See your eye doctor every 1 to 2 years.

## 2022-02-16 NOTE — PROGRESS NOTES
SUBJECTIVE:   CC: Luci Londono is an 51 year old woman who presents for preventive health visit.       Patient has been advised of split billing requirements and indicates understanding: No  Healthy Habits:     Getting at least 3 servings of Calcium per day:  Yes    Bi-annual eye exam:  NO    Dental care twice a year:  NO    Sleep apnea or symptoms of sleep apnea:  None    Diet:  Low fat/cholesterol and Diabetic    Frequency of exercise:  2-3 days/week    Duration of exercise:  15-30 minutes    Taking medications regularly:  Yes    Medication side effects:  Other    PHQ-2 Total Score: 0    Additional concerns today:  No    Today's PHQ-2 Score:   PHQ-2 ( 1999 Pfizer) 2/16/2022   Q1: Little interest or pleasure in doing things 0   Q2: Feeling down, depressed or hopeless 0   PHQ-2 Score 0   PHQ-2 Total Score (12-17 Years)- Positive if 3 or more points; Administer PHQ-A if positive -   Q1: Little interest or pleasure in doing things Not at all   Q2: Feeling down, depressed or hopeless Not at all   PHQ-2 Score 0       Abuse: Current or Past (Physical, Sexual or Emotional) - No  Do you feel safe in your environment? Yes    Have you ever done Advance Care Planning? (For example, a Health Directive, POLST, or a discussion with a medical provider or your loved ones about your wishes): N/A    Social History     Tobacco Use     Smoking status: Never Smoker     Smokeless tobacco: Never Used   Substance Use Topics     Alcohol use: No     If you drink alcohol do you typically have >3 drinks per day or >7 drinks per week? Yes    Alcohol Use 2/16/2022   Prescreen: >3 drinks/day or >7 drinks/week? Not Applicable   Prescreen: >3 drinks/day or >7 drinks/week? -   No flowsheet data found.    Reviewed orders with patient.  Reviewed health maintenance and updated orders accordingly - Yes  Lab work is in process  Labs reviewed in EPIC    Breast Cancer Screening:  Any new diagnosis of family breast, ovarian, or bowel cancer?  No    FHS-7:   Breast CA Risk Assessment (FHS-7) 10/20/2021   Did any of your first-degree relatives have breast or ovarian cancer? No   Did any of your relatives have bilateral breast cancer? No   Did any man in your family have breast cancer? No   Did any woman in your family have breast and ovarian cancer? No   Did any woman in your family have breast cancer before age 50 y? Yes   Do you have 2 or more relatives with breast and/or ovarian cancer? No   Do you have 2 or more relatives with breast and/or bowel cancer? No     click delete button to remove this line now  Mammogram Screening: Recommended annual mammography  Pertinent mammograms are reviewed under the imaging tab.    History of abnormal Pap smear: NO - age 30-65 PAP every 5 years with negative HPV co-testing recommended  PAP / HPV Latest Ref Rng & Units 12/3/2020 8/25/2017 1/31/2014   PAP (Historical) - NIL NIL NIL   HPV16 NEG:Negative Negative Negative -   HPV18 NEG:Negative Negative Negative -   HRHPV NEG:Negative Negative Negative -     Reviewed and updated as needed this visit by clinical staff   Tobacco  Allergies  Meds  Problems  Med Hx  Surg Hx  Fam Hx          Reviewed and updated as needed this visit by Provider   Tobacco  Allergies  Meds  Problems  Med Hx  Surg Hx  Fam Hx         Past Medical History:   Diagnosis Date     Cancer (H)     breast     Diabetes (H)     pre-dm     High cholesterol      Hypertension goal BP (blood pressure) < 140/80 2/18/2014     Lateral epicondylitis, right dominant elbow since late 10-17 11/1/2017     Migraine      Port-A-Cath in place 10/26/2018     Thyroid disease       Past Surgical History:   Procedure Laterality Date     BIOPSY NODE SENTINEL Right 5/15/2019    Procedure: BIOPSY, LYMPH NODE, SENTINEL;  Surgeon: Stacey Ashford MD;  Location:  OR     COLONOSCOPY N/A 1/26/2022    Procedure: COLONOSCOPY;  Surgeon: Zac Arriola MD;  Location:  GI     DISSECT LYMPH NODE AXILLA Right  5/15/2019    Procedure: LYMPHADENECTOMY, AXILLARY;  Surgeon: Stacey Ashford MD;  Location:  OR     HAND SURGERY  2002    left     INSERT PORT VASCULAR ACCESS N/A 10/18/2018    Procedure: INSERTION OF VASCULAR PORT;  Surgeon: Stacey Ashford MD;  Location: [facility] OR     LUMPECTOMY BREAST WITH SEED LOCALIZATION Right 5/15/2019    Procedure: SEED LOCALIZATION BREAST LUMPECTOMY, SEED LOCALIZATION AXILLARY BREAST BIOPSY, SENTINEL NODE , REMOVAL OF VASCULAR PORT ACCESS AND RIGHT  AXILLARY NODE DISSECTION;  Surgeon: Stacey Ashford MD;  Location:  OR     REMOVE PORT VASCULAR ACCESS N/A 5/15/2019    Procedure: REMOVAL, VASCULAR ACCESS PORT;  Surgeon: Stacey Ashford MD;  Location: [facility] OR     TUBAL LIGATION       OB History    Para Term  AB Living   3 3 0 0 0 0   SAB IAB Ectopic Multiple Live Births   0 0 0 0 0      # Outcome Date GA Lbr Murtaza/2nd Weight Sex Delivery Anes PTL Lv   3 Para            2 Para            1 Para                Review of Systems   Constitutional: Negative for chills and fever.   HENT: Negative for congestion, ear pain, hearing loss and sore throat.    Eyes: Negative for pain and visual disturbance.   Respiratory: Negative for cough and shortness of breath.    Cardiovascular: Negative for chest pain, palpitations and peripheral edema.   Gastrointestinal: Negative for abdominal pain, constipation, diarrhea, heartburn, hematochezia and nausea.   Breasts:  Positive for tenderness. Negative for breast mass and discharge.   Genitourinary: Negative for dysuria, frequency, genital sores, hematuria, pelvic pain, urgency, vaginal bleeding and vaginal discharge.   Musculoskeletal: Negative for arthralgias, joint swelling and myalgias.   Skin: Negative for rash.   Neurological: Negative for dizziness, weakness, headaches and paresthesias.   Psychiatric/Behavioral: Negative for mood changes. The patient is not nervous/anxious.      CONSTITUTIONAL: NEGATIVE for fever, chills, change in  "weight  INTEGUMENTARY/SKIN: NEGATIVE for worrisome rashes, moles or lesions  EYES: NEGATIVE for vision changes or irritation  ENT: NEGATIVE for ear, mouth and throat problems  RESP: NEGATIVE for significant cough or SOB  BREAST: NEGATIVE for masses, tenderness or discharge  CV: NEGATIVE for chest pain, palpitations or peripheral edema  GI: NEGATIVE for nausea, abdominal pain, heartburn, or change in bowel habits  : NEGATIVE for unusual urinary or vaginal symptoms. No vaginal bleeding.  MUSCULOSKELETAL: NEGATIVE for significant arthralgias or myalgia  NEURO: NEGATIVE for weakness, dizziness or paresthesias  PSYCHIATRIC: NEGATIVE for changes in mood or affect      OBJECTIVE:   /80   Pulse 75   Temp 96.8  F (36  C) (Tympanic)   Resp 14   Ht 1.651 m (5' 5\")   Wt 84.8 kg (187 lb)   SpO2 99%   BMI 31.12 kg/m    Physical Exam  GENERAL APPEARANCE: healthy, alert and no distress  EYES: Eyes grossly normal to inspection, PERRL and conjunctivae and sclerae normal  HENT: ear canals and TM's normal, nose and mouth without ulcers or lesions, oropharynx clear and oral mucous membranes moist  NECK: no adenopathy, no asymmetry, masses, or scars and thyroid normal to palpation  RESP: lungs clear to auscultation - no rales, rhonchi or wheezes  BREAST: normal without masses, tenderness or nipple discharge and no palpable axillary masses or adenopathy. Positive for Lumpectomy scar on the Rt breast.   CV: regular rate and rhythm, normal S1 S2, no S3 or S4, no murmur, click or rub, no peripheral edema and peripheral pulses strong  ABDOMEN: soft, nontender, no hepatosplenomegaly, no masses and bowel sounds normal  MS: no musculoskeletal defects are noted and gait is age appropriate without ataxia  SKIN: no suspicious lesions or rashes  NEURO: Normal strength and tone, sensory exam grossly normal, mentation intact and speech normal  PSYCH: mentation appears normal and affect normal/bright    Diagnostic Test Results:  Labs " reviewed in Epic    ASSESSMENT/PLAN:     Assessment and Plan  1. Routine general medical examination at a health care facility  Last seen pt in 12/2021 for f/u on GERD, Migraine was  started on triptans and she feels much improved at this time.  She is here for physical. Last labs in 12/2021 with dyslipidemia, CMP normal. CBC does show leucocytosis , prediabetes and Vit.D def .   - CBC with platelets and differential; Future  - TSH with free T4 reflex; Future  - Vitamin D Deficiency; Future  - CBC with platelets and differential  - TSH with free T4 reflex  - Vitamin D Deficiency    2. Malignant neoplasm of female breast, unspecified estrogen receptor status, unspecified laterality, unspecified site of breast (H)  3. Malignant neoplasm of upper-outer quadrant of right breast in female, estrogen receptor negative (H)  Stable,recent Mammogram in 10/2021 revoewed. Does get yearly MRI breast as managed with the genetics department as well.   - CBC with platelets and differential; Future  - CBC with platelets and differential    4. Leukocytosis, unspecified type  - CBC with platelets and differential; Future  - CBC with platelets and differential    5. Morbid obesity (H)  Noted, patient working on her diet management and has had this special herbal tea which she lost 10 lbs since her last visit with me. Will monitor.     6. Acquired hypothyroidism  - TSH with free T4 reflex; Future  - TSH with free T4 reflex    7. Vitamin D insufficiency  - Vitamin D Deficiency; Future  - Vitamin D Deficiency    8. High priority for 2019-nCoV vaccine  - COVID-19,PF,PFIZER (12+ Yrs GRAY LABEL)       Patient Instructions   As discussed, we will do the labs which were not done recently on 12/2021.     =========================          Preventive Health Recommendations  Female Ages 50 - 64    Yearly exam: See your health care provider every year in order to  o Review health changes.   o Discuss preventive care.    o Review your medicines if  your doctor has prescribed any.      Get a Pap test every three years (unless you have an abnormal result and your provider advises testing more often).    If you get Pap tests with HPV test, you only need to test every 5 years, unless you have an abnormal result.     You do not need a Pap test if your uterus was removed (hysterectomy) and you have not had cancer.    You should be tested each year for STDs (sexually transmitted diseases) if you're at risk.     Have a mammogram every 1 to 2 years.    Have a colonoscopy at age 50, or have a yearly FIT test (stool test). These exams screen for colon cancer.      Have a cholesterol test every 5 years, or more often if advised.    Have a diabetes test (fasting glucose) every three years. If you are at risk for diabetes, you should have this test more often.     If you are at risk for osteoporosis (brittle bone disease), think about having a bone density scan (DEXA).    Shots: Get a flu shot each year. Get a tetanus shot every 10 years.    Nutrition:     Eat at least 5 servings of fruits and vegetables each day.    Eat whole-grain bread, whole-wheat pasta and brown rice instead of white grains and rice.    Get adequate Calcium and Vitamin D.     Lifestyle    Exercise at least 150 minutes a week (30 minutes a day, 5 days a week). This will help you control your weight and prevent disease.    Limit alcohol to one drink per day.    No smoking.     Wear sunscreen to prevent skin cancer.     See your dentist every six months for an exam and cleaning.    See your eye doctor every 1 to 2 years.      Return in about 6 months (around 8/16/2022), or if symptoms worsen or fail to improve, for Follow up of last visit.    Melida Chandra MD  [facility]        Patient has been advised of split billing requirements and indicates understanding: Yes    COUNSELING:  Reviewed preventive health counseling, as reflected in patient instructions  Special attention  "given to:        Regular exercise       Healthy diet/nutrition       Vision screening       Hearing screening       Osteoporosis prevention/bone health    Estimated body mass index is 31.12 kg/m  as calculated from the following:    Height as of this encounter: 1.651 m (5' 5\").    Weight as of this encounter: 84.8 kg (187 lb).    Weight management plan: Discussed healthy diet and exercise guidelines    She reports that she has never smoked. She has never used smokeless tobacco.      Counseling Resources:  ATP IV Guidelines  Pooled Cohorts Equation Calculator  Breast Cancer Risk Calculator  BRCA-Related Cancer Risk Assessment: FHS-7 Tool  FRAX Risk Assessment  ICSI Preventive Guidelines  Dietary Guidelines for Americans, 2010  USDA's MyPlate  ASA Prophylaxis  Lung CA Screening    Melida Chandra MD  [facility]  "

## 2022-02-17 LAB
DEPRECATED CALCIDIOL+CALCIFEROL SERPL-MC: 20 UG/L (ref 20–75)
TSH SERPL DL<=0.005 MIU/L-ACNC: 1.78 MU/L (ref 0.4–4)

## 2022-02-25 DIAGNOSIS — K21.9 GASTROESOPHAGEAL REFLUX DISEASE, UNSPECIFIED WHETHER ESOPHAGITIS PRESENT: ICD-10-CM

## 2022-02-25 RX ORDER — PANTOPRAZOLE SODIUM 20 MG/1
40 TABLET, DELAYED RELEASE ORAL 2 TIMES DAILY
Qty: 360 TABLET | Refills: 3 | Status: SHIPPED | OUTPATIENT
Start: 2022-02-25 | End: 2023-02-22

## 2022-02-25 NOTE — TELEPHONE ENCOUNTER
Prescription approved per [facility] Refill Protocol.    Queta JORDAN RN  EP Triage

## 2022-04-11 ENCOUNTER — TELEPHONE (OUTPATIENT)
Dept: MEDSURG UNIT | Facility: CLINIC | Age: 52
End: 2022-04-11
Payer: COMMERCIAL

## 2022-04-13 ENCOUNTER — HOSPITAL ENCOUNTER (OUTPATIENT)
Dept: MRI IMAGING | Facility: CLINIC | Age: 52
Discharge: HOME | End: 2022-04-13
Attending: INTERNAL MEDICINE
Payer: COMMERCIAL

## 2022-04-13 ENCOUNTER — HOSPITAL ENCOUNTER (OUTPATIENT)
Facility: CLINIC | Age: 52
Discharge: HOME | End: 2022-04-13
Admitting: INTERNAL MEDICINE
Payer: COMMERCIAL

## 2022-04-13 DIAGNOSIS — Z17.1 MALIGNANT NEOPLASM OF UPPER-OUTER QUADRANT OF RIGHT BREAST IN FEMALE, ESTROGEN RECEPTOR NEGATIVE (H): ICD-10-CM

## 2022-04-13 DIAGNOSIS — Z91.89 AT RISK FOR BREAST CANCER: ICD-10-CM

## 2022-04-13 DIAGNOSIS — Z15.09 MONOALLELIC MUTATION OF PALB2 GENE: ICD-10-CM

## 2022-04-13 DIAGNOSIS — Z15.89 MONOALLELIC MUTATION OF PALB2 GENE: ICD-10-CM

## 2022-04-13 DIAGNOSIS — Z15.01 MONOALLELIC MUTATION OF PALB2 GENE: ICD-10-CM

## 2022-04-13 DIAGNOSIS — C50.411 MALIGNANT NEOPLASM OF UPPER-OUTER QUADRANT OF RIGHT BREAST IN FEMALE, ESTROGEN RECEPTOR NEGATIVE (H): ICD-10-CM

## 2022-04-13 PROCEDURE — 255N000002 HC RX 255 OP 636: Performed by: INTERNAL MEDICINE

## 2022-04-13 PROCEDURE — 77049 MRI BREAST C-+ W/CAD BI: CPT

## 2022-04-13 PROCEDURE — A9585 GADOBUTROL INJECTION: HCPCS | Performed by: INTERNAL MEDICINE

## 2022-04-13 RX ORDER — GADOBUTROL 604.72 MG/ML
9 INJECTION INTRAVENOUS ONCE
Status: COMPLETED | OUTPATIENT
Start: 2022-04-13 | End: 2022-04-13

## 2022-04-13 RX ADMIN — GADOBUTROL 9 ML: 604.72 INJECTION INTRAVENOUS at 09:01

## 2022-04-13 NOTE — RESULT ENCOUNTER NOTE
Dear Ms. Alert,    MRI of breast is good. No evidence of cancer.    Please, call me with any questions.    Ailyn Humphrey MD

## 2022-05-02 DIAGNOSIS — E03.9 HYPOTHYROIDISM (ACQUIRED): ICD-10-CM

## 2022-05-04 RX ORDER — LEVOTHYROXINE SODIUM 75 UG/1
75 TABLET ORAL DAILY
Qty: 90 TABLET | Refills: 2 | Status: SHIPPED | OUTPATIENT
Start: 2022-05-04 | End: 2023-02-22

## 2022-05-09 ENCOUNTER — HOSPITAL ENCOUNTER (EMERGENCY)
Facility: CLINIC | Age: 52
Discharge: STILL ADMITTED OR ADMINISTRATIVE | End: 2022-05-10
Payer: COMMERCIAL

## 2022-05-11 ENCOUNTER — ONCOLOGY VISIT (OUTPATIENT)
Dept: ONCOLOGY | Facility: CLINIC | Age: 52
End: 2022-05-11
Attending: INTERNAL MEDICINE
Payer: COMMERCIAL

## 2022-05-11 ENCOUNTER — LAB (OUTPATIENT)
Dept: INFUSION THERAPY | Facility: CLINIC | Age: 52
End: 2022-05-11
Attending: INTERNAL MEDICINE
Payer: COMMERCIAL

## 2022-05-11 ENCOUNTER — PATIENT OUTREACH (OUTPATIENT)
Dept: FAMILY MEDICINE | Facility: CLINIC | Age: 52
End: 2022-05-11

## 2022-05-11 VITALS
OXYGEN SATURATION: 100 % | WEIGHT: 190.2 LBS | DIASTOLIC BLOOD PRESSURE: 85 MMHG | TEMPERATURE: 97.8 F | SYSTOLIC BLOOD PRESSURE: 126 MMHG | RESPIRATION RATE: 20 BRPM | HEART RATE: 74 BPM | BODY MASS INDEX: 31.65 KG/M2

## 2022-05-11 DIAGNOSIS — C50.411 MALIGNANT NEOPLASM OF UPPER-OUTER QUADRANT OF RIGHT BREAST IN FEMALE, ESTROGEN RECEPTOR NEGATIVE (H): ICD-10-CM

## 2022-05-11 DIAGNOSIS — Z17.1 MALIGNANT NEOPLASM OF UPPER-OUTER QUADRANT OF RIGHT BREAST IN FEMALE, ESTROGEN RECEPTOR NEGATIVE (H): ICD-10-CM

## 2022-05-11 DIAGNOSIS — R17 ELEVATED BILIRUBIN: ICD-10-CM

## 2022-05-11 DIAGNOSIS — C50.411 MALIGNANT NEOPLASM OF UPPER-OUTER QUADRANT OF RIGHT BREAST IN FEMALE, ESTROGEN RECEPTOR NEGATIVE (H): Primary | ICD-10-CM

## 2022-05-11 DIAGNOSIS — Z12.31 ENCOUNTER FOR SCREENING MAMMOGRAM FOR BREAST CANCER: ICD-10-CM

## 2022-05-11 DIAGNOSIS — Z17.1 MALIGNANT NEOPLASM OF UPPER-OUTER QUADRANT OF RIGHT BREAST IN FEMALE, ESTROGEN RECEPTOR NEGATIVE (H): Primary | ICD-10-CM

## 2022-05-11 LAB
ALBUMIN SERPL-MCNC: 3.4 G/DL (ref 3.4–5)
ALP SERPL-CCNC: 79 U/L (ref 40–150)
ALT SERPL W P-5'-P-CCNC: 25 U/L (ref 0–50)
ANION GAP SERPL CALCULATED.3IONS-SCNC: 1 MMOL/L (ref 3–14)
AST SERPL W P-5'-P-CCNC: 20 U/L (ref 0–45)
BILIRUB SERPL-MCNC: 1.6 MG/DL (ref 0.2–1.3)
BUN SERPL-MCNC: 11 MG/DL (ref 7–30)
CALCIUM SERPL-MCNC: 9.1 MG/DL (ref 8.5–10.1)
CANCER AG27-29 SERPL-ACNC: 25 U/ML (ref 0–39)
CHLORIDE BLD-SCNC: 107 MMOL/L (ref 94–109)
CO2 SERPL-SCNC: 30 MMOL/L (ref 20–32)
CREAT SERPL-MCNC: 0.69 MG/DL (ref 0.52–1.04)
ERYTHROCYTE [DISTWIDTH] IN BLOOD BY AUTOMATED COUNT: 15.2 % (ref 10–15)
GFR SERPL CREATININE-BSD FRML MDRD: >90 ML/MIN/1.73M2
GLUCOSE BLD-MCNC: 92 MG/DL (ref 70–99)
HCT VFR BLD AUTO: 37.6 % (ref 35–47)
HGB BLD-MCNC: 12.3 G/DL (ref 11.7–15.7)
MCH RBC QN AUTO: 26.6 PG (ref 26.5–33)
MCHC RBC AUTO-ENTMCNC: 32.7 G/DL (ref 31.5–36.5)
MCV RBC AUTO: 81 FL (ref 78–100)
PLATELET # BLD AUTO: 291 10E3/UL (ref 150–450)
POTASSIUM BLD-SCNC: 4.1 MMOL/L (ref 3.4–5.3)
PROT SERPL-MCNC: 7.4 G/DL (ref 6.8–8.8)
RBC # BLD AUTO: 4.63 10E6/UL (ref 3.8–5.2)
SODIUM SERPL-SCNC: 138 MMOL/L (ref 133–144)
WBC # BLD AUTO: 6.3 10E3/UL (ref 4–11)

## 2022-05-11 PROCEDURE — G0463 HOSPITAL OUTPT CLINIC VISIT: HCPCS

## 2022-05-11 PROCEDURE — 85027 COMPLETE CBC AUTOMATED: CPT | Performed by: INTERNAL MEDICINE

## 2022-05-11 PROCEDURE — 80053 COMPREHEN METABOLIC PANEL: CPT | Performed by: INTERNAL MEDICINE

## 2022-05-11 PROCEDURE — 99214 OFFICE O/P EST MOD 30 MIN: CPT | Performed by: INTERNAL MEDICINE

## 2022-05-11 PROCEDURE — 86300 IMMUNOASSAY TUMOR CA 15-3: CPT | Performed by: INTERNAL MEDICINE

## 2022-05-11 PROCEDURE — 36415 COLL VENOUS BLD VENIPUNCTURE: CPT

## 2022-05-11 ASSESSMENT — PAIN SCALES - GENERAL: PAINLEVEL: NO PAIN (0)

## 2022-05-11 NOTE — PROGRESS NOTES
"Oncology Rooming Note    May 11, 2022 8:55 AM   Luci Londono is a 51 year old female who presents for:    Chief Complaint   Patient presents with     Oncology Clinic Visit     Breast cancer (H       Initial Vitals: /85 (BP Location: Left arm, Patient Position: Sitting, Cuff Size: Adult Large)   Pulse 74   Temp 97.8  F (36.6  C) (Oral)   Resp 20   Wt 86.3 kg (190 lb 3.2 oz)   SpO2 100%   BMI 31.65 kg/m   Estimated body mass index is 31.65 kg/m  as calculated from the following:    Height as of 2/16/22: 1.651 m (5' 5\").    Weight as of this encounter: 86.3 kg (190 lb 3.2 oz). Body surface area is 1.99 meters squared.  No Pain (0) Comment: Data Unavailable   No LMP recorded. Patient is premenopausal.  Allergies reviewed: Yes  Medications reviewed: Yes    Medications: Medication refills not needed today.  Pharmacy name entered into [facility]:    [facility] [place] - [address] [id number] - [place], [place] - [address] AT [facility]    Clinical concerns: no     Emerald Pascual CMA              "

## 2022-05-11 NOTE — TELEPHONE ENCOUNTER
What type of discharge? Emergency Department  Risk of Hospital admission or ED visit: 79%  Is a TCM episode required? No  When should the patient follow up with PCP? 7 days of discharge.    Shell Manjarrez RN  [facility]

## 2022-05-11 NOTE — LETTER
"    5/11/2022         RE: Luci Londono  [address]  [place] [facility] [place] [address]        Dear Colleague,    Thank you for referring your patient, Luci C Alert, to the [facility]. Please see a copy of my visit note below.    Oncology Rooming Note    May 11, 2022 8:55 AM   Luci Londono is a 51 year old female who presents for:    Chief Complaint   Patient presents with     Oncology Clinic Visit     Breast cancer (H       Initial Vitals: /85 (BP Location: Left arm, Patient Position: Sitting, Cuff Size: Adult Large)   Pulse 74   Temp 97.8  F (36.6  C) (Oral)   Resp 20   Wt 86.3 kg (190 lb 3.2 oz)   SpO2 100%   BMI 31.65 kg/m   Estimated body mass index is 31.65 kg/m  as calculated from the following:    Height as of 2/16/22: 1.651 m (5' 5\").    Weight as of this encounter: 86.3 kg (190 lb 3.2 oz). Body surface area is 1.99 meters squared.  No Pain (0) Comment: Data Unavailable   No LMP recorded. Patient is premenopausal.  Allergies reviewed: Yes  Medications reviewed: Yes    Medications: Medication refills not needed today.  Pharmacy name entered into [facility]:    [facility], [place] - [address] [facility] [id number] [place], [place] - [address] AT [facility] & [facility]    Clinical concerns: no     Emerald Pascual CMA                ONCOLOGIC HISTORY:  Ms. Londono is a female with triple negative right breast cancer.   -PALB2  mutation.  1.  Bilateral diagnostic mammogram and right breast ultrasound on 10/09/2018 revealed an 8 mm hypoechoic mass at the 12 o'clock position and abnormal right axillary lymph node.  2.  Ultrasound-guided right breast and right axillary lymph node biopsy was done on 10/12/2018.    -Right breast biopsy revealed invasive carcinoma, grade 3.  There was DCIS.    -Right axillary lymph node biopsy revealed metastatic adenocarcinoma.  -ER negative, IL negative and HER-2/elton negative.   -Clinical T1b N1 " M0.  3.  Patient received neoadjuvant chemotherapy with dose-dense Adriamycin and Cytoxan followed by weekly carboplatin and Taxol between 10/31/2018 and 04/03/2019.   4.  Patient underwent right breast lumpectomy and sentinel lymph node biopsy on 05/15/2019.  There was a 1.6 mm metastatic carcinoma in one of the lymph nodes.  There was no residual disease in the breast.  ypT0 ypN1mi M0 disease.   -The patient received adjuvant oral Xeloda.   -The patient received radiation to right breast and right supraclavicular region between 06/27/2019 and 08/13/2019. 5940 cGy in 33 fractions.  5.  She has PALB2  mutation.  The patient followed up in Genetics clinic.  She is getting alternating mammogram and breast MRI.      SUBJECTIVE:  Ms. Londono is a 51-year-old female with triple negative right breast cancer status post chemotherapy, surgery and radiation. There has been no recurrence.      The patient is doing good.  No headache.  No dizziness.  No bone pain.  No chest pain.  No shortness of breath.  No abdominal pain, nausea or vomiting.  No urinary or bowel complaints.  No abnormal bleeding.  No fever, chills, or night sweats.  All other review of systems negative.     PHYSICAL EXAMINATION:   GENERAL:  Alert and oriented x 3.   VITAL SIGNS:  Reviewed.  ECOG PS of 0.     EYES:  No icterus.   THROAT:  No ulcer. No thrush.   NECK:  Supple. No lymphadenopathy.    AXILLAE:  No lymphadenopathy.   LUNGS:  Good air entry bilaterally.  No crackles or wheezing.   HEART:  Regular.  No murmur.   GI:  Abdomen is soft.  Nontender. No mass.   EXTREMITIES:  No pedal edema.  No calf swelling or tenderness.   SKIN:  No rash.   BREASTS:  Examined in the presence of the nurse.  No suspicious mass or skin lesions.    LABS: CBC, CMP and CA 27-29 reviewed.     ASSESSMENT:    1.  A 51-year-old female with triple negative right breast cancer diagnosed in 2018.  No evidence of recurrence.  2.  PALB2 mutation.  3.  Elevated bilirubin.     PLAN:    1.  Patient is doing well from breast cancer.  No evidence of recurrence.  We will continue to monitor her.    Patient has PALB2 mutation.  This increases her risk of breast cancer.  She is getting alternating mammogram and MRI every 6 months.  That will be continued.  In 6 months time if she will get mammogram.    I discussed with her regarding prophylactic bilateral mastectomy.  At this time she does not want mastectomy.    2.  Labs were reviewed with her.  Explained to her bilirubin is mildly elevated 1.6.  Other LFTs are normal.  We will recheck another bilirubin in next few weeks.  If it goes higher, ultrasound liver will be done.    3.  I will see her back in 6 months time.  Advised her to see a physician if she has any lump, unexplained pain, weight loss, worsening weakness, shortness of breath or any other concerns.    Total visit time of 30 minutes.  Time is spent in today's visit, review of chart/investigations today and documentation.      Again, thank you for allowing me to participate in the care of your patient.        Sincerely,        Ailyn Humphrey MD

## 2022-05-12 NOTE — PROGRESS NOTES
ONCOLOGIC HISTORY:  Ms. Londono is a female with triple negative right breast cancer.   -PALB2  mutation.  1.  Bilateral diagnostic mammogram and right breast ultrasound on 10/09/2018 revealed an 8 mm hypoechoic mass at the 12 o'clock position and abnormal right axillary lymph node.  2.  Ultrasound-guided right breast and right axillary lymph node biopsy was done on 10/12/2018.    -Right breast biopsy revealed invasive carcinoma, grade 3.  There was DCIS.    -Right axillary lymph node biopsy revealed metastatic adenocarcinoma.  -ER negative, OH negative and HER-2/elton negative.   -Clinical T1b N1 M0.  3.  Patient received neoadjuvant chemotherapy with dose-dense Adriamycin and Cytoxan followed by weekly carboplatin and Taxol between 10/31/2018 and 04/03/2019.   4.  Patient underwent right breast lumpectomy and sentinel lymph node biopsy on 05/15/2019.  There was a 1.6 mm metastatic carcinoma in one of the lymph nodes.  There was no residual disease in the breast.  ypT0 ypN1mi M0 disease.   -The patient received adjuvant oral Xeloda.   -The patient received radiation to right breast and right supraclavicular region between 06/27/2019 and 08/13/2019. 5940 cGy in 33 fractions.  5.  She has PALB2  mutation.  The patient followed up in Genetics clinic.  She is getting alternating mammogram and breast MRI.      SUBJECTIVE:  Ms. Londono is a 51-year-old female with triple negative right breast cancer status post chemotherapy, surgery and radiation. There has been no recurrence.      The patient is doing good.  No headache.  No dizziness.  No bone pain.  No chest pain.  No shortness of breath.  No abdominal pain, nausea or vomiting.  No urinary or bowel complaints.  No abnormal bleeding.  No fever, chills, or night sweats.  All other review of systems negative.     PHYSICAL EXAMINATION:   GENERAL:  Alert and oriented x 3.   VITAL SIGNS:  Reviewed.  ECOG PS of 0.     EYES:  No icterus.   THROAT:  No ulcer. No thrush.   NECK:   Supple. No lymphadenopathy.    AXILLAE:  No lymphadenopathy.   LUNGS:  Good air entry bilaterally.  No crackles or wheezing.   HEART:  Regular.  No murmur.   GI:  Abdomen is soft.  Nontender. No mass.   EXTREMITIES:  No pedal edema.  No calf swelling or tenderness.   SKIN:  No rash.   BREASTS:  Examined in the presence of the nurse.  No suspicious mass or skin lesions.    LABS: CBC, CMP and CA 27-29 reviewed.     ASSESSMENT:    1.  A 51-year-old female with triple negative right breast cancer diagnosed in 2018.  No evidence of recurrence.  2.  PALB2 mutation.  3.  Elevated bilirubin.     PLAN:   1.  Patient is doing well from breast cancer.  No evidence of recurrence.  We will continue to monitor her.    Patient has PALB2 mutation.  This increases her risk of breast cancer.  She is getting alternating mammogram and MRI every 6 months.  That will be continued.  In 6 months time if she will get mammogram.    I discussed with her regarding prophylactic bilateral mastectomy.  At this time she does not want mastectomy.    2.  Labs were reviewed with her.  Explained to her bilirubin is mildly elevated 1.6.  Other LFTs are normal.  We will recheck another bilirubin in next few weeks.  If it goes higher, ultrasound liver will be done.    3.  I will see her back in 6 months time.  Advised her to see a physician if she has any lump, unexplained pain, weight loss, worsening weakness, shortness of breath or any other concerns.    Total visit time of 30 minutes.  Time is spent in today's visit, review of chart/investigations today and documentation.

## 2022-06-17 DIAGNOSIS — E78.2 MIXED HYPERLIPIDEMIA: ICD-10-CM

## 2022-06-20 RX ORDER — SIMVASTATIN 10 MG
10 TABLET ORAL AT BEDTIME
Qty: 90 TABLET | Refills: 1 | Status: SHIPPED | OUTPATIENT
Start: 2022-06-20 | End: 2022-09-15

## 2022-06-20 NOTE — TELEPHONE ENCOUNTER
Prescription approved per [facility] Refill Protocol.  Jenae ESQUIVEL RN  [facility]

## 2022-09-07 ENCOUNTER — OFFICE VISIT (OUTPATIENT)
Dept: FAMILY MEDICINE | Facility: CLINIC | Age: 52
End: 2022-09-07
Payer: COMMERCIAL

## 2022-09-07 VITALS
HEART RATE: 58 BPM | HEIGHT: 65 IN | BODY MASS INDEX: 31.16 KG/M2 | OXYGEN SATURATION: 100 % | WEIGHT: 187 LBS | SYSTOLIC BLOOD PRESSURE: 130 MMHG | DIASTOLIC BLOOD PRESSURE: 80 MMHG | TEMPERATURE: 97.9 F

## 2022-09-07 DIAGNOSIS — Z17.1 MALIGNANT NEOPLASM OF UPPER-OUTER QUADRANT OF RIGHT BREAST IN FEMALE, ESTROGEN RECEPTOR NEGATIVE (H): ICD-10-CM

## 2022-09-07 DIAGNOSIS — C50.411 MALIGNANT NEOPLASM OF UPPER-OUTER QUADRANT OF RIGHT BREAST IN FEMALE, ESTROGEN RECEPTOR NEGATIVE (H): ICD-10-CM

## 2022-09-07 DIAGNOSIS — N64.4 BREAST PAIN: ICD-10-CM

## 2022-09-07 DIAGNOSIS — G89.29 CHRONIC BILATERAL LOW BACK PAIN WITH RIGHT-SIDED SCIATICA: ICD-10-CM

## 2022-09-07 DIAGNOSIS — R73.03 PREDIABETES: ICD-10-CM

## 2022-09-07 DIAGNOSIS — Z23 NEED FOR PROPHYLACTIC VACCINATION AND INOCULATION AGAINST INFLUENZA: ICD-10-CM

## 2022-09-07 DIAGNOSIS — E66.09 CLASS 1 OBESITY DUE TO EXCESS CALORIES WITH SERIOUS COMORBIDITY AND BODY MASS INDEX (BMI) OF 31.0 TO 31.9 IN ADULT: ICD-10-CM

## 2022-09-07 DIAGNOSIS — M79.10 MYALGIA: ICD-10-CM

## 2022-09-07 DIAGNOSIS — E78.5 DYSLIPIDEMIA: ICD-10-CM

## 2022-09-07 DIAGNOSIS — E66.811 CLASS 1 OBESITY DUE TO EXCESS CALORIES WITH SERIOUS COMORBIDITY AND BODY MASS INDEX (BMI) OF 31.0 TO 31.9 IN ADULT: ICD-10-CM

## 2022-09-07 DIAGNOSIS — H92.01 DISCOMFORT OF RIGHT EAR: ICD-10-CM

## 2022-09-07 DIAGNOSIS — H61.21 IMPACTED CERUMEN OF RIGHT EAR: Primary | ICD-10-CM

## 2022-09-07 DIAGNOSIS — M54.41 CHRONIC BILATERAL LOW BACK PAIN WITH RIGHT-SIDED SCIATICA: ICD-10-CM

## 2022-09-07 PROCEDURE — 90471 IMMUNIZATION ADMIN: CPT | Performed by: INTERNAL MEDICINE

## 2022-09-07 PROCEDURE — 69209 REMOVE IMPACTED EAR WAX UNI: CPT | Mod: RT | Performed by: INTERNAL MEDICINE

## 2022-09-07 PROCEDURE — 99214 OFFICE O/P EST MOD 30 MIN: CPT | Mod: 25 | Performed by: INTERNAL MEDICINE

## 2022-09-07 PROCEDURE — 90682 RIV4 VACC RECOMBINANT DNA IM: CPT | Performed by: INTERNAL MEDICINE

## 2022-09-07 RX ORDER — METFORMIN HCL 500 MG
TABLET, EXTENDED RELEASE 24 HR ORAL
Qty: 90 TABLET | Refills: 0 | Status: SHIPPED | OUTPATIENT
Start: 2022-09-07 | End: 2023-02-22

## 2022-09-07 RX ORDER — DULOXETIN HYDROCHLORIDE 30 MG/1
30 CAPSULE, DELAYED RELEASE ORAL 2 TIMES DAILY
Qty: 60 CAPSULE | Refills: 1 | Status: SHIPPED | OUTPATIENT
Start: 2022-09-07 | End: 2023-02-22

## 2022-09-07 ASSESSMENT — PAIN SCALES - GENERAL: PAINLEVEL: NO PAIN (0)

## 2022-09-07 NOTE — PROGRESS NOTES
{PROVIDER CHARTING PREFERENCE:235488}    Cristian Verma is a 51 year old{ACCOMPANIED BY STATEMENT (Optional):134519}, presenting for the following health issues:  Ear Problem (Bleeding when flying/)      HPI     {SUPERLIST (Optional):205805}  {additonal problems for provider to add (Optional):379838}    Review of Systems   {ROS COMP (Optional):046931}      Objective    There were no vitals taken for this visit.  There is no height or weight on file to calculate BMI.  Physical Exam   {Exam List (Optional):161389}    {Diagnostic Test Results (Optional):805671}    {AMBULATORY ATTESTATION (Optional):736429}

## 2022-09-07 NOTE — PROGRESS NOTES
Assessment and Plan  1. Impacted cerumen of right ear  2. Discomfort of right ear  New problem, which pt states happened in July 2 months back while she was flying and has had severe Rt ear pain   - SD REMOVAL IMPACTED CERUMEN IRRIGATION/LVG UNILAT    3. Breast pain  4. Malignant neoplasm of upper-outer quadrant of right breast in female, estrogen receptor negative (H)  New problem, of bilateral breast pain. Last Mammogram in 10/2021 normal. Given Breast cancer Hx pt is very concerned . Will check Ultrasound and do further recommendations.    - US Breast Bilateral Limited 1-3 Quadrants; Future    4. Chronic bilateral low back pain with right-sided sciatica  5. Myalgia w hx of recurrence since 2012  Chronic problem, intermittent flareups.WIll start on Cymbalta given the clinical exam showing tenderness on the trigger points of Fibromyalgia.    - DULoxetine (CYMBALTA) 30 MG capsule; Take 1 capsule (30 mg) by mouth 2 times daily  Dispense: 60 capsule; Refill: 1    6. Dyslipidemia  Last labs showing mildly elevated LDL. Currently on Simvastatin. Will recheck at this time including LFTs.   - Comprehensive metabolic panel (BMP + Alb, Alk Phos, ALT, AST, Total. Bili, TP); Future  - Lipid panel reflex to direct LDL Fasting; Future    7. Prediabetes  8. Class 1 obesity due to excess calories with serious comorbidity and body mass index (BMI) of 31.0 to 31.9 in adult  Continue current Metformin which is helping in weight loss.   - Hemoglobin A1c; Future    9. Need for prophylactic vaccination and inoculation against influenza  - INFLUENZA QUAD, RECOMBINANT, P-FREE (RIV4) (FLUBLOK)           Patient Instructions   As discussed, your ear symptoms of ear are due to congestion and will need Flonase nasal spray , Claritin-d while you are travelling and Keep swallowing liquid while in flight which helps.    Given your Bodyaches , will treat this as fibromyalgia with Cymbalta started at low dose.     Will check labs for cholesterol  ", A1C and liver function since we started on Statin last time.     Placed Ultrasound of the breast for your breast pain. You can schedule at your nearby locations.     [facility]  ( [facility] )   Call : [phone]  Toll Free : [phone]    ==============================    [facility]  ( [facility] )   [facility]  Call : [phone]  Toll Free : [phone]    ==============================  [facility]  Phone : [phone]    ===============================    [facility]   ( All Locations )   Call : [phone]  Toll Free : [phone]       =========    Dietary Approaches to Stop Hypertension trial -- A different approach was evaluated in the Dietary Approaches to Stop Hypertension (DASH) trial [6]. Rather than evaluating sodium intake or weight loss, DASH randomly assigned 459 patients with BPs of less than 160/80 to 95 mmHg to one of three diets:  ?A control diet low in fruits, vegetables, and legumes and high in snacks, sweets, meats, and saturated fat.  ?A diet rich in fruits, vegetables, legumes and low in snacks and sweets.  ?A combination diet rich in fruits, vegetables, legumes, and low-fat dairy products and low in snacks, sweets, meats, and saturated and total fat (this combination diet is called the \"DASH diet\"). The DASH diet is comprised of four to five servings of fruit, four to five servings of vegetables, two to three servings of low-fat dairy per day, and <25 percent fat.  The following observations were noted in which the BP reductions were expressed in relation to the fall in BP seen with the control diet:  ?The fruits and vegetables diet reduced the BP by 2.8/1.1 mmHg, and the combination diet reduced the BP by 5.5/3.0.  ?These effects were more pronounced in patients with hypertension. With the combination diet, for example, the BP fell 11.4/5.5 mmHg in hypertensives versus " 3.5/2.1 mmHg in the normotensives.  ?The antihypertensive effects were maximal by the end of week 2 with any of the diets and were then maintained for eight weeks.     Return in about 5 months (around 2/7/2023), or if symptoms worsen or fail to improve, for Preventative Visit.    Melida Chandra MD  [facility] ADRIANA Verma is a 51 year old presenting for the following health issues:  Ear Problem (Bleeding when flying/) and Imm/Inj (Flu Shot)      HPI     Last seen pt on 2/2022 for annual physical. She is here for Bleeding in ears as she is flying        Allergies   Allergen Reactions     Clindamycin      Pt gets GI symptoms, gastritis .and lower extremity numbness        Past Medical History:   Diagnosis Date     Cancer (H)     breast     Diabetes (H)     pre-dm     High cholesterol      Hypertension goal BP (blood pressure) < 140/80 2/18/2014     Lateral epicondylitis, right dominant elbow since late 10-17 11/1/2017     Migraine      Port-A-Cath in place 10/26/2018     Thyroid disease        Past Surgical History:   Procedure Laterality Date     BIOPSY NODE SENTINEL Right 5/15/2019    Procedure: BIOPSY, LYMPH NODE, SENTINEL;  Surgeon: Stacey Ashford MD;  Location:  OR     COLONOSCOPY N/A 1/26/2022    Procedure: COLONOSCOPY;  Surgeon: Zac Arriola MD;  Location:  GI     DISSECT LYMPH NODE AXILLA Right 5/15/2019    Procedure: LYMPHADENECTOMY, AXILLARY;  Surgeon: Stacey Ashford MD;  Location:  OR     HAND SURGERY  2002    left     INSERT PORT VASCULAR ACCESS N/A 10/18/2018    Procedure: INSERTION OF VASCULAR PORT;  Surgeon: Stacey Ashford MD;  Location:  OR     LUMPECTOMY BREAST WITH SEED LOCALIZATION Right 5/15/2019    Procedure: SEED LOCALIZATION BREAST LUMPECTOMY, SEED LOCALIZATION AXILLARY BREAST BIOPSY, SENTINEL NODE , REMOVAL OF VASCULAR PORT ACCESS AND RIGHT  AXILLARY NODE DISSECTION;  Surgeon: Stacey Ashford MD;  Location:  OR      "REMOVE PORT VASCULAR ACCESS N/A 5/15/2019    Procedure: REMOVAL, VASCULAR ACCESS PORT;  Surgeon: Stacey Ashford MD;  Location: [facility] OR     TUBAL LIGATION         Family History   Problem Relation Age of Onset     Diabetes Mother      Unknown/Adopted Father      Coronary Artery Disease No family hx of      Hypertension No family hx of      Hyperlipidemia No family hx of      Cerebrovascular Disease No family hx of      Breast Cancer No family hx of      Colon Cancer No family hx of      Prostate Cancer No family hx of      Other Cancer No family hx of      Depression No family hx of      Anxiety Disorder No family hx of      Mental Illness No family hx of      Substance Abuse No family hx of      Anesthesia Reaction No family hx of      Asthma No family hx of      Osteoporosis No family hx of      Genetic Disorder No family hx of      Thyroid Disease No family hx of      Obesity No family hx of        Social History     Tobacco Use     Smoking status: Never Smoker     Smokeless tobacco: Never Used   Substance Use Topics     Alcohol use: No        Current Outpatient Medications   Medication     DULoxetine (CYMBALTA) 30 MG capsule     metFORMIN (GLUCOPHAGE XR) 500 MG 24 hr tablet     simvastatin (ZOCOR) 10 MG tablet     ascorbic acid (VITAMIN C) 1000 MG TABS     aspirin 81 MG EC tablet     cyclobenzaprine (FLEXERIL) 5 MG tablet     hydrOXYzine (ATARAX) 25 MG tablet     levothyroxine (SYNTHROID/LEVOTHROID) 75 MCG tablet     multivitamin, therapeutic with minerals (THERA-VIT-M) TABS tablet     pantoprazole (PROTONIX) 20 MG EC tablet     SUMAtriptan (IMITREX) 25 MG tablet     No current facility-administered medications for this visit.        Review of Systems   Constitutional, HEENT, cardiovascular, pulmonary, GI, , musculoskeletal, neuro, skin, endocrine and psych systems are negative, except as otherwise noted.      Objective    /80   Pulse 58   Temp 97.9  F (36.6  C) (Temporal)   Ht 1.651 m (5' 5\")   Wt " 84.8 kg (187 lb)   SpO2 100%   BMI 31.12 kg/m    Body mass index is 31.12 kg/m .  Physical Exam   GENERAL: healthy, alert and no distress  ENT Exam : Ear canals and TM's POSITIVE for Rt ear cerumen and possible dried blood clot on the ear., nose and mouth without ulcers or lesions, NO pharyngeal erythema, Cervical lymphadenopathy or Sinus tenderness on palpation.  NECK: no adenopathy, no asymmetry, masses, or scars and thyroid normal to palpation  RESP: lungs clear to auscultation - no rales, rhonchi or wheezes  CV: regular rate and rhythm, normal S1 S2, no S3 or S4, no murmur, click or rub, no peripheral edema and peripheral pulses strong  Breasts: normal without suspicious masses, skin changes or axillary nodes, symmetric fibrous changes in both upper outer quadrants, self exam is taught and encouraged. No positive signs except breast tenderness bilaterally   ABDOMEN: soft, nontender, no hepatosplenomegaly, no masses and bowel sounds normal  MS: no gross musculoskeletal defects noted, no edema  BACK : POSITIVE for tenderness on the trigger points of Fibromyalgia on the back on palpation.

## 2022-09-07 NOTE — PATIENT INSTRUCTIONS
"As discussed, your ear symptoms of ear are due to congestion and will need Flonase nasal spray , Claritin-d while you are travelling and Keep swallowing liquid while in flight which helps.    Given your Bodyaches , will treat this as fibromyalgia with Cymbalta started at low dose.     Will check labs for cholesterol , A1C and liver function since we started on Statin last time.     Placed Ultrasound of the breast for your breast pain. You can schedule at your nearby locations.     [facility]  ( [facility] )   Call : [phone]  Toll Free : [phone]    ==============================    [facility]  ( [facility] )   [facility]  Call : [phone]  Toll Free : [phone]    ==============================  [facility]  Phone : [phone]    ===============================    [facility]   ( All Locations )   Call : [phone]  Toll Free : [phone]       =========    Dietary Approaches to Stop Hypertension trial -- A different approach was evaluated in the Dietary Approaches to Stop Hypertension (DASH) trial [6]. Rather than evaluating sodium intake or weight loss, DASH randomly assigned 459 patients with BPs of less than 160/80 to 95 mmHg to one of three diets:  ?A control diet low in fruits, vegetables, and legumes and high in snacks, sweets, meats, and saturated fat.  ?A diet rich in fruits, vegetables, legumes and low in snacks and sweets.  ?A combination diet rich in fruits, vegetables, legumes, and low-fat dairy products and low in snacks, sweets, meats, and saturated and total fat (this combination diet is called the \"DASH diet\"). The DASH diet is comprised of four to five servings of fruit, four to five servings of vegetables, two to three servings of low-fat dairy per day, and <25 percent fat.  The following observations were noted in which the BP reductions were expressed in relation to " the fall in BP seen with the control diet:  ?The fruits and vegetables diet reduced the BP by 2.8/1.1 mmHg, and the combination diet reduced the BP by 5.5/3.0.  ?These effects were more pronounced in patients with hypertension. With the combination diet, for example, the BP fell 11.4/5.5 mmHg in hypertensives versus 3.5/2.1 mmHg in the normotensives.  ?The antihypertensive effects were maximal by the end of week 2 with any of the diets and were then maintained for eight weeks.

## 2022-09-09 ENCOUNTER — TELEPHONE (OUTPATIENT)
Dept: FAMILY MEDICINE | Facility: CLINIC | Age: 52
End: 2022-09-09

## 2022-09-09 NOTE — TELEPHONE ENCOUNTER
Prior Authorization Retail Medication Request     Medication/Dose: Duloxetine Dr 30mg Capsules  ICD code (if different than what is on RX):   Previously Tried and Failed:   Rationale: Plan does not cover this medication. Please call plan at [phone] to initiate PA or call/fax pharmacy to change medication. Pt ID is [id number]     Insurance Name:   Insurance ID:      Pharmacy Information (if different than what is on RX)  Name: [facility] [address]  Phone: [phone]

## 2022-09-09 NOTE — TELEPHONE ENCOUNTER
Central Prior Authorization Team  Phone: [phone]    PA Initiation    Medication: Duloxetine Dr 30mg Capsules  Insurance Company: [employer] - Phone [phone] Fax [phone]  Pharmacy Filling the Rx: [id number] [place], [place] - [address] AT [facility]  Filling Pharmacy Phone: [phone]  Filling Pharmacy Fax:    Start Date: 9/9/2022

## 2022-09-13 ENCOUNTER — HOSPITAL ENCOUNTER (OUTPATIENT)
Dept: MAMMOGRAPHY | Facility: CLINIC | Age: 52
Discharge: HOME | End: 2022-09-13
Attending: INTERNAL MEDICINE
Payer: COMMERCIAL

## 2022-09-13 DIAGNOSIS — N64.4 BREAST PAIN: ICD-10-CM

## 2022-09-13 PROCEDURE — 77062 BREAST TOMOSYNTHESIS BI: CPT

## 2022-09-13 PROCEDURE — 76642 ULTRASOUND BREAST LIMITED: CPT | Mod: 50

## 2022-09-13 NOTE — TELEPHONE ENCOUNTER
Central Prior Authorization Team  Phone: [phone]    Prior Authorization Approval    Authorization Effective Date:    Authorization Expiration Date:    Medication: Duloxetine Dr 30mg Capsules  Approved Dose/Quantity:   Reference #:     Insurance Company: [employer] - Phone [phone] Fax [phone]  Expected CoPay:       CoPay Card Available:      Foundation Assistance Needed:    Which Pharmacy is filling the prescription (Not needed for infusion/clinic administered): [id number] - [place] [id number] AT [address]  Pharmacy Notified: Yes  Patient Notified: Yes

## 2022-09-14 ENCOUNTER — LAB (OUTPATIENT)
Dept: LAB | Facility: CLINIC | Age: 52
End: 2022-09-14
Payer: COMMERCIAL

## 2022-09-14 DIAGNOSIS — E78.5 DYSLIPIDEMIA: ICD-10-CM

## 2022-09-14 DIAGNOSIS — R73.03 PREDIABETES: ICD-10-CM

## 2022-09-14 LAB — HBA1C MFR BLD: 5.6 % (ref 0–5.6)

## 2022-09-14 PROCEDURE — 36415 COLL VENOUS BLD VENIPUNCTURE: CPT

## 2022-09-14 PROCEDURE — 80053 COMPREHEN METABOLIC PANEL: CPT

## 2022-09-14 PROCEDURE — 83036 HEMOGLOBIN GLYCOSYLATED A1C: CPT

## 2022-09-14 PROCEDURE — 80061 LIPID PANEL: CPT

## 2022-09-15 DIAGNOSIS — E78.5 DYSLIPIDEMIA: Primary | ICD-10-CM

## 2022-09-15 LAB
ALBUMIN SERPL-MCNC: 3.5 G/DL (ref 3.4–5)
ALP SERPL-CCNC: 84 U/L (ref 40–150)
ALT SERPL W P-5'-P-CCNC: 26 U/L (ref 0–50)
ANION GAP SERPL CALCULATED.3IONS-SCNC: 3 MMOL/L (ref 3–14)
AST SERPL W P-5'-P-CCNC: 27 U/L (ref 0–45)
BILIRUB SERPL-MCNC: 0.3 MG/DL (ref 0.2–1.3)
BUN SERPL-MCNC: 9 MG/DL (ref 7–30)
CALCIUM SERPL-MCNC: 8.8 MG/DL (ref 8.5–10.1)
CHLORIDE BLD-SCNC: 106 MMOL/L (ref 94–109)
CHOLEST SERPL-MCNC: 216 MG/DL
CO2 SERPL-SCNC: 29 MMOL/L (ref 20–32)
CREAT SERPL-MCNC: 0.78 MG/DL (ref 0.52–1.04)
FASTING STATUS PATIENT QL REPORTED: YES
GFR SERPL CREATININE-BSD FRML MDRD: >90 ML/MIN/1.73M2
GLUCOSE BLD-MCNC: 82 MG/DL (ref 70–99)
HDLC SERPL-MCNC: 61 MG/DL
LDLC SERPL CALC-MCNC: 137 MG/DL
NONHDLC SERPL-MCNC: 155 MG/DL
POTASSIUM BLD-SCNC: 4.6 MMOL/L (ref 3.4–5.3)
PROT SERPL-MCNC: 7.5 G/DL (ref 6.8–8.8)
SODIUM SERPL-SCNC: 138 MMOL/L (ref 133–144)
TRIGL SERPL-MCNC: 90 MG/DL

## 2022-09-15 RX ORDER — SIMVASTATIN 20 MG
20 TABLET ORAL AT BEDTIME
Qty: 90 TABLET | Refills: 1 | Status: SHIPPED | OUTPATIENT
Start: 2022-09-15 | End: 2023-02-22

## 2022-09-16 ENCOUNTER — TELEPHONE (OUTPATIENT)
Dept: FAMILY MEDICINE | Facility: CLINIC | Age: 52
End: 2022-09-16

## 2022-09-16 NOTE — TELEPHONE ENCOUNTER
Patient Contact     S/w pt and relayed message from Dr. Chandra, see below. She state understanding.     Jenae ESQUIVEL RN  [facility]

## 2022-09-16 NOTE — TELEPHONE ENCOUNTER
----- Message from Melida Chandra MD sent at 9/15/2022 11:51 PM CDT -----  Your Cholesterol levels are remaining abnormal inspite of current Simvastatin. Will increase the dose of it and send to pharmacy.     All your other labs normal, you may see some highlighted which do not have Clinical significance.  Melida Chandra MD on 9/15/2022

## 2022-09-18 NOTE — PROCEDURES
Ear wash performed by sha SAVAGE On Rt Ear:     Instruments which are utilized to remove the impacted earwax are - an otoscope and Lavage apparatus with suction. Pt tolerated the procedure well with no complications.

## 2022-11-09 ENCOUNTER — ONCOLOGY VISIT (OUTPATIENT)
Dept: ONCOLOGY | Facility: CLINIC | Age: 52
End: 2022-11-09
Attending: INTERNAL MEDICINE
Payer: COMMERCIAL

## 2022-11-09 ENCOUNTER — LAB (OUTPATIENT)
Dept: INFUSION THERAPY | Facility: CLINIC | Age: 52
End: 2022-11-09
Attending: INTERNAL MEDICINE
Payer: COMMERCIAL

## 2022-11-09 VITALS
SYSTOLIC BLOOD PRESSURE: 123 MMHG | HEART RATE: 81 BPM | TEMPERATURE: 98 F | DIASTOLIC BLOOD PRESSURE: 86 MMHG | WEIGHT: 195 LBS | BODY MASS INDEX: 32.45 KG/M2 | RESPIRATION RATE: 16 BRPM | OXYGEN SATURATION: 100 %

## 2022-11-09 DIAGNOSIS — Z17.1 MALIGNANT NEOPLASM OF UPPER-OUTER QUADRANT OF RIGHT BREAST IN FEMALE, ESTROGEN RECEPTOR NEGATIVE (H): Primary | ICD-10-CM

## 2022-11-09 DIAGNOSIS — Z91.89 AT RISK FOR BREAST CANCER: ICD-10-CM

## 2022-11-09 DIAGNOSIS — Z17.1 MALIGNANT NEOPLASM OF UPPER-OUTER QUADRANT OF RIGHT BREAST IN FEMALE, ESTROGEN RECEPTOR NEGATIVE (H): ICD-10-CM

## 2022-11-09 DIAGNOSIS — Z15.89 MONOALLELIC MUTATION OF PALB2 GENE: ICD-10-CM

## 2022-11-09 DIAGNOSIS — C50.411 MALIGNANT NEOPLASM OF UPPER-OUTER QUADRANT OF RIGHT BREAST IN FEMALE, ESTROGEN RECEPTOR NEGATIVE (H): Primary | ICD-10-CM

## 2022-11-09 DIAGNOSIS — C50.411 MALIGNANT NEOPLASM OF UPPER-OUTER QUADRANT OF RIGHT BREAST IN FEMALE, ESTROGEN RECEPTOR NEGATIVE (H): ICD-10-CM

## 2022-11-09 DIAGNOSIS — Z15.01 MONOALLELIC MUTATION OF PALB2 GENE: ICD-10-CM

## 2022-11-09 DIAGNOSIS — R17 ELEVATED BILIRUBIN: ICD-10-CM

## 2022-11-09 DIAGNOSIS — Z15.09 MONOALLELIC MUTATION OF PALB2 GENE: ICD-10-CM

## 2022-11-09 LAB
BILIRUB SERPL-MCNC: 1.2 MG/DL (ref 0.2–1.3)
HOLD SPECIMEN: NORMAL

## 2022-11-09 PROCEDURE — G0463 HOSPITAL OUTPT CLINIC VISIT: HCPCS

## 2022-11-09 PROCEDURE — 99214 OFFICE O/P EST MOD 30 MIN: CPT | Performed by: INTERNAL MEDICINE

## 2022-11-09 PROCEDURE — 36415 COLL VENOUS BLD VENIPUNCTURE: CPT

## 2022-11-09 PROCEDURE — 82247 BILIRUBIN TOTAL: CPT | Performed by: INTERNAL MEDICINE

## 2022-11-09 ASSESSMENT — PAIN SCALES - GENERAL: PAINLEVEL: SEVERE PAIN (7)

## 2022-11-09 NOTE — PROGRESS NOTES
Nursing Note:  Luci Londono presents today for labs.    Patient seen by provider today: Yes: BK   present during visit today: Not Applicable.    Note: N/A.    Intravenous Access:  Lab draw site L AC, Needle type butterfly, Gauge 23.  Labs drawn without difficulty.    Discharge Plan:   Patient was sent to clinic for BK appointment.    Ayde Curry RN

## 2022-11-09 NOTE — LETTER
"    11/9/2022         RE: Luci Londono  [address]  [place] [address]        Dear Colleague,    Thank you for referring your patient, Luci C Alert, to the [facility]. Please see a copy of my visit note below.    Oncology Rooming Note    November 9, 2022 7:55 AM   Luci Londono is a 51 year old female who presents for:    Chief Complaint   Patient presents with     Oncology Clinic Visit     Initial Vitals: Resp 16   Wt 88.5 kg (195 lb)   BMI 32.45 kg/m   Estimated body mass index is 32.45 kg/m  as calculated from the following:    Height as of 9/7/22: 1.651 m (5' 5\").    Weight as of this encounter: 88.5 kg (195 lb). Body surface area is 2.01 meters squared.  Severe Pain (7) Comment: Data Unavailable   No LMP recorded. Patient is premenopausal.  Allergies reviewed: Yes  Medications reviewed: Yes    Medications: Medication refills not needed today.  Pharmacy name entered into [facility]:    [facility] [place], [place] - [address] [id number] [place], [place] - [address] AT [address]    Clinical concerns: no      Anum Edwards              ONCOLOGIC HISTORY:  Ms. Londono is a female with triple negative right breast cancer.   -PALB2  mutation.  1.  Bilateral diagnostic mammogram and right breast ultrasound on 10/09/2018 revealed an 8 mm hypoechoic mass at the 12 o'clock position and abnormal right axillary lymph node.  2.  Ultrasound-guided right breast and right axillary lymph node biopsy was done on 10/12/2018.    -Right breast biopsy revealed invasive carcinoma, grade 3.  There was DCIS.    -Right axillary lymph node biopsy revealed metastatic adenocarcinoma.  -ER negative, NC negative and HER-2/elton negative.   -Clinical T1b N1 M0.  3.  Patient received neoadjuvant chemotherapy with dose-dense Adriamycin and Cytoxan followed by weekly carboplatin and Taxol between 10/31/2018 and 04/03/2019.   4.  Patient underwent " right breast lumpectomy and sentinel lymph node biopsy on 05/15/2019.  There was a 1.6 mm metastatic carcinoma in one of the lymph nodes.  There was no residual disease in the breast.  ypT0 ypN1mi M0 disease.   -The patient received adjuvant oral Xeloda.   -The patient received radiation to right breast and right supraclavicular region between 06/27/2019 and 08/13/2019. 5940 cGy in 33 fractions.  5.  She has PALB2  mutation.  The patient followed up in Genetics clinic.  She is getting alternating mammogram and breast MRI.      SUBJECTIVE:  Ms. Londono is a 51-year-old female with triple negative right breast cancer status post chemotherapy, surgery and radiation. There has been no recurrence.     Patient gets headache on and off.  She does have a history of migraine.  Patient said that she was doing well. Now migraine is coming back.  She had brain MRI done on 12/08/2021 which was negative for any metastatic disease.    Patient still gets some pain in the small joints of the hand.  Does not happen every day. No stiffness or swelling.    Patient sometimes gets some discomfort in the right breast at the lumpectomy site.  Sometimes also some discomfort in the right shoulder.  These are intermittent and they resolve on their own.     No headache.  No dizziness. No chest pain.  No shortness of breath.  No abdominal pain, nausea or vomiting.  No urinary or bowel complaints. No fever or night sweats. All other review of systems negative.     PHYSICAL EXAMINATION:   GENERAL:  Alert and oriented x 3.   VITAL SIGNS:  Reviewed.  ECOG PS of 0.     EYES:  No icterus.   NECK:  Supple. No lymphadenopathy.    AXILLAE:  No lymphadenopathy.   LUNGS:  Good air entry bilaterally.  No crackles or wheezing.   HEART:  Regular.  No murmur.   GI:  Abdomen is soft.  Nontender. No mass.   EXTREMITIES:  No pedal edema.  No calf swelling or tenderness.   SKIN:  No rash.   BREASTS:  Examined in the presence of the nurse.  No suspicious mass or  skin lesions.     ASSESSMENT:    1.  A 51-year-old female with triple negative right breast cancer diagnosed in 2018.  No evidence of recurrence.  2.  PALB2 mutation.     PLAN:   1.  Discussed regarding breast cancer.  She is doing well.  No evidence of recurrence.  It is 4 years since diagnosis.  Risk of recurrence is low.  We will continue to monitor her.  Explained to her that we will not be doing any routine CT scan or bone scan.    2. Patient has PALB2 mutation.  This increases her risk of breast cancer.  She is getting alternating mammogram and MRI every 6 months.  That will be continued.  In 6 months time she will get MRI breast.     I have discussed with her regarding prophylactic bilateral mastectomy. She does not want mastectomy.     3.  Patient has few symptoms.  She gets some migraine headache.  She will take pain medication as needed.  Sometimes she gets her pain at the lumpectomy site.  Told her this is postsurgical.      Sometimes she gets some pain in the small joints of the hand.  Likely arthritic pain.  Explained to her that if it gets worse or if she is stiffness, she may have to see a rheumatologist to make sure she does not have rheumatoid arthritis.  She will let us know if it gets worse.    4.  I will see her in 6 months time.  Advised her to call us with any questions or concerns.    Total visit time of 30 minutes.  Time is spent in today's visit, review of chart/investigations today and documentation.      Again, thank you for allowing me to participate in the care of your patient.        Sincerely,        Ailyn Humphrey MD

## 2022-11-09 NOTE — PROGRESS NOTES
"Oncology Rooming Note    November 9, 2022 7:55 AM   Luci Londono is a 51 year old female who presents for:    Chief Complaint   Patient presents with     Oncology Clinic Visit     Initial Vitals: Resp 16   Wt 88.5 kg (195 lb)   BMI 32.45 kg/m   Estimated body mass index is 32.45 kg/m  as calculated from the following:    Height as of 9/7/22: 1.651 m (5' 5\").    Weight as of this encounter: 88.5 kg (195 lb). Body surface area is 2.01 meters squared.  Severe Pain (7) Comment: Data Unavailable   No LMP recorded. Patient is premenopausal.  Allergies reviewed: Yes  Medications reviewed: Yes    Medications: Medication refills not needed today.  Pharmacy name entered into [facility]:    [facility] [place]  [place] - [address] [facility] [address] [id number] - [place], [place] - [address] AT [address]    Clinical concerns: no      Anum Edwards            "

## 2022-11-09 NOTE — PROGRESS NOTES
ONCOLOGIC HISTORY:  Ms. Londono is a female with triple negative right breast cancer.   -PALB2  mutation.  1.  Bilateral diagnostic mammogram and right breast ultrasound on 10/09/2018 revealed an 8 mm hypoechoic mass at the 12 o'clock position and abnormal right axillary lymph node.  2.  Ultrasound-guided right breast and right axillary lymph node biopsy was done on 10/12/2018.    -Right breast biopsy revealed invasive carcinoma, grade 3.  There was DCIS.    -Right axillary lymph node biopsy revealed metastatic adenocarcinoma.  -ER negative, RI negative and HER-2/elton negative.   -Clinical T1b N1 M0.  3.  Patient received neoadjuvant chemotherapy with dose-dense Adriamycin and Cytoxan followed by weekly carboplatin and Taxol between 10/31/2018 and 04/03/2019.   4.  Patient underwent right breast lumpectomy and sentinel lymph node biopsy on 05/15/2019.  There was a 1.6 mm metastatic carcinoma in one of the lymph nodes.  There was no residual disease in the breast.  ypT0 ypN1mi M0 disease.   -The patient received adjuvant oral Xeloda.   -The patient received radiation to right breast and right supraclavicular region between 06/27/2019 and 08/13/2019. 5940 cGy in 33 fractions.  5.  She has PALB2  mutation.  The patient followed up in Genetics clinic.  She is getting alternating mammogram and breast MRI.      SUBJECTIVE:  Ms. Londono is a 51-year-old female with triple negative right breast cancer status post chemotherapy, surgery and radiation. There has been no recurrence.     Patient gets headache on and off.  She does have a history of migraine.  Patient said that she was doing well. Now migraine is coming back.  She had brain MRI done on 12/08/2021 which was negative for any metastatic disease.    Patient still gets some pain in the small joints of the hand.  Does not happen every day. No stiffness or swelling.    Patient sometimes gets some discomfort in the right breast at the lumpectomy site.  Sometimes also some  discomfort in the right shoulder.  These are intermittent and they resolve on their own.     No headache.  No dizziness. No chest pain.  No shortness of breath.  No abdominal pain, nausea or vomiting.  No urinary or bowel complaints. No fever or night sweats. All other review of systems negative.     PHYSICAL EXAMINATION:   GENERAL:  Alert and oriented x 3.   VITAL SIGNS:  Reviewed.  ECOG PS of 0.     EYES:  No icterus.   NECK:  Supple. No lymphadenopathy.    AXILLAE:  No lymphadenopathy.   LUNGS:  Good air entry bilaterally.  No crackles or wheezing.   HEART:  Regular.  No murmur.   GI:  Abdomen is soft.  Nontender. No mass.   EXTREMITIES:  No pedal edema.  No calf swelling or tenderness.   SKIN:  No rash.   BREASTS:  Examined in the presence of the nurse.  No suspicious mass or skin lesions.     ASSESSMENT:    1.  A 51-year-old female with triple negative right breast cancer diagnosed in 2018.  No evidence of recurrence.  2.  PALB2 mutation.     PLAN:   1.  Discussed regarding breast cancer.  She is doing well.  No evidence of recurrence.  It is 4 years since diagnosis.  Risk of recurrence is low.  We will continue to monitor her.  Explained to her that we will not be doing any routine CT scan or bone scan.    2. Patient has PALB2 mutation.  This increases her risk of breast cancer.  She is getting alternating mammogram and MRI every 6 months.  That will be continued.  In 6 months time she will get MRI breast.     I have discussed with her regarding prophylactic bilateral mastectomy. She does not want mastectomy.     3.  Patient has few symptoms.  She gets some migraine headache.  She will take pain medication as needed.  Sometimes she gets her pain at the lumpectomy site.  Told her this is postsurgical.      Sometimes she gets some pain in the small joints of the hand.  Likely arthritic pain.  Explained to her that if it gets worse or if she is stiffness, she may have to see a rheumatologist to make sure she does  not have rheumatoid arthritis.  She will let us know if it gets worse.    4.  I will see her in 6 months time.  Advised her to call us with any questions or concerns.    Total visit time of 30 minutes.  Time is spent in today's visit, review of chart/investigations today and documentation.

## 2022-11-10 NOTE — RESULT ENCOUNTER NOTE
Dear Ms. Alert,    Bilirubin is normal.    Please, call me with any questions.    Ailyn Humphrey MD

## 2022-12-21 ENCOUNTER — TELEPHONE (OUTPATIENT)
Dept: FAMILY MEDICINE | Facility: CLINIC | Age: 52
End: 2022-12-21

## 2022-12-21 ENCOUNTER — E-VISIT (OUTPATIENT)
Dept: FAMILY MEDICINE | Facility: CLINIC | Age: 52
End: 2022-12-21
Payer: COMMERCIAL

## 2022-12-21 DIAGNOSIS — M47.816 LUMBAR FACET ARTHROPATHY: ICD-10-CM

## 2022-12-21 DIAGNOSIS — C50.411 MALIGNANT NEOPLASM OF UPPER-OUTER QUADRANT OF RIGHT BREAST IN FEMALE, ESTROGEN RECEPTOR NEGATIVE (H): ICD-10-CM

## 2022-12-21 DIAGNOSIS — M54.41 ACUTE BILATERAL LOW BACK PAIN WITH RIGHT-SIDED SCIATICA: Primary | ICD-10-CM

## 2022-12-21 DIAGNOSIS — Z17.1 MALIGNANT NEOPLASM OF UPPER-OUTER QUADRANT OF RIGHT BREAST IN FEMALE, ESTROGEN RECEPTOR NEGATIVE (H): ICD-10-CM

## 2022-12-21 DIAGNOSIS — M51.06 INTERVERTEBRAL LUMBAR DISC DISORDER WITH MYELOPATHY, LUMBAR REGION: ICD-10-CM

## 2022-12-21 PROCEDURE — 99423 OL DIG E/M SVC 21+ MIN: CPT | Performed by: PHYSICIAN ASSISTANT

## 2022-12-21 RX ORDER — CYCLOBENZAPRINE HCL 10 MG
10 TABLET ORAL 3 TIMES DAILY PRN
Qty: 30 TABLET | Refills: 0 | Status: SHIPPED | OUTPATIENT
Start: 2022-12-21 | End: 2023-02-22

## 2022-12-21 NOTE — ADDENDUM NOTE
Addended by: ALEENA TYLER JR. on: 8/16/2018 11:02 AM     Modules accepted: Orders     Detail Level: Detailed

## 2022-12-21 NOTE — TELEPHONE ENCOUNTER
Provider E-Visit time total (minutes): 5 minutes      Sent in [employer] message as below -     Lam Verma,     Yes, I have placed the letter as requested. Please find it on your mychart - letter section.     Thank you,  Melida Chandra MD on 12/21/2022 at 1:22 PM

## 2022-12-21 NOTE — TELEPHONE ENCOUNTER
Please see separate telephone encounter from 12/21/22.   Jenae ESQUIVEL RN  [facility]

## 2022-12-21 NOTE — LETTER
[address] [address]  Phone: [phone]    December 21, 2022        Luci Londono  [address]  [facility] [address]          To whom it may concern:    RE: Luci C Alert    Patient was treated today at our clinic and missed work.Please excuse her for 3 days. 12/21 , 12/22 , 12/23 for her medical condition.     Please contact me for questions or concerns.      Sincerely,        Melida Chandra MD

## 2022-12-21 NOTE — TELEPHONE ENCOUNTER
Provider E-Visit time total (minutes): 22 minutes       Lam Verma,     I am sorry to hear that, I do understand that given your risk factors and the new symptoms of back pain with sciatica into your right leg but did check an MRI of your back to make sure there is no other suspicious concerns which could be causing this before planning physical therapy.    I went ahead and placed orders for MRI with contrast, you will need to do a blood test on the day of your MRI.    I will keep updating you on the results and recommendations as soon as you do this MRI through this same a visit which is submitted.      Also you are due for annual physical in February, I have asked my team to reach you so that they can get you in at that time for evaluation and further recommendations.    Please let me know if you have any questions.    Melida Chandra MD on 12/21/2022 at 11:59 AM

## 2022-12-22 ENCOUNTER — HOSPITAL ENCOUNTER (OUTPATIENT)
Dept: MRI IMAGING | Facility: CLINIC | Age: 52
Discharge: HOME | End: 2022-12-22
Attending: INTERNAL MEDICINE | Admitting: INTERNAL MEDICINE
Payer: COMMERCIAL

## 2022-12-22 DIAGNOSIS — M54.41 ACUTE BILATERAL LOW BACK PAIN WITH RIGHT-SIDED SCIATICA: ICD-10-CM

## 2022-12-22 DIAGNOSIS — M51.06 INTERVERTEBRAL LUMBAR DISC DISORDER WITH MYELOPATHY, LUMBAR REGION: ICD-10-CM

## 2022-12-22 DIAGNOSIS — Z17.1 MALIGNANT NEOPLASM OF UPPER-OUTER QUADRANT OF RIGHT BREAST IN FEMALE, ESTROGEN RECEPTOR NEGATIVE (H): ICD-10-CM

## 2022-12-22 DIAGNOSIS — C50.411 MALIGNANT NEOPLASM OF UPPER-OUTER QUADRANT OF RIGHT BREAST IN FEMALE, ESTROGEN RECEPTOR NEGATIVE (H): ICD-10-CM

## 2022-12-22 PROCEDURE — 72158 MRI LUMBAR SPINE W/O & W/DYE: CPT

## 2022-12-22 PROCEDURE — 255N000002 HC RX 255 OP 636: Performed by: INTERNAL MEDICINE

## 2022-12-22 PROCEDURE — A9585 GADOBUTROL INJECTION: HCPCS | Performed by: INTERNAL MEDICINE

## 2022-12-22 RX ORDER — GADOBUTROL 604.72 MG/ML
9 INJECTION INTRAVENOUS ONCE
Status: COMPLETED | OUTPATIENT
Start: 2022-12-22 | End: 2022-12-22

## 2022-12-22 RX ADMIN — GADOBUTROL 9 ML: 604.72 INJECTION INTRAVENOUS at 08:45

## 2022-12-23 NOTE — TELEPHONE ENCOUNTER
Provider E-Visit time total (minutes): 5 minutes    MRI result reported and placed referral to Physical therapy.     Thank you,  Melida Chandra MD on 12/22/2022

## 2022-12-26 ENCOUNTER — TELEPHONE (OUTPATIENT)
Dept: FAMILY MEDICINE | Facility: CLINIC | Age: 52
End: 2022-12-26

## 2022-12-26 NOTE — TELEPHONE ENCOUNTER
Patient Contact     Attempt # 1     Was call answered?    No  Left non-detailed message to call the clinic back at [phone]. Pt has not viewed result and message via [employer] yet.     On call back:      -Relay message from Dr. Chandra, see below.    Jenae ESQUIVEL RN  [facility]

## 2022-12-26 NOTE — TELEPHONE ENCOUNTER
Spoke to pt, informed pt of results, and had no further questions.    Lindsey HUGHES    [facility]

## 2022-12-26 NOTE — TELEPHONE ENCOUNTER
----- Message from Melida Chandra MD sent at 12/22/2022  9:29 PM CST -----  Good news!  Your MRI spine is normal except mild changes with no concerns. Recommend physical therapy with orders placed.

## 2023-01-18 ENCOUNTER — THERAPY VISIT (OUTPATIENT)
Dept: PHYSICAL THERAPY | Facility: CLINIC | Age: 53
End: 2023-01-18
Payer: COMMERCIAL

## 2023-01-18 DIAGNOSIS — M47.816 LUMBAR FACET ARTHROPATHY: ICD-10-CM

## 2023-01-18 DIAGNOSIS — M54.41 ACUTE BILATERAL LOW BACK PAIN WITH RIGHT-SIDED SCIATICA: ICD-10-CM

## 2023-01-18 PROCEDURE — 97110 THERAPEUTIC EXERCISES: CPT | Mod: GP | Performed by: PHYSICAL THERAPIST

## 2023-01-18 PROCEDURE — 97161 PT EVAL LOW COMPLEX 20 MIN: CPT | Mod: GP | Performed by: PHYSICAL THERAPIST

## 2023-01-18 PROCEDURE — 97140 MANUAL THERAPY 1/> REGIONS: CPT | Mod: GP | Performed by: PHYSICAL THERAPIST

## 2023-01-18 NOTE — PROGRESS NOTES
Physical Therapy Initial Evaluation - Lumbar      Therapist Impression:  Patient presents with signs and symptoms consistent with low back pain secondary to history of chemotherapy and radiation - patient hasn't had radiation in 3 years but has had ongoing RUE, RLE and low back symptoms ever since. Patient demonstrates impairments including decreased lumbar range of motion, flexibility and core stability, with myofascial restrictions and decreased R shoulder mobility - patient feeling like her R arm/shoulder at surgery site is tight and will not loosen . Patient's functional limitations include reaching above head, increase of activity, working as a CNA - patient feels that she is okay to just push thru her pain and has learned to deal with it.    Subjective:  Luci Londono is a 52 year old female. HPI given by patient  Patient's chief complaints: patient has cancer and she gets an entire right sided pain from her arm into the ribs area and into the low back as well, she does feel like heaviness and tightness of the R shoulder/breast that bothers her quite a bit.   Condition occurred due to possible related to chemo, she sees her cancer doctor every 6 months.  Date of Onset: 3 years ago she ended her chemo treatment  Location of symptoms is middle of her back to the lower back on the R side  Symptoms other than pain include: sharp pains sometimes    Pain dependence on time of day is: sometimes it doesn't really matter   Symptoms are exacerbated by: some days she doesn't feel anything, some days she feels it with standing for longer periods of time.    Symptoms are relieved by:  IBU.    Progression of symptoms is that symptoms are:  Getting worse, but she still pushes thru and continues .      Previous treatments include: PT in the past.   Current occupation is: nursing home  Primary job tasks include: she does most standing work, dietary and CNA work    Patient's expectations for therapy include: decrease pain  symptoms increase mobility    Pertinent medical history includes:   Past Medical History:   Diagnosis Date     Cancer (H)     breast     Diabetes (H)     pre-dm     High cholesterol      Hypertension goal BP (blood pressure) < 140/80 2/18/2014     Lateral epicondylitis, right dominant elbow since late 10-17 11/1/2017     Migraine      Port-A-Cath in place 10/26/2018     Thyroid disease      Medical allergies includes: Clindamycin  Surgical history includes: .  Past Surgical History:   Procedure Laterality Date     BIOPSY NODE SENTINEL Right 5/15/2019    Procedure: BIOPSY, LYMPH NODE, SENTINEL;  Surgeon: Stacey Ashford MD;  Location:  OR     COLONOSCOPY N/A 1/26/2022    Procedure: COLONOSCOPY;  Surgeon: Zac Arriola MD;  Location:  GI     DISSECT LYMPH NODE AXILLA Right 5/15/2019    Procedure: LYMPHADENECTOMY, AXILLARY;  Surgeon: Stacey Ashford MD;  Location:  OR     HAND SURGERY  2002    left     INSERT PORT VASCULAR ACCESS N/A 10/18/2018    Procedure: INSERTION OF VASCULAR PORT;  Surgeon: Stacey Ashford MD;  Location:  OR     LUMPECTOMY BREAST WITH SEED LOCALIZATION Right 5/15/2019    Procedure: SEED LOCALIZATION BREAST LUMPECTOMY, SEED LOCALIZATION AXILLARY BREAST BIOPSY, SENTINEL NODE , REMOVAL OF VASCULAR PORT ACCESS AND RIGHT  AXILLARY NODE DISSECTION;  Surgeon: Stacey Ashford MD;  Location:  OR     REMOVE PORT VASCULAR ACCESS N/A 5/15/2019    Procedure: REMOVAL, VASCULAR ACCESS PORT;  Surgeon: Stacey Ashford MD;  Location:  OR     TUBAL LIGATION         Current Outpatient Medications:      ascorbic acid (VITAMIN C) 1000 MG TABS, Take 1,000 mg by mouth daily, Disp: , Rfl:      aspirin 81 MG EC tablet, Take 1 tablet (81 mg) by mouth daily, Disp: 90 tablet, Rfl: 3     cyclobenzaprine (FLEXERIL) 10 MG tablet, Take 1 tablet (10 mg) by mouth 3 times daily as needed for muscle spasms, Disp: 30 tablet, Rfl: 0     cyclobenzaprine (FLEXERIL) 5 MG tablet, Take 1 tablet (5 mg) by  mouth 3 times daily as needed for muscle spasms, Disp: 30 tablet, Rfl: 0     DULoxetine (CYMBALTA) 30 MG capsule, Take 1 capsule (30 mg) by mouth 2 times daily, Disp: 60 capsule, Rfl: 1     hydrOXYzine (ATARAX) 25 MG tablet, , Disp: , Rfl:      levothyroxine (SYNTHROID/LEVOTHROID) 75 MCG tablet, Take 1 tablet (75 mcg) by mouth daily, Disp: 90 tablet, Rfl: 2     metFORMIN (GLUCOPHAGE XR) 500 MG 24 hr tablet, TAKE 1 TABLET(500 MG) BY MOUTH DAILY WITH DINNER, Disp: 90 tablet, Rfl: 0     multivitamin, therapeutic with minerals (THERA-VIT-M) TABS tablet, Take 1 tablet by mouth daily, Disp: , Rfl:      pantoprazole (PROTONIX) 20 MG EC tablet, Take 2 tablets (40 mg) by mouth 2 times daily, Disp: 360 tablet, Rfl: 3     simvastatin (ZOCOR) 20 MG tablet, Take 1 tablet (20 mg) by mouth At Bedtime, Disp: 90 tablet, Rfl: 1     SUMAtriptan (IMITREX) 25 MG tablet, Take 2 tablets (50 mg) by mouth at onset of headache for migraine May repeat in 2 hours. Max 8 tablets/24 hours., Disp: 30 tablet, Rfl: 1    LUMBAR:    Posture: anterior pelvic tilt with sitting and standing    Neurological:    Motor Deficit: WFL no increase of symptoms    Dural Signs:   L R   Slump - -   SLR - -       AROM: (Major, Moderate, Minimal or Nil loss)  Movement Loss Tad Mod Min Nil Pain   Flexion   fingers to shins     Extension   Feels good and she does this daily     Side Gliding L        Side Gliding R          HIP:    PROM:   L  R   Flexion     Extension     Abduction     IR limited limited   ER         Special tests:   L R   Ulises's     Jose BLANKENSHIP - -   DMITRIIR + +       Palpation: TTP at R thoracolumbar paraspinals   Gait: no visible change      Assessment/Plan:    Patient is a 52 year old female with lumbar complaints.    Patient has the following significant findings with corresponding treatment plan.                Diagnosis 1:  R sided low back pain    Pain -  hot/cold therapy, electric stimulation, manual therapy and  splint/taping/bracing/orthotics  Decreased ROM/flexibility - manual therapy and therapeutic exercise  Decreased joint mobility - manual therapy and therapeutic exercise  Decreased strength - therapeutic exercise and therapeutic activities    Therapy Evaluation Codes:   Cumulative Therapy Evaluation is: Low complexity.    Previous and current functional limitations:  (See Goal Flow Sheet for this information)    Short term and Long term goals: (See Goal Flow Sheet for this information)     Communication ability:  Patient appears to be able to clearly communicate and understand verbal and written communication and follow directions correctly.  Treatment Explanation - The following has been discussed with the patient:   RX ordered/plan of care  Anticipated outcomes  Possible risks and side effects  This patient would benefit from PT intervention to resume normal activities.   Rehab potential is good.    Frequency:  1 X week, once daily  Duration:  for 4 weeks tapering to 2 X a month over 6 weeks  Discharge Plan:  Achieve all LTG.  Independent in home treatment program.  Reach maximal therapeutic benefit.    Please refer to the daily flowsheet for treatment today, total treatment time and time spent performing 1:1 timed codes.     Barb Castanon, PT

## 2023-01-18 NOTE — PROGRESS NOTES
[facility]    OUTPATIENT Physical Therapy ORTHOPEDIC EVALUATION  PLAN OF TREATMENT FOR OUTPATIENT REHABILITATION  (COMPLETE FOR INITIAL CLAIMS ONLY)  Patient's Last Name, First Name, M.I.  YOB: 1970  Luci Londono    Provider s Name:  [facility]   Medical Record No.  [id number]   Start of Care Date:  01/18/23   Onset Date:   12/21/22 (PT order)   Treatment Diagnosis:  Low back pain Medical Diagnosis:     Acute bilateral low back pain with right-sided sciatica  Lumbar facet arthropathy       Goals:     01/18/23 0500   Body Part   Goals listed below are for low back   Goal #1   Goal #1 posture/body mechanics   Current Functional Level Fair posture/body mechanics   Performance level limited core stability and postural awareness with sitting/standing/functional mobility   STG Target Performance Patient able to demonstrate good posture and body mechanics when prompted;Improve patient awareness to maintain optimum posture the following percentage of time   Performance Level 50% of the time aware of core stability   Rationale to prevent neck/back pain and avoid injury when lifting/carrying and performing tasks requiring bending   Due Date 03/04/23   LTG Target Performance Patient able to demonstrate good posture and body mechanics without prompting;Improve patient awareness to maintain optimum posture the following percentage of time   Performance Level 80% of the day with core awareness and decreased P symptoms   Rationale to prevent neck/back pain and avoid injury when lifting/carrying and performing tasks requiring bending   Due Date 04/18/23         Therapy Frequency:  1x/week  Predicted Duration of Therapy Intervention:  10 weeks    DANIEL SWANSON, PT                 I CERTIFY THE NEED FOR THESE SERVICES FURNISHED UNDER        THIS PLAN OF TREATMENT AND WHILE UNDER  MY CARE     (Physician attestation of this document indicates review and certification of the therapy plan).                     Certification Date From:  01/18/23   Certification Date To:  04/18/23    Referring Provider:  Melida Chandra    Initial Assessment        See Epic Evaluation SOC Date: 01/18/23

## 2023-02-08 ENCOUNTER — THERAPY VISIT (OUTPATIENT)
Dept: PHYSICAL THERAPY | Facility: CLINIC | Age: 53
End: 2023-02-08
Payer: COMMERCIAL

## 2023-02-08 DIAGNOSIS — M47.816 LUMBAR FACET ARTHROPATHY: ICD-10-CM

## 2023-02-08 DIAGNOSIS — M54.41 ACUTE BILATERAL LOW BACK PAIN WITH RIGHT-SIDED SCIATICA: Primary | ICD-10-CM

## 2023-02-08 PROCEDURE — 97140 MANUAL THERAPY 1/> REGIONS: CPT | Mod: GP | Performed by: PHYSICAL THERAPIST

## 2023-02-08 PROCEDURE — 97110 THERAPEUTIC EXERCISES: CPT | Mod: GP | Performed by: PHYSICAL THERAPIST

## 2023-02-22 ENCOUNTER — ANCILLARY PROCEDURE (OUTPATIENT)
Dept: GENERAL RADIOLOGY | Facility: CLINIC | Age: 53
End: 2023-02-22
Attending: INTERNAL MEDICINE
Payer: COMMERCIAL

## 2023-02-22 ENCOUNTER — OFFICE VISIT (OUTPATIENT)
Dept: FAMILY MEDICINE | Facility: CLINIC | Age: 53
End: 2023-02-22
Payer: COMMERCIAL

## 2023-02-22 VITALS
BODY MASS INDEX: 34.01 KG/M2 | HEART RATE: 68 BPM | HEIGHT: 64 IN | WEIGHT: 199.2 LBS | RESPIRATION RATE: 18 BRPM | DIASTOLIC BLOOD PRESSURE: 78 MMHG | OXYGEN SATURATION: 100 % | TEMPERATURE: 96 F | SYSTOLIC BLOOD PRESSURE: 110 MMHG

## 2023-02-22 DIAGNOSIS — M54.41 ACUTE BILATERAL LOW BACK PAIN WITH RIGHT-SIDED SCIATICA: ICD-10-CM

## 2023-02-22 DIAGNOSIS — Z00.00 ROUTINE GENERAL MEDICAL EXAMINATION AT A HEALTH CARE FACILITY: Primary | ICD-10-CM

## 2023-02-22 DIAGNOSIS — Z17.1 MALIGNANT NEOPLASM OF UPPER-OUTER QUADRANT OF RIGHT BREAST IN FEMALE, ESTROGEN RECEPTOR NEGATIVE (H): ICD-10-CM

## 2023-02-22 DIAGNOSIS — E03.9 HYPOTHYROIDISM (ACQUIRED): ICD-10-CM

## 2023-02-22 DIAGNOSIS — M25.511 ACUTE PAIN OF RIGHT SHOULDER: ICD-10-CM

## 2023-02-22 DIAGNOSIS — R73.03 PREDIABETES: ICD-10-CM

## 2023-02-22 DIAGNOSIS — K21.9 GASTROESOPHAGEAL REFLUX DISEASE, UNSPECIFIED WHETHER ESOPHAGITIS PRESENT: ICD-10-CM

## 2023-02-22 DIAGNOSIS — E78.5 DYSLIPIDEMIA: ICD-10-CM

## 2023-02-22 DIAGNOSIS — E55.9 VITAMIN D INSUFFICIENCY: Chronic | ICD-10-CM

## 2023-02-22 DIAGNOSIS — M79.10 MYALGIA: ICD-10-CM

## 2023-02-22 DIAGNOSIS — C50.411 MALIGNANT NEOPLASM OF UPPER-OUTER QUADRANT OF RIGHT BREAST IN FEMALE, ESTROGEN RECEPTOR NEGATIVE (H): ICD-10-CM

## 2023-02-22 DIAGNOSIS — E03.9 ACQUIRED HYPOTHYROIDISM: ICD-10-CM

## 2023-02-22 PROBLEM — E66.01 MORBID OBESITY (H): Status: RESOLVED | Noted: 2018-08-21 | Resolved: 2023-02-22

## 2023-02-22 LAB
ALBUMIN SERPL BCG-MCNC: 4.3 G/DL (ref 3.5–5.2)
ALP SERPL-CCNC: 79 U/L (ref 35–104)
ALT SERPL W P-5'-P-CCNC: 25 U/L (ref 10–35)
ANION GAP SERPL CALCULATED.3IONS-SCNC: 12 MMOL/L (ref 7–15)
AST SERPL W P-5'-P-CCNC: 38 U/L (ref 10–35)
BILIRUB SERPL-MCNC: 0.3 MG/DL
BUN SERPL-MCNC: 10.2 MG/DL (ref 6–20)
CALCIUM SERPL-MCNC: 9.7 MG/DL (ref 8.6–10)
CHLORIDE SERPL-SCNC: 100 MMOL/L (ref 98–107)
CREAT SERPL-MCNC: 0.82 MG/DL (ref 0.51–0.95)
DEPRECATED HCO3 PLAS-SCNC: 27 MMOL/L (ref 22–29)
GFR SERPL CREATININE-BSD FRML MDRD: 86 ML/MIN/1.73M2
GLUCOSE SERPL-MCNC: 87 MG/DL (ref 70–99)
HBA1C MFR BLD: 5.6 % (ref 0–5.6)
POTASSIUM SERPL-SCNC: 4.2 MMOL/L (ref 3.4–5.3)
PROT SERPL-MCNC: 7.7 G/DL (ref 6.4–8.3)
SODIUM SERPL-SCNC: 139 MMOL/L (ref 136–145)
T4 FREE SERPL-MCNC: 0.86 NG/DL (ref 0.9–1.7)
TSH SERPL DL<=0.005 MIU/L-ACNC: 20.4 UIU/ML (ref 0.3–4.2)

## 2023-02-22 PROCEDURE — 36415 COLL VENOUS BLD VENIPUNCTURE: CPT | Performed by: INTERNAL MEDICINE

## 2023-02-22 PROCEDURE — 84443 ASSAY THYROID STIM HORMONE: CPT | Performed by: INTERNAL MEDICINE

## 2023-02-22 PROCEDURE — 80053 COMPREHEN METABOLIC PANEL: CPT | Performed by: INTERNAL MEDICINE

## 2023-02-22 PROCEDURE — 83036 HEMOGLOBIN GLYCOSYLATED A1C: CPT | Performed by: INTERNAL MEDICINE

## 2023-02-22 PROCEDURE — 73030 X-RAY EXAM OF SHOULDER: CPT | Mod: TC | Performed by: RADIOLOGY

## 2023-02-22 PROCEDURE — 90471 IMMUNIZATION ADMIN: CPT | Performed by: INTERNAL MEDICINE

## 2023-02-22 PROCEDURE — 90750 HZV VACC RECOMBINANT IM: CPT | Performed by: INTERNAL MEDICINE

## 2023-02-22 PROCEDURE — 99215 OFFICE O/P EST HI 40 MIN: CPT | Mod: 25 | Performed by: INTERNAL MEDICINE

## 2023-02-22 PROCEDURE — 99396 PREV VISIT EST AGE 40-64: CPT | Mod: 25 | Performed by: INTERNAL MEDICINE

## 2023-02-22 PROCEDURE — 84439 ASSAY OF FREE THYROXINE: CPT | Performed by: INTERNAL MEDICINE

## 2023-02-22 PROCEDURE — 82306 VITAMIN D 25 HYDROXY: CPT | Performed by: INTERNAL MEDICINE

## 2023-02-22 RX ORDER — PANTOPRAZOLE SODIUM 20 MG/1
40 TABLET, DELAYED RELEASE ORAL 2 TIMES DAILY
Qty: 360 TABLET | Refills: 3 | Status: SHIPPED | OUTPATIENT
Start: 2023-02-22

## 2023-02-22 RX ORDER — DULOXETIN HYDROCHLORIDE 30 MG/1
30 CAPSULE, DELAYED RELEASE ORAL 2 TIMES DAILY
Qty: 60 CAPSULE | Refills: 1 | Status: CANCELLED | OUTPATIENT
Start: 2023-02-22

## 2023-02-22 RX ORDER — LEVOTHYROXINE SODIUM 100 UG/1
100 TABLET ORAL DAILY
Qty: 60 TABLET | Refills: 0 | Status: SHIPPED | OUTPATIENT
Start: 2023-02-22 | End: 2023-05-03

## 2023-02-22 RX ORDER — CYCLOBENZAPRINE HCL 10 MG
10 TABLET ORAL 3 TIMES DAILY PRN
Qty: 30 TABLET | Refills: 0 | Status: SHIPPED | OUTPATIENT
Start: 2023-02-22 | End: 2024-01-19

## 2023-02-22 RX ORDER — SIMVASTATIN 20 MG
20 TABLET ORAL AT BEDTIME
Qty: 90 TABLET | Refills: 1 | Status: SHIPPED | OUTPATIENT
Start: 2023-02-22 | End: 2024-03-04

## 2023-02-22 RX ORDER — METFORMIN HCL 500 MG
TABLET, EXTENDED RELEASE 24 HR ORAL
Qty: 90 TABLET | Refills: 0 | Status: SHIPPED | OUTPATIENT
Start: 2023-02-22

## 2023-02-22 RX ORDER — LEVOTHYROXINE SODIUM 75 UG/1
75 TABLET ORAL DAILY
Qty: 90 TABLET | Refills: 2 | Status: SHIPPED | OUTPATIENT
Start: 2023-02-22 | End: 2023-02-22

## 2023-02-22 RX ORDER — DULOXETIN HYDROCHLORIDE 60 MG/1
60 CAPSULE, DELAYED RELEASE ORAL DAILY
Qty: 60 CAPSULE | Refills: 1 | Status: SHIPPED | OUTPATIENT
Start: 2023-02-22 | End: 2024-02-28

## 2023-02-22 ASSESSMENT — ENCOUNTER SYMPTOMS
CONSTIPATION: 0
HEMATURIA: 0
NERVOUS/ANXIOUS: 0
WEAKNESS: 0
DIZZINESS: 0
SORE THROAT: 0
NAUSEA: 0
SHORTNESS OF BREATH: 0
MYALGIAS: 1
FEVER: 0
DYSURIA: 0
PALPITATIONS: 1
BREAST MASS: 0
FREQUENCY: 0
HEARTBURN: 0
COUGH: 0
CHILLS: 0
DIARRHEA: 0
JOINT SWELLING: 0
HEADACHES: 1
PARESTHESIAS: 0
ARTHRALGIAS: 1
HEMATOCHEZIA: 0

## 2023-02-22 ASSESSMENT — PAIN SCALES - GENERAL: PAINLEVEL: SEVERE PAIN (7)

## 2023-02-22 NOTE — PROGRESS NOTES
SUBJECTIVE:   CC: Luci is an 52 year old who presents for preventive health visit.       Patient has been advised of split billing requirements and indicates understanding: Yes  Healthy Habits:     Getting at least 3 servings of Calcium per day:  Yes    Bi-annual eye exam:  NO    Dental care twice a year:  Yes    Sleep apnea or symptoms of sleep apnea:  None    Diet:  Regular (no restrictions) and Breakfast skipped    Frequency of exercise:  2-3 days/week    Duration of exercise:  15-30 minutes    Taking medications regularly:  Yes    Medication side effects:  None    PHQ-2 Total Score: 0    Additional concerns today:  No      Today's PHQ-2 Score:   PHQ-2 ( 1999 [employer]) 2/22/2023   Q1: Little interest or pleasure in doing things 0   Q2: Feeling down, depressed or hopeless 0   PHQ-2 Score 0   PHQ-2 Total Score (12-17 Years)- Positive if 3 or more points; Administer PHQ-A if positive -   Q1: Little interest or pleasure in doing things Not at all   Q2: Feeling down, depressed or hopeless Not at all   PHQ-2 Score 0       Have you ever done Advance Care Planning? (For example, a Health Directive, POLST, or a discussion with a medical provider or your loved ones about your wishes): No, advance care planning information given to patient to review.  Patient declined advance care planning discussion at this time.    Social History     Tobacco Use     Smoking status: Never     Smokeless tobacco: Never   Substance Use Topics     Alcohol use: No     If you drink alcohol do you typically have >3 drinks per day or >7 drinks per week? Yes    Alcohol Use 2/22/2023   Prescreen: >3 drinks/day or >7 drinks/week? No   Prescreen: >3 drinks/day or >7 drinks/week? -   No flowsheet data found.    Reviewed orders with patient.  Reviewed health maintenance and updated orders accordingly - Yes  Lab work is in process  Labs reviewed in EPIC    Breast Cancer Screening:    FHS-7:   Breast CA Risk Assessment (FHS-7) 10/20/2021   Did any of  your first-degree relatives have breast or ovarian cancer? No   Did any of your relatives have bilateral breast cancer? No   Did any man in your family have breast cancer? No   Did any woman in your family have breast and ovarian cancer? No   Did any woman in your family have breast cancer before age 50 y? Yes   Do you have 2 or more relatives with breast and/or ovarian cancer? No   Do you have 2 or more relatives with breast and/or bowel cancer? No     click delete button to remove this line now  Mammogram Screening: Recommended annual mammography  Pertinent mammograms are reviewed under the imaging tab.    History of abnormal Pap smear: NO - age 30-65 PAP every 5 years with negative HPV co-testing recommended  PAP / HPV Latest Ref Rng & Units 12/3/2020 8/25/2017 1/31/2014   PAP (Historical) - NIL NIL NIL   HPV16 NEG:Negative Negative Negative -   HPV18 NEG:Negative Negative Negative -   HRHPV NEG:Negative Negative Negative -     Reviewed and updated as needed this visit by clinical staff   Tobacco  Allergies  Meds  Problems  Med Hx  Surg Hx  Fam Hx          Reviewed and updated as needed this visit by Provider   Tobacco  Allergies  Meds  Problems  Med Hx  Surg Hx  Fam Hx         Past Medical History:   Diagnosis Date     Cancer (H)     breast     Diabetes (H)     pre-dm     High cholesterol      Hypertension goal BP (blood pressure) < 140/80 2/18/2014     Lateral epicondylitis, right dominant elbow since late 10-17 11/1/2017     Migraine      Port-A-Cath in place 10/26/2018     Thyroid disease       Past Surgical History:   Procedure Laterality Date     BIOPSY NODE SENTINEL Right 5/15/2019    Procedure: BIOPSY, LYMPH NODE, SENTINEL;  Surgeon: Stacey Ashford MD;  Location:  OR     COLONOSCOPY N/A 1/26/2022    Procedure: COLONOSCOPY;  Surgeon: Zac Arriola MD;  Location:  GI     DISSECT LYMPH NODE AXILLA Right 5/15/2019    Procedure: LYMPHADENECTOMY, AXILLARY;  Surgeon: Makeda  MD Stacey;  Location:  OR     HAND SURGERY  2002    left     INSERT PORT VASCULAR ACCESS N/A 10/18/2018    Procedure: INSERTION OF VASCULAR PORT;  Surgeon: Stacey Ashford MD;  Location: [facility] OR     LUMPECTOMY BREAST WITH SEED LOCALIZATION Right 5/15/2019    Procedure: SEED LOCALIZATION BREAST LUMPECTOMY, SEED LOCALIZATION AXILLARY BREAST BIOPSY, SENTINEL NODE , REMOVAL OF VASCULAR PORT ACCESS AND RIGHT  AXILLARY NODE DISSECTION;  Surgeon: Stacey Ashford MD;  Location:  OR     REMOVE PORT VASCULAR ACCESS N/A 5/15/2019    Procedure: REMOVAL, VASCULAR ACCESS PORT;  Surgeon: Stacey Ashford MD;  Location: [facility] OR     TUBAL LIGATION       OB History    Para Term  AB Living   3 3 0 0 0 0   SAB IAB Ectopic Multiple Live Births   0 0 0 0 0      # Outcome Date GA Lbr Murtaza/2nd Weight Sex Delivery Anes PTL Lv   3 Para            2 Para            1 Para                Review of Systems   Constitutional: Negative for chills and fever.   HENT: Negative for congestion, ear pain, hearing loss and sore throat.    Eyes: Positive for visual disturbance.   Respiratory: Negative for cough and shortness of breath.    Cardiovascular: Positive for palpitations. Negative for chest pain and peripheral edema.   Gastrointestinal: Negative for constipation, diarrhea, heartburn, hematochezia and nausea.   Breasts:  Positive for tenderness. Negative for breast mass and discharge.   Genitourinary: Negative for dysuria, frequency, genital sores, hematuria, pelvic pain, urgency, vaginal bleeding and vaginal discharge.   Musculoskeletal: Positive for arthralgias and myalgias. Negative for joint swelling.   Skin: Negative for rash.   Neurological: Positive for headaches. Negative for dizziness, weakness and paresthesias.   Psychiatric/Behavioral: Negative for mood changes. The patient is not nervous/anxious.      CONSTITUTIONAL: NEGATIVE for fever, chills, change in weight  INTEGUMENTARY/SKIN: NEGATIVE for worrisome rashes, moles  "or lesions  EYES: NEGATIVE for vision changes or irritation  ENT: NEGATIVE for ear, mouth and throat problems  RESP: NEGATIVE for significant cough or SOB  BREAST: NEGATIVE for masses, tenderness or discharge  CV: NEGATIVE for chest pain, palpitations or peripheral edema  GI: NEGATIVE for nausea, abdominal pain, heartburn, or change in bowel habits  : NEGATIVE for unusual urinary or vaginal symptoms. No vaginal bleeding.  MUSCULOSKELETAL: NEGATIVE for significant arthralgias or myalgia  NEURO: NEGATIVE for weakness, dizziness or paresthesias  PSYCHIATRIC: NEGATIVE for changes in mood or affect      OBJECTIVE:   /78 (BP Location: Left arm, Patient Position: Sitting, Cuff Size: Adult Large)   Pulse 68   Temp (!) 96  F (35.6  C) (Tympanic)   Resp 18   Ht 1.615 m (5' 3.58\")   Wt 90.4 kg (199 lb 3.2 oz)   SpO2 100%   BMI 34.64 kg/m    Physical Exam  GENERAL APPEARANCE: healthy, alert and no distress  EYES: Eyes grossly normal to inspection, PERRL and conjunctivae and sclerae normal  HENT: ear canals and TM's normal, nose and mouth without ulcers or lesions, oropharynx clear and oral mucous membranes moist  NECK: no adenopathy, no asymmetry, masses, or scars and thyroid normal to palpation  RESP: lungs clear to auscultation - no rales, rhonchi or wheezes  BREAST: Defered , Mammogram.   CV: regular rate and rhythm, normal S1 S2, no S3 or S4, no murmur, click or rub, no peripheral edema and peripheral pulses strong  ABDOMEN: soft, nontender, no hepatosplenomegaly, no masses and bowel sounds normal  MS: no musculoskeletal defects are noted and gait is age appropriate without ataxia  RIGHT SHOULDER \" POSITIVE for tenderness on palpation of the bony prominences of right shoulder, mild restriction of range of movements on abduction.  BACK EXAM : No tenderness on palpation of lumbosacral spine, mild paraspinal muscle spasm bilaterally.  SKIN: no suspicious lesions or rashes  NEURO: Normal strength and tone, " sensory exam grossly normal, mentation intact and speech normal  PSYCH: mentation appears normal and affect normal/bright    Diagnostic Test Results:  Labs reviewed in Epic    ASSESSMENT/PLAN:       Assessment and Plan  1. Routine general medical examination at a health care facility  Last seen pt in 9/2022 for ear wax and follow up on breast pain at that time given risk factors of Breast cancer on Rt breast. No concerns at that time   - ZOSTER VACCINE RECOMBINANT ADJUVANTED (SHINGRIX)  - REVIEW OF HEALTH MAINTENANCE PROTOCOL ORDERS  - TSH WITH FREE T4 REFLEX; Future  - cyclobenzaprine (FLEXERIL) 10 MG tablet; Take 1 tablet (10 mg) by mouth 3 times daily as needed for muscle spasms  Dispense: 30 tablet; Refill: 0  - levothyroxine (SYNTHROID/LEVOTHROID) 75 MCG tablet; Take 1 tablet (75 mcg) by mouth daily  Dispense: 90 tablet; Refill: 2  - metFORMIN (GLUCOPHAGE XR) 500 MG 24 hr tablet; TAKE 1 TABLET(500 MG) BY MOUTH DAILY WITH DINNER  Dispense: 90 tablet; Refill: 0  - simvastatin (ZOCOR) 20 MG tablet; Take 1 tablet (20 mg) by mouth At Bedtime  Dispense: 90 tablet; Refill: 1  - Comprehensive metabolic panel (BMP + Alb, Alk Phos, ALT, AST, Total. Bili, TP); Future  - Hemoglobin A1c; Future  - Vitamin D deficiency screening; Future  - TSH WITH FREE T4 REFLEX  - Comprehensive metabolic panel (BMP + Alb, Alk Phos, ALT, AST, Total. Bili, TP)  - Hemoglobin A1c  - Vitamin D deficiency screening    2. Acquired hypothyroidism  Uncontrolled, TSH check severely abnormal in 20s, will increase the dose of current levothyroxine to 100 mcg from current 75.  Future orders of TSH check in 5 weeks placed.  - TSH WITH FREE T4 REFLEX; Future  - TSH WITH FREE T4 REFLEX  - levothyroxine (SYNTHROID/LEVOTHROID) 100 MCG tablet; Take 1 tablet (100 mcg) by mouth daily  Dispense: 60 tablet; Refill: 0  - TSH with free T4 reflex; Future    3. Acute bilateral low back pain with right-sided sciatica  Chronic problem uncontrolled.  MRI lumbar spine  was done for this purpose recently was normal, except mild facet arthropathy.  Physical exam negative for any tenderness of palpation of the lumbar spine today, paraspinal back muscle spasm positive.  Given muscle relaxer and emphasized on continuing physical therapy which was already placed in the past.  - cyclobenzaprine (FLEXERIL) 10 MG tablet; Take 1 tablet (10 mg) by mouth 3 times daily as needed for muscle spasms  Dispense: 30 tablet; Refill: 0  - DULoxetine (CYMBALTA) 60 MG capsule; Take 1 capsule (60 mg) by mouth daily  Dispense: 60 capsule; Refill: 1    4. Acute pain of right shoulder  New problem, given patient physical exam does show tenderness on palpation of bony prominences of shoulder joints, mild restriction of range of movements on abduction.  Will make sure there is no degenerative arthritis given patient ongoing physical stress and strain with right hand.  UPDATE-x-ray shoulder was showing normal, muscle spasm for which physical therapy is added also for shoulder pain.  - XR Shoulder Right 2 Views; Future  - Physical Therapy Referral; Future    5. Gastroesophageal reflux disease, unspecified whether esophagitis present  - pantoprazole (PROTONIX) 20 MG EC tablet; Take 2 tablets (40 mg) by mouth 2 times daily  Dispense: 360 tablet; Refill: 3    6. Malignant neoplasm of upper-outer quadrant of right breast in female, estrogen receptor negative (H)  Chronic problem, to follow-up with surveillance mammograms as recommended.    7. Myalgia w hx of recurrence since 2012  - DULoxetine (CYMBALTA) 60 MG capsule; Take 1 capsule (60 mg) by mouth daily  Dispense: 60 capsule; Refill: 1    8. Prediabetes  - metFORMIN (GLUCOPHAGE XR) 500 MG 24 hr tablet; TAKE 1 TABLET(500 MG) BY MOUTH DAILY WITH DINNER  Dispense: 90 tablet; Refill: 0  - Hemoglobin A1c; Future  - Hemoglobin A1c    10. Dyslipidemia  - simvastatin (ZOCOR) 20 MG tablet; Take 1 tablet (20 mg) by mouth At Bedtime  Dispense: 90 tablet; Refill: 1    11.  Vitamin D insufficiency  - Vitamin D deficiency screening; Future  - Vitamin D deficiency screening  - vitamin D2 (ERGOCALCIFEROL) 68981 units (1250 mcg) capsule; Take 1 capsule (50,000 Units) by mouth once a week  Dispense: 12 capsule; Refill: 0       Over 40 minutes spent outside the preventive visit ( 20 minutes ) on reviewing patient chart,  face to face encounter, greater than 50% time spent with plan/cordination of care and documentation as above in my A/P.       Patient Instructions   As discussed , please do fasting labs ordered     Continue Physical therapy.     Increased the dose of Your Cymbalta which can help in improving your bodypains.       =============================    Preventive Health Recommendations  Female Ages 50 - 64    Yearly exam: See your health care provider every year in order to  o Review health changes.   o Discuss preventive care.    o Review your medicines if your doctor has prescribed any.      Get a Pap test every three years (unless you have an abnormal result and your provider advises testing more often).    If you get Pap tests with HPV test, you only need to test every 5 years, unless you have an abnormal result.     You do not need a Pap test if your uterus was removed (hysterectomy) and you have not had cancer.    You should be tested each year for STDs (sexually transmitted diseases) if you're at risk.     Have a mammogram every 1 to 2 years.    Have a colonoscopy at age 50, or have a yearly FIT test (stool test). These exams screen for colon cancer.      Have a cholesterol test every 5 years, or more often if advised.    Have a diabetes test (fasting glucose) every three years. If you are at risk for diabetes, you should have this test more often.     If you are at risk for osteoporosis (brittle bone disease), think about having a bone density scan (DEXA).    Shots: Get a flu shot each year. Get a tetanus shot every 10 years.    Nutrition:     Eat at least 5 servings of  "fruits and vegetables each day.    Eat whole-grain bread, whole-wheat pasta and brown rice instead of white grains and rice.    Get adequate Calcium and Vitamin D.     Lifestyle    Exercise at least 150 minutes a week (30 minutes a day, 5 days a week). This will help you control your weight and prevent disease.    Limit alcohol to one drink per day.    No smoking.     Wear sunscreen to prevent skin cancer.     See your dentist every six months for an exam and cleaning.    See your eye doctor every 1 to 2 years.      Return in about 6 months (around 8/22/2023), or if symptoms worsen or fail to improve, for If symptoms persist, Follow up of last visit.    Melida Chandra MD  [facility]        Patient has been advised of split billing requirements and indicates understanding: Yes      COUNSELING:  Reviewed preventive health counseling, as reflected in patient instructions  Special attention given to:        Regular exercise       Healthy diet/nutrition       Vision screening       Hearing screening       Immunizations    Vaccinated for: Zoster             Osteoporosis prevention/bone health       (Kaylin)menopause management      BMI:   Estimated body mass index is 34.64 kg/m  as calculated from the following:    Height as of this encounter: 1.615 m (5' 3.58\").    Weight as of this encounter: 90.4 kg (199 lb 3.2 oz).   Weight management plan: Discussed healthy diet and exercise guidelines      She reports that she has never smoked. She has never used smokeless tobacco.      Melida Chandra MD  [facility]  "

## 2023-02-22 NOTE — PATIENT INSTRUCTIONS
As discussed , please do fasting labs ordered     Continue Physical therapy.     Increased the dose of Your Cymbalta which can help in improving your bodypains.       =============================    Preventive Health Recommendations  Female Ages 50 - 64    Yearly exam: See your health care provider every year in order to  Review health changes.   Discuss preventive care.    Review your medicines if your doctor has prescribed any.    Get a Pap test every three years (unless you have an abnormal result and your provider advises testing more often).  If you get Pap tests with HPV test, you only need to test every 5 years, unless you have an abnormal result.   You do not need a Pap test if your uterus was removed (hysterectomy) and you have not had cancer.  You should be tested each year for STDs (sexually transmitted diseases) if you're at risk.   Have a mammogram every 1 to 2 years.  Have a colonoscopy at age 50, or have a yearly FIT test (stool test). These exams screen for colon cancer.    Have a cholesterol test every 5 years, or more often if advised.  Have a diabetes test (fasting glucose) every three years. If you are at risk for diabetes, you should have this test more often.   If you are at risk for osteoporosis (brittle bone disease), think about having a bone density scan (DEXA).    Shots: Get a flu shot each year. Get a tetanus shot every 10 years.    Nutrition:   Eat at least 5 servings of fruits and vegetables each day.  Eat whole-grain bread, whole-wheat pasta and brown rice instead of white grains and rice.  Get adequate Calcium and Vitamin D.     Lifestyle  Exercise at least 150 minutes a week (30 minutes a day, 5 days a week). This will help you control your weight and prevent disease.  Limit alcohol to one drink per day.  No smoking.   Wear sunscreen to prevent skin cancer.   See your dentist every six months for an exam and cleaning.  See your eye doctor every 1 to 2 years.

## 2023-02-23 ENCOUNTER — TELEPHONE (OUTPATIENT)
Dept: FAMILY MEDICINE | Facility: CLINIC | Age: 53
End: 2023-02-23
Payer: COMMERCIAL

## 2023-02-23 LAB — DEPRECATED CALCIDIOL+CALCIFEROL SERPL-MC: 13 UG/L (ref 20–75)

## 2023-02-23 NOTE — RESULT ENCOUNTER NOTE
Your X ray is normal, no concerns per radiology review.  As discussed I went ahead and placed physical therapy also for your shoulder pain.  Please follow-up with a physical therapy for improvement.    Please let me know if you have any questions.  Melida Chandra MD on 2/23/2023

## 2023-02-23 NOTE — TELEPHONE ENCOUNTER
----- Message from Melida Chandra MD sent at 2/23/2023 12:49 AM CST -----  Your TSH levels are remaining abnormal inspite of your current Levothyroxine. I have increased the dose of your medication and sent to your pharmacy. You will need repeat TSH check in 5 weeks of starting the new dosage to make sure it normalizes versus further titration of the dose if necessary. Future orders for TSH placed around  3/23/23 which is a nonfasting lab work.     Your one of the liver enzyme is borderline which could be most likely fatty liver, please work on weight reduction for improvement.  Will need follow-up.    Please let me know if you have any questions.  Melida Chandra MD on 2/23/2023

## 2023-02-23 NOTE — TELEPHONE ENCOUNTER
----- Message from Melida Chandra MD sent at 2/23/2023 12:50 AM CST -----  Your X ray is normal, no concerns per radiology review.  As discussed I went ahead and placed physical therapy also for your shoulder pain.  Please follow-up with a physical therapy for improvement.    Please let me know if you have any questions.  Melida Chandra MD on 2/23/2023

## 2023-02-23 NOTE — RESULT ENCOUNTER NOTE
Your TSH levels are remaining abnormal inspite of your current Levothyroxine. I have increased the dose of your medication and sent to your pharmacy. You will need repeat TSH check in 5 weeks of starting the new dosage to make sure it normalizes versus further titration of the dose if necessary. Future orders for TSH placed around  3/23/23 which is a nonfasting lab work.     Your one of the liver enzyme is borderline which could be most likely fatty liver, please work on weight reduction for improvement.  Will need follow-up.    Please let me know if you have any questions.  Melida Chandra MD on 2/23/2023

## 2023-02-24 NOTE — TELEPHONE ENCOUNTER
Called patient and relayed provider's message. Patient stated understanding and had no further questions.    Berta SERRANO RN  [facility] Triage Team     Discharge Summary  [facility]    Patient Name Tish Oneal   MRN: [id number]   YOB: 1939       Admit date: 4/8/2018   Discharge date:  4/10/2018       Disposition:                   Inpatient hospice    Admitting Physician: Sridevi Noel MD   Primary care provider: Ashley Calix DO  Discharge Physician: Parmjit Warner MD    Primary Discharge Diagnoses:  1. Acute hypoxic and hypercapnic respiratory failure  2. Septic shock requiring dopamine  3. Acute renal insufficiency on CK D with poor urine output  4. Community acquired pneumonia on IV antibiotics    Principal Problem:    Pneumonia of both lungs due to infectious organism  Active Problems:    Essential hypertension    Severe persistent asthma with acute exacerbation    Hyperlipidemia    Acute renal failure (CMS/HCC)    Acute respiratory failure with hypoxia and hypercapnia (CMS/HCC)    Elevated troponin    Severe sepsis (CMS/HCC)    Hyperglycemia    Leukocytosis    Macrocytosis without anemia  Resolved Problems:    * No resolved hospital problems. *      Past Medical History:    Osteoporosis, unspecified                       09/23/2002    Psoriasis                                                     Peripheral neuropathy                                         Severe obstructive sleep apnea                                Hyperlipidemia                                                Hypertension                                                  Chronic back pain                                             Severe persistent asthma                                      Arthritis                                                     Elevated hemidiaphragm                                          Comment: Right    Allergic rhinitis due to pollen                                 Comment: 4/15/2016 SPT + ragweed, weed     Venous stasis ulcer (CMS/HCC)                                 Psoriatic arthritis  (CMS/[facility])                                 Macrocytosis without anemia                     4/8/2018      Consultations:  IP CONSULT TO SOCIAL WORK  IP CONSULT TO PULMONOLOGY  IP CONSULT TO SOCIAL WORK    Procedures:  none    Hospital Course:  Patient admitted with community-acquired pneumonia and found to have hypoxia with hypercapnic respiratory failure requiring BiPAP. Patient had chronic obstructive and restrictive lung disease at baseline. Patient was admitted to ICU for septic shock due to pneumonia and respiratory failure. Patient was started on IV dopamine through peripheral access for blood pressure control. Dopamine was later weaned off and IV fluids were continued. Patient was given IV steroids and nebulizer treatments. During hospital stay patient continued to decline BiPAP multiple times but agreed to keep it on for a while. PH and CO2 improved with the BiPAP. However patient's renal function and continued to decline with decreased urine output. Patient later refused to continue BiPAP. Patient requested to withdraw care with family at bedside. Patient's  agreeable to withdraw care as per patient wishes. Patient was transitioned to inpatient hospice.    Code status: Prior    Discharge Labs:    Recent Labs  Lab 04/10/18  0500 04/09/18  1441 04/09/18  0520  04/08/18  0848   SODIUM 139 139 139  < >  --    POTASSIUM 4.6 5.2* 5.3*  < >  --    CHLORIDE 107 105 104  < >  --    CO2 20* 24 26  < >  --    BUN 90* 78* 67*  < >  --    CREATININE 3.25* 3.46* 3.39*  < >  --    GLUCOSE 156* 143* 138*  < >  --    MG  --   --  2.6*  --  2.0   WBC 26.8*  --  31.6*  --  34.5*   HGB 12.5  --  12.9  --  14.1   HCT 39.0  --  42.3  --  46.1     --  260  --  292   PT  --   --   --   --  12.7*   INR  --   --   --   --  1.2   PTT  --   --   --   --  27   < > = values in this interval not displayed.    Microbiology Results  (Last 10 results in the past 7 days)    Specimen Gram Smear Culture Result Status       04/08/18  2000  04/08/18  1310 04/08/18 2000     URINE, CLEAN CATCH/MIDSTREAM  NO GROWTH 2 DAYS. 04/09/2018 FINAL     URINE   04/09/2018 FINAL     04/08/18  1310  04/08/18  0940 04/08/18  1310     BLOOD RIGHT FOREARM  NO GROWTH 2 DAYS. PENDING     04/08/18  0940   04/08/18  0940     BLOOD, PERIPHERAL ANTECUBITAL,LEFT   PENDING           Significant Diagnostic Studies and Procedures:  Xr Chest Ap Or Pa    Result Date: 4/10/2018  XR CHEST AP OR PA DATE OF EXAM:  4/10/2018 4:51 AM. CLINICAL INFORMATION:  Shortness of breath. TECHNIQUE: AP upright view of the chest performed at 0445 hours. COMPARISON: 4/9/2018. FINDINGS: Overlying EKG leads. Moderately dense alveolar consolidation in the right upper lobe and lateral left midlung, minimally improved compared to prior. Stable cardiomediastinal silhouette. No vascular congestion. Persistent elevation of the right hemidiaphragm. No significant effusion. No pneumothorax.     IMPRESSION:  1. Right upper lobe and left midlung consolidation, minimally improved from yesterday's exam.     Xr Chest Ap Or Pa    Result Date: 4/9/2018  XR CHEST AP OR PA HISTORY: SOB COMPARISON:  April 8, 2018 radiograph. FINDINGS: Abnormal exam. Persistent right upper lobe and left upper lobe large areas of consolidation. Chronic severe right hemidiaphragm elevation. Cardiac silhouette is shifted to the left side.     IMPRESSION: No significant change in large right upper lobe and left lung areas of consolidation. No significant change in the chronic elevation of the right hemidiaphragm.     Xr Chest Ap Or Pa    Result Date: 4/8/2018  AP PORTABLE VIEW CHEST, DATED 4/8/2018 at 0919 HOURS. CLINICAL HISTORY:  Dyspnea. COMPARISONS: 4/11/2017.     IMPRESSION: Persistent significant elevation of the right hemidiaphragm. Extensive new alveolar infiltrate throughout the right upper lobe and laterally in the left mid to upper lung with central air bronchograms. Findings are suspicious for pneumonia, however,  alternative etiologies including large infarcts difficult to exclude. Faint opacity is also present at the left lung base partially obscuring the left hemidiaphragm, increased from the previous study. Stable cardiac and mediastinal contours. Question small left pleural effusion. No pneumothorax.        Pending  Results:  Unresulted Labs     Start     Ordered    04/08/18 1236  Respiratory Pathogen Panel  ONE TIME,   Today      04/08/18 1247        Unresulted Procedure     None          Discharge Exam:    Blood pressure 160/74, pulse 94, temperature 98 °F (36.7 °C), temperature source Temporal Artery, resp. rate (!) 32, height 5' 5\" (1.651 m), weight 117.1 kg, SpO2 93 %.  General - Patient is alert, oriented and in acute distress.   HEENT - Normocephalic and atraumatic.  Pupils equally round and reactive to light. Sclerae are anicteric. No conjunctivitis or subconjunctival lesions.    Coronary - Regular rate and rhythm without murmurs, rubs or gallops.   Pulmonary - bilateral diffuse wheezing with increased work of breathing noted.  Abdomen - Soft, non-tender and non-distended. Bowel sounds are normoactive  Extremities  -  Warm without clubbing, cyanosis or edema.   Neurologic - Alert and oriented to person, place and time.   No focal deficits.     Patient Discharge Instructions:   1. Activity: activity as tolerated  2. Diet: Npo Diet With Exceptions; Ice Chips, Sips Of Water . Ok with food for comfort.   3. Wound Care:  none needed    4. Discharge Medications:  Discharge Medication List as of 4/10/2018 11:45 AM      STOP taking these medications       albuterol (VENTOLIN HFA) 108 (90 Base) MCG/ACT inhaler Comments:   Reason for Stopping:         albuterol (VENTOLIN) (2.5 MG/3ML) 0.083% nebulizer solution Comments:   Reason for Stopping:         CycloSPORINE (RESTASIS OP) Comments:   Reason for Stopping:         ammonium lactate (LAC-HYDRIN) 12 % cream Comments:   Reason for Stopping:         betamethasone diprop  (DIPROLENE AF) 0.05 % cream Comments:   Reason for Stopping:                 ALLERGIES:  Aspirin; Penicillins; Sulfa drugs cross reactors; Demerol [meperidine hcl]; Lipitor [atorvastatin]; Meloxicam; Methotrexate; and Formaldehyde     Follow-up:  No follow-up provider specified.    Time spent on discharge was 45 minutes.  Discharge discussed with  Patient, nurse, .     Signed:  Parmjit Warner MD  4/10/2018  12:00 PM

## 2023-02-25 RX ORDER — ERGOCALCIFEROL 1.25 MG/1
50000 CAPSULE, LIQUID FILLED ORAL WEEKLY
Qty: 12 CAPSULE | Refills: 0 | Status: SHIPPED | OUTPATIENT
Start: 2023-02-25 | End: 2023-11-08

## 2023-02-27 ENCOUNTER — TELEPHONE (OUTPATIENT)
Dept: FAMILY MEDICINE | Facility: CLINIC | Age: 53
End: 2023-02-27
Payer: COMMERCIAL

## 2023-02-27 DIAGNOSIS — R73.9 HYPERGLYCEMIA: ICD-10-CM

## 2023-02-27 NOTE — TELEPHONE ENCOUNTER
----- Message from Melida Chandra MD sent at 2/25/2023 11:05 PM CST -----  Your Vitamin D levels are abnormally low, Sent high dose Vitamin D 50,000 units once a week to your pharmacy for 3 months. After you complete the 3 months course resume back to Over the Counter Vitamin D 2000 units daily.  Please let me know if you have any questions.    Melida Chandra MD on 2/25/2023

## 2023-02-28 NOTE — TELEPHONE ENCOUNTER
Patient Contact    Attempt # 1    Was call answered?  No.  Left message on voicemail with information to call triage back at [phone], option 2.     On call back: result message below. Patient has not read result message in [employer].  Shell Manjarrez RN

## 2023-02-28 NOTE — TELEPHONE ENCOUNTER
Patient calling back regarding below. Writer reviewed Dr. Chandra's result note below with patient.    Patient expressed verbal understanding and agreeable, states she has already picked up high dose Vit D from pharmacy.    Patient is also requesting a refill of the following:    aspirin 81 MG EC tablet 90 tablet 3 10/27/2021  No   Sig - Route: Take 1 tablet (81 mg) by mouth daily - Oral     Medication last ordered by Dr. Humphrey - PCP please advise if able to refill or if request needs to be sent to Dr. Humphrey?    Thank you!    Tito Boland RN  [facility]

## 2023-03-01 RX ORDER — ASPIRIN 81 MG/1
81 TABLET ORAL DAILY
Qty: 90 TABLET | Refills: 3 | Status: SHIPPED | OUTPATIENT
Start: 2023-03-01

## 2023-03-01 NOTE — TELEPHONE ENCOUNTER
Lift message sent notifying pt that rx was sent.    Jenae ESQUIVEL RN  [facility]

## 2023-03-15 ENCOUNTER — THERAPY VISIT (OUTPATIENT)
Dept: PHYSICAL THERAPY | Facility: CLINIC | Age: 53
End: 2023-03-15
Payer: COMMERCIAL

## 2023-03-15 DIAGNOSIS — M54.41 ACUTE BILATERAL LOW BACK PAIN WITH RIGHT-SIDED SCIATICA: ICD-10-CM

## 2023-03-15 DIAGNOSIS — M25.511 ACUTE PAIN OF RIGHT SHOULDER: ICD-10-CM

## 2023-03-15 PROCEDURE — 97140 MANUAL THERAPY 1/> REGIONS: CPT | Mod: GP | Performed by: PHYSICAL THERAPIST

## 2023-03-28 ENCOUNTER — THERAPY VISIT (OUTPATIENT)
Dept: PHYSICAL THERAPY | Facility: CLINIC | Age: 53
End: 2023-03-28
Payer: COMMERCIAL

## 2023-03-28 DIAGNOSIS — M25.511 ACUTE PAIN OF RIGHT SHOULDER: Primary | ICD-10-CM

## 2023-03-28 PROCEDURE — 97161 PT EVAL LOW COMPLEX 20 MIN: CPT | Mod: GP | Performed by: PHYSICAL THERAPIST

## 2023-03-28 PROCEDURE — 97110 THERAPEUTIC EXERCISES: CPT | Mod: GP | Performed by: PHYSICAL THERAPIST

## 2023-03-28 NOTE — PROGRESS NOTES
[facility]    OUTPATIENT Physical Therapy ORTHOPEDIC EVALUATION  PLAN OF TREATMENT FOR OUTPATIENT REHABILITATION  (COMPLETE FOR INITIAL CLAIMS ONLY)  Patient's Last Name, First Name, M.I.  YOB: 1970  Luci Londono    Provider s Name:  [facility]   Medical Record No.  [id number]   Start of Care Date:  03/28/23   Onset Date:   03/15/23 (MD order date)   Treatment Diagnosis:  R shoulder pain Medical Diagnosis:  Data Unavailable       Goals:     03/28/23 0500   Body Part   Goals listed below are for R shoulder   Goal #2   Goal #2 reaching   Previous Functional Level No restrictions   Current Functional Level Cannot reach;to shoulder;out to the side   Performance level pain with reach to shoulder height   STG Target Performance Reach;to shoulder level   Performance level flexion   Rationale for dressing;for care of infant/toddler   Due date 04/18/23   LTG Target Performance Repetitive or prolonged use of arm at shoulder height for;Reach;overhead   Performance Level with shoulder pain <2/10   Rationale for job requirements in their work place;for independent personal hygiene   Due date 05/27/23         Therapy Frequency:  1x/week  Predicted Duration of Therapy Intervention:  10 weeks    Giovana Bowens, PT                 I CERTIFY THE NEED FOR THESE SERVICES FURNISHED UNDER        THIS PLAN OF TREATMENT AND WHILE UNDER MY CARE     (Physician attestation of this document indicates review and certification of the therapy plan).                     Certification Date From:  01/18/23   Certification Date To:  04/18/23    Referring Provider:  Melida Chandra    Initial Assessment        See Epic Evaluation SOC Date: 03/28/23

## 2023-03-29 NOTE — PROGRESS NOTES
Physical Therapy Initial Evaluation  Subjective:    Patient Health History             Pertinent medical history includes: diabetes and thyroid problems.            Current medications:  Pain medication, muscle relaxants and thyroid medication.    Current occupation is [employer]/ [employer].   Primary job tasks include:  Lifting/carrying and pushing/pulling.                                    Objective:  System    Physical Exam    General     ROS    Assessment/Plan:

## 2023-04-12 ENCOUNTER — THERAPY VISIT (OUTPATIENT)
Dept: PHYSICAL THERAPY | Facility: CLINIC | Age: 53
End: 2023-04-12
Payer: COMMERCIAL

## 2023-04-12 DIAGNOSIS — M25.511 ACUTE PAIN OF RIGHT SHOULDER: Primary | ICD-10-CM

## 2023-04-12 PROCEDURE — 97110 THERAPEUTIC EXERCISES: CPT | Mod: GP | Performed by: PHYSICAL THERAPIST

## 2023-04-12 PROCEDURE — 97140 MANUAL THERAPY 1/> REGIONS: CPT | Mod: GP | Performed by: PHYSICAL THERAPIST

## 2023-04-25 DIAGNOSIS — E03.9 ACQUIRED HYPOTHYROIDISM: ICD-10-CM

## 2023-04-26 RX ORDER — LEVOTHYROXINE SODIUM 100 UG/1
TABLET ORAL
Qty: 60 TABLET | Refills: 0 | OUTPATIENT
Start: 2023-04-26

## 2023-04-27 NOTE — TELEPHONE ENCOUNTER
Patient is supposed to do her thyroid function recheck lab work which I will place lab order already in February 2023.  Please let the patient know to do this nonfasting labs ASAP so that we can adjust the dose of levothyroxine and sent it to her pharmacy on the refill request placed.         Thank you,  Melida Chandra MD on 4/26/2023

## 2023-05-01 NOTE — TELEPHONE ENCOUNTER
Chief Complaint  Chief Complaint   Patient presents with   • Establish Care     No previous Pcp   • ADHD     Patient takes vyvanse for ADHD and needs medication refill.  Patient has been getting migraines consistantly.           Subjective          Jack Pradhan presents to [facility] for   History of Present Illness     New patient presents today for annual exam and to establish care with new provider.  He has past medical history of ADHD, headaches and eczema. The patient denies dysuria, constipation, GERD, chest pain, shortness of air, palpitations and swelling of the extremities    ADHD, per inspect the patient filled Vyvanse 1 time in May 2021 by a provider in [place], he lost insurance, moved to [place].  He reports being on Vyvanse in the past and was also effective for emotional instability, focus, concentration and migraines, he occasionally uses marijuana for severe migraines which is effective, rarely drinks alcohol and does not use any other drugs.    Migraine headaches, typically has one every day, 5/10 pain, last all day, occ pain 7-9/10, one per mo at level 10, can't function or work with 8-10 range,  R>L eye blurry, has tried topamax, had s/e of lightheadedness, imitrex and recently only otc's, which have been ineffective.      The following portions of the patient's history were reviewed and updated as appropriate: allergies, current medications, past family history, past medical history, past social history, past surgical history and problem list.    Past Medical History:   Diagnosis Date   • ADHD (attention deficit hyperactivity disorder)    • Eczema    • Headache      History reviewed. No pertinent surgical history.  History reviewed. No pertinent family history.  Social History     Tobacco Use   • Smoking status: Never Smoker   • Smokeless tobacco: Never Used   Substance Use Topics   • Alcohol use: Yes     Comment: OCC     No current outpatient medications on  Contacted patient and scheduled 3:15 on 05/02.    Jessa BLAIR CMA     "file.    Objective   Vital Signs:   /78 (BP Location: Right arm, Patient Position: Sitting, Cuff Size: Large Adult)   Pulse 88   Temp 98 °F (36.7 °C) (Temporal)   Ht 184.2 cm (72.5\")   Wt 123 kg (270 lb 9.6 oz)   SpO2 100%   BMI 36.20 kg/m²           Physical Exam  Vitals and nursing note reviewed.   Constitutional:       General: He is not in acute distress.     Appearance: Normal appearance. He is well-developed. He is not diaphoretic.   HENT:      Head: Normocephalic and atraumatic.   Eyes:      Pupils: Pupils are equal, round, and reactive to light.   Neck:      Thyroid: No thyromegaly.   Cardiovascular:      Rate and Rhythm: Normal rate and regular rhythm.      Heart sounds: Normal heart sounds. No murmur heard.  Pulmonary:      Effort: Pulmonary effort is normal. No respiratory distress.      Breath sounds: Normal breath sounds.   Abdominal:      General: Bowel sounds are normal. There is no distension.      Palpations: Abdomen is soft.      Tenderness: There is no abdominal tenderness.   Musculoskeletal:         General: Normal range of motion.      Cervical back: Normal range of motion.   Skin:     General: Skin is warm and dry.      Findings: No erythema.   Neurological:      Mental Status: He is alert and oriented to person, place, and time.   Psychiatric:         Behavior: Behavior normal.         Thought Content: Thought content normal.         Judgment: Judgment normal.          Result Review :     No visits with results within 7 Day(s) from this visit.   Latest known visit with results is:   No results found for any previous visit.                  Class 2 Severe Obesity (BMI >=35 and <=39.9). Obesity-related health conditions include the following: none. Obesity is unchanged. BMI is is above average; BMI management plan is completed. We discussed portion control and increasing exercise.           Assessment and Plan {CC Problem List  Visit Diagnosis  ROS  Review (Popup)  Health " Maintenance  Quality  BestPractice  Medications  SmartSets  SnapShot Encounters  Media :23}   Diagnoses and all orders for this visit:    1. Preventative health care (Primary)  Comments:  1. Labs today as ordered  2.  Discussed healthy heart diet, lifestyle modifications and exercise habits  Orders:  -     Hepatitis C Antibody  -     Lipid Panel  -     Comprehensive Metabolic Panel  -     CBC (No Diff)  -     TSH  -     Hemoglobin A1c  -     Vitamin D 25 Hydroxy  -     Vitamin B12    2. Chronic migraine without aura without status migrainosus, not intractable  Comments:  1.  Rec resume vyvanse if UDS in line  2.  had s/e with Topamax and minimal relief with triptans, resolved with vyvanse.  3.  Eliminate nitrates from diet  Orders:  -     Hepatitis C Antibody  -     Lipid Panel  -     Comprehensive Metabolic Panel  -     CBC (No Diff)  -     TSH  -     Hemoglobin A1c  -     Vitamin D 25 Hydroxy  -     Vitamin B12    3. Attention deficit hyperactivity disorder (ADHD), combined type  Comments:  1.  Urine drug screen and controlled substance agreement completed today  2.  Will plan to prescribe Vyvanse 50 mg once UDS reviewed.   Orders:  -     Hepatitis C Antibody  -     Lipid Panel  -     Comprehensive Metabolic Panel  -     CBC (No Diff)  -     TSH  -     Hemoglobin A1c  -     Vitamin D 25 Hydroxy  -     Vitamin B12    4. Constipation, unspecified constipation type  Comments:  1.  Recommend increasing water and fiber in the diet  2.  Education provided on fiber      Age appropriate preventative counseling provided, including healthy lifestyle modifications and exercise      I spent 35 minutes caring for Jack Pradhan on this date of service. This time includes time spent by me in the following activities: preparing for the visit, reviewing tests, performing a medically appropriate examination and/or evaluation , counseling and educating the patient/family/caregiver, ordering medications, tests, or procedures  and documenting information in the medical record        Follow Up     Return in about 3 months (around 11/30/2022) for Recheck ADD and medication refill.  Patient was given instructions and counseling regarding his condition or for health maintenance advice. Please see specific information pulled into the AVS if appropriate.        Part of this note may be an electronic transcription/translation of spoken language to printed text using the Dragon Dictation System

## 2023-05-02 ENCOUNTER — LAB (OUTPATIENT)
Dept: LAB | Facility: CLINIC | Age: 53
End: 2023-05-02
Payer: COMMERCIAL

## 2023-05-02 DIAGNOSIS — E03.9 ACQUIRED HYPOTHYROIDISM: ICD-10-CM

## 2023-05-02 LAB
T4 FREE SERPL-MCNC: 0.85 NG/DL (ref 0.9–1.7)
TSH SERPL DL<=0.005 MIU/L-ACNC: 6.26 UIU/ML (ref 0.3–4.2)

## 2023-05-02 PROCEDURE — 84443 ASSAY THYROID STIM HORMONE: CPT

## 2023-05-02 PROCEDURE — 36415 COLL VENOUS BLD VENIPUNCTURE: CPT

## 2023-05-02 PROCEDURE — 84439 ASSAY OF FREE THYROXINE: CPT

## 2023-05-03 ENCOUNTER — TELEPHONE (OUTPATIENT)
Dept: FAMILY MEDICINE | Facility: CLINIC | Age: 53
End: 2023-05-03

## 2023-05-03 ENCOUNTER — ANCILLARY PROCEDURE (OUTPATIENT)
Dept: MRI IMAGING | Facility: CLINIC | Age: 53
End: 2023-05-03
Attending: INTERNAL MEDICINE
Payer: COMMERCIAL

## 2023-05-03 ENCOUNTER — THERAPY VISIT (OUTPATIENT)
Dept: PHYSICAL THERAPY | Facility: CLINIC | Age: 53
End: 2023-05-03
Payer: COMMERCIAL

## 2023-05-03 DIAGNOSIS — M47.816 LUMBAR FACET ARTHROPATHY: ICD-10-CM

## 2023-05-03 DIAGNOSIS — Z15.09 MONOALLELIC MUTATION OF PALB2 GENE: ICD-10-CM

## 2023-05-03 DIAGNOSIS — Z15.89 MONOALLELIC MUTATION OF PALB2 GENE: ICD-10-CM

## 2023-05-03 DIAGNOSIS — M54.41 ACUTE BILATERAL LOW BACK PAIN WITH RIGHT-SIDED SCIATICA: Primary | ICD-10-CM

## 2023-05-03 DIAGNOSIS — C50.411 MALIGNANT NEOPLASM OF UPPER-OUTER QUADRANT OF RIGHT BREAST IN FEMALE, ESTROGEN RECEPTOR NEGATIVE (H): ICD-10-CM

## 2023-05-03 DIAGNOSIS — Z17.1 MALIGNANT NEOPLASM OF UPPER-OUTER QUADRANT OF RIGHT BREAST IN FEMALE, ESTROGEN RECEPTOR NEGATIVE (H): ICD-10-CM

## 2023-05-03 DIAGNOSIS — Z15.01 MONOALLELIC MUTATION OF PALB2 GENE: ICD-10-CM

## 2023-05-03 DIAGNOSIS — Z91.89 AT RISK FOR BREAST CANCER: ICD-10-CM

## 2023-05-03 PROCEDURE — A9585 GADOBUTROL INJECTION: HCPCS | Performed by: INTERNAL MEDICINE

## 2023-05-03 PROCEDURE — 97140 MANUAL THERAPY 1/> REGIONS: CPT | Mod: GP | Performed by: PHYSICAL THERAPIST

## 2023-05-03 PROCEDURE — 255N000002 HC RX 255 OP 636: Performed by: INTERNAL MEDICINE

## 2023-05-03 PROCEDURE — 97110 THERAPEUTIC EXERCISES: CPT | Mod: GP | Performed by: PHYSICAL THERAPIST

## 2023-05-03 PROCEDURE — 77049 MRI BREAST C-+ W/CAD BI: CPT

## 2023-05-03 RX ORDER — LEVOTHYROXINE SODIUM 100 UG/1
TABLET ORAL
Qty: 94 TABLET | Refills: 3 | Status: SHIPPED | OUTPATIENT
Start: 2023-05-03 | End: 2024-02-28

## 2023-05-03 RX ORDER — GADOBUTROL 604.72 MG/ML
0.1 INJECTION INTRAVENOUS ONCE
Status: COMPLETED | OUTPATIENT
Start: 2023-05-03 | End: 2023-05-03

## 2023-05-03 RX ADMIN — GADOBUTROL 9 ML: 604.72 INJECTION INTRAVENOUS at 09:59

## 2023-05-03 NOTE — TELEPHONE ENCOUNTER
----- Message from Melida Chandra MD sent at 5/3/2023  1:13 AM CDT -----  Your thyroid function is much improved compared to the past, remaining mildly high above the normal.  We can adjust the dose of your current medication by taking 1 tablet every day and 1 and half tablets on Saturday and Sunday for normalization.  I went ahead and sent in your current medication at this adjusted dose to the pharmacy.

## 2023-05-03 NOTE — TELEPHONE ENCOUNTER
Pt updated with PCP message below. No further questions at this time.    Queta JORDAN RN  [facility] Triage Team

## 2023-05-03 NOTE — PROGRESS NOTES
[facility]    OUTPATIENT Physical Therapy ORTHOPEDIC EVALUATION  PLAN OF TREATMENT FOR OUTPATIENT REHABILITATION  (COMPLETE FOR INITIAL CLAIMS ONLY)  Patient's Last Name, First Name, M.I.  YOB: 1970  Luci Londono    Provider s Name:  [facility]   Medical Record No.  [id number]   Start of Care Date:  01/18/23   Onset Date:   12/21/22 (PT order)   Treatment Diagnosis:  Low back pain Medical Diagnosis:     Acute bilateral low back pain with right-sided sciatica  Lumbar facet arthropathy       Goals:     05/03/23 0500   Body Part   Goals listed below are for low back   Goal #1   Goal #1 posture/body mechanics   Current Functional Level Fair posture/body mechanics   Performance level limited core stability and postural awareness with sitting/standing/functional mobility   STG Target Performance Patient able to demonstrate good posture and body mechanics when prompted;Improve patient awareness to maintain optimum posture the following percentage of time   Performance Level 50% of the time aware of core stability   Rationale to prevent neck/back pain and avoid injury when lifting/carrying and performing tasks requiring bending   Due Date 03/04/23   Date Goal Met 05/03/23   LTG Target Performance Patient able to demonstrate good posture and body mechanics without prompting;Improve patient awareness to maintain optimum posture the following percentage of time   Performance Level 80% of the day with core awareness and decreased P symptoms   Rationale to prevent neck/back pain and avoid injury when lifting/carrying and performing tasks requiring bending   Due Date 06/17/23   If goal not met, Why? Still having increased P         Therapy Frequency:  1x/week  Predicted Duration of Therapy Intervention:  10 weeks    DANIEL SWANSON, PT                 I CERTIFY THE NEED FOR  THESE SERVICES FURNISHED UNDER        THIS PLAN OF TREATMENT AND WHILE UNDER MY CARE     (Physician attestation of this document indicates review and certification of the therapy plan).                     Certification Date From:  04/19/23   Certification Date To:  07/18/23    Referring Provider:  Melida Chandra    Initial Assessment        See Epic Evaluation SOC Date: 01/18/23

## 2023-05-10 ENCOUNTER — ONCOLOGY VISIT (OUTPATIENT)
Dept: ONCOLOGY | Facility: CLINIC | Age: 53
End: 2023-05-10
Attending: INTERNAL MEDICINE
Payer: COMMERCIAL

## 2023-05-10 VITALS
TEMPERATURE: 98 F | BODY MASS INDEX: 34.12 KG/M2 | OXYGEN SATURATION: 100 % | DIASTOLIC BLOOD PRESSURE: 84 MMHG | WEIGHT: 196.2 LBS | SYSTOLIC BLOOD PRESSURE: 134 MMHG | HEART RATE: 71 BPM | RESPIRATION RATE: 16 BRPM

## 2023-05-10 DIAGNOSIS — Z17.1 MALIGNANT NEOPLASM OF UPPER-OUTER QUADRANT OF RIGHT BREAST IN FEMALE, ESTROGEN RECEPTOR NEGATIVE (H): Primary | ICD-10-CM

## 2023-05-10 DIAGNOSIS — C50.411 MALIGNANT NEOPLASM OF UPPER-OUTER QUADRANT OF RIGHT BREAST IN FEMALE, ESTROGEN RECEPTOR NEGATIVE (H): Primary | ICD-10-CM

## 2023-05-10 DIAGNOSIS — Z12.31 ENCOUNTER FOR SCREENING MAMMOGRAM FOR BREAST CANCER: ICD-10-CM

## 2023-05-10 PROCEDURE — G0463 HOSPITAL OUTPT CLINIC VISIT: HCPCS | Performed by: INTERNAL MEDICINE

## 2023-05-10 PROCEDURE — 99214 OFFICE O/P EST MOD 30 MIN: CPT | Performed by: INTERNAL MEDICINE

## 2023-05-10 ASSESSMENT — PAIN SCALES - GENERAL: PAINLEVEL: MILD PAIN (3)

## 2023-05-10 NOTE — PROGRESS NOTES
"Oncology Rooming Note    May 10, 2023 8:44 AM   Luci Londono is a 52 year old female who presents for:    Chief Complaint   Patient presents with     Oncology Clinic Visit     Initial Vitals: /84   Pulse 71   Temp 98  F (36.7  C) (Oral)   Resp 16   Wt 89 kg (196 lb 3.2 oz)   SpO2 100%   BMI 34.12 kg/m   Estimated body mass index is 34.12 kg/m  as calculated from the following:    Height as of 2/22/23: 1.615 m (5' 3.58\").    Weight as of this encounter: 89 kg (196 lb 3.2 oz). Body surface area is 2 meters squared.  Mild Pain (3) Comment: Data Unavailable   No LMP recorded. Patient is premenopausal.  Allergies reviewed: Yes  Medications reviewed: Yes    Medications: Medication refills not needed today.  Pharmacy name entered into [facility]:    [facility] [place]  [place] [address] [id number] [place], [place] - [address] AT [address]    Clinical concerns: pain in right arm BK was notified.      Shari J. Schoenberger, GIANCARLO            "

## 2023-05-10 NOTE — PROGRESS NOTES
ONCOLOGIC HISTORY:  Ms. Londono is a female with triple negative right breast cancer.   -PALB2  mutation.  1.  Bilateral diagnostic mammogram and right breast ultrasound on 10/09/2018 revealed an 8 mm hypoechoic mass at the 12 o'clock position and abnormal right axillary lymph node.    2.  Ultrasound-guided right breast and right axillary lymph node biopsy was done on 10/12/2018.    -Right breast biopsy revealed invasive carcinoma, grade 3.  There was DCIS.    -Right axillary lymph node biopsy revealed metastatic adenocarcinoma.  -ER negative, WV negative and HER-2/elton negative.   -Clinical T1b N1 M0.    3.  Patient received neoadjuvant chemotherapy with dose-dense Adriamycin and Cytoxan followed by weekly carboplatin and Taxol between 10/31/2018 and 04/03/2019.     4.  Patient underwent right breast lumpectomy and sentinel lymph node biopsy on 05/15/2019.  There was a 1.6 mm metastatic carcinoma in one of the lymph nodes.  There was no residual disease in the breast.  ypT0 ypN1mi M0 disease.   -The patient received adjuvant oral Xeloda.   -The patient received radiation to right breast and right supraclavicular region between 06/27/2019 and 08/13/2019. 5940 cGy in 33 fractions.    5.  She has PALB2  mutation.  The patient followed up in Genetics clinic.  She is getting alternating mammogram and breast MRI. Does not want prophylactic bilateral mastectomy.     SUBJECTIVE:  Ms. Londono is a 52-year-old female with triple negative right breast cancer diagnosed in 2018 status post chemotherapy, surgery and radiation. There has been no recurrence.  MRI breast done last week is benign.    Patient gets pain in the right shoulder, right arm and also in the right upper chest wall.  Pain is more when she moves her upper extremity.  She has been getting physical therapy.  Right shoulder x-ray in February was negative.    She gets intermittent headache.  No dizziness.  No shortness of breath.  No abdominal pain.  No nausea or  vomiting.  No urinary or bowel complaints.  No abnormal bleeding.  No fever or night sweats.  She has mild chronic back pain.  Pain is both in the upper and lower back.  This is from arthritis. All other review of systems negative.     PHYSICAL EXAMINATION:   GENERAL:  Alert and oriented x 3.   VITAL SIGNS:  Reviewed.  ECOG PS of 0.     EYES:  No icterus.   NECK:  Supple. No lymphadenopathy.    AXILLAE:  No lymphadenopathy.   LUNGS:  Good air entry bilaterally.  No crackles or wheezing.   HEART:  Regular.  No murmur.   GI:  Abdomen is soft.  Nontender. No mass.   EXTREMITIES:  No pedal edema.  No calf swelling or tenderness.  -Right shoulder examined.  No swelling, redness or tenderness.  SKIN:  No rash.      ASSESSMENT:    1.  A 52-year-old female with triple negative right breast cancer diagnosed in 2018.  No evidence of recurrence.  2.  PALB2 mutation.  3.  Right shoulder pain.  This is due to arthritis or rotator cuff problem.     PLAN:   1.  Patient doing well from breast cancer.  No evidence of recurrence.  It is more than 4 years since the diagnosis.  Risk of recurrence is low.  We will continue to monitor her.  I will see her in 6 months time with labs. We will not be doing any routine CT scan or bone scan.     2. Patient has PALB2 mutation.  She is at increased risk for breast cancer.  She is getting alternating mammogram and MRI every 6 months.  She will have mammogram in 6 months.     I have discussed with her regarding prophylactic bilateral mastectomy. She does not want mastectomy.    3.  She has right shoulder pain.  That is causing pain in the right anterior chest.  She will continue with physical therapy.  Advised her to see an orthopedics.  She will discuss this with her PCP.    4.  Labs in February had revealed mildly elevated AST.  Patient may have fatty liver.  In 6 months time, we will recheck CMP.  If LFTs are elevated, we will plan on imaging studies.    5.  She had few questions which were  all answered. I will see her in 6 months time.  Advised her to call us with any questions or concerns.     Total visit time of 30 minutes.  Time is spent in today's visit, review of chart/investigations today and documentation.

## 2023-05-10 NOTE — LETTER
"    5/10/2023         RE: Luci Londono  [address]  [facility] [address]        Dear Colleague,    Thank you for referring your patient, Luci C Alert, to the [facility]. Please see a copy of my visit note below.    Oncology Rooming Note    May 10, 2023 8:44 AM   Luci Londono is a 52 year old female who presents for:    Chief Complaint   Patient presents with     Oncology Clinic Visit     Initial Vitals: /84   Pulse 71   Temp 98  F (36.7  C) (Oral)   Resp 16   Wt 89 kg (196 lb 3.2 oz)   SpO2 100%   BMI 34.12 kg/m   Estimated body mass index is 34.12 kg/m  as calculated from the following:    Height as of 2/22/23: 1.615 m (5' 3.58\").    Weight as of this encounter: 89 kg (196 lb 3.2 oz). Body surface area is 2 meters squared.  Mild Pain (3) Comment: Data Unavailable   No LMP recorded. Patient is premenopausal.  Allergies reviewed: Yes  Medications reviewed: Yes    Medications: Medication refills not needed today.  Pharmacy name entered into [facility]:    [facility], [place] - [address] [id number] - [place], [place] - [address] AT [address]    Clinical concerns: pain in right arm BK was notified.      Shari J. Schoenberger, [facility]              ONCOLOGIC HISTORY:  Ms. Londono is a female with triple negative right breast cancer.   -PALB2  mutation.  1.  Bilateral diagnostic mammogram and right breast ultrasound on 10/09/2018 revealed an 8 mm hypoechoic mass at the 12 o'clock position and abnormal right axillary lymph node.    2.  Ultrasound-guided right breast and right axillary lymph node biopsy was done on 10/12/2018.    -Right breast biopsy revealed invasive carcinoma, grade 3.  There was DCIS.    -Right axillary lymph node biopsy revealed metastatic adenocarcinoma.  -ER negative, TN negative and HER-2/elton negative.   -Clinical T1b N1 M0.    3.  Patient received neoadjuvant chemotherapy with dose-dense " Adriamycin and Cytoxan followed by weekly carboplatin and Taxol between 10/31/2018 and 04/03/2019.     4.  Patient underwent right breast lumpectomy and sentinel lymph node biopsy on 05/15/2019.  There was a 1.6 mm metastatic carcinoma in one of the lymph nodes.  There was no residual disease in the breast.  ypT0 ypN1mi M0 disease.   -The patient received adjuvant oral Xeloda.   -The patient received radiation to right breast and right supraclavicular region between 06/27/2019 and 08/13/2019. 5940 cGy in 33 fractions.    5.  She has PALB2  mutation.  The patient followed up in Genetics clinic.  She is getting alternating mammogram and breast MRI. Does not want prophylactic bilateral mastectomy.     SUBJECTIVE:  Ms. Londono is a 52-year-old female with triple negative right breast cancer diagnosed in 2018 status post chemotherapy, surgery and radiation. There has been no recurrence.  MRI breast done last week is benign.    Patient gets pain in the right shoulder, right arm and also in the right upper chest wall.  Pain is more when she moves her upper extremity.  She has been getting physical therapy.  Right shoulder x-ray in February was negative.    She gets intermittent headache.  No dizziness.  No shortness of breath.  No abdominal pain.  No nausea or vomiting.  No urinary or bowel complaints.  No abnormal bleeding.  No fever or night sweats.  She has mild chronic back pain.  Pain is both in the upper and lower back.  This is from arthritis. All other review of systems negative.     PHYSICAL EXAMINATION:   GENERAL:  Alert and oriented x 3.   VITAL SIGNS:  Reviewed.  ECOG PS of 0.     EYES:  No icterus.   NECK:  Supple. No lymphadenopathy.    AXILLAE:  No lymphadenopathy.   LUNGS:  Good air entry bilaterally.  No crackles or wheezing.   HEART:  Regular.  No murmur.   GI:  Abdomen is soft.  Nontender. No mass.   EXTREMITIES:  No pedal edema.  No calf swelling or tenderness.  -Right shoulder examined.  No swelling,  redness or tenderness.  SKIN:  No rash.      ASSESSMENT:    1.  A 52-year-old female with triple negative right breast cancer diagnosed in 2018.  No evidence of recurrence.  2.  PALB2 mutation.  3.  Right shoulder pain.  This is due to arthritis or rotator cuff problem.     PLAN:   1.  Patient doing well from breast cancer.  No evidence of recurrence.  It is more than 4 years since the diagnosis.  Risk of recurrence is low.  We will continue to monitor her.  I will see her in 6 months time with labs. We will not be doing any routine CT scan or bone scan.     2. Patient has PALB2 mutation.  She is at increased risk for breast cancer.  She is getting alternating mammogram and MRI every 6 months.  She will have mammogram in 6 months.     I have discussed with her regarding prophylactic bilateral mastectomy. She does not want mastectomy.    3.  She has right shoulder pain.  That is causing pain in the right anterior chest.  She will continue with physical therapy.  Advised her to see an orthopedics.  She will discuss this with her PCP.    4.  Labs in February had revealed mildly elevated AST.  Patient may have fatty liver.  In 6 months time, we will recheck CMP.  If LFTs are elevated, we will plan on imaging studies.    5.  She had few questions which were all answered. I will see her in 6 months time.  Advised her to call us with any questions or concerns.     Total visit time of 30 minutes.  Time is spent in today's visit, review of chart/investigations today and documentation.      Again, thank you for allowing me to participate in the care of your patient.        Sincerely,        Ailyn Humphrey MD

## 2023-05-31 ENCOUNTER — THERAPY VISIT (OUTPATIENT)
Dept: PHYSICAL THERAPY | Facility: CLINIC | Age: 53
End: 2023-05-31
Payer: COMMERCIAL

## 2023-05-31 DIAGNOSIS — M25.511 ACUTE PAIN OF RIGHT SHOULDER: Primary | ICD-10-CM

## 2023-05-31 PROCEDURE — 97140 MANUAL THERAPY 1/> REGIONS: CPT | Mod: GP | Performed by: PHYSICAL THERAPIST

## 2023-05-31 PROCEDURE — 97110 THERAPEUTIC EXERCISES: CPT | Mod: GP | Performed by: PHYSICAL THERAPIST

## 2023-05-31 NOTE — PROGRESS NOTES
[facility]                                                                                   OUTPATIENT PHYSICAL THERAPY    PLAN OF TREATMENT FOR OUTPATIENT REHABILITATION   Patient's Last Name, First Name, Luci Early YOB: 1970   Provider's Name   [facility]   Medical Record No.  [id number]     Onset Date: 03/15/23  Start of Care Date: 03/28/23     Medical Diagnosis:  R shoulder and low back      PT Treatment Diagnosis:  R shoulder and low back Plan of Treatment  Frequency/Duration: 1x/week/ every other week 3 months    Certification date from 04/19/23 to 07/18/23         See note for plan of treatment details and functional goals     BARB CASTANON, PT                         I CERTIFY THE NEED FOR THESE SERVICES FURNISHED UNDER        THIS PLAN OF TREATMENT AND WHILE UNDER MY CARE     (Physician attestation of this document indicates review and certification of the therapy plan).                Referring Provider:  Melida Chandra      Initial Assessment  See Epic Evaluation- Start of Care Date: 03/28/23 05/31/23 0500   Appointment Info   Signing clinician's name / credentials Barb Castanon PT   Total/Authorized Visits 10 [employer]   Medical Diagnosis R shoulder and low back   PT Tx Diagnosis R shoulder and low back   Quick Adds Certification   Progress Note/Certification   Start of Care Date 03/28/23   Onset of illness/injury or Date of Surgery 03/15/23   Therapy Frequency 1x/week   Predicted Duration every other week 3 months   Certification date from 04/19/23   Certification date to 07/18/23   PT Goal 1   Goal Identifier Reaching   Goal Description Patient will be able to reach up and use arm for repetitve or prolonged use with <2/10 pain for her job   Goal Progress Baseline: cannot reach without P at shoulder height   Target Date 07/30/23   Treatment Interventions (PT)   Interventions Manual Therapy;Therapeutic  Procedure/Exercise   Therapeutic Procedure/Exercise   Therapeutic Procedures: strength, endurance, ROM, flexibillity minutes (84407) 15   Ther Proc 1 Shoulder flexion with band   Ther Proc 1 - Details x 10 reps green band to HEP   Therapeutic Procedures Ther Proc 2;Ther Proc 3;Ther Proc 4;Ther Proc 5;Ther Proc 6   Ther Proc 2 SL ER with 3lb weight   Ther Proc 2 - Details x 10 reps to HEP   Ther Proc 3 SL shoulder abduction with 3lb weight   Ther Proc 3 - Details x 10 reps   Ther Proc 4 banded horiz abduction   Ther Proc 4 - Details x 10 reps   Manual Therapy   Manual Therapy: Mobilization, MFR, MLD, friction massage minutes (19712) 20   Manual Therapy Manual Therapy 2   Manual Therapy 1 STM   Manual Therapy 1 - Details min fani to subscap and pec minor direct palpation; SL on L to R subscap, lats, rhomboids, LT   Manual Therapy 2 Joint mobilization   Manual Therapy 2 - Details axial distraction with PROM to R shoulder - IR, ER, Flex   Plan   Plan for next session for low back: hamstrings, HF, cat/cow, abdominal/bridging; for shoulder: arm bike, wall walks with band, countertop push ups   Total Session Time   Timed Code Treatment Minutes 35   Total Treatment Time (sum of timed and untimed services) 35

## 2023-06-19 DIAGNOSIS — E55.9 VITAMIN D INSUFFICIENCY: Chronic | ICD-10-CM

## 2023-06-19 RX ORDER — ERGOCALCIFEROL 1.25 MG/1
CAPSULE, LIQUID FILLED ORAL
Qty: 12 CAPSULE | Refills: 0 | OUTPATIENT
Start: 2023-06-19

## 2023-06-19 RX ORDER — CHOLECALCIFEROL (VITAMIN D3) 50 MCG
1 TABLET ORAL DAILY
Qty: 90 TABLET | Refills: 1 | Status: SHIPPED | OUTPATIENT
Start: 2023-06-19 | End: 2024-02-28

## 2023-06-20 NOTE — TELEPHONE ENCOUNTER
Pt has completed high dose Vitamin D already and OK to resume to OTC vitamin D 2000 units daily. ( To avoid vitamin D toxicity ) . Sent in medication to pharmacy.      Thank you,  Melida Chandra MD on 6/19/2023

## 2023-06-21 NOTE — TELEPHONE ENCOUNTER
Spoke with patient and she will notify [facility], cancel her  Refill request. She will start he OTC vitamin D dosage per PCP.       Santa Simmons RN  [facility]

## 2023-08-11 NOTE — PROGRESS NOTES
[facility]    Outpatient Physical Therapy Discharge Note  Patient: Luci Londono  : 1970    Patient was seen for 5 visits for R shoulder and low back pain from 3/28/23 to 23 with 2 NS follow up appointments.      Plan:  Discharge from therapy.     Reason for Discharge: Patient chooses to discontinue therapy.  Patient has not made expected progress due to interrupted treatment attendance.  Patient has failed to schedule further appointments.    Discharge Plan: Patient to continue home program.     If patient would like to return to therapy, they will need a new PT order from referring provider.    Barb Castanon, PT   2023

## 2023-08-14 ENCOUNTER — PATIENT OUTREACH (OUTPATIENT)
Dept: CARE COORDINATION | Facility: CLINIC | Age: 53
End: 2023-08-14
Payer: COMMERCIAL

## 2023-08-26 NOTE — PROGRESS NOTES
"Physical Therapy Initial Evaluation  Subjective:  Pt reports onset of R shoulder and R lateral chest area limited motion and pain following rx for breast cancer. She had surgery May 2019, chemo, and radiation ending August 2019.   She is currently reporting tightness in anterior lateral chest, UT area with R UE use over chest height and with reaching out to the side. She notes shoulder and chest musculature \"catch\" and get stuck sometimes with reaching. She occasionally uses L UE to assist R UE ROM prn.   Pt also has tightness to R lateral neck and medial R scapular area.   She has active episode for PT for back pain.    The history is provided by the patient. No  was used.                       Objective:  Standing Alignment:    Cervical/Thoracic:  Flat thoracic upper spine  Shoulder/UE:  Humeral head anterior R and carrying angle incr R              Gait:      Deviations:  Shoulder:  Decr arm swing R    Flexibility/Screens:   Positive screens:  Cervical (R cerv rotation mod loss, ext mod loss, flexion min loss, L rotation min loss, )  Upper Extremity:        Decreased right upper extremity flexibility present at:  Pectoralis Major and Pectoralis Minor    Spine:      Decreased right spine flexibility:  Scalenes; Upper Trap and Levator                       Shoulder Evaluation:  ROM:  AROM:    Flexion:  Left:  165    Right:  146    Abduction:  Right:  130                  Extension/Internal Rotation:  Right:  To ipsilatereal central buttock height     PROM:    Flexion:  Right: 160            External Rotation:  Left:  80    Right:  75                Pain: post/superior R shoulder with AROM elevation , ABD,     Strength:  not assessed                          Palpation:      Right shoulder tenderness present at: Clavicle; Sternoclavicular; Supraspinatus; Subscapularis (tenderness to palpation axilla- pt had 6 lymph nodes removed during sx); Levator; Rhomboids and Upper Trap  Right shoulder " tenderness not present at:Acrimioclavicular; Biceps or Bicipital Groove  Mobility Tests:        Glenohumeral inferior right:  Hypomobile        Sternoclavicular right:  Hypomobile  Scapulohumeral rhythm right:  Hypomobile                                   General     ROS    Assessment/Plan:    Patient is a 52 year old female with right side shoulder complaints.    Patient has the following significant findings with corresponding treatment plan.                Diagnosis 1:  R shoulder pain   Pain -  hot/cold therapy, manual therapy and self management  Decreased ROM/flexibility - manual therapy and therapeutic exercise  Decreased joint mobility - manual therapy and therapeutic exercise  Decreased strength - therapeutic exercise and therapeutic activities  Impaired muscle performance - neuro re-education  Decreased function - therapeutic activities  Impaired posture - neuro re-education    Therapy Evaluation Codes:   1) History comprised of:   Personal factors that impact the plan of care:      None.    Comorbidity factors that impact the plan of care are:      previous CA RX.     Medications impacting care: None.  2) Examination of Body Systems comprised of:   Body structures and functions that impact the plan of care:      Cervical spine, Shoulder and Thoracic Spine.   Activity limitations that impact the plan of care are:      Driving, Dressing, Lifting, Working and Laying down.  3) Clinical presentation characteristics are:   Stable/Uncomplicated.  4) Decision-Making    Low complexity using standardized patient assessment instrument and/or measureable assessment of functional outcome.  Cumulative Therapy Evaluation is: Low complexity.    Previous and current functional limitations:  (See Goal Flow Sheet for this information)    Short term and Long term goals: (See Goal Flow Sheet for this information)     Communication ability:  Patient appears to be able to clearly communicate and understand verbal and written  communication and follow directions correctly.  Treatment Explanation - The following has been discussed with the patient:   RX ordered/plan of care  Anticipated outcomes  Possible risks and side effects  This patient would benefit from PT intervention to resume normal activities.   Rehab potential is good.    Frequency:  1 X week, once daily  Duration:  for 8 weeks  Discharge Plan:  Achieve all LTG.  Independent in home treatment program.  Reach maximal therapeutic benefit.    Please refer to the daily flowsheet for treatment today, total treatment time and time spent performing 1:1 timed codes.        26-Aug-2023 12:52

## 2023-09-06 NOTE — PROGRESS NOTES
[facility]    Outpatient Physical Therapy Discharge Note  Patient: Luci Londono  : 1970    Patient was seen for 5 visits for low back pain from 23 to 23 with 2 NS follow up appointments.      Plan:  Discharge from therapy.     Reason for Discharge: No further expectation of progress.  Patient chooses to discontinue therapy.  Patient has not made expected progress due to interrupted treatment attendance.  Patient has failed to schedule further appointments.    Discharge Plan: Patient to continue home program.     If patient would like to return to therapy, they will need a new PT order from referring provider.    Barb Castanon, PT   2023

## 2023-09-20 ENCOUNTER — HOSPITAL ENCOUNTER (OUTPATIENT)
Dept: MAMMOGRAPHY | Facility: CLINIC | Age: 53
Discharge: HOME | End: 2023-09-20
Attending: INTERNAL MEDICINE | Admitting: INTERNAL MEDICINE
Payer: COMMERCIAL

## 2023-09-20 DIAGNOSIS — Z12.31 ENCOUNTER FOR SCREENING MAMMOGRAM FOR BREAST CANCER: ICD-10-CM

## 2023-09-20 PROCEDURE — 77067 SCR MAMMO BI INCL CAD: CPT

## 2023-10-23 ENCOUNTER — PATIENT OUTREACH (OUTPATIENT)
Dept: GASTROENTEROLOGY | Facility: CLINIC | Age: 53
End: 2023-10-23
Payer: COMMERCIAL

## 2023-11-08 ENCOUNTER — ONCOLOGY VISIT (OUTPATIENT)
Dept: ONCOLOGY | Facility: CLINIC | Age: 53
End: 2023-11-08
Attending: INTERNAL MEDICINE
Payer: COMMERCIAL

## 2023-11-08 VITALS
BODY MASS INDEX: 35.41 KG/M2 | OXYGEN SATURATION: 98 % | RESPIRATION RATE: 14 BRPM | WEIGHT: 203.6 LBS | TEMPERATURE: 98 F | DIASTOLIC BLOOD PRESSURE: 86 MMHG | SYSTOLIC BLOOD PRESSURE: 136 MMHG | HEART RATE: 63 BPM

## 2023-11-08 DIAGNOSIS — Z15.01 MONOALLELIC MUTATION OF PALB2 GENE: ICD-10-CM

## 2023-11-08 DIAGNOSIS — Z91.89 AT RISK FOR BREAST CANCER: ICD-10-CM

## 2023-11-08 DIAGNOSIS — Z15.89 MONOALLELIC MUTATION OF PALB2 GENE: ICD-10-CM

## 2023-11-08 DIAGNOSIS — Z15.09 MONOALLELIC MUTATION OF PALB2 GENE: ICD-10-CM

## 2023-11-08 DIAGNOSIS — Z17.1 MALIGNANT NEOPLASM OF UPPER-OUTER QUADRANT OF RIGHT BREAST IN FEMALE, ESTROGEN RECEPTOR NEGATIVE (H): Primary | ICD-10-CM

## 2023-11-08 DIAGNOSIS — C50.411 MALIGNANT NEOPLASM OF UPPER-OUTER QUADRANT OF RIGHT BREAST IN FEMALE, ESTROGEN RECEPTOR NEGATIVE (H): Primary | ICD-10-CM

## 2023-11-08 PROCEDURE — 99214 OFFICE O/P EST MOD 30 MIN: CPT | Performed by: INTERNAL MEDICINE

## 2023-11-08 PROCEDURE — G0463 HOSPITAL OUTPT CLINIC VISIT: HCPCS | Performed by: INTERNAL MEDICINE

## 2023-11-08 ASSESSMENT — PAIN SCALES - GENERAL: PAINLEVEL: MODERATE PAIN (5)

## 2023-11-08 NOTE — LETTER
"    11/8/2023         RE: Luci Londono  [address]  [facility] [address]        Dear Colleague,    Thank you for referring your patient, Luci C Alert, to the [facility]. Please see a copy of my visit note below.    Oncology Rooming Note    November 8, 2023 8:20 AM   Luci Londono is a 52 year old female who presents for:    Chief Complaint   Patient presents with     Oncology Clinic Visit     Initial Vitals: BP (!) 154/78   Pulse 63   Temp 98  F (36.7  C) (Oral)   Resp 14   Wt 92.4 kg (203 lb 9.6 oz)   SpO2 98%   BMI 35.41 kg/m   Estimated body mass index is 35.41 kg/m  as calculated from the following:    Height as of 2/22/23: 1.615 m (5' 3.58\").    Weight as of this encounter: 92.4 kg (203 lb 9.6 oz). Body surface area is 2.04 meters squared.  Moderate Pain (5) Comment: Data Unavailable   No LMP recorded. Patient is premenopausal.  Allergies reviewed: Yes  Medications reviewed: Yes    Medications: Medication refills not needed today.  Pharmacy name entered into [facility]:    [facility], [place] - [address] [id number] - [facility] [address] AT [facility] & [facility]    Clinical concerns: no       Shari J. Schoenberger, CMA              ONCOLOGIC HISTORY:  Ms. Londono is a female with triple negative right breast cancer.   -PALB2  mutation.  1.  Bilateral diagnostic mammogram and right breast ultrasound on 10/09/2018 revealed an 8 mm hypoechoic mass at the 12 o'clock position and abnormal right axillary lymph node.     2.  Ultrasound-guided right breast and right axillary lymph node biopsy was done on 10/12/2018.    -Right breast biopsy revealed invasive carcinoma, grade 3.  There was DCIS.    -Right axillary lymph node biopsy revealed metastatic adenocarcinoma.  -ER negative, PA negative and HER-2/elton negative.   -Clinical T1b N1 M0.     3.  Patient received neoadjuvant chemotherapy with dose-dense Adriamycin and " Cytoxan followed by weekly carboplatin and Taxol between 10/31/2018 and 04/03/2019.      4.  Patient underwent right breast lumpectomy and sentinel lymph node biopsy on 05/15/2019.  There was a 1.6 mm metastatic carcinoma in one of the lymph nodes.  There was no residual disease in the breast.  ypT0 ypN1mi M0 disease.   -The patient received adjuvant oral Xeloda.   -The patient received radiation to right breast and right supraclavicular region between 06/27/2019 and 08/13/2019. 5940 cGy in 33 fractions.     5.  She has PALB2  mutation.  The patient followed up in Genetics clinic.  She is getting alternating mammogram and breast MRI. Does not want prophylactic bilateral mastectomy.     SUBJECTIVE:  Ms. Londono is a 52-year-old female with triple negative right breast cancer diagnosed in 2018 status post   neoadjuvant chemotherapy, surgery, adjuvant chemotherapy and radiation.  She is in complete remission.    Patient has PALB2 mutation.  She does not want prophylactic bilateral mastectomy.  She has been getting alternating mammograms and MRI.  MRI breast in May 2023 was benign.  Mammogram in 09/20/2023 is benign.    Patient has chronic pain and numbness in the right chest wall, right shoulder, right arm and right scapular area.  This started after lumpectomy.  Occasionally pain gets severe.  She takes over-the-counter pain medication.  She has done physical therapy.  There was not much relief.  She has had imaging studies done.  There has been no evidence of malignancy.    She also has chronic lower back pain and sometimes upper back pain.  Pain is mild.  MRI lumbar spine is negative for malignancy.  There is facet arthropathy.     No headache.  No dizziness.  No shortness of breath.  No abdominal pain.  No nausea or vomiting.  No urinary or bowel complaints.  No bleeding.  No fever or night sweats.  She has mild chronic pain is both in the upper and lower back from arthritis.  Patient has gained some weight.  Patient  says that she wants to lose weight but has not been able to.  All other review of systems negative.     PHYSICAL EXAMINATION:   GENERAL:  Alert and oriented x 3.   VITAL SIGNS:  Reviewed.  ECOG PS of 0.     EYES:  No icterus.   NECK:  Supple. No lymphadenopathy.    AXILLAE:  No lymphadenopathy.   LUNGS:  Good air entry bilaterally.  No crackles or wheezing.   HEART:  Regular.  No murmur.   GI:  Abdomen is soft.  Nontender. No mass.   EXTREMITIES:  No pedal edema.  No calf swelling or tenderness.  -Right shoulder/scapular area examined.  No swelling, redness or tenderness.  SKIN:  No rash.   BREASTS:  Examined in the presence of Shari J. Schoenberger, CMA.  -Right breast exam does not reveal any mass or skin lesion. There is mild tenderness.  As per the patient, this tenderness has been there since surgery.  -Left breast exam does not reveal any mass or skin lesion.  No area of tenderness.     ASSESSMENT:    1.  A 52-year-old female with triple negative right breast cancer diagnosed in 2018.  No evidence of recurrence.  2.  PALB2 mutation.  3.  Right shoulder/right chest wall/right scapular pain. This started after right breast surgery.  Some of the pain could also be due to arthritis/rotator cuff problem.     PLAN:   1.  Patient is doing well from breast cancer.  No clinical suspicion of recurrence.  It is 5 years since the diagnosis of breast cancer.  Risk of recurrence is very low.    2.  Patient has PALB2 mutation.  She is at increased risk for breast cancer.  She does not want prophylactic bilateral mastectomy.  We will continue to monitor her with alternating mammogram and MRI every 6 months.  She will get breast MRI in 6 months.     3.  She has right shoulder/right chest wall/right scapular pain.  This is postsurgical pain.  Some of it is from arthritis/rotator cuff problem.  She will take over-the-counter pain medication as needed.  She will continue with exercises recommended by physical therapist.     4.   She had few questions which were all answered. I will see her in 6 months time.  Advised her to call us with any problems.     Total visit time of 30 minutes.  Time is spent in today's visit, review of chart/investigations today and documentation today.      Again, thank you for allowing me to participate in the care of your patient.        Sincerely,        Ailyn Humphrey MD

## 2023-11-08 NOTE — PROGRESS NOTES
ONCOLOGIC HISTORY:  Ms. Londono is a female with triple negative right breast cancer.   -PALB2  mutation.  1.  Bilateral diagnostic mammogram and right breast ultrasound on 10/09/2018 revealed an 8 mm hypoechoic mass at the 12 o'clock position and abnormal right axillary lymph node.     2.  Ultrasound-guided right breast and right axillary lymph node biopsy was done on 10/12/2018.    -Right breast biopsy revealed invasive carcinoma, grade 3.  There was DCIS.    -Right axillary lymph node biopsy revealed metastatic adenocarcinoma.  -ER negative, ID negative and HER-2/elton negative.   -Clinical T1b N1 M0.     3.  Patient received neoadjuvant chemotherapy with dose-dense Adriamycin and Cytoxan followed by weekly carboplatin and Taxol between 10/31/2018 and 04/03/2019.      4.  Patient underwent right breast lumpectomy and sentinel lymph node biopsy on 05/15/2019.  There was a 1.6 mm metastatic carcinoma in one of the lymph nodes.  There was no residual disease in the breast.  ypT0 ypN1mi M0 disease.   -The patient received adjuvant oral Xeloda.   -The patient received radiation to right breast and right supraclavicular region between 06/27/2019 and 08/13/2019. 5940 cGy in 33 fractions.     5.  She has PALB2  mutation.  The patient followed up in Genetics clinic.  She is getting alternating mammogram and breast MRI. Does not want prophylactic bilateral mastectomy.     SUBJECTIVE:  Ms. Londono is a 52-year-old female with triple negative right breast cancer diagnosed in 2018 status post   neoadjuvant chemotherapy, surgery, adjuvant chemotherapy and radiation.  She is in complete remission.    Patient has PALB2 mutation.  She does not want prophylactic bilateral mastectomy.  She has been getting alternating mammograms and MRI.  MRI breast in May 2023 was benign.  Mammogram in 09/20/2023 is benign.    Patient has chronic pain and numbness in the right chest wall, right shoulder, right arm and right scapular area.  This started  after lumpectomy.  Occasionally pain gets severe.  She takes over-the-counter pain medication.  She has done physical therapy.  There was not much relief.  She has had imaging studies done.  There has been no evidence of malignancy.    She also has chronic lower back pain and sometimes upper back pain.  Pain is mild.  MRI lumbar spine is negative for malignancy.  There is facet arthropathy.     No headache.  No dizziness.  No shortness of breath.  No abdominal pain.  No nausea or vomiting.  No urinary or bowel complaints.  No bleeding.  No fever or night sweats.  She has mild chronic pain is both in the upper and lower back from arthritis.  Patient has gained some weight.  Patient says that she wants to lose weight but has not been able to.  All other review of systems negative.     PHYSICAL EXAMINATION:   GENERAL:  Alert and oriented x 3.   VITAL SIGNS:  Reviewed.  ECOG PS of 0.     EYES:  No icterus.   NECK:  Supple. No lymphadenopathy.    AXILLAE:  No lymphadenopathy.   LUNGS:  Good air entry bilaterally.  No crackles or wheezing.   HEART:  Regular.  No murmur.   GI:  Abdomen is soft.  Nontender. No mass.   EXTREMITIES:  No pedal edema.  No calf swelling or tenderness.  -Right shoulder/scapular area examined.  No swelling, redness or tenderness.  SKIN:  No rash.   BREASTS:  Examined in the presence of Shari J. Schoenberger, CMA.  -Right breast exam does not reveal any mass or skin lesion. There is mild tenderness.  As per the patient, this tenderness has been there since surgery.  -Left breast exam does not reveal any mass or skin lesion.  No area of tenderness.     ASSESSMENT:    1.  A 52-year-old female with triple negative right breast cancer diagnosed in 2018.  No evidence of recurrence.  2.  PALB2 mutation.  3.  Right shoulder/right chest wall/right scapular pain. This started after right breast surgery.  Some of the pain could also be due to arthritis/rotator cuff problem.     PLAN:   1.  Patient is doing  well from breast cancer.  No clinical suspicion of recurrence.  It is 5 years since the diagnosis of breast cancer.  Risk of recurrence is very low.    2.  Patient has PALB2 mutation.  She is at increased risk for breast cancer.  She does not want prophylactic bilateral mastectomy.  We will continue to monitor her with alternating mammogram and MRI every 6 months.  She will get breast MRI in 6 months.     3.  She has right shoulder/right chest wall/right scapular pain.  This is postsurgical pain.  Some of it is from arthritis/rotator cuff problem.  She will take over-the-counter pain medication as needed.  She will continue with exercises recommended by physical therapist.     4.  She had few questions which were all answered. I will see her in 6 months time.  Advised her to call us with any problems.     Total visit time of 30 minutes.  Time is spent in today's visit, review of chart/investigations today and documentation today.

## 2023-11-08 NOTE — PROGRESS NOTES
"Oncology Rooming Note    November 8, 2023 8:20 AM   Luci Londono is a 52 year old female who presents for:    Chief Complaint   Patient presents with    Oncology Clinic Visit     Initial Vitals: BP (!) 154/78   Pulse 63   Temp 98  F (36.7  C) (Oral)   Resp 14   Wt 92.4 kg (203 lb 9.6 oz)   SpO2 98%   BMI 35.41 kg/m   Estimated body mass index is 35.41 kg/m  as calculated from the following:    Height as of 2/22/23: 1.615 m (5' 3.58\").    Weight as of this encounter: 92.4 kg (203 lb 9.6 oz). Body surface area is 2.04 meters squared.  Moderate Pain (5) Comment: Data Unavailable   No LMP recorded. Patient is premenopausal.  Allergies reviewed: Yes  Medications reviewed: Yes    Medications: Medication refills not needed today.  Pharmacy name entered into [employer]:    [facility] [place] - [address] [facility] [address] DRUG STORE #[id number] [place], [place] - [address] AT [facility] & [facility]    Clinical concerns: no       Shari J. Schoenberger, CMA            "

## 2023-11-13 ENCOUNTER — PATIENT OUTREACH (OUTPATIENT)
Dept: CARE COORDINATION | Facility: CLINIC | Age: 53
End: 2023-11-13
Payer: COMMERCIAL

## 2023-11-13 NOTE — Clinical Note
Lam SAM & Leonarda,  After reviewing your note from visit with Luci, I wonder if she might be an excellent candidate for a visit with Dr. Castillo due to the level of pain that she is continuing to experience (and that PT has not been helpful for her with pain). Would welcome your thoughts.  I just left a voicemail with her, so have not discussed this idea.   Thanks,  Mirtha

## 2023-11-13 NOTE — PROGRESS NOTES
Social Work - Distress Screen Intervention  [facility]    Identified Concern and Score from Distress Screenin. How concerned are you about your ability to eat? 0     2. How concerned are you about unintended weight loss or your current weight? (!) 10     3. How concerned are you about feeling depressed or very sad?  0     4. How concerned are you about feeling anxious or very scared?  0     5. Do you struggle with the loss of meaning and silver in your life?  Not at all     6. How concerned are you about work and home life issues that may be affected by your cancer?  (!) 10     7. How concerned are you about knowing what resources are available to help you?  (!) 10     8. Do you currently have what you would describe as Oriental orthodox or spiritual struggles? Somewhat     9. If you want to be contacted by one of our professionals, I can send a message to them right now.  No data recorded     Date of Distress Screen: 23  Data: At time of last visit, patient scored positive on distress screening.  outreached to patient today to follow up on elevated distress and introduce psychosocial services and support.  Intervention/Education provided:  contacted patient by phone to discuss distress screening results. Left voicemail message  Follow-up Required:  to remain available for support and await return call from patient    Mirtha Saenz, MSW, LICSW, OSW-C  Clinical - Adult Oncology  She/Her/Hers  Phone: [phone]  [facility]: M, Thu  *every other Tue, 8am-4:30pm  [facility]: W, F, *every other Tue, 8am-4:30pm

## 2023-11-15 ENCOUNTER — PATIENT OUTREACH (OUTPATIENT)
Dept: CARE COORDINATION | Facility: CLINIC | Age: 53
End: 2023-11-15
Payer: COMMERCIAL

## 2023-11-15 NOTE — PROGRESS NOTES
Social Work - Follow-Up  [facility]    Data/Intervention:  Patient Name: Luci JORDAN Alert Goes By: Luci    /Age: 1970 (52 year old)    Reason for Follow-Up:  Follow-up from distress screen    Intervention/Education/Resources Provided:  Luci endorsed ongoing pain and numbness from right chest wall, shoulder, arm and scapular that has understandably impacted her mobility. SW discussed referral for PM&R physician, which she was receptive to.     Luci reports that her mood is good, pleased to be 5 years cancer free.     Assessment/Plan:  1) Onc SW to send message to RNCC and MD regarding placing PM&R referral at [facility]  2) Onc SW will continue to be available as needed for ongoing psychosocial support.     Previously provided patient/family with writer's contact information and availability.      Mirtha Saenz, MSW, LICSW, OSW-C  Clinical - Adult Oncology  She/Her/Hers  Phone: [phone]  [facility]: M, Thu  *every other Tue, 8am-4:30pm  [facility]: W, F, *every other Tue, 8am-4:30pm

## 2023-11-21 ENCOUNTER — TELEPHONE (OUTPATIENT)
Dept: ONCOLOGY | Facility: CLINIC | Age: 53
End: 2023-11-21
Payer: COMMERCIAL

## 2023-11-21 NOTE — PROGRESS NOTES
CLINICAL NUTRITION SERVICES- ONCOLOGY DISTRESS SCREENING     Identified Concern and Score From Distress Screenin. How concerned are you about your ability to eat? :  0  2. How concerned are you about unintended weight loss or your current weight? : 10     Date of Distress Screenin23     Findings: Phone call with Luci. Patient declined nutrition visit at this time but may be open to one in the future.      Intervention: Provided oncology RD phone number.      Follow-up Required: As needed.     Angle Fountain RD, LD  [facility] [phone]

## 2024-01-19 DIAGNOSIS — M54.41 ACUTE BILATERAL LOW BACK PAIN WITH RIGHT-SIDED SCIATICA: ICD-10-CM

## 2024-01-19 DIAGNOSIS — Z17.1 MALIGNANT NEOPLASM OF UPPER-OUTER QUADRANT OF RIGHT BREAST IN FEMALE, ESTROGEN RECEPTOR NEGATIVE (H): ICD-10-CM

## 2024-01-19 DIAGNOSIS — C50.411 MALIGNANT NEOPLASM OF UPPER-OUTER QUADRANT OF RIGHT BREAST IN FEMALE, ESTROGEN RECEPTOR NEGATIVE (H): ICD-10-CM

## 2024-01-19 DIAGNOSIS — Z00.00 ROUTINE GENERAL MEDICAL EXAMINATION AT A HEALTH CARE FACILITY: ICD-10-CM

## 2024-01-19 RX ORDER — CYCLOBENZAPRINE HCL 10 MG
10 TABLET ORAL 3 TIMES DAILY PRN
Qty: 30 TABLET | Refills: 0 | Status: SHIPPED | OUTPATIENT
Start: 2024-01-19 | End: 2024-02-28

## 2024-02-20 ENCOUNTER — HOSPITAL ENCOUNTER (EMERGENCY)
Facility: CLINIC | Age: 54
Discharge: HOME | End: 2024-02-20
Attending: PHYSICIAN ASSISTANT | Admitting: PHYSICIAN ASSISTANT
Payer: COMMERCIAL

## 2024-02-20 ENCOUNTER — APPOINTMENT (OUTPATIENT)
Dept: CT IMAGING | Facility: CLINIC | Age: 54
End: 2024-02-20
Attending: EMERGENCY MEDICINE
Payer: COMMERCIAL

## 2024-02-20 ENCOUNTER — APPOINTMENT (OUTPATIENT)
Dept: MRI IMAGING | Facility: CLINIC | Age: 54
End: 2024-02-20
Attending: PHYSICIAN ASSISTANT
Payer: COMMERCIAL

## 2024-02-20 VITALS
DIASTOLIC BLOOD PRESSURE: 86 MMHG | RESPIRATION RATE: 16 BRPM | HEART RATE: 86 BPM | OXYGEN SATURATION: 99 % | SYSTOLIC BLOOD PRESSURE: 168 MMHG | TEMPERATURE: 98.3 F

## 2024-02-20 DIAGNOSIS — R03.0 ELEVATED BLOOD PRESSURE READING: ICD-10-CM

## 2024-02-20 DIAGNOSIS — R20.0 RT FACIAL NUMBNESS: ICD-10-CM

## 2024-02-20 DIAGNOSIS — R51.9 FACIAL PAIN: ICD-10-CM

## 2024-02-20 LAB
ANION GAP SERPL CALCULATED.3IONS-SCNC: 9 MMOL/L (ref 7–15)
ATRIAL RATE - MUSE: 66 BPM
BASOPHILS # BLD AUTO: 0 10E3/UL (ref 0–0.2)
BASOPHILS NFR BLD AUTO: 0 %
BUN SERPL-MCNC: 10.5 MG/DL (ref 6–20)
CALCIUM SERPL-MCNC: 9.3 MG/DL (ref 8.6–10)
CHLORIDE SERPL-SCNC: 99 MMOL/L (ref 98–107)
CREAT SERPL-MCNC: 0.75 MG/DL (ref 0.51–0.95)
DEPRECATED HCO3 PLAS-SCNC: 30 MMOL/L (ref 22–29)
DIASTOLIC BLOOD PRESSURE - MUSE: NORMAL MMHG
EGFRCR SERPLBLD CKD-EPI 2021: >90 ML/MIN/1.73M2
EOSINOPHIL # BLD AUTO: 0.1 10E3/UL (ref 0–0.7)
EOSINOPHIL NFR BLD AUTO: 1 %
ERYTHROCYTE [DISTWIDTH] IN BLOOD BY AUTOMATED COUNT: 14.2 % (ref 10–15)
GLUCOSE SERPL-MCNC: 95 MG/DL (ref 70–99)
HCT VFR BLD AUTO: 39.1 % (ref 35–47)
HGB BLD-MCNC: 12.7 G/DL (ref 11.7–15.7)
HOLD SPECIMEN: NORMAL
IMM GRANULOCYTES # BLD: 0 10E3/UL
IMM GRANULOCYTES NFR BLD: 0 %
INTERPRETATION ECG - MUSE: NORMAL
LYMPHOCYTES # BLD AUTO: 3.3 10E3/UL (ref 0.8–5.3)
LYMPHOCYTES NFR BLD AUTO: 37 %
MCH RBC QN AUTO: 26.2 PG (ref 26.5–33)
MCHC RBC AUTO-ENTMCNC: 32.5 G/DL (ref 31.5–36.5)
MCV RBC AUTO: 81 FL (ref 78–100)
MONOCYTES # BLD AUTO: 0.4 10E3/UL (ref 0–1.3)
MONOCYTES NFR BLD AUTO: 5 %
NEUTROPHILS # BLD AUTO: 5 10E3/UL (ref 1.6–8.3)
NEUTROPHILS NFR BLD AUTO: 57 %
NRBC # BLD AUTO: 0 10E3/UL
NRBC BLD AUTO-RTO: 0 /100
P AXIS - MUSE: 59 DEGREES
PLATELET # BLD AUTO: 328 10E3/UL (ref 150–450)
POTASSIUM SERPL-SCNC: 4.1 MMOL/L (ref 3.4–5.3)
PR INTERVAL - MUSE: 152 MS
QRS DURATION - MUSE: 86 MS
QT - MUSE: 410 MS
QTC - MUSE: 429 MS
R AXIS - MUSE: 7 DEGREES
RBC # BLD AUTO: 4.85 10E6/UL (ref 3.8–5.2)
SODIUM SERPL-SCNC: 138 MMOL/L (ref 135–145)
SYSTOLIC BLOOD PRESSURE - MUSE: NORMAL MMHG
T AXIS - MUSE: 30 DEGREES
VENTRICULAR RATE- MUSE: 66 BPM
WBC # BLD AUTO: 8.8 10E3/UL (ref 4–11)

## 2024-02-20 PROCEDURE — 70496 CT ANGIOGRAPHY HEAD: CPT

## 2024-02-20 PROCEDURE — 70450 CT HEAD/BRAIN W/O DYE: CPT

## 2024-02-20 PROCEDURE — 93005 ELECTROCARDIOGRAM TRACING: CPT

## 2024-02-20 PROCEDURE — 85004 AUTOMATED DIFF WBC COUNT: CPT | Performed by: EMERGENCY MEDICINE

## 2024-02-20 PROCEDURE — A9585 GADOBUTROL INJECTION: HCPCS | Performed by: PHYSICIAN ASSISTANT

## 2024-02-20 PROCEDURE — 80048 BASIC METABOLIC PNL TOTAL CA: CPT | Performed by: EMERGENCY MEDICINE

## 2024-02-20 PROCEDURE — 36415 COLL VENOUS BLD VENIPUNCTURE: CPT | Performed by: EMERGENCY MEDICINE

## 2024-02-20 PROCEDURE — 70553 MRI BRAIN STEM W/O & W/DYE: CPT

## 2024-02-20 PROCEDURE — 255N000002 HC RX 255 OP 636: Performed by: PHYSICIAN ASSISTANT

## 2024-02-20 PROCEDURE — 250N000011 HC RX IP 250 OP 636: Performed by: EMERGENCY MEDICINE

## 2024-02-20 PROCEDURE — 99285 EMERGENCY DEPT VISIT HI MDM: CPT | Mod: 25

## 2024-02-20 PROCEDURE — 250N000009 HC RX 250: Performed by: EMERGENCY MEDICINE

## 2024-02-20 RX ORDER — GADOBUTROL 604.72 MG/ML
9 INJECTION INTRAVENOUS ONCE
Status: COMPLETED | OUTPATIENT
Start: 2024-02-20 | End: 2024-02-20

## 2024-02-20 RX ORDER — GABAPENTIN 300 MG/1
300 CAPSULE ORAL 3 TIMES DAILY
Qty: 21 CAPSULE | Refills: 0 | Status: SHIPPED | OUTPATIENT
Start: 2024-02-20 | End: 2024-02-28

## 2024-02-20 RX ORDER — IOPAMIDOL 755 MG/ML
67 INJECTION, SOLUTION INTRAVASCULAR ONCE
Status: COMPLETED | OUTPATIENT
Start: 2024-02-20 | End: 2024-02-20

## 2024-02-20 RX ORDER — SODIUM CHLORIDE 9 MG/ML
INJECTION, SOLUTION INTRAVENOUS CONTINUOUS
Status: DISCONTINUED | OUTPATIENT
Start: 2024-02-20 | End: 2024-02-20

## 2024-02-20 RX ADMIN — SODIUM CHLORIDE 100 ML: 9 INJECTION, SOLUTION INTRAVENOUS at 16:49

## 2024-02-20 RX ADMIN — GADOBUTROL 9 ML: 604.72 INJECTION INTRAVENOUS at 20:46

## 2024-02-20 RX ADMIN — IOPAMIDOL 67 ML: 755 INJECTION, SOLUTION INTRAVENOUS at 16:48

## 2024-02-20 ASSESSMENT — ACTIVITIES OF DAILY LIVING (ADL)
ADLS_ACUITY_SCORE: 35

## 2024-02-20 NOTE — ED PROVIDER NOTES
Rapid Assessment Note    History:   Luci Londono is a 53 year old female with a history of non active breast cancer who presents for evaluation of numbness. Patient reports that she has been experiencing numbness for the past 4 days radiating from her right cheek to the top of her head and going into the center of the base of her neck. She also states experiencing dizziness when bending over. Patient denies any recent chiropractor visits. Denies any neck pain, slurred speech, arm or leg problems, or difficulty ambulating.        Exam:   General:  Alert, interactive  Cardiovascular:  Well perfused  Lungs:  No respiratory distress, no accessory muscle use  Neuro:  Moving all 4 extremities.  No hand drift.  Slight right facial droop.  Good  strength.  Gait was normal.  Skin:  Warm, dry  Psych:  Normal affect      Plan of Care: Patient with 4 days of some numbness to the right side of her face back of her head down toward her shoulder and some intermittent dizziness.  CT and CTA ordered to make sure she does not have a dissection.  I thought the right corner of the mouth was drooping a little bit.  Labs also ordered.  I evaluated the patient and developed an initial plan of care. I discussed this plan and explained that I, or one of my partners, would be returning to complete the evaluation.         I, Shayla Stein, am serving as a scribe to document services personally performed by Kasie Sal MD, based on my observations and the provider's statements to me.    2/20/2024  [employer]    Portions of this medical record were completed by a scribe. UPON MY REVIEW AND AUTHENTICATION BY ELECTRONIC SIGNATURE, this confirms (a) I performed the applicable clinical services, and (b) the record is accurate.            Kasie Sal MD  02/20/24 2381

## 2024-02-21 ENCOUNTER — PATIENT OUTREACH (OUTPATIENT)
Dept: FAMILY MEDICINE | Facility: CLINIC | Age: 54
End: 2024-02-21
Payer: COMMERCIAL

## 2024-02-21 NOTE — TELEPHONE ENCOUNTER
"Hospital/TCU/ED for chronic condition Discharge Protocol    \"Hi, my name is Santa Simmons RN, a registered nurse, and I am calling from [facility].  I am calling to follow up and see how things are going for you after your recent emergency visit/hospital/TCU stay.\"    Tell me how you are doing now that you are home?\" Some pain but better.       Discharge Instructions    \"Let's review your discharge instructions.  What is/are the follow-up recommendations?  Pt. Response: See primary.     \"Has an appointment with your primary care provider been scheduled?\"   Yes. (confirm)    \"When you see the provider, I would recommend that you bring your medications with you.\"    Medications    \"Tell me what changed about your medicines when you discharged?\"    Changes to chronic meds?    0-1    \"What questions do you have about your medications?\"    None     New diagnoses of heart failure, COPD, diabetes, or MI?    No              Post Discharge Medication Reconciliation Status: medication reconcilation previously completed during another office visit.    Was MTM referral placed (*Make sure to put transitions as reason for referral)?   No    Call Summary    \"What questions or concerns do you have about your recent visit and your follow-up care?\"     none    \"If you have questions or things don't continue to improve, we encourage you contact us through the main clinic number (give number).  Even if the clinic is not open, triage nurses are available 24/7 to help you.     We would like you to know that our clinic has extended hours (provide information).  We also have urgent care (provide details on closest location and hours/contact info)\"      \"Thank you for your time and take care!\"    Santa Simmons RN  [facility]         "

## 2024-02-21 NOTE — ED PROVIDER NOTES
History     Chief Complaint:  Numbness       HPI   Luci Londono is a 53 year old female with a history of breast cancer and diabetes who presents to the ED with facial pain. She explains that over the past four days she has had right sided facial pain with intermittent numbness at the site. She adds that this pain is worsened by chewing and she has been developing dizziness when she bends over. She denies headache, tooth pain, numbness in her extremities, difficulty ambulating, double vision or loss of vision. She denies personal history of stroke, further denies familial history of brain aneurysm.     Independent Historian:   None - Patient Only    Review of External Notes:        Medications:    Vitamin C  Aspirin 81mg  Flexeril  Cymbalta  Synthroid  Glucophage  Protonix  Zocor  Cholecalciferol    Past Medical History:    Cancer  Diabetes  High cholesterol  Hypertension  Lateral epicondylitis  Migraine  Port-A-Cath in place  Hypothyroid  Gastroesophageal reflux disease  Vitamin D insufficiency  Myalgia and myositis  Helicobacter pylori infection  Disorder of metabolism  Polycystic ovaries  Rosacea  Absence of menstruation  Pinguecula of both eyes  Presbyopia  Impaired fasting glucose  Class one obesity  Breast cancer  Monoallelic mutation of PALB2 gene  Dyslipidemia  Prediabetes    Past Surgical History:    Past Surgical History:   Procedure Laterality Date    BIOPSY NODE SENTINEL Right 5/15/2019    Procedure: BIOPSY, LYMPH NODE, SENTINEL;  Surgeon: Stacey Ashford MD;  Location:  OR    COLONOSCOPY N/A 1/26/2022    Procedure: COLONOSCOPY;  Surgeon: Zac Arriola MD;  Location:  GI    DISSECT LYMPH NODE AXILLA Right 5/15/2019    Procedure: LYMPHADENECTOMY, AXILLARY;  Surgeon: Stacey Ashford MD;  Location:  OR    HAND SURGERY  2002    left    INSERT PORT VASCULAR ACCESS N/A 10/18/2018    Procedure: INSERTION OF VASCULAR PORT;  Surgeon: Stacey Ashford MD;  Location:  OR    LUMPECTOMY  BREAST WITH SEED LOCALIZATION Right 5/15/2019    Procedure: SEED LOCALIZATION BREAST LUMPECTOMY, SEED LOCALIZATION AXILLARY BREAST BIOPSY, SENTINEL NODE , REMOVAL OF VASCULAR PORT ACCESS AND RIGHT  AXILLARY NODE DISSECTION;  Surgeon: Stacey Ashford MD;  Location: [facility] OR    REMOVE PORT VASCULAR ACCESS N/A 5/15/2019    Procedure: REMOVAL, VASCULAR ACCESS PORT;  Surgeon: Stacey Ashford MD;  Location: [facility] OR    TUBAL LIGATION          Physical Exam   Patient Vitals for the past 24 hrs:   BP Temp Temp src Pulse Resp SpO2   02/20/24 2125 -- -- -- -- -- 99 %   02/20/24 2115 -- -- -- -- -- 100 %   02/20/24 2105 -- -- -- -- -- 97 %   02/20/24 2055 -- -- -- -- -- 99 %   02/20/24 1515 (!) 168/86 98.3  F (36.8  C) Oral 86 16 100 %        Physical Exam  Vitals and nursing note reviewed.   Constitutional:       Appearance: She is not diaphoretic.   HENT:      Head:        Comments: Blue: Area of pain and numbness     Right Ear: Hearing, tympanic membrane and ear canal normal.      Left Ear: Hearing, tympanic membrane and ear canal normal.      Nose: Nose normal.      Mouth/Throat:      Mouth: Mucous membranes are moist.      Pharynx: Oropharynx is clear.   Eyes:      General: No visual field deficit or scleral icterus.     Conjunctiva/sclera: Conjunctivae normal.   Cardiovascular:      Rate and Rhythm: Normal rate and regular rhythm.      Pulses:           Radial pulses are 2+ on the right side and 2+ on the left side.      Heart sounds: Normal heart sounds, S1 normal and S2 normal. No murmur heard.     No friction rub. No gallop.   Pulmonary:      Effort: Pulmonary effort is normal.      Breath sounds: Normal breath sounds.   Musculoskeletal:      Cervical back: Neck supple. No spinous process tenderness or muscular tenderness. Normal range of motion.   Skin:     General: Skin is warm.      Capillary Refill: Capillary refill takes less than 2 seconds.      Coloration: Skin is not ashen or cyanotic.      Findings: No erythema  or lesion.   Neurological:      Mental Status: She is alert and oriented to person, place, and time. Mental status is at baseline.      Cranial Nerves: Facial asymmetry (questionable slight drop of the right corner of mouth) present. No dysarthria.      Sensory: Sensory deficit (right Right forhead to light sensation, cheek and jaw) present.      Motor: No weakness or pronator drift.      Coordination: Coordination is intact. Romberg sign negative. Finger-Nose-Finger Test and Heel to Shin Test normal.      Gait: Gait is intact. Gait normal.      Comments: Able to to puff out cheeks.     Myotomes C5-T1, L1-S1 intact bilaterally 5/5 strength.   Psychiatric:         Attention and Perception: Attention and perception normal.         Mood and Affect: Mood and affect normal.         Speech: Speech normal.         Behavior: Behavior normal.         Thought Content: Thought content normal.           1st Exam:      2nd Exam:      Emergency Department Course   ECG  ECG taken at 1946, ECG read at 2003  Normal sinus rhythm   No STEMI  No significant change as compared to prior, dated 1/14/19.  Rate 66 bpm. LA interval 152 ms. QRS duration 86 ms. QT/QTc 410/429 ms. P-R-T axes 59 7 30.     Imaging:  MR Brain w/o & w Contrast   Final Result   IMPRESSION:   1.  Unremarkable MR evaluation of the brain without and with contrast. No restricted diffusion to suggest acute ischemia.      CT Head w/o Contrast   Final Result   IMPRESSION:   1.  Normal head CT.      CTA Head Neck with Contrast   Final Result   IMPRESSION:    1.  Unremarkable CTA evaluation of the head and neck.         Laboratory:  Labs Ordered and Resulted from Time of ED Arrival to Time of ED Departure   BASIC METABOLIC PANEL - Abnormal       Result Value    Sodium 138      Potassium 4.1      Chloride 99      Carbon Dioxide (CO2) 30 (*)     Anion Gap 9      Urea Nitrogen 10.5      Creatinine 0.75      GFR Estimate >90      Calcium 9.3      Glucose 95     CBC WITH  PLATELETS AND DIFFERENTIAL - Abnormal    WBC Count 8.8      RBC Count 4.85      Hemoglobin 12.7      Hematocrit 39.1      MCV 81      MCH 26.2 (*)     MCHC 32.5      RDW 14.2      Platelet Count 328      % Neutrophils 57      % Lymphocytes 37      % Monocytes 5      % Eosinophils 1      % Basophils 0      % Immature Granulocytes 0      NRBCs per 100 WBC 0      Absolute Neutrophils 5.0      Absolute Lymphocytes 3.3      Absolute Monocytes 0.4      Absolute Eosinophils 0.1      Absolute Basophils 0.0      Absolute Immature Granulocytes 0.0      Absolute NRBCs 0.0          Procedures   None    Emergency Department Course & Assessments:       Interventions:  Medications   Saline (100 mLs As instructed $Given 2/20/24 1649)   iopamidol (ISOVUE-370) solution 67 mL (67 mLs Intravenous $Given 2/20/24 1648)   gadobutrol (GADAVIST) injection 9 mL (9 mLs Intravenous $Given 2/20/24 2046)        Assessments:  1817 I obtained history and examined the patient as noted above.  2055 I rechecked the patient and explained findings.     Independent Interpretation (X-rays, CTs, rhythm strip):  None    Consultations/Discussion of Management or Tests:         Social Determinants of Health affecting care:   None    Disposition:  The patient was discharged.     Impression & Plan      Medical Decision Making:  This is a 53-year-old female that presents with symptoms of pain in paresthesias sensations of her right face and the posterior nape of her neck that she has been having for the last 4 days.  There was question regarding potential slight droop of the corner of her mouth on my initial exam this was questionable please see image above.  This could be her baseline smile she had no significant facial droop on reexamination please see second picture.  Patient's numbness resolved but continued to have pain over her right side of her face which on my exam she reported distributions of the forehead maxilla and jaw.  She had reported prior that  she was also having some posterior neck and pain as well.  No headache.  No upper or lower extremity numbness or weakness on exam.  Blood pressure was noted to be hypertensive with the rest of her vital signs normal.  Differential included CVA, cervical dissection, intracranial lesion-mass effect, trigeminal neuralgia, meningitis, MS, among many others.  At this time patient's had a normal noncontrast head CT and CTA of her head and neck without any evidence of cervical dissection or aneurysms or intracranial bleeding or CVA.  MRI of her brain was also obtained showing no evidence of ischemic stroke or other intracranial lesions.  Patient's numbness resolved and again the questionable asymmetry of her smile could just be her baseline and it is not significantly present.  She has no other symptoms of focal neurodeficits on exam.  Most of her complaint now is the painful distribution of pain on the right side of her face.  Labs show no metabolic or electrolyte derangements, white blood cell count normal hemoglobin normal.  Differential still includes other neurological symptoms such as by benign cause of headache, trigeminal neuralgia, could consider about atypical Bell's palsy however I think this is less likely, MS among others.  I recommend neuro neurology consult and close primary care follow-up.  We discussed signs and symptoms for her to return including worsening drooping of the face, vision changes, return of numbness or numbness of the extremities, focal weakness, high fever, worsening pain or imbalance or worsening condition.      Diagnosis:    ICD-10-CM    1. Facial pain  R51.9 Adult Neurology  Referral      2. Rt facial numbness  R20.0 Adult Neurology  Referral      3. Elevated blood pressure reading  R03.0            Discharge Medications:  Discharge Medication List as of 2/20/2024  9:27 PM        START taking these medications    Details   gabapentin (NEURONTIN) 300 MG capsule Take 1  capsule (300 mg) by mouth 3 times daily for 7 days, Disp-21 capsule, R-0, Local Print                Scribe Disclosure:  I, TEREZA WHARTON, am serving as a scribe at 6:28 PM on 2/20/2024 to document services personally performed by Chuck Lezama PA-C based on my observations and the provider's statements to me.     2/20/2024   Chuck Lezama PA-C Kruger, Jacob C, PA-C  02/21/24 1340       Chuck Lezama PA-C  02/21/24 1341

## 2024-02-21 NOTE — TELEPHONE ENCOUNTER
What type of discharge? Emergency Department  Risk of Hospital admission or ED visit: 61.9%  Is a TCM episode required? No  When should the patient follow up with PCP? 7 days of discharge.    Liza Hanna RN  [facility]

## 2024-02-28 ENCOUNTER — OFFICE VISIT (OUTPATIENT)
Dept: FAMILY MEDICINE | Facility: CLINIC | Age: 54
End: 2024-02-28
Payer: COMMERCIAL

## 2024-02-28 VITALS
OXYGEN SATURATION: 100 % | RESPIRATION RATE: 16 BRPM | SYSTOLIC BLOOD PRESSURE: 145 MMHG | HEART RATE: 68 BPM | BODY MASS INDEX: 34.15 KG/M2 | WEIGHT: 200 LBS | DIASTOLIC BLOOD PRESSURE: 80 MMHG | HEIGHT: 64 IN | TEMPERATURE: 97.1 F

## 2024-02-28 DIAGNOSIS — Z00.00 ROUTINE GENERAL MEDICAL EXAMINATION AT A HEALTH CARE FACILITY: Primary | ICD-10-CM

## 2024-02-28 DIAGNOSIS — E03.9 ACQUIRED HYPOTHYROIDISM: ICD-10-CM

## 2024-02-28 DIAGNOSIS — Z23 NEED FOR VACCINATION: ICD-10-CM

## 2024-02-28 DIAGNOSIS — R73.03 PREDIABETES: ICD-10-CM

## 2024-02-28 DIAGNOSIS — E61.1 IRON DEFICIENCY: ICD-10-CM

## 2024-02-28 DIAGNOSIS — M79.10 MYALGIA: ICD-10-CM

## 2024-02-28 DIAGNOSIS — E55.9 VITAMIN D INSUFFICIENCY: Chronic | ICD-10-CM

## 2024-02-28 DIAGNOSIS — M54.9 UPPER BACK PAIN ON RIGHT SIDE: ICD-10-CM

## 2024-02-28 DIAGNOSIS — E78.2 MIXED HYPERLIPIDEMIA: ICD-10-CM

## 2024-02-28 DIAGNOSIS — M26.609 TEMPOROMANDIBULAR JOINT DISORDER: ICD-10-CM

## 2024-02-28 LAB — HBA1C MFR BLD: 5.8 % (ref 0–5.6)

## 2024-02-28 PROCEDURE — 90750 HZV VACC RECOMBINANT IM: CPT | Performed by: INTERNAL MEDICINE

## 2024-02-28 PROCEDURE — 99214 OFFICE O/P EST MOD 30 MIN: CPT | Mod: 25 | Performed by: INTERNAL MEDICINE

## 2024-02-28 PROCEDURE — 83550 IRON BINDING TEST: CPT | Performed by: INTERNAL MEDICINE

## 2024-02-28 PROCEDURE — 84443 ASSAY THYROID STIM HORMONE: CPT | Performed by: INTERNAL MEDICINE

## 2024-02-28 PROCEDURE — 36415 COLL VENOUS BLD VENIPUNCTURE: CPT | Performed by: INTERNAL MEDICINE

## 2024-02-28 PROCEDURE — 82306 VITAMIN D 25 HYDROXY: CPT | Performed by: INTERNAL MEDICINE

## 2024-02-28 PROCEDURE — 83036 HEMOGLOBIN GLYCOSYLATED A1C: CPT | Performed by: INTERNAL MEDICINE

## 2024-02-28 PROCEDURE — 90471 IMMUNIZATION ADMIN: CPT | Performed by: INTERNAL MEDICINE

## 2024-02-28 PROCEDURE — 83540 ASSAY OF IRON: CPT | Performed by: INTERNAL MEDICINE

## 2024-02-28 PROCEDURE — 80061 LIPID PANEL: CPT | Performed by: INTERNAL MEDICINE

## 2024-02-28 PROCEDURE — 99396 PREV VISIT EST AGE 40-64: CPT | Mod: 25 | Performed by: INTERNAL MEDICINE

## 2024-02-28 PROCEDURE — 82607 VITAMIN B-12: CPT | Performed by: INTERNAL MEDICINE

## 2024-02-28 PROCEDURE — 84439 ASSAY OF FREE THYROXINE: CPT | Performed by: INTERNAL MEDICINE

## 2024-02-28 RX ORDER — LEVOTHYROXINE SODIUM 100 UG/1
TABLET ORAL
Qty: 94 TABLET | Refills: 3 | Status: SHIPPED | OUTPATIENT
Start: 2024-02-28 | End: 2024-03-04

## 2024-02-28 RX ORDER — TIZANIDINE HYDROCHLORIDE 6 MG/1
6 CAPSULE, GELATIN COATED ORAL
Qty: 30 CAPSULE | Refills: 1 | Status: SHIPPED | OUTPATIENT
Start: 2024-02-28

## 2024-02-28 RX ORDER — DULOXETIN HYDROCHLORIDE 60 MG/1
60 CAPSULE, DELAYED RELEASE ORAL DAILY
Qty: 180 CAPSULE | Refills: 1 | Status: SHIPPED | OUTPATIENT
Start: 2024-02-28

## 2024-02-28 ASSESSMENT — PAIN SCALES - GENERAL: PAINLEVEL: NO PAIN (0)

## 2024-02-28 NOTE — PATIENT INSTRUCTIONS
As discussed, please do fasting labs placed.     Refills done for medications.     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     =========================    Preventive Care Advice   This is general advice given by our system to help you stay healthy. However, your care team may have specific advice just for you. Please talk to your care team about your preventive care needs.  Nutrition  Eat 5 or more servings of fruits and vegetables each day.  Try wheat bread, brown rice and whole grain pasta (instead of white bread, rice, and pasta).  Get enough calcium and vitamin D. Check the label on foods and aim for 100% of the RDA (recommended daily allowance).  Lifestyle  Exercise at least 150 minutes each week   (30 minutes a day, 5 days a week).  Do muscle strengthening activities 2 days a week. These help control your weight and prevent disease.  No smoking.  Wear sunscreen to prevent skin cancer.  Have a dental exam and cleaning every 6 months.  Yearly exams  See your health care team every year to talk about:  Any changes in your health.  Any medicines your care team has prescribed.  Preventive care, family planning, and ways to prevent chronic diseases.  Shots (vaccines)   HPV shots (up to age 26), if you've never had them before.  Hepatitis B shots (up to age 59), if you've never had them before.  COVID-19 shot: Get this shot when it's due.  Flu shot: Get a flu shot every year.  Tetanus shot: Get a tetanus shot every 10 years.  Pneumococcal, hepatitis A, and RSV shots: Ask your care team if you need these based on your risk.  Shingles shot (for age 50 and up).  General health tests  Diabetes screening:  Starting at age 35, Get screened for diabetes at least every 3 years.  If you are younger than age 35, ask your care team if you should be screened for diabetes.  Cholesterol test: At age 39, start having a  cholesterol test every 5 years, or more often if advised.  Bone density scan (DEXA): At age 50, ask your care team if you should have this scan for osteoporosis (brittle bones).  Hepatitis C: Get tested at least once in your life.  STIs (sexually transmitted infections)  Before age 24: Ask your care team if you should be screened for STIs.  After age 24: Get screened for STIs if you're at risk. You are at risk for STIs (including HIV) if:  You are sexually active with more than one person.  You don't use condoms every time.  You or a partner was diagnosed with a sexually transmitted infection.  If you are at risk for HIV, ask about PrEP medicine to prevent HIV.  Get tested for HIV at least once in your life, whether you are at risk for HIV or not.  Cancer screening tests  Cervical cancer screening: If you have a cervix, begin getting regular cervical cancer screening tests at age 21. Most people who have regular screenings with normal results can stop after age 65. Talk about this with your provider.  Breast cancer scan (mammogram): If you've ever had breasts, begin having regular mammograms starting at age 40. This is a scan to check for breast cancer.  Colon cancer screening: It is important to start screening for colon cancer at age 45.  Have a colonoscopy test every 10 years (or more often if you're at risk) Or, ask your provider about stool tests like a FIT test every year or Cologuard test every 3 years.  To learn more about your testing options, visit: https://www.[email or web address]/372814.pdf.  For help making a decision, visit: https://bit.ly/xb79075.  Prostate cancer screening test: If you have a prostate and are age 55 to 69, ask your provider if you would benefit from a yearly prostate cancer screening test.  Lung cancer screening: If you are a current or former smoker age 50 to 80, ask your care team if ongoing lung cancer screenings are right for you.  For informational purposes only. Not to replace the  advice of your health care provider. Copyright   2023 [facility]. All rights reserved. Clinically reviewed by the [facility] Transitions Program. [employer] [id number] - REV 01/24.

## 2024-02-28 NOTE — PROGRESS NOTES
Preventive Care Visit  [facility] ADRIANA Chandra MD, Internal Medicine  Feb 28, 2024        Assessment and Plan  1. Routine general medical examination at a health care facility    Last seen patient in 2/20/2023 for annual physical at the time, she is here for the same today.  Does have risk factors of hypothyroidism on levothyroxine, history of right breast cancer, prediabetes on metformin and dyslipidemia on simvastatin.      Given patient's chronic manage the patient's currently on Cymbalta 60 mg daily.  Which is also helping and patient chronic back pains, MRI lumbar spine was normal.  Does have muscle relaxer for the same.    - Lipid panel reflex to direct LDL Non-fasting; Future  - REVIEW OF HEALTH MAINTENANCE PROTOCOL ORDERS  - ZOSTER RECOMBINANT ADJUVANTED (SHINGRIX)  - DULoxetine (CYMBALTA) 60 MG capsule; Take 1 capsule (60 mg) by mouth daily  Dispense: 180 capsule; Refill: 1  - TSH with free T4 reflex; Future  - Hemoglobin A1c; Future  - Vitamin D deficiency screening; Future  - Vitamin B12; Future  - Iron and iron binding capacity; Future  - Lipid panel reflex to direct LDL Non-fasting  - TSH with free T4 reflex  - Hemoglobin A1c  - Vitamin D deficiency screening  - Vitamin B12  - Iron and iron binding capacity    2. Temporomandibular joint disorder  New problem, patient states that she has seen her dentist who has mentioned that this current symptoms are not related to her teeth issues.  Does have ongoing right jaw pain as well as tenderness on the temporal region as she moves her jaw.  Physical exam positive for painful movements on the right side temporomandibular joint movements.  -Differential diagnosis of TMJ disorder, discussed on muscle relaxer as well as physical therapy for improvement.  Patient understood and agreed with the plan.  - tiZANidine (ZANAFLEX) 6 MG capsule; Take 1 capsule (6 mg) by mouth nightly as needed (for jaw pain and upper back pain)  Dispense:  30 capsule; Refill: 1  - Physical Therapy  Referral; Future    3. Upper back pain on right side  New problem, possible overlap of part of her fibromyalgia pains which she always has the myalgias since remote past.  Supposed to be on Cymbalta as prescribed in the past but patient states that she was out of refills and never made a follow-up visit as instructed in her last physical with me.  Shared decision to restart the Cymbalta as well as continue to use Zanaflex for improvement.  Physical therapy included in this.  - DULoxetine (CYMBALTA) 60 MG capsule; Take 1 capsule (60 mg) by mouth daily  Dispense: 180 capsule; Refill: 1  - tiZANidine (ZANAFLEX) 6 MG capsule; Take 1 capsule (6 mg) by mouth nightly as needed (for jaw pain and upper back pain)  Dispense: 30 capsule; Refill: 1  - Physical Therapy  Referral; Future    4. Myalgia w hx of recurrence since 2012  - DULoxetine (CYMBALTA) 60 MG capsule; Take 1 capsule (60 mg) by mouth daily  Dispense: 180 capsule; Refill: 1  - Vitamin B12; Future  - Iron and iron binding capacity; Future  - tiZANidine (ZANAFLEX) 6 MG capsule; Take 1 capsule (6 mg) by mouth nightly as needed (for jaw pain and upper back pain)  Dispense: 30 capsule; Refill: 1  - Vitamin B12  - Iron and iron binding capacity    5. Mixed hyperlipidemia  Uncontrolled , inspite of current Simvastatin 20 mg daily . Will change to Atorvastatin for improvement.   - Lipid panel reflex to direct LDL Non-fasting; Future  - Lipid panel reflex to direct LDL Non-fasting  - atorvastatin (LIPITOR) 40 MG tablet; Take 1 tablet (40 mg) by mouth daily  Dispense: 90 tablet; Refill: 3    10. Iron deficiency  New diagnosis with low iron saturation. Will start on supplements for improvement.   - ferrous sulfate (FE TABS) 325 (65 Fe) MG EC tablet; Take 1 tablet (325 mg) by mouth daily  Dispense: 90 tablet; Refill: 1    6. Acquired hypothyroidism  Uncontrolled, will adjust the dose of Levothyroxine and placed  future TSH check in 5 weeks.   - TSH with free T4 reflex; Future  - levothyroxine (SYNTHROID/LEVOTHROID) 100 MCG tablet; Take 1 tablet every day Dispense: 94 tablet; Refill: 3  - TSH with free T4 reflex    7. Vitamin D insufficiency  - Vitamin D deficiency screening; Future  - Vitamin D deficiency screening    8. Prediabetes  - Hemoglobin A1c; Future  - Hemoglobin A1c    9. Need for vaccination  - ZOSTER RECOMBINANT ADJUVANTED (SHINGRIX)         Please note that this note consists of symbols derived from keyboarding, dictation and/or voice recognition software. As a result, there may be errors in the script that have gone undetected. Please consider this when interpreting information found in this chart.    Patient Instructions   As discussed, please do fasting labs placed.     Refills done for medications.     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     =========================    Preventive Care Advice   This is general advice given by our system to help you stay healthy. However, your care team may have specific advice just for you. Please talk to your care team about your preventive care needs.  Nutrition  Eat 5 or more servings of fruits and vegetables each day.  Try wheat bread, brown rice and whole grain pasta (instead of white bread, rice, and pasta).  Get enough calcium and vitamin D. Check the label on foods and aim for 100% of the RDA (recommended daily allowance).  Lifestyle  Exercise at least 150 minutes each week   (30 minutes a day, 5 days a week).  Do muscle strengthening activities 2 days a week. These help control your weight and prevent disease.  No smoking.  Wear sunscreen to prevent skin cancer.  Have a dental exam and cleaning every 6 months.  Yearly exams  See your health care team every year to talk about:  Any changes in your health.  Any medicines your care team has  prescribed.  Preventive care, family planning, and ways to prevent chronic diseases.  Shots (vaccines)   HPV shots (up to age 26), if you've never had them before.  Hepatitis B shots (up to age 59), if you've never had them before.  COVID-19 shot: Get this shot when it's due.  Flu shot: Get a flu shot every year.  Tetanus shot: Get a tetanus shot every 10 years.  Pneumococcal, hepatitis A, and RSV shots: Ask your care team if you need these based on your risk.  Shingles shot (for age 50 and up).  General health tests  Diabetes screening:  Starting at age 35, Get screened for diabetes at least every 3 years.  If you are younger than age 35, ask your care team if you should be screened for diabetes.  Cholesterol test: At age 39, start having a cholesterol test every 5 years, or more often if advised.  Bone density scan (DEXA): At age 50, ask your care team if you should have this scan for osteoporosis (brittle bones).  Hepatitis C: Get tested at least once in your life.  STIs (sexually transmitted infections)  Before age 24: Ask your care team if you should be screened for STIs.  After age 24: Get screened for STIs if you're at risk. You are at risk for STIs (including HIV) if:  You are sexually active with more than one person.  You don't use condoms every time.  You or a partner was diagnosed with a sexually transmitted infection.  If you are at risk for HIV, ask about PrEP medicine to prevent HIV.  Get tested for HIV at least once in your life, whether you are at risk for HIV or not.  Cancer screening tests  Cervical cancer screening: If you have a cervix, begin getting regular cervical cancer screening tests at age 21. Most people who have regular screenings with normal results can stop after age 65. Talk about this with your provider.  Breast cancer scan (mammogram): If you've ever had breasts, begin having regular mammograms starting at age 40. This is a scan to check for breast cancer.  Colon cancer screening:  It is important to start screening for colon cancer at age 45.  Have a colonoscopy test every 10 years (or more often if you're at risk) Or, ask your provider about stool tests like a FIT test every year or Cologuard test every 3 years.  To learn more about your testing options, visit: https://www.Encover/022152.pdf.  For help making a decision, visit: https://bit.ly/ld41048.  Prostate cancer screening test: If you have a prostate and are age 55 to 69, ask your provider if you would benefit from a yearly prostate cancer screening test.  Lung cancer screening: If you are a current or former smoker age 50 to 80, ask your care team if ongoing lung cancer screenings are right for you.  For informational purposes only. Not to replace the advice of your health care provider. Copyright   2023 [facility]. All rights reserved. Clinically reviewed by the [facility] Transitions Program. MIKESTAR [id number] - REV 01/24.    Return in about 6 months (around 8/28/2024), or if symptoms worsen or fail to improve, for E-Visit for Cymbalta refills .    Melida Chandra MD  [facility] ADRIANA Verma is a 53 year old, presenting for the following:  Physical        2/28/2024     1:40 PM   Additional Questions   Roomed by [facility] Care Directive  Patient does not have a Health Care Directive or Living Will: N/A       HPI              2/22/2023   Nutrition   Three or more servings of calcium each day? Yes   Diet: regular (no restrictions)    breakfast skipped         2/22/2023   Exercise   Frequency of exercise: 2-3 days/week             No data to display                   2/22/2023   Dental   Dentist two times every year? Yes              Today's PHQ-2 Score:       2/22/2023    10:11 AM   PHQ-2 ( 1999 Pf[employer])   Q1: Little interest or pleasure in doing things 0   Q2: Feeling down, depressed or hopeless 0   PHQ-2 Score 0   Q1: Little interest or pleasure in doing  things Not at all   Q2: Feeling down, depressed or hopeless Not at all   PHQ-2 Score 0         2/22/2023   Substance Use   Alcohol more than 3/day or more than 7/wk No     Social History     Tobacco Use    Smoking status: Never    Smokeless tobacco: Never   Vaping Use    Vaping Use: Never used   Substance Use Topics    Alcohol use: No    Drug use: No           9/20/2023   LAST FHS-7 RESULTS   1st degree relative breast or ovarian cancer No   Any relative bilateral breast cancer No   Any male have breast cancer No   Any ONE woman have BOTH breast AND ovarian cancer No   Any woman with breast cancer before 50yrs Yes   2 or more relatives with breast AND/OR ovarian cancer No   2 or more relatives with breast AND/OR bowel cancer No        Mammogram Screening - Annual screen due to greater than 20% lifetime risk as estimated by Breast Cancer Risk Calculator      History of abnormal Pap smear: NO - age 30-65 PAP every 5 years with negative HPV co-testing recommended        Latest Ref Rng & Units 12/3/2020     4:28 PM 12/3/2020     3:55 PM 8/25/2017    12:21 PM   PAP / HPV   PAP (Historical)   NIL     HPV 16 DNA NEG^Negative Negative   Negative    HPV 18 DNA NEG^Negative Negative   Negative    Other HR HPV NEG^Negative Negative   Negative      ASCVD Risk   The 10-year ASCVD risk score (Dana ROSENBAUM, et al., 2019) is: 3.6%    Values used to calculate the score:      Age: 53 years      Sex: Female      Is Non- : Yes      Diabetic: No      Tobacco smoker: No      Systolic Blood Pressure: 145 mmHg      Is BP treated: No      HDL Cholesterol: 61 mg/dL      Total Cholesterol: 216 mg/dL      Reviewed and updated as needed this visit by Provider   Tobacco  Allergies  Meds  Problems  Med Hx  Surg Hx  Fam Hx            Past Medical History:   Diagnosis Date    Cancer (H)     breast    Diabetes (H)     pre-dm    High cholesterol     Hypertension goal BP (blood pressure) < 140/80 2/18/2014     Lateral epicondylitis, right dominant elbow since late 10-17 2017    Migraine     Port-A-Cath in place 10/26/2018    Thyroid disease      Past Surgical History:   Procedure Laterality Date    BIOPSY NODE SENTINEL Right 5/15/2019    Procedure: BIOPSY, LYMPH NODE, SENTINEL;  Surgeon: Stacey Ashford MD;  Location:  OR    COLONOSCOPY N/A 2022    Procedure: COLONOSCOPY;  Surgeon: Zac Arriola MD;  Location:  GI    DISSECT LYMPH NODE AXILLA Right 5/15/2019    Procedure: LYMPHADENECTOMY, AXILLARY;  Surgeon: Stacey Ashford MD;  Location:  OR    HAND SURGERY  2002    left    INSERT PORT VASCULAR ACCESS N/A 10/18/2018    Procedure: INSERTION OF VASCULAR PORT;  Surgeon: Stacey Ashford MD;  Location:  OR    LUMPECTOMY BREAST WITH SEED LOCALIZATION Right 5/15/2019    Procedure: SEED LOCALIZATION BREAST LUMPECTOMY, SEED LOCALIZATION AXILLARY BREAST BIOPSY, SENTINEL NODE , REMOVAL OF VASCULAR PORT ACCESS AND RIGHT  AXILLARY NODE DISSECTION;  Surgeon: Stacey Ashford MD;  Location:  OR    REMOVE PORT VASCULAR ACCESS N/A 5/15/2019    Procedure: REMOVAL, VASCULAR ACCESS PORT;  Surgeon: Stacey Ashford MD;  Location:  OR    TUBAL LIGATION       OB History    Para Term  AB Living   3 3 0 0 0 0   SAB IAB Ectopic Multiple Live Births   0 0 0 0 0      # Outcome Date GA Lbr Murtaza/2nd Weight Sex Delivery Anes PTL Lv   3 Para            2 Para            1 Para              Lab work is in process  Labs reviewed in EPIC  BP Readings from Last 3 Encounters:   24 (!) 145/80   24 (!) 168/86   23 136/86    Wt Readings from Last 3 Encounters:   24 90.7 kg (200 lb)   23 92.4 kg (203 lb 9.6 oz)   05/10/23 89 kg (196 lb 3.2 oz)                  Patient Active Problem List   Diagnosis    Myalgia and myositis    Gastroesophageal reflux disease without esophagitis    Helicobacter pylori infection    Acquired hypothyroidism    Disorder of metabolism    Polycystic  ovaries    Vitamin D insufficiency    BMI 35    Rosacea    Absence of menstruation    Chronic left-sided low back pain with left-sided sciatica    Pinguecula of both eyes    Presbyopia    Impaired fasting glucose    Migraine without aura and without status migrainosus, not intractable    Class 1 obesity due to excess calories with serious comorbidity and body mass index (BMI) of 31.0 to 31.9 in adult    Myalgia w hx of recurrence since 2012    Mixed hyperlipidemia    Malignant neoplasm of upper-outer quadrant of right breast in female, estrogen receptor negative (H)    Drug or medicinal substance causing adverse effect in therapeutic use, initial encounter    Abnormal electrocardiogram    Port-A-Cath in place    Breast cancer (H)    Monoallelic mutation of PALB2 gene    Acute bilateral low back pain with right-sided sciatica    Discomfort of right ear    Breast pain    Dyslipidemia    Prediabetes     Past Surgical History:   Procedure Laterality Date    BIOPSY NODE SENTINEL Right 5/15/2019    Procedure: BIOPSY, LYMPH NODE, SENTINEL;  Surgeon: Stacey Ashford MD;  Location:  OR    COLONOSCOPY N/A 1/26/2022    Procedure: COLONOSCOPY;  Surgeon: Zac Arriola MD;  Location:  GI    DISSECT LYMPH NODE AXILLA Right 5/15/2019    Procedure: LYMPHADENECTOMY, AXILLARY;  Surgeon: Stacey Ashford MD;  Location:  OR    HAND SURGERY  2002    left    INSERT PORT VASCULAR ACCESS N/A 10/18/2018    Procedure: INSERTION OF VASCULAR PORT;  Surgeon: Stacey Ashford MD;  Location:  OR    LUMPECTOMY BREAST WITH SEED LOCALIZATION Right 5/15/2019    Procedure: SEED LOCALIZATION BREAST LUMPECTOMY, SEED LOCALIZATION AXILLARY BREAST BIOPSY, SENTINEL NODE , REMOVAL OF VASCULAR PORT ACCESS AND RIGHT  AXILLARY NODE DISSECTION;  Surgeon: Stacey Ashford MD;  Location:  OR    REMOVE PORT VASCULAR ACCESS N/A 5/15/2019    Procedure: REMOVAL, VASCULAR ACCESS PORT;  Surgeon: Stacey Ashford MD;  Location:  OR    TUBAL  LIGATION         Social History     Tobacco Use    Smoking status: Never    Smokeless tobacco: Never   Substance Use Topics    Alcohol use: No     Family History   Problem Relation Age of Onset    Diabetes Mother     Unknown/Adopted Father     Coronary Artery Disease No family hx of     Hypertension No family hx of     Hyperlipidemia No family hx of     Cerebrovascular Disease No family hx of     Breast Cancer No family hx of     Colon Cancer No family hx of     Prostate Cancer No family hx of     Other Cancer No family hx of     Depression No family hx of     Anxiety Disorder No family hx of     Mental Illness No family hx of     Substance Abuse No family hx of     Anesthesia Reaction No family hx of     Asthma No family hx of     Osteoporosis No family hx of     Genetic Disorder No family hx of     Thyroid Disease No family hx of     Obesity No family hx of          Current Outpatient Medications   Medication Sig Dispense Refill    ascorbic acid (VITAMIN C) 1000 MG TABS Take 1,000 mg by mouth daily      aspirin 81 MG EC tablet Take 1 tablet (81 mg) by mouth daily 90 tablet 3    DULoxetine (CYMBALTA) 60 MG capsule Take 1 capsule (60 mg) by mouth daily 180 capsule 1    levothyroxine (SYNTHROID/LEVOTHROID) 100 MCG tablet Take 1 tablet every day and 1.5 tablets on Saturdays and Sundays 94 tablet 3    metFORMIN (GLUCOPHAGE XR) 500 MG 24 hr tablet TAKE 1 TABLET(500 MG) BY MOUTH DAILY WITH DINNER 90 tablet 0    pantoprazole (PROTONIX) 20 MG EC tablet Take 2 tablets (40 mg) by mouth 2 times daily 360 tablet 3    simvastatin (ZOCOR) 20 MG tablet Take 1 tablet (20 mg) by mouth At Bedtime 90 tablet 1    tiZANidine (ZANAFLEX) 6 MG capsule Take 1 capsule (6 mg) by mouth nightly as needed (for jaw pain and upper back pain) 30 capsule 1     Allergies   Allergen Reactions    Clindamycin      Pt gets GI symptoms, gastritis .and lower extremity numbness     Recent Labs   Lab Test 02/28/24  1550 02/20/24  1529 05/02/23  1444  "02/22/23  1144 09/14/22  0930 05/11/22  0844 02/16/22  1101 12/15/21  1433 11/16/21  1525 04/07/21  1204 02/19/21  1604 12/03/20  1606 10/15/20  1232   A1C 5.8*  --   --  5.6 5.6  --   --  5.7*   < >  --   --   --   --    LDL  --   --   --   --  137*  --   --  132*  --   --   --  114*  --    HDL  --   --   --   --  61  --   --  65  --   --   --  64  --    TRIG  --   --   --   --  90  --   --  87  --   --   --  80  --    ALT  --   --   --  25 26 25  --   --    < > 28  --   --  35   CR  --  0.75  --  0.82 0.78 0.69  --   --    < > 0.75  --   --  0.68   GFRESTIMATED  --  >90  --  86 >90 >90  --   --    < > >90  --   --  >90   GFRESTBLACK  --   --   --   --   --   --   --   --   --  >90  --   --  >90   POTASSIUM  --  4.1  --  4.2 4.6 4.1  --   --    < > 4.0  --   --  4.4   TSH  --   --  6.26* 20.40*  --   --    < >  --   --   --    < > <0.01* <0.01*    < > = values in this interval not displayed.          Review of Systems  Constitutional, HEENT, cardiovascular, pulmonary, GI, , musculoskeletal, neuro, skin, endocrine and psych systems are negative, except as otherwise noted.     Objective    Exam  BP (!) 145/80   Pulse 68   Temp 97.1  F (36.2  C) (Temporal)   Resp 16   Ht 1.613 m (5' 3.5\")   Wt 90.7 kg (200 lb)   SpO2 100%   BMI 34.87 kg/m     Estimated body mass index is 34.87 kg/m  as calculated from the following:    Height as of this encounter: 1.613 m (5' 3.5\").    Weight as of this encounter: 90.7 kg (200 lb).    Physical Exam  GENERAL: alert and no distress  EYES: Eyes grossly normal to inspection, PERRL and conjunctivae and sclerae normal  HENT: ear canals and TM's normal, nose and mouth without ulcers or lesions  JAW EXAM : Positive for painful movements on the right side temporomandibular joint movements.  NECK: no adenopathy, no asymmetry, masses, or scars  RESP: lungs clear to auscultation - no rales, rhonchi or wheezes  BREAST: normal without masses, tenderness or nipple discharge and no palpable " axillary masses or adenopathy + right lumpectomy scars well-healed with subjective mild sensation of discomfort on the scars but no acute signs of cellulitis per my exam.  Patient to try over-the-counter ibuprofen.  CV: regular rate and rhythm, normal S1 S2, no S3 or S4, no murmur, click or rub, no peripheral edema  ABDOMEN: soft, nontender, no hepatosplenomegaly, no masses and bowel sounds normal  MS: no gross musculoskeletal defects noted, no edema  SKIN: no suspicious lesions or rashes  NEURO: Normal strength and tone, mentation intact and speech normal  PSYCH: mentation appears normal, affect normal/bright      Signed Electronically by: Melida Chandra MD

## 2024-02-29 LAB
CHOLEST SERPL-MCNC: 198 MG/DL
FASTING STATUS PATIENT QL REPORTED: YES
HDLC SERPL-MCNC: 60 MG/DL
IRON BINDING CAPACITY (ROCHE): 317 UG/DL (ref 240–430)
IRON SATN MFR SERPL: 12 % (ref 15–46)
IRON SERPL-MCNC: 39 UG/DL (ref 37–145)
LDLC SERPL CALC-MCNC: 123 MG/DL
NONHDLC SERPL-MCNC: 138 MG/DL
T4 FREE SERPL-MCNC: 1.8 NG/DL (ref 0.9–1.7)
TRIGL SERPL-MCNC: 77 MG/DL
TSH SERPL DL<=0.005 MIU/L-ACNC: 0.02 UIU/ML (ref 0.3–4.2)
VIT B12 SERPL-MCNC: 394 PG/ML (ref 232–1245)
VIT D+METAB SERPL-MCNC: 21 NG/ML (ref 20–50)

## 2024-03-04 RX ORDER — FERROUS SULFATE 325(65) MG
325 TABLET, DELAYED RELEASE (ENTERIC COATED) ORAL DAILY
Qty: 90 TABLET | Refills: 1 | Status: SHIPPED | OUTPATIENT
Start: 2024-03-04

## 2024-03-04 RX ORDER — LEVOTHYROXINE SODIUM 100 UG/1
TABLET ORAL
Qty: 94 TABLET | Refills: 3 | Status: SHIPPED | OUTPATIENT
Start: 2024-03-04 | End: 2024-03-04

## 2024-03-04 RX ORDER — ATORVASTATIN CALCIUM 40 MG/1
40 TABLET, FILM COATED ORAL DAILY
Qty: 90 TABLET | Refills: 3 | Status: SHIPPED | OUTPATIENT
Start: 2024-03-04

## 2024-03-04 RX ORDER — LEVOTHYROXINE SODIUM 100 UG/1
TABLET ORAL
Qty: 94 TABLET | Refills: 3 | Status: SHIPPED | OUTPATIENT
Start: 2024-03-04

## 2024-04-24 ENCOUNTER — ANCILLARY PROCEDURE (OUTPATIENT)
Dept: MRI IMAGING | Facility: CLINIC | Age: 54
End: 2024-04-24
Attending: INTERNAL MEDICINE
Payer: COMMERCIAL

## 2024-04-24 DIAGNOSIS — Z15.89 MONOALLELIC MUTATION OF PALB2 GENE: ICD-10-CM

## 2024-04-24 DIAGNOSIS — Z15.01 MONOALLELIC MUTATION OF PALB2 GENE: ICD-10-CM

## 2024-04-24 DIAGNOSIS — Z15.09 MONOALLELIC MUTATION OF PALB2 GENE: ICD-10-CM

## 2024-04-24 DIAGNOSIS — Z91.89 AT RISK FOR BREAST CANCER: ICD-10-CM

## 2024-04-24 DIAGNOSIS — C50.411 MALIGNANT NEOPLASM OF UPPER-OUTER QUADRANT OF RIGHT BREAST IN FEMALE, ESTROGEN RECEPTOR NEGATIVE (H): ICD-10-CM

## 2024-04-24 DIAGNOSIS — Z17.1 MALIGNANT NEOPLASM OF UPPER-OUTER QUADRANT OF RIGHT BREAST IN FEMALE, ESTROGEN RECEPTOR NEGATIVE (H): ICD-10-CM

## 2024-04-24 PROCEDURE — A9585 GADOBUTROL INJECTION: HCPCS | Performed by: INTERNAL MEDICINE

## 2024-04-24 PROCEDURE — 77049 MRI BREAST C-+ W/CAD BI: CPT

## 2024-04-24 PROCEDURE — 255N000002 HC RX 255 OP 636: Performed by: INTERNAL MEDICINE

## 2024-04-24 RX ORDER — GADOBUTROL 604.72 MG/ML
10 INJECTION INTRAVENOUS ONCE
Status: COMPLETED | OUTPATIENT
Start: 2024-04-24 | End: 2024-04-24

## 2024-04-24 RX ADMIN — GADOBUTROL 10 ML: 604.72 INJECTION INTRAVENOUS at 13:16

## 2024-05-01 ENCOUNTER — ONCOLOGY VISIT (OUTPATIENT)
Dept: ONCOLOGY | Facility: CLINIC | Age: 54
End: 2024-05-01
Attending: INTERNAL MEDICINE
Payer: COMMERCIAL

## 2024-05-01 ENCOUNTER — PATIENT OUTREACH (OUTPATIENT)
Dept: CARE COORDINATION | Facility: CLINIC | Age: 54
End: 2024-05-01
Payer: COMMERCIAL

## 2024-05-01 VITALS
RESPIRATION RATE: 16 BRPM | OXYGEN SATURATION: 99 % | HEART RATE: 81 BPM | BODY MASS INDEX: 34.87 KG/M2 | DIASTOLIC BLOOD PRESSURE: 84 MMHG | SYSTOLIC BLOOD PRESSURE: 131 MMHG | WEIGHT: 200 LBS

## 2024-05-01 DIAGNOSIS — Z17.1 MALIGNANT NEOPLASM OF UPPER-OUTER QUADRANT OF RIGHT BREAST IN FEMALE, ESTROGEN RECEPTOR NEGATIVE (H): Primary | ICD-10-CM

## 2024-05-01 DIAGNOSIS — Z15.89 MONOALLELIC MUTATION OF PALB2 GENE: ICD-10-CM

## 2024-05-01 DIAGNOSIS — C50.411 MALIGNANT NEOPLASM OF UPPER-OUTER QUADRANT OF RIGHT BREAST IN FEMALE, ESTROGEN RECEPTOR NEGATIVE (H): Primary | ICD-10-CM

## 2024-05-01 DIAGNOSIS — Z15.01 MONOALLELIC MUTATION OF PALB2 GENE: ICD-10-CM

## 2024-05-01 DIAGNOSIS — Z15.09 MONOALLELIC MUTATION OF PALB2 GENE: ICD-10-CM

## 2024-05-01 DIAGNOSIS — Z71.89 COORDINATION OF COMPLEX CARE: Primary | ICD-10-CM

## 2024-05-01 PROCEDURE — G0463 HOSPITAL OUTPT CLINIC VISIT: HCPCS | Performed by: INTERNAL MEDICINE

## 2024-05-01 PROCEDURE — 99214 OFFICE O/P EST MOD 30 MIN: CPT | Performed by: INTERNAL MEDICINE

## 2024-05-01 ASSESSMENT — PAIN SCALES - GENERAL: PAINLEVEL: MILD PAIN (3)

## 2024-05-01 NOTE — PROGRESS NOTES
Social Work - Follow-Up  [facility]    Data/Intervention:    Patient Name: Luci JORDAN Alert Goes By: Luci    /Age: 1970 (53 year old)    Reason for Follow-Up:  Psychosocial check-in at follow-up visit    Intervention/Education/Resources Provided:  Provided safe place for Luci to voice emotional distress over recent death of mother (whom she cared for prior to end-of-life), and 18-year-old cousin who was found dead. Luci plans to re-enter work next week after taking leave. Emotional support provided.     Luci continues to adjust well from cancer perspective. Denies concern or need at present.     Assessment/Plan:  Previously provided patient/family with writer's contact information and availability.      Mirtha Saenz, MIRA, LICSW, OSW-C  Clinical - Adult Oncology  Phone: [phone]  She/Her/Hers  [facility]: Ashlyn PAIGE  8am-4:30pm  [facility]: TAY Christian F 8am-4:30pm   Support Groups at [facility]: Social Work Services for Cancer Patients ([email or web address])

## 2024-05-01 NOTE — LETTER
5/1/2024         RE: Luci Londono  [address]  [facility] [address]        Dear Colleague,    Thank you for referring your patient, Luci C Alert, to the [facility]. Please see a copy of my visit note below.       ONCOLOGIC HISTORY:  Ms. Londono is a female with triple negative right breast cancer.   -PALB2  mutation.    1.  Bilateral diagnostic mammogram and right breast ultrasound on 10/09/2018 revealed an 8 mm hypoechoic mass at the 12 o'clock position and abnormal right axillary lymph node.     2.  Ultrasound-guided right breast and right axillary lymph node biopsy was done on 10/12/2018.    -Right breast biopsy revealed invasive carcinoma, grade 3.  There was DCIS.    -Right axillary lymph node biopsy revealed metastatic adenocarcinoma.  -ER negative, NE negative and HER-2/elton negative.   -Clinical T1b N1 M0.     3.  Patient received neoadjuvant chemotherapy with dose-dense Adriamycin and Cytoxan followed by weekly carboplatin and Taxol between 10/31/2018 and 04/03/2019.      4.  Patient underwent right breast lumpectomy and sentinel lymph node biopsy on 05/15/2019.  There was a 1.6 mm metastatic carcinoma in one of the lymph nodes.  There was no residual disease in the breast.  ypT0 ypN1mi M0 disease.   -The patient received adjuvant oral Xeloda.   -The patient received radiation to right breast and right supraclavicular region between 06/27/2019 and 08/13/2019. 5940 cGy in 33 fractions.     5.  She has PALB2  mutation.  The patient followed up in Genetics clinic.  She is getting alternating mammogram and breast MRI. Does not want prophylactic bilateral mastectomy.    6.  Brain MRI on 02/20/2024 does not reveal any brain metastasis.     SUBJECTIVE:    Ms. Londono is a 53-year-old female with triple negative right breast cancer diagnosed in 2018 status post   neoadjuvant chemotherapy, surgery, adjuvant xeloda and radiation.  She is in complete remission.     Patient has PALB2  mutation.  She does not want prophylactic bilateral mastectomy.  She has been getting alternating mammograms and MRI.  MRI breast on 04/24/2024 did not reveal and evidence of malignancy.    She has some pain in the right armpit and right breast.  She also gets pain in the right shoulder and right scapular area.  These pain have been chronic.  Sometimes it gets worse and then improves.     She also has chronic lower back pain and sometimes upper back pain.  Pain is mild.  MRI lumbar spine on 12/22/2022 is negative for malignancy.  There is facet arthropathy.    She gets occasional headache.  No dizziness.  No shortness of breath.  No abdominal pain.  No nausea or vomiting.  No urinary or bowel complaints.  No bleeding.  No fever or night sweats. All other review of systems negative.     PHYSICAL EXAMINATION:   GENERAL:  Alert and oriented x 3.   VITAL SIGNS:  Reviewed.  ECOG PS of 0.     EYES:  No icterus.   NECK:  Supple. No lymphadenopathy.    AXILLAE:  No lymphadenopathy.   LUNGS:  Good air entry bilaterally.  No crackles or wheezing.   HEART:  Regular.  No murmur.   GI:  Abdomen is soft.  Nontender. No mass.   EXTREMITIES:  No pedal edema.  No calf swelling or tenderness.  -Right shoulder/scapular area examined.  No swelling, redness or tenderness.  SKIN:  No rash.   BREASTS:  Examined in the presence of GIANCARLO Rodriguez.  -Right breast exam does not reveal any mass or skin lesion. There is mild tenderness.   -Left breast exam does not reveal any mass or skin lesion.  No area of tenderness.     ASSESSMENT:    1.  A 53-year-old female with triple negative right breast cancer diagnosed in 2018.  No evidence of recurrence.  2.  PALB2 mutation.  3.  Right shoulder/right breast/right axillary/right scapular pain. This started after right breast surgery. This is post surgical pain. Some of the pain could also be due to arthritis/rotator cuff problem.     PLAN:   -Follow-up in 1 year.  -Mammogram in 5 to 6 months.  -MRI  breast in 1 year.    DISCUSSION:  1.  Explained to the patient that she is doing well from breast cancer.  No evidence of recurrence of malignancy.  MRI reviewed.  It does not reveal any evidence of malignancy.    It is about 5 and half years since the diagnosis of breast cancer.  Risk of recurrence is very low.    For breast cancer follow-up, I will see her once a year.  No routine labs.    2.  Patient has PALB2 mutation.  She is at increased risk for breast cancer.  She does not want prophylactic bilateral mastectomy.  We will continue to monitor her with alternating mammogram and MRI every 6 months.  She will get breast MRI in 6 months.     3.  She has chronic pain in right shoulder, right breast, right armpit and right scapula.  This is postsurgical pain.  Some of it is from arthritis/rotator cuff problem.  She will continue with over-the-counter pain medication as needed.  She will continue with exercises recommended by physical therapist.     4.  She had few questions which were all answered. I will see her in 1 year.  Advised her to call us with any problems.     Total visit time of 30 minutes.  Time spent in today's visit, review of chart/investigations today and documentation.          Again, thank you for allowing me to participate in the care of your patient.        Sincerely,        Ailyn Humphrey MD

## 2024-05-31 NOTE — NURSING NOTE
"Chief Complaint   Patient presents with     Breast Exam     go over results     Elbow Pain     right       Initial /74  Pulse 67  Temp 97.5  F (36.4  C) (Tympanic)  Resp 16  Wt 191 lb 8 oz (86.9 kg)  LMP 11/14/2017 (Approximate)  SpO2 99%  BMI 33.92 kg/m2 Estimated body mass index is 33.92 kg/(m^2) as calculated from the following:    Height as of 10/31/17: 5' 3\" (1.6 m).    Weight as of this encounter: 191 lb 8 oz (86.9 kg).  Medication Reconciliation: complete   .Rema SAVAGE      " Cosentyx Pregnancy And Lactation Text: This medication is Pregnancy Category B and is considered safe during pregnancy. It is unknown if this medication is excreted in breast milk. Zyclara Pregnancy And Lactation Text: This medication is Pregnancy Category C. It is unknown if this medication is excreted in breast milk. Minocycline Counseling: Patient advised regarding possible photosensitivity and discoloration of the teeth, skin, lips, tongue and gums.  Patient instructed to avoid sunlight, if possible.  When exposed to sunlight, patients should wear protective clothing, sunglasses, and sunscreen.  The patient was instructed to call the office immediately if the following severe adverse effects occur:  hearing changes, easy bruising/bleeding, severe headache, or vision changes.  The patient verbalized understanding of the proper use and possible adverse effects of minocycline.  All of the patient's questions and concerns were addressed. Dapsone Pregnancy And Lactation Text: This medication is Pregnancy Category C and is not considered safe during pregnancy or breast feeding. Skyrizi Pregnancy And Lactation Text: The risk during pregnancy and breastfeeding is uncertain with this medication. Birth Control Pills Pregnancy And Lactation Text: This medication should be avoided if pregnant and for the first 30 days post-partum. Nsaids Counseling: NSAID Counseling: I discussed with the patient that NSAIDs should be taken with food. Prolonged use of NSAIDs can result in the development of stomach ulcers.  Patient advised to stop taking NSAIDs if abdominal pain occurs.  The patient verbalized understanding of the proper use and possible adverse effects of NSAIDs.  All of the patient's questions and concerns were addressed. Solaraze Counseling:  I discussed with the patient the risks of Solaraze including but not limited to erythema, scaling, itching, weeping, crusting, and pain. Methotrexate Counseling:  Patient counseled regarding adverse effects of methotrexate including but not limited to nausea, vomiting, abnormalities in liver function tests. Patients may develop mouth sores, rash, diarrhea, and abnormalities in blood counts. The patient understands that monitoring is required including LFT's and blood counts.  There is a rare possibility of scarring of the liver and lung problems that can occur when taking methotrexate. Persistent nausea, loss of appetite, pale stools, dark urine, cough, and shortness of breath should be reported immediately. Patient advised to discontinue methotrexate treatment at least three months before attempting to become pregnant.  I discussed the need for folate supplements while taking methotrexate.  These supplements can decrease side effects during methotrexate treatment. The patient verbalized understanding of the proper use and possible adverse effects of methotrexate.  All of the patient's questions and concerns were addressed. Ivermectin Pregnancy And Lactation Text: This medication is Pregnancy Category C and it isn't known if it is safe during pregnancy. It is also excreted in breast milk. Cephalexin Pregnancy And Lactation Text: This medication is Pregnancy Category B and considered safe during pregnancy.  It is also excreted in breast milk but can be used safely for shorter doses. Isotretinoin Counseling: Patient should get monthly blood tests, not donate blood, not drive at night if vision affected, not share medication, and not undergo elective surgery for 6 months after tx completed. Side effects reviewed, pt to contact office should one occur. Griseofulvin Pregnancy And Lactation Text: This medication is Pregnancy Category X and is known to cause serious birth defects. It is unknown if this medication is excreted in breast milk but breast feeding should be avoided. 5-Fu Counseling: 5-Fluorouracil Counseling:  I discussed with the patient the risks of 5-fluorouracil including but not limited to erythema, scaling, itching, weeping, crusting, and pain. Hydroquinone Pregnancy And Lactation Text: This medication has not been assigned a Pregnancy Risk Category but animal studies failed to show danger with the topical medication. It is unknown if the medication is excreted in breast milk. Cimetidine Pregnancy And Lactation Text: This medication is Pregnancy Category B and is considered safe during pregnancy. It is also excreted in breast milk and breast feeding isn't recommended. Taltz Counseling: I discussed with the patient the risks of ixekizumab including but not limited to immunosuppression, serious infections, worsening of inflammatory bowel disease and drug reactions.  The patient understands that monitoring is required including a PPD at baseline and must alert us or the primary physician if symptoms of infection or other concerning signs are noted. Quinolones Pregnancy And Lactation Text: This medication is Pregnancy Category C and it isn't know if it is safe during pregnancy. It is also excreted in breast milk. Dupixent Counseling: I discussed with the patient the risks of dupilumab including but not limited to eye infection and irritation, cold sores, injection site reactions, worsening of asthma, allergic reactions and increased risk of parasitic infection.  Live vaccines should be avoided while taking dupilumab. Dupilumab will also interact with certain medications such as warfarin and cyclosporine. The patient understands that monitoring is required and they must alert us or the primary physician if symptoms of infection or other concerning signs are noted. Erivedge Counseling- I discussed with the patient the risks of Erivedge including but not limited to nausea, vomiting, diarrhea, constipation, weight loss, changes in the sense of taste, decreased appetite, muscle spasms, and hair loss.  The patient verbalized understanding of the proper use and possible adverse effects of Erivedge.  All of the patient's questions and concerns were addressed. Minocycline Pregnancy And Lactation Text: This medication is Pregnancy Category D and not consider safe during pregnancy. It is also excreted in breast milk. Solaraze Pregnancy And Lactation Text: This medication is Pregnancy Category B and is considered safe. There is some data to suggest avoiding during the third trimester. It is unknown if this medication is excreted in breast milk. Imiquimod Counseling:  I discussed with the patient the risks of imiquimod including but not limited to erythema, scaling, itching, weeping, crusting, and pain.  Patient understands that the inflammatory response to imiquimod is variable from person to person and was educated regarded proper titration schedule.  If flu-like symptoms develop, patient knows to discontinue the medication and contact us. Methotrexate Pregnancy And Lactation Text: This medication is Pregnancy Category X and is known to cause fetal harm. This medication is excreted in breast milk. Nsaids Pregnancy And Lactation Text: These medications are considered safe up to 30 weeks gestation. It is excreted in breast milk. Clindamycin Counseling: I counseled the patient regarding use of clindamycin as an antibiotic for prophylactic and/or therapeutic purposes. Clindamycin is active against numerous classes of bacteria, including skin bacteria. Side effects may include nausea, diarrhea, gastrointestinal upset, rash, hives, yeast infections, and in rare cases, colitis. Azathioprine Counseling:  I discussed with the patient the risks of azathioprine including but not limited to myelosuppression, immunosuppression, hepatotoxicity, lymphoma, and infections.  The patient understands that monitoring is required including baseline LFTs, Creatinine, possible TPMP genotyping and weekly CBCs for the first month and then every 2 weeks thereafter.  The patient verbalized understanding of the proper use and possible adverse effects of azathioprine.  All of the patient's questions and concerns were addressed. Doxepin Counseling:  Patient advised that the medication is sedating and not to drive a car after taking this medication. Patient informed of potential adverse effects including but not limited to dry mouth, urinary retention, and blurry vision.  The patient verbalized understanding of the proper use and possible adverse effects of doxepin.  All of the patient's questions and concerns were addressed. Isotretinoin Pregnancy And Lactation Text: This medication is Pregnancy Category X and is considered extremely dangerous during pregnancy. It is unknown if it is excreted in breast milk. Spironolactone Counseling: Patient advised regarding risks of diarrhea, abdominal pain, hyperkalemia, birth defects (for female patients), liver toxicity and renal toxicity. The patient may need blood work to monitor liver and kidney function and potassium levels while on therapy. The patient verbalized understanding of the proper use and possible adverse effects of spironolactone.  All of the patient's questions and concerns were addressed. Itraconazole Counseling:  I discussed with the patient the risks of itraconazole including but not limited to liver damage, nausea/vomiting, neuropathy, and severe allergy.  The patient understands that this medication is best absorbed when taken with acidic beverages such as non-diet cola or ginger ale.  The patient understands that monitoring is required including baseline LFTs and repeat LFTs at intervals.  The patient understands that they are to contact us or the primary physician if concerning signs are noted. Rituxan Counseling:  I discussed with the patient the risks of Rituxan infusions. Side effects can include infusion reactions, severe drug rashes including mucocutaneous reactions, reactivation of latent hepatitis and other infections and rarely progressive multifocal leukoencephalopathy.  All of the patient's questions and concerns were addressed. 5-Fu Pregnancy And Lactation Text: This medication is Pregnancy Category X and contraindicated in pregnancy and in women who may become pregnant. It is unknown if this medication is excreted in breast milk. Arava Counseling:  Patient counseled regarding adverse effects of Arava including but not limited to nausea, vomiting, abnormalities in liver function tests. Patients may develop mouth sores, rash, diarrhea, and abnormalities in blood counts. The patient understands that monitoring is required including LFTs and blood counts.  There is a rare possibility of scarring of the liver and lung problems that can occur when taking methotrexate. Persistent nausea, loss of appetite, pale stools, dark urine, cough, and shortness of breath should be reported immediately. Patient advised to discontinue Arava treatment and consult with a physician prior to attempting conception. The patient will have to undergo a treatment to eliminate Arava from the body prior to conception. Erivedge Pregnancy And Lactation Text: This medication is Pregnancy Category X and is absolutely contraindicated during pregnancy. It is unknown if it is excreted in breast milk. Dupixent Pregnancy And Lactation Text: This medication likely crosses the placenta but the risk for the fetus is uncertain. This medication is excreted in breast milk. Rifampin Counseling: I discussed with the patient the risks of rifampin including but not limited to liver damage, kidney damage, red-orange body fluids, nausea/vomiting and severe allergy. Tazorac Counseling:  Patient advised that medication is irritating and drying.  Patient may need to apply sparingly and wash off after an hour before eventually leaving it on overnight.  The patient verbalized understanding of the proper use and possible adverse effects of tazorac.  All of the patient's questions and concerns were addressed. Quinolones Counseling:  I discussed with the patient the risks of fluoroquinolones including but not limited to GI upset, allergic reaction, drug rash, diarrhea, dizziness, photosensitivity, yeast infections, liver function test abnormalities, tendonitis/tendon rupture. Prednisone Counseling:  I discussed with the patient the risks of prolonged use of prednisone including but not limited to weight gain, insomnia, osteoporosis, mood changes, diabetes, susceptibility to infection, glaucoma and high blood pressure.  In cases where prednisone use is prolonged, patients should be monitored with blood pressure checks, serum glucose levels and an eye exam.  Additionally, the patient may need to be placed on GI prophylaxis, PCP prophylaxis, and calcium and vitamin D supplementation and/or a bisphosphonate.  The patient verbalized understanding of the proper use and the possible adverse effects of prednisone.  All of the patient's questions and concerns were addressed. Clindamycin Pregnancy And Lactation Text: This medication can be used in pregnancy if certain situations. Clindamycin is also present in breast milk. Topical Retinoid counseling:  Patient advised to apply a pea-sized amount only at bedtime and wait 30 minutes after washing their face before applying.  If too drying, patient may add a non-comedogenic moisturizer. The patient verbalized understanding of the proper use and possible adverse effects of retinoids.  All of the patient's questions and concerns were addressed. Drysol Counseling:  I discussed with the patient the risks of drysol/aluminum chloride including but not limited to skin rash, itching, irritation, burning. Spironolactone Pregnancy And Lactation Text: This medication can cause feminization of the male fetus and should be avoided during pregnancy. The active metabolite is also found in breast milk. Odomzo Counseling- I discussed with the patient the risks of Odomzo including but not limited to nausea, vomiting, diarrhea, constipation, weight loss, changes in the sense of taste, decreased appetite, muscle spasms, and hair loss.  The patient verbalized understanding of the proper use and possible adverse effects of Odomzo.  All of the patient's questions and concerns were addressed. High Dose Vitamin A Counseling: Side effects reviewed, pt to contact office should one occur. Azathioprine Pregnancy And Lactation Text: This medication is Pregnancy Category D and isn't considered safe during pregnancy. It is unknown if this medication is excreted in breast milk. Rituxan Pregnancy And Lactation Text: This medication is Pregnancy Category C and it isn't know if it is safe during pregnancy. It is unknown if this medication is excreted in breast milk but similar antibodies are known to be excreted. Doxepin Pregnancy And Lactation Text: This medication is Pregnancy Category C and it isn't known if it is safe during pregnancy. It is also excreted in breast milk and breast feeding isn't recommended. Rifampin Pregnancy And Lactation Text: This medication is Pregnancy Category C and it isn't know if it is safe during pregnancy. It is also excreted in breast milk and should not be used if you are breast feeding. Enbrel Counseling:  I discussed with the patient the risks of etanercept including but not limited to myelosuppression, immunosuppression, autoimmune hepatitis, demyelinating diseases, lymphoma, and infections.  The patient understands that monitoring is required including a PPD at baseline and must alert us or the primary physician if symptoms of infection or other concerning signs are noted. Gabapentin Counseling: I discussed with the patient the risks of gabapentin including but not limited to dizziness, somnolence, fatigue and ataxia. Tazorac Pregnancy And Lactation Text: This medication is not safe during pregnancy. It is unknown if this medication is excreted in breast milk. Prednisone Pregnancy And Lactation Text: This medication is Pregnancy Category C and it isn't know if it is safe during pregnancy. This medication is excreted in breast milk. Doxycycline Counseling:  Patient counseled regarding possible photosensitivity and increased risk for sunburn.  Patient instructed to avoid sunlight, if possible.  When exposed to sunlight, patients should wear protective clothing, sunglasses, and sunscreen.  The patient was instructed to call the office immediately if the following severe adverse effects occur:  hearing changes, easy bruising/bleeding, severe headache, or vision changes.  The patient verbalized understanding of the proper use and possible adverse effects of doxycycline.  All of the patient's questions and concerns were addressed. Azithromycin Counseling:  I discussed with the patient the risks of azithromycin including but not limited to GI upset, allergic reaction, drug rash, diarrhea, and yeast infections. Ketoconazole Counseling:   Patient counseled regarding improving absorption with orange juice.  Adverse effects include but are not limited to breast enlargement, headache, diarrhea, nausea, upset stomach, liver function test abnormalities, taste disturbance, and stomach pain.  There is a rare possibility of liver failure that can occur when taking ketoconazole. The patient understands that monitoring of LFTs may be required, especially at baseline. The patient verbalized understanding of the proper use and possible adverse effects of ketoconazole.  All of the patient's questions and concerns were addressed. Cellcept Counseling:  I discussed with the patient the risks of mycophenolate mofetil including but not limited to infection/immunosuppression, GI upset, hypokalemia, hypercholesterolemia, bone marrow suppression, lymphoproliferative disorders, malignancy, GI ulceration/bleed/perforation, colitis, interstitial lung disease, kidney failure, progressive multifocal leukoencephalopathy, and birth defects.  The patient understands that monitoring is required including a baseline creatinine and regular CBC testing. In addition, patient must alert us immediately if symptoms of infection or other concerning signs are noted. Hydroxyzine Counseling: Patient advised that the medication is sedating and not to drive a car after taking this medication.  Patient informed of potential adverse effects including but not limited to dry mouth, urinary retention, and blurry vision.  The patient verbalized understanding of the proper use and possible adverse effects of hydroxyzine.  All of the patient's questions and concerns were addressed. Minoxidil Counseling: Minoxidil is a topical medication which can increase blood flow where it is applied. It is uncertain how this medication increases hair growth. Side effects are uncommon and include stinging and allergic reactions. Cimzia Counseling:  I discussed with the patient the risks of Cimzia including but not limited to immunosuppression, allergic reactions and infections.  The patient understands that monitoring is required including a PPD at baseline and must alert us or the primary physician if symptoms of infection or other concerning signs are noted. Drysol Pregnancy And Lactation Text: This medication is considered safe during pregnancy and breast feeding. SSKI Counseling:  I discussed with the patient the risks of SSKI including but not limited to thyroid abnormalities, metallic taste, GI upset, fever, headache, acne, arthralgias, paraesthesias, lymphadenopathy, easy bleeding, arrhythmias, and allergic reaction. Valtrex Counseling: I discussed with the patient the risks of valacyclovir including but not limited to kidney damage, nausea, vomiting and severe allergy.  The patient understands that if the infection seems to be worsening or is not improving, they are to call. High Dose Vitamin A Pregnancy And Lactation Text: High dose vitamin A therapy is contraindicated during pregnancy and breast feeding. Tetracycline Counseling: Patient counseled regarding possible photosensitivity and increased risk for sunburn.  Patient instructed to avoid sunlight, if possible.  When exposed to sunlight, patients should wear protective clothing, sunglasses, and sunscreen.  The patient was instructed to call the office immediately if the following severe adverse effects occur:  hearing changes, easy bruising/bleeding, severe headache, or vision changes.  The patient verbalized understanding of the proper use and possible adverse effects of tetracycline.  All of the patient's questions and concerns were addressed. Patient understands to avoid pregnancy while on therapy due to potential birth defects. Siliq Counseling:  I discussed with the patient the risks of Siliq including but not limited to new or worsening depression, suicidal thoughts and behavior, immunosuppression, malignancy, posterior leukoencephalopathy syndrome, and serious infections.  The patient understands that monitoring is required including a PPD at baseline and must alert us or the primary physician if symptoms of infection or other concerning signs are noted. There is also a special program designed to monitor depression which is required with Siliq. Tremfya Counseling: I discussed with the patient the risks of guselkumab including but not limited to immunosuppression, serious infections, worsening of inflammatory bowel disease and drug reactions.  The patient understands that monitoring is required including a PPD at baseline and must alert us or the primary physician if symptoms of infection or other concerning signs are noted. Clofazimine Counseling:  I discussed with the patient the risks of clofazimine including but not limited to skin and eye pigmentation, liver damage, nausea/vomiting, gastrointestinal bleeding and allergy. Gabapentin Pregnancy And Lactation Text: This medication is Pregnancy Category C and isn't considered safe during pregnancy. It is excreted in breast milk. Acitretin Counseling:  I discussed with the patient the risks of acitretin including but not limited to hair loss, dry lips/skin/eyes, liver damage, hyperlipidemia, depression/suicidal ideation, photosensitivity.  Serious rare side effects can include but are not limited to pancreatitis, pseudotumor cerebri, bony changes, clot formation/stroke/heart attack.  Patient understands that alcohol is contraindicated since it can result in liver toxicity and significantly prolong the elimination of the drug by many years. Topical Clindamycin Counseling: Patient counseled that this medication may cause skin irritation or allergic reactions.  In the event of skin irritation, the patient was advised to reduce the amount of the drug applied or use it less frequently.   The patient verbalized understanding of the proper use and possible adverse effects of clindamycin.  All of the patient's questions and concerns were addressed. Doxycycline Pregnancy And Lactation Text: This medication is Pregnancy Category D and not consider safe during pregnancy. It is also excreted in breast milk but is considered safe for shorter treatment courses. Otezla Counseling: The side effects of Otezla were discussed with the patient, including but not limited to worsening or new depression, weight loss, diarrhea, nausea, upper respiratory tract infection, and headache. Patient instructed to call the office should any adverse effect occur.  The patient verbalized understanding of the proper use and possible adverse effects of Otezla.  All the patient's questions and concerns were addressed. Hydroxyzine Pregnancy And Lactation Text: This medication is not safe during pregnancy and should not be taken. It is also excreted in breast milk and breast feeding isn't recommended. Benzoyl Peroxide Counseling: Patient counseled that medicine may cause skin irritation and bleach clothing.  In the event of skin irritation, the patient was advised to reduce the amount of the drug applied or use it less frequently.   The patient verbalized understanding of the proper use and possible adverse effects of benzoyl peroxide.  All of the patient's questions and concerns were addressed. Cimzia Pregnancy And Lactation Text: This medication crosses the placenta but can be considered safe in certain situations. Cimzia may be excreted in breast milk. Azithromycin Pregnancy And Lactation Text: This medication is considered safe during pregnancy and is also secreted in breast milk. Ketoconazole Pregnancy And Lactation Text: This medication is Pregnancy Category C and it isn't know if it is safe during pregnancy. It is also excreted in breast milk and breast feeding isn't recommended. Elidel Counseling: Patient may experience a mild burning sensation during topical application. Elidel is not approved in children less than 2 years of age. There have been case reports of hematologic and skin malignancies in patients using topical calcineurin inhibitors although causality is questionable. Sski Pregnancy And Lactation Text: This medication is Pregnancy Category D and isn't considered safe during pregnancy. It is excreted in breast milk. Valtrex Pregnancy And Lactation Text: this medication is Pregnancy Category B and is considered safe during pregnancy. This medication is not directly found in breast milk but it's metabolite acyclovir is present. Humira Counseling:  I discussed with the patient the risks of adalimumab including but not limited to myelosuppression, immunosuppression, autoimmune hepatitis, demyelinating diseases, lymphoma, and serious infections.  The patient understands that monitoring is required including a PPD at baseline and must alert us or the primary physician if symptoms of infection or other concerning signs are noted. Topical Clindamycin Pregnancy And Lactation Text: This medication is Pregnancy Category B and is considered safe during pregnancy. It is unknown if it is excreted in breast milk. Erythromycin Counseling:  I discussed with the patient the risks of erythromycin including but not limited to GI upset, allergic reaction, drug rash, diarrhea, increase in liver enzymes, and yeast infections. Picato Counseling:  I discussed with the patient the risks of Picato including but not limited to erythema, scaling, itching, weeping, crusting, and pain. Otezla Pregnancy And Lactation Text: This medication is Pregnancy Category C and it isn't known if it is safe during pregnancy. It is unknown if it is excreted in breast milk. Cyclophosphamide Counseling:  I discussed with the patient the risks of cyclophosphamide including but not limited to hair loss, hormonal abnormalities, decreased fertility, abdominal pain, diarrhea, nausea and vomiting, bone marrow suppression and infection. The patient understands that monitoring is required while taking this medication. Terbinafine Counseling: Patient counseling regarding adverse effects of terbinafine including but not limited to headache, diarrhea, rash, upset stomach, liver function test abnormalities, itching, taste/smell disturbance, nausea, abdominal pain, and flatulence.  There is a rare possibility of liver failure that can occur when taking terbinafine.  The patient understands that a baseline LFT and kidney function test may be required. The patient verbalized understanding of the proper use and possible adverse effects of terbinafine.  All of the patient's questions and concerns were addressed. Cosentyx Counseling:  I discussed with the patient the risks of Cosentyx including but not limited to worsening of Crohn's disease, immunosuppression, allergic reactions and infections.  The patient understands that monitoring is required including a PPD at baseline and must alert us or the primary physician if symptoms of infection or other concerning signs are noted. Glycopyrrolate Counseling:  I discussed with the patient the risks of glycopyrrolate including but not limited to skin rash, drowsiness, dry mouth, difficulty urinating, and blurred vision. Simponi Counseling:  I discussed with the patient the risks of golimumab including but not limited to myelosuppression, immunosuppression, autoimmune hepatitis, demyelinating diseases, lymphoma, and serious infections.  The patient understands that monitoring is required including a PPD at baseline and must alert us or the primary physician if symptoms of infection or other concerning signs are noted. Xeljanz Counseling: I discussed with the patient the risks of Xeljanz therapy including increased risk of infection, liver issues, headache, diarrhea, or cold symptoms. Live vaccines should be avoided. They were instructed to call if they have any problems. Colchicine Counseling:  Patient counseled regarding adverse effects including but not limited to stomach upset (nausea, vomiting, stomach pain, or diarrhea).  Patient instructed to limit alcohol consumption while taking this medication.  Colchicine may reduce blood counts especially with prolonged use.  The patient understands that monitoring of kidney function and blood counts may be required, especially at baseline. The patient verbalized understanding of the proper use and possible adverse effects of colchicine.  All of the patient's questions and concerns were addressed. Topical Sulfur Applications Counseling: Topical Sulfur Counseling: Patient counseled that this medication may cause skin irritation or allergic reactions.  In the event of skin irritation, the patient was advised to reduce the amount of the drug applied or use it less frequently.   The patient verbalized understanding of the proper use and possible adverse effects of topical sulfur application.  All of the patient's questions and concerns were addressed. Detail Level: Generalized Thalidomide Counseling: I discussed with the patient the risks of thalidomide including but not limited to birth defects, anxiety, weakness, chest pain, dizziness, cough and severe allergy. Oxybutynin Counseling:  I discussed with the patient the risks of oxybutynin including but not limited to skin rash, drowsiness, dry mouth, difficulty urinating, and blurred vision. Erythromycin Pregnancy And Lactation Text: This medication is Pregnancy Category B and is considered safe during pregnancy. It is also excreted in breast milk. Glycopyrrolate Pregnancy And Lactation Text: This medication is Pregnancy Category B and is considered safe during pregnancy. It is unknown if it is excreted breast milk. Albendazole Counseling:  I discussed with the patient the risks of albendazole including but not limited to cytopenia, kidney damage, nausea/vomiting and severe allergy.  The patient understands that this medication is being used in an off-label manner. Cyclophosphamide Pregnancy And Lactation Text: This medication is Pregnancy Category D and it isn't considered safe during pregnancy. This medication is excreted in breast milk. Eucrisa Counseling: Patient may experience a mild burning sensation during topical application. Eucrisa is not approved in children less than 2 years of age. Bactrim Counseling:  I discussed with the patient the risks of sulfa antibiotics including but not limited to GI upset, allergic reaction, drug rash, diarrhea, dizziness, photosensitivity, and yeast infections.  Rarely, more serious reactions can occur including but not limited to aplastic anemia, agranulocytosis, methemoglobinemia, blood dyscrasias, liver or kidney failure, lung infiltrates or desquamative/blistering drug rashes. Acitretin Pregnancy And Lactation Text: This medication is Pregnancy Category X and should not be given to women who are pregnant or may become pregnant in the future. This medication is excreted in breast milk. Benzoyl Peroxide Pregnancy And Lactation Text: This medication is Pregnancy Category C. It is unknown if benzoyl peroxide is excreted in breast milk. Fluconazole Counseling:  Patient counseled regarding adverse effects of fluconazole including but not limited to headache, diarrhea, nausea, upset stomach, liver function test abnormalities, taste disturbance, and stomach pain.  There is a rare possibility of liver failure that can occur when taking fluconazole.  The patient understands that monitoring of LFTs and kidney function test may be required, especially at baseline. The patient verbalized understanding of the proper use and possible adverse effects of fluconazole.  All of the patient's questions and concerns were addressed. Xelziaz Pregnancy And Lactation Text: This medication is Pregnancy Category D and is not considered safe during pregnancy.  The risk during breast feeding is also uncertain. Ilumya Counseling: I discussed with the patient the risks of tildrakizumab including but not limited to immunosuppression, malignancy, posterior leukoencephalopathy syndrome, and serious infections.  The patient understands that monitoring is required including a PPD at baseline and must alert us or the primary physician if symptoms of infection or other concerning signs are noted. Topical Sulfur Applications Pregnancy And Lactation Text: This medication is Pregnancy Category C and has an unknown safety profile during pregnancy. It is unknown if this topical medication is excreted in breast milk. Metronidazole Counseling:  I discussed with the patient the risks of metronidazole including but not limited to seizures, nausea/vomiting, a metallic taste in the mouth, nausea/vomiting and severe allergy. Cyclosporine Counseling:  I discussed with the patient the risks of cyclosporine including but not limited to hypertension, gingival hyperplasia,myelosuppression, immunosuppression, liver damage, kidney damage, neurotoxicity, lymphoma, and serious infections. The patient understands that monitoring is required including baseline blood pressure, CBC, CMP, lipid panel and uric acid, and then 1-2 times monthly CMP and blood pressure. Carac Counseling:  I discussed with the patient the risks of Carac including but not limited to erythema, scaling, itching, weeping, crusting, and pain. Bactrim Pregnancy And Lactation Text: This medication is Pregnancy Category D and is known to cause fetal risk.  It is also excreted in breast milk. Hydroxychloroquine Counseling:  I discussed with the patient that a baseline ophthalmologic exam is needed at the start of therapy and every year thereafter while on therapy. A CBC may also be warranted for monitoring.  The side effects of this medication were discussed with the patient, including but not limited to agranulocytosis, aplastic anemia, seizures, rashes, retinopathy, and liver toxicity. Patient instructed to call the office should any adverse effect occur.  The patient verbalized understanding of the proper use and possible adverse effects of Plaquenil.  All the patient's questions and concerns were addressed. Include Pregnancy/Lactation Warning?: No Protopic Counseling: Patient may experience a mild burning sensation during topical application. Protopic is not approved in children less than 2 years of age. There have been case reports of hematologic and skin malignancies in patients using topical calcineurin inhibitors although causality is questionable. Stelara Counseling:  I discussed with the patient the risks of ustekinumab including but not limited to immunosuppression, malignancy, posterior leukoencephalopathy syndrome, and serious infections.  The patient understands that monitoring is required including a PPD at baseline and must alert us or the primary physician if symptoms of infection or other concerning signs are noted. Xolair Counseling:  Patient informed of potential adverse effects including but not limited to fever, muscle aches, rash and allergic reactions.  The patient verbalized understanding of the proper use and possible adverse effects of Xolair.  All of the patient's questions and concerns were addressed. Sarecycline Counseling: Patient advised regarding possible photosensitivity and discoloration of the teeth, skin, lips, tongue and gums.  Patient instructed to avoid sunlight, if possible.  When exposed to sunlight, patients should wear protective clothing, sunglasses, and sunscreen.  The patient was instructed to call the office immediately if the following severe adverse effects occur:  hearing changes, easy bruising/bleeding, severe headache, or vision changes.  The patient verbalized understanding of the proper use and possible adverse effects of sarecycline.  All of the patient's questions and concerns were addressed. Bexarotene Counseling:  I discussed with the patient the risks of bexarotene including but not limited to hair loss, dry lips/skin/eyes, liver abnormalities, hyperlipidemia, pancreatitis, depression/suicidal ideation, photosensitivity, drug rash/allergic reactions, hypothyroidism, anemia, leukopenia, infection, cataracts, and teratogenicity.  Patient understands that they will need regular blood tests to check lipid profile, liver function tests, white blood cell count, thyroid function tests and pregnancy test if applicable. Dapsone Counseling: I discussed with the patient the risks of dapsone including but not limited to hemolytic anemia, agranulocytosis, rashes, methemoglobinemia, kidney failure, peripheral neuropathy, headaches, GI upset, and liver toxicity.  Patients who start dapsone require monitoring including baseline LFTs and weekly CBCs for the first month, then every month thereafter.  The patient verbalized understanding of the proper use and possible adverse effects of dapsone.  All of the patient's questions and concerns were addressed. Metronidazole Pregnancy And Lactation Text: This medication is Pregnancy Category B and considered safe during pregnancy.  It is also excreted in breast milk. Birth Control Pills Counseling: Birth Control Pill Counseling: I discussed with the patient the potential side effects of OCPs including but not limited to increased risk of stroke, heart attack, thrombophlebitis, deep venous thrombosis, hepatic adenomas, breast changes, GI upset, headaches, and depression.  The patient verbalized understanding of the proper use and possible adverse effects of OCPs. All of the patient's questions and concerns were addressed. Zyclara Counseling:  I discussed with the patient the risks of imiquimod including but not limited to erythema, scaling, itching, weeping, crusting, and pain.  Patient understands that the inflammatory response to imiquimod is variable from person to person and was educated regarded proper titration schedule.  If flu-like symptoms develop, patient knows to discontinue the medication and contact us. Ivermectin Counseling:  Patient instructed to take medication on an empty stomach with a full glass of water.  Patient informed of potential adverse effects including but not limited to nausea, diarrhea, dizziness, itching, and swelling of the extremities or lymph nodes.  The patient verbalized understanding of the proper use and possible adverse effects of ivermectin.  All of the patient's questions and concerns were addressed. Protopic Pregnancy And Lactation Text: This medication is Pregnancy Category C. It is unknown if this medication is excreted in breast milk when applied topically. Cephalexin Counseling: I counseled the patient regarding use of cephalexin as an antibiotic for prophylactic and/or therapeutic purposes. Cephalexin (commonly prescribed under brand name Keflex) is a cephalosporin antibiotic which is active against numerous classes of bacteria, including most skin bacteria. Side effects may include nausea, diarrhea, gastrointestinal upset, rash, hives, yeast infections, and in rare cases, hepatitis, kidney disease, seizures, fever, confusion, neurologic symptoms, and others. Patients with severe allergies to penicillin medications are cautioned that there is about a 10% incidence of cross-reactivity with cephalosporins. When possible, patients with penicillin allergies should use alternatives to cephalosporins for antibiotic therapy. Hydroquinone Counseling:  Patient advised that medication may result in skin irritation, lightening (hypopigmentation), dryness, and burning.  In the event of skin irritation, the patient was advised to reduce the amount of the drug applied or use it less frequently.  Rarely, spots that are treated with hydroquinone can become darker (pseudoochronosis).  Should this occur, patient instructed to stop medication and call the office. The patient verbalized understanding of the proper use and possible adverse effects of hydroquinone.  All of the patient's questions and concerns were addressed. Skyrizi Counseling: I discussed with the patient the risks of risankizumab-rzaa including but not limited to immunosuppression, and serious infections.  The patient understands that monitoring is required including a PPD at baseline and must alert us or the primary physician if symptoms of infection or other concerning signs are noted. Bexarotene Pregnancy And Lactation Text: This medication is Pregnancy Category X and should not be given to women who are pregnant or may become pregnant. This medication should not be used if you are breast feeding. Hydroxychloroquine Pregnancy And Lactation Text: This medication has been shown to cause fetal harm but it isn't assigned a Pregnancy Risk Category. There are small amounts excreted in breast milk. Infliximab Counseling:  I discussed with the patient the risks of infliximab including but not limited to myelosuppression, immunosuppression, autoimmune hepatitis, demyelinating diseases, lymphoma, and serious infections.  The patient understands that monitoring is required including a PPD at baseline and must alert us or the primary physician if symptoms of infection or other concerning signs are noted. Griseofulvin Counseling:  I discussed with the patient the risks of griseofulvin including but not limited to photosensitivity, cytopenia, liver damage, nausea/vomiting and severe allergy.  The patient understands that this medication is best absorbed when taken with a fatty meal (e.g., ice cream or french fries). Cimetidine Counseling:  I discussed with the patient the risks of Cimetidine including but not limited to gynecomastia, headache, diarrhea, nausea, drowsiness, arrhythmias, pancreatitis, skin rashes, psychosis, bone marrow suppression and kidney toxicity. Xolair Pregnancy And Lactation Text: This medication is Pregnancy Category B and is considered safe during pregnancy. This medication is excreted in breast milk.

## 2024-08-21 ENCOUNTER — PATIENT OUTREACH (OUTPATIENT)
Dept: CARE COORDINATION | Facility: CLINIC | Age: 54
End: 2024-08-21
Payer: COMMERCIAL

## 2024-09-03 ENCOUNTER — ANCILLARY PROCEDURE (OUTPATIENT)
Dept: GENERAL RADIOLOGY | Facility: CLINIC | Age: 54
End: 2024-09-03
Attending: INTERNAL MEDICINE
Payer: COMMERCIAL

## 2024-09-03 ENCOUNTER — OFFICE VISIT (OUTPATIENT)
Dept: INTERNAL MEDICINE | Facility: CLINIC | Age: 54
End: 2024-09-03
Payer: COMMERCIAL

## 2024-09-03 VITALS
HEIGHT: 64 IN | BODY MASS INDEX: 34.76 KG/M2 | TEMPERATURE: 97.1 F | SYSTOLIC BLOOD PRESSURE: 143 MMHG | OXYGEN SATURATION: 100 % | HEART RATE: 74 BPM | DIASTOLIC BLOOD PRESSURE: 85 MMHG | WEIGHT: 203.6 LBS

## 2024-09-03 DIAGNOSIS — R07.81 RIB PAIN ON LEFT SIDE: ICD-10-CM

## 2024-09-03 DIAGNOSIS — H65.93 BILATERAL NON-SUPPURATIVE OTITIS MEDIA: ICD-10-CM

## 2024-09-03 PROBLEM — E66.812 CLASS 2 SEVERE OBESITY DUE TO EXCESS CALORIES WITH SERIOUS COMORBIDITY IN ADULT (H): Status: ACTIVE | Noted: 2024-09-03

## 2024-09-03 PROBLEM — E66.01 CLASS 2 SEVERE OBESITY DUE TO EXCESS CALORIES WITH SERIOUS COMORBIDITY IN ADULT (H): Status: ACTIVE | Noted: 2024-09-03

## 2024-09-03 PROCEDURE — 71101 X-RAY EXAM UNILAT RIBS/CHEST: CPT | Mod: LT | Performed by: STUDENT IN AN ORGANIZED HEALTH CARE EDUCATION/TRAINING PROGRAM

## 2024-09-03 PROCEDURE — 99214 OFFICE O/P EST MOD 30 MIN: CPT | Performed by: INTERNAL MEDICINE

## 2024-09-03 RX ORDER — CIPROFLOXACIN AND DEXAMETHASONE 3; 1 MG/ML; MG/ML
4 SUSPENSION/ DROPS AURICULAR (OTIC) 2 TIMES DAILY
Qty: 7.5 ML | Refills: 0 | Status: SHIPPED | OUTPATIENT
Start: 2024-09-03

## 2024-09-03 NOTE — PROGRESS NOTES
"  Assessment & Plan     Bilateral non-suppurative otitis media  Ciprodex ear drops prescribed    Rib pain on left side  Xray of ribs ordered.            BMI  Estimated body mass index is 35.5 kg/m  as calculated from the following:    Height as of this encounter: 1.613 m (5' 3.5\").    Weight as of this encounter: 92.4 kg (203 lb 9.6 oz).             Cristian Verma is a 53 year old, presenting for the following health issues:  Ear Problem  Left side   Ice pack   Shocking pain   Some pain on left rib cage for a few days   Ear Problem    History of Present Illness       Headaches:   Since the patient's last clinic visit, headaches are: worsened  The patient is getting headaches:  Constant  She is able to do normal daily activities when she has a migraine.  The patient is taking the following rescue/relief medications:  Ibuprofen (Advil, Motrin)   Patient states \"I get only a small amount of relief\" from the rescue/relief medications.   The patient is taking the following medications to prevent migraines:  No medications to prevent migraines  In the past 4 weeks, the patient has gone to an Urgent Care or Emergency Room 0 times times due to headaches.   She is taking medications regularly.         H/O treated breast cancer in 2018.        Ear symptoms are worsening.  On left side her jaw is also hurting.        Review of Systems  Constitutional, HEENT, cardiovascular, pulmonary, gi and gu systems are negative, except as otherwise noted.      Objective    BP (!) 143/85   Pulse 74   Temp 97.1  F (36.2  C) (Temporal)   Ht 1.613 m (5' 3.5\")   Wt 92.4 kg (203 lb 9.6 oz)   SpO2 100%   BMI 35.50 kg/m    Body mass index is 35.5 kg/m .  Physical Exam   GENERAL: alert and no distress  HENT: ear canals and TM's normal, nose and mouth without ulcers or lesions  NECK: no adenopathy, no asymmetry, masses, or scars  RESP: lungs clear to auscultation - no rales, rhonchi or wheezes  CV: regular rate and rhythm, normal S1 S2, " no S3 or S4, no murmur, click or rub, no peripheral edema  MS: no gross musculoskeletal defects noted, no edema            Signed Electronically by: Dutch Gibbs MD

## 2024-09-23 ENCOUNTER — HOSPITAL ENCOUNTER (OUTPATIENT)
Dept: MAMMOGRAPHY | Facility: CLINIC | Age: 54
Discharge: HOME | End: 2024-09-23
Attending: INTERNAL MEDICINE | Admitting: INTERNAL MEDICINE
Payer: COMMERCIAL

## 2024-09-23 DIAGNOSIS — Z12.31 VISIT FOR SCREENING MAMMOGRAM: ICD-10-CM

## 2024-09-23 PROCEDURE — 77063 BREAST TOMOSYNTHESIS BI: CPT

## 2024-10-24 ENCOUNTER — OFFICE VISIT (OUTPATIENT)
Dept: INTERNAL MEDICINE | Facility: CLINIC | Age: 54
End: 2024-10-24
Payer: COMMERCIAL

## 2024-10-24 VITALS
DIASTOLIC BLOOD PRESSURE: 84 MMHG | WEIGHT: 202.3 LBS | BODY MASS INDEX: 34.54 KG/M2 | HEART RATE: 72 BPM | SYSTOLIC BLOOD PRESSURE: 138 MMHG | TEMPERATURE: 97.5 F | HEIGHT: 64 IN | OXYGEN SATURATION: 100 %

## 2024-10-24 DIAGNOSIS — L30.9 DERMATITIS: Primary | ICD-10-CM

## 2024-10-24 PROCEDURE — 99213 OFFICE O/P EST LOW 20 MIN: CPT | Performed by: INTERNAL MEDICINE

## 2024-10-24 RX ORDER — HYDROCORTISONE 25 MG/G
OINTMENT TOPICAL 2 TIMES DAILY
Qty: 30 G | Refills: 1 | Status: SHIPPED | OUTPATIENT
Start: 2024-10-24

## 2024-10-24 NOTE — PROGRESS NOTES
"  Assessment & Plan     Dermatitis  Mild and slowly improving after using topical steroid at home. Will rx similar for ongoing use.     - hydrocortisone 2.5 % ointment; Apply topically 2 times daily. Apply to affected areas twice daily          BMI  Estimated body mass index is 35.27 kg/m  as calculated from the following:    Height as of this encounter: 1.613 m (5' 3.5\").    Weight as of this encounter: 91.8 kg (202 lb 4.8 oz).             Cristian Verma is a 53 year old, presenting for the following health issues:  Derm Problem  Pt has used some ointment to help ease the pain. Pt states it felt like a burning feeling on the rash. Pt has only noticed rash on face    Pt daughter gave them some cream that she got derm and it seemed to help calm some of the rash       No new exposures, no new soaps. No masks at work. Improved with topical steroid.    History of Present Illness       Reason for visit:  Yes  Symptom onset:  3-7 days ago  Symptom intensity:  Mild  Symptom progression:  Staying the same  Had these symptoms before:  No  What makes it worse:  Not really  What makes it better:  Not really   She is taking medications regularly.                     Objective    /84   Pulse 72   Temp 97.5  F (36.4  C) (Temporal)   Ht 1.613 m (5' 3.5\")   Wt 91.8 kg (202 lb 4.8 oz)   SpO2 100%   BMI 35.27 kg/m    Body mass index is 35.27 kg/m .  Physical Exam       Maculopapular rash  below and citlali he sides fo mouth. Also present supoerior aspect of nose.            Signed Electronically by: Keely Villavicencio MD    "

## 2024-11-13 NOTE — PHARMACY-ADMISSION MEDICATION HISTORY
Admission medication history interview status for the 5/15/2019  admission is complete. See EPIC admission navigator for prior to admission medications     Medication history source reliability:Good    Actions taken by pharmacist (provider contacted, etc): Chart review and discussed with patient. RN previously discussed last taken times with patient and charted in PTA list.     Additional medication history information not noted on PTA med list :   - Patient previously filled dexamethasone 4mg 3/20/19 for 28 day supply - patient indicates she does not take this regularly, but will go into clinic and get a prescription when needed - not included on medication list.    Medication reconciliation/reorder completed by provider prior to medication history? Yes    Time spent in this activity: 10 minutes    Prior to Admission medications    Medication Sig Last Dose Taking? Auth Provider   ascorbic acid (VITAMIN C) 1000 MG TABS Take 1,000 mg by mouth daily 5/14/2019 at Unknown time Yes Reported, Patient   gabapentin (NEURONTIN) 100 MG capsule Take 2 capsules (200 mg) by mouth 3 times daily 5/14/2019 at Unknown time Yes Addie Murillo MD   HYDROcodone-acetaminophen (NORCO) 5-325 MG tablet Take 1-2 tablets by mouth every 4 hours as needed for moderate to severe pain  Yes Carmen Cruz PA-C   levothyroxine (SYNTHROID/LEVOTHROID) 125 MCG tablet TAKE 1 TABLET BY MOUTH DAILY 5/14/2019 at Unknown time Yes Stuart Wills MD   lidocaine-prilocaine (EMLA) cream Apply quarter-size amount of Emla cream topically over port site one hour prior to port access for comfort. prn Yes Addie Murillo MD   loratadine (CLARITIN) 10 MG tablet Take 1 tablet (10 mg) by mouth daily Past Week at Unknown time Yes Addie Murillo MD   metFORMIN (GLUCOPHAGE-XR) 500 MG 24 hr tablet TAKE 1 TABLET(500 MG) BY MOUTH DAILY WITH DINNER Past Week at Unknown time Yes Stuart Wills MD   multivitamin, therapeutic with minerals (THERA-VIT-M)  Trisha from [facility] called stating \"we had a request for an in person  for today but we are a little short staffed with a lot of appointments at the same times so unfortunately you will need to use a video  for her appointment today.\"    Forward to Dr. Giron. CHARLIE    Forward to Dr. Giron's clinical pool for follow up.       TABS tablet Take 1 tablet by mouth daily 5/14/2019 at Unknown time Yes Reported, Patient   omeprazole (PRILOSEC) 20 MG CR capsule Take 1 capsule (20 mg) by mouth 2 times daily 5/15/2019 at 0600 Yes Stuart Wills MD   senna-docusate (SENOKOT-S/PERICOLACE) 8.6-50 MG tablet Take 1-2 tablets by mouth 2 times daily as needed for constipation (While on oral opioids.)  Yes Carmen Cruz PA-C   LORazepam (ATIVAN) 0.5 MG tablet Take 1 tablet (0.5 mg) by mouth every 4 hours as needed (Anxiety, Nausea/Vomiting or Sleep) More than a month at Unknown time  Ailyn Humphrey MD

## 2024-11-21 NOTE — PROGRESS NOTES
[facility] Progress Note      Patient Name: Philly Person  : 1943  MRN: [id number]  Primary Care Physician:  Shazia Cardoza MD  Date of admission: 2024    Subjective     Interval History:   Follow-up on JENNIFER and CKD  S/p paracentesis on  with 7.1 fluid removal    Patient is doing well today, denies chest pain, N/V/D, fever/chills, or dizziness/lightheadedness  Negative orthostatic blood pressure  UOP low 400 mL yesterday on torsemide, negative bladder scan    Review of Systems:   As noted above    Objective     Vitals:   Temp:  [97.6 °F (36.4 °C)-98.8 °F (37.1 °C)] 98.8 °F (37.1 °C)  Heart Rate:  [66-74] 70  Resp:  [16-18] 16  BP: (126-163)/(54-72) 163/72    Intake/Output Summary (Last 24 hours) at 2024 1338  Last data filed at 2024 0536  Gross per 24 hour   Intake --   Output 200 ml   Net -200 ml       Physical Exam:    General Appearance: Awake, chronically ill, no acute distress.  Obese  Skin: warm and dry  HEENT: oral mucosa normal, nonicteric sclera  Neck: No JVD  Lungs: Clear, breathing effort not labored on room air  Heart: RRR, no rub  Abdomen: soft, nontender, nondistended, normal active bowel  Extremities: 1+ lower extremity edema, no cyanosis or clubbing.  Left upper extremity AV fistula with good thrill and bruit.  Neuro: normal speech and mental status     Scheduled Meds:     epoetin zuleika/zuleika-epbx, 3,000 Units, Subcutaneous, Once per day on   hydrALAZINE, 50 mg, Oral, Q8H  insulin glargine, 8 Units, Subcutaneous, Daily  insulin lispro, 2-7 Units, Subcutaneous, 4x Daily AC & at Bedtime  lactulose, 20 g, Oral, Daily  nadolol, 20 mg, Oral, Q24H  pantoprazole, 40 mg, Oral, QAM AC  sodium chloride, 10 mL, Intravenous, Q12H  torsemide, 20 mg, Oral, BID      IV Meds:        Results Reviewed:   I have personally reviewed the results from the time of this admission to 2024 13:38 EST     Results from last 7 days   Lab  General Surgery    CXR without evidence of pneumothorax, reviewed by radiologist. Ok for PO intake and discharge.      Carmen Cruz PA-C  [facility]  [phone]     Units 11/21/24  0557 11/20/24  0337 11/19/24  0505 11/18/24  0512 11/17/24  0600 11/16/24  0424   SODIUM mmol/L 131* 137 136 135* 138 139   POTASSIUM mmol/L 3.8 3.7 3.5 3.6 3.6 3.7   CHLORIDE mmol/L 100 105 106 103 106 106   CO2 mmol/L 18.0* 19.0* 18.0* 19.2* 21.0* 20.0*   BUN mg/dL 69* 68* 66* 68* 68* 66*   CREATININE mg/dL 4.09* 3.95* 3.98* 3.90* 3.66* 3.26*   CALCIUM mg/dL 8.6 8.4* 8.2* 8.2* 8.3* 8.5*   BILIRUBIN mg/dL  --   --   --  0.6 0.5 0.7   ALK PHOS U/L  --   --   --  77 99 112   ALT (SGPT) U/L  --   --   --  10 16 16   AST (SGOT) U/L  --   --   --  20 27 28   GLUCOSE mg/dL 96 119* 66 124* 80 134*       Estimated Creatinine Clearance: 12.9 mL/min (A) (by C-G formula based on SCr of 4.09 mg/dL (H)).    Results from last 7 days   Lab Units 11/21/24  0557 11/20/24  0337 11/19/24  0505 11/18/24  0512 11/17/24  0600   MAGNESIUM mg/dL  --   --   --  2.1 2.3   PHOSPHORUS mg/dL 3.4 3.4 3.5 3.7 3.5       Results from last 7 days   Lab Units 11/18/24  0512 11/17/24  0600   URIC ACID mg/dL 5.7 5.9*       Results from last 7 days   Lab Units 11/20/24  0338 11/19/24  0505 11/18/24  1205 11/18/24  0512 11/17/24  0600 11/16/24  0424   WBC 10*3/mm3 4.55 3.46  --  3.15* 4.22 4.88   HEMOGLOBIN g/dL 8.2* 7.5* 7.6* 7.1* 8.3* 8.8*   PLATELETS 10*3/mm3 84* 67*  --  71* 96* 138*               Assessment / Plan     ASSESSMENT:  Acute kidney injury on CKD stage IV, acute etiology is secondary to significant volume shift with the large-volume paracentesis and concern for hepatorenal syndrome, diarrhea and contrast-induced nephropathy.  Urine sodium of above 20 is more in favor of ATN.  SCr now has plateaued, likely AGMA; mild hypervolemic on exam  CKD stage IV, with recent baseline creatinine between 2- 2.3 mg/dL, secondary to biopsy-proven diabetic nephrosclerosis with 60% interstitial fibrosis   Urine albumin to creatinine ratio 382 mg/g consistent with diabetic nephrosclerosis.  Does have matured left upper extremity AV fistula  with good thrill.   History of type 2 diabetes mellitus with CKD  Hypertension with CKD.  BP acceptable, negative orthostatics  QUINN cirrhosis complicated by ascites, s/p paracentesis on 11/14  Volume overload, improving  Anemia of CKD, iron panel not in favor iron deficiency anemia, started on April  Left renal cyst.  Complex needs to be followed in outpatient setting  Possible UTI, on antibiotics    PLAN:  Continue current diuretic dose as her volume status seems to be improving  No indication for dialysis   Continue to check orthostatic blood pressure every day   Check abdomen ultrasound  Continue daily bladder scan every shift    I reviewed the chart and other providers notes, reviewed labs.  Copied text in this note has been reviewed and is accurate as of 11/21/24.       Thank you for involving us in the care of Philly Person.  Please feel free to call with any questions.    Christy Lynch MD  11/21/24  13:38 EST    [facility]  [phone]    Parts of this note may be an electronic transcription/translation of spoken language to printed text using the Dragon dictation system.

## 2025-01-17 ENCOUNTER — ANCILLARY PROCEDURE (OUTPATIENT)
Dept: GENERAL RADIOLOGY | Facility: CLINIC | Age: 55
End: 2025-01-17
Attending: FAMILY MEDICINE
Payer: COMMERCIAL

## 2025-01-17 PROCEDURE — 73610 X-RAY EXAM OF ANKLE: CPT | Mod: TC | Performed by: RADIOLOGY

## 2025-01-29 ENCOUNTER — OFFICE VISIT (OUTPATIENT)
Dept: PODIATRY | Facility: CLINIC | Age: 55
End: 2025-01-29
Attending: FAMILY MEDICINE
Payer: COMMERCIAL

## 2025-01-29 DIAGNOSIS — M25.472 ANKLE SWELLING, LEFT: ICD-10-CM

## 2025-01-29 DIAGNOSIS — M25.471 RIGHT ANKLE SWELLING: Primary | ICD-10-CM

## 2025-01-29 DIAGNOSIS — M25.572 ACUTE BILATERAL ANKLE PAIN: ICD-10-CM

## 2025-01-29 DIAGNOSIS — M25.571 ACUTE BILATERAL ANKLE PAIN: ICD-10-CM

## 2025-01-29 LAB
CRP SERPL-MCNC: 16.5 MG/L
ERYTHROCYTE [SEDIMENTATION RATE] IN BLOOD BY WESTERGREN METHOD: 41 MM/HR (ref 0–30)
URATE SERPL-MCNC: 5.2 MG/DL (ref 2.4–5.7)
WBC # BLD AUTO: 7.5 10E3/UL (ref 4–11)

## 2025-01-29 PROCEDURE — 84550 ASSAY OF BLOOD/URIC ACID: CPT | Performed by: PODIATRIST

## 2025-01-29 PROCEDURE — 86140 C-REACTIVE PROTEIN: CPT | Performed by: PODIATRIST

## 2025-01-29 PROCEDURE — 86200 CCP ANTIBODY: CPT | Performed by: PODIATRIST

## 2025-01-29 PROCEDURE — 99203 OFFICE O/P NEW LOW 30 MIN: CPT | Performed by: PODIATRIST

## 2025-01-29 PROCEDURE — 36415 COLL VENOUS BLD VENIPUNCTURE: CPT | Performed by: PODIATRIST

## 2025-01-29 PROCEDURE — 85652 RBC SED RATE AUTOMATED: CPT | Performed by: PODIATRIST

## 2025-01-29 PROCEDURE — 85048 AUTOMATED LEUKOCYTE COUNT: CPT | Performed by: PODIATRIST

## 2025-01-29 NOTE — LETTER
1/29/2025      Luci Londono  [address]  [facility] [address]      Dear Colleague,    Thank you for referring your patient, Luci C Alert, to the [facility]. Please see a copy of my visit note below.    ASSESSMENT:  Encounter Diagnoses   Name Primary?     Acute bilateral ankle pain      Right ankle swelling Yes     Ankle swelling, left      MEDICAL DECISION MAKING:  I am not sure what is causing her bilateral ankle swelling and pain.  This is not typical of mechanical pain, given the lack of biomechanical problem and injury.  The ankle pain and edema was fairly sudden onset.  She reports more swelling in the morning when she wakes up.    A crystalline arthropathy and or some form of rheumatologic condition is considered.  She also reported 1 occasion of left wrist pain and swelling.    Labs ordered:  CRP  ESR  Rheumatoid factor  Anti-CCP  Uric acid    I will await the lab results.  Reasonable next steps might be a dual-energy CAT scan to evaluate for crystals versus referral back to primary care or a rheumatologist.    She is able to function life and perform her job.  We discussed the use of NSAIDs and Tylenol.      Disclaimer: This note consists of symbols derived from keyboarding, dictation and/or voice recognition software. As a result, there may be errors in the script that have gone undetected. Please consider this when interpreting information found in this chart.    Jan Sosa DPM, FACFAS, [facility] Department of Podiatry/Foot & Ankle Surgery      ____________________________________________________________________    HPI:       I was asked by Dr. Norma Serrano to evaluate Luci C Alert in consultation for bilateral ankle pain.   This is a follow-up from urgent care.    Luci C Alert presents reporting 2 weeks of bilateral ankle pain.  She reports she woke up and had pain.  She said she wakes up with the swelling.  She is able to walk and work without too  much difficulty  She takes ibuprofen prior to work  Shooting pain rated an 8 out of 10 at worst.  Pain is worse with stairs and prolonged standing  Treatment has involved ibuprofen and massage  She presented to urgent care on 1/17/2025  X-rays done  Ice, NSAIDs, activity modification, arch supports and follow-up with podiatry  *  Past Medical History:   Diagnosis Date     Cancer (H)     breast     Diabetes (H)     pre-dm     High cholesterol      Hypertension goal BP (blood pressure) < 140/80 2/18/2014     Lateral epicondylitis, right dominant elbow since late 10-17 11/1/2017     Migraine      Port-A-Cath in place 10/26/2018     Thyroid disease    *  *  Past Surgical History:   Procedure Laterality Date     BIOPSY NODE SENTINEL Right 5/15/2019    Procedure: BIOPSY, LYMPH NODE, SENTINEL;  Surgeon: Stacey Ashford MD;  Location:  OR     COLONOSCOPY N/A 1/26/2022    Procedure: COLONOSCOPY;  Surgeon: Zac Arriola MD;  Location:  GI     DISSECT LYMPH NODE AXILLA Right 5/15/2019    Procedure: LYMPHADENECTOMY, AXILLARY;  Surgeon: Stacey Ashford MD;  Location:  OR     HAND SURGERY  2002    left     INSERT PORT VASCULAR ACCESS N/A 10/18/2018    Procedure: INSERTION OF VASCULAR PORT;  Surgeon: Stacey Ashford MD;  Location:  OR     LUMPECTOMY BREAST WITH SEED LOCALIZATION Right 5/15/2019    Procedure: SEED LOCALIZATION BREAST LUMPECTOMY, SEED LOCALIZATION AXILLARY BREAST BIOPSY, SENTINEL NODE , REMOVAL OF VASCULAR PORT ACCESS AND RIGHT  AXILLARY NODE DISSECTION;  Surgeon: Stacey Ashford MD;  Location:  OR     REMOVE PORT VASCULAR ACCESS N/A 5/15/2019    Procedure: REMOVAL, VASCULAR ACCESS PORT;  Surgeon: Stacey Ashford MD;  Location:  OR     TUBAL LIGATION     *  *  Current Outpatient Medications   Medication Sig Dispense Refill     ascorbic acid (VITAMIN C) 1000 MG TABS Take 1,000 mg by mouth daily       aspirin 81 MG EC tablet Take 1 tablet (81 mg) by mouth daily 90 tablet 3      atorvastatin (LIPITOR) 40 MG tablet Take 1 tablet (40 mg) by mouth daily 90 tablet 3     ciprofloxacin-dexAMETHasone (CIPRODEX) 0.3-0.1 % otic suspension Place 4 drops Into the left ear 2 times daily. 7.5 mL 0     diclofenac (CATAFLAM) 50 MG tablet Take 1 tablet (50 mg) by mouth 2 times daily as needed for moderate pain. 20 tablet 0     DULoxetine (CYMBALTA) 60 MG capsule Take 1 capsule (60 mg) by mouth daily 180 capsule 1     ferrous sulfate (FE TABS) 325 (65 Fe) MG EC tablet Take 1 tablet (325 mg) by mouth daily 90 tablet 1     hydrocortisone 2.5 % ointment Apply topically 2 times daily. Apply to affected areas twice daily 30 g 1     levothyroxine (SYNTHROID/LEVOTHROID) 100 MCG tablet Take 1 tablet every day 94 tablet 3     metFORMIN (GLUCOPHAGE XR) 500 MG 24 hr tablet TAKE 1 TABLET(500 MG) BY MOUTH DAILY WITH DINNER 90 tablet 0     pantoprazole (PROTONIX) 20 MG EC tablet Take 2 tablets (40 mg) by mouth 2 times daily 360 tablet 3     tiZANidine (ZANAFLEX) 6 MG capsule Take 1 capsule (6 mg) by mouth nightly as needed (for jaw pain and upper back pain) 30 capsule 1         EXAM:    Vitals: There were no vitals taken for this visit.  BMI: There is no height or weight on file to calculate BMI.    Vasc:      Pedal pulses are palpable for the dorsalis pedis posterior tibial artery, bilateral foot.  Capillary fill time </= 3 seconds  Pedal skin appears well-perfused    There is bilateral localized ankle edema.  This is circumferential around the ankle.  No appreciated hyperpigmentation or erythema.    Neuro:      Light touch sensation intact to all sensory nerve distributions, bilateral foot.  No apparent spastic contractures or other deformity secondary to neurologic compromise.  Derm:      No calluses  No wounds   No worrisome lesions  MSK:      Smooth bilateral ankle range of motion.  This is not particularly painful.  Most pain is on palpation all along the lateral aspect of the bilateral ankle.  Bilateral lower  extremity muscle strength presents is normal.  No gross deformities  Adequate ankle and subtalar joint range of motion  Calf:    Neg for redness, swelling or tenderness    EXAM: XR ANKLE BILATERAL G/E 3 VIEWS  LOCATION: [facility]RG[facility]  DATE: 01/17/2025     INDICATION: Acute bilateral ankle pain.  COMPARISON: None.                                                                      IMPRESSION: Ankle mortise is intact bilaterally. The left side shows lateral ankle soft tissue swelling. There is no evidence of an acute fracture on either side.         Again, thank you for allowing me to participate in the care of your patient.        Sincerely,        Jan Sosa DPM    Electronically signed

## 2025-01-29 NOTE — PATIENT INSTRUCTIONS
Thank you for choosing [facility]!    DR. JIMENEZ'S CLINIC LOCATIONS:     [facility] TRIAGE LINE: [phone]   [address] APPOINTMENTS: [phone]   [place] [address] RADIOLOGY: [phone]   (Every other Tues - Wed - Fri PM) SET UP SURGERY: [phone]    PHYSICAL THERAPY: [phone]   [place] SPECIALTY BILLING QUESTIONS: [phone]   [address] FAX: [phone]   [place] [address]    (Thurs & Fri AM)

## 2025-01-29 NOTE — PROGRESS NOTES
ASSESSMENT:  Encounter Diagnoses   Name Primary?    Acute bilateral ankle pain     Right ankle swelling Yes    Ankle swelling, left      MEDICAL DECISION MAKING:  I am not sure what is causing her bilateral ankle swelling and pain.  This is not typical of mechanical pain, given the lack of biomechanical problem and injury.  The ankle pain and edema was fairly sudden onset.  She reports more swelling in the morning when she wakes up.    A crystalline arthropathy and or some form of rheumatologic condition is considered.  She also reported 1 occasion of left wrist pain and swelling.    Labs ordered:  CRP  ESR  Rheumatoid factor  Anti-CCP  Uric acid    I will await the lab results.  Reasonable next steps might be a dual-energy CAT scan to evaluate for crystals versus referral back to primary care or a rheumatologist.    She is able to function life and perform her job.  We discussed the use of NSAIDs and Tylenol.      Disclaimer: This note consists of symbols derived from keyboarding, dictation and/or voice recognition software. As a result, there may be errors in the script that have gone undetected. Please consider this when interpreting information found in this chart.    Jan Sosa DPM, FACFAS, [facility] Department of Podiatry/Foot & Ankle Surgery      ____________________________________________________________________    HPI:       I was asked by Dr. Norma Serrano to evaluate Luci C Alert in consultation for bilateral ankle pain.   This is a follow-up from urgent care.    Luci C Alert presents reporting 2 weeks of bilateral ankle pain.  She reports she woke up and had pain.  She said she wakes up with the swelling.  She is able to walk and work without too much difficulty  She takes ibuprofen prior to work  Shooting pain rated an 8 out of 10 at worst.  Pain is worse with stairs and prolonged standing  Treatment has involved ibuprofen and massage  She presented to urgent care on 1/17/2025  X-rays  done  Ice, NSAIDs, activity modification, arch supports and follow-up with podiatry  *  Past Medical History:   Diagnosis Date    Cancer (H)     breast    Diabetes (H)     pre-dm    High cholesterol     Hypertension goal BP (blood pressure) < 140/80 2/18/2014    Lateral epicondylitis, right dominant elbow since late 10-17 11/1/2017    Migraine     Port-A-Cath in place 10/26/2018    Thyroid disease    *  *  Past Surgical History:   Procedure Laterality Date    BIOPSY NODE SENTINEL Right 5/15/2019    Procedure: BIOPSY, LYMPH NODE, SENTINEL;  Surgeon: Stacey Ashford MD;  Location:  OR    COLONOSCOPY N/A 1/26/2022    Procedure: COLONOSCOPY;  Surgeon: Zac Arriola MD;  Location:  GI    DISSECT LYMPH NODE AXILLA Right 5/15/2019    Procedure: LYMPHADENECTOMY, AXILLARY;  Surgeon: Stacey Ashford MD;  Location:  OR    HAND SURGERY  2002    left    INSERT PORT VASCULAR ACCESS N/A 10/18/2018    Procedure: INSERTION OF VASCULAR PORT;  Surgeon: Stacey Ashford MD;  Location:  OR    LUMPECTOMY BREAST WITH SEED LOCALIZATION Right 5/15/2019    Procedure: SEED LOCALIZATION BREAST LUMPECTOMY, SEED LOCALIZATION AXILLARY BREAST BIOPSY, SENTINEL NODE , REMOVAL OF VASCULAR PORT ACCESS AND RIGHT  AXILLARY NODE DISSECTION;  Surgeon: Stacey Ashford MD;  Location:  OR    REMOVE PORT VASCULAR ACCESS N/A 5/15/2019    Procedure: REMOVAL, VASCULAR ACCESS PORT;  Surgeon: Stacey Ashford MD;  Location:  OR    TUBAL LIGATION     *  *  Current Outpatient Medications   Medication Sig Dispense Refill    ascorbic acid (VITAMIN C) 1000 MG TABS Take 1,000 mg by mouth daily      aspirin 81 MG EC tablet Take 1 tablet (81 mg) by mouth daily 90 tablet 3    atorvastatin (LIPITOR) 40 MG tablet Take 1 tablet (40 mg) by mouth daily 90 tablet 3    ciprofloxacin-dexAMETHasone (CIPRODEX) 0.3-0.1 % otic suspension Place 4 drops Into the left ear 2 times daily. 7.5 mL 0    diclofenac (CATAFLAM) 50 MG tablet Take 1 tablet (50 mg) by  mouth 2 times daily as needed for moderate pain. 20 tablet 0    DULoxetine (CYMBALTA) 60 MG capsule Take 1 capsule (60 mg) by mouth daily 180 capsule 1    ferrous sulfate (FE TABS) 325 (65 Fe) MG EC tablet Take 1 tablet (325 mg) by mouth daily 90 tablet 1    hydrocortisone 2.5 % ointment Apply topically 2 times daily. Apply to affected areas twice daily 30 g 1    levothyroxine (SYNTHROID/LEVOTHROID) 100 MCG tablet Take 1 tablet every day 94 tablet 3    metFORMIN (GLUCOPHAGE XR) 500 MG 24 hr tablet TAKE 1 TABLET(500 MG) BY MOUTH DAILY WITH DINNER 90 tablet 0    pantoprazole (PROTONIX) 20 MG EC tablet Take 2 tablets (40 mg) by mouth 2 times daily 360 tablet 3    tiZANidine (ZANAFLEX) 6 MG capsule Take 1 capsule (6 mg) by mouth nightly as needed (for jaw pain and upper back pain) 30 capsule 1         EXAM:    Vitals: There were no vitals taken for this visit.  BMI: There is no height or weight on file to calculate BMI.    Vasc:      Pedal pulses are palpable for the dorsalis pedis posterior tibial artery, bilateral foot.  Capillary fill time </= 3 seconds  Pedal skin appears well-perfused    There is bilateral localized ankle edema.  This is circumferential around the ankle.  No appreciated hyperpigmentation or erythema.    Neuro:      Light touch sensation intact to all sensory nerve distributions, bilateral foot.  No apparent spastic contractures or other deformity secondary to neurologic compromise.  Derm:      No calluses  No wounds   No worrisome lesions  MSK:      Smooth bilateral ankle range of motion.  This is not particularly painful.  Most pain is on palpation all along the lateral aspect of the bilateral ankle.  Bilateral lower extremity muscle strength presents is normal.  No gross deformities  Adequate ankle and subtalar joint range of motion  Calf:    Neg for redness, swelling or tenderness    EXAM: XR ANKLE BILATERAL G/E 3 VIEWS  LOCATION: [facility] URGENT CARE [facility]  DATE: 01/17/2025      INDICATION: Acute bilateral ankle pain.  COMPARISON: None.                                                                      IMPRESSION: Ankle mortise is intact bilaterally. The left side shows lateral ankle soft tissue swelling. There is no evidence of an acute fracture on either side.

## 2025-01-30 LAB — CCP AB SER IA-ACNC: 1.8 U/ML

## 2025-02-03 DIAGNOSIS — M25.472 ANKLE SWELLING, LEFT: ICD-10-CM

## 2025-02-03 DIAGNOSIS — M25.471 RIGHT ANKLE SWELLING: ICD-10-CM

## 2025-02-03 DIAGNOSIS — M25.572 ACUTE BILATERAL ANKLE PAIN: Primary | ICD-10-CM

## 2025-02-03 DIAGNOSIS — M25.571 ACUTE BILATERAL ANKLE PAIN: Primary | ICD-10-CM

## 2025-02-22 ENCOUNTER — HOSPITAL ENCOUNTER (EMERGENCY)
Facility: CLINIC | Age: 55
Discharge: HOME | End: 2025-02-23
Attending: EMERGENCY MEDICINE | Admitting: EMERGENCY MEDICINE
Payer: COMMERCIAL

## 2025-02-22 VITALS
WEIGHT: 200 LBS | DIASTOLIC BLOOD PRESSURE: 86 MMHG | OXYGEN SATURATION: 99 % | RESPIRATION RATE: 16 BRPM | BODY MASS INDEX: 35.44 KG/M2 | SYSTOLIC BLOOD PRESSURE: 159 MMHG | HEART RATE: 96 BPM | TEMPERATURE: 98.4 F | HEIGHT: 63 IN

## 2025-02-22 DIAGNOSIS — M25.572 BILATERAL ANKLE PAIN, UNSPECIFIED CHRONICITY: ICD-10-CM

## 2025-02-22 DIAGNOSIS — M25.571 BILATERAL ANKLE PAIN, UNSPECIFIED CHRONICITY: ICD-10-CM

## 2025-02-22 PROCEDURE — 250N000013 HC RX MED GY IP 250 OP 250 PS 637: Performed by: EMERGENCY MEDICINE

## 2025-02-22 PROCEDURE — 99284 EMERGENCY DEPT VISIT MOD MDM: CPT | Mod: 25

## 2025-02-22 PROCEDURE — 76882 US LMTD JT/FCL EVL NVASC XTR: CPT

## 2025-02-22 RX ORDER — NAPROXEN 500 MG/1
500 TABLET ORAL ONCE
Status: COMPLETED | OUTPATIENT
Start: 2025-02-22 | End: 2025-02-22

## 2025-02-22 RX ORDER — ACETAMINOPHEN 500 MG
1000 TABLET ORAL ONCE
Status: COMPLETED | OUTPATIENT
Start: 2025-02-22 | End: 2025-02-22

## 2025-02-22 RX ADMIN — ACETAMINOPHEN 1000 MG: 500 TABLET, FILM COATED ORAL at 23:30

## 2025-02-22 RX ADMIN — NAPROXEN 500 MG: 500 TABLET ORAL at 23:30

## 2025-02-22 ASSESSMENT — COLUMBIA-SUICIDE SEVERITY RATING SCALE - C-SSRS
6. HAVE YOU EVER DONE ANYTHING, STARTED TO DO ANYTHING, OR PREPARED TO DO ANYTHING TO END YOUR LIFE?: NO
1. IN THE PAST MONTH, HAVE YOU WISHED YOU WERE DEAD OR WISHED YOU COULD GO TO SLEEP AND NOT WAKE UP?: NO
2. HAVE YOU ACTUALLY HAD ANY THOUGHTS OF KILLING YOURSELF IN THE PAST MONTH?: NO

## 2025-02-22 ASSESSMENT — ACTIVITIES OF DAILY LIVING (ADL): ADLS_ACUITY_SCORE: 46

## 2025-02-23 ENCOUNTER — ANCILLARY PROCEDURE (OUTPATIENT)
Dept: ULTRASOUND IMAGING | Facility: CLINIC | Age: 55
End: 2025-02-23
Attending: EMERGENCY MEDICINE
Payer: COMMERCIAL

## 2025-02-23 RX ORDER — METHYLPREDNISOLONE 4 MG/1
TABLET ORAL
Qty: 21 TABLET | Refills: 0 | Status: SHIPPED | OUTPATIENT
Start: 2025-02-23

## 2025-02-23 RX ORDER — NAPROXEN 500 MG/1
500 TABLET ORAL 2 TIMES DAILY WITH MEALS
Qty: 16 TABLET | Refills: 0 | Status: SHIPPED | OUTPATIENT
Start: 2025-02-23 | End: 2025-03-03

## 2025-02-23 ASSESSMENT — ACTIVITIES OF DAILY LIVING (ADL): ADLS_ACUITY_SCORE: 46

## 2025-02-23 NOTE — ED PROVIDER NOTES
"  Emergency Department Note      History of Present Illness     Chief Complaint  Ankle Pain     JEFFY Londono is a 54 year old female who presents to the ED with her daughter for evaluation of ankle pain. She reports bilateral ankle pain and swelling for the past month. She was seen at [facility] for it and nothing was found. She states the swelling as worsened and it worse in the morning. It hurts on the sides of her ankle and when turning a certain direction. She takes 2 tablets of ibuprofen before she goes to work in the morning. No pain on bottom of feet when she walks. No injuries to foot or recent falls. No pain when moving ankle. No use of medications tonight. Patient has an MRI scheduled in four days.    Independent Historian  None    Review of External Notes  I reviewed the Podiatry note from 1/29/25 for right ankle swelling  Past Medical History   Medical History and Problem List   Breast cancer   Diabetes   Hyperlipidemia   Hypertension   Lateral epicondylitis, right dominant elbow since late 10-17  Migraine  Port-A-Cath in place  Hypothyroidism   GERD  Vitamin D deficiency    Medications   Aspirin 81 mg  Lipitor  Cataflam  Cymbalta  Levothyroxine  Metformin  Protonix  Zanaflex     Surgical History   Biopsy node sentinel  Colonoscopy  Dissect lymph node axilla  Left hand surgery  Right breast lumpectomy  Remove port vascular access   Tubal ligation   Physical Exam   Patient Vitals for the past 24 hrs:   BP Temp Temp src Pulse Resp SpO2 Height Weight   02/22/25 2256 (!) 159/86 98.4  F (36.9  C) Temporal 96 16 99 % 1.6 m (5' 3\") 90.7 kg (200 lb)     Physical Exam  General:  Alert, nontoxic in appearance  CV:  Appears well perfused  Lungs:  No obvious respiratory distress  Neuro:  Speaking clearly, no slurred speech  MSK:  Swelling with localized tenderness to lateral aspect of bilateral ankles distal to lateral malleoli.  No swelling or tenderness to remainder of ankle or foot.  No " warmth or erythema.  No pain with loading foot.  Distal pulses intact.  Normal strength, cap refill, and sensation distally.     Diagnostics   Imaging  POC US SOFT TISSUE   Final Result   [facility] Procedure Note       Limited Bedside ED Ultrasound of bilateral ankles      PROCEDURE: PERFORMED BY: Dr. Jonathan Mena MD   INDICATIONS/SYMPTOM: Pain and lateral swelling   PROBE: High frequency linear probe   BODY LOCATION: Bilateral ankle joints       FINDINGS: No significant joint effusion noted bilaterally   INTERPRETATION:  No significant joint effusion bilaterally      IMAGE DOCUMENTATION: Images were archived to PACs system.            ED Course    Medications Administered  Medications   naproxen (NAPROSYN) tablet 500 mg (500 mg Oral $Given 2/22/25 2330)   acetaminophen (TYLENOL) tablet 1,000 mg (1,000 mg Oral $Given 2/22/25 2330)     Procedures  Procedures     ED Course  ED Course as of 02/23/25 0110   Sat Feb 22, 2025   2310 I obtained history and examined the patient as noted above.    2336 I rechecked the patient and did an bedside ultrasound to the joint.   Sun Feb 23, 2025   0029 I rechecked the patient and explained findings.    0037 I prepared the patient to be discharged home.      Medical Decision Making / Diagnosis   JOSE Londono is a 54 year old female presents with pain and swelling to the bilateral ankles.  Symptoms are localized to the lateral aspect of the bilateral ankles and has been present over the last month or so.  She reports the symptoms have become worse.  She reports that it is oftentimes worse at night and better when she is up and moving.  She denies any trauma.  She had been seen in urgent care with x-rays that did not show any fracture.  She followed up with the podiatrist who ordered an MRI which is scheduled for a couple of days from now.  Given the increasing symptoms she came in.  On my evaluation she did have some swelling but there is no warmth or erythema.   The remainder of her foot and leg exam was reassuring.  I did do a bedside ultrasound, there is no joint effusion to try to tap and clinically have a low suspicion for a joint infection or gout given its clinical appearance.  I considered, but do not feel that a repeat x-ray was necessary given there is no trauma reported.  At this time, I discussed further supportive care measures as the patient has only been using ibuprofen 1-2 times per day.  I discussed using naproxen 2 times a day for continued pain control and anti-inflammatory.  She was also given a prescription for Medrol Dosepak to see if this would help.  Recommended she keep her appointment for the MRI as scheduled and continue to work with podiatry to evaluate her symptoms    Disposition  The patient was discharged.     ICD-10 Codes:    ICD-10-CM    1. Bilateral ankle pain, unspecified chronicity  M25.571     M25.572          Discharge Medications  Discharge Medication List as of 2/23/2025 12:42 AM        START taking these medications    Details   methylPREDNISolone (MEDROL DOSEPAK) 4 MG tablet therapy pack Follow Package Directions, Disp-21 tablet, R-0, E-Prescribe      naproxen (NAPROSYN) 500 MG tablet Take 1 tablet (500 mg) by mouth 2 times daily (with meals) for 8 days., Disp-16 tablet, R-0, E-Prescribe           Scribe Disclosure:  I, Britni Abarca, am serving as a scribe at 11:24 PM on 2/22/2025 to document services personally performed by Jonathan Mena MD based on my observations and the provider's statements to me.      Jonathan Mena MD  02/23/25 0116

## 2025-02-23 NOTE — ED TRIAGE NOTES
"B ankle pain and swelling for past month. Today pain started \"blazing like fire\" and interfering with ambulation, cannot bear the pain until MRI on Wednesday. Has been seen for same at  and foot doctor and MRI is ordered for next week.      Triage Assessment (Adult)       Row Name 02/22/25 2300          Triage Assessment    Airway WDL WDL        Respiratory WDL    Respiratory WDL WDL        Skin Circulation/Temperature WDL    Skin Circulation/Temperature WDL WDL        Cardiac WDL    Cardiac WDL WDL        Cognitive/Neuro/Behavioral WDL    Cognitive/Neuro/Behavioral WDL WDL                     "

## 2025-02-24 ENCOUNTER — PATIENT OUTREACH (OUTPATIENT)
Dept: FAMILY MEDICINE | Facility: CLINIC | Age: 55
End: 2025-02-24
Payer: COMMERCIAL

## 2025-02-24 NOTE — TELEPHONE ENCOUNTER
Transitions of Care Outreach  Chief Complaint   Patient presents with    Hospital F/U     ED visit 2/22 Ankle Pain       Most Recent Admission Date: 2/22/2025   Most Recent Admission Diagnosis:      Most Recent Discharge Date: 2/23/2025   Most Recent Discharge Diagnosis: Bilateral ankle pain, unspecified chronicity - M25.571, M25.572     Transitions of Care Assessment    Discharge Assessment  How are you doing now that you are home?: Patient stated that she is still having swelling and pain in bilateral ankles but it is better  How are your symptoms? (Red Flag symptoms escalate to triage hotline per guidelines): Improved  Do you know how to contact your clinic care team if you have future questions or changes to your health status? : Yes  Does the patient have their discharge instructions? : Yes  Does the patient have questions regarding their discharge instructions? : No  Were you started on any new medications or were there changes to any of your previous medications? : Yes  Does the patient have all of their medications?: Yes  Do you have questions regarding any of your medications? : No  Do you have all of your needed medical supplies or equipment (DME)?  (i.e. oxygen tank, CPAP, cane, etc.): Yes    Follow up Plan     Discharge Follow-Up  Discharge follow up appointment scheduled in alignment with recommended follow up timeframe or Transitions of Risk Category? (Low = within 30 days; Moderate= within 14 days; High= within 7 days): No  Patient's follow up appointment not scheduled: Patient accepted scheduling support. Appt scheduled/requested per protocol.    Future Appointments   Date Time Provider Department Center   2/26/2025  6:30 AM [facility]   2/26/2025  7:10 AM [facility]   3/5/2025  4:00 PM Melida Chandra MD [facility]    5/1/2025  2:40 PM Ailyn Humphrey MD [facility]       Outpatient Plan as outlined on AVS reviewed with patient.    For any urgent concerns,  please contact our 24 hour nurse triage line: [phone] ([phone])       Berta Mariee RN

## 2025-02-26 ENCOUNTER — HOSPITAL ENCOUNTER (OUTPATIENT)
Dept: MRI IMAGING | Facility: CLINIC | Age: 55
Discharge: HOME | End: 2025-02-26
Attending: PODIATRIST
Payer: COMMERCIAL

## 2025-02-26 DIAGNOSIS — M25.571 ACUTE BILATERAL ANKLE PAIN: ICD-10-CM

## 2025-02-26 DIAGNOSIS — M25.572 ACUTE BILATERAL ANKLE PAIN: ICD-10-CM

## 2025-02-26 DIAGNOSIS — M25.472 ANKLE SWELLING, LEFT: ICD-10-CM

## 2025-02-26 DIAGNOSIS — M25.471 RIGHT ANKLE SWELLING: ICD-10-CM

## 2025-02-26 PROCEDURE — 73723 MRI JOINT LWR EXTR W/O&W/DYE: CPT | Mod: 26 | Performed by: RADIOLOGY

## 2025-02-26 PROCEDURE — 73723 MRI JOINT LWR EXTR W/O&W/DYE: CPT | Mod: LT

## 2025-02-26 PROCEDURE — 255N000002 HC RX 255 OP 636: Performed by: PODIATRIST

## 2025-02-26 PROCEDURE — A9585 GADOBUTROL INJECTION: HCPCS | Performed by: PODIATRIST

## 2025-02-26 RX ORDER — GADOBUTROL 604.72 MG/ML
9 INJECTION INTRAVENOUS ONCE
Status: COMPLETED | OUTPATIENT
Start: 2025-02-26 | End: 2025-02-26

## 2025-02-26 RX ADMIN — GADOBUTROL 9 ML: 604.72 INJECTION INTRAVENOUS at 06:18

## 2025-03-04 SDOH — HEALTH STABILITY: PHYSICAL HEALTH: ON AVERAGE, HOW MANY DAYS PER WEEK DO YOU ENGAGE IN MODERATE TO STRENUOUS EXERCISE (LIKE A BRISK WALK)?: 3 DAYS

## 2025-03-04 SDOH — HEALTH STABILITY: PHYSICAL HEALTH: ON AVERAGE, HOW MANY MINUTES DO YOU ENGAGE IN EXERCISE AT THIS LEVEL?: 30 MIN

## 2025-03-04 ASSESSMENT — SOCIAL DETERMINANTS OF HEALTH (SDOH): HOW OFTEN DO YOU GET TOGETHER WITH FRIENDS OR RELATIVES?: TWICE A WEEK

## 2025-03-05 ENCOUNTER — OFFICE VISIT (OUTPATIENT)
Dept: FAMILY MEDICINE | Facility: CLINIC | Age: 55
End: 2025-03-05
Payer: COMMERCIAL

## 2025-03-05 VITALS
BODY MASS INDEX: 33.8 KG/M2 | HEART RATE: 80 BPM | SYSTOLIC BLOOD PRESSURE: 130 MMHG | RESPIRATION RATE: 18 BRPM | OXYGEN SATURATION: 98 % | DIASTOLIC BLOOD PRESSURE: 76 MMHG | TEMPERATURE: 97.3 F | WEIGHT: 198 LBS | HEIGHT: 64 IN

## 2025-03-05 DIAGNOSIS — K21.9 GASTROESOPHAGEAL REFLUX DISEASE, UNSPECIFIED WHETHER ESOPHAGITIS PRESENT: ICD-10-CM

## 2025-03-05 DIAGNOSIS — E03.9 ACQUIRED HYPOTHYROIDISM: ICD-10-CM

## 2025-03-05 DIAGNOSIS — M25.572 BILATERAL ANKLE PAIN, UNSPECIFIED CHRONICITY: ICD-10-CM

## 2025-03-05 DIAGNOSIS — L03.119 CELLULITIS OF FOOT: ICD-10-CM

## 2025-03-05 DIAGNOSIS — M79.10 MYALGIA: ICD-10-CM

## 2025-03-05 DIAGNOSIS — E78.5 DYSLIPIDEMIA: ICD-10-CM

## 2025-03-05 DIAGNOSIS — Z00.00 ROUTINE GENERAL MEDICAL EXAMINATION AT A HEALTH CARE FACILITY: Primary | ICD-10-CM

## 2025-03-05 DIAGNOSIS — M54.9 UPPER BACK PAIN ON RIGHT SIDE: ICD-10-CM

## 2025-03-05 DIAGNOSIS — S93.499A PARTIAL TEAR OF LIGAMENT OF LATERAL ASPECT OF ANKLE: ICD-10-CM

## 2025-03-05 DIAGNOSIS — C50.919 MALIGNANT NEOPLASM OF FEMALE BREAST, UNSPECIFIED ESTROGEN RECEPTOR STATUS, UNSPECIFIED LATERALITY, UNSPECIFIED SITE OF BREAST (H): ICD-10-CM

## 2025-03-05 DIAGNOSIS — E55.9 VITAMIN D INSUFFICIENCY: Chronic | ICD-10-CM

## 2025-03-05 DIAGNOSIS — Z13.9 ENCOUNTER FOR SCREENING INVOLVING SOCIAL DETERMINANTS OF HEALTH (SDOH): ICD-10-CM

## 2025-03-05 DIAGNOSIS — M54.42 CHRONIC LEFT-SIDED LOW BACK PAIN WITH LEFT-SIDED SCIATICA: ICD-10-CM

## 2025-03-05 DIAGNOSIS — G89.29 CHRONIC LEFT-SIDED LOW BACK PAIN WITH LEFT-SIDED SCIATICA: ICD-10-CM

## 2025-03-05 DIAGNOSIS — Z23 NEED FOR VACCINATION: ICD-10-CM

## 2025-03-05 DIAGNOSIS — M25.571 BILATERAL ANKLE PAIN, UNSPECIFIED CHRONICITY: ICD-10-CM

## 2025-03-05 DIAGNOSIS — R73.03 PREDIABETES: ICD-10-CM

## 2025-03-05 DIAGNOSIS — R21 RASH OF FACE: ICD-10-CM

## 2025-03-05 LAB
ERYTHROCYTE [DISTWIDTH] IN BLOOD BY AUTOMATED COUNT: 14.4 % (ref 10–15)
EST. AVERAGE GLUCOSE BLD GHB EST-MCNC: 114 MG/DL
HBA1C MFR BLD: 5.6 % (ref 0–5.6)
HCT VFR BLD AUTO: 38.1 % (ref 35–47)
HGB BLD-MCNC: 12.9 G/DL (ref 11.7–15.7)
MCH RBC QN AUTO: 27.3 PG (ref 26.5–33)
MCHC RBC AUTO-ENTMCNC: 33.9 G/DL (ref 31.5–36.5)
MCV RBC AUTO: 81 FL (ref 78–100)
PLATELET # BLD AUTO: 309 10E3/UL (ref 150–450)
RBC # BLD AUTO: 4.72 10E6/UL (ref 3.8–5.2)
WBC # BLD AUTO: 8.8 10E3/UL (ref 4–11)

## 2025-03-05 RX ORDER — LEVOTHYROXINE SODIUM 100 UG/1
TABLET ORAL
Qty: 94 TABLET | Refills: 3 | Status: SHIPPED | OUTPATIENT
Start: 2025-03-05

## 2025-03-05 RX ORDER — GABAPENTIN 100 MG/1
100 CAPSULE ORAL 3 TIMES DAILY
Qty: 90 CAPSULE | Refills: 1 | Status: SHIPPED | OUTPATIENT
Start: 2025-03-05

## 2025-03-05 RX ORDER — DULOXETIN HYDROCHLORIDE 60 MG/1
60 CAPSULE, DELAYED RELEASE ORAL DAILY
Qty: 180 CAPSULE | Refills: 1 | Status: SHIPPED | OUTPATIENT
Start: 2025-03-05

## 2025-03-05 RX ORDER — SULFAMETHOXAZOLE AND TRIMETHOPRIM 800; 160 MG/1; MG/1
1 TABLET ORAL 2 TIMES DAILY
Qty: 14 TABLET | Refills: 0 | Status: SHIPPED | OUTPATIENT
Start: 2025-03-05

## 2025-03-05 RX ORDER — METFORMIN HYDROCHLORIDE 500 MG/1
TABLET, EXTENDED RELEASE ORAL
Qty: 90 TABLET | Refills: 0 | Status: SHIPPED | OUTPATIENT
Start: 2025-03-05

## 2025-03-05 RX ORDER — PANTOPRAZOLE SODIUM 20 MG/1
40 TABLET, DELAYED RELEASE ORAL 2 TIMES DAILY
Qty: 360 TABLET | Refills: 3 | Status: SHIPPED | OUTPATIENT
Start: 2025-03-05

## 2025-03-05 ASSESSMENT — PAIN SCALES - GENERAL: PAINLEVEL_OUTOF10: MODERATE PAIN (5)

## 2025-03-05 NOTE — PROGRESS NOTES
Preventive Care Visit  [facility] ADRIANA Chandra MD, Internal Medicine  Mar 5, 2025        Assessment and Plan  1. Routine general medical examination at a health care facility (Primary)    Last seen pt in 2/2024 for annual physical     Does have risk factors of hypothyroidism on levothyroxine, history of right breast cancer, prediabetes on metformin and dyslipidemia on simvastatin.       Given patient's chronic manage the patient's currently on Cymbalta 60 mg daily.  Which is also helping and patient chronic back pains, MRI lumbar spine was normal.  Does have muscle relaxer for the same.    Patient does have ongoing new concerns as mentioned in assessment plan below    - Primary Care - Care Coordination Referral; Future  - Lipid panel reflex to direct LDL Non-fasting; Future  - REVIEW OF HEALTH MAINTENANCE PROTOCOL ORDERS  - TSH WITH FREE T4 REFLEX; Future  - Pneumococcal 20 Valent Conjugate (PCV20)  - Comprehensive metabolic panel (BMP + Alb, Alk Phos, ALT, AST, Total. Bili, TP); Future  - CBC with platelets; Future  - Hemoglobin A1c; Future  - Vitamin D deficiency screening; Future  - PRIMARY CARE FOLLOW-UP SCHEDULING; Future  - Lipid panel reflex to direct LDL Non-fasting  - TSH WITH FREE T4 REFLEX  - Comprehensive metabolic panel (BMP + Alb, Alk Phos, ALT, AST, Total. Bili, TP)  - CBC with platelets  - Hemoglobin A1c  - Vitamin D deficiency screening  - DULoxetine (CYMBALTA) 60 MG capsule; Take 1 capsule (60 mg) by mouth daily.  Dispense: 180 capsule; Refill: 1  - metFORMIN (GLUCOPHAGE XR) 500 MG 24 hr tablet; TAKE 1 TABLET(500 MG) BY MOUTH DAILY WITH DINNER  Dispense: 90 tablet; Refill: 0  - levothyroxine (SYNTHROID/LEVOTHROID) 100 MCG tablet; Take 1 tablet every day  Dispense: 94 tablet; Refill: 3  - pantoprazole (PROTONIX) 20 MG EC tablet; Take 2 tablets (40 mg) by mouth 2 times daily.  Dispense: 360 tablet; Refill: 3    2. Malignant neoplasm of female breast, unspecified  estrogen receptor status, unspecified laterality, unspecified site of breast (H)  Chronic stable, continue to follow recommendations of heme oncology with surveillance mammograms as managed by them.  - CBC with platelets; Future  - CBC with platelets    3. Acquired hypothyroidism  Chronic stable, continue current levothyroxine 100 mcg daily.  Will recheck and do further recommendations.  - TSH WITH FREE T4 REFLEX; Future  - TSH WITH FREE T4 REFLEX  - levothyroxine (SYNTHROID/LEVOTHROID) 100 MCG tablet; Take 1 tablet every day  Dispense: 94 tablet; Refill: 3    4. Dyslipidemia  Chronic stable, continue current diet and lifestyle modifications.    5. Prediabetes  - Hemoglobin A1c; Future  - Hemoglobin A1c  - metFORMIN (GLUCOPHAGE XR) 500 MG 24 hr tablet; TAKE 1 TABLET(500 MG) BY MOUTH DAILY WITH DINNER  Dispense: 90 tablet; Refill: 0    6. Chronic left-sided low back pain with left-sided sciatica  - Comprehensive metabolic panel (BMP + Alb, Alk Phos, ALT, AST, Total. Bili, TP); Future  - Comprehensive metabolic panel (BMP + Alb, Alk Phos, ALT, AST, Total. Bili, TP)    7. Vitamin D insufficiency  - Vitamin D deficiency screening; Future  - Vitamin D deficiency screening    8. Rash of face  New problem with patient endorses that has been having severe papular raised skin rash on the face which is now healed and showing in the form of black spots.  Differential diagnosis of unspecified rash which will need further workup with the dermatology, will place referral to dermatology.  - Adult Dermatology  Referral; Future    9. Bilateral ankle pain, unspecified chronicity  Uncontrolled, new problem added to patient problem list after she has been dealing with this for the past few months following the podiatry closely who has done all the workup including MRI of the ankles with not much improvement of this pain.  -Physical exam done by me in the office today showing healed skin puckering of the lateral part of the  right foot, severe tenderness on palpation, mild restriction of range of movements on the midfoot bilaterally.  -Reviewed the MRI as mentioned below with the findings of possible partial ligament tear as per the MRI report and will need further recommendations from sports medicine for possible ligament repair versus conservative management.  -Given the concerns of severity of symptoms differential diagnosis of cellulitis cannot be excluded.  Patient already had workup for arthritis including rheumatoid panel and uric acid which was normal.  - gabapentin (NEURONTIN) 100 MG capsule; Take 1 capsule (100 mg) by mouth 3 times daily.  Dispense: 90 capsule; Refill: 1    10. Partial tear of ligament of lateral aspect of ankle  New problem as entered by the MRI report, placed referral to sports medicine as mentioned above.  - Orthopedic  Referral; Future    11. Cellulitis of foot  New problem as per the physical exam done by me in the office today for possible ongoing cellulitis causing the severity of pain on the right foot, went ahead and started on Bactrim antibiotic for improvement.  - sulfamethoxazole-trimethoprim (BACTRIM DS) 800-160 MG tablet; Take 1 tablet by mouth 2 times daily.  Dispense: 14 tablet; Refill: 0    12. Encounter for screening involving social determinants of health (SDoH)  - Primary Care - Care Coordination Referral; Future    13. Myalgia w hx of recurrence since 2012  14. Upper back pain on right side  - DULoxetine (CYMBALTA) 60 MG capsule; Take 1 capsule (60 mg) by mouth daily.  Dispense: 180 capsule; Refill: 1    15. Gastroesophageal reflux disease, unspecified whether esophagitis present  - pantoprazole (PROTONIX) 20 MG EC tablet; Take 2 tablets (40 mg) by mouth 2 times daily.  Dispense: 360 tablet; Refill: 3    16. Need for vaccination  - Pneumococcal 20 Valent Conjugate (PCV20)         The longitudinal plan of care for the diagnosis(es)/condition(s) as documented were addressed during  this visit. Due to the added complexity in care, I will continue to support Luci in the subsequent management and with ongoing continuity of care.      Please note that this note consists of symbols derived from keyboarding, dictation and/or voice recognition software. As a result, there may be errors in the script that have gone undetected. Please consider this when interpreting information found in this chart.    Patient Instructions   As discussed , please do fasting labs placed .    Will take care of refills.     Placed referral to Sports medicine for the ligament positive on MRI ankle done    Started on gabapentin for low dose on the ankle pains but appears as possible cellulitis cannot be excluded. Sent in 1 week of antibiotics.     Placed referral to dermatology for your facial rash.     ==========================  Patient Education  Preventive Care Advice   This is general advice given by our system to help you stay healthy. However, your care team may have specific advice just for you. Please talk to your care team about your preventive care needs.  Nutrition  Eat 5 or more servings of fruits and vegetables each day.  Try wheat bread, brown rice and whole grain pasta (instead of white bread, rice, and pasta).  Get enough calcium and vitamin D. Check the label on foods and aim for 100% of the RDA (recommended daily allowance).  Lifestyle  Exercise at least 150 minutes each week  (30 minutes a day, 5 days a week).  Do muscle strengthening activities 2 days a week. These help control your weight and prevent disease.  No smoking.  Wear sunscreen to prevent skin cancer.  Have a dental exam and cleaning every 6 months.  Yearly exams  See your health care team every year to talk about:  Any changes in your health.  Any medicines your care team has prescribed.  Preventive care, family planning, and ways to prevent chronic diseases.  Shots (vaccines)   HPV shots (up to age 26), if you've never had them  before.  Hepatitis B shots (up to age 59), if you've never had them before.  COVID-19 shot: Get this shot when it's due.  Flu shot: Get a flu shot every year.  Tetanus shot: Get a tetanus shot every 10 years.  Pneumococcal, hepatitis A, and RSV shots: Ask your care team if you need these based on your risk.  Shingles shot (for age 50 and up)  General health tests  Diabetes screening:  Starting at age 35, Get screened for diabetes at least every 3 years.  If you are younger than age 35, ask your care team if you should be screened for diabetes.  Cholesterol test: At age 39, start having a cholesterol test every 5 years, or more often if advised.  Bone density scan (DEXA): At age 50, ask your care team if you should have this scan for osteoporosis (brittle bones).  Hepatitis C: Get tested at least once in your life.  STIs (sexually transmitted infections)  Before age 24: Ask your care team if you should be screened for STIs.  After age 24: Get screened for STIs if you're at risk. You are at risk for STIs (including HIV) if:  You are sexually active with more than one person.  You don't use condoms every time.  You or a partner was diagnosed with a sexually transmitted infection.  If you are at risk for HIV, ask about PrEP medicine to prevent HIV.  Get tested for HIV at least once in your life, whether you are at risk for HIV or not.  Cancer screening tests  Cervical cancer screening: If you have a cervix, begin getting regular cervical cancer screening tests starting at age 21.  Breast cancer scan (mammogram): If you've ever had breasts, begin having regular mammograms starting at age 40. This is a scan to check for breast cancer.  Colon cancer screening: It is important to start screening for colon cancer at age 45.  Have a colonoscopy test every 10 years (or more often if you're at risk) Or, ask your provider about stool tests like a FIT test every year or Cologuard test every 3 years.  To learn more about your  testing options, visit:   .  For help making a decision, visit:   https://bit.ly/xt77810.  Prostate cancer screening test: If you have a prostate, ask your care team if a prostate cancer screening test (PSA) at age 55 is right for you.  Lung cancer screening: If you are a current or former smoker ages 50 to 80, ask your care team if ongoing lung cancer screenings are right for you.  For informational purposes only. Not to replace the advice of your health care provider. Copyright   2023 [employer]. All rights reserved. Clinically reviewed by the [facility] Transitions Program. Concuity 027399 - REV 01/24.     Return in about 3 months (around 6/5/2025), or if symptoms worsen or fail to improve, for Follow up of last visit, If symptoms persist, video visit.    Melida Chandra MD  [facility] ADRIANA Verma is a 54 year old, presenting for the following:  Physical        3/5/2025     3:34 PM   Additional Questions   Roomed by Ellie JORDAN          Healthy Habits:     Getting at least 3 servings of Calcium per day:  Yes    Bi-annual eye exam:  Yes    Dental care twice a year:  Yes    Sleep apnea or symptoms of sleep apnea:  None    Diet:  Regular (no restrictions)    Frequency of exercise:  2-3 days/week    Duration of exercise:  15-30 minutes    Taking medications regularly:  Yes    Barriers to taking medications:  None    Medication side effects:  None    Additional concerns today:  No    Advance Care Planning  Patient does not have a Health Care Directive: Discussed advance care planning with patient; however, patient declined at this time.      3/4/2025   General Health   How would you rate your overall physical health? Good   Feel stress (tense, anxious, or unable to sleep) Only a little   (!) STRESS CONCERN      3/4/2025   Nutrition   Three or more servings of calcium each day? Yes   Diet: Regular (no restrictions)   How many servings of fruit and vegetables  per day? (!) 2-3   How many sweetened beverages each day? 0-1         3/4/2025   Exercise   Days per week of moderate/strenous exercise 3 days   Average minutes spent exercising at this level 30 min         3/4/2025   Social Factors   Frequency of gathering with friends or relatives Twice a week   Worry food won't last until get money to buy more Yes   Food not last or not have enough money for food? Yes   Do you have housing? (Housing is defined as stable permanent housing and does not include staying ouside in a car, in a tent, in an abandoned building, in an overnight shelter, or couch-surfing.) No   Are you worried about losing your housing? No   Lack of transportation? No   Unable to get utilities (heat,electricity)? No   Want help with housing or utility concern? (!) YES   (!) FOOD SECURITY CONCERN PRESENT(!) HOUSING CONCERN PRESENT      3/4/2025   Fall Risk   Fallen 2 or more times in the past year? No   Trouble with walking or balance? No          3/4/2025   Dental   Dentist two times every year? Yes            Today's PHQ-2 Score:       3/4/2025     5:47 PM   PHQ-2 ( 1999 [employer])   Q1: Little interest or pleasure in doing things 2   Q2: Feeling down, depressed or hopeless 0   PHQ-2 Score 2    Q1: Little interest or pleasure in doing things More than half the days   Q2: Feeling down, depressed or hopeless Not at all   PHQ-2 Score 2       Patient-reported           3/4/2025   Substance Use   Alcohol more than 3/day or more than 7/wk No   Do you use any other substances recreationally? No     Social History     Tobacco Use    Smoking status: Never    Smokeless tobacco: Never    Tobacco comments:     None   Vaping Use    Vaping status: Never Used   Substance Use Topics    Alcohol use: No    Drug use: No           9/23/2024   LAST FHS-7 RESULTS   1st degree relative breast or ovarian cancer No   Any relative bilateral breast cancer No   Any male have breast cancer No   Any ONE woman have BOTH breast AND ovarian  cancer No   Any woman with breast cancer before 50yrs No   2 or more relatives with breast AND/OR ovarian cancer No   2 or more relatives with breast AND/OR bowel cancer No        Mammogram Screening - Annual screen due to greater than 20% lifetime risk as estimated by Breast Cancer Risk Calculator        3/4/2025   STI Screening   New sexual partner(s) since last STI/HIV test? No     History of abnormal Pap smear: No - age 30- 64 PAP with HPV every 5 years recommended        Latest Ref Rng & Units 12/3/2020     4:28 PM 12/3/2020     3:55 PM 8/25/2017    12:21 PM   PAP / HPV   PAP (Historical)   NIL     HPV 16 DNA NEG^Negative Negative   Negative    HPV 18 DNA NEG^Negative Negative   Negative    Other HR HPV NEG^Negative Negative   Negative      ASCVD Risk   The 10-year ASCVD risk score (Dana ROSENBAUM, et al., 2019) is: 2.5%    Values used to calculate the score:      Age: 54 years      Sex: Female      Is Non- : Yes      Diabetic: No      Tobacco smoker: No      Systolic Blood Pressure: 130 mmHg      Is BP treated: No      HDL Cholesterol: 60 mg/dL      Total Cholesterol: 198 mg/dL  \    Reviewed and updated as needed this visit by Provider   Tobacco  Allergies  Meds  Problems  Med Hx  Surg Hx  Fam Hx            Past Medical History:   Diagnosis Date    Cancer (H)     breast    Diabetes (H)     pre-dm    High cholesterol     Hypertension goal BP (blood pressure) < 140/80 2/18/2014    Lateral epicondylitis, right dominant elbow since late 10-17 11/1/2017    Migraine     Port-A-Cath in place 10/26/2018    Thyroid disease      Past Surgical History:   Procedure Laterality Date    BIOPSY NODE SENTINEL Right 05/15/2019    Procedure: BIOPSY, LYMPH NODE, SENTINEL;  Surgeon: Stacey Ashford MD;  Location:  OR    BREAST SURGERY      COLONOSCOPY N/A 01/26/2022    Procedure: COLONOSCOPY;  Surgeon: Zac Arriola MD;  Location:  GI    DISSECT LYMPH NODE AXILLA Right 05/15/2019     Procedure: LYMPHADENECTOMY, AXILLARY;  Surgeon: Stacey Ashford MD;  Location:  OR    HAND SURGERY  2002    left    INSERT PORT VASCULAR ACCESS N/A 10/18/2018    Procedure: INSERTION OF VASCULAR PORT;  Surgeon: Stacey Ashford MD;  Location:  OR    LUMPECTOMY BREAST WITH SEED LOCALIZATION Right 05/15/2019    Procedure: SEED LOCALIZATION BREAST LUMPECTOMY, SEED LOCALIZATION AXILLARY BREAST BIOPSY, SENTINEL NODE , REMOVAL OF VASCULAR PORT ACCESS AND RIGHT  AXILLARY NODE DISSECTION;  Surgeon: Stacey Ashford MD;  Location:  OR    REMOVE PORT VASCULAR ACCESS N/A 05/15/2019    Procedure: REMOVAL, VASCULAR ACCESS PORT;  Surgeon: Stacey Ashford MD;  Location:  OR    TUBAL LIGATION       OB History    Para Term  AB Living   3 3 0 0 0 0   SAB IAB Ectopic Multiple Live Births   0 0 0 0 0      # Outcome Date GA Lbr Murtaza/2nd Weight Sex Type Anes PTL Lv   3 Para            2 Para            1 Para              Lab work is in process  Labs reviewed in EPIC  BP Readings from Last 3 Encounters:   25 130/76   25 (!) 159/86   25 134/78    Wt Readings from Last 3 Encounters:   25 89.8 kg (198 lb)   25 90.7 kg (200 lb)   25 91.6 kg (202 lb)                  Patient Active Problem List   Diagnosis    Myalgia and myositis    Gastroesophageal reflux disease without esophagitis    Helicobacter pylori infection    Acquired hypothyroidism    Disorder of metabolism    Polycystic ovaries    Vitamin D insufficiency    BMI 35    Rosacea    Absence of menstruation    Chronic left-sided low back pain with left-sided sciatica    Pinguecula of both eyes    Presbyopia    Impaired fasting glucose    Migraine without aura and without status migrainosus, not intractable    Class 1 obesity due to excess calories with serious comorbidity and body mass index (BMI) of 31.0 to 31.9 in adult    Myalgia w hx of recurrence since     Mixed hyperlipidemia    Malignant neoplasm of  upper-outer quadrant of right breast in female, estrogen receptor negative (H)    Drug or medicinal substance causing adverse effect in therapeutic use, initial encounter    Abnormal electrocardiogram    Port-A-Cath in place    Breast cancer (H)    Monoallelic mutation of PALB2 gene    Acute bilateral low back pain with right-sided sciatica    Discomfort of right ear    Breast pain    Dyslipidemia    Prediabetes    Class 2 severe obesity due to excess calories with serious comorbidity in adult (H)     Past Surgical History:   Procedure Laterality Date    BIOPSY NODE SENTINEL Right 05/15/2019    Procedure: BIOPSY, LYMPH NODE, SENTINEL;  Surgeon: Stacey Ashford MD;  Location:  OR    BREAST SURGERY      COLONOSCOPY N/A 01/26/2022    Procedure: COLONOSCOPY;  Surgeon: Zac Arriola MD;  Location:  GI    DISSECT LYMPH NODE AXILLA Right 05/15/2019    Procedure: LYMPHADENECTOMY, AXILLARY;  Surgeon: Stacey Ahsford MD;  Location:  OR    HAND SURGERY  01/01/2002    left    INSERT PORT VASCULAR ACCESS N/A 10/18/2018    Procedure: INSERTION OF VASCULAR PORT;  Surgeon: Stacey Ashford MD;  Location:  OR    LUMPECTOMY BREAST WITH SEED LOCALIZATION Right 05/15/2019    Procedure: SEED LOCALIZATION BREAST LUMPECTOMY, SEED LOCALIZATION AXILLARY BREAST BIOPSY, SENTINEL NODE , REMOVAL OF VASCULAR PORT ACCESS AND RIGHT  AXILLARY NODE DISSECTION;  Surgeon: Stacey Ashford MD;  Location:  OR    REMOVE PORT VASCULAR ACCESS N/A 05/15/2019    Procedure: REMOVAL, VASCULAR ACCESS PORT;  Surgeon: Stacey Ashford MD;  Location:  OR    TUBAL LIGATION         Social History     Tobacco Use    Smoking status: Never    Smokeless tobacco: Never    Tobacco comments:     None   Substance Use Topics    Alcohol use: No     Family History   Problem Relation Age of Onset    Diabetes Mother     Unknown/Adopted Father     Coronary Artery Disease No family hx of     Hypertension No family hx of     Hyperlipidemia No family hx  of     Cerebrovascular Disease No family hx of     Breast Cancer No family hx of     Colon Cancer No family hx of     Prostate Cancer No family hx of     Other Cancer No family hx of     Depression No family hx of     Anxiety Disorder No family hx of     Mental Illness No family hx of     Substance Abuse No family hx of     Anesthesia Reaction No family hx of     Asthma No family hx of     Osteoporosis No family hx of     Genetic Disorder No family hx of     Thyroid Disease No family hx of     Obesity No family hx of          Current Outpatient Medications   Medication Sig Dispense Refill    ascorbic acid (VITAMIN C) 1000 MG TABS Take 1,000 mg by mouth daily      aspirin 81 MG EC tablet Take 1 tablet (81 mg) by mouth daily 90 tablet 3    atorvastatin (LIPITOR) 40 MG tablet Take 1 tablet (40 mg) by mouth daily 90 tablet 3    ciprofloxacin-dexAMETHasone (CIPRODEX) 0.3-0.1 % otic suspension Place 4 drops Into the left ear 2 times daily. 7.5 mL 0    diclofenac (CATAFLAM) 50 MG tablet Take 1 tablet (50 mg) by mouth 2 times daily as needed for moderate pain. 20 tablet 0    DULoxetine (CYMBALTA) 60 MG capsule Take 1 capsule (60 mg) by mouth daily. 180 capsule 1    ferrous sulfate (FE TABS) 325 (65 Fe) MG EC tablet Take 1 tablet (325 mg) by mouth daily 90 tablet 1    gabapentin (NEURONTIN) 100 MG capsule Take 1 capsule (100 mg) by mouth 3 times daily. 90 capsule 1    hydrocortisone 2.5 % ointment Apply topically 2 times daily. Apply to affected areas twice daily 30 g 1    levothyroxine (SYNTHROID/LEVOTHROID) 100 MCG tablet Take 1 tablet every day 94 tablet 3    metFORMIN (GLUCOPHAGE XR) 500 MG 24 hr tablet TAKE 1 TABLET(500 MG) BY MOUTH DAILY WITH DINNER 90 tablet 0    pantoprazole (PROTONIX) 20 MG EC tablet Take 2 tablets (40 mg) by mouth 2 times daily. 360 tablet 3    sulfamethoxazole-trimethoprim (BACTRIM DS) 800-160 MG tablet Take 1 tablet by mouth 2 times daily. 14 tablet 0    tiZANidine (ZANAFLEX) 6 MG capsule Take 1  "capsule (6 mg) by mouth nightly as needed (for jaw pain and upper back pain) 30 capsule 1     Allergies   Allergen Reactions    Clindamycin      Pt gets GI symptoms, gastritis .and lower extremity numbness     Recent Labs   Lab Test 03/05/25  1637 02/28/24  1550 02/20/24  1529 05/02/23  1444 02/22/23  1144 09/14/22  0930 09/14/22  0930 05/11/22  0844 02/16/22  1101 12/15/21  1433 11/16/21  1525 04/07/21  1204 12/03/20  1606 10/15/20  1232   A1C 5.6 5.8*  --   --  5.6  --  5.6  --   --  5.7*   < >  --   --   --    LDL  --  123*  --   --   --   --  137*  --   --  132*  --   --    < >  --    HDL  --  60  --   --   --   --  61  --   --  65  --   --    < >  --    TRIG  --  77  --   --   --   --  90  --   --  87  --   --    < >  --    ALT  --   --   --   --  25  --  26 25  --   --    < > 28  --  35   CR  --   --  0.75  --  0.82  --  0.78 0.69  --   --    < > 0.75  --  0.68   GFRESTIMATED  --   --  >90  --  86  --  >90 >90  --   --    < > >90  --  >90   GFRESTBLACK  --   --   --   --   --   --   --   --   --   --   --  >90  --  >90   POTASSIUM  --   --  4.1  --  4.2  --  4.6 4.1  --   --    < > 4.0  --  4.4   TSH  --  0.02*  --  6.26* 20.40*   < >  --   --    < >  --   --   --    < > <0.01*    < > = values in this interval not displayed.               Review of Systems  Constitutional, HEENT, cardiovascular, pulmonary, GI, , musculoskeletal, neuro, skin, endocrine and psych systems are negative, except as otherwise noted.     Objective    Exam  /76   Pulse 80   Temp 97.3  F (36.3  C)   Resp 18   Ht 1.632 m (5' 4.25\")   Wt 89.8 kg (198 lb)   SpO2 98%   BMI 33.72 kg/m     Estimated body mass index is 33.72 kg/m  as calculated from the following:    Height as of this encounter: 1.632 m (5' 4.25\").    Weight as of this encounter: 89.8 kg (198 lb).    Physical Exam  GENERAL: alert and no distress  EYES: Eyes grossly normal to inspection, PERRL and conjunctivae and sclerae normal  HENT: ear canals and TM's normal, " nose and mouth without ulcers or lesions  NECK: no adenopathy, no asymmetry, masses, or scars  RESP: lungs clear to auscultation - no rales, rhonchi or wheezes  BREAST: Deferred, mammogram  CV: regular rate and rhythm, normal S1 S2, no S3 or S4, no murmur, click or rub, no peripheral edema  ABDOMEN: soft, nontender, no hepatosplenomegaly, no masses and bowel sounds normal  MS: no gross musculoskeletal defects noted, no edema  ANKLE EXAM : Healed skin puckering of the lateral part of the right foot, severe tenderness on palpation, mild restriction of range of movements on the midfoot bilaterally.  SKIN: POSITIVE for maculopapular raised skin rash on the face which is now healed and showing in the form of black spots.  NEURO: Normal strength and tone, mentation intact and speech normal  PSYCH: mentation appears normal, affect normal/bright        Signed Electronically by: Melida Chandra MD

## 2025-03-05 NOTE — PATIENT INSTRUCTIONS
Date of Service: 01/27/2023    HISTORY OF PRESENT ILLNESS:    Right side face pain, cervical spine stenosis.  She is off antibiotics, finished that.  The right sided pain persists.  It is considered to be related to TMJ.  She has cervical spine stenosis, was diagnosed with a recent x-ray.  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    REVIEW OF SYSTEMS:    No chest pain, no weight loss, no diaphoresis.    PHYSICAL EXAMINATION:    GENERAL:  Pleasant, speaks in full sentences.  SKIN:  No skin rashes, good skin turgor.  LUNGS:  No respiratory distress.  EXTREMITIES:  No clubbing.  Gait and station normal.    ASSESSMENT AND PLAN:   In Epic.       Dictated By: Venessa Roberts MD  Signing Provider: Venessa Roberts MD    MS/yasmeen ([id number])   DD: 02/03/2023 11:11:21 AM TD: 02/05/2023 8:40:12 AM       As discussed , please do fasting labs placed .    Will take care of refills.     Placed referral to Sports medicine for the ligament positive on MRI ankle done    Started on gabapentin for low dose on the ankle pains but appears as possible cellulitis cannot be excluded. Sent in 1 week of antibiotics.     Placed referral to dermatology for your facial rash.     ==========================  Patient Education   Preventive Care Advice   This is general advice given by our system to help you stay healthy. However, your care team may have specific advice just for you. Please talk to your care team about your preventive care needs.  Nutrition  Eat 5 or more servings of fruits and vegetables each day.  Try wheat bread, brown rice and whole grain pasta (instead of white bread, rice, and pasta).  Get enough calcium and vitamin D. Check the label on foods and aim for 100% of the RDA (recommended daily allowance).  Lifestyle  Exercise at least 150 minutes each week  (30 minutes a day, 5 days a week).  Do muscle strengthening activities 2 days a week. These help control your weight and prevent disease.  No smoking.  Wear sunscreen to prevent skin cancer.  Have a dental exam and cleaning every 6 months.  Yearly exams  See your health care team every year to talk about:  Any changes in your health.  Any medicines your care team has prescribed.  Preventive care, family planning, and ways to prevent chronic diseases.  Shots (vaccines)   HPV shots (up to age 26), if you've never had them before.  Hepatitis B shots (up to age 59), if you've never had them before.  COVID-19 shot: Get this shot when it's due.  Flu shot: Get a flu shot every year.  Tetanus shot: Get a tetanus shot every 10 years.  Pneumococcal, hepatitis A, and RSV shots: Ask your care team if you need these based on your risk.  Shingles shot (for age 50 and up)  General health tests  Diabetes screening:  Starting at age 35, Get screened for diabetes at least every 3  years.  If you are younger than age 35, ask your care team if you should be screened for diabetes.  Cholesterol test: At age 39, start having a cholesterol test every 5 years, or more often if advised.  Bone density scan (DEXA): At age 50, ask your care team if you should have this scan for osteoporosis (brittle bones).  Hepatitis C: Get tested at least once in your life.  STIs (sexually transmitted infections)  Before age 24: Ask your care team if you should be screened for STIs.  After age 24: Get screened for STIs if you're at risk. You are at risk for STIs (including HIV) if:  You are sexually active with more than one person.  You don't use condoms every time.  You or a partner was diagnosed with a sexually transmitted infection.  If you are at risk for HIV, ask about PrEP medicine to prevent HIV.  Get tested for HIV at least once in your life, whether you are at risk for HIV or not.  Cancer screening tests  Cervical cancer screening: If you have a cervix, begin getting regular cervical cancer screening tests starting at age 21.  Breast cancer scan (mammogram): If you've ever had breasts, begin having regular mammograms starting at age 40. This is a scan to check for breast cancer.  Colon cancer screening: It is important to start screening for colon cancer at age 45.  Have a colonoscopy test every 10 years (or more often if you're at risk) Or, ask your provider about stool tests like a FIT test every year or Cologuard test every 3 years.  To learn more about your testing options, visit:   .  For help making a decision, visit:   https://bit.ly/wl56750.  Prostate cancer screening test: If you have a prostate, ask your care team if a prostate cancer screening test (PSA) at age 55 is right for you.  Lung cancer screening: If you are a current or former smoker ages 50 to 80, ask your care team if ongoing lung cancer screenings are right for you.  For informational purposes only. Not to replace the advice of your  health care provider. Copyright   2023 [facility]. All rights reserved. Clinically reviewed by the [facility] Transitions Program. Celsion 874553 - REV 01/24.

## 2025-03-06 ENCOUNTER — PATIENT OUTREACH (OUTPATIENT)
Dept: CARE COORDINATION | Facility: CLINIC | Age: 55
End: 2025-03-06
Payer: COMMERCIAL

## 2025-03-06 LAB
ALBUMIN SERPL BCG-MCNC: 4.2 G/DL (ref 3.5–5.2)
ALP SERPL-CCNC: 101 U/L (ref 40–150)
ALT SERPL W P-5'-P-CCNC: 26 U/L (ref 0–50)
ANION GAP SERPL CALCULATED.3IONS-SCNC: 8 MMOL/L (ref 7–15)
AST SERPL W P-5'-P-CCNC: 28 U/L (ref 0–45)
BILIRUB SERPL-MCNC: 0.2 MG/DL
BUN SERPL-MCNC: 13.1 MG/DL (ref 6–20)
CALCIUM SERPL-MCNC: 9.5 MG/DL (ref 8.8–10.4)
CHLORIDE SERPL-SCNC: 102 MMOL/L (ref 98–107)
CHOLEST SERPL-MCNC: 209 MG/DL
CREAT SERPL-MCNC: 0.76 MG/DL (ref 0.51–0.95)
EGFRCR SERPLBLD CKD-EPI 2021: >90 ML/MIN/1.73M2
FASTING STATUS PATIENT QL REPORTED: YES
FASTING STATUS PATIENT QL REPORTED: YES
GLUCOSE SERPL-MCNC: 82 MG/DL (ref 70–99)
HCO3 SERPL-SCNC: 28 MMOL/L (ref 22–29)
HDLC SERPL-MCNC: 53 MG/DL
LDLC SERPL CALC-MCNC: 133 MG/DL
NONHDLC SERPL-MCNC: 156 MG/DL
POTASSIUM SERPL-SCNC: 4 MMOL/L (ref 3.4–5.3)
PROT SERPL-MCNC: 8 G/DL (ref 6.4–8.3)
SODIUM SERPL-SCNC: 138 MMOL/L (ref 135–145)
T4 FREE SERPL-MCNC: 1.87 NG/DL (ref 0.9–1.7)
TRIGL SERPL-MCNC: 115 MG/DL
TSH SERPL DL<=0.005 MIU/L-ACNC: 0.06 UIU/ML (ref 0.3–4.2)
VIT D+METAB SERPL-MCNC: 31 NG/ML (ref 20–50)

## 2025-03-06 NOTE — PROGRESS NOTES
Clinic Care Coordination Contact  Community Health Worker Initial Outreach    CHW Initial Information Gathering:  Referral Source: PCP  Preferred Hospital: [facility]  [phone]  Current living arrangement:: I live in a private home with family (Plus 3 children)  Type of residence:: Private home - stairs  Community Resources: None  Supplies Currently Used at Home: None  Equipment Currently Used at Home: none  Informal Support system:: Children, Family (Sister)  No PCP office visit in Past Year: No  Transportation means:: Family (Son drives for patinet)  CHW Additional Questions  If ED/Hospital discharge, follow-up appointment scheduled as recommended?: N/A  Medication changes made following ED/Hospital discharge?: N/A  MyChart active?: Yes    Patient accepts CC: Yes. Patient scheduled for assessment with  CC on 3/10/25 at 3pm.     Patient noted desire to discuss:    Food resources including [place] [employer]  Resources for water/electricity bills     Applied for SNAP but declined.     Previously used VEAP but not gone to the food shelf for a while.     Patient owns her house and no concern/issue with mortgage payment.     No other medical or SDOH concern repotted at this time.     Aline John  Community Health Worker   [email or web address]   Clinic Care Coordination / Ambulatory Care Management  [facility], and [facility]  [email or web address]  Office: [phone]  Gender Pronouns: She/her/hers  Employed by [employer]    (Covering for Nitza Echols TAY, [phone], for [facility], [place], and [facility])

## 2025-03-06 NOTE — LETTER
[facility] CARE COORDINATION  [address]  [place] [address]     March 6, 2025    Luci JORDAN Alert  [address] [address]      Dear Luci,    I am a clinic community health worker who works with Melida Chandra MD with the [facility]. I wanted to introduce myself and provide you with my contact information for you to be able to call me with any questions or concerns. I wanted to thank you for spending the time to talk with me.      Below is a description of clinic care coordination and how I can further assist you.       The clinic care coordination team is made up of a registered nurse, , financial resource worker and community health worker who understand the health care system. The goal of clinic care coordination is to help you manage your health and improve access to the health care system. Our team works alongside your provider to assist you in determining your health and social needs. We can help you obtain health care and community resources, providing you with necessary information and education. We can work with you through any barriers and develop a care plan that helps coordinate and strengthen the communication between you and your care team.  Our services are voluntary and are offered without charge to you personally.    Please feel free to contact me with any questions or concerns regarding care coordination and what we can offer.      We are focused on providing you with the highest-quality healthcare experience possible.    Sincerely,     Aline John  Community Health Worker   [email or web address]   Clinic Care Coordination / Ambulatory Care Management  [facility], and [facility]  [email or web address]  Office: [phone]  Gender Pronouns: She/her/hers  Employed by [facility]    (Covering for Nitza Echols TAY, [phone], for [facility], [facility] [place], and [facility])     Enclosed: Clinic Care Coordination Information   WHAT IS CARE COORDINATION?      [facility] Care Coordination supports patients and families dealing with chronic or complex health conditions, developmental issues, and social service needs. This service is available to patients of all ages, from babies to seniors. When you're facing a difficult decision about caring for yourself or someone you love, we can help you understand your options. We identify and refer you to community resources that help with financial, legal, mental health, transportation, and other issues. We also help with your medical and related education needs.      IS CARE COORDINATION RIGHT FOR ME?      Discuss a referral to Care Coordination with your primary care provider or care team member.      HOW CAN I CONNECT WITH CARE COORDINATION?      Contact your clinic.   Speak with your doctor or clinic staff.   Discuss care coordination with hospital staff before discharge.         MEET YOUR CARE COORDINATION TEAM      Registered Nurse Care Coordinator      Provides education on medications, disease management, and new diagnoses.   Addresses concerns about medical conditions and connects you to resources.   Develops patient-centered goals in collaboration with your care team and community partners.      Social Work Care Coordinator      Supports you with mental health concerns and psychosocial needs.   Connects you to a variety of community-based resources.   Develops patient-centered goals in collaboration with your care team and community partners.      Community Health Worker      Identifies health and social barriers and connects you with community resources.   Develops nonclinical patient-centered goals in collaboration with your care team and community partners.   Enhances communication between patients and care teams.      Financial Resource Worker      Assists you with applying for county-based health insurance and other  benefits.   Connects you with [facility].

## 2025-03-06 NOTE — Clinical Note
Lam Wilson  [facility] SW Assessment is scheduled for 3/10/25 at 3pm.   Thank you!  Aline John  Community Health Worker  [email or web address]  Clinic Care Coordination / Ambulatory Care Management [facility], and [facility] [email or web address]  Office: [phone] Gender Pronouns: She/her/hers Employed by [employer]  (Covering for Nitza Echols TAY, [phone], for [place] [facility], [place], and [facility])

## 2025-03-10 ENCOUNTER — PATIENT OUTREACH (OUTPATIENT)
Dept: NURSING | Facility: CLINIC | Age: 55
End: 2025-03-10
Payer: COMMERCIAL

## 2025-03-10 NOTE — LETTER
[facility] CARE COORDINATION    March 11, 2025    Luci Londono  [address] [address]      Dear Luci,    I am a clinic care coordinator who works with Melida Chandra MD with the [facility]. I wanted to thank you for spending the time to talk with me.  Below is a description of clinic care coordination and how I can further assist you.       The clinic care coordination team is made up of a registered nurse, , financial resource worker and community health worker who understand the health care system. The goal of clinic care coordination is to help you manage your health and improve access to the health care system. Our team works alongside your provider to assist you in determining your health and social needs. We can help you obtain health care and community resources, providing you with necessary information and education. We can work with you through any barriers and develop a care plan that helps coordinate and strengthen the communication between you and your care team.  Our services are voluntary and are offered without charge to you personally.    Please feel free to contact me with any questions or concerns regarding care coordination and what we can offer.      We are focused on providing you with the highest-quality healthcare experience possible.    Sincerely,     DAVID Donovan   Care Coordination Team  [phone]      Enclosed: I have enclosed a copy of the Patient Centered Plan of Care. This has helpful information and goals that we have talked about. Please keep this in an easy to access place to use as needed.

## 2025-03-10 NOTE — LETTER
[facility] CARE COORDINATION    March 11, 2025    Luci Londono  [address] [address]      Dear Luci,    I am a clinic care coordinator who works with Melida Chandra MD with the [facility]. I wanted to thank you for spending the time to talk with me.  Below is a description of clinic care coordination and how I can further assist you.       The clinic care coordination team is made up of a registered nurse, , financial resource worker and community health worker who understand the health care system. The goal of clinic care coordination is to help you manage your health and improve access to the health care system. Our team works alongside your provider to assist you in determining your health and social needs. We can help you obtain health care and community resources, providing you with necessary information and education. We can work with you through any barriers and develop a care plan that helps coordinate and strengthen the communication between you and your care team.  Our services are voluntary and are offered without charge to you personally.    Please feel free to contact me with any questions or concerns regarding care coordination and what we can offer.      We are focused on providing you with the highest-quality healthcare experience possible.    Sincerely,     DAVID Donovan   Care Coordination Team  [phone]      Enclosed: I have enclosed a copy of the Patient Centered Plan of Care. This has helpful information and goals that we have talked about. Please keep this in an easy to access place to use as needed.

## 2025-03-10 NOTE — LETTER
[facility]  Patient Centered Plan of Care  About Me:        Patient Name:  Luci Londono    YOB: 1970  Age:         54 year old   Alessandro MRN:    [id number] Telephone Information:  Home Phone [phone]   Mobile [phone]   Home Phone Not on file.       Address:  [address] [place] [place] [address] Email address:  [email or web address]      Emergency Contact(s)    Name Relationship Lgl Grd Work Phone Home Phone Mobile Phone   1. ELSY TOPETE Sister   [phone]   2. JERSEY SUMMERS Son   [phone]           Primary language:  English     needed? No   [employer]:  [phone] op. 1  Other communication barriers:None    Preferred Method of Communication:  Mail  Current living arrangement: I live in a private home with family (Plus 3 children)    Mobility Status/ Medical Equipment: Independent        Health Maintenance  Health Maintenance Reviewed: Due/Overdue   Health Maintenance Due   Topic Date Due    HEPATITIS B IMMUNIZATION (1 of 3 - 19+ 3-dose series) Never done    INFLUENZA VACCINE (1) 09/01/2024    COVID-19 Vaccine (4 - 2024-25 season) 09/01/2024          My Access Plan  Medical Emergency 911   Primary Clinic Line [facility] [phone]   24 Hour Appointment Line [phone] or  [address] [place] [address] [phone]) (toll-free)   24 Hour Nurse Line [phone] (toll-free)   Preferred Urgent Care No data recorded   Preferred Hospital [facility]  [phone]     [id number] [place], [place] - [address] [facility]     Behavioral Health Crisis Line The National Suicide Prevention Lifeline at 1-447.689.3235 or Text/Call 558           My Care Team Members  Patient Care Team         Relationship Specialty Notifications Start End    Melida Chandra MD PCP - General Internal Medicine  9/29/20     Phone:  [phone] Fax: [phone]         [address] [place] [address] [address] [place] [address]    Ailyn Humphrey MD Assigned Cancer Care Provider   11/7/21     Phone: [phone] Fax: [phone]         [facility] [address] [place] [address]    Melida Chandra MD Assigned PCP   3/6/22     Phone: [phone] Fax: [phone]         [address] [place] [address]    Jan Sosa DPM Assigned Surgical Provider   2/23/25     Phone: [phone] Fax: [phone] [address] [facility] [address]    Katie Leonardo LSW Lead Care Coordinator Primary Care - CC Admissions 3/6/25     Phone: [phone]         Renetta Swain PA-C Physician Assistant Dermatology  3/6/25     Phone: [phone] Fax: [phone]         [address] [facility] [address]    Nitza Echols CHW Community Health Worker Primary Care - CC Admissions 3/11/25     Phone: [phone]                     My Care Plans  Self Management and Treatment Plan    Care Plan  Care Plan: Nutrition       Problem: Diet management       Goal: Demonstrate improved access to helth food       Start Date: 3/10/2025 Expected End Date: 4/30/2025    Priority: High    Note:     Barriers: Finances are tight  Strengths: None noted  Patient expressed understanding of goal: Yes  Action steps to achieve this goal:  1. I will access [employer].  Survey completed on 3/10/25.  2. I will access other food resources such as VEAP as needed.                               Action Plans on File:            Depression     Migraine    Advance Care Plans/Directives:   Advanced Care Plan/Directives on file: No    Discussed with patient/caregiver(s): No data recorded  Honoring Choices    Advance Care Planning and Health Care Directives  When it comes to decisions about your health care, it s important that your voice is heard. You may not always be able to speak for yourself.    We encourage you to have discussions  with your loved ones, cultural or spiritual leaders and health care providers about your goals, values, beliefs and choices.    We are a part of [employer] [place] , supporting and promoting the benefits of advance care planning conversations.    Our goals are to:  Help you make informed decisions about your healthcare choices and ensure that those choices are honored  Offer advance care planning discussions with trained staff  Ensure your choices are clearly defined, documented and available in your medical record  Translate your choices into medical orders as needed    Why is Advance Care Planning important?  Know what your health care choices are and decide what is right for you  An unexpected illness or injury could leave you unable to participate in important treatment decisions  When choices are left to others to decide that responsibility can be difficult and stressful  By discussing and outlining your choices, your voice is heard in the care you want to receive    How can I learn more?  We offer free classes at multiple locations, days and times. Our trained facilitators will provide information and guide you through a Health Care Directive document. They can also review, notarize and add your document to your medical record.    Call [employer] at [phone] or toll free at [phone] for assistance.          My Medical and Care Information  Problem List   Patient Active Problem List   Diagnosis    Myalgia and myositis    Gastroesophageal reflux disease without esophagitis    Helicobacter pylori infection    Acquired hypothyroidism    Disorder of metabolism    Polycystic ovaries    Vitamin D insufficiency    BMI 35    Rosacea    Absence of menstruation    Chronic left-sided low back pain with left-sided sciatica    Pinguecula of both eyes    Presbyopia    Impaired fasting glucose    Migraine without aura and without status migrainosus, not intractable    Class 1 obesity due to  excess calories with serious comorbidity and body mass index (BMI) of 31.0 to 31.9 in adult    Myalgia w hx of recurrence since 2012    Mixed hyperlipidemia    Malignant neoplasm of upper-outer quadrant of right breast in female, estrogen receptor negative (H)    Drug or medicinal substance causing adverse effect in therapeutic use, initial encounter    Abnormal electrocardiogram    Port-A-Cath in place    Breast cancer (H)    Monoallelic mutation of PALB2 gene    Acute bilateral low back pain with right-sided sciatica    Discomfort of right ear    Breast pain    Dyslipidemia    Prediabetes    Class 2 severe obesity due to excess calories with serious comorbidity in adult (H)      Current Medications:  Please refer to the most recent medication list provided to you by your medical team and reach out to your provider with any questions or to make any corrections.    Care Coordination Start Date: 3/5/2025   Frequency of Care Coordination: monthly, more frequently as needed     Form Last Updated: 03/11/2025

## 2025-03-11 ASSESSMENT — ACTIVITIES OF DAILY LIVING (ADL): DEPENDENT_IADLS:: INDEPENDENT

## 2025-03-11 NOTE — PROGRESS NOTES
Clinic Care Coordination Contact  Clinic Care Coordination Contact  OUTREACH    Referral Information:  Referral Source: PCP    Primary Diagnosis: Other (include Comment box)    Chief Complaint   Patient presents with    Clinic Care Coordination - Initial     Food resources        Universal Utilization: 62% Admission or ED Risk  Clinic Utilization  Difficulty keeping appointments:: No  Compliance Concerns: No  No-Show Concerns: No  No PCP office visit in Past Year: No  Utilization      No Show Count (past year)  0             ED Visits  1             Hospital Admissions  0                    Current as of: 3/10/2025  4:22 PM                Clinical Concerns:  Current Medical Concerns:    Patient Active Problem List   Diagnosis    Myalgia and myositis    Gastroesophageal reflux disease without esophagitis    Helicobacter pylori infection    Acquired hypothyroidism    Disorder of metabolism    Polycystic ovaries    Vitamin D insufficiency    BMI 35    Rosacea    Absence of menstruation    Chronic left-sided low back pain with left-sided sciatica    Pinguecula of both eyes    Presbyopia    Impaired fasting glucose    Migraine without aura and without status migrainosus, not intractable    Class 1 obesity due to excess calories with serious comorbidity and body mass index (BMI) of 31.0 to 31.9 in adult    Myalgia w hx of recurrence since 2012    Mixed hyperlipidemia    Malignant neoplasm of upper-outer quadrant of right breast in female, estrogen receptor negative (H)    Drug or medicinal substance causing adverse effect in therapeutic use, initial encounter    Abnormal electrocardiogram    Port-A-Cath in place    Breast cancer (H)    Monoallelic mutation of PALB2 gene    Acute bilateral low back pain with right-sided sciatica    Discomfort of right ear    Breast pain    Dyslipidemia    Prediabetes    Class 2 severe obesity due to excess calories with serious comorbidity in adult (H)        Luci is interested in the  [employer] program.  We competed the survey over the phone.  SVETA ESPINOSA emailed her the dates and locations to her per her request.  She is familiar with [employer] and will access them as needed.  No other needs or concerns at this time.    Current Behavioral Concerns: None noted    Education Provided to patient: FRANCISCA role, [employer], [employer]    It was nice talking with you today. Here is information about [employer]. .          [employer]: .  You can receive $80 worth of food (meat packages, fruit and vegetables)  to use at a [facility] OR [facility] [employer].  When you go to one of these locations tell the  that you are from [place] and your name is on the list for the $80.00 voucher.  You can complete the survey here:      Fare For All schedule: [email or web address]    Fare for All Locations     [facility]  The dates below are 2025 Fare For All service dates.  Monthly Day & Time  Monthly on a Friday, from 11:00am - 1:00pm.  Sale Dates  January 17, February 21, March 21, April 18, May 16  Address  [address], [place] [address]  Learn More    [facility] (door [address])  The dates below are 2025 Fare For All service dates.  Monthly Day & Time  Monthly on a Wednesday, from 3:00pm - 5:00pm.  Sale Dates  January 22, February 26, March 26, April 23, May 21, June 25, July 30, August 27, September 24, October 22, November 19, December 17  Address  [address], [place] [address]  Learn More    [place]  The dates below are 2025 Fare For All service dates.  Monthly Day & Time  Monthly on a Tuesday from 3:30pm - 5:30pm.  Sale Dates  January 7, February 11, March 11, April 8, May 6, Lisa 10, July 15, August 12, September 9, October 7, November 4, December 2  Address  [address] [place] [address]  Learn More    [facility]  The dates below  are 2025 Fare For All service dates.  Monthly Day & Time  Monthly on a Tuesday, from 1:00pm - 3:00pm.  Sale Dates  January 14, February 25, March 25, April 22, May 20, June 24, July 29, August 26, September 23, October 21, November 18, December 16  Address  [address] [place] [address]  Learn More      Health Maintenance Reviewed: Due/Overdue   Health Maintenance Due   Topic Date Due    HEPATITIS B IMMUNIZATION (1 of 3 - 19+ 3-dose series) Never done    INFLUENZA VACCINE (1) 09/01/2024    COVID-19 Vaccine (4 - 2024-25 season) 09/01/2024      Medication Management:  Medication review status: Medications reviewed and no changes reported per patient.             Functional Status:  Dependent ADLs:: Independent  Dependent IADLs:: Independent  Mobility Status: Independent  Fallen 2 or more times in the past year?: No  Any fall with injury in the past year?: No    Living Situation:  Current living arrangement:: I live in a private home with family (Plus 3 children)  Type of residence:: Private home - [facility]    Lifestyle & Psychosocial Needs:    Social Drivers of Health     Food Insecurity: High Risk (3/4/2025)    Food Insecurity     Within the past 12 months, did you worry that your food would run out before you got money to buy more?: Yes     Within the past 12 months, did the food you bought just not last and you didn t have money to get more?: Yes   Depression: Not at risk (3/5/2025)    PHQ-2     PHQ-2 Score: 2   Housing Stability: High Risk (3/4/2025)    Housing Stability     Do you have housing? : No     Are you worried about losing your housing?: No   Tobacco Use: Low Risk  (3/5/2025)    Patient History     Smoking Tobacco Use: Never     Smokeless Tobacco Use: Never     Passive Exposure: Not on file   Financial Resource Strain: Low Risk  (3/4/2025)    Financial Resource Strain     Within the past 12 months, have you or your family members you live with been unable to get utilities (heat, electricity)  when it was really needed?: No   Alcohol Use: Not on file   Transportation Needs: Low Risk  (3/4/2025)    Transportation Needs     Within the past 12 months, has lack of transportation kept you from medical appointments, getting your medicines, non-medical meetings or appointments, work, or from getting things that you need?: No   Physical Activity: Insufficiently Active (3/4/2025)    Exercise Vital Sign     Days of Exercise per Week: 3 days     Minutes of Exercise per Session: 30 min   Interpersonal Safety: Low Risk  (3/5/2025)    Interpersonal Safety     Do you feel physically and emotionally safe where you currently live?: Yes     Within the past 12 months, have you been hit, slapped, kicked or otherwise physically hurt by someone?: No     Within the past 12 months, have you been humiliated or emotionally abused in other ways by your partner or ex-partner?: No   Stress: No Stress Concern Present (3/4/2025)    [employer] [employer] - Occupational Stress Questionnaire     Feeling of Stress : Only a little   Social Connections: Unknown (3/4/2025)    Social Connection and Isolation Panel [NHANES]     Frequency of Communication with Friends and Family: Not on file     Frequency of Social Gatherings with Friends and Family: Twice a week     Attends Druze Services: Not on file     Active Member of Clubs or Organizations: Not on file     Attends Club or Organization Meetings: Not on file     Marital Status: Not on file   Health Literacy: Not on file     Inadequate nutrition (GOAL):: No  Tube Feeding: No  Inadequate activity/exercise (GOAL):: No  Significant changes in sleep pattern (GOAL): No  Transportation means:: Family (Son drives for patinet)     Mental health DX:: No  Mental health management concern (GOAL):: No  Informal Support system:: Children, Family (Sister)             Resources and Interventions:  Current Resources:      Community Resources: None  Supplies Currently Used at Home:  None  Equipment Currently Used at Home: none  Employment Status: employed full-time         Advance Care Plan/Directive  Advanced Care Plans/Directives on file:: No    Referrals Placed: Community Resources    The patient consented via Verbal consent to have contact information and resources sent via email in an unencrypted manner.     Care Plan:  Care Plan: Nutrition       Problem: Diet management       Goal: Demonstrate improved access to helth food       Start Date: 3/10/2025 Expected End Date: 4/30/2025    Priority: High    Note:     Barriers: Finances are tight  Strengths: None noted  Patient expressed understanding of goal: Yes  Action steps to achieve this goal:  1. I will access [employer].  Survey completed on 3/10/25.  2. I will access other food resources such as [employer] as needed.                               Patient/Caregiver understanding: Yes    Outreach Frequency: monthly, more frequently as needed  Future Appointments                In 1 week Yandel Ansari MD [facility] [place]    In 1 month Ailyn Humphrey MD [facility]    In 6 months Renetta Swain PA-C [facility]            Plan: Luci plans to attend upcoming appointments.  She will consider accessing the [employer] program.    DAVID Donovan   Care Coordination Team  [phone]

## 2025-03-18 NOTE — PROGRESS NOTES
SPORTS MEDICINE CLINIC NEW PATIENT VISIT    REFERRAL SOURCE: Melida Chandra MD    PATIENT'S GOAL FOR APPOINTMENT: Addressing ankle pain    HISTORY OF PRESENT ILLNESS  Luci Londono is a 54 year old female with history breast cancer s/p radiation and chemotherapy in remission for 5 years presenting as a new patient with bilateral ankle/foot pain    When did problem start?/Trauma associated with onset?:  January. No injury. She works as a CNA which involves regular walking.     Location & description of pain:  Lateral ankle pain, ATFL  Burning pain, previous swelling    Exacerbating factors:   Constant pain    Remitting factors:  None    Previous Treatments:  -Medications: Ibuprofen, Gabapentin  -Rehabilitation: None  -Durable Medical Equipment: None  -Injections: None  -Modalities: Ice  -Other Providers seen: Ian    Sports, Hobbies, Employment:  Walking, CNA    Average hours of sleep per night: 7-8    Average minutes of exercise per day: Daily 20 min + 8 hour shift walking    Area of Problem  3/21/2025  Date 2 Date 3 Date 4 Date 5   Function Ability in last week - % of Baseline (0 is worst & 100 is best) 100       Sport/Activity Ability in last week - % of Baseline (0 is worst & 100 is best) 100       Pain Level in the last week (0 is best & 10 is worst) 9         Additional Information of consideration:  -Poor Balance? None  -Numbness/paresthesias in extremities? Yes, previously diagnosed with cancer    MEDICATIONS    Current Outpatient Medications:     ascorbic acid (VITAMIN C) 1000 MG TABS, Take 1,000 mg by mouth daily, Disp: , Rfl:     aspirin 81 MG EC tablet, Take 1 tablet (81 mg) by mouth daily, Disp: 90 tablet, Rfl: 3    atorvastatin (LIPITOR) 80 MG tablet, Take 1 tablet (80 mg) by mouth daily., Disp: 90 tablet, Rfl: 1    ciprofloxacin-dexAMETHasone (CIPRODEX) 0.3-0.1 % otic suspension, Place 4 drops Into the left ear 2 times daily., Disp: 7.5 mL, Rfl: 0    diclofenac (CATAFLAM) 50 MG tablet, Take 1  tablet (50 mg) by mouth 2 times daily as needed for moderate pain., Disp: 20 tablet, Rfl: 0    DULoxetine (CYMBALTA) 60 MG capsule, Take 1 capsule (60 mg) by mouth daily., Disp: 180 capsule, Rfl: 1    ferrous sulfate (FE TABS) 325 (65 Fe) MG EC tablet, Take 1 tablet (325 mg) by mouth daily, Disp: 90 tablet, Rfl: 1    gabapentin (NEURONTIN) 100 MG capsule, Take 1 capsule (100 mg) by mouth 3 times daily., Disp: 90 capsule, Rfl: 1    hydrocortisone 2.5 % ointment, Apply topically 2 times daily. Apply to affected areas twice daily, Disp: 30 g, Rfl: 1    levothyroxine (SYNTHROID/LEVOTHROID) 75 MCG tablet, Take 1 tablet (75 mcg) by mouth every morning (before breakfast)., Disp: 90 tablet, Rfl: 1    metFORMIN (GLUCOPHAGE XR) 500 MG 24 hr tablet, TAKE 1 TABLET(500 MG) BY MOUTH DAILY WITH DINNER, Disp: 90 tablet, Rfl: 0    pantoprazole (PROTONIX) 20 MG EC tablet, Take 2 tablets (40 mg) by mouth 2 times daily., Disp: 360 tablet, Rfl: 3    sulfamethoxazole-trimethoprim (BACTRIM DS) 800-160 MG tablet, Take 1 tablet by mouth 2 times daily., Disp: 14 tablet, Rfl: 0    tiZANidine (ZANAFLEX) 6 MG capsule, Take 1 capsule (6 mg) by mouth nightly as needed (for jaw pain and upper back pain), Disp: 30 capsule, Rfl: 1    ALLERGIES  Allergies   Allergen Reactions    Clindamycin      Pt gets GI symptoms, gastritis .and lower extremity numbness       PAST MEDICAL HISTORY  Past Medical History:   Diagnosis Date    Cancer (H)     breast    Diabetes (H)     pre-dm    High cholesterol     Hypertension goal BP (blood pressure) < 140/80 2/18/2014    Lateral epicondylitis, right dominant elbow since late 10-17 11/1/2017    Migraine     Port-A-Cath in place 10/26/2018    Thyroid disease        PAST SURGICAL HISTORY  Past Surgical History:   Procedure Laterality Date    BIOPSY NODE SENTINEL Right 05/15/2019    Procedure: BIOPSY, LYMPH NODE, SENTINEL;  Surgeon: Stacey Ashford MD;  Location: [facility] OR    BREAST SURGERY      COLONOSCOPY N/A 01/26/2022     Procedure: COLONOSCOPY;  Surgeon: Zac Arriola MD;  Location:  GI    DISSECT LYMPH NODE AXILLA Right 05/15/2019    Procedure: LYMPHADENECTOMY, AXILLARY;  Surgeon: Stacey Ashford MD;  Location:  OR    HAND SURGERY  01/01/2002    left    INSERT PORT VASCULAR ACCESS N/A 10/18/2018    Procedure: INSERTION OF VASCULAR PORT;  Surgeon: Stacey Ashford MD;  Location:  OR    LUMPECTOMY BREAST WITH SEED LOCALIZATION Right 05/15/2019    Procedure: SEED LOCALIZATION BREAST LUMPECTOMY, SEED LOCALIZATION AXILLARY BREAST BIOPSY, SENTINEL NODE , REMOVAL OF VASCULAR PORT ACCESS AND RIGHT  AXILLARY NODE DISSECTION;  Surgeon: Stacey Ashford MD;  Location:  OR    REMOVE PORT VASCULAR ACCESS N/A 05/15/2019    Procedure: REMOVAL, VASCULAR ACCESS PORT;  Surgeon: Stacey Ashford MD;  Location:  OR    TUBAL LIGATION         SOCIAL HISTORY  Social History     Tobacco Use    Smoking status: Never    Smokeless tobacco: Never    Tobacco comments:     None   Vaping Use    Vaping status: Never Used   Substance Use Topics    Alcohol use: No    Drug use: No        FAMILY HISTORY  Family History   Problem Relation Age of Onset    Diabetes Mother     Unknown/Adopted Father     Coronary Artery Disease No family hx of     Hypertension No family hx of     Hyperlipidemia No family hx of     Cerebrovascular Disease No family hx of     Breast Cancer No family hx of     Colon Cancer No family hx of     Prostate Cancer No family hx of     Other Cancer No family hx of     Depression No family hx of     Anxiety Disorder No family hx of     Mental Illness No family hx of     Substance Abuse No family hx of     Anesthesia Reaction No family hx of     Asthma No family hx of     Osteoporosis No family hx of     Genetic Disorder No family hx of     Thyroid Disease No family hx of     Obesity No family hx of        REVIEW OF SYSTEMS  Complete 12 system Review of Systems performed and was negative except for HPI.    VITALS  There  were no vitals filed for this visit.    PHYSICAL EXAMINATION   General: Age appropriate appearing, no acute distress  HEENT: normocephalic, atraumatic, sclera non-icteric  Skin: No open skin lesion noted in visible areas.  Respiratory: Non labored breathing, No wheezes.  Cardiac/Vessels: No edema, cyanosis, clubbing noted in all extremities.  Lymph: No palpable lymph node swelling noted around the affected area.  Mental: There was no signs of aberrant behaviors noted. Patient was pleasant throughout the encounter.    Functional Movements: Non-antalgic gait appreciated.      ANKLE/FOOT:   Inspection: Trace lateral ankle swelling bilaterally  Palpation: TTP bilateral lateral ankle along course of peroneal tendons  Range of Motion [estimated degrees, active (patient performed) unless noted, L/R]:    Inversion [30/30]  Eversion [20*/20*]  Dorsiflexion [20/20]  Plantarflexion [40/40]  *reproduction of symptoms  Sensation: Lateral ankle foot paresthesias bilaterally  Special Testing:   Squeeze test (High ankle sprain or Llamas's Neuroma): (-)  Anterior drawer test (ATFL): (-)  Resisted eversion: (-)    IMAGING STUDIES:  2/26/2025 Bilateral ankle MRI:   LEFT: Mild hyperemic change at anterolateral aspect of the ankle extending towards the fifth metatarsal base area, nonspecific.  Small amount of tenosynovial fluid around the posterior tibialis tendon, presumably physiologic. Small type I os navicular Trace nonspecific tenosynovial fluid around the peroneal tendons.     RIGHT: 1. Nonspecific hyperemic changes anterolaterally along the ankle, contiguous with Kager's fat pad fascial sheath, and distally to the level of fifth metatarsal base laterally. -Trace fluid surrounding the peroneal tendon sheath, nonspecific.  2. Focal low-grade intrasubstance tear of the posterior tibiotalar  Ligament.   Otherwise, deltoid and spring ligamentous complexes are intact.    I personally reviewed these images and agree with the radiology  report and shared the findings with the patient.    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  She also has MRI findings of low grade deltoid sprain involving mild posterior tibiotalar ligament intrasubstance tear, without ligamentous insufficiency on clinical testing or associated pain.    PLAN  The following was discussed with the patient:  Activity Modification: Activity as tolerated  Imaging/Tests: MRI reviewed as noted above  Rehabilitation: Physical therapy recommended, referral placed  Orthotics/Bracing: Bilateral night splints limiting ankle plantarflexion recommended at night, this was provided after in office trial  Medication: Non-prescription topical and/or oral analgesics may be used as needed  Interventions: None recommended at this time.  Could consider distal peroneal nerve hydrodissection if symptoms persist.  Follow-up Plan: As needed if symptoms persist  Resources Provided: Written and verbal information detailing above findings and plan provided including after visit summary.    They were encouraged to message me on [employer] whenever they needed.    The patient was in agreement with this plan. All questions were answered to the best of my ability.    Total time (face-to-face and non-face-to-face) spent on today's visit was 35 minutes. This included preparation for the visit (i.e. reviewing test results), performance of a medically appropriate history and examination, placing orders for medications, tests or other procedures, and discussing the plan of care with the patient. This time is exclusive of procedures performed and time spent teaching.    Yandel Ansari MD,  CAQSM  Sports Medicine Attending Physician  Department of Physical Medicine & Rehabilitation

## 2025-03-21 ENCOUNTER — OFFICE VISIT (OUTPATIENT)
Dept: ORTHOPEDICS | Facility: CLINIC | Age: 55
End: 2025-03-21
Attending: INTERNAL MEDICINE
Payer: COMMERCIAL

## 2025-03-21 DIAGNOSIS — S93.499A PARTIAL TEAR OF LIGAMENT OF LATERAL ASPECT OF ANKLE: ICD-10-CM

## 2025-03-21 DIAGNOSIS — M76.72 PERONEAL TENDINITIS OF BOTH LOWER LEGS: Primary | ICD-10-CM

## 2025-03-21 DIAGNOSIS — G57.30 PERONEAL NEURITIS, UNSPECIFIED LATERALITY: ICD-10-CM

## 2025-03-21 DIAGNOSIS — M76.71 PERONEAL TENDINITIS OF BOTH LOWER LEGS: Primary | ICD-10-CM

## 2025-03-21 PROCEDURE — 99203 OFFICE O/P NEW LOW 30 MIN: CPT | Performed by: STUDENT IN AN ORGANIZED HEALTH CARE EDUCATION/TRAINING PROGRAM

## 2025-03-21 NOTE — LETTER
3/21/2025      Luci Londono  [address] [facility] [address]      Dear Colleague,    Thank you for referring your patient, Luci C Alert, to the [facility] [place]. Please see a copy of my visit note below.    SPORTS MEDICINE CLINIC NEW PATIENT VISIT    REFERRAL SOURCE: Melida Chandra MD    PATIENT'S GOAL FOR APPOINTMENT: Addressing ankle pain    HISTORY OF PRESENT ILLNESS  Luci Londono is a 54 year old female with history breast cancer s/p radiation and chemotherapy in remission for 5 years presenting as a new patient with bilateral ankle/foot pain    When did problem start?/Trauma associated with onset?:  January. No injury. She works as a CNA which involves regular walking.     Location & description of pain:  Lateral ankle pain, ATFL  Burning pain, previous swelling    Exacerbating factors:   Constant pain    Remitting factors:  None    Previous Treatments:  -Medications: Ibuprofen, Gabapentin  -Rehabilitation: None  -Durable Medical Equipment: None  -Injections: None  -Modalities: Ice  -Other Providers seen: Ian    Sports, Hobbies, Employment:  Walking, CNA    Average hours of sleep per night: 7-8    Average minutes of exercise per day: Daily 20 min + 8 hour shift walking    Area of Problem  3/21/2025  Date 2 Date 3 Date 4 Date 5   Function Ability in last week - % of Baseline (0 is worst & 100 is best) 100       Sport/Activity Ability in last week - % of Baseline (0 is worst & 100 is best) 100       Pain Level in the last week (0 is best & 10 is worst) 9         Additional Information of consideration:  -Poor Balance? None  -Numbness/paresthesias in extremities? Yes, previously diagnosed with cancer    MEDICATIONS    Current Outpatient Medications:      ascorbic acid (VITAMIN C) 1000 MG TABS, Take 1,000 mg by mouth daily, Disp: , Rfl:      aspirin 81 MG EC tablet, Take 1 tablet (81 mg) by mouth daily, Disp: 90 tablet, Rfl: 3     atorvastatin (LIPITOR) 80 MG tablet,  Take 1 tablet (80 mg) by mouth daily., Disp: 90 tablet, Rfl: 1     ciprofloxacin-dexAMETHasone (CIPRODEX) 0.3-0.1 % otic suspension, Place 4 drops Into the left ear 2 times daily., Disp: 7.5 mL, Rfl: 0     diclofenac (CATAFLAM) 50 MG tablet, Take 1 tablet (50 mg) by mouth 2 times daily as needed for moderate pain., Disp: 20 tablet, Rfl: 0     DULoxetine (CYMBALTA) 60 MG capsule, Take 1 capsule (60 mg) by mouth daily., Disp: 180 capsule, Rfl: 1     ferrous sulfate (FE TABS) 325 (65 Fe) MG EC tablet, Take 1 tablet (325 mg) by mouth daily, Disp: 90 tablet, Rfl: 1     gabapentin (NEURONTIN) 100 MG capsule, Take 1 capsule (100 mg) by mouth 3 times daily., Disp: 90 capsule, Rfl: 1     hydrocortisone 2.5 % ointment, Apply topically 2 times daily. Apply to affected areas twice daily, Disp: 30 g, Rfl: 1     levothyroxine (SYNTHROID/LEVOTHROID) 75 MCG tablet, Take 1 tablet (75 mcg) by mouth every morning (before breakfast)., Disp: 90 tablet, Rfl: 1     metFORMIN (GLUCOPHAGE XR) 500 MG 24 hr tablet, TAKE 1 TABLET(500 MG) BY MOUTH DAILY WITH DINNER, Disp: 90 tablet, Rfl: 0     pantoprazole (PROTONIX) 20 MG EC tablet, Take 2 tablets (40 mg) by mouth 2 times daily., Disp: 360 tablet, Rfl: 3     sulfamethoxazole-trimethoprim (BACTRIM DS) 800-160 MG tablet, Take 1 tablet by mouth 2 times daily., Disp: 14 tablet, Rfl: 0     tiZANidine (ZANAFLEX) 6 MG capsule, Take 1 capsule (6 mg) by mouth nightly as needed (for jaw pain and upper back pain), Disp: 30 capsule, Rfl: 1    ALLERGIES  Allergies   Allergen Reactions     Clindamycin      Pt gets GI symptoms, gastritis .and lower extremity numbness       PAST MEDICAL HISTORY  Past Medical History:   Diagnosis Date     Cancer (H)     breast     Diabetes (H)     pre-dm     High cholesterol      Hypertension goal BP (blood pressure) < 140/80 2/18/2014     Lateral epicondylitis, right dominant elbow since late 10-17 11/1/2017     Migraine      Port-A-Cath in place 10/26/2018     Thyroid  disease        PAST SURGICAL HISTORY  Past Surgical History:   Procedure Laterality Date     BIOPSY NODE SENTINEL Right 05/15/2019    Procedure: BIOPSY, LYMPH NODE, SENTINEL;  Surgeon: Stacey Ashford MD;  Location:  OR     BREAST SURGERY       COLONOSCOPY N/A 01/26/2022    Procedure: COLONOSCOPY;  Surgeon: Zac Arriola MD;  Location:  GI     DISSECT LYMPH NODE AXILLA Right 05/15/2019    Procedure: LYMPHADENECTOMY, AXILLARY;  Surgeon: Stacey Ashford MD;  Location:  OR     HAND SURGERY  01/01/2002    left     INSERT PORT VASCULAR ACCESS N/A 10/18/2018    Procedure: INSERTION OF VASCULAR PORT;  Surgeon: Stacey Ashford MD;  Location:  OR     LUMPECTOMY BREAST WITH SEED LOCALIZATION Right 05/15/2019    Procedure: SEED LOCALIZATION BREAST LUMPECTOMY, SEED LOCALIZATION AXILLARY BREAST BIOPSY, SENTINEL NODE , REMOVAL OF VASCULAR PORT ACCESS AND RIGHT  AXILLARY NODE DISSECTION;  Surgeon: Stacey Ashford MD;  Location:  OR     REMOVE PORT VASCULAR ACCESS N/A 05/15/2019    Procedure: REMOVAL, VASCULAR ACCESS PORT;  Surgeon: Stacey Ashford MD;  Location:  OR     TUBAL LIGATION         SOCIAL HISTORY  Social History     Tobacco Use     Smoking status: Never     Smokeless tobacco: Never     Tobacco comments:     None   Vaping Use     Vaping status: Never Used   Substance Use Topics     Alcohol use: No     Drug use: No        FAMILY HISTORY  Family History   Problem Relation Age of Onset     Diabetes Mother      Unknown/Adopted Father      Coronary Artery Disease No family hx of      Hypertension No family hx of      Hyperlipidemia No family hx of      Cerebrovascular Disease No family hx of      Breast Cancer No family hx of      Colon Cancer No family hx of      Prostate Cancer No family hx of      Other Cancer No family hx of      Depression No family hx of      Anxiety Disorder No family hx of      Mental Illness No family hx of      Substance Abuse No family hx of      Anesthesia Reaction  No family hx of      Asthma No family hx of      Osteoporosis No family hx of      Genetic Disorder No family hx of      Thyroid Disease No family hx of      Obesity No family hx of        REVIEW OF SYSTEMS  Complete 12 system Review of Systems performed and was negative except for HPI.    VITALS  There were no vitals filed for this visit.    PHYSICAL EXAMINATION   General: Age appropriate appearing, no acute distress  HEENT: normocephalic, atraumatic, sclera non-icteric  Skin: No open skin lesion noted in visible areas.  Respiratory: Non labored breathing, No wheezes.  Cardiac/Vessels: No edema, cyanosis, clubbing noted in all extremities.  Lymph: No palpable lymph node swelling noted around the affected area.  Mental: There was no signs of aberrant behaviors noted. Patient was pleasant throughout the encounter.    Functional Movements: Non-antalgic gait appreciated.      ANKLE/FOOT:   Inspection: Trace lateral ankle swelling bilaterally  Palpation: TTP bilateral lateral ankle along course of peroneal tendons  Range of Motion [estimated degrees, active (patient performed) unless noted, L/R]:    Inversion [30/30]  Eversion [20*/20*]  Dorsiflexion [20/20]  Plantarflexion [40/40]  *reproduction of symptoms  Sensation: Lateral ankle foot paresthesias bilaterally  Special Testing:   Squeeze test (High ankle sprain or Llamas's Neuroma): (-)  Anterior drawer test (ATFL): (-)  Resisted eversion: (-)    IMAGING STUDIES:  2/26/2025 Bilateral ankle MRI:   LEFT: Mild hyperemic change at anterolateral aspect of the ankle extending towards the fifth metatarsal base area, nonspecific.  Small amount of tenosynovial fluid around the posterior tibialis tendon, presumably physiologic. Small type I os navicular Trace nonspecific tenosynovial fluid around the peroneal tendons.     RIGHT: 1. Nonspecific hyperemic changes anterolaterally along the ankle, contiguous with Kager's fat pad fascial sheath, and distally to the level of fifth  metatarsal base laterally. -Trace fluid surrounding the peroneal tendon sheath, nonspecific.  2. Focal low-grade intrasubstance tear of the posterior tibiotalar  Ligament.   Otherwise, deltoid and spring ligamentous complexes are intact.    I personally reviewed these images and agree with the radiology report and shared the findings with the patient.    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  She also has MRI findings of low grade deltoid sprain involving mild posterior tibiotalar ligament intrasubstance tear, without ligamentous insufficiency on clinical testing or associated pain.    PLAN  The following was discussed with the patient:  Activity Modification: Activity as tolerated  Imaging/Tests: MRI reviewed as noted above  Rehabilitation: Physical therapy recommended, referral placed  Orthotics/Bracing: Bilateral night splints limiting ankle plantarflexion recommended at night, this was provided after in office trial  Medication: Non-prescription topical and/or oral analgesics may be used as needed  Interventions: None recommended at this time.  Could consider distal peroneal nerve hydrodissection if symptoms persist.  Follow-up Plan: As needed if symptoms persist  Resources Provided: Written and verbal information detailing above findings and plan provided including after visit summary.    They were encouraged to message me on "[employer]" whenever they needed.    The patient was in agreement with this plan. All questions were answered to the best of my ability.    Total time (face-to-face and non-face-to-face) spent on today's visit was 35 minutes. This included preparation for the  visit (i.e. reviewing test results), performance of a medically appropriate history and examination, placing orders for medications, tests or other procedures, and discussing the plan of care with the patient. This time is exclusive of procedures performed and time spent teaching.    Yandel Ansari MD, [facility]  Sports Medicine Attending Physician  Department of Physical Medicine & Rehabilitation       Again, thank you for allowing me to participate in the care of your patient.        Sincerely,        Yandel Ansari MD    Electronically signed

## 2025-03-21 NOTE — PATIENT INSTRUCTIONS
Luci C Alert, It was nice to see you in our office today.      DIAGNOSIS:   1. Peroneal tendinitis of both lower legs    2. Partial tear of ligament of lateral aspect of ankle    3. Peroneal neuritis, unspecified laterality        INSTRUCTIONS FOR FOLLOW-UP CARE:  Activity Modification: Activity as tolerated being guided by pain using the following simple  Traffic Light System  as it relates to tendon pain:  Green Light (0-3/10 pain): No increases in symptoms, you are OK to continue the activity, or perhaps increase load slightly.  Yellow light (4-6/10 pain): Minor increase in symptoms, but you can move normally within an hour of exercise, and pain reduces to normal within the next 24 hours. Proceed with caution, you can do minor bouts of loading, but too much will aggravate your symptoms and increase pain.  Red light (7-10/10 pain): Cease activity, as you have exceeded your tolerance. Generally, involves a significant increase in pain, which may not settle in the following 24 hours. Rest for 24-48 hours, allow symptoms to settle down, then begin gentle movement, and monitor your progression.  The goal is to try and find the right amount of activity, and increase slowly, allowing your tendon to adapt as you improve.  Imaging/Tests: MRIs reviewed  Rehabilitation: Physical therapy recommended, referral placed  Orthotics/Bracing: Bilateral night splints limiting ankle plantarflexion recommended at night, this was provided after in office trial  Medication: --Topical Medication Options:  May use topical Voltaren gel up to 4 times daily as needed for pain, if symptoms persist,  May use topical Aspercreme up to 4 times daily as needed for pain, if symptoms persist  May alternate with topical Verasome gel up to 4 times daily as needed for pain: this may be purchased at: https://www.[employer]/verasome with using patient pricing code [facility]  For night time pain, consider nightly Salonpas patches nightly (on  for 12 hours and off for 12 hours)                    --Oral medication options: Tylenol 500-1000mg (up to three times per day) as needed  Follow-up Plan: As needed if symptoms persist     CLINIC LOCATIONS:     [place] [facility]:  [phone] [address], [address] MYCHART HELP LINE: [phone]   [place] [address] TRIAGE LINE: [phone]   (Monday & Friday) APPOINTMENTS: [phone]    RADIOLOGY: [phone]   [facility] [phone] [address] PHYSICAL & OCCUPATIONAL THERAPY: [phone]*   [place] [address] BILLING QUESTIONS: [phone]   (Tuesday & Thursday) FAX: [phone]       *Therapy locations that offer Saturday options: [place], [place], [place]    Thank you for choosing [facility]!    If you have any questions, please do not hesitate to reach out on [employer] [facility] or Call [phone] and ask for my team.    Yandel Ansari MD, [facility]

## 2025-03-31 ASSESSMENT — ACTIVITIES OF DAILY LIVING (ADL)
STANDING_FOR_1_HOUR: A LITTLE BIT OF DIFFICULTY
PUTTING_ON_YOUR_PANTS: 2
PUTTING_ON_SOCKS_AND_SHOES: SLIGHT DIFFICULTY
SPORTS_COUNT: 9
HOS_ADL_HIGHEST_POTENTIAL_SCORE: 56
STANDING FOR 15 MINUTES: SLIGHT DIFFICULTY
HOS_ADL_SCORE(%): 50
ROLLING_OVER_IN_BED: EXTREME DIFFICULTY
PUTTING_ON_YOUR_SHOES_OR_SOCKS: MODERATE DIFFICULTY
GETTING_INTO_AND_OUT_OF_A_BATHTUB: MODERATE DIFFICULTY
STARTING_AND_STOPPING_QUICKLY: MODERATE DIFFICULTY
GETTING INTO AND OUT OF AN AVERAGE CAR: SLIGHT DIFFICULTY
LOW_IMPACT_ACTIVITIES_LIKE_FAST_WALKING: MODERATE DIFFICULTY
HEAVY_WORK: MODERATE DIFFICULTY
RUNNING_ON_UNEVEN_GROUND: MODERATE DIFFICULTY
MAKING_SHARP_TURNS_WHILE_RUNNING_FAST: MODERATE DIFFICULTY
SPORTS_HIGHEST_POTENTIAL_SCORE: 36
GOING DOWN 1 FLIGHT OF STAIRS: MODERATE DIFFICULTY
GETTING_INTO_AND_OUT_OF_AN_AVERAGE_CAR: SLIGHT DIFFICULTY
STANDING_FOR_15_MINUTES: SLIGHT DIFFICULTY
PUTTING ON SOCKS AND SHOES: SLIGHT DIFFICULTY
ROLLING OVER IN BED: EXTREME DIFFICULTY
LIFTING_AN_OBJECT,_LIKE_A_BAG_OF_GROCERIES_FROM_THE_FLOOR: MODERATE DIFFICULTY
DEEP SQUATTING: MODERATE DIFFICULTY
TWISTING/PIVOTING_ON_INVOLVED_LEG: EXTREME DIFFICULTY
SPORTS_TOTAL_ITEM_SCORE: 0
ADL_SCORE(%): 0
YOUR_USUAL_HOBBIES,_RECREATIONAL_OR_SPORTING_ACTIVITIES: A LITTLE BIT OF DIFFICULTY
GETTING_INTO_AND_OUT_OF_A_BATH: A LITTLE BIT OF DIFFICULTY
LEFS_RAW_SCORE: 0
DEEP_SQUATTING: MODERATE DIFFICULTY
PUTTING_ON_A_SHIRT_THAT_BUTTONS_DOWN_THE_FRONT: 2
PUTTING_ON_AN_UNDERSHIRT_OR_A_PULLOVER_SWEATER: 0
PERFORMING_LIGHT_ACTIVITIES_AROUND_YOUR_HOME: A LITTLE BIT OF DIFFICULTY
WHEN_LYING_ON_THE_INVOLVED_SIDE: 6
SITTING FOR 15 MINUTES: EXTREME DIFFICULTY
ADL_TOTAL_ITEM_SCORE: 0
TOUCHING_THE_BACK_OF_YOUR_NECK: 4
WASHING_YOUR_BACK: 5
SITTING_FOR_15_MINUTES: EXTREME DIFFICULTY
REMOVING_SOMETHING_FROM_YOUR_BACK_POCKET: 2
RECREATIONAL_ACTIVITIES: MODERATE DIFFICULTY
WALKING_INITIALLY: MODERATE DIFFICULTY
HOS_ADL_ITEM_SCORE_TOTAL: 28
ABILITY_TO_PARTICIPATE_IN_YOUR_DESIRED_SPORT_AS_LONG_AS_YOU_WOULD_LIKE: MODERATE DIFFICULTY
GOING_DOWN_1_FLIGHT_OF_STAIRS: MODERATE DIFFICULTY
HEAVY_WORK: MODERATE DIFFICULTY
CARRYING_A_HEAVY_OBJECT_OF_10_POUNDS: 4
ANY_OF_YOUR_USUAL_WORK,_HOUSEWORK_OR_SCHOOL_ACTIVITIES: MODERATE DIFFICULTY
GOING_UP_OR_DOWN_10_STAIRS: MODERATE DIFFICULTY
LIGHT_TO_MODERATE_WORK: MODERATE DIFFICULTY
SITTING_FOR_1_HOUR: MODERATE DIFFICULTY
PLEASE_INDICATE_YOR_PRIMARY_REASON_FOR_REFERRAL_TO_THERAPY:: FOOT AND/OR ANKLE
SQUATTING: MODERATE DIFFICULTY
LIGHT_TO_MODERATE_WORK: MODERATE DIFFICULTY
WALKING_INITIALLY: MODERATE DIFFICULTY
PLEASE_INDICATE_YOR_PRIMARY_REASON_FOR_REFERRAL_TO_THERAPY:: SHOULDER
SHOPPING: A LITTLE BIT OF DIFFICULTY
WALKING_2_BLOCKS: A LITTLE BIT OF DIFFICULTY
SPORTS_SCORE(%): 0
WALKING_15_MINUTES_OR_GREATER: MODERATE DIFFICULTY
WALKING_APPROXIMATELY_10_MINUTES: MODERATE DIFFICULTY
GETTING_INTO_AND_OUT_OF_A_BATHTUB: MODERATE DIFFICULTY
PUSHING_WITH_THE_INVOLVED_ARM: 4
GOING_UP_1_FLIGHT_OF_STAIRS: MODERATE DIFFICULTY
TWISTING/PIVOTING ON INVOLVED LEG: EXTREME DIFFICULTY
SWINGING_OBJECTS_LIKE_A_GOLF_CLUB: NO DIFFICULTY AT ALL
GOING UP 1 FLIGHT OF STAIRS: MODERATE DIFFICULTY
AT_ITS_WORST?: 7
PLACING_AN_OBJECT_ON_A_HIGH_SHELF: 4
ABILITY_TO_PERFORM_ACTIVITY_WITH_YOUR_NORMAL_TECHNIQUE: MODERATE DIFFICULTY
WALKING_FOR_APPROXIMATELY_10_MINUTES: MODERATE DIFFICULTY
GETTING_INTO_OR_OUT_OF_A_CAR: A LITTLE BIT OF DIFFICULTY
LANDING: MODERATE DIFFICULTY
LEFS_SCORE(%): 0
RUNNING_ON_EVEN_GROUND: A LITTLE BIT OF DIFFICULTY
WALKING_15_MINUTES_OR_GREATER: MODERATE DIFFICULTY
WALKING_BETWEEN_ROOMS: A LITTLE BIT OF DIFFICULTY
WALKING_A_MILE: MODERATE DIFFICULTY
WASHING_YOUR_HAIR?: 3
ADL_COUNT: 17
RECREATIONAL ACTIVITIES: MODERATE DIFFICULTY
PLEASE_INDICATE_YOR_PRIMARY_REASON_FOR_REFERRAL_TO_THERAPY:: HIP
PERFORMING_HEAVY_ACTIVITIES_AROUND_YOUR_HOME: A LITTLE BIT OF DIFFICULTY
ROLLING_OVER_IN_BED: MODERATE DIFFICULTY
ADL_HIGHEST_POTENTIAL_SCORE: 68
REACHING_FOR_SOMETHING_ON_A_HIGH_SHELF: 5
HOW_WOULD_YOU_RATE_YOUR_CURRENT_LEVEL_OF_FUNCTION?: NEARLY NORMAL
CUTTING/LATERAL_MOVEMENTS: MODERATE DIFFICULTY

## 2025-03-31 NOTE — PROGRESS NOTES
PHYSICAL THERAPY EVALUATION  Type of Visit: Evaluation              Subjective         Presenting condition or subjective complaint:    Date of onset: 03/21/25 (Referral date)    Relevant medical history: Pain at night or rest; Thyroid problems   Dates & types of surgery:      Prior diagnostic imaging/testing results: MRI; X-ray     Prior therapy history for the same diagnosis, illness or injury: No      Patient presents with ankle pain x3 months. States that she noticed at work that her ankle would start swelling. She went to  who did an x-ray and determined no fracture. A few weeks later she had significant B ankle swelling with no SIM. Went to ED who gave her prednisone, which has helped with the swelling but she continues to have burning and pain at TCJ and medial ankle. Ankle pain is worse in the morning. Pain does not stop her from doing anything.     Aggs: sleeping, walking  Eases: ice, heat, prednisone, epson salt, elevation    Occupation: CNA and dietary    Exercise Program: None    Goals: to get help, have less pain      Living Environment  Social support: With family members   Type of home: House   Stairs to enter the home: No       Ramp: No   Stairs inside the home: Yes 10 Is there a railing: Yes     Help at home:    Equipment owned:       Employment: Yes    Hobbies/Interests:      Patient goals for therapy:         Objective     Gait  B ankle pronation, no significant gait asymmetries or compensatory patterns    Functional Test  Squat: Limited depth, reports pull in achilles tendon  SL balance: ~15s B, appropriate ankle strategies    ROM  Ankle ROM   Left A/PROM Right A/PROM   Dorsiflexion 10/15 Lacking 5/10   Plantarflexion 40/45 40/50   Inversion 10/25, p! 20/30   Eversion 20/25 10/30,     Strength  Hip Strength   Left  Right   Flexion 5/5 5/5   Extension 4/5 4/5   Abduction 4/5 4/5     Knee Strength   Left  Right   Flexion 5/5 5/5   Extension 5/5 5/5     Ankle Strength   Left Right   Dorsiflexion  5/5 5/5   Plantarflexion 11 SL HR, limited range 12 SL HR, limited range   Inversion 5/5, pain 5/5, pain   Eversion 5/5, Pain  5/5 pain     Palpation  Pitting edema at anteriolateral aspect of  B ankles  Tender to palpation at posterior aspect of B medial malleolus (L>R) and anterior aspect of lateral malleolus    Assessment & Plan   CLINICAL IMPRESSIONS  Medical Diagnosis: partial tear of ligament of lateral aspect of ankle    Treatment Diagnosis: chronic ankle pain, bilateral   Impression/Assessment: Patient is a 54 year old female with ankle complaints.  The following significant findings have been identified: Pain, Decreased ROM/flexibility, Edema, Impaired muscle performance, and Decreased activity tolerance. These impairments interfere with their ability to perform work tasks, recreational activities, and household chores as compared to previous level of function.     Clinical Decision Making (Complexity):  Clinical Presentation: Stable/Uncomplicated  Clinical Presentation Rationale: based on medical and personal factors listed in PT evaluation  Clinical Decision Making (Complexity): Low complexity    PLAN OF CARE  Treatment Interventions:  Interventions: Manual Therapy, Neuromuscular Re-education, Therapeutic Activity, Therapeutic Exercise    Long Term Goals     PT Goal 1  Goal Identifier: ROM  Goal Description: Patient will demonstrate 15 deg active ankle DF B  Rationale: to maximize safety and independence with performance of ADLs and functional tasks;to maximize safety and independence with self cares;to maximize safety and independence within the community  Goal Progress: initated  Target Date: 05/02/25  PT Goal 2  Goal Identifier: strength  Goal Description: Patient will demonstrate pain-free strength testing of B ankes  Rationale: to maximize safety and independence with performance of ADLs and functional tasks;to maximize safety and independence with self cares  Goal Progress: Initated  Target Date:  05/02/25      Frequency of Treatment: 1x/week for 4 weeks, every other week as needed following  Duration of Treatment: 12 weeks    Recommended Referrals to Other Professionals:   Education Assessment:   Learner/Method: Patient    Risks and benefits of evaluation/treatment have been explained.   Patient/Family/caregiver agrees with Plan of Care.     Evaluation Time:     PT Eval, Low Complexity Minutes (91800): 28       Signing Clinician: Halina Higgins PT        [facility]                                                                                   OUTPATIENT PHYSICAL THERAPY      PLAN OF TREATMENT FOR OUTPATIENT REHABILITATION   Patient's Last Name, First Name, M.Luci Rodriguez YOB: 1970   Provider's Name   [facility]   Medical Record No.  [id number]     Onset Date: 03/21/25 (Referral date)  Start of Care Date: 04/02/25     Medical Diagnosis:  partial tear of ligament of lateral aspect of ankle      PT Treatment Diagnosis:  chronic ankle pain, bilateral Plan of Treatment  Frequency/Duration: 1x/week for 4 weeks, every other week as needed following/ 12 weeks    Certification date from 04/02/25 to 06/30/25         See note for plan of treatment details and functional goals     Halina Higgins, PT                         I CERTIFY THE NEED FOR THESE SERVICES FURNISHED UNDER        THIS PLAN OF TREATMENT AND WHILE UNDER MY CARE     (Physician attestation of this document indicates review and certification of the therapy plan).              Referring Provider:  Yandel Ansari    Initial Assessment  See Epic Evaluation- Start of Care Date: 04/02/25

## 2025-04-02 ENCOUNTER — THERAPY VISIT (OUTPATIENT)
Dept: PHYSICAL THERAPY | Facility: CLINIC | Age: 55
End: 2025-04-02
Attending: STUDENT IN AN ORGANIZED HEALTH CARE EDUCATION/TRAINING PROGRAM
Payer: COMMERCIAL

## 2025-04-02 DIAGNOSIS — G57.30 PERONEAL NEURITIS, UNSPECIFIED LATERALITY: ICD-10-CM

## 2025-04-02 DIAGNOSIS — S93.499A PARTIAL TEAR OF LIGAMENT OF LATERAL ASPECT OF ANKLE: ICD-10-CM

## 2025-04-02 DIAGNOSIS — M76.72 PERONEAL TENDINITIS OF BOTH LOWER LEGS: ICD-10-CM

## 2025-04-02 DIAGNOSIS — M76.71 PERONEAL TENDINITIS OF BOTH LOWER LEGS: ICD-10-CM

## 2025-04-02 PROCEDURE — 97161 PT EVAL LOW COMPLEX 20 MIN: CPT | Mod: GP

## 2025-04-02 PROCEDURE — 97110 THERAPEUTIC EXERCISES: CPT | Mod: GP

## 2025-04-07 DIAGNOSIS — Z00.00 ROUTINE GENERAL MEDICAL EXAMINATION AT A HEALTH CARE FACILITY: ICD-10-CM

## 2025-04-07 DIAGNOSIS — M54.9 UPPER BACK PAIN ON RIGHT SIDE: ICD-10-CM

## 2025-04-07 DIAGNOSIS — K21.9 GASTROESOPHAGEAL REFLUX DISEASE, UNSPECIFIED WHETHER ESOPHAGITIS PRESENT: ICD-10-CM

## 2025-04-07 DIAGNOSIS — E78.5 DYSLIPIDEMIA: ICD-10-CM

## 2025-04-07 DIAGNOSIS — M79.10 MYALGIA: ICD-10-CM

## 2025-04-07 DIAGNOSIS — R73.9 HYPERGLYCEMIA: ICD-10-CM

## 2025-04-07 DIAGNOSIS — R73.03 PREDIABETES: ICD-10-CM

## 2025-04-07 DIAGNOSIS — E03.9 ACQUIRED HYPOTHYROIDISM: ICD-10-CM

## 2025-04-07 DIAGNOSIS — E61.1 IRON DEFICIENCY: ICD-10-CM

## 2025-04-08 RX ORDER — METFORMIN HYDROCHLORIDE 500 MG/1
TABLET, EXTENDED RELEASE ORAL
Qty: 90 TABLET | Refills: 0 | Status: SHIPPED | OUTPATIENT
Start: 2025-04-08

## 2025-04-08 RX ORDER — PANTOPRAZOLE SODIUM 20 MG/1
40 TABLET, DELAYED RELEASE ORAL 2 TIMES DAILY
Qty: 360 TABLET | Refills: 1 | Status: SHIPPED | OUTPATIENT
Start: 2025-04-08

## 2025-04-08 RX ORDER — FERROUS SULFATE 325(65) MG
325 TABLET, DELAYED RELEASE (ENTERIC COATED) ORAL DAILY
Qty: 90 TABLET | Refills: 1 | Status: SHIPPED | OUTPATIENT
Start: 2025-04-08

## 2025-04-08 NOTE — TELEPHONE ENCOUNTER
Clinic RN: Please investigate patient's chart or contact patient if the information cannot be found because patient should have run out of this medication on 3/1/2024. Confirm patient is taking this medication as prescribed. Document findings and route refill encounter to provider for approval or denial.     Berta SERRANO RN  [facility] Triage Team

## 2025-04-08 NOTE — TELEPHONE ENCOUNTER
Duloxetine 60 mg daily was ordered 3/5/25, but the sig does not match the quantity.    Levothyroxine 75 mcg was ordered to same pharmacy for a 90 day supply with 1 refill.    Simvastatin is not on current medication list. Atorvastatin 80 mg daily was ordered 3/7/25 for 90 days with 1 refill.    Triage Patient Outreach    Attempt # 1    Was call answered?  No.  Left voicemail to return call to Triage at Primary Clinic. Please confirm if the patient is taking duloxetine once or twice a day. Instructions don't match quantity.   Routing to PCP due to 3/5/25 office visit note states duloxetine 60 mg 1 capsule daily.   Duloxetine quantity is currently 180 and should be 90 if taking once daily.     Shell Manjarrez RN

## 2025-04-08 NOTE — TELEPHONE ENCOUNTER
Patient called back and states she takes: Asprin 81 mg daily and Duloxetine 60 mg capsule daily, Atrovastin 80 mg daily, and Levothyroxine 75 mcg daily.     Patient states she only needs a refill on the Asprin 81 mg right now and is unsure why the other medications were requested.     Patient also requesting Naproxen 500 mg refill that she was initially prescribed on 2/22/25 by the ED for ankle pain. Patient states she is still experiencing bilateral ankle pain and pain can get to be 7/10 pain.     Please advise.     Di Robert RN

## 2025-04-08 NOTE — TELEPHONE ENCOUNTER
Clinic RN: Please investigate patient's chart or contact patient if the information cannot be found because the medication is listed as historical or discontinued. Confirm patient is taking this medication. Document findings and route refill encounter to provider for approval or denial. Ariane SERRANO RN  [facility] Triage Team

## 2025-04-08 NOTE — TELEPHONE ENCOUNTER
Triage Patient Outreach    Attempt # 1    Was call answered?  No.  Left voicemail to return call to Triage at [facility]. Please confirm the patient is taking aspirin 81 mg daily. Aspirin was last ordered 3/1/23.  This is a duplicate request for duloxetine.     Shell Manjarrez RN

## 2025-04-09 ENCOUNTER — THERAPY VISIT (OUTPATIENT)
Dept: PHYSICAL THERAPY | Facility: CLINIC | Age: 55
End: 2025-04-09
Attending: STUDENT IN AN ORGANIZED HEALTH CARE EDUCATION/TRAINING PROGRAM
Payer: COMMERCIAL

## 2025-04-09 DIAGNOSIS — S93.499A PARTIAL TEAR OF LIGAMENT OF LATERAL ASPECT OF ANKLE: Primary | ICD-10-CM

## 2025-04-09 PROCEDURE — 97110 THERAPEUTIC EXERCISES: CPT | Mod: GP

## 2025-04-09 RX ORDER — DULOXETIN HYDROCHLORIDE 60 MG/1
60 CAPSULE, DELAYED RELEASE ORAL DAILY
Qty: 60 CAPSULE | OUTPATIENT
Start: 2025-04-09

## 2025-04-09 RX ORDER — ASPIRIN 81 MG/1
81 TABLET, COATED ORAL DAILY
Qty: 90 TABLET | Refills: 3 | OUTPATIENT
Start: 2025-04-09

## 2025-04-09 NOTE — TELEPHONE ENCOUNTER
Please see other refill encounter 4/7/25.     Berta SERRANO RN  [facility] Triage Team     1 day

## 2025-04-10 RX ORDER — SIMVASTATIN 20 MG
20 TABLET ORAL AT BEDTIME
Qty: 90 TABLET | Refills: 1 | Status: SHIPPED | OUTPATIENT
Start: 2025-04-10

## 2025-04-10 RX ORDER — LEVOTHYROXINE SODIUM 75 UG/1
75 TABLET ORAL DAILY
Qty: 90 TABLET | Refills: 1 | Status: SHIPPED | OUTPATIENT
Start: 2025-04-10

## 2025-04-10 RX ORDER — DULOXETIN HYDROCHLORIDE 60 MG/1
60 CAPSULE, DELAYED RELEASE ORAL DAILY
Qty: 180 CAPSULE | Refills: 1 | Status: SHIPPED | OUTPATIENT
Start: 2025-04-10

## 2025-04-10 RX ORDER — DULOXETIN HYDROCHLORIDE 60 MG/1
60 CAPSULE, DELAYED RELEASE ORAL DAILY
Qty: 90 CAPSULE | Refills: 1 | Status: SHIPPED | OUTPATIENT
Start: 2025-04-10

## 2025-04-14 ENCOUNTER — PATIENT OUTREACH (OUTPATIENT)
Dept: CARE COORDINATION | Facility: CLINIC | Age: 55
End: 2025-04-14
Payer: COMMERCIAL

## 2025-04-14 NOTE — PROGRESS NOTES
Clinic Care Coordination Contact  [facility]/Voicemail    Clinical Data: Care Coordinator Outreach    Outreach Documentation Number of Outreach Attempt   4/14/2025  11:46 AM 1       Left message on patient's voicemail with call back information and requested return call.      Plan:  Care Coordinator will try to reach patient again in 10 business days.    JAZMIN De Leon  Clinic Care Coordination  [facility] Clinics: [facility], [place], [facility], and [facility]  Phone: [phone]

## 2025-04-22 ENCOUNTER — ONCOLOGY VISIT (OUTPATIENT)
Dept: ONCOLOGY | Facility: CLINIC | Age: 55
End: 2025-04-22
Attending: INTERNAL MEDICINE
Payer: COMMERCIAL

## 2025-04-22 VITALS
BODY MASS INDEX: 34.49 KG/M2 | OXYGEN SATURATION: 99 % | SYSTOLIC BLOOD PRESSURE: 113 MMHG | HEART RATE: 99 BPM | WEIGHT: 202 LBS | HEIGHT: 64 IN | RESPIRATION RATE: 16 BRPM | DIASTOLIC BLOOD PRESSURE: 74 MMHG

## 2025-04-22 DIAGNOSIS — Z15.01 MONOALLELIC MUTATION OF PALB2 GENE: ICD-10-CM

## 2025-04-22 DIAGNOSIS — Z17.1 MALIGNANT NEOPLASM OF UPPER-OUTER QUADRANT OF RIGHT BREAST IN FEMALE, ESTROGEN RECEPTOR NEGATIVE (H): Primary | ICD-10-CM

## 2025-04-22 DIAGNOSIS — Z15.09 MONOALLELIC MUTATION OF PALB2 GENE: ICD-10-CM

## 2025-04-22 DIAGNOSIS — Z12.31 ENCOUNTER FOR SCREENING MAMMOGRAM FOR BREAST CANCER: ICD-10-CM

## 2025-04-22 DIAGNOSIS — Z15.89 MONOALLELIC MUTATION OF PALB2 GENE: ICD-10-CM

## 2025-04-22 DIAGNOSIS — Z91.89 AT RISK FOR BREAST CANCER: ICD-10-CM

## 2025-04-22 DIAGNOSIS — C50.411 MALIGNANT NEOPLASM OF UPPER-OUTER QUADRANT OF RIGHT BREAST IN FEMALE, ESTROGEN RECEPTOR NEGATIVE (H): Primary | ICD-10-CM

## 2025-04-22 PROCEDURE — 99214 OFFICE O/P EST MOD 30 MIN: CPT | Performed by: INTERNAL MEDICINE

## 2025-04-22 PROCEDURE — G0463 HOSPITAL OUTPT CLINIC VISIT: HCPCS | Performed by: INTERNAL MEDICINE

## 2025-04-22 PROCEDURE — G2211 COMPLEX E/M VISIT ADD ON: HCPCS | Performed by: INTERNAL MEDICINE

## 2025-04-22 ASSESSMENT — PAIN SCALES - GENERAL: PAINLEVEL_OUTOF10: MILD PAIN (3)

## 2025-04-22 NOTE — PROGRESS NOTES
ONCOLOGIC HISTORY:  Ms. Londono is a female with triple negative right breast cancer.   -PALB2  mutation.     1.  Bilateral diagnostic mammogram and right breast ultrasound on 10/09/2018 revealed an 8 mm hypoechoic mass at the 12 o'clock position and abnormal right axillary lymph node.     2.  Ultrasound-guided right breast and right axillary lymph node biopsy was done on 10/12/2018.    -Right breast biopsy revealed invasive carcinoma, grade 3.  There was DCIS.    -Right axillary lymph node biopsy revealed metastatic adenocarcinoma.  -ER negative, ID negative and HER-2/elton negative.   -Clinical T1b N1 M0.     3.  Patient received neoadjuvant chemotherapy with dose-dense Adriamycin and Cytoxan followed by weekly carboplatin and Taxol between 10/31/2018 and 04/03/2019.      4.  Patient underwent right breast lumpectomy and sentinel lymph node biopsy on 05/15/2019.  There was a 1.6 mm metastatic carcinoma in one of the lymph nodes.  There was no residual disease in the breast.  ypT0 ypN1mi M0 disease.   -The patient received adjuvant oral Xeloda.   -The patient received radiation to right breast and right supraclavicular region between 06/27/2019 and 08/13/2019. 5940 cGy in 33 fractions.     5.  She has PALB2  mutation.  The patient followed up in Genetics clinic.  She is getting alternating mammogram and breast MRI. Does not want prophylactic bilateral mastectomy.     6.  Brain MRI on 02/20/2024 does not reveal any brain metastasis.     SUBJECTIVE:    Ms. Londono is a 54-year-old female with triple negative right breast cancer diagnosed in 2018 status post   neoadjuvant chemotherapy, surgery, adjuvant xeloda and radiation.  She is in complete remission.     Patient has PALB2 mutation.  She does not want prophylactic bilateral mastectomy.  She has been getting alternating mammograms and MRI.  Mammogram on 09/23/2024 did not reveal and evidence of malignancy.     She gets intermittent foot pain.     No headache.  No  dizziness.  No shortness of breath.  No abdominal pain.  No nausea or vomiting.  No urinary or bowel complaints.  No bleeding.  No fever or night sweats. All other review of systems negative.     PHYSICAL EXAMINATION:   GENERAL:  Alert and oriented x 3.   VITAL SIGNS:  Reviewed.  ECOG PS of 0.     EYES:  No icterus.   NECK:  Supple. No lymphadenopathy.    AXILLAE:  No lymphadenopathy.   LUNGS:  Good air entry bilaterally.  No crackles or wheezing.   HEART:  Regular.  No murmur.   GI:  Abdomen is soft.  Nontender. No mass.   EXTREMITIES:  No pedal edema.  No calf swelling or tenderness.  -Right shoulder/scapular area examined.  No swelling, redness or tenderness.  SKIN:  No rash.   BREASTS:  Examined in the presence of CMA Caitlan Stromgren.  -Right breast exam does not reveal any mass or skin lesion.    -Left breast exam does not reveal any mass or skin lesion.      ASSESSMENT:    1.  A 54-year-old female with triple negative right breast cancer diagnosed in 2018.  No evidence of recurrence.  2.  PALB2 mutation.     PLAN:   -MRI breast in next few weeks.  -Mammogram in 6 months.  -Follow-up in 1 year.     DISCUSSION:  1.  Patient is doing well from breast cancer.  No evidence of recurrence.      2.  Patient has PALB2 mutation. She is at increased risk for breast cancer.  She does not want prophylactic bilateral mastectomy.  We will continue to monitor her with alternating mammogram and MRI every 6 months.  Will schedule breast MRI for next few weeks.  She will have mammogram in 6 months    3.  She gets some foot pain.  MRI ankle in February did not reveal any evidence of malignancy.  She will continue to follow-up with PCP.     4.  She had few questions which were all answered. I will see her in 1 year.  Advised her to call us with any problems.     Total visit time of 30 minutes.  Time spent in today's visit, review of chart/investigations today and documentation today.

## 2025-04-22 NOTE — LETTER
"4/22/2025      Luci Londono  [address]  [facility] [address]      Dear Colleague,    Thank you for referring your patient, Luci C Alert, to the [facility]. Please see a copy of my visit note below.    Oncology Rooming Note    April 22, 2025 2:47 PM   Luci Londono is a 54 year old female who presents for:    Chief Complaint   Patient presents with     Oncology Clinic Visit     Initial Vitals: /74   Pulse 99   Resp 16   Ht 1.626 m (5' 4\")   Wt 91.6 kg (202 lb)   SpO2 99%   BMI 34.67 kg/m   Estimated body mass index is 34.67 kg/m  as calculated from the following:    Height as of this encounter: 1.626 m (5' 4\").    Weight as of this encounter: 91.6 kg (202 lb). Body surface area is 2.03 meters squared.  Mild Pain (3) Comment: Data Unavailable   No LMP recorded. Patient is premenopausal.  Allergies reviewed: Yes  Medications reviewed: Yes    Medications: Medication refills not needed today.  Pharmacy name entered into [email or web address]: [id number] - [place], [place] - [address] AT [facility]    Frailty Screening:   Is the patient here for a new oncology consult visit in cancer care? 2. No    PHQ9:  Did this patient require a PHQ9?: No          Gina Pina MA              ONCOLOGIC HISTORY:  Ms. Londono is a female with triple negative right breast cancer.   -PALB2  mutation.     1.  Bilateral diagnostic mammogram and right breast ultrasound on 10/09/2018 revealed an 8 mm hypoechoic mass at the 12 o'clock position and abnormal right axillary lymph node.     2.  Ultrasound-guided right breast and right axillary lymph node biopsy was done on 10/12/2018.    -Right breast biopsy revealed invasive carcinoma, grade 3.  There was DCIS.    -Right axillary lymph node biopsy revealed metastatic adenocarcinoma.  -ER negative, CA negative and HER-2/elton negative.   -Clinical T1b N1 M0.     3.  Patient received neoadjuvant chemotherapy with dose-dense " Adriamycin and Cytoxan followed by weekly carboplatin and Taxol between 10/31/2018 and 04/03/2019.      4.  Patient underwent right breast lumpectomy and sentinel lymph node biopsy on 05/15/2019.  There was a 1.6 mm metastatic carcinoma in one of the lymph nodes.  There was no residual disease in the breast.  ypT0 ypN1mi M0 disease.   -The patient received adjuvant oral Xeloda.   -The patient received radiation to right breast and right supraclavicular region between 06/27/2019 and 08/13/2019. 5940 cGy in 33 fractions.     5.  She has PALB2  mutation.  The patient followed up in Genetics clinic.  She is getting alternating mammogram and breast MRI. Does not want prophylactic bilateral mastectomy.     6.  Brain MRI on 02/20/2024 does not reveal any brain metastasis.     SUBJECTIVE:    Ms. Londono is a 54-year-old female with triple negative right breast cancer diagnosed in 2018 status post   neoadjuvant chemotherapy, surgery, adjuvant xeloda and radiation.  She is in complete remission.     Patient has PALB2 mutation.  She does not want prophylactic bilateral mastectomy.  She has been getting alternating mammograms and MRI.  Mammogram on 09/23/2024 did not reveal and evidence of malignancy.     She gets intermittent foot pain.     No headache.  No dizziness.  No shortness of breath.  No abdominal pain.  No nausea or vomiting.  No urinary or bowel complaints.  No bleeding.  No fever or night sweats. All other review of systems negative.     PHYSICAL EXAMINATION:   GENERAL:  Alert and oriented x 3.   VITAL SIGNS:  Reviewed.  ECOG PS of 0.     EYES:  No icterus.   NECK:  Supple. No lymphadenopathy.    AXILLAE:  No lymphadenopathy.   LUNGS:  Good air entry bilaterally.  No crackles or wheezing.   HEART:  Regular.  No murmur.   GI:  Abdomen is soft.  Nontender. No mass.   EXTREMITIES:  No pedal edema.  No calf swelling or tenderness.  -Right shoulder/scapular area examined.  No swelling, redness or tenderness.  SKIN:  No  rash.   BREASTS:  Examined in the presence of GIANCARLO Pina.  -Right breast exam does not reveal any mass or skin lesion.    -Left breast exam does not reveal any mass or skin lesion.      ASSESSMENT:    1.  A 54-year-old female with triple negative right breast cancer diagnosed in 2018.  No evidence of recurrence.  2.  PALB2 mutation.     PLAN:   -MRI breast in next few weeks.  -Mammogram in 6 months.  -Follow-up in 1 year.     DISCUSSION:  1.  Patient is doing well from breast cancer.  No evidence of recurrence.      2.  Patient has PALB2 mutation. She is at increased risk for breast cancer.  She does not want prophylactic bilateral mastectomy.  We will continue to monitor her with alternating mammogram and MRI every 6 months.  Will schedule breast MRI for next few weeks.  She will have mammogram in 6 months    3.  She gets some foot pain.  MRI ankle in February did not reveal any evidence of malignancy.  She will continue to follow-up with PCP.     4.  She had few questions which were all answered. I will see her in 1 year.  Advised her to call us with any problems.     Total visit time of 30 minutes.  Time spent in today's visit, review of chart/investigations today and documentation today.      Again, thank you for allowing me to participate in the care of your patient.        Sincerely,        Ailyn Humphrey MD    Electronically signed

## 2025-04-22 NOTE — PROGRESS NOTES
"Oncology Rooming Note    April 22, 2025 2:47 PM   Luci Londono is a 54 year old female who presents for:    Chief Complaint   Patient presents with    Oncology Clinic Visit     Initial Vitals: /74   Pulse 99   Resp 16   Ht 1.626 m (5' 4\")   Wt 91.6 kg (202 lb)   SpO2 99%   BMI 34.67 kg/m   Estimated body mass index is 34.67 kg/m  as calculated from the following:    Height as of this encounter: 1.626 m (5' 4\").    Weight as of this encounter: 91.6 kg (202 lb). Body surface area is 2.03 meters squared.  Mild Pain (3) Comment: Data Unavailable   No LMP recorded. Patient is premenopausal.  Allergies reviewed: Yes  Medications reviewed: Yes    Medications: Medication refills not needed today.  Pharmacy name entered into [employer]: [facility] #[id number] - [place], [place] - [address] AT [facility] & [facility]    Frailty Screening:   Is the patient here for a new oncology consult visit in cancer care? 2. No    PHQ9:  Did this patient require a PHQ9?: No          Gina Pina MA            "

## 2025-04-28 ENCOUNTER — PATIENT OUTREACH (OUTPATIENT)
Dept: CARE COORDINATION | Facility: CLINIC | Age: 55
End: 2025-04-28
Payer: COMMERCIAL

## 2025-04-28 NOTE — PROGRESS NOTES
Clinic Care Coordination Contact  [facility]/Voicemail    Clinical Data: Care Coordinator Outreach    Outreach Documentation Number of Outreach Attempt   4/14/2025  11:46 AM 1   4/28/2025   3:52 PM 1       Left message on patient's voicemail with call back information and requested return call.      Plan: Care Coordinator will try to reach patient again in 10 business days.    JAZMIN De Leon  Clinic Care Coordination  [facility] Clinics: [place], and [place] for Women  Phone: [phone]

## 2025-05-12 ENCOUNTER — PATIENT OUTREACH (OUTPATIENT)
Dept: CARE COORDINATION | Facility: CLINIC | Age: 55
End: 2025-05-12
Payer: COMMERCIAL

## 2025-05-12 NOTE — PROGRESS NOTES
Clinic Care Coordination Contact  [facility]/Voicemail    Clinical Data: Care Coordinator Outreach    Outreach Documentation Number of Outreach Attempt   4/14/2025  11:46 AM 1   4/28/2025   3:52 PM 1   5/12/2025   2:30 PM 1       Patient Called Back- LVM.  1st Attempt.    Left message on patient's voicemail with call back information and requested return call.      Plan: Care Coordinator will try to reach patient again in 10 business days.    JAZMIN De Leon  Clinic Care Coordination  [facility] Clin[facility]: [facility], [place], [place], and [place] for Women  Phone: [phone]

## 2025-05-29 ENCOUNTER — PATIENT OUTREACH (OUTPATIENT)
Dept: CARE COORDINATION | Facility: CLINIC | Age: 55
End: 2025-05-29
Payer: COMMERCIAL

## 2025-05-29 NOTE — PROGRESS NOTES
Clinic Care Coordination Contact  Community Health Worker Follow Up    Care Gaps:     Health Maintenance Due   Topic Date Due    HEPATITIS B VACCINE (1 of 3 - 19+ 3-dose series) Never done    COVID-19 VACCINE (4 - 2024-25 season) 09/01/2024       Did Not Address    Care Plan:   Care Plan: Nutrition       Problem: Diet management       Goal: Demonstrate improved access to helth food       Start Date: 3/10/2025 Expected End Date: 4/30/2025    Priority: High    Note:     Barriers: Finances are tight  Strengths: None noted  Patient expressed understanding of goal: Yes  Action steps to achieve this goal:  1. I will access [employer].  Survey completed on 3/10/25.  2. I will access other food resources such as [employer] as needed.                           Discussed:  Patient stated she has not used the [employer].  Patient stated she plans to go to [employer] for food support.  Patient stated she would like a listing of the Faare for All locations in [place], [place], and [place].  Patient stated she would like assistance for her energy & water bill.      Intervention and Education during outreach:     CHW provided the contact information for [employer] [phone] in [place].    CHW advised the deadline for the 2304-8112 if May 31st 2025 to apply.    CHW sent the following Fare for All locations via mail on 5/29/25:    [place]: [facility]  The dates below are 2025 Fare For All service dates.  ** New location starting in June!**  Monthly Day & Time  Monthly on a Friday, from 11:00am - 1:00pm.  Sale Dates  January 17, February 21, March 21, April 18, May 16, June 20, July 25, August 22, September 19, October 17, November 14, December 12  Address  [address] [place], [place], [facility] ([address])  The dates below are 2025 Fare For All service dates.  Monthly Day & Time  Monthly on a Wednesday, from 3:00pm - 5:00pm.  Sale Dates  January  22, February 26, March 26, April 23, May 21, June 25, July 30, August 27, September 24, October 22, November 19, December 17  Address  [address] [facility], [place] [address]     [place]: [facility]  The dates below are 2025 Fare For All service dates.  Monthly Day & Time  Monthly on a Tuesday, from 1:00pm - 3:00pm.  Sale Dates  January 14, February 25, March 25, April 22, May 20, June 24, July 29, August 26, September 23, October 14, November 18, December 16  Address  [address] [place] [address]     CHW encouraged patient to contact CC if there are any other changes or resources needed.  Patient acknowledged understanding.        CHW Plan:  will outreach in one month.    JAZMIN De Leon  Clinic Care Coordination  [facility]: [facility] [place], [place], [facility], and [place] for Women  Phone: [phone]

## 2025-05-29 NOTE — LETTER
[facility] CARE COORDINATION  [address] [place] [address] [address]  [place] [address]    May 29, 2025    Luci JORDAN Alert  [address]  [place] [address]      Dear Luci,    Enclosed you will find locations for Fare for All.    Please let me know if you have any questions or need more resources.    Thank you.    Nitza Echols, [facility]  Clinic Care Coordination  [facility] Clinics: [facility] [place], [place], and [place] for Women  Phone: [phone]

## 2025-06-03 ENCOUNTER — PATIENT OUTREACH (OUTPATIENT)
Dept: CARE COORDINATION | Facility: CLINIC | Age: 55
End: 2025-06-03
Payer: COMMERCIAL

## 2025-06-03 ASSESSMENT — ACTIVITIES OF DAILY LIVING (ADL): DEPENDENT_IADLS:: INDEPENDENT

## 2025-06-03 NOTE — LETTER
[facility] CARE COORDINATION    Lisa 3, 2025        Luci JORDAN Alert  [address] [place], [place] [address]  [facility] [address]          Dear Luci,     Attached is an updated Patient Centered Plan of Care for your continued enrollment in Care Coordination. Please let us know if you have additional questions, concerns, or goals that we can assist with.    Sincerely,    DAVID Donovan   Care Coordination Team  [phone]         [facility]  Patient Centered Plan of Care  About Me:        Patient Name:  Luci Londono    YOB: 1970  Age:         54 year old   [place] MRN:    [id number] Telephone Information:  Home Phone [phone]   Mobile [phone]   Home Phone Not on file.       Address:  [address] [place] [address] Email address:  [email or web address]      Emergency Contact(s)    Name Relationship Lgl Grd Work Phone Home Phone Mobile Phone   1. ELSY TOPETE Sister   [phone]   2. JERSEY SUMMERS Son   [phone]           Primary language:  English     needed? No   [place] [employer]:  [phone] op. 1  Other communication barriers:None    Preferred Method of Communication:  Mail  Current living arrangement: I live in a private home with family (Plus 3 children)    Mobility Status/ Medical Equipment: Independent        Health Maintenance  Health Maintenance Reviewed: Due/Overdue   Health Maintenance Due   Topic Date Due    Hepatitis B Vaccine (1 of 3 - 19+ 3-dose series) Never done    COVID-19 Vaccine (4 - 2024-25 season) 09/01/2024    HPV Screening  12/03/2025    PAP Smear  12/03/2025          My Access Plan  Medical Emergency 911   Primary [facility] Line [facility] [phone]   24 Hour Appointment Line [phone] or  [phone]) (toll-free)   24 Hour Nurse Line [phone] (toll-free)   Preferred Urgent Care No data recorded   Preferred Hospital [facility],  [place]  [phone]     [id number] [place], [place] - [address] AT [facility]     Behavioral Health Crisis Line The National Suicide Prevention Lifeline at 1-441.673.8804 or Text/Call 988           My Care Team Members  Patient Care Team         Relationship Specialty Notifications Start End    Melida Chandra MD PCP - General Internal Medicine  9/29/20     Phone: [phone] Fax: [phone]         [address] [facility] [address] [place] [address]    Ailyn Humphrey MD Assigned Cancer Care Provider   11/7/21     Phone: [phone] Fax: [phone] [address] [place] [address]    Melida Chandra MD Assigned PCP   3/6/22     Phone: [phone] Fax: [phone]         [address] [facility] [address] [place] [address]    Jan Sosa DPM Assigned Surgical Provider   2/23/25     Phone: [phone] Fax: [phone]         [address] [facility] [address] [facility] [address]    Katie Leonardo LSW Lead Care Coordinator Primary Care - CC Admissions 3/6/25     Phone: [phone]         Renetta Swain PA-C Physician Assistant Dermatology  3/6/25     Phone: [phone] Fax: [phone]         [address] [place] [address]    iNtza Echols CHW Community Health Worker Primary Care - CC Admissions 3/11/25     Phone: [phone]         Yandel Ansari MD Assigned Neuroscience Provider   4/23/25     Phone: [phone] Fax: [phone] [address] [place] [address]                My Care Plans  Self Management and Treatment Plan    Care Plan  Care Plan: Nutrition       Problem: Diet management       Goal: Demonstrate improved access to helth food       Start Date: 3/10/2025 Expected End Date: 7/31/2025    Recent Progress: 20%    Priority: High    Note:     Barriers: Finances are tight  Strengths: None noted  Patient expressed understanding of goal: Yes  Action steps to achieve this goal:  1. I will  access [employer].  Survey completed on 3/10/25.  2. I will access other food resources such as [employer] as needed.                               Action Plans on File:            Depression     Migraine    Advance Care Plans/Directives:   Advanced Care Plan/Directives on file: No    Discussed with patient/caregiver(s): No data recorded  Honoring Choices    Advance Care Planning and Health Care Directives  When it comes to decisions about your health care, it s important that your voice is heard. You may not always be able to speak for yourself.    We encourage you to have discussions with your loved ones, cultural or spiritual leaders and health care providers about your goals, values, beliefs and choices.    We are a part of [employer] [place] , supporting and promoting the benefits of advance care planning conversations.    Our goals are to:  Help you make informed decisions about your healthcare choices and ensure that those choices are honored  Offer advance care planning discussions with trained staff  Ensure your choices are clearly defined, documented and available in your medical record  Translate your choices into medical orders as needed    Why is Advance Care Planning important?  Know what your health care choices are and decide what is right for you  An unexpected illness or injury could leave you unable to participate in important treatment decisions  When choices are left to others to decide that responsibility can be difficult and stressful  By discussing and outlining your choices, your voice is heard in the care you want to receive    How can I learn more?  We offer free classes at multiple locations, days and times. Our trained facilitators will provide information and guide you through a Health Care Directive document. They can also review, notarize and add your document to your medical record.    Call [employer] at [phone] or toll free at [phone] for assistance.           My Medical and Care Information  Problem List   Patient Active Problem List   Diagnosis    Myalgia and myositis    Gastroesophageal reflux disease without esophagitis    Helicobacter pylori infection    Acquired hypothyroidism    Disorder of metabolism    Polycystic ovaries    Vitamin D insufficiency    BMI 35    Rosacea    Absence of menstruation    Chronic left-sided low back pain with left-sided sciatica    Pinguecula of both eyes    Presbyopia    Impaired fasting glucose    Migraine without aura and without status migrainosus, not intractable    Class 1 obesity due to excess calories with serious comorbidity and body mass index (BMI) of 31.0 to 31.9 in adult    Myalgia w hx of recurrence since 2012    Mixed hyperlipidemia    Malignant neoplasm of upper-outer quadrant of right breast in female, estrogen receptor negative (H)    Drug or medicinal substance causing adverse effect in therapeutic use, initial encounter    Abnormal electrocardiogram    Port-A-Cath in place    Breast cancer (H)    Monoallelic mutation of PALB2 gene    Acute bilateral low back pain with right-sided sciatica    Discomfort of right ear    Breast pain    Dyslipidemia    Prediabetes    Class 2 severe obesity due to excess calories with serious comorbidity in adult (H)    Partial tear of ligament of lateral aspect of ankle    Peroneal tendinitis of both lower legs    Peroneal neuritis, unspecified laterality      Current Medications:  Please refer to the most recent medication list provided to you by your medical team and reach out to your provider with any questions or to make any corrections.    Care Coordination Start Date: 3/5/2025   Frequency of Care Coordination: monthly, more frequently as needed     Form Last Updated: 06/03/2025

## 2025-06-03 NOTE — PROGRESS NOTES
Clinic Care Coordination Contact  Care Coordination Clinician Chart Review    Situation: Patient chart reviewed by Care Coordinator.       Background: Care Coordination Program started: 3/5/2025. Initial assessment completed and patient-centered care plan(s) were developed with participation from patient. Lead CC handed patient off to CHW for continued outreaches.       Assessment: Per chart review, patient outreach completed by CC CHW on 5/29/25.  Patient is actively working to accomplish goal(s). Patient's goal(s) appropriate and relevant at this time. Patient is due for updated Plan of Care.  Assessments will be completed annually or as needed/with change of patient status.      Care Plan: Nutrition       Problem: Diet management       Goal: Demonstrate improved access to [employer]       Start Date: 3/10/2025 Expected End Date: 7/31/2025    Recent Progress: 20%    Priority: High    Note:     Barriers: Finances are tight  Strengths: None noted  Patient expressed understanding of goal: Yes  Action steps to achieve this goal:  1. I will access [facility].  Survey completed on 3/10/25.  2. I will access other food resources such as [employer] as needed.                                  Plan/Recommendations: The patient will continue working with Care Coordination to achieve goal(s) as above. CHW will continue outreaches at minimum every 30 days and will involve Lead CC as needed or if patient is ready to move to Maintenance. Lead CC will continue to monitor CHW outreaches and patient's progress to goal(s) every 6 weeks.     Plan of Care updated and sent to patient: Yes, via DAVID Hernandez   Care Coordination Team  [phone]

## 2025-07-01 ENCOUNTER — PATIENT OUTREACH (OUTPATIENT)
Dept: CARE COORDINATION | Facility: CLINIC | Age: 55
End: 2025-07-01
Payer: COMMERCIAL

## 2025-07-01 NOTE — PROGRESS NOTES
"Clinic Care Coordination Contact  Community Health Worker Follow Up    Care Gaps:     Health Maintenance Due   Topic Date Due    HEPATITIS B VACCINE (1 of 3 - 19+ 3-dose series) Never done    COVID-19 VACCINE (4 - 2024-25 season) 09/01/2024    HPV TEST  12/03/2025    PAP  12/03/2025       Postponed to next CCC outreach     Care Plan:   Care Plan: Nutrition       Problem: Diet management       Goal: Demonstrate improved access to [employer]       Start Date: 3/10/2025 Expected End Date: 7/31/2025    This Visit's Progress: 30% Recent Progress: 20%    Priority: High    Note:     Barriers: Finances are tight  Strengths: None noted  Patient expressed understanding of goal: Yes  Action steps to achieve this goal:  1. I will access [employer] [facility].  Survey completed on 3/10/25.  2. I will access other food resources such as [employer] as needed.                               Intervention and Education during outreach: CHW Introduced intent of call regarding monthly follow up. Patient and CHW discussed the following:     Patient reported she has been doing \"good\" since last [facility] outreach.  Patient reported that she has not access [employer] [facility] as she gets off of work when it they close. Patient stated she will send her child up their to access [employer] [facility] tomorrow (7/2).   Patient denied any needs or concerns at this time but will reach out to [facility] team as needed     CHW Plan: Next follow-up outreach scheduled in one month.    JAZMIN Lundberg   [phone]  Clinic Care Coordination  [facility]    "

## 2025-07-15 ENCOUNTER — PATIENT OUTREACH (OUTPATIENT)
Dept: CARE COORDINATION | Facility: CLINIC | Age: 55
End: 2025-07-15
Payer: COMMERCIAL

## 2025-07-15 ASSESSMENT — ACTIVITIES OF DAILY LIVING (ADL): DEPENDENT_IADLS:: INDEPENDENT

## 2025-07-15 NOTE — PROGRESS NOTES
Clinic Care Coordination Contact  Care Coordination Clinician Chart Review    Situation: Patient chart reviewed by Care Coordinator.       Background: Care Coordination Program started: 3/5/2025. Initial assessment completed and patient-centered care plan(s) were developed with participation from patient. Lead CC handed patient off to CHW for continued outreaches.       Assessment: Per chart review, patient outreach completed by CC CHW on 7/1/25.  Patient is actively working to accomplish goal(s). Patient's goal(s) appropriate and relevant at this time. Patient is not due for updated Plan of Care.  Assessments will be completed annually or as needed/with change of patient status.      Care Plan: Nutrition       Problem: Diet management       Goal: Demonstrate improved access to [employer]       Start Date: 3/10/2025 Expected End Date: 8/29/2025    Recent Progress: 30%    Priority: High    Note:     Barriers: Finances are tight  Strengths: None noted  Patient expressed understanding of goal: Yes  Action steps to achieve this goal:  1. I will access [facility].  Survey completed on 3/10/25.  2. I will access other food resources such as [employer] as needed.                                  Plan/Recommendations: The patient will continue working with Care Coordination to achieve goal(s) as above. CHW will continue outreaches at minimum every 30 days and will involve Lead CC as needed or if patient is ready to move to Maintenance. Lead CC will continue to monitor CHW outreaches and patient's progress to goal(s) every 6 weeks.     Plan of Care updated and sent to patient: DAVID Schuler   Care Coordination Team  [phone]

## 2025-08-25 ENCOUNTER — PATIENT OUTREACH (OUTPATIENT)
Dept: CARE COORDINATION | Facility: CLINIC | Age: 55
End: 2025-08-25
Payer: COMMERCIAL

## 2025-08-26 ENCOUNTER — PATIENT OUTREACH (OUTPATIENT)
Dept: CARE COORDINATION | Facility: CLINIC | Age: 55
End: 2025-08-26
Payer: COMMERCIAL

## (undated) DEVICE — SUTURE BOOTS 051003PBX

## (undated) DEVICE — SOL WATER IRRIG 1000ML BOTTLE 2F7114

## (undated) DEVICE — SU VICRYL 2-0 SH 27" J317H

## (undated) DEVICE — SOL NACL 0.9% IRRIG 1000ML BOTTLE 07138-09

## (undated) DEVICE — COVER ULTRASOUND PROBE 6X48" PC1290

## (undated) DEVICE — DRAPE COVER C-ARM SEAMLESS SNAP-KAP 03-KP26 LATEX FREE

## (undated) DEVICE — BLADE KNIFE SURG 11 371111

## (undated) DEVICE — SUCTION CANISTER MEDIVAC LINER 3000ML W/LID 65651-530

## (undated) DEVICE — SYR 10ML FINGER CONTROL W/O NDL 309695

## (undated) DEVICE — SYR 10ML SLIP TIP W/O NDL

## (undated) DEVICE — GLOVE PROTEXIS MICRO 6.0  2D73PM60

## (undated) DEVICE — SU MONOCRYL 4-0 PS-2 18" UND Y496G

## (undated) DEVICE — PACK MINOR SBA15MIFSE

## (undated) DEVICE — PAD CHUX UNDERPAD 23X24" 7136

## (undated) DEVICE — DECANTER VIAL 2006S

## (undated) DEVICE — GLOVE PROTEXIS BLUE W/NEU-THERA 6.5  2D73EB65

## (undated) DEVICE — ESU ELEC BLADE 2.75" COATED/INSULATED E1455

## (undated) DEVICE — PREP CHLORAPREP 26ML TINTED ORANGE  260815

## (undated) DEVICE — SU SILK 2-0 FSL 18" 677G

## (undated) DEVICE — DRAPE LAP TRANSVERSE 29421

## (undated) DEVICE — LIGHT HANDLE X2

## (undated) DEVICE — LINEN TOWEL PACK X5 5464

## (undated) DEVICE — DRAIN JACKSON PRATT RESERVOIR 100ML SU130-1305

## (undated) DEVICE — SU ETHILON 3-0 FS-1 18" 669H

## (undated) DEVICE — DECANTER BAG 2002S

## (undated) DEVICE — SU VICRYL 3-0 SH 27" J316H

## (undated) DEVICE — DRSG GAUZE 4X4" 3033

## (undated) DEVICE — SOL NACL 0.9% INJ 250ML BAG 2B1322Q

## (undated) DEVICE — SLEEVE PROTECTIVE BREAST BIOPSY  GMSLV001-10

## (undated) DEVICE — DRAIN JACKSON PRATT 15FR ROUND SU130-1323

## (undated) DEVICE — SU PROLENE 2-0 CT-2 30" 8411H

## (undated) DEVICE — CLIP APPLIER 09 3/8" SM LIGACLIP MCS20

## (undated) DEVICE — CLIP ETHICON LIGACLIP SM BLUE LT100

## (undated) DEVICE — ESU GROUND PAD UNIVERSAL W/O CORD

## (undated) DEVICE — ESU PENCIL W/SMOKE EVAC NEPTUNE STRYKER 0703-046-000

## (undated) DEVICE — SU DERMABOND MINI DHVM12

## (undated) DEVICE — SU VICRYL 3-0 TIE 12X18" J904T

## (undated) DEVICE — DRSG STERI STRIP 1/4X3" R1541

## (undated) DEVICE — DRAPE BREAST/CHEST 29420

## (undated) RX ORDER — PROPOFOL 10 MG/ML
INJECTION, EMULSION INTRAVENOUS
Status: DISPENSED
Start: 2019-05-15

## (undated) RX ORDER — HYDROMORPHONE HYDROCHLORIDE 1 MG/ML
INJECTION, SOLUTION INTRAMUSCULAR; INTRAVENOUS; SUBCUTANEOUS
Status: DISPENSED
Start: 2019-05-15

## (undated) RX ORDER — FENTANYL CITRATE 50 UG/ML
INJECTION, SOLUTION INTRAMUSCULAR; INTRAVENOUS
Status: DISPENSED
Start: 2018-10-18

## (undated) RX ORDER — FENTANYL CITRATE 50 UG/ML
INJECTION, SOLUTION INTRAMUSCULAR; INTRAVENOUS
Status: DISPENSED
Start: 2022-01-26

## (undated) RX ORDER — FENTANYL CITRATE 50 UG/ML
INJECTION, SOLUTION INTRAMUSCULAR; INTRAVENOUS
Status: DISPENSED
Start: 2019-05-15

## (undated) RX ORDER — HEPARIN SODIUM (PORCINE) LOCK FLUSH IV SOLN 100 UNIT/ML 100 UNIT/ML
SOLUTION INTRAVENOUS
Status: DISPENSED
Start: 2018-10-22

## (undated) RX ORDER — HEPARIN SODIUM (PORCINE) LOCK FLUSH IV SOLN 100 UNIT/ML 100 UNIT/ML
SOLUTION INTRAVENOUS
Status: DISPENSED
Start: 2018-10-18

## (undated) RX ORDER — HYDROCODONE BITARTRATE AND ACETAMINOPHEN 5; 325 MG/1; MG/1
TABLET ORAL
Status: DISPENSED
Start: 2018-10-18

## (undated) RX ORDER — REGADENOSON 0.08 MG/ML
INJECTION, SOLUTION INTRAVENOUS
Status: DISPENSED
Start: 2018-10-26

## (undated) RX ORDER — LIDOCAINE HYDROCHLORIDE 20 MG/ML
INJECTION, SOLUTION EPIDURAL; INFILTRATION; INTRACAUDAL; PERINEURAL
Status: DISPENSED
Start: 2019-05-15

## (undated) RX ORDER — DEXAMETHASONE SODIUM PHOSPHATE 4 MG/ML
INJECTION, SOLUTION INTRA-ARTICULAR; INTRALESIONAL; INTRAMUSCULAR; INTRAVENOUS; SOFT TISSUE
Status: DISPENSED
Start: 2019-05-15

## (undated) RX ORDER — HEPARIN SODIUM 1000 [USP'U]/ML
INJECTION, SOLUTION INTRAVENOUS; SUBCUTANEOUS
Status: DISPENSED
Start: 2018-10-18

## (undated) RX ORDER — ONDANSETRON 2 MG/ML
INJECTION INTRAMUSCULAR; INTRAVENOUS
Status: DISPENSED
Start: 2019-05-15

## (undated) RX ORDER — CEFAZOLIN SODIUM 2 G/100ML
INJECTION, SOLUTION INTRAVENOUS
Status: DISPENSED
Start: 2019-05-15

## (undated) RX ORDER — PROPOFOL 10 MG/ML
INJECTION, EMULSION INTRAVENOUS
Status: DISPENSED
Start: 2018-10-18

## (undated) RX ORDER — LIDOCAINE HYDROCHLORIDE 10 MG/ML
INJECTION, SOLUTION INFILTRATION; PERINEURAL
Status: DISPENSED
Start: 2018-10-18